# Patient Record
Sex: MALE | Race: ASIAN | NOT HISPANIC OR LATINO | Employment: OTHER | URBAN - METROPOLITAN AREA
[De-identification: names, ages, dates, MRNs, and addresses within clinical notes are randomized per-mention and may not be internally consistent; named-entity substitution may affect disease eponyms.]

---

## 2017-08-09 RX ORDER — ATORVASTATIN CALCIUM 10 MG/1
10 TABLET, FILM COATED ORAL NIGHTLY
COMMUNITY

## 2017-08-09 RX ORDER — TAMSULOSIN HYDROCHLORIDE 0.4 MG/1
0.4 CAPSULE ORAL DAILY
Status: ON HOLD | COMMUNITY
End: 2017-08-14

## 2017-08-09 RX ORDER — GLIMEPIRIDE 1 MG/1
1 TABLET ORAL EVERY MORNING
COMMUNITY

## 2017-08-14 ENCOUNTER — HOSPITAL ENCOUNTER (OUTPATIENT)
Facility: MEDICAL CENTER | Age: 77
DRG: 666 | End: 2017-08-14
Attending: UROLOGY | Admitting: UROLOGY
Payer: MEDICARE

## 2017-08-14 VITALS
HEIGHT: 69 IN | HEART RATE: 65 BPM | SYSTOLIC BLOOD PRESSURE: 134 MMHG | RESPIRATION RATE: 16 BRPM | BODY MASS INDEX: 25.8 KG/M2 | OXYGEN SATURATION: 94 % | WEIGHT: 174.16 LBS | TEMPERATURE: 97.7 F | DIASTOLIC BLOOD PRESSURE: 70 MMHG

## 2017-08-14 PROBLEM — N40.1 BENIGN PROSTATIC HYPERTROPHY WITH LOWER URINARY TRACT SYMPTOMS (LUTS): Status: ACTIVE | Noted: 2017-08-14

## 2017-08-14 LAB
GLUCOSE BLD-MCNC: 141 MG/DL (ref 65–99)
GLUCOSE BLD-MCNC: 148 MG/DL (ref 65–99)

## 2017-08-14 PROCEDURE — 0V508ZZ DESTRUCTION OF PROSTATE, VIA NATURAL OR ARTIFICIAL OPENING ENDOSCOPIC: ICD-10-PCS | Performed by: UROLOGY

## 2017-08-14 RX ORDER — ATROPA BELLADONNA AND OPIUM 16.2; 6 MG/1; MG/1
60 SUPPOSITORY RECTAL
Status: DISCONTINUED | OUTPATIENT
Start: 2017-08-14 | End: 2017-08-14 | Stop reason: HOSPADM

## 2017-08-14 RX ORDER — MAGNESIUM HYDROXIDE 1200 MG/15ML
LIQUID ORAL
Status: DISCONTINUED | OUTPATIENT
Start: 2017-08-14 | End: 2017-08-14 | Stop reason: HOSPADM

## 2017-08-14 RX ORDER — OXYCODONE HCL 5 MG/5 ML
SOLUTION, ORAL ORAL
Status: COMPLETED
Start: 2017-08-14 | End: 2017-08-14

## 2017-08-14 RX ORDER — LIDOCAINE HYDROCHLORIDE 10 MG/ML
0.5 INJECTION, SOLUTION INFILTRATION; PERINEURAL
Status: COMPLETED | OUTPATIENT
Start: 2017-08-14 | End: 2017-08-14

## 2017-08-14 RX ORDER — LIDOCAINE HYDROCHLORIDE 10 MG/ML
INJECTION, SOLUTION INFILTRATION; PERINEURAL
Status: COMPLETED
Start: 2017-08-14 | End: 2017-08-14

## 2017-08-14 RX ORDER — SILODOSIN 8 MG/1
8 CAPSULE ORAL
COMMUNITY

## 2017-08-14 RX ORDER — PHENAZOPYRIDINE HYDROCHLORIDE 200 MG/1
200 TABLET, FILM COATED ORAL 3 TIMES DAILY PRN
Qty: 12 TAB | Refills: 0 | Status: SHIPPED | OUTPATIENT
Start: 2017-08-14

## 2017-08-14 RX ORDER — LIDOCAINE AND PRILOCAINE 25; 25 MG/G; MG/G
1 CREAM TOPICAL
Status: COMPLETED | OUTPATIENT
Start: 2017-08-14 | End: 2017-08-14

## 2017-08-14 RX ORDER — MIDAZOLAM HYDROCHLORIDE 1 MG/ML
INJECTION INTRAMUSCULAR; INTRAVENOUS
Status: DISCONTINUED
Start: 2017-08-14 | End: 2017-08-14 | Stop reason: HOSPADM

## 2017-08-14 RX ORDER — SODIUM CHLORIDE 9 MG/ML
INJECTION, SOLUTION INTRAVENOUS CONTINUOUS
Status: DISCONTINUED | OUTPATIENT
Start: 2017-08-14 | End: 2017-08-14 | Stop reason: HOSPADM

## 2017-08-14 RX ORDER — OXYCODONE HYDROCHLORIDE AND ACETAMINOPHEN 5; 325 MG/1; MG/1
1 TABLET ORAL EVERY 4 HOURS PRN
Status: DISCONTINUED | OUTPATIENT
Start: 2017-08-14 | End: 2017-08-14 | Stop reason: HOSPADM

## 2017-08-14 RX ORDER — OXYCODONE HYDROCHLORIDE AND ACETAMINOPHEN 5; 325 MG/1; MG/1
1 TABLET ORAL EVERY 4 HOURS PRN
Qty: 10 TAB | Refills: 0 | Status: SHIPPED | OUTPATIENT
Start: 2017-08-14

## 2017-08-14 RX ORDER — CIPROFLOXACIN 500 MG/1
500 TABLET, FILM COATED ORAL 2 TIMES DAILY
Qty: 4 TAB | Refills: 0 | Status: ON HOLD | OUTPATIENT
Start: 2017-08-14 | End: 2017-08-17

## 2017-08-14 RX ADMIN — LIDOCAINE HYDROCHLORIDE 0.5 ML: 10 INJECTION, SOLUTION INFILTRATION; PERINEURAL at 13:45

## 2017-08-14 RX ADMIN — SODIUM CHLORIDE: 9 INJECTION, SOLUTION INTRAVENOUS at 13:45

## 2017-08-14 RX ADMIN — OXYCODONE HYDROCHLORIDE 5 MG: 5 SOLUTION ORAL at 17:14

## 2017-08-14 ASSESSMENT — PAIN SCALES - GENERAL
PAINLEVEL_OUTOF10: 3
PAINLEVEL_OUTOF10: 3
PAINLEVEL_OUTOF10: ASSUMED PAIN PRESENT
PAINLEVEL_OUTOF10: 0

## 2017-08-14 NOTE — IP AVS SNAPSHOT
" Home Care Instructions                                                                                                                Name:Miri Joseph  Medical Record Number:[id number]  CSN: [id number]    YOB: 1940   Age: 77 y.o.  Sex: male  HT:1.753 m (5' 9\") WT: 79 kg (174 lb 2.6 oz)          Admit Date: 8/14/2017     Discharge Date:   Today's Date: 8/14/2017  Attending Doctor:  Sagar Boykin M.D.                  Allergies:  Review of patient's allergies indicates no known allergies.                Discharge Instructions         ACTIVITY: Rest and take it easy for the first 24 hours.  A responsible adult is recommended to remain with you during that time.  It is normal to feel sleepy.  We encourage you to not do anything that requires balance, judgment or coordination.    MILD FLU-LIKE SYMPTOMS ARE NORMAL. YOU MAY EXPERIENCE GENERALIZED MUSCLE ACHES, THROAT IRRITATION, HEADACHE AND/OR SOME NAUSEA.    FOR 24 HOURS DO NOT:  Drive, operate machinery or run household appliances.  Drink beer or alcoholic beverages.   Make important decisions or sign legal documents.    SPECIAL INSTRUCTIONS: *Call if fever >101F, pain not controlled with medications, or if catheter not draining  *Mckee Catheter Care, Adult  A Mckee catheter is a soft, flexible tube that is placed into the bladder to drain urine. A Mckee catheter may be inserted if:  · You leak urine or are not able to control when you urinate (urinary incontinence).  · You are not able to urinate when you need to (urinary retention).  · You had prostate surgery or surgery on the genitals.  · You have certain medical conditions, such as multiple sclerosis, dementia, or a spinal cord injury.  If you are going home with a Mckee catheter in place, follow the instructions below.  TAKING CARE OF THE CATHETER  1. Wash your hands with soap and water.  2. Using mild soap and warm water on a clean washcloth:  ¨ Clean the area on your body closest to the catheter " insertion site using a circular motion, moving away from the catheter. Never wipe toward the catheter because this could sweep bacteria up into the urethra and cause infection.  ¨ Remove all traces of soap. Pat the area dry with a clean towel. For males, reposition the foreskin.  3. Attach the catheter to your leg so there is no tension on the catheter. Use adhesive tape or a leg strap. If you are using adhesive tape, remove any sticky residue left behind by the previous tape you used.  4. Keep the drainage bag below the level of the bladder, but keep it off the floor.  5. Check throughout the day to be sure the catheter is working and urine is draining freely. Make sure the tubing does not become kinked.  6. Do not pull on the catheter or try to remove it. Pulling could damage internal tissues.  TAKING CARE OF THE DRAINAGE BAGS  You will be given two drainage bags to take home. One is a large overnight drainage bag, and the other is a smaller leg bag that fits underneath clothing. You may wear the overnight bag at any time, but you should never wear the smaller leg bag at night. Follow the instructions below for how to empty, change, and clean your drainage bags.  Emptying the Drainage Bag  You must empty your drainage bag when it is  -½ full or at least 2-3 times a day.  1. Wash your hands with soap and water.  2. Keep the drainage bag below your hips, below the level of your bladder. This stops urine from going back into the tubing and into your bladder.  3. Hold the dirty bag over the toilet or a clean container.  4. Open the pour spout at the bottom of the bag and empty the urine into the toilet or container. Do not let the pour spout touch the toilet, container, or any other surface. Doing so can place bacteria on the bag, which can cause an infection.  5. Clean the pour spout with a gauze pad or cotton ball that has rubbing alcohol on it.  6. Close the pour spout.  7. Attach the bag to your leg with adhesive  tape or a leg strap.  8. Wash your hands well.  Changing the Drainage Bag  Change your drainage bag once a month or sooner if it starts to smell bad or look dirty. Below are steps to follow when changing the drainage bag.  1. Wash your hands with soap and water.  2. Pinch off the rubber catheter so that urine does not spill out.  3. Disconnect the catheter tube from the drainage tube at the connection valve. Do not let the tubes touch any surface.  4. Clean the end of the catheter tube with an alcohol wipe. Use a different alcohol wipe to clean the end of the drainage tube.  5. Connect the catheter tube to the drainage tube of the clean drainage bag.  6. Attach the new bag to the leg with adhesive tape or a leg strap. Avoid attaching the new bag too tightly.  7. Wash your hands well.  Cleaning the Drainage Bag  1. Wash your hands with soap and water.  2. Wash the bag in warm, soapy water.  3. Rinse the bag thoroughly with warm water.  4. Fill the bag with a solution of white vinegar and water (1 cup vinegar to 1 qt warm water [.2 L vinegar to 1 L warm water]). Close the bag and soak it for 30 minutes in the solution.  5. Rinse the bag with warm water.  6. Hang the bag to dry with the pour spout open and hanging downward.  7. Store the clean bag (once it is dry) in a clean plastic bag.  8. Wash your hands well.  PREVENTING INFECTION  · Wash your hands before and after handling your catheter.  · Take showers daily and wash the area where the catheter enters your body. Do not take baths. Replace wet leg straps with dry ones, if this applies.  · Do not use powders, sprays, or lotions on the genital area. Only use creams, lotions, or ointments as directed by your caregiver.  · For females, wipe from front to back after each bowel movement.  · Drink enough fluids to keep your urine clear or pale yellow unless you have a fluid restriction.  · Do not let the drainage bag or tubing touch or lie on the floor.  · Wear cotton  underwear to absorb moisture and to keep your .  SEEK MEDICAL CARE IF:   · Your urine is cloudy or smells unusually bad.  · Your catheter becomes clogged.  · You are not draining urine into the bag or your bladder feels full.  · Your catheter starts to leak.  SEEK IMMEDIATE MEDICAL CARE IF:   · You have pain, swelling, redness, or pus where the catheter enters the body.  · You have pain in the abdomen, legs, lower back, or bladder.  · You have a fever.  · You see blood fill the catheter, or your urine is pink or red.  · You have nausea, vomiting, or chills.  · Your catheter gets pulled out.  MAKE SURE YOU:   · Understand these instructions.  · Will watch your condition.  · Will get help right away if you are not doing well or get worse.     This information is not intended to replace advice given to you by your health care provider. Make sure you discuss any questions you have with your health care provider.     Document Released: 12/18/2006 Document Revised: 05/04/2015 Document Reviewed: 12/09/2013  [employer] Interactive Patient Education ©2016 [employer]  *    DIET: To avoid nausea, slowly advance diet as tolerated, avoiding spicy or greasy foods for the first day.  Add more substantial food to your diet according to your physician's instructions.  INCREASE FLUIDS AND FIBER TO AVOID CONSTIPATION.    SURGICAL DRESSING/BATHING: *ok to shower**    FOLLOW-UP APPOINTMENT:  A follow-up appointment should be arranged with your doctor in 1-2 weeks; call to schedule.    You should CALL YOUR PHYSICIAN if you develop:  Fever greater than 101 degrees F.  Pain not relieved by medication, or persistent nausea or vomiting.  Excessive bleeding (blood soaking through dressing) or unexpected drainage from the wound.  Extreme redness or swelling around the incision site, drainage of pus or foul smelling drainage.  Inability to urinate or empty your bladder within 8 hours.  Problems with breathing or chest pain.    You  should call 911 if you develop problems with breathing or chest pain.  If you are unable to contact your doctor or surgical center, you should go to the nearest emergency room or urgent care center.    Physician's telephone #: [phone]    If any questions arise, call your doctor.  If your doctor is not available, please feel free to call the Surgical Center at [phone].  The Center is open Monday through Friday from 7AM to 7PM.  You can also call the HEALTH HOTLINE open 24 hours/day, 7 days/week and speak to a nurse at [phone], or toll free at [phone].    A registered nurse may call you a few days after your surgery to see how you are doing after your procedure.    MEDICATIONS: Resume taking daily medication.  Take prescribed pain medication with food.  If no medication is prescribed, you may take non-aspirin pain medication if needed.  PAIN MEDICATION CAN BE VERY CONSTIPATING.  Take a stool softener or laxative such as senokot, pericolace, or milk of magnesia if needed.    Prescription given for **Pyridium, Cipro, Percocet*.  Last pain medication given at **5:15 p.m. - next dose is at 9:15 p.m.*.    If your physician has prescribed pain medication that includes Acetaminophen (Tylenol), do not take additional Acetaminophen (Tylenol) while taking the prescribed medication.    Depression / Suicide Risk    As you are discharged from this [facility] facility, it is important to learn how to keep safe from harming yourself.    Recognize the warning signs:  · Abrupt changes in personality, positive or negative- including increase in energy   · Giving away possessions  · Change in eating patterns- significant weight changes-  positive or negative  · Change in sleeping patterns- unable to sleep or sleeping all the time   · Unwillingness or inability to communicate  · Depression  · Unusual sadness, discouragement and loneliness  · Talk of wanting to die  · Neglect of personal  appearance   · Rebelliousness- reckless behavior  · Withdrawal from people/activities they love  · Confusion- inability to concentrate     If you or a loved one observes any of these behaviors or has concerns about self-harm, here's what you can do:  · Talk about it- your feelings and reasons for harming yourself  · Remove any means that you might use to hurt yourself (examples: pills, rope, extension cords, firearm)  · Get professional help from the community (Mental Health, Substance Abuse, psychological counseling)  · Do not be alone:Call your Safe Contact- someone whom you trust who will be there for you.  · Call your local CRISIS HOTLINE 891-7732 or 655-005-5979  · Call your local Children's Mobile Crisis Response Team [place] (421) 194-4760 or www.[employer]  · Call the toll free National Suicide Prevention Hotlines   · National Suicide Prevention Lifeline 582-483-LSYB (6716)  · [employer] Line Network 800-SUICIDE (789-9061)       Medication List      START taking these medications        Instructions    Morning Afternoon Evening Bedtime    ciprofloxacin 500 MG Tabs   Commonly known as:  CIPRO        Take 1 Tab by mouth 2 times a day.   Dose:  500 mg                        oxycodone-acetaminophen 5-325 MG Tabs   Commonly known as:  PERCOCET        Take 1 Tab by mouth every four hours as needed for Moderate Pain.   Dose:  1 Tab                        phenazopyridine 200 MG Tabs   Commonly known as:  PYRIDIUM        Take 1 Tab by mouth 3 times a day as needed (burning with urination).   Dose:  200 mg                          CONTINUE taking these medications        Instructions    Morning Afternoon Evening Bedtime    atorvastatin 10 MG Tabs   Commonly known as:  LIPITOR        Take 10 mg by mouth every evening.   Dose:  10 mg                        glimepiride 1 MG tablet   Commonly known as:  AMARYL        Take 1 mg by mouth every morning.   Dose:  1 mg                        JANUVIA 100 MG Tabs    Generic drug:  sitagliptin        Take 100 mg by mouth every day.   Dose:  100 mg                        metformin 1000 MG tablet   Commonly known as:  GLUCOPHAGE        Take 1,000 mg by mouth 2 times a day.   Dose:  1000 mg                        Non Formulary Request        Take 1 Tab by mouth every day. Cadifast from [facility] for eyes   Dose:  1 Tab                        RAPAFLO 8 MG Caps capsule   Generic drug:  silodosin        Take 8 mg by mouth ONE-HALF HOUR AFTER BREAKFAST.   Dose:  8 mg                          STOP taking these medications     aspirin 81 MG tablet               tamsulosin 0.4 MG capsule   Commonly known as:  FLOMAX                    Where to Get Your Medications      You can get these medications from any pharmacy     Bring a paper prescription for each of these medications    - ciprofloxacin 500 MG Tabs  - oxycodone-acetaminophen 5-325 MG Tabs  - phenazopyridine 200 MG Tabs            Medication Information     Above and/or attached are the medications your physician expects you to take upon discharge. Review all of your home medications and newly ordered medications with your doctor and/or pharmacist. Follow medication instructions as directed by your doctor and/or pharmacist. Please keep your medication list with you and share with your physician. Update the information when medications are discontinued, doses are changed, or new medications (including over-the-counter products) are added; and carry medication information at all times in the event of emergency situations.        Resources     Quit Smoking / Tobacco Use:    I understand the use of any tobacco products increases my chance of suffering from future heart disease or stroke and could cause other illnesses which may shorten my life. Quitting the use of tobacco products is the single most important thing I can do to improve my health. For further information on smoking / tobacco cessation call a Toll Free Quit Line at  1-969.483.6882 (*[employer] [place]) or 1-279.862.4539 (American Lung Association) or you can access the web based program at www.lungusa.org.    [place] Tobacco Users Help Line:  (604) 345-3624       Toll Free: 2-311-432-6704  Quit Tobacco Program [facility] Management Services (938)421-8302    Crisis Hotline:    [place] Crisis Hotline:  5-073-NBQULKB or 1-632.756.6851    [place] Crisis Hotline:    1-793.312.3151 or 683-085-6111    Discharge Survey:   Thank you for choosing [facility]. We hope we did everything we could to make your hospital stay a pleasant one. You may be receiving a survey and we would appreciate your time and participation in answering the questions. Your input is very valuable to us in our efforts to improve our service to our patients and their families.            Signatures     My signature on this form indicates that:    1. I acknowledge receipt and understanding of these Home Care Instruction.  2. My questions regarding this information have been answered to my satisfaction.  3. I have formulated a plan with my discharge nurse to obtain my prescribed medications for home.    __________________________________      __________________________________                   Patient Signature                                 Guardian/Responsible Adult Signature      __________________________________                 __________       ________                       Nurse Signature                                               Date                 Time

## 2017-08-14 NOTE — IP AVS SNAPSHOT
8/14/2017    Miri Joseph  [address] [place] [address]    Dear Miri:    [facility] wants to ensure your discharge home is safe and you or your loved ones have had all of your questions answered regarding your care after you leave the hospital.    Below is a list of resources and contact information should you have any questions regarding your hospital stay, follow-up instructions, or active medical symptoms.    Questions or Concerns Regarding… Contact   Medical Questions Related to Your Discharge  (7 days a week, 8am-5pm) Contact a Nurse Care Coordinator   [phone]   Medical Questions Not Related to Your Discharge  (24 hours a day / 7 days a week)  Contact the Nurse Health Line   [phone]    Medications or Discharge Instructions Refer to your discharge packet   or contact your [facility] Primary Care Provider   [phone]   Follow-up Appointment(s) Schedule your appointment via utoopia   or contact Scheduling [phone]   Billing Review your statement via utoopia  or contact Billing [phone]   Medical Records Review your records via utoopia   or contact Medical Records [phone]     You may receive a telephone call within two days of discharge. This call is to make certain you understand your discharge instructions and have the opportunity to have any questions answered. You can also easily access your medical information, test results and upcoming appointments via the utoopia free online health management tool. You can learn more and sign up at [email or web address]. For assistance setting up your utoopia account, please call [phone].    Once again, we want to ensure your discharge home is safe and that you have a clear understanding of any next steps in your care. If you have any questions or concerns, please do not hesitate to contact us, we are here for you. Thank you for choosing [facility] for your healthcare needs.    Sincerely,    Your [facility] Healthcare Team

## 2017-08-14 NOTE — OR SURGEON
Operative Report    PreOp Diagnosis: BPH    PostOp Diagnosis: BPH    Procedure(s):  TURP-VAPOR GREEN LIGHT - Wound Class: Clean Contaminated    Surgeon(s):  Sagar Boykin M.D.    Anesthesiologist/Type of Anesthesia:  Anesthesiologist: Pranav Dunbar M.D./General    Surgical Staff:  Circulator: Brittney Machado R.N.  Operating Room Assistant (ORA): Lorenzo Rudolph  Scrub Person: Alyse Heath; Zack Anderson    Specimens:  None    Estimated Blood Loss: 20ml    Findings: Bilobar BPH    Complications: none    Drain:  20Fr bishop    8/14/2017 4:40 PM Sagar Boykin

## 2017-08-15 PROCEDURE — 36415 COLL VENOUS BLD VENIPUNCTURE: CPT

## 2017-08-15 PROCEDURE — 85025 COMPLETE CBC W/AUTO DIFF WBC: CPT

## 2017-08-15 PROCEDURE — 80053 COMPREHEN METABOLIC PANEL: CPT

## 2017-08-15 PROCEDURE — 96365 THER/PROPH/DIAG IV INF INIT: CPT | Mod: XU

## 2017-08-15 PROCEDURE — 51702 INSERT TEMP BLADDER CATH: CPT

## 2017-08-15 PROCEDURE — 99285 EMERGENCY DEPT VISIT HI MDM: CPT

## 2017-08-15 PROCEDURE — 303105 HCHG CATHETER EXTRA

## 2017-08-15 NOTE — OR NURSING
Pt to recovery, sleeping, resps unlabored. Airway dc'd shortly after arrival. Pt medicated per MAR for assumed pain at surgical site. Now states pain is tolerable and declines further analgesics. Denies nausea, tolerating sips of clears. FSBS checked in PACU and = 141. No coverage required per MD. Scant pink oozing observed from penile meatus. Bishop to down drain with pink urine observed in tubing and bag. Per Dr. Boykin, pt to discharge home with bishop and leg bag. VSS. Pt's son updated by telephone to pt condition and POC.

## 2017-08-15 NOTE — OP REPORT
DATE OF SERVICE:  08/14/2017    PREOPERATIVE DIAGNOSIS:  Benign prostatic hypertrophy.    POSTOPERATIVE DIAGNOSIS:  Benign prostatic hypertrophy.    PROCEDURE PERFORMED:  GreenLight laser photovaporization of the prostate.    SURGEON:  Sagar Boykin MD.    ANESTHESIOLOGIST:  Pranav Dunbar MD.    ANESTHESIA:  General.    INDICATIONS FOR PROCEDURE:  The patient is a 77-year-old male with BPH and   bothersome despite maximal medical therapy.  After discussing treatment   options, he elected for a GreenLight laser TURP.    DESCRIPTION OF PROCEDURE:  After obtaining informed consent, the patient was   brought to the operating room.  After the induction of general anesthesia, the   patient was placed in dorsal lithotomy position and his genitalia was prepped   and draped in sterile fashion.  Patient received ciprofloxacin as antibiotic   prophylaxis prior to starting the procedure, after which a laser scope was   passed per urethra into the bladder under direct vision using a visual   obturator after which a GreenLight laser fiber was passed through the working   bridge.  The prostate and bladder were examined.  The bladder was normal in   appearance other than moderate trabeculation.  Ureteral orifices were in   normal location.  The bladder showed a high bladder neck and bilobar BPH.  The   obstructing prostatic tissue was vaporized starting at the bladder neck and   working back to the verumontanum on each lobe separately.  There was a good   open channel at the end of procedure with minimal bleeding.  The patient's   bladder was left full.  Scope was removed and a 20-Beninese Mckee catheter was   placed, 30 mL of sterile water placed in the balloon and hooked up to down   drain after irrigating with a Kena syringe and it was draining light pink   urine.  The patient was then awoken from anesthesia and taken to recovery room   in stable condition.    COMPLICATIONS:  None.    ESTIMATED BLOOD LOSS:  20  mL.    SPECIMENS:  None.    DRAINS:  A 20-Sinhala Mckee catheter.    PLAN:  For the patient to go home with Mckee catheter today and follow up in   the office tomorrow for Mckee catheter removal.       ____________________________________     MD ARSEN Cochran / NTS    DD:  08/14/2017 16:46:27  DT:  08/14/2017 17:42:40    D#:  [id number]  Job#:  [id number]

## 2017-08-15 NOTE — DISCHARGE INSTRUCTIONS
ACTIVITY: Rest and take it easy for the first 24 hours.  A responsible adult is recommended to remain with you during that time.  It is normal to feel sleepy.  We encourage you to not do anything that requires balance, judgment or coordination.    MILD FLU-LIKE SYMPTOMS ARE NORMAL. YOU MAY EXPERIENCE GENERALIZED MUSCLE ACHES, THROAT IRRITATION, HEADACHE AND/OR SOME NAUSEA.    FOR 24 HOURS DO NOT:  Drive, operate machinery or run household appliances.  Drink beer or alcoholic beverages.   Make important decisions or sign legal documents.    SPECIAL INSTRUCTIONS: *Call if fever >101F, pain not controlled with medications, or if catheter not draining  *Mckee Catheter Care, Adult  A Mckee catheter is a soft, flexible tube that is placed into the bladder to drain urine. A Mckee catheter may be inserted if:  · You leak urine or are not able to control when you urinate (urinary incontinence).  · You are not able to urinate when you need to (urinary retention).  · You had prostate surgery or surgery on the genitals.  · You have certain medical conditions, such as multiple sclerosis, dementia, or a spinal cord injury.  If you are going home with a Mckee catheter in place, follow the instructions below.  TAKING CARE OF THE CATHETER  1. Wash your hands with soap and water.  2. Using mild soap and warm water on a clean washcloth:  ¨ Clean the area on your body closest to the catheter insertion site using a circular motion, moving away from the catheter. Never wipe toward the catheter because this could sweep bacteria up into the urethra and cause infection.  ¨ Remove all traces of soap. Pat the area dry with a clean towel. For males, reposition the foreskin.  3. Attach the catheter to your leg so there is no tension on the catheter. Use adhesive tape or a leg strap. If you are using adhesive tape, remove any sticky residue left behind by the previous tape you used.  4. Keep the drainage bag below the level of the bladder, but  keep it off the floor.  5. Check throughout the day to be sure the catheter is working and urine is draining freely. Make sure the tubing does not become kinked.  6. Do not pull on the catheter or try to remove it. Pulling could damage internal tissues.  TAKING CARE OF THE DRAINAGE BAGS  You will be given two drainage bags to take home. One is a large overnight drainage bag, and the other is a smaller leg bag that fits underneath clothing. You may wear the overnight bag at any time, but you should never wear the smaller leg bag at night. Follow the instructions below for how to empty, change, and clean your drainage bags.  Emptying the Drainage Bag  You must empty your drainage bag when it is  -½ full or at least 2-3 times a day.  1. Wash your hands with soap and water.  2. Keep the drainage bag below your hips, below the level of your bladder. This stops urine from going back into the tubing and into your bladder.  3. Hold the dirty bag over the toilet or a clean container.  4. Open the pour spout at the bottom of the bag and empty the urine into the toilet or container. Do not let the pour spout touch the toilet, container, or any other surface. Doing so can place bacteria on the bag, which can cause an infection.  5. Clean the pour spout with a gauze pad or cotton ball that has rubbing alcohol on it.  6. Close the pour spout.  7. Attach the bag to your leg with adhesive tape or a leg strap.  8. Wash your hands well.  Changing the Drainage Bag  Change your drainage bag once a month or sooner if it starts to smell bad or look dirty. Below are steps to follow when changing the drainage bag.  1. Wash your hands with soap and water.  2. Pinch off the rubber catheter so that urine does not spill out.  3. Disconnect the catheter tube from the drainage tube at the connection valve. Do not let the tubes touch any surface.  4. Clean the end of the catheter tube with an alcohol wipe. Use a different alcohol wipe to clean  the end of the drainage tube.  5. Connect the catheter tube to the drainage tube of the clean drainage bag.  6. Attach the new bag to the leg with adhesive tape or a leg strap. Avoid attaching the new bag too tightly.  7. Wash your hands well.  Cleaning the Drainage Bag  1. Wash your hands with soap and water.  2. Wash the bag in warm, soapy water.  3. Rinse the bag thoroughly with warm water.  4. Fill the bag with a solution of white vinegar and water (1 cup vinegar to 1 qt warm water [.2 L vinegar to 1 L warm water]). Close the bag and soak it for 30 minutes in the solution.  5. Rinse the bag with warm water.  6. Hang the bag to dry with the pour spout open and hanging downward.  7. Store the clean bag (once it is dry) in a clean plastic bag.  8. Wash your hands well.  PREVENTING INFECTION  · Wash your hands before and after handling your catheter.  · Take showers daily and wash the area where the catheter enters your body. Do not take baths. Replace wet leg straps with dry ones, if this applies.  · Do not use powders, sprays, or lotions on the genital area. Only use creams, lotions, or ointments as directed by your caregiver.  · For females, wipe from front to back after each bowel movement.  · Drink enough fluids to keep your urine clear or pale yellow unless you have a fluid restriction.  · Do not let the drainage bag or tubing touch or lie on the floor.  · Wear cotton underwear to absorb moisture and to keep your .  SEEK MEDICAL CARE IF:   · Your urine is cloudy or smells unusually bad.  · Your catheter becomes clogged.  · You are not draining urine into the bag or your bladder feels full.  · Your catheter starts to leak.  SEEK IMMEDIATE MEDICAL CARE IF:   · You have pain, swelling, redness, or pus where the catheter enters the body.  · You have pain in the abdomen, legs, lower back, or bladder.  · You have a fever.  · You see blood fill the catheter, or your urine is pink or red.  · You have nausea,  vomiting, or chills.  · Your catheter gets pulled out.  MAKE SURE YOU:   · Understand these instructions.  · Will watch your condition.  · Will get help right away if you are not doing well or get worse.     This information is not intended to replace advice given to you by your health care provider. Make sure you discuss any questions you have with your health care provider.     Document Released: 12/18/2006 Document Revised: 05/04/2015 Document Reviewed: 12/09/2013  [employer] Interactive Patient Education ©2016 [employer]  *    DIET: To avoid nausea, slowly advance diet as tolerated, avoiding spicy or greasy foods for the first day.  Add more substantial food to your diet according to your physician's instructions.  INCREASE FLUIDS AND FIBER TO AVOID CONSTIPATION.    SURGICAL DRESSING/BATHING: *ok to shower**    FOLLOW-UP APPOINTMENT:  A follow-up appointment should be arranged with your doctor in 1-2 weeks; call to schedule.    You should CALL YOUR PHYSICIAN if you develop:  Fever greater than 101 degrees F.  Pain not relieved by medication, or persistent nausea or vomiting.  Excessive bleeding (blood soaking through dressing) or unexpected drainage from the wound.  Extreme redness or swelling around the incision site, drainage of pus or foul smelling drainage.  Inability to urinate or empty your bladder within 8 hours.  Problems with breathing or chest pain.    You should call 911 if you develop problems with breathing or chest pain.  If you are unable to contact your doctor or surgical center, you should go to the nearest emergency room or urgent care center.    Physician's telephone #: [phone]    If any questions arise, call your doctor.  If your doctor is not available, please feel free to call the Surgical Center at [phone].  The Center is open Monday through Friday from 7AM to 7PM.  You can also call the HEALTH HOTLINE open 24 hours/day, 7 days/week and speak to a nurse at [phone], or  toll free at [phone].    A registered nurse may call you a few days after your surgery to see how you are doing after your procedure.    MEDICATIONS: Resume taking daily medication.  Take prescribed pain medication with food.  If no medication is prescribed, you may take non-aspirin pain medication if needed.  PAIN MEDICATION CAN BE VERY CONSTIPATING.  Take a stool softener or laxative such as senokot, pericolace, or milk of magnesia if needed.    Prescription given for **Pyridium, Cipro, Percocet*.  Last pain medication given at **5:15 p.m. - next dose is at 9:15 p.m.*.    If your physician has prescribed pain medication that includes Acetaminophen (Tylenol), do not take additional Acetaminophen (Tylenol) while taking the prescribed medication.    Depression / Suicide Risk    As you are discharged from this [facility] facility, it is important to learn how to keep safe from harming yourself.    Recognize the warning signs:  · Abrupt changes in personality, positive or negative- including increase in energy   · Giving away possessions  · Change in eating patterns- significant weight changes-  positive or negative  · Change in sleeping patterns- unable to sleep or sleeping all the time   · Unwillingness or inability to communicate  · Depression  · Unusual sadness, discouragement and loneliness  · Talk of wanting to die  · Neglect of personal appearance   · Rebelliousness- reckless behavior  · Withdrawal from people/activities they love  · Confusion- inability to concentrate     If you or a loved one observes any of these behaviors or has concerns about self-harm, here's what you can do:  · Talk about it- your feelings and reasons for harming yourself  · Remove any means that you might use to hurt yourself (examples: pills, rope, extension cords, firearm)  · Get professional help from the community (Mental Health, Substance Abuse, psychological counseling)  · Do not be alone:Call your Safe Contact- someone  whom you trust who will be there for you.  · Call your local CRISIS HOTLINE 545-7439 or 516-564-5926  · Call your local Children's Mobile Crisis Response Team [place] (650) 271-2211 or www.HubCast  · Call the toll free National Suicide Prevention Hotlines   · National Suicide Prevention Lifeline 785-283-LDTQ (4525)  · [employer] Network 800-SUICIDE (771-4041)

## 2017-08-16 ENCOUNTER — HOSPITAL ENCOUNTER (INPATIENT)
Facility: MEDICAL CENTER | Age: 77
LOS: 1 days | DRG: 666 | End: 2017-08-17
Attending: EMERGENCY MEDICINE | Admitting: FAMILY MEDICINE
Payer: MEDICARE

## 2017-08-16 DIAGNOSIS — E87.1 HYPONATREMIA: ICD-10-CM

## 2017-08-16 DIAGNOSIS — N30.00 ACUTE CYSTITIS WITHOUT HEMATURIA: ICD-10-CM

## 2017-08-16 DIAGNOSIS — R33.9 URINARY RETENTION: ICD-10-CM

## 2017-08-16 PROBLEM — N39.0 UTI (URINARY TRACT INFECTION): Status: ACTIVE | Noted: 2017-08-16

## 2017-08-16 LAB
ALBUMIN SERPL BCP-MCNC: 4 G/DL (ref 3.2–4.9)
ALBUMIN/GLOB SERPL: 1.3 G/DL
ALP SERPL-CCNC: 54 U/L (ref 30–99)
ALT SERPL-CCNC: 39 U/L (ref 2–50)
ANION GAP SERPL CALC-SCNC: 11 MMOL/L (ref 0–11.9)
ANION GAP SERPL CALC-SCNC: 8 MMOL/L (ref 0–11.9)
ANION GAP SERPL CALC-SCNC: 9 MMOL/L (ref 0–11.9)
APPEARANCE UR: ABNORMAL
AST SERPL-CCNC: 34 U/L (ref 12–45)
BACTERIA #/AREA URNS HPF: ABNORMAL /HPF
BASOPHILS # BLD AUTO: 0.1 % (ref 0–1.8)
BASOPHILS # BLD: 0.02 K/UL (ref 0–0.12)
BILIRUB SERPL-MCNC: 1 MG/DL (ref 0.1–1.5)
BILIRUB UR QL STRIP.AUTO: ABNORMAL
BUN SERPL-MCNC: 10 MG/DL (ref 8–22)
BUN SERPL-MCNC: 10 MG/DL (ref 8–22)
BUN SERPL-MCNC: 9 MG/DL (ref 8–22)
CALCIUM SERPL-MCNC: 8.7 MG/DL (ref 8.5–10.5)
CALCIUM SERPL-MCNC: 8.8 MG/DL (ref 8.5–10.5)
CALCIUM SERPL-MCNC: 9.1 MG/DL (ref 8.5–10.5)
CHLORIDE SERPL-SCNC: 83 MMOL/L (ref 96–112)
CHLORIDE SERPL-SCNC: 83 MMOL/L (ref 96–112)
CHLORIDE SERPL-SCNC: 89 MMOL/L (ref 96–112)
CHLORIDE SERPL-SCNC: 90 MMOL/L (ref 96–112)
CHLORIDE SERPL-SCNC: 93 MMOL/L (ref 96–112)
CO2 SERPL-SCNC: 22 MMOL/L (ref 20–33)
CO2 SERPL-SCNC: 24 MMOL/L (ref 20–33)
CO2 SERPL-SCNC: 24 MMOL/L (ref 20–33)
CO2 SERPL-SCNC: 25 MMOL/L (ref 20–33)
CO2 SERPL-SCNC: 26 MMOL/L (ref 20–33)
COLOR UR: ABNORMAL
CREAT SERPL-MCNC: 0.64 MG/DL (ref 0.5–1.4)
CREAT SERPL-MCNC: 0.65 MG/DL (ref 0.5–1.4)
CREAT SERPL-MCNC: 0.66 MG/DL (ref 0.5–1.4)
CREAT SERPL-MCNC: 0.68 MG/DL (ref 0.5–1.4)
CREAT SERPL-MCNC: 0.73 MG/DL (ref 0.5–1.4)
CULTURE IF INDICATED INDCX: YES UA CULTURE
EOSINOPHIL # BLD AUTO: 0.13 K/UL (ref 0–0.51)
EOSINOPHIL NFR BLD: 0.9 % (ref 0–6.9)
EPI CELLS #/AREA URNS HPF: ABNORMAL /HPF
ERYTHROCYTE [DISTWIDTH] IN BLOOD BY AUTOMATED COUNT: 39 FL (ref 35.9–50)
GFR SERPL CREATININE-BSD FRML MDRD: >60 ML/MIN/1.73 M 2
GLOBULIN SER CALC-MCNC: 3 G/DL (ref 1.9–3.5)
GLUCOSE BLD-MCNC: 182 MG/DL (ref 65–99)
GLUCOSE BLD-MCNC: 202 MG/DL (ref 65–99)
GLUCOSE BLD-MCNC: 247 MG/DL (ref 65–99)
GLUCOSE SERPL-MCNC: 172 MG/DL (ref 65–99)
GLUCOSE SERPL-MCNC: 200 MG/DL (ref 65–99)
GLUCOSE SERPL-MCNC: 201 MG/DL (ref 65–99)
GLUCOSE SERPL-MCNC: 210 MG/DL (ref 65–99)
GLUCOSE SERPL-MCNC: 218 MG/DL (ref 65–99)
GLUCOSE UR STRIP.AUTO-MCNC: >=1000 MG/DL
HCT VFR BLD AUTO: 37.9 % (ref 42–52)
HGB BLD-MCNC: 13.8 G/DL (ref 14–18)
IMM GRANULOCYTES # BLD AUTO: 0.05 K/UL (ref 0–0.11)
IMM GRANULOCYTES NFR BLD AUTO: 0.4 % (ref 0–0.9)
KETONES UR STRIP.AUTO-MCNC: NEGATIVE MG/DL
LEUKOCYTE ESTERASE UR QL STRIP.AUTO: ABNORMAL
LYMPHOCYTES # BLD AUTO: 2.87 K/UL (ref 1–4.8)
LYMPHOCYTES NFR BLD: 20.3 % (ref 22–41)
MCH RBC QN AUTO: 29.9 PG (ref 27–33)
MCHC RBC AUTO-ENTMCNC: 36.4 G/DL (ref 33.7–35.3)
MCV RBC AUTO: 82 FL (ref 81.4–97.8)
MICRO URNS: ABNORMAL
MONOCYTES # BLD AUTO: 1.04 K/UL (ref 0–0.85)
MONOCYTES NFR BLD AUTO: 7.3 % (ref 0–13.4)
MUCOUS THREADS #/AREA URNS HPF: ABNORMAL /HPF
NEUTROPHILS # BLD AUTO: 10.05 K/UL (ref 1.82–7.42)
NEUTROPHILS NFR BLD: 71 % (ref 44–72)
NITRITE UR QL STRIP.AUTO: POSITIVE
NRBC # BLD AUTO: 0 K/UL
NRBC BLD AUTO-RTO: 0 /100 WBC
PH UR STRIP.AUTO: 6.5 [PH]
PLATELET # BLD AUTO: 218 K/UL (ref 164–446)
PMV BLD AUTO: 9.8 FL (ref 9–12.9)
POTASSIUM SERPL-SCNC: 3.5 MMOL/L (ref 3.6–5.5)
POTASSIUM SERPL-SCNC: 3.7 MMOL/L (ref 3.6–5.5)
POTASSIUM SERPL-SCNC: 3.9 MMOL/L (ref 3.6–5.5)
POTASSIUM SERPL-SCNC: 3.9 MMOL/L (ref 3.6–5.5)
POTASSIUM SERPL-SCNC: 4.1 MMOL/L (ref 3.6–5.5)
PROT SERPL-MCNC: 7 G/DL (ref 6–8.2)
PROT UR QL STRIP: 300 MG/DL
RBC # BLD AUTO: 4.62 M/UL (ref 4.7–6.1)
RBC # URNS HPF: >150 /HPF
RBC UR QL AUTO: ABNORMAL
SODIUM SERPL-SCNC: 116 MMOL/L (ref 135–145)
SODIUM SERPL-SCNC: 118 MMOL/L (ref 135–145)
SODIUM SERPL-SCNC: 122 MMOL/L (ref 135–145)
SODIUM SERPL-SCNC: 123 MMOL/L (ref 135–145)
SODIUM SERPL-SCNC: 126 MMOL/L (ref 135–145)
SP GR UR STRIP.AUTO: 1.02
UROBILINOGEN UR STRIP.AUTO-MCNC: 1 MG/DL
WBC # BLD AUTO: 14.2 K/UL (ref 4.8–10.8)
WBC #/AREA URNS HPF: ABNORMAL /HPF

## 2017-08-16 PROCEDURE — 80048 BASIC METABOLIC PNL TOTAL CA: CPT

## 2017-08-16 PROCEDURE — 81001 URINALYSIS AUTO W/SCOPE: CPT

## 2017-08-16 PROCEDURE — 36415 COLL VENOUS BLD VENIPUNCTURE: CPT

## 2017-08-16 PROCEDURE — 700105 HCHG RX REV CODE 258: Performed by: EMERGENCY MEDICINE

## 2017-08-16 PROCEDURE — 82962 GLUCOSE BLOOD TEST: CPT

## 2017-08-16 PROCEDURE — 770020 HCHG ROOM/CARE - TELE (206)

## 2017-08-16 PROCEDURE — 700111 HCHG RX REV CODE 636 W/ 250 OVERRIDE (IP): Performed by: EMERGENCY MEDICINE

## 2017-08-16 PROCEDURE — 700111 HCHG RX REV CODE 636 W/ 250 OVERRIDE (IP): Performed by: FAMILY MEDICINE

## 2017-08-16 PROCEDURE — 700102 HCHG RX REV CODE 250 W/ 637 OVERRIDE(OP): Performed by: FAMILY MEDICINE

## 2017-08-16 PROCEDURE — 87086 URINE CULTURE/COLONY COUNT: CPT

## 2017-08-16 PROCEDURE — A9270 NON-COVERED ITEM OR SERVICE: HCPCS | Performed by: FAMILY MEDICINE

## 2017-08-16 RX ORDER — POLYETHYLENE GLYCOL 3350 17 G/17G
1 POWDER, FOR SOLUTION ORAL
Status: DISCONTINUED | OUTPATIENT
Start: 2017-08-16 | End: 2017-08-17 | Stop reason: HOSPADM

## 2017-08-16 RX ORDER — ACETAMINOPHEN 325 MG/1
650 TABLET ORAL EVERY 6 HOURS PRN
Status: DISCONTINUED | OUTPATIENT
Start: 2017-08-16 | End: 2017-08-17 | Stop reason: HOSPADM

## 2017-08-16 RX ORDER — TAMSULOSIN HYDROCHLORIDE 0.4 MG/1
0.4 CAPSULE ORAL
Status: DISCONTINUED | OUTPATIENT
Start: 2017-08-16 | End: 2017-08-17 | Stop reason: HOSPADM

## 2017-08-16 RX ORDER — PHENAZOPYRIDINE HYDROCHLORIDE 200 MG/1
200 TABLET, FILM COATED ORAL 3 TIMES DAILY PRN
Status: DISCONTINUED | OUTPATIENT
Start: 2017-08-16 | End: 2017-08-17 | Stop reason: HOSPADM

## 2017-08-16 RX ORDER — SODIUM CHLORIDE 9 MG/ML
1000 INJECTION, SOLUTION INTRAVENOUS ONCE
Status: COMPLETED | OUTPATIENT
Start: 2017-08-16 | End: 2017-08-16

## 2017-08-16 RX ORDER — GLIMEPIRIDE 2 MG/1
1 TABLET ORAL EVERY MORNING
Status: DISCONTINUED | OUTPATIENT
Start: 2017-08-16 | End: 2017-08-17 | Stop reason: HOSPADM

## 2017-08-16 RX ORDER — BISACODYL 10 MG
10 SUPPOSITORY, RECTAL RECTAL
Status: DISCONTINUED | OUTPATIENT
Start: 2017-08-16 | End: 2017-08-17 | Stop reason: HOSPADM

## 2017-08-16 RX ORDER — CEFTRIAXONE 2 G/1
2 INJECTION, POWDER, FOR SOLUTION INTRAMUSCULAR; INTRAVENOUS ONCE
Status: COMPLETED | OUTPATIENT
Start: 2017-08-16 | End: 2017-08-16

## 2017-08-16 RX ORDER — ATORVASTATIN CALCIUM 10 MG/1
10 TABLET, FILM COATED ORAL NIGHTLY
Status: DISCONTINUED | OUTPATIENT
Start: 2017-08-16 | End: 2017-08-17 | Stop reason: HOSPADM

## 2017-08-16 RX ORDER — CIPROFLOXACIN 500 MG/1
500 TABLET, FILM COATED ORAL ONCE
Status: ACTIVE | OUTPATIENT
Start: 2017-08-16 | End: 2017-08-17

## 2017-08-16 RX ORDER — SILODOSIN 8 MG/1
8 CAPSULE ORAL
Status: DISCONTINUED | OUTPATIENT
Start: 2017-08-16 | End: 2017-08-16

## 2017-08-16 RX ORDER — ENALAPRILAT 1.25 MG/ML
1.25 INJECTION INTRAVENOUS EVERY 6 HOURS PRN
Status: DISCONTINUED | OUTPATIENT
Start: 2017-08-16 | End: 2017-08-17 | Stop reason: HOSPADM

## 2017-08-16 RX ORDER — SODIUM CHLORIDE 9 MG/ML
INJECTION, SOLUTION INTRAVENOUS
Status: ACTIVE
Start: 2017-08-16 | End: 2017-08-16

## 2017-08-16 RX ORDER — OXYCODONE HYDROCHLORIDE AND ACETAMINOPHEN 5; 325 MG/1; MG/1
1 TABLET ORAL EVERY 4 HOURS PRN
Status: DISCONTINUED | OUTPATIENT
Start: 2017-08-16 | End: 2017-08-17 | Stop reason: HOSPADM

## 2017-08-16 RX ORDER — AMOXICILLIN 250 MG
2 CAPSULE ORAL 2 TIMES DAILY
Status: DISCONTINUED | OUTPATIENT
Start: 2017-08-16 | End: 2017-08-17 | Stop reason: HOSPADM

## 2017-08-16 RX ORDER — DEXTROSE MONOHYDRATE 25 G/50ML
25 INJECTION, SOLUTION INTRAVENOUS
Status: DISCONTINUED | OUTPATIENT
Start: 2017-08-16 | End: 2017-08-17 | Stop reason: HOSPADM

## 2017-08-16 RX ADMIN — ATORVASTATIN CALCIUM 10 MG: 10 TABLET, FILM COATED ORAL at 21:26

## 2017-08-16 RX ADMIN — TAMSULOSIN HYDROCHLORIDE 0.4 MG: 0.4 CAPSULE ORAL at 10:02

## 2017-08-16 RX ADMIN — STANDARDIZED SENNA CONCENTRATE AND DOCUSATE SODIUM 2 TABLET: 8.6; 5 TABLET, FILM COATED ORAL at 21:26

## 2017-08-16 RX ADMIN — METFORMIN HYDROCHLORIDE 1000 MG: 500 TABLET, FILM COATED ORAL at 21:26

## 2017-08-16 RX ADMIN — SODIUM CHLORIDE 1000 ML: 9 INJECTION, SOLUTION INTRAVENOUS at 03:42

## 2017-08-16 RX ADMIN — CEFTRIAXONE SODIUM 2 G: 2 INJECTION, POWDER, FOR SOLUTION INTRAMUSCULAR; INTRAVENOUS at 04:42

## 2017-08-16 RX ADMIN — ENOXAPARIN SODIUM 40 MG: 100 INJECTION SUBCUTANEOUS at 10:02

## 2017-08-16 RX ADMIN — STANDARDIZED SENNA CONCENTRATE AND DOCUSATE SODIUM 2 TABLET: 8.6; 5 TABLET, FILM COATED ORAL at 02:00

## 2017-08-16 RX ADMIN — INSULIN LISPRO 2 UNITS: 100 INJECTION, SOLUTION INTRAVENOUS; SUBCUTANEOUS at 21:30

## 2017-08-16 ASSESSMENT — COPD QUESTIONNAIRES
DURING THE PAST 4 WEEKS HOW MUCH DID YOU FEEL SHORT OF BREATH: NONE/LITTLE OF THE TIME
HAVE YOU SMOKED AT LEAST 100 CIGARETTES IN YOUR ENTIRE LIFE: NO/DON'T KNOW
DO YOU EVER COUGH UP ANY MUCUS OR PHLEGM?: NO/ONLY WITH OCCASIONAL COLDS OR INFECTIONS
COPD SCREENING SCORE: 0

## 2017-08-16 ASSESSMENT — LIFESTYLE VARIABLES
DO YOU DRINK ALCOHOL: NO
DO YOU DRINK ALCOHOL: NO

## 2017-08-16 ASSESSMENT — PAIN SCALES - GENERAL
PAINLEVEL_OUTOF10: 0

## 2017-08-16 NOTE — ED NOTES
Lab called with critical result of sodium of 116 at 0328. Critical lab result read back to .   Dr. Johnson notified of critical lab result at 0328.  Critical lab result read back by Dr. Johnson.

## 2017-08-16 NOTE — ED PROVIDER NOTES
ED Provider Note    Scribed for Connie Johnson M.D. by Christian Jones. 8/16/2017, 2:49 AM.    Primary care provider: Danielle Dawson M.D.  Means of arrival: walk-in  History obtained from: patient, patient's family  History limited by: none    CHIEF COMPLAINT  Chief Complaint   Patient presents with   • N/V     Since aprox 1700. Pt's family reports two episodes of emesis PTA.   • Urinary Retention     since aprox 1700.  Pt had urinary catheter removed at 1100 8/15 w/o complication       HPI  Miri Joseph is a 77 y.o. male who presents to the Emergency Department for evaluation of urinary retention onset tonight. Per patient's family, the patient had a prostate reduction surgery by Dr. Boykin (General Surgery) two days ago. Yesterday, the patient had a urinary catheter removed in the morning without complication. However, last night, patient was unable to urinate. Family also endorses associated nausea and vomiting. The patient's family reports the patient had two episodes of emesis tonight. Patient's family also confirms a history of diabetes. Family denies fevers, shortness of breath.     REVIEW OF SYSTEMS  Pertinent positives include urinary retention, recent prostate reduction surgery, nausea, vomiting. Pertinent negatives include no fevers, shortness of breath.  All other systems reviewed and negative.    C.     PAST MEDICAL HISTORY   has a past medical history of Diabetes (CMS-[facility]).    SURGICAL HISTORY   has past surgical history that includes turp-vapor (8/14/2017).    SOCIAL HISTORY  Social History   Substance Use Topics   • Smoking status: Never Smoker    • Smokeless tobacco: Never Used   • Alcohol Use: No      History   Drug Use No       FAMILY HISTORY  No family history on file.    CURRENT MEDICATIONS  No current facility-administered medications on file prior to encounter.     Current Outpatient Prescriptions on File Prior to Encounter   Medication Sig Dispense Refill   • silodosin (RAPAFLO) 8 MG Cap  capsule Take 8 mg by mouth ONE-HALF HOUR AFTER BREAKFAST.     • Non Formulary Request Take 1 Tab by mouth every day. Cadifast from [facility] for eyes     • oxycodone-acetaminophen (PERCOCET) 5-325 MG Tab Take 1 Tab by mouth every four hours as needed for Moderate Pain. 10 Tab 0   • ciprofloxacin (CIPRO) 500 MG Tab Take 1 Tab by mouth 2 times a day. 4 Tab 0   • phenazopyridine (PYRIDIUM) 200 MG Tab Take 1 Tab by mouth 3 times a day as needed (burning with urination). 12 Tab 0   • atorvastatin (LIPITOR) 10 MG Tab Take 10 mg by mouth every evening.     • glimepiride (AMARYL) 1 MG tablet Take 1 mg by mouth every morning.     • sitagliptin (JANUVIA) 100 MG Tab Take 100 mg by mouth every day.     • metformin (GLUCOPHAGE) 1000 MG tablet Take 1,000 mg by mouth 2 times a day.        ALLERGIES  No Known Allergies    PHYSICAL EXAM  Vital Signs: /62 mmHg  Pulse 62  Temp(Src) 36.7 °C (98.1 °F)  Resp 14  Wt 83.5 kg (184 lb 1.4 oz)  SpO2 95%  Constitutional: Alert, no acute distress  HENT: Normocephalic, atraumatic, moist mucus membranes  Eyes: Pupils equal and reactive, normal conjunctiva, non-icteric  Neck: Supple, normal range of motion, no stridor  Cardiovascular: Regular rhythm, Normal peripheral perfusion, no cyanosis, Normal cardiac auscultation  Pulmonary: No respiratory distress, normal work of breathing, no accessory muscle usage, Clear to auscultation bilaterally  Abdomen: Distended, palpable bladder, no peritoneal signs, no overlying skin changes.   Skin: Warm, dry, no rashes or lesions  Back: No pain with active range of motion  Musculoskeletal: Normal range of motion in all extremities, no swelling or deformity noted  Neurologic: Alert, responsive  Psychiatric: Normal and appropriate mood and affect    DIAGNOSTIC STUDIES/PROCEDURES:    LABS  Labs Reviewed   CBC WITH DIFFERENTIAL - Abnormal; Notable for the following:     WBC 14.2 (*)     RBC 4.62 (*)     Hemoglobin 13.8 (*)     Hematocrit 37.9 (*)     MCHC  36.4 (*)     Lymphocytes 20.30 (*)     Neutrophils (Absolute) 10.05 (*)     Monos (Absolute) 1.04 (*)     All other components within normal limits   COMP METABOLIC PANEL - Abnormal; Notable for the following:     Sodium 116 (*)     Potassium 3.5 (*)     Chloride 83 (*)     Glucose 218 (*)     All other components within normal limits   URINALYSIS,CULTURE IF INDICATED - Abnormal; Notable for the following:     Character Bloody (*)     Glucose >=1000 (*)     Protein 300 (*)     Bilirubin Small (*)     Nitrite Positive (*)     Leukocyte Esterase Moderate (*)     Occult Blood Large (*)     All other components within normal limits   URINE MICROSCOPIC (W/UA) - Abnormal; Notable for the following:     WBC 5-10 (*)     RBC >150 (*)     Bacteria Few (*)     All other components within normal limits   ESTIMATED GFR   URINE CULTURE(NEW)   POCT URINALYSIS     All labs reviewed by me.    Radiology results revealed:   No orders to display        COURSE & MEDICAL DECISION MAKING  Pertinent Labs & Imaging studies reviewed. (See chart for details)    Review of old medical records for continuity of care. Operative report reviewed, patient had laser photo vaporization of the prostate performed on 8/14/17 by Dr. Boykin. Previous laboratory results reviewed, sodium on 8/9/17 is 131.    Differential diagnoses include but are not limited to: Urinary tract infection, urinary retention, electrolyte abnormality, dehydration    11:40 PM - Ordered estimated GFR, CBC with differential, CMP, POCT UA per protocol.     2:49 AM - Patient seen and examined at bedside. Performed transabdominal US at bedside.    Decision Making:  This is a 77 y.o. year old male who presents with urinary retention. He is POD #2 s/p laser TURP, had his Mckee catheter removed yesterday and was urinating normally until this evening when he was no longer able to pass urine. Family also reports that he developed vomiting and has seemed a little more confused than  usual.    On arrival to the emergency department bladder is palpable, it was visualized under ultrasound and noted to be large and distended. Mckee catheter was placed with 1200 mL returned and resolution of vomiting.    On laboratory evaluation sodium was noted to be 116. I discussed this finding with the family, they stated that the patient was told to drink large amounts of fluids after the procedure, they stated he has been drinking very large amounts of water since that time. Additionally, they state that over the last day or so he seemed a little more confused than usual, they have not noticed any other symptoms with exception of urinary retention and vomiting. White blood count is mildly elevated at 14.2, this may be infectious or reactive. Urinalysis is nitrite positive with bacteria, white blood cells and red blood cells. Rocephin given in the emergency department given evidence of urinary tract infection and elevated white blood count. Urine culture sent and pending at this time.    Plan at this time is for admission to [facility] for sodium correction and continued antibiotics. Discussed urology consult with admitting physician, she kindly agrees to contact urology consult during daylight hours to see the patient in the hospital if necessary.    Admit to [facility] in stable condition    FINAL IMPRESSION  1. Hyponatremia    2. Urinary retention    3. Acute cystitis without hematuria          Christian ARCE (Scribe), am scribing for, and in the presence of, Connie Johnson M.D..    Electronically signed by: Christian Jones (Scribjarrod), 8/16/2017    Connie ARCE M.D. personally performed the services described in this documentation, as scribed by Christian Jones in my presence, and it is both accurate and complete.    The note accurately reflects work and decisions made by me.  Connie Johnson  8/16/2017  4:39 AM

## 2017-08-16 NOTE — IP AVS SNAPSHOT
Home Care Instructions                                                                                                                  Name:Miri Joseph  Medical Record Number:[id number]  CSN: [id number]    YOB: 1940   Age: 77 y.o.  Sex: male  HT:  WT: 77.5 kg (170 lb 13.7 oz)          Admit Date: 8/16/2017     Discharge Date:   Today's Date: 8/17/2017  Attending Doctor:  Kisha Donovan M.D.                  Allergies:  Review of patient's allergies indicates no known allergies.            Discharge Instructions       Discharge Instructions    Discharged to home by car with relative. Discharged via wheelchair, hospital escort: Yes.  Special equipment needed: Walker    Be sure to schedule a follow-up appointment with your primary care doctor or any specialists as instructed.     Discharge Plan:   Diet Plan: Discussed  Activity Level: Discussed  Confirmed Follow up Appointment: Patient to Call and Schedule Appointment  Confirmed Symptoms Management: Discussed  Medication Reconciliation Updated: Yes  Influenza Vaccine Indication: Indicated: Not available from distributor/    I understand that a diet low in cholesterol, fat, and sodium is recommended for good health. Unless I have been given specific instructions below for another diet, I accept this instruction as my diet prescription.   Other diet: Diabetic    Special Instructions: None      Depression / Suicide Risk    As you are discharged from this [facility] facility, it is important to learn how to keep safe from harming yourself.    Recognize the warning signs:  · Abrupt changes in personality, positive or negative- including increase in energy   · Giving away possessions  · Change in eating patterns- significant weight changes-  positive or negative  · Change in sleeping patterns- unable to sleep or sleeping all the time   · Unwillingness or inability to communicate  · Depression  · Unusual sadness, discouragement and  loneliness  · Talk of wanting to die  · Neglect of personal appearance   · Rebelliousness- reckless behavior  · Withdrawal from people/activities they love  · Confusion- inability to concentrate     If you or a loved one observes any of these behaviors or has concerns about self-harm, here's what you can do:  · Talk about it- your feelings and reasons for harming yourself  · Remove any means that you might use to hurt yourself (examples: pills, rope, extension cords, firearm)  · Get professional help from the community (Mental Health, Substance Abuse, psychological counseling)  · Do not be alone:Call your Safe Contact- someone whom you trust who will be there for you.  · Call your local CRISIS HOTLINE 730-2550 or 157-082-1199  · Call your local Children's Mobile Crisis Response Team [place] (294) 997-7527 or www.[employer]  · Call the toll free National Suicide Prevention Hotlines   · National Suicide Prevention Lifeline 539-386-FGKY (2789)  · [facility] 800-SUICIDE (644-3713)  Transurethral Resection of the Prostate  Transurethral resection of the prostate (TURP) is the removal of part of your prostate to treat noncancerous (benign) prostatic hyperplasia (BPH). BPH typically occurs in men older than 40 years. It is the abnormal growth of cells in your prostate. Specifically, it is an abnormal increase in the number of cells that make up your prostate tissue. This causes an increase in the size of your prostate. Often, in the case of BPH, the prostate becomes so large that it compresses the tube that drains urine out of your body from your bladder (urethra). Eventually, this compression can obstruct the flow of urine from your bladder. This obstruction can cause recurrent bladder infection and difficulties with bladder control and bladder emptying. The goal of TURP is to remove enough prostate tissue to allow for an unobstructed flow of urine, which often resolves the associated  conditions.  LET YOUR CAREGIVER KNOW ABOUT:  · Any allergies you have.  · Any medicines you are taking, including herbs, eye drops, over-the-counter medicines, and creams.  · Any problems you have had with the use of anesthetics.  · Any blood disorders you have, including bleeding problems and clotting problems.  · Previous surgeries you have had.  · Any prostate infections you have had.  RISKS AND COMPLICATIONS  Generally, TURP is a safe procedure. However, as with any surgical procedure, complications can occur. Possible complications associated with TURP include:  · Difficulty getting an erection.  · Scarring, which may cause problems with the flow of your urine.  · Injury to your urethra.  · Incontinence from injury to the muscle that surrounds your prostate, which controls urine flow.  · Infection.  · Bleeding.  · Injury to your bladder (rare).  BEFORE THE PROCEDURE   Your caregiver will tell you when you need to stop eating and drinking. If you take any medicines, your caregiver will tell you which ones you may keep taking and which ones you will have to stop taking and when.   Just before the procedure you will also receive medicine to make you fall asleep (general anesthetic). This will be given through a tube that is inserted into one of your veins (intravenous [IV] tube).  PROCEDURE  Your surgeon inserts an instrument that is similar to a telescope with an electric cutting edge (resectoscope) through your urethra to the area of the prostate gland. The cutting edge is used to remove enlarged pieces of your prostate, one piece at a time. At the end of your procedure, a flexible tube (catheter) will be inserted into your urethra to drain your bladder. Special plastic bags filled with solution will be connected to the end of the catheter. The solution will be used to irrigate blood from your bladder while you heal.   AFTER THE PROCEDURE  You will be taken to the recovery area. Once you are awake, stable, and  taking fluids well, you will be taken to your hospital room. Typically, you will stay in the hospital 1-2 days after this procedure. The catheter usually is removed before discharge from the hospital.     This information is not intended to replace advice given to you by your health care provider. Make sure you discuss any questions you have with your health care provider.     Document Released: 12/18/2006 Document Revised: 01/08/2016 Document Reviewed: 05/20/2013  [employer] Interactive Patient Education ©2016 [employer]    Mckee Catheter Care, Adult  A Mckee catheter is a soft, flexible tube that is placed into the bladder to drain urine. A Mckee catheter may be inserted if:  · You leak urine or are not able to control when you urinate (urinary incontinence).  · You are not able to urinate when you need to (urinary retention).  · You had prostate surgery or surgery on the genitals.  · You have certain medical conditions, such as multiple sclerosis, dementia, or a spinal cord injury.  If you are going home with a Mckee catheter in place, follow the instructions below.  TAKING CARE OF THE CATHETER  · Wash your hands with soap and water.  · Using mild soap and warm water on a clean washcloth:  ¨ Clean the area on your body closest to the catheter insertion site using a circular motion, moving away from the catheter. Never wipe toward the catheter because this could sweep bacteria up into the urethra and cause infection.  ¨ Remove all traces of soap. Pat the area dry with a clean towel. For males, reposition the foreskin.  · Attach the catheter to your leg so there is no tension on the catheter. Use adhesive tape or a leg strap. If you are using adhesive tape, remove any sticky residue left behind by the previous tape you used.  · Keep the drainage bag below the level of the bladder, but keep it off the floor.  · Check throughout the day to be sure the catheter is working and urine is draining freely. Make sure the  tubing does not become kinked.  · Do not pull on the catheter or try to remove it. Pulling could damage internal tissues.  TAKING CARE OF THE DRAINAGE BAGS  You will be given two drainage bags to take home. One is a large overnight drainage bag, and the other is a smaller leg bag that fits underneath clothing. You may wear the overnight bag at any time, but you should never wear the smaller leg bag at night. Follow the instructions below for how to empty, change, and clean your drainage bags.  Emptying the Drainage Bag  You must empty your drainage bag when it is  -½ full or at least 2-3 times a day.  · Wash your hands with soap and water.  · Keep the drainage bag below your hips, below the level of your bladder. This stops urine from going back into the tubing and into your bladder.  · Hold the dirty bag over the toilet or a clean container.  · Open the pour spout at the bottom of the bag and empty the urine into the toilet or container. Do not let the pour spout touch the toilet, container, or any other surface. Doing so can place bacteria on the bag, which can cause an infection.  · Clean the pour spout with a gauze pad or cotton ball that has rubbing alcohol on it.  · Close the pour spout.  · Attach the bag to your leg with adhesive tape or a leg strap.  · Wash your hands well.  Changing the Drainage Bag  Change your drainage bag once a month or sooner if it starts to smell bad or look dirty. Below are steps to follow when changing the drainage bag.  · Wash your hands with soap and water.  · Pinch off the rubber catheter so that urine does not spill out.  · Disconnect the catheter tube from the drainage tube at the connection valve. Do not let the tubes touch any surface.  · Clean the end of the catheter tube with an alcohol wipe. Use a different alcohol wipe to clean the end of the drainage tube.  · Connect the catheter tube to the drainage tube of the clean drainage bag.  · Attach the new bag to the leg with  adhesive tape or a leg strap. Avoid attaching the new bag too tightly.  · Wash your hands well.  Cleaning the Drainage Bag  · Wash your hands with soap and water.  · Wash the bag in warm, soapy water.  · Rinse the bag thoroughly with warm water.  · Fill the bag with a solution of white vinegar and water (1 cup vinegar to 1 qt warm water [.2 L vinegar to 1 L warm water]). Close the bag and soak it for 30 minutes in the solution.  · Rinse the bag with warm water.  · Hang the bag to dry with the pour spout open and hanging downward.  · Store the clean bag (once it is dry) in a clean plastic bag.  · Wash your hands well.  PREVENTING INFECTION  · Wash your hands before and after handling your catheter.  · Take showers daily and wash the area where the catheter enters your body. Do not take baths. Replace wet leg straps with dry ones, if this applies.  · Do not use powders, sprays, or lotions on the genital area. Only use creams, lotions, or ointments as directed by your caregiver.  · For females, wipe from front to back after each bowel movement.  · Drink enough fluids to keep your urine clear or pale yellow unless you have a fluid restriction.  · Do not let the drainage bag or tubing touch or lie on the floor.  · Wear cotton underwear to absorb moisture and to keep your .  SEEK MEDICAL CARE IF:   · Your urine is cloudy or smells unusually bad.  · Your catheter becomes clogged.  · You are not draining urine into the bag or your bladder feels full.  · Your catheter starts to leak.  SEEK IMMEDIATE MEDICAL CARE IF:   · You have pain, swelling, redness, or pus where the catheter enters the body.  · You have pain in the abdomen, legs, lower back, or bladder.  · You have a fever.  · You see blood fill the catheter, or your urine is pink or red.  · You have nausea, vomiting, or chills.  · Your catheter gets pulled out.  MAKE SURE YOU:   · Understand these instructions.  · Will watch your condition.  · Will get help  right away if you are not doing well or get worse.     This information is not intended to replace advice given to you by your health care provider. Make sure you discuss any questions you have with your health care provider.     Document Released: 12/18/2006 Document Revised: 05/04/2015 Document Reviewed: 12/09/2013  [employer] Interactive Patient Education ©2016 [employer]    Diabetes Mellitus and Food  It is important for you to manage your blood sugar (glucose) level. Your blood glucose level can be greatly affected by what you eat. Eating healthier foods in the appropriate amounts throughout the day at about the same time each day will help you control your blood glucose level. It can also help slow or prevent worsening of your diabetes mellitus. Healthy eating may even help you improve the level of your blood pressure and reach or maintain a healthy weight.   General recommendations for healthful eating and cooking habits include:  · Eating meals and snacks regularly. Avoid going long periods of time without eating to lose weight.  · Eating a diet that consists mainly of plant-based foods, such as fruits, vegetables, nuts, legumes, and whole grains.  · Using low-heat cooking methods, such as baking, instead of high-heat cooking methods, such as deep frying.  Work with your dietitian to make sure you understand how to use the Nutrition Facts information on food labels.  HOW CAN FOOD AFFECT ME?  Carbohydrates  Carbohydrates affect your blood glucose level more than any other type of food. Your dietitian will help you determine how many carbohydrates to eat at each meal and teach you how to count carbohydrates. Counting carbohydrates is important to keep your blood glucose at a healthy level, especially if you are using insulin or taking certain medicines for diabetes mellitus.  Alcohol  Alcohol can cause sudden decreases in blood glucose (hypoglycemia), especially if you use insulin or take certain medicines  for diabetes mellitus. Hypoglycemia can be a life-threatening condition. Symptoms of hypoglycemia (sleepiness, dizziness, and disorientation) are similar to symptoms of having too much alcohol.   If your health care provider has given you approval to drink alcohol, do so in moderation and use the following guidelines:  · Women should not have more than one drink per day, and men should not have more than two drinks per day. One drink is equal to:  ¨ 12 oz of beer.  ¨ 5 oz of wine.  ¨ 1½ oz of hard liquor.  · Do not drink on an empty stomach.  · Keep yourself hydrated. Have water, diet soda, or unsweetened iced tea.  · Regular soda, juice, and other mixers might contain a lot of carbohydrates and should be counted.  WHAT FOODS ARE NOT RECOMMENDED?  As you make food choices, it is important to remember that all foods are not the same. Some foods have fewer nutrients per serving than other foods, even though they might have the same number of calories or carbohydrates. It is difficult to get your body what it needs when you eat foods with fewer nutrients. Examples of foods that you should avoid that are high in calories and carbohydrates but low in nutrients include:  · Trans fats (most processed foods list trans fats on the Nutrition Facts label).  · Regular soda.  · Juice.  · Candy.  · Sweets, such as cake, pie, doughnuts, and cookies.  · Fried foods.  WHAT FOODS CAN I EAT?  Eat nutrient-rich foods, which will nourish your body and keep you healthy. The food you should eat also will depend on several factors, including:  · The calories you need.  · The medicines you take.  · Your weight.  · Your blood glucose level.  · Your blood pressure level.  · Your cholesterol level.  You should eat a variety of foods, including:  · Protein.  ¨ Lean cuts of meat.  ¨ Proteins low in saturated fats, such as fish, egg whites, and beans. Avoid processed meats.  · Fruits and vegetables.  ¨ Fruits and vegetables that may help control  blood glucose levels, such as apples, mangoes, and yams.  · Dairy products.  ¨ Choose fat-free or low-fat dairy products, such as milk, yogurt, and cheese.  · Grains, bread, pasta, and rice.  ¨ Choose whole grain products, such as multigrain bread, whole oats, and brown rice. These foods may help control blood pressure.  · Fats.  ¨ Foods containing healthful fats, such as nuts, avocado, olive oil, canola oil, and fish.  DOES EVERYONE WITH DIABETES MELLITUS HAVE THE SAME MEAL PLAN?  Because every person with diabetes mellitus is different, there is not one meal plan that works for everyone. It is very important that you meet with a dietitian who will help you create a meal plan that is just right for you.     This information is not intended to replace advice given to you by your health care provider. Make sure you discuss any questions you have with your health care provider.     Document Released: 09/14/2006 Document Revised: 01/08/2016 Document Reviewed: 11/14/2014  [employer] Interactive Patient Education ©2016 [employer] Inc.        Follow-up Information     1. Follow up with Sagar Boykin M.D.. Schedule an appointment as soon as possible for a visit in 1 week.    Specialty:  Urology    Contact information    [address] [place] [id number] [phone]           Discharge Medication Instructions:    Below are the medications your physician expects you to take upon discharge:    Review all your home medications and newly ordered medications with your doctor and/or pharmacist. Follow medication instructions as directed by your doctor and/or pharmacist.    Please keep your medication list with you and share with your physician.               Medication List      CONTINUE taking these medications        Instructions    Morning Afternoon Evening Bedtime    atorvastatin 10 MG Tabs   Last time this was given:  10 mg on 8/16/2017  9:26 PM   Commonly known as:  LIPITOR        Take 10 mg by mouth every evening.    Dose:  10 mg                        ciprofloxacin 500 MG Tabs   Commonly known as:  CIPRO        Take 1 Tab by mouth 2 times a day.   Dose:  500 mg                        glimepiride 1 MG tablet   Last time this was given:  1 mg on 8/17/2017  8:58 AM   Commonly known as:  AMARYL        Take 1 mg by mouth every morning.   Dose:  1 mg                        JANUVIA 100 MG Tabs   Last time this was given:  100 mg on 8/17/2017  8:59 AM   Generic drug:  sitagliptin        Take 100 mg by mouth every day.   Dose:  100 mg                        metformin 1000 MG tablet   Last time this was given:  1,000 mg on 8/17/2017  8:54 AM   Commonly known as:  GLUCOPHAGE        Take 1,000 mg by mouth 2 times a day.   Dose:  1000 mg                        Non Formulary Request        Take 1 Tab by mouth every day. Cadifast from [facility] for eyes   Dose:  1 Tab                        oxycodone-acetaminophen 5-325 MG Tabs   Commonly known as:  PERCOCET        Take 1 Tab by mouth every four hours as needed for Moderate Pain.   Dose:  1 Tab                        phenazopyridine 200 MG Tabs   Commonly known as:  PYRIDIUM        Take 1 Tab by mouth 3 times a day as needed (burning with urination).   Dose:  200 mg                        RAPAFLO 8 MG Caps capsule   Generic drug:  silodosin        Take 8 mg by mouth ONE-HALF HOUR AFTER BREAKFAST.   Dose:  8 mg                             Where to Get Your Medications      Information about where to get these medications is not yet available     ! Ask your nurse or doctor about these medications    - ciprofloxacin 500 MG Tabs            Instructions           Diet / Nutrition:    Follow any diet instructions given to you by your doctor or the dietician, including how much salt (sodium) you are allowed each day.    If you are overweight, talk to your doctor about a weight reduction plan.    Activity:    Remain physically active following your doctor's instructions about exercise and  activity.    Rest often.     Any time you become even a little tired or short of breath, SIT DOWN and rest.    Worsening Symptoms:    Report any of the following signs and symptoms to the doctor's office immediately:    *Pain of jaw, arm, or neck  *Chest pain not relieved by medication                               *Dizziness or loss of consciousness  *Difficulty breathing even when at rest   *More tired than usual                                       *Bleeding drainage or swelling of surgical site  *Swelling of feet, ankles, legs or stomach                 *Fever (>100ºF)  *Pink or blood tinged sputum  *Weight gain (3lbs/day or 5lbs /week)           *Shock from internal defibrillator (if applicable)  *Palpitations or irregular heartbeats                *Cool and/or numb extremities    Stroke Awareness    Common Risk Factors for Stroke include:    Age  Atrial Fibrillation  Carotid Artery Stenosis  Diabetes Mellitus  Excessive alcohol consumption  High blood pressure  Overweight   Physical inactivity  Smoking    Warning signs and symptoms of a stroke include:    *Sudden numbness or weakness of the face, arm or leg (especially on one side of the body).  *Sudden confusion, trouble speaking or understanding.  *Sudden trouble seeing in one or both eyes.  *Sudden trouble walking, dizziness, loss of balance or coordination.Sudden severe headache with no known cause.    It is very important to get treatment quickly when a stroke occurs. If you experience any of the above warning signs, call 911 immediately.                   Disclaimer         Quit Smoking / Tobacco Use:    I understand the use of any tobacco products increases my chance of suffering from future heart disease or stroke and could cause other illnesses which may shorten my life. Quitting the use of tobacco products is the single most important thing I can do to improve my health. For further information on smoking / tobacco cessation call a Toll Free Quit  Line at 1-101.642.1843 (*[employer] [place]) or 1-337.237.4029 (American Lung Association) or you can access the web based program at www.lungusa.org.    [place] Tobacco Users Help Line:  (829) 125-8432       Toll Free: 1-273.848.5836  Quit Tobacco Program [facility] (803)397-2357    Crisis Hotline:    [place] Crisis Hotline:  7-734-ZRLBSOA or 1-536.330.7626    [place] Crisis Hotline:    1-424.768.4534 or 755-435-5839    Discharge Survey:   Thank you for choosing [facility]. We hope we did everything we could to make your hospital stay a pleasant one. You may be receiving a phone survey and we would appreciate your time and participation in answering the questions. Your input is very valuable to us in our efforts to improve our service to our patients and their families.        My signature on this form indicates that:    1. I have reviewed and understand the above information.  2. My questions regarding this information have been answered to my satisfaction.  3. I have formulated a plan with my discharge nurse to obtain my prescribed medications for home.                  Disclaimer         __________________________________                     __________       ________                       Patient Signature                                                 Date                    Time

## 2017-08-16 NOTE — ED NOTES
N/V protocol ordered.  Blood drawn and sent to lab.  Urine specimen cup provided.  Pt educated on clean catch technique.  Pt updated on wait time and escorted back to senior russelle.

## 2017-08-16 NOTE — ED NOTES
Miri PERALES Ranu  77 y.o. male  Chief Complaint   Patient presents with   • N/V     Since aprox 1700. Pt's family reports two episodes of emesis PTA.   • Urinary Retention     since aprox 1700.  Pt had urinary catheter removed at 1100 8/15 w/o complication       Pt amb to triage via wheelchair for above complaint. Pt d/c'd from this facility after prostate reduction surgery yesterday (8/14). Pt had urinary catheter in place until aprox 1100 today.   Pt is alert and oriented, speaking in full sentences, follows commands and responds appropriately to questions. NAD. Resp are even and unlabored.  Pt placed in senior lounge. Pt educated on triage process. Pt encouraged to alert staff for any changes.

## 2017-08-16 NOTE — NON-PROVIDER
.  [facility] PHYSICAL     PATIENT ID:  NAME:  Miri Joseph  MRN:               [id number]  YOB: 1940    Date of Admission: 8/16/2017     Attending: Dr. Yarbrough    Resident: Dr. Cordon    Primary Care Physician: Dr. Wolf    CC:  Urinary Retention    HPI: Miri Joseph is a 77 y.o. male who presented with urinary retention for the last 3 days.  He had a Greenlight elected TURP on 8/14 and has had difficulty urinating since that time. Over the past 3 days, he sons reported that he has been drinking lots of water, but has not been able to urinate. Except for one instance where he urinated with blood. Patient's son reports that yesterday he suddenly began to have abdominal pain, mild confusion, and some difficulties walking.  Patient also has had multiple bouts of vomiting.   Currently patient's U/A was positive for leukocyte esterase and nitrite and he is on ciprofloxacin (500mg tid prn).  Waiting for culture results to provide more specific antibiotics.  The son reports that 1.7L of fluid have been taken out of the bladder.    REVIEW OF SYSTEMS:   Eyes: denies no changes in vision  CV: denies heart palpitations  Respiratory: denies cough or difficulty breathing  GI: + emesis  : pertinent to HPI               PAST MEDICAL HISTORY:  Past Medical History   Diagnosis Date   • Diabetes (CMS-[facility])      oral meds       PAST SURGICAL HISTORY:  Past Surgical History   Procedure Laterality Date   • Turp-vapor  8/14/2017     Procedure: Green Light Photorelective Vaporization of the Prostate;  Surgeon: Sagar Boykin M.D.;  Location: SURGERY [facility];  Service:      Vaccination Hx:  Pt reports having his flu shot.  Denies having the pneumococcal vaccine.    FAMILY HISTORY:  Denies family hx DM    SOCIAL HISTORY:   Pt lives in [place] with his son, his son's wife, and their dog  Smoking denies  Etoh use denies  Drug use denies      DIET:   Orders Placed This  Encounter   Procedures   • Diet Order     Standing Status: Standing      Number of Occurrences: 1      Standing Expiration Date:      Order Specific Question:  Diet:     Answer:  Diabetic [3]       ALLERGIES:  No Known Allergies    OUTPATIENT MEDICATIONS:  Current Outpatient Rx   Name  Route  Sig  Dispense  Refill   • silodosin (RAPAFLO) 8 MG Cap capsule    Oral    Take 8 mg by mouth ONE-HALF HOUR AFTER BREAKFAST.             • Non Formulary Request    Oral    Take 1 Tab by mouth every day. Cadifast from [facility] for eyes             • oxycodone-acetaminophen (PERCOCET) 5-325 MG Tab    Oral    Take 1 Tab by mouth every four hours as needed for Moderate Pain.    10 Tab    0     • ciprofloxacin (CIPRO) 500 MG Tab    Oral    Take 1 Tab by mouth 2 times a day.    4 Tab    0     • phenazopyridine (PYRIDIUM) 200 MG Tab    Oral    Take 1 Tab by mouth 3 times a day as needed (burning with urination).    12 Tab    0     • atorvastatin (LIPITOR) 10 MG Tab    Oral    Take 10 mg by mouth every evening.             • glimepiride (AMARYL) 1 MG tablet    Oral    Take 1 mg by mouth every morning.             • sitagliptin (JANUVIA) 100 MG Tab    Oral    Take 100 mg by mouth every day.             • metformin (GLUCOPHAGE) 1000 MG tablet    Oral    Take 1,000 mg by mouth 2 times a day.                 PHYSICAL EXAM:  Filed Vitals:    17 0445 17 0530 17 0600 17 0630   BP:       Pulse: 61 62 58 59   Temp:       Resp:       Weight:       SpO2: 96% 96% 97% 97%   , Temp (24hrs), Av.7 °C (98.1 °F), Min:36.7 °C (98.1 °F), Max:36.7 °C (98.1 °F)  , Pulse Oximetry: 97 %, O2 Delivery: None (Room Air)    General: Pt resting and sleeping in NAD, cooperative   Skin:  Pink, warm and dry.  No rashes  Lungs:  Symmetrical.  CTAB with no W/R/R.  Good air movement   Cardiovascular:  S1/S2 RRR without M/R/G.  Abdomen:  +BS. No masses noted. Mild diffuse tenderness to palpation  CNS:  Muscle tone is normal. Cranial nerves II-XII  grossly intact. 2+ DTRs.     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 1.2L were removed by cath. Low Ap=001 and UTI.     LAB TESTS:   Recent Labs      08/15/17   2358   WBC  14.2*   RBC  4.62*   HEMOGLOBIN  13.8*   HEMATOCRIT  37.9*   MCV  82.0   MCH  29.9   RDW  39.0   PLATELETCT  218   MPV  9.8   NEUTSPOLYS  71.00   LYMPHOCYTES  20.30*   MONOCYTES  7.30   EOSINOPHILS  0.90   BASOPHILS  0.10         Recent Labs      08/15/17   2358  08/16/17   0503   SODIUM  116*  118*   POTASSIUM  3.5*  4.1   CHLORIDE  83*  83*   CO2  22  26   BUN  10  10   CREATININE  0.65  0.68   CALCIUM  8.8  8.8   ALBUMIN  4.0   --        CULTURES:   Results     Procedure Component Value Units Date/Time    URINE CULTURE(NEW) [[id number]] Collected:  08/16/17 0340    Order Status:  Completed Updated:  08/16/17 0700    URINALYSIS,CULTURE IF INDICATED [[id number]]  (Abnormal) Collected:  08/16/17 0340    Order Status:  Completed Specimen Information:  Urine Updated:  08/16/17 0411     Micro Urine Req Microscopic      Color Red      Character Bloody (A)      Specific Gravity 1.021      Ph 6.5      Glucose >=1000 (A) mg/dL      Ketones Negative mg/dL      Protein 300 (A) mg/dL      Bilirubin Small (A)      Urobilinogen, Urine 1.0      Nitrite Positive (A)      Leukocyte Esterase Moderate (A)      Occult Blood Large (A)      Culture Indicated Yes UA Culture           IMAGES:  None    CONSULTS:   None    ASSESSMENT/PLAN: 77 y.o. male admitted for UTI due to urinary retention and hyponatremia following a recent TURP.  1. UTI  -U/A showed nitrites, leukocyte esterase,  blood, and bacteria  -WBC elevated on CBC  -Continue cipro until IV can be placed for ceftriaxone 2g daily   -Wait for Urine culture to use a more specific antibiotic    2. Acute Urinary Retention  - 1.7 L removed in ED with cath bishop  - F/U with Dr. Boykin with [facility]  -Etiology mostly likely due to post surgery but  continue his regiment given by urology    3. Hyponatremia: may be due to overhydration    - Na = 118 (8/16) from 116 when admitted which has decreased from before TURP   -Consider placing him on hypertonic saline IV    Chronic conditions    1) DM: all glucose checks have been elevated   - Increased risk of infection due to DM,    -On a regiment of glimepiride, metformin, januvia   - Bring into question adherence and try to increase adherence of patient   -Continue DM checks up for eyes and PCP   -Consider A1C and regular maintent  2) Dyslipidemia: on atorvastatin    -Consider a lipid panel to check on those

## 2017-08-16 NOTE — IP AVS SNAPSHOT
" <p align=\"LEFT\"><IMG SRC=\"[email or web address]\" alt=\"Image\" WIDTH=\"50%\" HEIGHT=\"200\" BORDER=\"\"></p>                   Name:Miri Joseph  Medical Record Number:[id number]  CSN: [id number]    YOB: 1940   Age: 77 y.o.  Sex: male  HT:  WT: 77.5 kg (170 lb 13.7 oz)          Admit Date: 8/16/2017     Discharge Date:   Today's Date: 8/17/2017  Attending Doctor:  Kisha Donovan M.D.                  Allergies:  Review of patient's allergies indicates no known allergies.          Follow-up Information     1. Follow up with Sagar Boykin M.D.. Schedule an appointment as soon as possible for a visit in 1 week.    Specialty:  Urology    Contact information    [address] [place] [address]  [phone]           Medication List      Take these Medications        Instructions    atorvastatin 10 MG Tabs   Commonly known as:  LIPITOR    Take 10 mg by mouth every evening.   Dose:  10 mg       ciprofloxacin 500 MG Tabs   Commonly known as:  CIPRO    Take 1 Tab by mouth 2 times a day.   Dose:  500 mg       glimepiride 1 MG tablet   Commonly known as:  AMARYL    Take 1 mg by mouth every morning.   Dose:  1 mg       JANUVIA 100 MG Tabs   Generic drug:  sitagliptin    Take 100 mg by mouth every day.   Dose:  100 mg       metformin 1000 MG tablet   Commonly known as:  GLUCOPHAGE    Take 1,000 mg by mouth 2 times a day.   Dose:  1000 mg       Non Formulary Request    Take 1 Tab by mouth every day. Cadifast from [facility] for eyes   Dose:  1 Tab       oxycodone-acetaminophen 5-325 MG Tabs   Commonly known as:  PERCOCET    Take 1 Tab by mouth every four hours as needed for Moderate Pain.   Dose:  1 Tab       phenazopyridine 200 MG Tabs   Commonly known as:  PYRIDIUM    Take 1 Tab by mouth 3 times a day as needed (burning with urination).   Dose:  200 mg       RAPAFLO 8 MG Caps capsule   Generic drug:  silodosin    Take 8 mg by mouth ONE-HALF HOUR AFTER BREAKFAST.   Dose:  8 mg         "

## 2017-08-16 NOTE — IP AVS SNAPSHOT
8/17/2017    Miri Joseph  [address] [place] [address]    Dear Miri:    [facility] wants to ensure your discharge home is safe and you or your loved ones have had all of your questions answered regarding your care after you leave the hospital.    Below is a list of resources and contact information should you have any questions regarding your hospital stay, follow-up instructions, or active medical symptoms.    Questions or Concerns Regarding… Contact   Medical Questions Related to Your Discharge  (7 days a week, 8am-5pm) Contact a Nurse Care Coordinator   [phone]   Medical Questions Not Related to Your Discharge  (24 hours a day / 7 days a week)  Contact the Nurse Health Line   [phone]    Medications or Discharge Instructions Refer to your discharge packet   or contact your [facility] Primary Care Provider   [phone]   Follow-up Appointment(s) Schedule your appointment via [employer]   or contact Scheduling [phone]   Billing Review your statement via [employer]  or contact Billing [phone]   Medical Records Review your records via [employer]   or contact Medical Records [phone]     You may receive a telephone call within two days of discharge. This call is to make certain you understand your discharge instructions and have the opportunity to have any questions answered. You can also easily access your medical information, test results and upcoming appointments via the [employer] free online health management tool. You can learn more and sign up at [employer]. For assistance setting up your [employer] account, please call [phone].    Once again, we want to ensure your discharge home is safe and that you have a clear understanding of any next steps in your care. If you have any questions or concerns, please do not hesitate to contact us, we are here for you. Thank you for choosing [facility] for your healthcare needs.    Sincerely,    Your [facility] Healthcare Team

## 2017-08-16 NOTE — ED NOTES
Med rec complete per family at bedside  Allergies reviewed    Patient took his discharge meds 8-15-17 Cipro, Percocet and Pyridium but most his other medications he took before surgery on 8-12-17, he felt like his blood sugar was off 8-15-17 and took a Metformin

## 2017-08-16 NOTE — IP AVS SNAPSHOT
komoot Access Code: [email or web address]  Expires: 9/16/2017  4:25 PM    komoot  A secure, online tool to manage your health information     [employer]’s komoot® is a secure, online tool that connects you to your personalized health information from the privacy of your home -- day or night - making it very easy for you to manage your healthcare. Once the activation process is completed, you can even access your medical information using the komoot silvio, which is available for free in the Apple Silvio store or Google Play store.     komoot provides the following levels of access (as shown below):   My Chart Features   [facility] Primary Care Doctor [facility]  Specialists [facility]  Urgent  Care Non-[facility]  Primary Care  Doctor   Email your healthcare team securely and privately 24/7 X X X X   Manage appointments: schedule your next appointment; view details of past/upcoming appointments X      Request prescription refills. X      View recent personal medical records, including lab and immunizations X X X X   View health record, including health history, allergies, medications X X X X   Read reports about your outpatient visits, procedures, consult and ER notes X X X X   See your discharge summary, which is a recap of your hospital and/or ER visit that includes your diagnosis, lab results, and care plan. X X       How to register for komoot:  1. Go to  [email or web address].  2. Click on the Sign Up Now box, which takes you to the New Member Sign Up page. You will need to provide the following information:  a. Enter your komoot Access Code exactly as it appears at the top of this page. (You will not need to use this code after you’ve completed the sign-up process. If you do not sign up before the expiration date, you must request a new code.)   b. Enter your date of birth.   c. Enter your home email address.   d. Click Submit, and follow the next screen’s instructions.  3. Create a komoot ID. This will be your komoot  login ID and cannot be changed, so think of one that is secure and easy to remember.  4. Create a [employer] password. You can change your password at any time.  5. Enter your Password Reset Question and Answer. This can be used at a later time if you forget your password.   6. Enter your e-mail address. This allows you to receive e-mail notifications when new information is available in StartX.  7. Click Sign Up. You can now view your health information.    For assistance activating your StartX account, call [phone]

## 2017-08-16 NOTE — H&P
[facility] MEDICINE HISTORY AND PHYSICAL     PATIENT ID:  NAME:  Miri Joseph  MRN:               [id number]  YOB: 1940    Date of Admission: 8/16/2017     Attending: Dr. Shea/Linnea    Resident: Dr. Donovan    Primary Care Physician:  Dr. Wolf    CC:  Urinary retention    HPI: Miri Joseph is a 77 y.o. male who presented with urinary retention. He had prostate surgery on 8/14/17 and was discharged home same-day with bishop. Yesterday the bishop was removed. He was having a hard time peeing. He was drinking a lot of water (maybe 1 gallon yesterday, drinking lots of water the day prior, told by urology to drink lots of water) and urinated once with blood clots. But then difficulty urinating again. They returned to the office yesterday afternoon, at that time bladder scan showed only 50mL. Discharged home again, and around 1700 he threw up. Repeat of emesis at 2230. He had also lost his appetite. Brought in by sons, he was having trouble walking and was a little confused.    His urologist is Dr. Holden with [facility] [place].    ROS:  Denies: headache, chest pain, sob, diarrhea  +sore throat   +visual changes - blurred  + nausea/vomiting    REVIEW OF SYSTEMS:   Ten systems reviewed and were negative except as noted in the HPI.                PAST MEDICAL HISTORY:  Past Medical History   Diagnosis Date   • Diabetes (CMS-[facility])      oral meds       PAST SURGICAL HISTORY:  Past Surgical History   Procedure Laterality Date   • Turp-vapor  8/14/2017     Procedure: Green Light Photorelective Vaporization of the Prostate;  Surgeon: Sagar Boykin M.D.;  Location: SURGERY [facility];  Service:        FAMILY HISTORY:  No family history on file.    SOCIAL HISTORY:   Pt lives in [place], with son  Smoking: never  Etoh use: denies  Drug use: denies    DIET:   No orders of the defined types were placed in this encounter.       ALLERGIES:  No Known Allergies    OUTPATIENT MEDICATIONS:  Current Outpatient Rx    Name  Route  Sig  Dispense  Refill   • silodosin (RAPAFLO) 8 MG Cap capsule    Oral    Take 8 mg by mouth ONE-HALF HOUR AFTER BREAKFAST.             • Non Formulary Request    Oral    Take 1 Tab by mouth every day. Cadifast from [facility] for eyes             • oxycodone-acetaminophen (PERCOCET) 5-325 MG Tab    Oral    Take 1 Tab by mouth every four hours as needed for Moderate Pain.    10 Tab    0     • ciprofloxacin (CIPRO) 500 MG Tab    Oral    Take 1 Tab by mouth 2 times a day.    4 Tab    0     • phenazopyridine (PYRIDIUM) 200 MG Tab    Oral    Take 1 Tab by mouth 3 times a day as needed (burning with urination).    12 Tab    0     • atorvastatin (LIPITOR) 10 MG Tab    Oral    Take 10 mg by mouth every evening.             • glimepiride (AMARYL) 1 MG tablet    Oral    Take 1 mg by mouth every morning.             • sitagliptin (JANUVIA) 100 MG Tab    Oral    Take 100 mg by mouth every day.             • metformin (GLUCOPHAGE) 1000 MG tablet    Oral    Take 1,000 mg by mouth 2 times a day.                 PHYSICAL EXAM:  Filed Vitals:    17 0330 17 0345 17 0400 17 0415   BP:       Pulse: 57 57 57 60   Temp:       Resp:       Weight:       SpO2: 96% 96% 96% 97%   , Temp (24hrs), Av.7 °C (98.1 °F), Min:36.7 °C (98.1 °F), Max:36.7 °C (98.1 °F)  , Pulse Oximetry: 97 %, O2 Delivery: None (Room Air)    General: Pt resting in NAD, cooperative   Skin:  Pink, warm and dry.  No rashes  HEENT: NC/AT. PERRL. EOMI. MMM. No nasal discharge. Oropharynx nonerythematous without exudate/plaques  Neck:  Supple without lymphadenopathy or rigidity.  Lungs:  Symmetrical.  CTAB with no W/R/R.  Good air movement   Cardiovascular:  S1/S2 RRR without M/R/G.  Abdomen:  Abdomen is soft ND, mild ttp diffusely, no rebound/guarding. +BS. No masses noted.    Extremities:  Full range of motion. No gross deformities noted. 2+ pulses in all extremities. No C/C/E   Spine:  Straight without vertebral anomalies.  CNS:   Muscle tone is normal. Cranial nerves II-XII grossly intact. 2+ DTRs.       ERCourse:  Came in for acute urinary retention and 1.2L were removed via cath. Screening labs were performed which showed Na 116 and UTI. He was started on an NS bolus and ceftriaxone.    LAB TESTS:   Recent Labs      08/15/17   2358   WBC  14.2*   RBC  4.62*   HEMOGLOBIN  13.8*   HEMATOCRIT  37.9*   MCV  82.0   MCH  29.9   RDW  39.0   PLATELETCT  218   MPV  9.8   NEUTSPOLYS  71.00   LYMPHOCYTES  20.30*   MONOCYTES  7.30   EOSINOPHILS  0.90   BASOPHILS  0.10         Recent Labs      08/15/17   2358   SODIUM  116*   POTASSIUM  3.5*   CHLORIDE  83*   CO2  22   BUN  10   CREATININE  0.65   CALCIUM  8.8   ALBUMIN  4.0       CULTURES:   Results     Procedure Component Value Units Date/Time    URINALYSIS,CULTURE IF INDICATED [[id number]]  (Abnormal) Collected:  08/16/17 0340    Order Status:  Completed Specimen Information:  Urine Updated:  08/16/17 0411     Micro Urine Req Microscopic      Color Red      Character Bloody (A)      Specific Gravity 1.021      Ph 6.5      Glucose >=1000 (A) mg/dL      Ketones Negative mg/dL      Protein 300 (A) mg/dL      Bilirubin Small (A)      Urobilinogen, Urine 1.0      Nitrite Positive (A)      Leukocyte Esterase Moderate (A)      Occult Blood Large (A)      Culture Indicated Yes UA Culture     URINE CULTURE(NEW) [[id number]]     Order Status:  No result           IMAGES:  None    CONSULTS:   None    ASSESSMENT/PLAN: 77 y.o. male admitted for acute hyponatremia and UTI.    Acute Hyponatremia  - Na 116, down from 131 on 8/9  - s/p TURP on 8/14 and told to drink lots of water  - drinking lots of water in last 2 days   - ordered stat BMP now (116 result from midnight)   - receiving NS bolus currently   - if correcting, could continue NS   - if not correcting, consider a 50mL bolus of hypertonic saline   - check BMP q4h    UTI  - UA shows nitrite, leuk est, blood, few bacteria  - WBC elevated, could be stress or  from urine   - cont ceftriaxone 2g daily   - follow up [facility] results    Acute Urinary Retention  - s/p TURP on 8/14  - bishop removed on 8/15  - 1.2L removed in ED via cath, bishop was replaced  - urologist is Dr. Holden with [facility] [place]   - cont bishop catheter   - contact urology to inform them of his admission in case they want to round on him   - cont home BPH meds: silodosin   - cont post-op meds: ciprofloxacin 500mg x1 to finish course, percocet prn pain, and pyridium     Chronic Conditions  - DM: glucose in urine, 218 on BMP   - cont home glimepiride, metformin, januvia   - check sugars qAC and qhs   - low ISS, hypoglycemia protocol  - Dyslipidemia: cont home atorvastatin    DVT ppx: enoxaparin  GI ppx: not indicated  IVF: currently receiving bolus NS, none ordered until BMP results  Diet: diabetic  PCP: Luciano  Code: Full    Dispo: admit to telemetry for acute hyponatremia and UTI.

## 2017-08-16 NOTE — PROGRESS NOTES
Assumed care of pt, transferred from ER.  Pt denies chest pain or SOB.  VSS.  Pt resting comfortably.  All needs met. Patient and son (at bedside) oriented to room and floor.  Tele box on.   Bed low and locked, call light in reach.  Discussed POC.

## 2017-08-16 NOTE — PROGRESS NOTES
Two RN skin check completed with EMILIO Stover.  Skin is intact with no areas of concern, except tip of penis does have bloody drainage.

## 2017-08-17 VITALS
TEMPERATURE: 97.9 F | OXYGEN SATURATION: 94 % | DIASTOLIC BLOOD PRESSURE: 44 MMHG | SYSTOLIC BLOOD PRESSURE: 96 MMHG | HEART RATE: 81 BPM | WEIGHT: 170.86 LBS | BODY MASS INDEX: 25.22 KG/M2 | RESPIRATION RATE: 20 BRPM

## 2017-08-17 PROBLEM — E87.1 ACUTE HYPONATREMIA: Status: RESOLVED | Noted: 2017-08-16 | Resolved: 2017-08-17

## 2017-08-17 PROBLEM — N40.1 BENIGN PROSTATIC HYPERTROPHY WITH LOWER URINARY TRACT SYMPTOMS (LUTS): Status: RESOLVED | Noted: 2017-08-14 | Resolved: 2017-08-17

## 2017-08-17 LAB
GLUCOSE BLD-MCNC: 166 MG/DL (ref 65–99)
GLUCOSE BLD-MCNC: 175 MG/DL (ref 65–99)

## 2017-08-17 PROCEDURE — A9270 NON-COVERED ITEM OR SERVICE: HCPCS | Performed by: FAMILY MEDICINE

## 2017-08-17 PROCEDURE — 82962 GLUCOSE BLOOD TEST: CPT

## 2017-08-17 PROCEDURE — 700105 HCHG RX REV CODE 258: Performed by: FAMILY MEDICINE

## 2017-08-17 PROCEDURE — 700111 HCHG RX REV CODE 636 W/ 250 OVERRIDE (IP): Performed by: FAMILY MEDICINE

## 2017-08-17 PROCEDURE — 700102 HCHG RX REV CODE 250 W/ 637 OVERRIDE(OP): Performed by: FAMILY MEDICINE

## 2017-08-17 PROCEDURE — 700105 HCHG RX REV CODE 258

## 2017-08-17 RX ORDER — CIPROFLOXACIN 500 MG/1
500 TABLET, FILM COATED ORAL 2 TIMES DAILY
Qty: 10 TAB | Refills: 0 | Status: SHIPPED | OUTPATIENT
Start: 2017-08-17

## 2017-08-17 RX ORDER — SODIUM CHLORIDE 9 MG/ML
INJECTION, SOLUTION INTRAVENOUS
Status: COMPLETED
Start: 2017-08-17 | End: 2017-08-17

## 2017-08-17 RX ADMIN — STANDARDIZED SENNA CONCENTRATE AND DOCUSATE SODIUM 2 TABLET: 8.6; 5 TABLET, FILM COATED ORAL at 08:54

## 2017-08-17 RX ADMIN — CEFTRIAXONE 2 G: 2 INJECTION, POWDER, FOR SOLUTION INTRAMUSCULAR; INTRAVENOUS at 04:43

## 2017-08-17 RX ADMIN — INSULIN LISPRO 1 UNITS: 100 INJECTION, SOLUTION INTRAVENOUS; SUBCUTANEOUS at 14:01

## 2017-08-17 RX ADMIN — ENOXAPARIN SODIUM 40 MG: 100 INJECTION SUBCUTANEOUS at 08:54

## 2017-08-17 RX ADMIN — TAMSULOSIN HYDROCHLORIDE 0.4 MG: 0.4 CAPSULE ORAL at 08:54

## 2017-08-17 RX ADMIN — GLIMEPIRIDE 1 MG: 2 TABLET ORAL at 08:58

## 2017-08-17 RX ADMIN — METFORMIN HYDROCHLORIDE 1000 MG: 500 TABLET, FILM COATED ORAL at 08:54

## 2017-08-17 RX ADMIN — INSULIN LISPRO 1 UNITS: 100 INJECTION, SOLUTION INTRAVENOUS; SUBCUTANEOUS at 06:05

## 2017-08-17 RX ADMIN — SODIUM CHLORIDE 500 ML: 9 INJECTION, SOLUTION INTRAVENOUS at 04:45

## 2017-08-17 RX ADMIN — SITAGLIPTIN 100 MG: 100 TABLET, FILM COATED ORAL at 08:59

## 2017-08-17 ASSESSMENT — PAIN SCALES - GENERAL
PAINLEVEL_OUTOF10: 0

## 2017-08-17 ASSESSMENT — LIFESTYLE VARIABLES
EVER_SMOKED: NEVER
EVER_SMOKED: NEVER

## 2017-08-17 ASSESSMENT — COPD QUESTIONNAIRES
DURING THE PAST 4 WEEKS HOW MUCH DID YOU FEEL SHORT OF BREATH: NONE/LITTLE OF THE TIME
HAVE YOU SMOKED AT LEAST 100 CIGARETTES IN YOUR ENTIRE LIFE: NO/DON'T KNOW
COPD SCREENING SCORE: 0
DO YOU EVER COUGH UP ANY MUCUS OR PHLEGM?: NO/ONLY WITH OCCASIONAL COLDS OR INFECTIONS

## 2017-08-17 ASSESSMENT — PATIENT HEALTH QUESTIONNAIRE - PHQ9
2. FEELING DOWN, DEPRESSED, IRRITABLE, OR HOPELESS: NOT AT ALL
SUM OF ALL RESPONSES TO PHQ QUESTIONS 1-9: 0
1. LITTLE INTEREST OR PLEASURE IN DOING THINGS: NOT AT ALL
SUM OF ALL RESPONSES TO PHQ9 QUESTIONS 1 AND 2: 0

## 2017-08-17 NOTE — PROGRESS NOTES
Patient resting. Family Med and son at bedside. Bed low locked, SRx2, call bell within reach, Non skid socks on, Bed alarm activated.

## 2017-08-17 NOTE — PROGRESS NOTES
Patient resting eyes closed. Bed low locked, SRx2, call bell within reach, Non skid socks on, Bed alarm activated

## 2017-08-17 NOTE — DISCHARGE SUMMARY
CHIEF COMPLAINT ON ADMISSION  Chief Complaint   Patient presents with   • N/V     Since aprox 1700. Pt's family reports two episodes of emesis PTA.   • Urinary Retention     since aprox 1700.  Pt had urinary catheter removed at 1100 8/15 w/o complication       CODE STATUS  Full Code    HPI & HOSPITAL COURSE  This is a 77 y.o. male here with urinary retention and hyponatremia following TURP. Patient had TURP done two days prior to admission and did have one successful void after bishop removal after which the patient was unable to void. He initially thought he was dehydrated and drank several large bottles of water. After which he developed confusion and difficulty ambulating. Upon admission to the ER he was found to have 1200ml in his bladder and a Na of 116. This was likely due to his large free water consumption, he was given 1L of NS and monitored with Q4hr BMP's. He diuresed 4100ml with in the first 6 few hours and his sodium began to increase appropriately. On HD#2 he was back to his baseline health with his sodium returning to normal levels. Dr. Boykin his urologist was notified who recommended leaving the bishop in place and to follow up in his office in one week.    Therefore, he is discharged in good and stable condition with close outpatient follow-up.    SPECIFIC OUTPATIENT FOLLOW-UP  [facility] in one week    DISCHARGE PROBLEM LIST  Active Problems:    UTI (urinary tract infection) POA: Unknown  Resolved Problems:    Acute hyponatremia POA: Unknown      FOLLOW UP  No future appointments.  No follow-up provider specified.    MEDICATIONS ON DISCHARGE   Miri Joseph   Home Medication Instructions EDD:[id number]    Printed on:08/17/17 1511   Medication Information                      atorvastatin (LIPITOR) 10 MG Tab  Take 10 mg by mouth every evening.             ciprofloxacin (CIPRO) 500 MG Tab  Take 1 Tab by mouth 2 times a day.             glimepiride (AMARYL) 1 MG tablet  Take 1 mg by mouth every  morning.             metformin (GLUCOPHAGE) 1000 MG tablet  Take 1,000 mg by mouth 2 times a day.             Non Formulary Request  Take 1 Tab by mouth every day. Cadifast from [facility] for eyes             oxycodone-acetaminophen (PERCOCET) 5-325 MG Tab  Take 1 Tab by mouth every four hours as needed for Moderate Pain.             phenazopyridine (PYRIDIUM) 200 MG Tab  Take 1 Tab by mouth 3 times a day as needed (burning with urination).             silodosin (RAPAFLO) 8 MG Cap capsule  Take 8 mg by mouth ONE-HALF HOUR AFTER BREAKFAST.             sitagliptin (JANUVIA) 100 MG Tab  Take 100 mg by mouth every day.                 DIET  Orders Placed This Encounter   Procedures   • Diet Order     Standing Status: Standing      Number of Occurrences: 1      Standing Expiration Date:      Order Specific Question:  Diet:     Answer:  Diabetic [3]       ACTIVITY  As tolerated.  Weight bearing as tolerated      CONSULTATIONS  None    PROCEDURES  None    LABORATORY  Lab Results   Component Value Date/Time    SODIUM 126* 08/16/2017 05:54 PM    POTASSIUM 3.9 08/16/2017 05:54 PM    CHLORIDE 93* 08/16/2017 05:54 PM    CO2 24 08/16/2017 05:54 PM    GLUCOSE 210* 08/16/2017 05:54 PM    BUN 9 08/16/2017 05:54 PM    CREATININE 0.73 08/16/2017 05:54 PM        Lab Results   Component Value Date/Time    WBC 14.2* 08/15/2017 11:58 PM    HEMOGLOBIN 13.8* 08/15/2017 11:58 PM    HEMATOCRIT 37.9* 08/15/2017 11:58 PM    PLATELET COUNT 218 08/15/2017 11:58 PM        Total time of the discharge process exceeds 31 minutes

## 2017-08-17 NOTE — FACE TO FACE
Face to Face Note  -  Durable Medical Equipment    Bret Cordon M.D. - NPI: [id number]  I certify that this patient is under my care and that they had a durable medical equipment(DME)face to face encounter by myself that meets the physician DME face-to-face encounter requirements with this patient on:    Date of encounter:   Patient:                    MRN:                       YOB: 2015  Miri Joseph  [id number]  1940     The encounter with the patient was in whole, or in part, for the following medical condition, which is the primary reason for durable medical equipment:  Post-Op Surgery    I certify that, based on my findings, the following durable medical equipment is medically necessary:  Walkers.    HOME O2 Saturation Measurements:(Values must be present for Home Oxygen orders)         ,     ,         My Clinical findings support the need for the above equipment due to:  Abnormal Gait    Supporting Symptoms: Patient with recent TURP, needs walker to assist with balance while amublating at home

## 2017-08-17 NOTE — DIETARY
Nutrition Services - Brief Check/No admit screen      Pt with 0% intake documented at breakfast. Per family, Pt is eating about 30% of meals and is a lacto-ovo vegetarian. BMI is 25.22kg/m2 WNL and Pt denies weight loss/poor PO PTA.  Will have nutrition representative provide between meal snacks per family request. RD available prn

## 2017-08-17 NOTE — PROGRESS NOTES
Received bedside report from Julieta RN, Pt assessed, A&Ox4, Vitals stable, pt has 0/10 of pain.  Pt updated on plan of care. Call light within reach, personal belongings within reach.  Bed in lowest position, Bed Strip alarm is on. Tele monitor on.

## 2017-08-17 NOTE — PROGRESS NOTES
[facility] FAMILY MEDICINE PROGRESS NOTE     Attending: Dr. Swartz    Resident: Dr. Cordon    PATIENT: Miri Joseph; [id number]; 1940    ID: 77 y.o. male admitted for urinary retention and hyponatremia     SUBJECTIVE: No acute events overnight, alert and oriented today, no confusion, reports feeling well and ready to go home, denies pain from bishop, tolerating diet, no cp/sob/f/c    OBJECTIVE:     Filed Vitals:    08/16/17 1600 08/16/17 2000 08/17/17 0000 08/17/17 0400   BP: 129/65 119/61 133/70 126/67   Pulse: 71 76 67 69   Temp: 36.9 °C (98.4 °F) 36.3 °C (97.4 °F) 36.7 °C (98 °F) 37.1 °C (98.8 °F)   Resp: 16 16 16 17   Weight:  77.5 kg (170 lb 13.7 oz)     SpO2: 93% 93% 94% 96%       Intake/Output Summary (Last 24 hours) at 08/17/17 0753  Last data filed at 08/16/17 2000   Gross per 24 hour   Intake    260 ml   Output   3950 ml   Net  -3690 ml       PE:  General: No acute distress, resting comfortably in bed.  HEENT: NC/AT. EOMI. MMM  Cardiovascular: RRR with no M/R/G.  Respiratory: Symmetrical chest. CTAB with no W/R/R  Abdomen: soft, NT/ND, no masses, +BS   : Bishop in place, dried blood at meatus of penis but no current bleeding  EXT:  SOL,  No C/C/E 2+ pulses   Neuro: non focal with no numbness, tingling or changes in sensation    LABS:  Recent Labs      08/15/17   2358   WBC  14.2*   RBC  4.62*   HEMOGLOBIN  13.8*   HEMATOCRIT  37.9*   MCV  82.0   MCH  29.9   RDW  39.0   PLATELETCT  218   MPV  9.8   NEUTSPOLYS  71.00   LYMPHOCYTES  20.30*   MONOCYTES  7.30   EOSINOPHILS  0.90   BASOPHILS  0.10     Recent Labs      08/15/17   2358   08/16/17   0948  08/16/17   1340  08/16/17   1754   SODIUM  116*   < >  123*  122*  126*   POTASSIUM  3.5*   < >  3.9  3.7  3.9   CHLORIDE  83*   < >  89*  90*  93*   CO2  22   < >  25  24  24   BUN  10   < >  9  9  9   CREATININE  0.65   < >  0.66  0.64  0.73   CALCIUM  8.8   < >  8.7  8.8  9.1   ALBUMIN  4.0   --    --    --    --     < > = values in this interval not  displayed.     Estimated GFR/CRCL = Estimated Creatinine Clearance: 87.4 mL/min (by C-G formula based on Cr of 0.73).  Recent Labs      08/16/17   0948   08/16/17   1340  08/16/17   1754  08/16/17   1817  08/16/17   2128  08/17/17   0600   GLUCOSE  172*   --   200*  210*   --    --    --    POCGLUCOSE   --    < >   --    --   202*  247*  166*    < > = values in this interval not displayed.     Recent Labs      08/15/17   2358   ASTSGOT  34   ALTSGPT  39   TBILIRUBIN  1.0   ALKPHOSPHAT  54   GLOBULIN  3.0               Invalid input(s): [id number]  No results for input(s): INR, APTT, FIBRINOGEN in the last 72 hours.    Invalid input(s): DIMER    MICROBIOLOGY: UA culture pending    MEDS:  Current Facility-Administered Medications   Medication Last Dose   • atorvastatin (LIPITOR) tablet 10 mg 10 mg at 08/16/17 2126   • glimepiride (AMARYL) tablet 1 mg 1 mg at 08/16/17 0900   • metformin (GLUCOPHAGE) tablet 1,000 mg 1,000 mg at 08/16/17 2126   • sitagliptin (JANUVIA) tablet 100 mg 100 mg at 08/16/17 0900   • phenazopyridine (PYRIDIUM) tablet 200 mg     • senna-docusate (PERICOLACE or SENOKOT S) 8.6-50 MG per tablet 2 Tab 2 Tab at 08/16/17 2126    And   • polyethylene glycol/lytes (MIRALAX) PACKET 1 Packet      And   • magnesium hydroxide (MILK OF MAGNESIA) suspension 30 mL      And   • bisacodyl (DULCOLAX) suppository 10 mg     • enoxaparin (LOVENOX) inj 40 mg 40 mg at 08/16/17 1002   • enalaprilat (VASOTEC) injection 1.25 mg     • acetaminophen (TYLENOL) tablet 650 mg     • insulin lispro (HUMALOG) injection 1-6 Units 1 Units at 08/17/17 0605   • glucose 4 g chewable tablet 16 g      And   • dextrose 50% (D50W) injection 25 mL     • oxycodone-acetaminophen (PERCOCET) 5-325 MG per tablet 1 Tab     • cefTRIAXone (ROCEPHIN) 2 g in  mL IVPB Stopped at 08/17/17 0513   • tamsulosin (FLOMAX) capsule 0.4 mg 0.4 mg at 08/16/17 1002       ASSESSMENT/PLAN: 77 y.o. male admitted for acute hyponatremia, UTI, and urinary  retention    # Acute Hyponatremia (resovling)  - Na 116 on admission, down from 131 on 8/9  - s/p TURP on 8/14 and was drinking plenty of water following procedure  - Likely hypervolemic hyponatremia due to fluid intake  - Na now up to 126 and correcting  - Initially with confusion and now back to baseline              - No further monitoring needed    # UTI  - UA shows nitrite, leuk est, blood, few bacteria  - Recent TURP  - Now with bishop in place and at increase risk for infection              - cont ceftriaxone 2g daily and d/c with PO              - follow up UCx results    # Acute Urinary Retention  - s/p TURP on 8/14  - bishop removed on 8/15  - 1.2L removed in ED via cath, bishop was replaced  - urologist is Dr. Holden with [facility] and was notified of admission              - cont bishop catheter for one week and follow up with Urology next week for removal              - contact urology to inform them of his admission in case they want to round on him              - cont home BPH meds: silodosin    # Chronic Conditions  - DM:              - cont home glimepiride, metformin, januvia              - check sugars qAC and qhs              - low ISS, hypoglycemia protocol  - Dyslipidemia: cont home atorvastatin    DVT ppx: enoxaparin  GI ppx: not indicated  IVF: currently receiving bolus NS, none ordered until BMP results  Diet: diabetic  PCP: Luciano  Code: Full    Dispo: D/C home today

## 2017-08-17 NOTE — DISCHARGE PLANNING
Medical Social Work    Met with pt to obtain choice for [facility]. Choice form completed and faxed to [facility]. Bedside RN will submit traction order.

## 2017-08-17 NOTE — NON-PROVIDER
".  [facility] FAMILY MEDICINE PROGRESS NOTE     Attending: Dr. Swartz    Resident:Dr. Cordon    PATIENT: Miri Joseph; [id number]; 1940    ID: 77 y.o. male admitted for urinary retention and hyponatremia    SUBJECTIVE: No acute events overnight, Hospital Day #2. Patients hyponatremia has improved up to 126 (8/17 17:54) and has steadily increased from 116 on admission.  Patient denies any pain, nausea, or vomiting.   He reports that he is feeling \"well today.\" Patient U/A showed increase leukocyte esterase, nitrites, blood, and bacteria, pt's culture results are not available. He is currently on ceftriaxone 2mg (NS IV q24rhs), and his ciprofloxacin was discontinued yesterday.    Social Hx was updated: patient migrated from [facility] [place] the [place] in [place]. He speaks Ch[place] in his native language as well as some Cecy.      OBJECTIVE:     Filed Vitals:    08/16/17 1600 08/16/17 2000 08/17/17 0000 08/17/17 0400   BP: 129/65 119/61 133/70 126/67   Pulse: 71 76 67 69   Temp: 36.9 °C (98.4 °F) 36.3 °C (97.4 °F) 36.7 °C (98 °F) 37.1 °C (98.8 °F)   Resp: 16 16 16 17   Weight:  77.5 kg (170 lb 13.7 oz)     SpO2: 93% 93% 94% 96%       Intake/Output Summary (Last 24 hours) at 08/17/17 0717  Last data filed at 08/16/17 2000   Gross per 24 hour   Intake    260 ml   Output   3950 ml   Net  -3690 ml       PE:  General: No acute distress, resting comfortably in bed. Pleasant conversation  HEENT: NC/AT. MMM  Cardiovascular: RRR with no M/R/G.  Respiratory: Symmetrical chest. CTAB with no W/R/R  Abdomen: soft, NT/ND, no masses, +BS   EXT:  2+ pedal pulses bilaterally, appropriate capillary refill.   Neuro: non focal with no numbness, tingling or changes in sensation    LABS:  Recent Labs      08/15/17   2358   WBC  14.2*   RBC  4.62*   HEMOGLOBIN  13.8*   HEMATOCRIT  37.9*   MCV  82.0   MCH  29.9   RDW  39.0   PLATELETCT  218   MPV  9.8   NEUTSPOLYS  71.00   LYMPHOCYTES  20.30*   MONOCYTES  7.30   EOSINOPHILS  0.90 "   BASOPHILS  0.10     Recent Labs      08/15/17   2358   08/16/17   0948  08/16/17   1340  08/16/17   1754   SODIUM  116*   < >  123*  122*  126*   POTASSIUM  3.5*   < >  3.9  3.7  3.9   CHLORIDE  83*   < >  89*  90*  93*   CO2  22   < >  25  24  24   BUN  10   < >  9  9  9   CREATININE  0.65   < >  0.66  0.64  0.73   CALCIUM  8.8   < >  8.7  8.8  9.1   ALBUMIN  4.0   --    --    --    --     < > = values in this interval not displayed.     Estimated GFR/CRCL = Estimated Creatinine Clearance: 87.4 mL/min (by C-G formula based on Cr of 0.73).  Recent Labs      08/16/17   0948   08/16/17   1340  08/16/17   1754  08/16/17   1817  08/16/17   2128  08/17/17   0600   GLUCOSE  172*   --   200*  210*   --    --    --    POCGLUCOSE   --    < >   --    --   202*  247*  166*    < > = values in this interval not displayed.     Recent Labs      08/15/17   2358   ASTSGOT  34   ALTSGPT  39   TBILIRUBIN  1.0   ALKPHOSPHAT  54   GLOBULIN  3.0               Invalid input(s): [id number]  No results for input(s): INR, APTT, FIBRINOGEN in the last 72 hours.    Invalid input(s): DIMER    MICROBIOLOGY: Suspected UTI, waiting on culture results    IMAGING: none    MEDS:  Current Facility-Administered Medications   Medication Last Dose   • atorvastatin (LIPITOR) tablet 10 mg 10 mg at 08/16/17 2126   • glimepiride (AMARYL) tablet 1 mg 1 mg at 08/16/17 0900   • metformin (GLUCOPHAGE) tablet 1,000 mg 1,000 mg at 08/16/17 2126   • sitagliptin (JANUVIA) tablet 100 mg 100 mg at 08/16/17 0900   • phenazopyridine (PYRIDIUM) tablet 200 mg     • senna-docusate (PERICOLACE or SENOKOT S) 8.6-50 MG per tablet 2 Tab 2 Tab at 08/16/17 2126    And   • polyethylene glycol/lytes (MIRALAX) PACKET 1 Packet      And   • magnesium hydroxide (MILK OF MAGNESIA) suspension 30 mL      And   • bisacodyl (DULCOLAX) suppository 10 mg     • enoxaparin (LOVENOX) inj 40 mg 40 mg at 08/16/17 1002   • enalaprilat (VASOTEC) injection 1.25 mg     • acetaminophen (TYLENOL)  tablet 650 mg     • insulin lispro (HUMALOG) injection 1-6 Units 1 Units at 08/17/17 0605   • glucose 4 g chewable tablet 16 g      And   • dextrose 50% (D50W) injection 25 mL     • oxycodone-acetaminophen (PERCOCET) 5-325 MG per tablet 1 Tab     • cefTRIAXone (ROCEPHIN) 2 g in  mL IVPB Stopped at 08/17/17 0513   • tamsulosin (FLOMAX) capsule 0.4 mg 0.4 mg at 08/16/17 1002       PROBLEM LIST:  Acute hyponatremia  UTI  Urinary Retention       ASSESSMENT/PLAN: Patient is a 77 year old male that presented to ER on 8/15 with urinary retention following a TURP on 8/14.     1. UTI:  1. U/A came back + for nitrites, leukocyte esterase, blood, and bacteria suspecting UTI  1. + nitrites most likely indicates an E. Coli infection but culture was done at 3:40 am (8/16)  2. Continue Patient on Ceftriaxone 2mg IV till culture report then place on more specific antibiotics  2. Hyponatremia:  1. Resolving, if he begins to deteriorate place on NS bolus  2. Etiology may have been due to overhydration following TURP as he reports drinking lots of water  3. Acute Urinary Retention:  1. After patient stabilize D/C to outpatient urology with Dr. Boykin  2. Keep cath in place and they will remove it in about a week  4. Chronic Problems  1. DMII  1. Patient is currently on metformin, sitagliptin, and glimepiride. As well as insulin lispro  2. Unclear why patient is on 3 medications for DM  1. Consider A1C  3. Continue with outpatient PCP  2. Hyperlipidemia:  1. Continue atorvastatin (10mg)   2. Recommend 1-6 month check-ups with PCP to discuss DM and lipid levels     CODE STATUS: full  PCP:Dr. Wolf

## 2017-08-17 NOTE — CARE PLAN
Problem: Communication  Goal: The ability to communicate needs accurately and effectively will improve  Outcome: PROGRESSING AS EXPECTED  Patient communicates effectively.  All needs met. Will continue to monitor.         Problem: Safety  Goal: Will remain free from injury  Intervention: Provide assistance with mobility  Bed in lowest, locked position.  Treaded slipper socks on, appropriate signs in place, and call light in reach.

## 2017-08-17 NOTE — CARE PLAN
Problem: Safety  Goal: Will remain free from falls  Outcome: PROGRESSING AS EXPECTED  Uses call light effectively, bed alarm in place, does not try to get out of bed without RN or CNA. Non-skid socks on, IV pole on same side as bathroom.

## 2017-08-17 NOTE — PROGRESS NOTES
Son requested walker upon discharge for patient, for when he is unable to assist him at home. [facility] family med paged. Will follow up. Patient tolerated ambulation with walker well and independently.

## 2017-08-17 NOTE — DISCHARGE INSTRUCTIONS
Discharge Instructions    Discharged to home by car with relative. Discharged via wheelchair, hospital escort: Yes.  Special equipment needed: Walker    Be sure to schedule a follow-up appointment with your primary care doctor or any specialists as instructed.     Discharge Plan:   Diet Plan: Discussed  Activity Level: Discussed  Confirmed Follow up Appointment: Patient to Call and Schedule Appointment  Confirmed Symptoms Management: Discussed  Medication Reconciliation Updated: Yes  Influenza Vaccine Indication: Indicated: Not available from distributor/    I understand that a diet low in cholesterol, fat, and sodium is recommended for good health. Unless I have been given specific instructions below for another diet, I accept this instruction as my diet prescription.   Other diet: Diabetic    Special Instructions: None      Depression / Suicide Risk    As you are discharged from this [facility] facility, it is important to learn how to keep safe from harming yourself.    Recognize the warning signs:  · Abrupt changes in personality, positive or negative- including increase in energy   · Giving away possessions  · Change in eating patterns- significant weight changes-  positive or negative  · Change in sleeping patterns- unable to sleep or sleeping all the time   · Unwillingness or inability to communicate  · Depression  · Unusual sadness, discouragement and loneliness  · Talk of wanting to die  · Neglect of personal appearance   · Rebelliousness- reckless behavior  · Withdrawal from people/activities they love  · Confusion- inability to concentrate     If you or a loved one observes any of these behaviors or has concerns about self-harm, here's what you can do:  · Talk about it- your feelings and reasons for harming yourself  · Remove any means that you might use to hurt yourself (examples: pills, rope, extension cords, firearm)  · Get professional help from the community (Mental Health, Substance  Abuse, psychological counseling)  · Do not be alone:Call your Safe Contact- someone whom you trust who will be there for you.  · Call your local CRISIS HOTLINE 060-9828 or 394-217-1878  · Call your local Children's Mobile Crisis Response Team [place] (680) 998-6033 or www.EdgeSpring  · Call the toll free National Suicide Prevention Hotlines   · National Suicide Prevention Lifeline 653-301-JXUX (9231)  · [employer] Line Network 800-SUICIDE (846-1691)  Transurethral Resection of the Prostate  Transurethral resection of the prostate (TURP) is the removal of part of your prostate to treat noncancerous (benign) prostatic hyperplasia (BPH). BPH typically occurs in men older than 40 years. It is the abnormal growth of cells in your prostate. Specifically, it is an abnormal increase in the number of cells that make up your prostate tissue. This causes an increase in the size of your prostate. Often, in the case of BPH, the prostate becomes so large that it compresses the tube that drains urine out of your body from your bladder (urethra). Eventually, this compression can obstruct the flow of urine from your bladder. This obstruction can cause recurrent bladder infection and difficulties with bladder control and bladder emptying. The goal of TURP is to remove enough prostate tissue to allow for an unobstructed flow of urine, which often resolves the associated conditions.  LET YOUR CAREGIVER KNOW ABOUT:  · Any allergies you have.  · Any medicines you are taking, including herbs, eye drops, over-the-counter medicines, and creams.  · Any problems you have had with the use of anesthetics.  · Any blood disorders you have, including bleeding problems and clotting problems.  · Previous surgeries you have had.  · Any prostate infections you have had.  RISKS AND COMPLICATIONS  Generally, TURP is a safe procedure. However, as with any surgical procedure, complications can occur. Possible complications associated with  TURP include:  · Difficulty getting an erection.  · Scarring, which may cause problems with the flow of your urine.  · Injury to your urethra.  · Incontinence from injury to the muscle that surrounds your prostate, which controls urine flow.  · Infection.  · Bleeding.  · Injury to your bladder (rare).  BEFORE THE PROCEDURE   Your caregiver will tell you when you need to stop eating and drinking. If you take any medicines, your caregiver will tell you which ones you may keep taking and which ones you will have to stop taking and when.   Just before the procedure you will also receive medicine to make you fall asleep (general anesthetic). This will be given through a tube that is inserted into one of your veins (intravenous [IV] tube).  PROCEDURE  Your surgeon inserts an instrument that is similar to a telescope with an electric cutting edge (resectoscope) through your urethra to the area of the prostate gland. The cutting edge is used to remove enlarged pieces of your prostate, one piece at a time. At the end of your procedure, a flexible tube (catheter) will be inserted into your urethra to drain your bladder. Special plastic bags filled with solution will be connected to the end of the catheter. The solution will be used to irrigate blood from your bladder while you heal.   AFTER THE PROCEDURE  You will be taken to the recovery area. Once you are awake, stable, and taking fluids well, you will be taken to your hospital room. Typically, you will stay in the hospital 1-2 days after this procedure. The catheter usually is removed before discharge from the hospital.     This information is not intended to replace advice given to you by your health care provider. Make sure you discuss any questions you have with your health care provider.     Document Released: 12/18/2006 Document Revised: 01/08/2016 Document Reviewed: 05/20/2013  [employer] Interactive Patient Education ©2016 [employer]    Mckee Catheter Care,  Adult  A Mckee catheter is a soft, flexible tube that is placed into the bladder to drain urine. A Mckee catheter may be inserted if:  · You leak urine or are not able to control when you urinate (urinary incontinence).  · You are not able to urinate when you need to (urinary retention).  · You had prostate surgery or surgery on the genitals.  · You have certain medical conditions, such as multiple sclerosis, dementia, or a spinal cord injury.  If you are going home with a Mckee catheter in place, follow the instructions below.  TAKING CARE OF THE CATHETER  · Wash your hands with soap and water.  · Using mild soap and warm water on a clean washcloth:  ¨ Clean the area on your body closest to the catheter insertion site using a circular motion, moving away from the catheter. Never wipe toward the catheter because this could sweep bacteria up into the urethra and cause infection.  ¨ Remove all traces of soap. Pat the area dry with a clean towel. For males, reposition the foreskin.  · Attach the catheter to your leg so there is no tension on the catheter. Use adhesive tape or a leg strap. If you are using adhesive tape, remove any sticky residue left behind by the previous tape you used.  · Keep the drainage bag below the level of the bladder, but keep it off the floor.  · Check throughout the day to be sure the catheter is working and urine is draining freely. Make sure the tubing does not become kinked.  · Do not pull on the catheter or try to remove it. Pulling could damage internal tissues.  TAKING CARE OF THE DRAINAGE BAGS  You will be given two drainage bags to take home. One is a large overnight drainage bag, and the other is a smaller leg bag that fits underneath clothing. You may wear the overnight bag at any time, but you should never wear the smaller leg bag at night. Follow the instructions below for how to empty, change, and clean your drainage bags.  Emptying the Drainage Bag  You must empty your drainage  bag when it is  -½ full or at least 2-3 times a day.  · Wash your hands with soap and water.  · Keep the drainage bag below your hips, below the level of your bladder. This stops urine from going back into the tubing and into your bladder.  · Hold the dirty bag over the toilet or a clean container.  · Open the pour spout at the bottom of the bag and empty the urine into the toilet or container. Do not let the pour spout touch the toilet, container, or any other surface. Doing so can place bacteria on the bag, which can cause an infection.  · Clean the pour spout with a gauze pad or cotton ball that has rubbing alcohol on it.  · Close the pour spout.  · Attach the bag to your leg with adhesive tape or a leg strap.  · Wash your hands well.  Changing the Drainage Bag  Change your drainage bag once a month or sooner if it starts to smell bad or look dirty. Below are steps to follow when changing the drainage bag.  · Wash your hands with soap and water.  · Pinch off the rubber catheter so that urine does not spill out.  · Disconnect the catheter tube from the drainage tube at the connection valve. Do not let the tubes touch any surface.  · Clean the end of the catheter tube with an alcohol wipe. Use a different alcohol wipe to clean the end of the drainage tube.  · Connect the catheter tube to the drainage tube of the clean drainage bag.  · Attach the new bag to the leg with adhesive tape or a leg strap. Avoid attaching the new bag too tightly.  · Wash your hands well.  Cleaning the Drainage Bag  · Wash your hands with soap and water.  · Wash the bag in warm, soapy water.  · Rinse the bag thoroughly with warm water.  · Fill the bag with a solution of white vinegar and water (1 cup vinegar to 1 qt warm water [.2 L vinegar to 1 L warm water]). Close the bag and soak it for 30 minutes in the solution.  · Rinse the bag with warm water.  · Hang the bag to dry with the pour spout open and hanging downward.  · Store the clean  bag (once it is dry) in a clean plastic bag.  · Wash your hands well.  PREVENTING INFECTION  · Wash your hands before and after handling your catheter.  · Take showers daily and wash the area where the catheter enters your body. Do not take baths. Replace wet leg straps with dry ones, if this applies.  · Do not use powders, sprays, or lotions on the genital area. Only use creams, lotions, or ointments as directed by your caregiver.  · For females, wipe from front to back after each bowel movement.  · Drink enough fluids to keep your urine clear or pale yellow unless you have a fluid restriction.  · Do not let the drainage bag or tubing touch or lie on the floor.  · Wear cotton underwear to absorb moisture and to keep your .  SEEK MEDICAL CARE IF:   · Your urine is cloudy or smells unusually bad.  · Your catheter becomes clogged.  · You are not draining urine into the bag or your bladder feels full.  · Your catheter starts to leak.  SEEK IMMEDIATE MEDICAL CARE IF:   · You have pain, swelling, redness, or pus where the catheter enters the body.  · You have pain in the abdomen, legs, lower back, or bladder.  · You have a fever.  · You see blood fill the catheter, or your urine is pink or red.  · You have nausea, vomiting, or chills.  · Your catheter gets pulled out.  MAKE SURE YOU:   · Understand these instructions.  · Will watch your condition.  · Will get help right away if you are not doing well or get worse.     This information is not intended to replace advice given to you by your health care provider. Make sure you discuss any questions you have with your health care provider.     Document Released: 12/18/2006 Document Revised: 05/04/2015 Document Reviewed: 12/09/2013  [employer] Interactive Patient Education ©2016 [employer]    Diabetes Mellitus and Food  It is important for you to manage your blood sugar (glucose) level. Your blood glucose level can be greatly affected by what you eat. Eating  healthier foods in the appropriate amounts throughout the day at about the same time each day will help you control your blood glucose level. It can also help slow or prevent worsening of your diabetes mellitus. Healthy eating may even help you improve the level of your blood pressure and reach or maintain a healthy weight.   General recommendations for healthful eating and cooking habits include:  · Eating meals and snacks regularly. Avoid going long periods of time without eating to lose weight.  · Eating a diet that consists mainly of plant-based foods, such as fruits, vegetables, nuts, legumes, and whole grains.  · Using low-heat cooking methods, such as baking, instead of high-heat cooking methods, such as deep frying.  Work with your dietitian to make sure you understand how to use the Nutrition Facts information on food labels.  HOW CAN FOOD AFFECT ME?  Carbohydrates  Carbohydrates affect your blood glucose level more than any other type of food. Your dietitian will help you determine how many carbohydrates to eat at each meal and teach you how to count carbohydrates. Counting carbohydrates is important to keep your blood glucose at a healthy level, especially if you are using insulin or taking certain medicines for diabetes mellitus.  Alcohol  Alcohol can cause sudden decreases in blood glucose (hypoglycemia), especially if you use insulin or take certain medicines for diabetes mellitus. Hypoglycemia can be a life-threatening condition. Symptoms of hypoglycemia (sleepiness, dizziness, and disorientation) are similar to symptoms of having too much alcohol.   If your health care provider has given you approval to drink alcohol, do so in moderation and use the following guidelines:  · Women should not have more than one drink per day, and men should not have more than two drinks per day. One drink is equal to:  ¨ 12 oz of beer.  ¨ 5 oz of wine.  ¨ 1½ oz of hard liquor.  · Do not drink on an empty  stomach.  · Keep yourself hydrated. Have water, diet soda, or unsweetened iced tea.  · Regular soda, juice, and other mixers might contain a lot of carbohydrates and should be counted.  WHAT FOODS ARE NOT RECOMMENDED?  As you make food choices, it is important to remember that all foods are not the same. Some foods have fewer nutrients per serving than other foods, even though they might have the same number of calories or carbohydrates. It is difficult to get your body what it needs when you eat foods with fewer nutrients. Examples of foods that you should avoid that are high in calories and carbohydrates but low in nutrients include:  · Trans fats (most processed foods list trans fats on the Nutrition Facts label).  · Regular soda.  · Juice.  · Candy.  · Sweets, such as cake, pie, doughnuts, and cookies.  · Fried foods.  WHAT FOODS CAN I EAT?  Eat nutrient-rich foods, which will nourish your body and keep you healthy. The food you should eat also will depend on several factors, including:  · The calories you need.  · The medicines you take.  · Your weight.  · Your blood glucose level.  · Your blood pressure level.  · Your cholesterol level.  You should eat a variety of foods, including:  · Protein.  ¨ Lean cuts of meat.  ¨ Proteins low in saturated fats, such as fish, egg whites, and beans. Avoid processed meats.  · Fruits and vegetables.  ¨ Fruits and vegetables that may help control blood glucose levels, such as apples, mangoes, and yams.  · Dairy products.  ¨ Choose fat-free or low-fat dairy products, such as milk, yogurt, and cheese.  · Grains, bread, pasta, and rice.  ¨ Choose whole grain products, such as multigrain bread, whole oats, and brown rice. These foods may help control blood pressure.  · Fats.  ¨ Foods containing healthful fats, such as nuts, avocado, olive oil, canola oil, and fish.  DOES EVERYONE WITH DIABETES MELLITUS HAVE THE SAME MEAL PLAN?  Because every person with diabetes mellitus is  different, there is not one meal plan that works for everyone. It is very important that you meet with a dietitian who will help you create a meal plan that is just right for you.     This information is not intended to replace advice given to you by your health care provider. Make sure you discuss any questions you have with your health care provider.     Document Released: 09/14/2006 Document Revised: 01/08/2016 Document Reviewed: 11/14/2014  [employer] Interactive Patient Education ©2016 [employer].

## 2017-08-18 LAB
BACTERIA UR CULT: NORMAL
SIGNIFICANT IND 70042: NORMAL
SITE SITE: NORMAL
SOURCE SOURCE: NORMAL

## 2017-08-22 NOTE — DOCUMENTATION QUERY
DOCUMENTATION QUERY    PROVIDERS: Please select “Cosign w/ note”to reply to query.    To better represent the severity of illness of your patient, please review the following information and exercise your independent professional judgment in responding to this query.     A UTI is documented in a patient with a recent indwelling bishop after prostate surgery. Based upon the clinical findings, risk factors, and treatment, please specify if there is a relationship between the UTI and the existence of the bishop catheter:      • The UTI is due to the indwelling bishop catheter  • The UTI is not due to the presence of the indwelling bishop catheter  • Other explanation of findings (please document)  • Unable to determine    The medical record reflects the following:   Clinical Findings This is a 77 y.o. male here with urinary retention and hyponatremia following TURP. Patient had TURP done two days prior to admission and did have one successful void after bishop removal after which the patient was unable to void. He initially thought he was dehydrated and drank several large bottles of water. After which he developed confusion and difficulty ambulating. Upon admission to the ER he was found to have 1200ml in his bladder and a Na of 116. This was likely due to his large free water consumption, he was given 1L of NS and monitored with Q4hr BMP's. He diuresed 4100ml with in the first 6 few hours and his sodium began to increase appropriately. On HD#2 he was back to his baseline health with his sodium returning to normal levels. Dr. Boykin his urologist was notified who recommended leaving the bishop in place and to follow up in his office in one week.   Treatment Replacement of bishop   Risk Factors Recent prostate surgery, urinary retention   Location within medical record Discharge Summary     Thank you,   Chani Titus MD, MEd, CPC, CCS, CDIP

## 2017-08-28 NOTE — DOCUMENTATION QUERY
DOCUMENTATION QUERY     PROVIDERS: Please select “Cosign w/ note”to reply to query.     To better represent the severity of illness of your patient, please review the following information and exercise your independent professional judgment in responding to this query.      A UTI is documented in a patient with a recent indwelling bishop after prostate surgery. Based upon the clinical findings, risk factors, and treatment, please specify if there is a relationship between the UTI and the existence of the bishop catheter:     · The UTI is due to the indwelling bishop catheter  · The UTI is not due to the presence of the indwelling bishop catheter  · Other explanation of findings (please document)  · Unable to determine     The medical record reflects the following:   Clinical Findings This is a 77 y.o. male here with urinary retention and hyponatremia following TURP. Patient had TURP done two days prior to admission and did have one successful void after bishop removal after which the patient was unable to void. He initially thought he was dehydrated and drank several large bottles of water. After which he developed confusion and difficulty ambulating. Upon admission to the ER he was found to have 1200ml in his bladder and a Na of 116. This was likely due to his large free water consumption, he was given 1L of NS and monitored with Q4hr BMP's. He diuresed 4100ml with in the first 6 few hours and his sodium began to increase appropriately. On HD#2 he was back to his baseline health with his sodium returning to normal levels. Dr. Boykin his urologist was notified who recommended leaving the bishop in place and to follow up in his office in one week.   Treatment Replacement of bishop   Risk Factors Recent prostate surgery, urinary retention   Location within medical record Discharge Summary      Thank you,   Chani Titus MD, MEd, CPC, CCS, CDIP

## 2017-08-31 NOTE — DOCUMENTATION QUERY
DOCUMENTATION QUERY     PROVIDERS: Please select “Cosign w/ note”to reply to query.     To better represent the severity of illness of your patient, please review the following information and exercise your independent professional judgment in responding to this query.      A UTI is documented in a patient with a recent indwelling bishop after prostate surgery. Based upon the clinical findings, risk factors, and treatment, please specify if there is a relationship between the UTI and the existence of the bishop catheter:     · The UTI is due to the indwelling bishop catheter  · The UTI is not due to the presence of the indwelling bishop catheter  · Other explanation of findings (please document)  · Unable to determine     The medical record reflects the following:   Clinical Findings This is a 77 y.o. male here with urinary retention and hyponatremia following TURP. Patient had TURP done two days prior to admission and did have one successful void after bsihop removal after which the patient was unable to void. He initially thought he was dehydrated and drank several large bottles of water. After which he developed confusion and difficulty ambulating. Upon admission to the ER he was found to have 1200ml in his bladder and a Na of 116. This was likely due to his large free water consumption, he was given 1L of NS and monitored with Q4hr BMP's. He diuresed 4100ml with in the first 6 few hours and his sodium began to increase appropriately. On HD#2 he was back to his baseline health with his sodium returning to normal levels. Dr. Boykin his urologist was notified who recommended leaving the bishop in place and to follow up in his office in one week.   Treatment Replacement of bishop   Risk Factors Recent prostate surgery, urinary retention   Location within medical record Discharge Summary      Thank you,   Chani Titus MD, MEd, CPC, CCS, CDIP

## 2021-01-14 DIAGNOSIS — Z23 NEED FOR VACCINATION: ICD-10-CM

## 2021-02-17 ENCOUNTER — IMMUNIZATION (OUTPATIENT)
Dept: FAMILY PLANNING/WOMEN'S HEALTH CLINIC | Facility: IMMUNIZATION CENTER | Age: 81
End: 2021-02-17
Attending: INTERNAL MEDICINE
Payer: MEDICARE

## 2021-02-17 DIAGNOSIS — Z23 ENCOUNTER FOR VACCINATION: Primary | ICD-10-CM

## 2021-02-17 DIAGNOSIS — Z23 NEED FOR VACCINATION: ICD-10-CM

## 2021-02-17 PROCEDURE — 91300 PFIZER SARS-COV-2 VACCINE: CPT | Performed by: INTERNAL MEDICINE

## 2021-02-17 PROCEDURE — 0001A PFIZER SARS-COV-2 VACCINE: CPT | Performed by: INTERNAL MEDICINE

## 2021-03-13 ENCOUNTER — IMMUNIZATION (OUTPATIENT)
Dept: FAMILY PLANNING/WOMEN'S HEALTH CLINIC | Facility: IMMUNIZATION CENTER | Age: 81
End: 2021-03-13
Attending: INTERNAL MEDICINE
Payer: MEDICARE

## 2021-03-13 DIAGNOSIS — Z23 ENCOUNTER FOR VACCINATION: Primary | ICD-10-CM

## 2021-03-13 PROCEDURE — 0002A PFIZER SARS-COV-2 VACCINE: CPT | Performed by: INTERNAL MEDICINE

## 2021-03-13 PROCEDURE — 91300 PFIZER SARS-COV-2 VACCINE: CPT | Performed by: INTERNAL MEDICINE

## 2024-01-01 ENCOUNTER — APPOINTMENT (OUTPATIENT)
Dept: RADIOLOGY | Facility: MEDICAL CENTER | Age: 84
End: 2024-01-01
Attending: STUDENT IN AN ORGANIZED HEALTH CARE EDUCATION/TRAINING PROGRAM
Payer: MEDICARE

## 2024-01-01 ENCOUNTER — APPOINTMENT (OUTPATIENT)
Dept: RADIOLOGY | Facility: MEDICAL CENTER | Age: 84
End: 2024-01-01
Attending: INTERNAL MEDICINE
Payer: MEDICARE

## 2024-01-01 ENCOUNTER — APPOINTMENT (OUTPATIENT)
Dept: RADIOLOGY | Facility: MEDICAL CENTER | Age: 84
End: 2024-01-01
Payer: MEDICARE

## 2024-01-01 ENCOUNTER — APPOINTMENT (OUTPATIENT)
Dept: RADIOLOGY | Facility: MEDICAL CENTER | Age: 84
End: 2024-01-01
Attending: EMERGENCY MEDICINE
Payer: MEDICARE

## 2024-01-01 ENCOUNTER — HOSPITAL ENCOUNTER (INPATIENT)
Facility: MEDICAL CENTER | Age: 84
LOS: 18 days | End: 2024-11-24
Attending: STUDENT IN AN ORGANIZED HEALTH CARE EDUCATION/TRAINING PROGRAM | Admitting: STUDENT IN AN ORGANIZED HEALTH CARE EDUCATION/TRAINING PROGRAM
Payer: MEDICARE

## 2024-01-01 ENCOUNTER — APPOINTMENT (OUTPATIENT)
Dept: RADIOLOGY | Facility: MEDICAL CENTER | Age: 84
End: 2024-01-01
Attending: NURSE PRACTITIONER
Payer: MEDICARE

## 2024-01-01 ENCOUNTER — HOSPITAL ENCOUNTER (OUTPATIENT)
Dept: RADIOLOGY | Facility: MEDICAL CENTER | Age: 84
End: 2024-11-06
Payer: MEDICARE

## 2024-01-01 DIAGNOSIS — J18.9 MULTIFOCAL PNEUMONIA: ICD-10-CM

## 2024-01-01 DIAGNOSIS — D61.818 PANCYTOPENIA (HCC): ICD-10-CM

## 2024-01-01 LAB
1 3 BETA D GLUCAN INTERP Q4483: NEGATIVE
1 3 BETA D GLUCAN INTERP Q4483: NEGATIVE
1,3 BETA GLUCAN SER-MCNC: 40 PG/ML
1,3 BETA GLUCAN SER-MCNC: <31 PG/ML
ABO GROUP BLD: ABNORMAL
ALBUMIN SERPL BCP-MCNC: 1.7 G/DL (ref 3.2–4.9)
ALBUMIN SERPL BCP-MCNC: 1.8 G/DL (ref 3.2–4.9)
ALBUMIN SERPL BCP-MCNC: 1.8 G/DL (ref 3.2–4.9)
ALBUMIN SERPL BCP-MCNC: 1.9 G/DL (ref 3.2–4.9)
ALBUMIN SERPL BCP-MCNC: 2 G/DL (ref 3.2–4.9)
ALBUMIN SERPL BCP-MCNC: 2.1 G/DL (ref 3.2–4.9)
ALBUMIN SERPL BCP-MCNC: 2.1 G/DL (ref 3.2–4.9)
ALBUMIN SERPL BCP-MCNC: 2.2 G/DL (ref 3.2–4.9)
ALBUMIN SERPL BCP-MCNC: 2.3 G/DL (ref 3.2–4.9)
ALBUMIN SERPL BCP-MCNC: 2.4 G/DL (ref 3.2–4.9)
ALBUMIN/GLOB SERPL: 0.4 G/DL
ALBUMIN/GLOB SERPL: 0.5 G/DL
ALBUMIN/GLOB SERPL: 0.6 G/DL
ALBUMIN/GLOB SERPL: 0.8 G/DL
ALP SERPL-CCNC: 101 U/L (ref 30–99)
ALP SERPL-CCNC: 101 U/L (ref 30–99)
ALP SERPL-CCNC: 106 U/L (ref 30–99)
ALP SERPL-CCNC: 107 U/L (ref 30–99)
ALP SERPL-CCNC: 115 U/L (ref 30–99)
ALP SERPL-CCNC: 119 U/L (ref 30–99)
ALP SERPL-CCNC: 122 U/L (ref 30–99)
ALP SERPL-CCNC: 126 U/L (ref 30–99)
ALP SERPL-CCNC: 67 U/L (ref 30–99)
ALP SERPL-CCNC: 82 U/L (ref 30–99)
ALP SERPL-CCNC: 87 U/L (ref 30–99)
ALP SERPL-CCNC: 95 U/L (ref 30–99)
ALT SERPL-CCNC: 17 U/L (ref 2–50)
ALT SERPL-CCNC: 17 U/L (ref 2–50)
ALT SERPL-CCNC: 20 U/L (ref 2–50)
ALT SERPL-CCNC: 22 U/L (ref 2–50)
ALT SERPL-CCNC: 23 U/L (ref 2–50)
ALT SERPL-CCNC: 23 U/L (ref 2–50)
ALT SERPL-CCNC: 24 U/L (ref 2–50)
ALT SERPL-CCNC: 24 U/L (ref 2–50)
ALT SERPL-CCNC: 31 U/L (ref 2–50)
ALT SERPL-CCNC: 34 U/L (ref 2–50)
ALT SERPL-CCNC: 36 U/L (ref 2–50)
ALT SERPL-CCNC: 60 U/L (ref 2–50)
ANION GAP SERPL CALC-SCNC: 10 MMOL/L (ref 7–16)
ANION GAP SERPL CALC-SCNC: 11 MMOL/L (ref 7–16)
ANION GAP SERPL CALC-SCNC: 12 MMOL/L (ref 7–16)
ANION GAP SERPL CALC-SCNC: 12 MMOL/L (ref 7–16)
ANION GAP SERPL CALC-SCNC: 4 MMOL/L (ref 7–16)
ANION GAP SERPL CALC-SCNC: 5 MMOL/L (ref 7–16)
ANION GAP SERPL CALC-SCNC: 6 MMOL/L (ref 7–16)
ANION GAP SERPL CALC-SCNC: 7 MMOL/L (ref 7–16)
ANION GAP SERPL CALC-SCNC: 8 MMOL/L (ref 7–16)
ANION GAP SERPL CALC-SCNC: 8 MMOL/L (ref 7–16)
ANION GAP SERPL CALC-SCNC: 9 MMOL/L (ref 7–16)
ANISOCYTOSIS BLD QL SMEAR: ABNORMAL
APPEARANCE UR: CLEAR
AST SERPL-CCNC: 15 U/L (ref 12–45)
AST SERPL-CCNC: 20 U/L (ref 12–45)
AST SERPL-CCNC: 22 U/L (ref 12–45)
AST SERPL-CCNC: 26 U/L (ref 12–45)
AST SERPL-CCNC: 28 U/L (ref 12–45)
AST SERPL-CCNC: 29 U/L (ref 12–45)
AST SERPL-CCNC: 31 U/L (ref 12–45)
AST SERPL-CCNC: 32 U/L (ref 12–45)
AST SERPL-CCNC: 56 U/L (ref 12–45)
AST SERPL-CCNC: 74 U/L (ref 12–45)
B-OH-BUTYR SERPL-MCNC: 0.98 MMOL/L (ref 0.02–0.27)
BACTERIA #/AREA URNS HPF: NORMAL /HPF
BACTERIA BLD CULT: NORMAL
BACTERIA SPEC RESP CULT: ABNORMAL
BARCODED ABORH UBTYP: 5100
BARCODED ABORH UBTYP: 6200
BARCODED ABORH UBTYP: 8400
BARCODED PRD CODE UBPRD: ABNORMAL
BARCODED PRD CODE UBPRD: NORMAL
BARCODED UNIT NUM UBUNT: ABNORMAL
BARCODED UNIT NUM UBUNT: NORMAL
BASE EXCESS BLDA CALC-SCNC: -2 MMOL/L (ref -4–3)
BASE EXCESS BLDA CALC-SCNC: 2 MMOL/L (ref -4–3)
BASE EXCESS BLDA CALC-SCNC: 3 MMOL/L (ref -4–3)
BASE EXCESS BLDA CALC-SCNC: 4 MMOL/L (ref -4–3)
BASOPHILS # BLD AUTO: 0 % (ref 0–1.8)
BASOPHILS # BLD: 0 K/UL (ref 0–0.12)
BILIRUB CONJ SERPL-MCNC: 1.4 MG/DL (ref 0.1–0.5)
BILIRUB INDIRECT SERPL-MCNC: 0.7 MG/DL (ref 0–1)
BILIRUB SERPL-MCNC: 1.1 MG/DL (ref 0.1–1.5)
BILIRUB SERPL-MCNC: 1.2 MG/DL (ref 0.1–1.5)
BILIRUB SERPL-MCNC: 1.5 MG/DL (ref 0.1–1.5)
BILIRUB SERPL-MCNC: 1.5 MG/DL (ref 0.1–1.5)
BILIRUB SERPL-MCNC: 1.7 MG/DL (ref 0.1–1.5)
BILIRUB SERPL-MCNC: 2 MG/DL (ref 0.1–1.5)
BILIRUB SERPL-MCNC: 2.1 MG/DL (ref 0.1–1.5)
BILIRUB SERPL-MCNC: 2.2 MG/DL (ref 0.1–1.5)
BILIRUB SERPL-MCNC: 2.5 MG/DL (ref 0.1–1.5)
BILIRUB SERPL-MCNC: 2.8 MG/DL (ref 0.1–1.5)
BILIRUB SERPL-MCNC: 3.3 MG/DL (ref 0.1–1.5)
BILIRUB SERPL-MCNC: 4.2 MG/DL (ref 0.1–1.5)
BILIRUB UR QL STRIP.AUTO: NEGATIVE
BLASTS NFR BLD MANUAL: 0 %
BLD GP AB SCN SERPL QL: ABNORMAL
BODY TEMPERATURE: 36.6 CENTIGRADE
BODY TEMPERATURE: 36.7 CENTIGRADE
BODY TEMPERATURE: 36.8 CENTIGRADE
BODY TEMPERATURE: ABNORMAL DEGREES
BUN SERPL-MCNC: 15 MG/DL (ref 8–22)
BUN SERPL-MCNC: 17 MG/DL (ref 8–22)
BUN SERPL-MCNC: 19 MG/DL (ref 8–22)
BUN SERPL-MCNC: 19 MG/DL (ref 8–22)
BUN SERPL-MCNC: 22 MG/DL (ref 8–22)
BUN SERPL-MCNC: 26 MG/DL (ref 8–22)
BUN SERPL-MCNC: 28 MG/DL (ref 8–22)
BUN SERPL-MCNC: 28 MG/DL (ref 8–22)
BUN SERPL-MCNC: 30 MG/DL (ref 8–22)
BUN SERPL-MCNC: 31 MG/DL (ref 8–22)
BUN SERPL-MCNC: 33 MG/DL (ref 8–22)
BUN SERPL-MCNC: 34 MG/DL (ref 8–22)
BUN SERPL-MCNC: 35 MG/DL (ref 8–22)
BUN SERPL-MCNC: 42 MG/DL (ref 8–22)
BUN SERPL-MCNC: 42 MG/DL (ref 8–22)
BUN SERPL-MCNC: 44 MG/DL (ref 8–22)
BUN SERPL-MCNC: 60 MG/DL (ref 8–22)
BUN SERPL-MCNC: 62 MG/DL (ref 8–22)
BUN SERPL-MCNC: 80 MG/DL (ref 8–22)
BURR CELLS BLD QL SMEAR: NORMAL
C IMMITIS AB SER QL ID: NOT DETECTED
CALCIUM ALBUM COR SERPL-MCNC: 10 MG/DL (ref 8.5–10.5)
CALCIUM ALBUM COR SERPL-MCNC: 9.1 MG/DL (ref 8.5–10.5)
CALCIUM ALBUM COR SERPL-MCNC: 9.2 MG/DL (ref 8.5–10.5)
CALCIUM ALBUM COR SERPL-MCNC: 9.3 MG/DL (ref 8.5–10.5)
CALCIUM ALBUM COR SERPL-MCNC: 9.4 MG/DL (ref 8.5–10.5)
CALCIUM ALBUM COR SERPL-MCNC: 9.6 MG/DL (ref 8.5–10.5)
CALCIUM ALBUM COR SERPL-MCNC: 9.6 MG/DL (ref 8.5–10.5)
CALCIUM ALBUM COR SERPL-MCNC: 9.7 MG/DL (ref 8.5–10.5)
CALCIUM SERPL-MCNC: 6.9 MG/DL (ref 8.5–10.5)
CALCIUM SERPL-MCNC: 7.3 MG/DL (ref 8.5–10.5)
CALCIUM SERPL-MCNC: 7.5 MG/DL (ref 8.5–10.5)
CALCIUM SERPL-MCNC: 7.6 MG/DL (ref 8.5–10.5)
CALCIUM SERPL-MCNC: 7.6 MG/DL (ref 8.5–10.5)
CALCIUM SERPL-MCNC: 7.8 MG/DL (ref 8.5–10.5)
CALCIUM SERPL-MCNC: 7.9 MG/DL (ref 8.5–10.5)
CALCIUM SERPL-MCNC: 8 MG/DL (ref 8.5–10.5)
CALCIUM SERPL-MCNC: 8.1 MG/DL (ref 8.5–10.5)
CALCIUM SERPL-MCNC: 8.2 MG/DL (ref 8.5–10.5)
CALCIUM SERPL-MCNC: 8.2 MG/DL (ref 8.5–10.5)
CALCIUM SERPL-MCNC: 8.5 MG/DL (ref 8.5–10.5)
CASTS URNS QL MICRO: NORMAL /LPF (ref 0–2)
CHLORIDE SERPL-SCNC: 100 MMOL/L (ref 96–112)
CHLORIDE SERPL-SCNC: 100 MMOL/L (ref 96–112)
CHLORIDE SERPL-SCNC: 101 MMOL/L (ref 96–112)
CHLORIDE SERPL-SCNC: 103 MMOL/L (ref 96–112)
CHLORIDE SERPL-SCNC: 103 MMOL/L (ref 96–112)
CHLORIDE SERPL-SCNC: 104 MMOL/L (ref 96–112)
CHLORIDE SERPL-SCNC: 104 MMOL/L (ref 96–112)
CHLORIDE SERPL-SCNC: 105 MMOL/L (ref 96–112)
CHLORIDE SERPL-SCNC: 105 MMOL/L (ref 96–112)
CHLORIDE SERPL-SCNC: 107 MMOL/L (ref 96–112)
CHLORIDE SERPL-SCNC: 108 MMOL/L (ref 96–112)
CHLORIDE SERPL-SCNC: 109 MMOL/L (ref 96–112)
CHLORIDE SERPL-SCNC: 109 MMOL/L (ref 96–112)
CHLORIDE SERPL-SCNC: 111 MMOL/L (ref 96–112)
CHLORIDE SERPL-SCNC: 89 MMOL/L (ref 96–112)
CHLORIDE SERPL-SCNC: 96 MMOL/L (ref 96–112)
CHLORIDE SERPL-SCNC: 97 MMOL/L (ref 96–112)
CHLORIDE SERPL-SCNC: 99 MMOL/L (ref 96–112)
CHLORIDE SERPL-SCNC: 99 MMOL/L (ref 96–112)
CO2 BLDA-SCNC: 28 MMOL/L (ref 20–33)
CO2 SERPL-SCNC: 17 MMOL/L (ref 20–33)
CO2 SERPL-SCNC: 20 MMOL/L (ref 20–33)
CO2 SERPL-SCNC: 20 MMOL/L (ref 20–33)
CO2 SERPL-SCNC: 21 MMOL/L (ref 20–33)
CO2 SERPL-SCNC: 22 MMOL/L (ref 20–33)
CO2 SERPL-SCNC: 23 MMOL/L (ref 20–33)
CO2 SERPL-SCNC: 24 MMOL/L (ref 20–33)
CO2 SERPL-SCNC: 24 MMOL/L (ref 20–33)
CO2 SERPL-SCNC: 26 MMOL/L (ref 20–33)
CO2 SERPL-SCNC: 27 MMOL/L (ref 20–33)
CO2 SERPL-SCNC: 27 MMOL/L (ref 20–33)
CO2 SERPL-SCNC: 29 MMOL/L (ref 20–33)
COCCIDIOIDES AB TITR SER CF: NORMAL {TITER}
COCCIDIOIDES IGG SER-ACNC: 0.7 IV
COCCIDIOIDES IGM SERPL-ACNC: 0.1 IV
COLLECT DURATION TIME SPEC: NORMAL HR
COLOR UR: YELLOW
COMMENT NL1176: NORMAL
COMPONENT P 8504P: NORMAL
COMPONENT R 8504R: ABNORMAL
CORTIS SERPL-MCNC: 21.8 UG/DL (ref 0–23)
CREAT 24H UR-MCNC: 15 MG/DL
CREAT SERPL-MCNC: 0.47 MG/DL (ref 0.5–1.4)
CREAT SERPL-MCNC: 0.53 MG/DL (ref 0.5–1.4)
CREAT SERPL-MCNC: 0.54 MG/DL (ref 0.5–1.4)
CREAT SERPL-MCNC: 0.59 MG/DL (ref 0.5–1.4)
CREAT SERPL-MCNC: 0.6 MG/DL (ref 0.5–1.4)
CREAT SERPL-MCNC: 0.62 MG/DL (ref 0.5–1.4)
CREAT SERPL-MCNC: 0.66 MG/DL (ref 0.5–1.4)
CREAT SERPL-MCNC: 0.68 MG/DL (ref 0.5–1.4)
CREAT SERPL-MCNC: 0.68 MG/DL (ref 0.5–1.4)
CREAT SERPL-MCNC: 0.69 MG/DL (ref 0.5–1.4)
CREAT SERPL-MCNC: 0.72 MG/DL (ref 0.5–1.4)
CREAT SERPL-MCNC: 0.74 MG/DL (ref 0.5–1.4)
CREAT SERPL-MCNC: 0.79 MG/DL (ref 0.5–1.4)
CREAT SERPL-MCNC: 0.92 MG/DL (ref 0.5–1.4)
CREAT SERPL-MCNC: 1.11 MG/DL (ref 0.5–1.4)
CREAT SERPL-MCNC: 1.23 MG/DL (ref 0.5–1.4)
CREAT SERPL-MCNC: 1.42 MG/DL (ref 0.5–1.4)
CREAT SERPL-MCNC: 1.75 MG/DL (ref 0.5–1.4)
CREAT SERPL-MCNC: 1.87 MG/DL (ref 0.5–1.4)
CRP SERPL HS-MCNC: 12.5 MG/DL (ref 0–0.75)
CRP SERPL HS-MCNC: 13.5 MG/DL (ref 0–0.75)
CRYPTOC AG SER QL LA: NEGATIVE
CYCLOSPORINE BLD-MCNC: 53.5 NG/ML
CYCLOSPORINE BLD-MCNC: 75.8 NG/ML
D DIMER PPP IA.FEU-MCNC: 19.18 UG/ML (FEU) (ref 0–0.5)
DELSYS IDSYS: ABNORMAL
EKG IMPRESSION: NORMAL
EOSINOPHIL # BLD AUTO: 0 K/UL (ref 0–0.51)
EOSINOPHIL NFR BLD: 0 % (ref 0–6.9)
EOSINOPHIL NFR BLD: 0.4 % (ref 0–6.9)
EOSINOPHIL NFR BLD: 0.6 % (ref 0–6.9)
EPITHELIAL CELLS 1715: NORMAL /HPF (ref 0–5)
ERYTHROCYTE [DISTWIDTH] IN BLOOD BY AUTOMATED COUNT: 43.8 FL (ref 35.9–50)
ERYTHROCYTE [DISTWIDTH] IN BLOOD BY AUTOMATED COUNT: 44 FL (ref 35.9–50)
ERYTHROCYTE [DISTWIDTH] IN BLOOD BY AUTOMATED COUNT: 44 FL (ref 35.9–50)
ERYTHROCYTE [DISTWIDTH] IN BLOOD BY AUTOMATED COUNT: 44.2 FL (ref 35.9–50)
ERYTHROCYTE [DISTWIDTH] IN BLOOD BY AUTOMATED COUNT: 44.6 FL (ref 35.9–50)
ERYTHROCYTE [DISTWIDTH] IN BLOOD BY AUTOMATED COUNT: 44.7 FL (ref 35.9–50)
ERYTHROCYTE [DISTWIDTH] IN BLOOD BY AUTOMATED COUNT: 44.8 FL (ref 35.9–50)
ERYTHROCYTE [DISTWIDTH] IN BLOOD BY AUTOMATED COUNT: 45.1 FL (ref 35.9–50)
ERYTHROCYTE [DISTWIDTH] IN BLOOD BY AUTOMATED COUNT: 46.5 FL (ref 35.9–50)
ERYTHROCYTE [DISTWIDTH] IN BLOOD BY AUTOMATED COUNT: 46.5 FL (ref 35.9–50)
ERYTHROCYTE [DISTWIDTH] IN BLOOD BY AUTOMATED COUNT: 47.3 FL (ref 35.9–50)
ERYTHROCYTE [DISTWIDTH] IN BLOOD BY AUTOMATED COUNT: 47.5 FL (ref 35.9–50)
ERYTHROCYTE [DISTWIDTH] IN BLOOD BY AUTOMATED COUNT: 47.5 FL (ref 35.9–50)
ERYTHROCYTE [DISTWIDTH] IN BLOOD BY AUTOMATED COUNT: 47.6 FL (ref 35.9–50)
ERYTHROCYTE [DISTWIDTH] IN BLOOD BY AUTOMATED COUNT: 47.8 FL (ref 35.9–50)
ERYTHROCYTE [DISTWIDTH] IN BLOOD BY AUTOMATED COUNT: 48.8 FL (ref 35.9–50)
ERYTHROCYTE [DISTWIDTH] IN BLOOD BY AUTOMATED COUNT: 49.6 FL (ref 35.9–50)
ERYTHROCYTE [DISTWIDTH] IN BLOOD BY AUTOMATED COUNT: 49.7 FL (ref 35.9–50)
ERYTHROCYTE [DISTWIDTH] IN BLOOD BY AUTOMATED COUNT: 50.6 FL (ref 35.9–50)
ERYTHROCYTE [DISTWIDTH] IN BLOOD BY AUTOMATED COUNT: 50.9 FL (ref 35.9–50)
ERYTHROCYTE [DISTWIDTH] IN BLOOD BY AUTOMATED COUNT: 51.5 FL (ref 35.9–50)
FOLATE SERPL-MCNC: 16.8 NG/ML
FUNGUS SPEC CULT: ABNORMAL
FUNGUS SPEC CULT: ABNORMAL
FUNGUS SPEC FUNGUS STN: ABNORMAL
FUNGUS SPEC FUNGUS STN: NORMAL
GALACTOMANNAN AG SERPL QL IA: NEGATIVE
GALACTOMANNAN AG SERPL QL IA: NEGATIVE
GALACTOMANNAN AG SERPL-ACNC: 0.03
GALACTOMANNAN AG SERPL-ACNC: 0.06
GFR SERPLBLD CREATININE-BSD FMLA CKD-EPI: 100 ML/MIN/1.73 M 2
GFR SERPLBLD CREATININE-BSD FMLA CKD-EPI: 103 ML/MIN/1.73 M 2
GFR SERPLBLD CREATININE-BSD FMLA CKD-EPI: 35 ML/MIN/1.73 M 2
GFR SERPLBLD CREATININE-BSD FMLA CKD-EPI: 38 ML/MIN/1.73 M 2
GFR SERPLBLD CREATININE-BSD FMLA CKD-EPI: 49 ML/MIN/1.73 M 2
GFR SERPLBLD CREATININE-BSD FMLA CKD-EPI: 58 ML/MIN/1.73 M 2
GFR SERPLBLD CREATININE-BSD FMLA CKD-EPI: 66 ML/MIN/1.73 M 2
GFR SERPLBLD CREATININE-BSD FMLA CKD-EPI: 83 ML/MIN/1.73 M 2
GFR SERPLBLD CREATININE-BSD FMLA CKD-EPI: 88 ML/MIN/1.73 M 2
GFR SERPLBLD CREATININE-BSD FMLA CKD-EPI: 90 ML/MIN/1.73 M 2
GFR SERPLBLD CREATININE-BSD FMLA CKD-EPI: 91 ML/MIN/1.73 M 2
GFR SERPLBLD CREATININE-BSD FMLA CKD-EPI: 92 ML/MIN/1.73 M 2
GFR SERPLBLD CREATININE-BSD FMLA CKD-EPI: 93 ML/MIN/1.73 M 2
GFR SERPLBLD CREATININE-BSD FMLA CKD-EPI: 95 ML/MIN/1.73 M 2
GFR SERPLBLD CREATININE-BSD FMLA CKD-EPI: 96 ML/MIN/1.73 M 2
GFR SERPLBLD CREATININE-BSD FMLA CKD-EPI: 97 ML/MIN/1.73 M 2
GFR SERPLBLD CREATININE-BSD FMLA CKD-EPI: 99 ML/MIN/1.73 M 2
GLOBULIN SER CALC-MCNC: 2.9 G/DL (ref 1.9–3.5)
GLOBULIN SER CALC-MCNC: 3.6 G/DL (ref 1.9–3.5)
GLOBULIN SER CALC-MCNC: 4.3 G/DL (ref 1.9–3.5)
GLOBULIN SER CALC-MCNC: 4.3 G/DL (ref 1.9–3.5)
GLOBULIN SER CALC-MCNC: 4.4 G/DL (ref 1.9–3.5)
GLOBULIN SER CALC-MCNC: 4.5 G/DL (ref 1.9–3.5)
GLOBULIN SER CALC-MCNC: 4.6 G/DL (ref 1.9–3.5)
GLOBULIN SER CALC-MCNC: 4.7 G/DL (ref 1.9–3.5)
GLUCOSE BLD STRIP.AUTO-MCNC: 101 MG/DL (ref 65–99)
GLUCOSE BLD STRIP.AUTO-MCNC: 104 MG/DL (ref 65–99)
GLUCOSE BLD STRIP.AUTO-MCNC: 104 MG/DL (ref 65–99)
GLUCOSE BLD STRIP.AUTO-MCNC: 108 MG/DL (ref 65–99)
GLUCOSE BLD STRIP.AUTO-MCNC: 108 MG/DL (ref 65–99)
GLUCOSE BLD STRIP.AUTO-MCNC: 110 MG/DL (ref 65–99)
GLUCOSE BLD STRIP.AUTO-MCNC: 113 MG/DL (ref 65–99)
GLUCOSE BLD STRIP.AUTO-MCNC: 114 MG/DL (ref 65–99)
GLUCOSE BLD STRIP.AUTO-MCNC: 117 MG/DL (ref 65–99)
GLUCOSE BLD STRIP.AUTO-MCNC: 119 MG/DL (ref 65–99)
GLUCOSE BLD STRIP.AUTO-MCNC: 121 MG/DL (ref 65–99)
GLUCOSE BLD STRIP.AUTO-MCNC: 121 MG/DL (ref 65–99)
GLUCOSE BLD STRIP.AUTO-MCNC: 123 MG/DL (ref 65–99)
GLUCOSE BLD STRIP.AUTO-MCNC: 124 MG/DL (ref 65–99)
GLUCOSE BLD STRIP.AUTO-MCNC: 124 MG/DL (ref 65–99)
GLUCOSE BLD STRIP.AUTO-MCNC: 126 MG/DL (ref 65–99)
GLUCOSE BLD STRIP.AUTO-MCNC: 134 MG/DL (ref 65–99)
GLUCOSE BLD STRIP.AUTO-MCNC: 138 MG/DL (ref 65–99)
GLUCOSE BLD STRIP.AUTO-MCNC: 141 MG/DL (ref 65–99)
GLUCOSE BLD STRIP.AUTO-MCNC: 142 MG/DL (ref 65–99)
GLUCOSE BLD STRIP.AUTO-MCNC: 142 MG/DL (ref 65–99)
GLUCOSE BLD STRIP.AUTO-MCNC: 146 MG/DL (ref 65–99)
GLUCOSE BLD STRIP.AUTO-MCNC: 149 MG/DL (ref 65–99)
GLUCOSE BLD STRIP.AUTO-MCNC: 150 MG/DL (ref 65–99)
GLUCOSE BLD STRIP.AUTO-MCNC: 150 MG/DL (ref 65–99)
GLUCOSE BLD STRIP.AUTO-MCNC: 152 MG/DL (ref 65–99)
GLUCOSE BLD STRIP.AUTO-MCNC: 152 MG/DL (ref 65–99)
GLUCOSE BLD STRIP.AUTO-MCNC: 155 MG/DL (ref 65–99)
GLUCOSE BLD STRIP.AUTO-MCNC: 159 MG/DL (ref 65–99)
GLUCOSE BLD STRIP.AUTO-MCNC: 161 MG/DL (ref 65–99)
GLUCOSE BLD STRIP.AUTO-MCNC: 163 MG/DL (ref 65–99)
GLUCOSE BLD STRIP.AUTO-MCNC: 164 MG/DL (ref 65–99)
GLUCOSE BLD STRIP.AUTO-MCNC: 169 MG/DL (ref 65–99)
GLUCOSE BLD STRIP.AUTO-MCNC: 173 MG/DL (ref 65–99)
GLUCOSE BLD STRIP.AUTO-MCNC: 195 MG/DL (ref 65–99)
GLUCOSE BLD STRIP.AUTO-MCNC: 196 MG/DL (ref 65–99)
GLUCOSE BLD STRIP.AUTO-MCNC: 199 MG/DL (ref 65–99)
GLUCOSE BLD STRIP.AUTO-MCNC: 199 MG/DL (ref 65–99)
GLUCOSE BLD STRIP.AUTO-MCNC: 208 MG/DL (ref 65–99)
GLUCOSE BLD STRIP.AUTO-MCNC: 210 MG/DL (ref 65–99)
GLUCOSE BLD STRIP.AUTO-MCNC: 211 MG/DL (ref 65–99)
GLUCOSE BLD STRIP.AUTO-MCNC: 212 MG/DL (ref 65–99)
GLUCOSE BLD STRIP.AUTO-MCNC: 213 MG/DL (ref 65–99)
GLUCOSE BLD STRIP.AUTO-MCNC: 222 MG/DL (ref 65–99)
GLUCOSE BLD STRIP.AUTO-MCNC: 223 MG/DL (ref 65–99)
GLUCOSE BLD STRIP.AUTO-MCNC: 224 MG/DL (ref 65–99)
GLUCOSE BLD STRIP.AUTO-MCNC: 229 MG/DL (ref 65–99)
GLUCOSE BLD STRIP.AUTO-MCNC: 232 MG/DL (ref 65–99)
GLUCOSE BLD STRIP.AUTO-MCNC: 237 MG/DL (ref 65–99)
GLUCOSE BLD STRIP.AUTO-MCNC: 237 MG/DL (ref 65–99)
GLUCOSE BLD STRIP.AUTO-MCNC: 244 MG/DL (ref 65–99)
GLUCOSE BLD STRIP.AUTO-MCNC: 244 MG/DL (ref 65–99)
GLUCOSE BLD STRIP.AUTO-MCNC: 248 MG/DL (ref 65–99)
GLUCOSE BLD STRIP.AUTO-MCNC: 248 MG/DL (ref 65–99)
GLUCOSE BLD STRIP.AUTO-MCNC: 249 MG/DL (ref 65–99)
GLUCOSE BLD STRIP.AUTO-MCNC: 252 MG/DL (ref 65–99)
GLUCOSE BLD STRIP.AUTO-MCNC: 271 MG/DL (ref 65–99)
GLUCOSE BLD STRIP.AUTO-MCNC: 274 MG/DL (ref 65–99)
GLUCOSE BLD STRIP.AUTO-MCNC: 287 MG/DL (ref 65–99)
GLUCOSE BLD STRIP.AUTO-MCNC: 290 MG/DL (ref 65–99)
GLUCOSE BLD STRIP.AUTO-MCNC: 309 MG/DL (ref 65–99)
GLUCOSE BLD STRIP.AUTO-MCNC: 310 MG/DL (ref 65–99)
GLUCOSE BLD STRIP.AUTO-MCNC: 321 MG/DL (ref 65–99)
GLUCOSE BLD STRIP.AUTO-MCNC: 322 MG/DL (ref 65–99)
GLUCOSE BLD STRIP.AUTO-MCNC: 326 MG/DL (ref 65–99)
GLUCOSE BLD STRIP.AUTO-MCNC: 330 MG/DL (ref 65–99)
GLUCOSE BLD STRIP.AUTO-MCNC: 340 MG/DL (ref 65–99)
GLUCOSE BLD STRIP.AUTO-MCNC: 341 MG/DL (ref 65–99)
GLUCOSE BLD STRIP.AUTO-MCNC: 365 MG/DL (ref 65–99)
GLUCOSE BLD STRIP.AUTO-MCNC: 380 MG/DL (ref 65–99)
GLUCOSE BLD STRIP.AUTO-MCNC: 384 MG/DL (ref 65–99)
GLUCOSE BLD STRIP.AUTO-MCNC: 389 MG/DL (ref 65–99)
GLUCOSE BLD STRIP.AUTO-MCNC: 63 MG/DL (ref 65–99)
GLUCOSE BLD STRIP.AUTO-MCNC: 68 MG/DL (ref 65–99)
GLUCOSE BLD STRIP.AUTO-MCNC: 76 MG/DL (ref 65–99)
GLUCOSE BLD STRIP.AUTO-MCNC: 77 MG/DL (ref 65–99)
GLUCOSE BLD STRIP.AUTO-MCNC: 77 MG/DL (ref 65–99)
GLUCOSE BLD STRIP.AUTO-MCNC: 80 MG/DL (ref 65–99)
GLUCOSE BLD STRIP.AUTO-MCNC: 91 MG/DL (ref 65–99)
GLUCOSE BLD STRIP.AUTO-MCNC: 95 MG/DL (ref 65–99)
GLUCOSE BLD STRIP.AUTO-MCNC: 99 MG/DL (ref 65–99)
GLUCOSE BLD STRIP.AUTO-MCNC: >600 MG/DL (ref 65–99)
GLUCOSE SERPL-MCNC: 102 MG/DL (ref 65–99)
GLUCOSE SERPL-MCNC: 115 MG/DL (ref 65–99)
GLUCOSE SERPL-MCNC: 120 MG/DL (ref 65–99)
GLUCOSE SERPL-MCNC: 124 MG/DL (ref 65–99)
GLUCOSE SERPL-MCNC: 126 MG/DL (ref 65–99)
GLUCOSE SERPL-MCNC: 128 MG/DL (ref 65–99)
GLUCOSE SERPL-MCNC: 149 MG/DL (ref 65–99)
GLUCOSE SERPL-MCNC: 170 MG/DL (ref 65–99)
GLUCOSE SERPL-MCNC: 195 MG/DL (ref 65–99)
GLUCOSE SERPL-MCNC: 197 MG/DL (ref 65–99)
GLUCOSE SERPL-MCNC: 220 MG/DL (ref 65–99)
GLUCOSE SERPL-MCNC: 223 MG/DL (ref 65–99)
GLUCOSE SERPL-MCNC: 261 MG/DL (ref 65–99)
GLUCOSE SERPL-MCNC: 291 MG/DL (ref 65–99)
GLUCOSE SERPL-MCNC: 342 MG/DL (ref 65–99)
GLUCOSE SERPL-MCNC: 343 MG/DL (ref 65–99)
GLUCOSE SERPL-MCNC: 373 MG/DL (ref 65–99)
GLUCOSE SERPL-MCNC: 629 MG/DL (ref 65–99)
GLUCOSE SERPL-MCNC: 95 MG/DL (ref 65–99)
GLUCOSE UR STRIP.AUTO-MCNC: 500 MG/DL
GRAM STN SPEC: ABNORMAL
GRAM STN SPEC: NORMAL
HCO3 BLDA-SCNC: 22 MMOL/L (ref 17–25)
HCO3 BLDA-SCNC: 26 MMOL/L (ref 17–25)
HCO3 BLDA-SCNC: 26 MMOL/L (ref 21–28)
HCO3 BLDA-SCNC: 26.7 MMOL/L (ref 17–25)
HCT VFR BLD AUTO: 17.5 % (ref 42–52)
HCT VFR BLD AUTO: 17.9 % (ref 42–52)
HCT VFR BLD AUTO: 18.9 % (ref 42–52)
HCT VFR BLD AUTO: 19.1 % (ref 42–52)
HCT VFR BLD AUTO: 19.5 % (ref 42–52)
HCT VFR BLD AUTO: 20.3 % (ref 42–52)
HCT VFR BLD AUTO: 20.9 % (ref 42–52)
HCT VFR BLD AUTO: 21.5 % (ref 42–52)
HCT VFR BLD AUTO: 21.5 % (ref 42–52)
HCT VFR BLD AUTO: 22.3 % (ref 42–52)
HCT VFR BLD AUTO: 22.8 % (ref 42–52)
HCT VFR BLD AUTO: 23.4 % (ref 42–52)
HCT VFR BLD AUTO: 23.6 % (ref 42–52)
HCT VFR BLD AUTO: 23.7 % (ref 42–52)
HCT VFR BLD AUTO: 23.9 % (ref 42–52)
HCT VFR BLD AUTO: 24.2 % (ref 42–52)
HCT VFR BLD AUTO: 25 % (ref 42–52)
HCT VFR BLD AUTO: 25.3 % (ref 42–52)
HCT VFR BLD AUTO: 25.4 % (ref 42–52)
HCT VFR BLD AUTO: 25.7 % (ref 42–52)
HCT VFR BLD AUTO: 29.2 % (ref 42–52)
HGB BLD-MCNC: 10.4 G/DL (ref 14–18)
HGB BLD-MCNC: 6.2 G/DL (ref 14–18)
HGB BLD-MCNC: 6.3 G/DL (ref 14–18)
HGB BLD-MCNC: 6.5 G/DL (ref 14–18)
HGB BLD-MCNC: 6.8 G/DL (ref 14–18)
HGB BLD-MCNC: 7 G/DL (ref 14–18)
HGB BLD-MCNC: 7 G/DL (ref 14–18)
HGB BLD-MCNC: 7.1 G/DL (ref 14–18)
HGB BLD-MCNC: 7.4 G/DL (ref 14–18)
HGB BLD-MCNC: 7.7 G/DL (ref 14–18)
HGB BLD-MCNC: 7.9 G/DL (ref 14–18)
HGB BLD-MCNC: 7.9 G/DL (ref 14–18)
HGB BLD-MCNC: 8.3 G/DL (ref 14–18)
HGB BLD-MCNC: 8.4 G/DL (ref 14–18)
HGB BLD-MCNC: 8.5 G/DL (ref 14–18)
HGB BLD-MCNC: 8.5 G/DL (ref 14–18)
HGB BLD-MCNC: 8.7 G/DL (ref 14–18)
HGB BLD-MCNC: 8.8 G/DL (ref 14–18)
HGB BLD-MCNC: 9.1 G/DL (ref 14–18)
HOLDING TUBE BB 8507: NORMAL
HYPOCHROMIA BLD QL SMEAR: ABNORMAL
INHALED O2 FLOW RATE: 50 L/MIN
INHALED O2 FLOW RATE: ABNORMAL L/MIN
INHALED O2 FLOW RATE: ABNORMAL L/MIN
INR PPP: 1.87 (ref 0.87–1.13)
KETONES UR STRIP.AUTO-MCNC: NEGATIVE MG/DL
LACTATE BLD-SCNC: 1.5 MMOL/L (ref 0.5–2)
LACTATE SERPL-SCNC: 0.9 MMOL/L (ref 0.5–2)
LACTATE SERPL-SCNC: 1.6 MMOL/L (ref 0.5–2)
LACTATE SERPL-SCNC: 1.9 MMOL/L (ref 0.5–2)
LACTATE SERPL-SCNC: 2 MMOL/L (ref 0.5–2)
LACTATE SERPL-SCNC: 2 MMOL/L (ref 0.5–2)
LACTATE SERPL-SCNC: 2.2 MMOL/L (ref 0.5–2)
LEUKOCYTE ESTERASE UR QL STRIP.AUTO: NEGATIVE
LPM ILPM: 8 LPM
LYMPHOCYTES # BLD AUTO: 0.39 K/UL (ref 1–4.8)
LYMPHOCYTES # BLD AUTO: 0.49 K/UL (ref 1–4.8)
LYMPHOCYTES # BLD AUTO: 0.49 K/UL (ref 1–4.8)
LYMPHOCYTES # BLD AUTO: 0.5 K/UL (ref 1–4.8)
LYMPHOCYTES # BLD AUTO: 0.59 K/UL (ref 1–4.8)
LYMPHOCYTES # BLD AUTO: 0.6 K/UL (ref 1–4.8)
LYMPHOCYTES # BLD AUTO: 0.68 K/UL (ref 1–4.8)
LYMPHOCYTES # BLD AUTO: 0.69 K/UL (ref 1–4.8)
LYMPHOCYTES # BLD AUTO: 0.79 K/UL (ref 1–4.8)
LYMPHOCYTES # BLD AUTO: 0.8 K/UL (ref 1–4.8)
LYMPHOCYTES # BLD AUTO: 0.89 K/UL (ref 1–4.8)
LYMPHOCYTES NFR BLD: 100 % (ref 22–41)
LYMPHOCYTES NFR BLD: 97.7 % (ref 22–41)
LYMPHOCYTES NFR BLD: 97.9 % (ref 22–41)
LYMPHOCYTES NFR BLD: 98 % (ref 22–41)
LYMPHOCYTES NFR BLD: 98.2 % (ref 22–41)
LYMPHOCYTES NFR BLD: 98.4 % (ref 22–41)
LYMPHOCYTES NFR BLD: 98.6 % (ref 22–41)
LYMPHOCYTES NFR BLD: 99 % (ref 22–41)
LYMPHOCYTES NFR BLD: 99.1 % (ref 22–41)
LYMPHOCYTES NFR BLD: 99.1 % (ref 22–41)
LYMPHOCYTES NFR BLD: 99.5 % (ref 22–41)
MACROCYTES BLD QL SMEAR: ABNORMAL
MAGNESIUM SERPL-MCNC: 1.3 MG/DL (ref 1.5–2.5)
MAGNESIUM SERPL-MCNC: 1.3 MG/DL (ref 1.5–2.5)
MAGNESIUM SERPL-MCNC: 1.5 MG/DL (ref 1.5–2.5)
MAGNESIUM SERPL-MCNC: 1.5 MG/DL (ref 1.5–2.5)
MAGNESIUM SERPL-MCNC: 1.6 MG/DL (ref 1.5–2.5)
MAGNESIUM SERPL-MCNC: 1.7 MG/DL (ref 1.5–2.5)
MAGNESIUM SERPL-MCNC: 1.8 MG/DL (ref 1.5–2.5)
MAGNESIUM SERPL-MCNC: 1.8 MG/DL (ref 1.5–2.5)
MAGNESIUM SERPL-MCNC: 2 MG/DL (ref 1.5–2.5)
MAGNESIUM SERPL-MCNC: 2.1 MG/DL (ref 1.5–2.5)
MAGNESIUM SERPL-MCNC: 2.6 MG/DL (ref 1.5–2.5)
MAGNESIUM SERPL-MCNC: 2.8 MG/DL (ref 1.5–2.5)
MAGNESIUM SERPL-MCNC: 2.8 MG/DL (ref 1.5–2.5)
MAGNESIUM SERPL-MCNC: 2.9 MG/DL (ref 1.5–2.5)
MANUAL DIFF BLD: NORMAL
MCH RBC QN AUTO: 28.3 PG (ref 27–33)
MCH RBC QN AUTO: 28.8 PG (ref 27–33)
MCH RBC QN AUTO: 28.9 PG (ref 27–33)
MCH RBC QN AUTO: 28.9 PG (ref 27–33)
MCH RBC QN AUTO: 29.1 PG (ref 27–33)
MCH RBC QN AUTO: 29.3 PG (ref 27–33)
MCH RBC QN AUTO: 29.5 PG (ref 27–33)
MCH RBC QN AUTO: 29.7 PG (ref 27–33)
MCH RBC QN AUTO: 29.7 PG (ref 27–33)
MCH RBC QN AUTO: 29.8 PG (ref 27–33)
MCH RBC QN AUTO: 30 PG (ref 27–33)
MCH RBC QN AUTO: 30 PG (ref 27–33)
MCH RBC QN AUTO: 30.1 PG (ref 27–33)
MCH RBC QN AUTO: 30.4 PG (ref 27–33)
MCH RBC QN AUTO: 30.4 PG (ref 27–33)
MCH RBC QN AUTO: 30.5 PG (ref 27–33)
MCH RBC QN AUTO: 30.5 PG (ref 27–33)
MCH RBC QN AUTO: 30.7 PG (ref 27–33)
MCH RBC QN AUTO: 31.1 PG (ref 27–33)
MCHC RBC AUTO-ENTMCNC: 33.5 G/DL (ref 32.3–36.5)
MCHC RBC AUTO-ENTMCNC: 34 G/DL (ref 32.3–36.5)
MCHC RBC AUTO-ENTMCNC: 34.4 G/DL (ref 32.3–36.5)
MCHC RBC AUTO-ENTMCNC: 34.4 G/DL (ref 32.3–36.5)
MCHC RBC AUTO-ENTMCNC: 34.6 G/DL (ref 32.3–36.5)
MCHC RBC AUTO-ENTMCNC: 34.6 G/DL (ref 32.3–36.5)
MCHC RBC AUTO-ENTMCNC: 34.8 G/DL (ref 32.3–36.5)
MCHC RBC AUTO-ENTMCNC: 35.1 G/DL (ref 32.3–36.5)
MCHC RBC AUTO-ENTMCNC: 35.2 G/DL (ref 32.3–36.5)
MCHC RBC AUTO-ENTMCNC: 35.4 G/DL (ref 32.3–36.5)
MCHC RBC AUTO-ENTMCNC: 35.6 G/DL (ref 32.3–36.5)
MCHC RBC AUTO-ENTMCNC: 35.8 G/DL (ref 32.3–36.5)
MCHC RBC AUTO-ENTMCNC: 35.9 G/DL (ref 32.3–36.5)
MCHC RBC AUTO-ENTMCNC: 35.9 G/DL (ref 32.3–36.5)
MCHC RBC AUTO-ENTMCNC: 36 G/DL (ref 32.3–36.5)
MCHC RBC AUTO-ENTMCNC: 36.3 G/DL (ref 32.3–36.5)
MCHC RBC AUTO-ENTMCNC: 36.6 G/DL (ref 32.3–36.5)
MCV RBC AUTO: 80.9 FL (ref 81.4–97.8)
MCV RBC AUTO: 82.1 FL (ref 81.4–97.8)
MCV RBC AUTO: 82.3 FL (ref 81.4–97.8)
MCV RBC AUTO: 82.7 FL (ref 81.4–97.8)
MCV RBC AUTO: 82.9 FL (ref 81.4–97.8)
MCV RBC AUTO: 83 FL (ref 81.4–97.8)
MCV RBC AUTO: 83.3 FL (ref 81.4–97.8)
MCV RBC AUTO: 83.5 FL (ref 81.4–97.8)
MCV RBC AUTO: 84 FL (ref 81.4–97.8)
MCV RBC AUTO: 84.1 FL (ref 81.4–97.8)
MCV RBC AUTO: 84.6 FL (ref 81.4–97.8)
MCV RBC AUTO: 84.9 FL (ref 81.4–97.8)
MCV RBC AUTO: 85.7 FL (ref 81.4–97.8)
MCV RBC AUTO: 85.7 FL (ref 81.4–97.8)
MCV RBC AUTO: 86.1 FL (ref 81.4–97.8)
MCV RBC AUTO: 86.2 FL (ref 81.4–97.8)
MCV RBC AUTO: 86.3 FL (ref 81.4–97.8)
MCV RBC AUTO: 86.4 FL (ref 81.4–97.8)
MCV RBC AUTO: 86.5 FL (ref 81.4–97.8)
MCV RBC AUTO: 86.7 FL (ref 81.4–97.8)
MCV RBC AUTO: 86.8 FL (ref 81.4–97.8)
METHYLMALONATE UR-SCNC: 1.16 UMOL/L
METHYLMALONATE/CREAT UR-SRTO: 0.87 MMOL/MOL CRT (ref 0–3.6)
MICRO URNS: ABNORMAL
MONOCYTES # BLD AUTO: 0 K/UL (ref 0–0.85)
MONOCYTES # BLD AUTO: 0.01 K/UL (ref 0–0.85)
MONOCYTES NFR BLD AUTO: 0 % (ref 0–13.4)
MONOCYTES NFR BLD AUTO: 0.5 % (ref 0–13.4)
MONOCYTES NFR BLD AUTO: 0.6 % (ref 0–13.4)
MONOCYTES NFR BLD AUTO: 0.9 % (ref 0–13.4)
MONOCYTES NFR BLD AUTO: 0.9 % (ref 0–13.4)
MONOCYTES NFR BLD AUTO: 1 % (ref 0–13.4)
MONOCYTES NFR BLD AUTO: 1 % (ref 0–13.4)
MORPHOLOGY BLD-IMP: NORMAL
NEUTROPHILS # BLD AUTO: 0 K/UL (ref 1.82–7.42)
NEUTROPHILS # BLD AUTO: 0.01 K/UL (ref 1.82–7.42)
NEUTROPHILS NFR BLD: 0 % (ref 44–72)
NEUTROPHILS NFR BLD: 0.5 % (ref 44–72)
NEUTROPHILS NFR BLD: 0.8 % (ref 44–72)
NEUTROPHILS NFR BLD: 0.9 % (ref 44–72)
NEUTROPHILS NFR BLD: 0.9 % (ref 44–72)
NEUTROPHILS NFR BLD: 1 % (ref 44–72)
NEUTROPHILS NFR BLD: 1 % (ref 44–72)
NEUTROPHILS NFR BLD: 1.1 % (ref 44–72)
NEUTROPHILS NFR BLD: 1.1 % (ref 44–72)
NEUTROPHILS NFR BLD: 1.2 % (ref 44–72)
NEUTROPHILS NFR BLD: 1.8 % (ref 44–72)
NEUTROPHILS NFR BLD: 1.8 % (ref 44–72)
NEUTS BAND NFR BLD MANUAL: 0.4 % (ref 0–10)
NITRITE UR QL STRIP.AUTO: NEGATIVE
NRBC # BLD AUTO: 0 K/UL
NRBC BLD-RTO: 0 /100 WBC (ref 0–0.2)
NT-PROBNP SERPL IA-MCNC: 1959 PG/ML (ref 0–125)
NT-PROBNP SERPL IA-MCNC: 2899 PG/ML (ref 0–125)
P JIROVECII AG SPEC QL IF: NEGATIVE
P JIROVECII DNA SPEC QL NAA+PROBE: NOT DETECTED
PCO2 BLDA: 27.3 MMHG (ref 32–48)
PCO2 BLDA: 30.8 MMHG (ref 26–37)
PCO2 BLDA: 37 MMHG (ref 26–37)
PCO2 BLDA: 42.1 MMHG (ref 26–37)
PCO2 TEMP ADJ BLDA: 26.9 MMHG (ref 32–48)
PCO2 TEMP ADJ BLDA: 30.3 MMHG (ref 26–37)
PCO2 TEMP ADJ BLDA: 36 MMHG (ref 26–37)
PCO2 TEMP ADJ BLDA: 40.7 MMHG (ref 26–37)
PH BLDA: 7.41 [PH] (ref 7.4–7.5)
PH BLDA: 7.46 [PH] (ref 7.4–7.5)
PH BLDA: 7.47 [PH] (ref 7.4–7.5)
PH BLDA: 7.6 [PH] (ref 7.35–7.45)
PH TEMP ADJ BLDA: 7.43 [PH] (ref 7.4–7.5)
PH TEMP ADJ BLDA: 7.47 [PH] (ref 7.4–7.5)
PH TEMP ADJ BLDA: 7.48 [PH] (ref 7.4–7.5)
PH TEMP ADJ BLDA: 7.6 [PH] (ref 7.35–7.45)
PH UR STRIP.AUTO: 6.5 [PH] (ref 5–8)
PHOSPHATE SERPL-MCNC: 1.9 MG/DL (ref 2.5–4.5)
PHOSPHATE SERPL-MCNC: 2.1 MG/DL (ref 2.5–4.5)
PHOSPHATE SERPL-MCNC: 2.3 MG/DL (ref 2.5–4.5)
PHOSPHATE SERPL-MCNC: 2.3 MG/DL (ref 2.5–4.5)
PHOSPHATE SERPL-MCNC: 2.4 MG/DL (ref 2.5–4.5)
PHOSPHATE SERPL-MCNC: 2.6 MG/DL (ref 2.5–4.5)
PHOSPHATE SERPL-MCNC: 2.8 MG/DL (ref 2.5–4.5)
PHOSPHATE SERPL-MCNC: 3 MG/DL (ref 2.5–4.5)
PHOSPHATE SERPL-MCNC: 3.2 MG/DL (ref 2.5–4.5)
PHOSPHATE SERPL-MCNC: 3.8 MG/DL (ref 2.5–4.5)
PHOSPHATE SERPL-MCNC: 4.1 MG/DL (ref 2.5–4.5)
PHOSPHATE SERPL-MCNC: 4.1 MG/DL (ref 2.5–4.5)
PLATELET # BLD AUTO: 11 K/UL (ref 164–446)
PLATELET # BLD AUTO: 18 K/UL (ref 164–446)
PLATELET # BLD AUTO: 18 K/UL (ref 164–446)
PLATELET # BLD AUTO: 20 K/UL (ref 164–446)
PLATELET # BLD AUTO: 20 K/UL (ref 164–446)
PLATELET # BLD AUTO: 21 K/UL (ref 164–446)
PLATELET # BLD AUTO: 22 K/UL (ref 164–446)
PLATELET # BLD AUTO: 30 K/UL (ref 164–446)
PLATELET # BLD AUTO: 34 K/UL (ref 164–446)
PLATELET # BLD AUTO: 4 K/UL (ref 164–446)
PLATELET # BLD AUTO: 44 K/UL (ref 164–446)
PLATELET # BLD AUTO: 48 K/UL (ref 164–446)
PLATELET # BLD AUTO: 5 K/UL (ref 164–446)
PLATELET # BLD AUTO: 51 K/UL (ref 164–446)
PLATELET # BLD AUTO: 56 K/UL (ref 164–446)
PLATELET # BLD AUTO: 6 K/UL (ref 164–446)
PLATELET # BLD AUTO: 6 K/UL (ref 164–446)
PLATELET # BLD AUTO: 64 K/UL (ref 164–446)
PLATELET # BLD AUTO: 7 K/UL (ref 164–446)
PLATELET # BLD AUTO: 7 K/UL (ref 164–446)
PLATELET # BLD AUTO: 9 K/UL (ref 164–446)
PLATELET BLD QL SMEAR: NORMAL
PLATELETS.RETICULATED NFR BLD AUTO: 0 % (ref 0.6–13.1)
PLATELETS.RETICULATED NFR BLD AUTO: 0.1 % (ref 0.6–13.1)
PLATELETS.RETICULATED NFR BLD AUTO: 0.2 % (ref 0.6–13.1)
PLATELETS.RETICULATED NFR BLD AUTO: 0.3 % (ref 0.6–13.1)
PLATELETS.RETICULATED NFR BLD AUTO: 0.3 % (ref 0.6–13.1)
PLATELETS.RETICULATED NFR BLD AUTO: 0.4 % (ref 0.6–13.1)
PLATELETS.RETICULATED NFR BLD AUTO: 0.4 % (ref 0.6–13.1)
PLATELETS.RETICULATED NFR BLD AUTO: 0.5 % (ref 0.6–13.1)
PLATELETS.RETICULATED NFR BLD AUTO: 0.6 % (ref 0.6–13.1)
PLATELETS.RETICULATED NFR BLD AUTO: 0.6 % (ref 0.6–13.1)
PLATELETS.RETICULATED NFR BLD AUTO: 0.8 % (ref 0.6–13.1)
PLATELETS.RETICULATED NFR BLD AUTO: 0.9 % (ref 0.6–13.1)
PLATELETS.RETICULATED NFR BLD AUTO: 0.9 % (ref 0.6–13.1)
PLATELETS.RETICULATED NFR BLD AUTO: 1.2 % (ref 0.6–13.1)
PLATELETS.RETICULATED NFR BLD AUTO: 1.4 % (ref 0.6–13.1)
PLATELETS.RETICULATED NFR BLD AUTO: 2 % (ref 0.6–13.1)
PLATELETS.RETICULATED NFR BLD AUTO: 2.3 % (ref 0.6–13.1)
PLATELETS.RETICULATED NFR BLD AUTO: 2.4 % (ref 0.6–13.1)
PLATELETS.RETICULATED NFR BLD AUTO: 2.9 % (ref 0.6–13.1)
PMV BLD AUTO: 10.2 FL (ref 9–12.9)
PMV BLD AUTO: 10.2 FL (ref 9–12.9)
PMV BLD AUTO: 10.3 FL (ref 9–12.9)
PMV BLD AUTO: 10.3 FL (ref 9–12.9)
PMV BLD AUTO: 10.4 FL (ref 9–12.9)
PMV BLD AUTO: 10.5 FL (ref 9–12.9)
PMV BLD AUTO: 10.5 FL (ref 9–12.9)
PMV BLD AUTO: 10.6 FL (ref 9–12.9)
PMV BLD AUTO: 10.6 FL (ref 9–12.9)
PMV BLD AUTO: 11.2 FL (ref 9–12.9)
PMV BLD AUTO: 11.4 FL (ref 9–12.9)
PMV BLD AUTO: 11.6 FL (ref 9–12.9)
PMV BLD AUTO: 11.7 FL (ref 9–12.9)
PMV BLD AUTO: 11.9 FL (ref 9–12.9)
PMV BLD AUTO: 12.5 FL (ref 9–12.9)
PMV BLD AUTO: 9 FL (ref 9–12.9)
PMV BLD AUTO: 9.1 FL (ref 9–12.9)
PMV BLD AUTO: 9.6 FL (ref 9–12.9)
PMV BLD AUTO: 9.8 FL (ref 9–12.9)
PMV BLD AUTO: 9.8 FL (ref 9–12.9)
PO2 BLDA: 129.6 MMHG (ref 64–87)
PO2 BLDA: 55 MMHG (ref 64–87)
PO2 BLDA: 56.2 MMHG (ref 83–108)
PO2 BLDA: 66.8 MMHG (ref 64–87)
PO2 TEMP ADJ BLDA: 127.1 MMHG (ref 64–87)
PO2 TEMP ADJ BLDA: 53 MMHG (ref 64–87)
PO2 TEMP ADJ BLDA: 55 MMHG (ref 64–87)
PO2 TEMP ADJ BLDA: 63.2 MMHG (ref 64–87)
POIKILOCYTOSIS BLD QL SMEAR: NORMAL
POTASSIUM SERPL-SCNC: 2.5 MMOL/L (ref 3.6–5.5)
POTASSIUM SERPL-SCNC: 2.8 MMOL/L (ref 3.6–5.5)
POTASSIUM SERPL-SCNC: 2.9 MMOL/L (ref 3.6–5.5)
POTASSIUM SERPL-SCNC: 3.2 MMOL/L (ref 3.6–5.5)
POTASSIUM SERPL-SCNC: 3.3 MMOL/L (ref 3.6–5.5)
POTASSIUM SERPL-SCNC: 3.4 MMOL/L (ref 3.6–5.5)
POTASSIUM SERPL-SCNC: 3.5 MMOL/L (ref 3.6–5.5)
POTASSIUM SERPL-SCNC: 3.6 MMOL/L (ref 3.6–5.5)
POTASSIUM SERPL-SCNC: 3.7 MMOL/L (ref 3.6–5.5)
POTASSIUM SERPL-SCNC: 3.8 MMOL/L (ref 3.6–5.5)
POTASSIUM SERPL-SCNC: 4.3 MMOL/L (ref 3.6–5.5)
POTASSIUM SERPL-SCNC: 4.4 MMOL/L (ref 3.6–5.5)
POTASSIUM SERPL-SCNC: 4.7 MMOL/L (ref 3.6–5.5)
POTASSIUM SERPL-SCNC: 4.7 MMOL/L (ref 3.6–5.5)
POTASSIUM SERPL-SCNC: 4.8 MMOL/L (ref 3.6–5.5)
POTASSIUM SERPL-SCNC: 4.8 MMOL/L (ref 3.6–5.5)
POTASSIUM SERPL-SCNC: 4.9 MMOL/L (ref 3.6–5.5)
POTASSIUM SERPL-SCNC: 5.1 MMOL/L (ref 3.6–5.5)
PREALB SERPL-MCNC: 3.2 MG/DL (ref 18–38)
PREALB SERPL-MCNC: 4.5 MG/DL (ref 18–38)
PROCALCITONIN SERPL-MCNC: 0.36 NG/ML
PRODUCT TYPE UPROD: ABNORMAL
PRODUCT TYPE UPROD: NORMAL
PROT SERPL-MCNC: 5.2 G/DL (ref 6–8.2)
PROT SERPL-MCNC: 5.7 G/DL (ref 6–8.2)
PROT SERPL-MCNC: 6.1 G/DL (ref 6–8.2)
PROT SERPL-MCNC: 6.3 G/DL (ref 6–8.2)
PROT SERPL-MCNC: 6.3 G/DL (ref 6–8.2)
PROT SERPL-MCNC: 6.5 G/DL (ref 6–8.2)
PROT SERPL-MCNC: 6.6 G/DL (ref 6–8.2)
PROT SERPL-MCNC: 6.8 G/DL (ref 6–8.2)
PROT SERPL-MCNC: 6.8 G/DL (ref 6–8.2)
PROT UR QL STRIP: 30 MG/DL
PROTHROMBIN TIME: 21.6 SEC (ref 12–14.6)
RBC # BLD AUTO: 2.03 M/UL (ref 4.7–6.1)
RBC # BLD AUTO: 2.15 M/UL (ref 4.7–6.1)
RBC # BLD AUTO: 2.19 M/UL (ref 4.7–6.1)
RBC # BLD AUTO: 2.36 M/UL (ref 4.7–6.1)
RBC # BLD AUTO: 2.37 M/UL (ref 4.7–6.1)
RBC # BLD AUTO: 2.4 M/UL (ref 4.7–6.1)
RBC # BLD AUTO: 2.41 M/UL (ref 4.7–6.1)
RBC # BLD AUTO: 2.51 M/UL (ref 4.7–6.1)
RBC # BLD AUTO: 2.56 M/UL (ref 4.7–6.1)
RBC # BLD AUTO: 2.59 M/UL (ref 4.7–6.1)
RBC # BLD AUTO: 2.73 M/UL (ref 4.7–6.1)
RBC # BLD AUTO: 2.79 M/UL (ref 4.7–6.1)
RBC # BLD AUTO: 2.83 M/UL (ref 4.7–6.1)
RBC # BLD AUTO: 2.89 M/UL (ref 4.7–6.1)
RBC # BLD AUTO: 2.92 M/UL (ref 4.7–6.1)
RBC # BLD AUTO: 2.98 M/UL (ref 4.7–6.1)
RBC # BLD AUTO: 3.06 M/UL (ref 4.7–6.1)
RBC # BLD AUTO: 3.13 M/UL (ref 4.7–6.1)
RBC # BLD AUTO: 3.47 M/UL (ref 4.7–6.1)
RBC # URNS HPF: NORMAL /HPF (ref 0–2)
RBC BLD AUTO: PRESENT
RBC UR QL AUTO: NEGATIVE
REF LAB TEST RESULTS: NORMAL
RH BLD: ABNORMAL
RHODAMINE-AURAMINE STN SPEC: NORMAL
ROULEAUX BLD QL SMEAR: NORMAL
SAO2 % BLDA: 90 % (ref 93–99)
SAO2 % BLDA: 90.2 % (ref 93–99)
SAO2 % BLDA: 91.5 % (ref 93–99)
SAO2 % BLDA: 98.2 % (ref 93–99)
SCCMEC + MECA PNL NOSE NAA+PROBE: POSITIVE
SIGNIFICANT IND 70042: ABNORMAL
SIGNIFICANT IND 70042: NORMAL
SITE SITE: ABNORMAL
SITE SITE: NORMAL
SMUDGE CELLS BLD QL SMEAR: NORMAL
SODIUM SERPL-SCNC: 118 MMOL/L (ref 135–145)
SODIUM SERPL-SCNC: 128 MMOL/L (ref 135–145)
SODIUM SERPL-SCNC: 129 MMOL/L (ref 135–145)
SODIUM SERPL-SCNC: 131 MMOL/L (ref 135–145)
SODIUM SERPL-SCNC: 132 MMOL/L (ref 135–145)
SODIUM SERPL-SCNC: 133 MMOL/L (ref 135–145)
SODIUM SERPL-SCNC: 134 MMOL/L (ref 135–145)
SODIUM SERPL-SCNC: 135 MMOL/L (ref 135–145)
SODIUM SERPL-SCNC: 137 MMOL/L (ref 135–145)
SODIUM SERPL-SCNC: 138 MMOL/L (ref 135–145)
SODIUM SERPL-SCNC: 139 MMOL/L (ref 135–145)
SODIUM SERPL-SCNC: 140 MMOL/L (ref 135–145)
SODIUM SERPL-SCNC: 143 MMOL/L (ref 135–145)
SOURCE SOURCE: ABNORMAL
SOURCE SOURCE: NORMAL
SP GR UR STRIP.AUTO: 1.02
SPECIMEN DRAWN FROM PATIENT: ABNORMAL
SPECIMEN SOURCE: NORMAL
SPECIMEN SOURCE: NORMAL
TARGETS BLD QL SMEAR: NORMAL
TOTAL VOLUME 1105: NORMAL ML
TROPONIN T SERPL-MCNC: 134 NG/L (ref 6–19)
TROPONIN T SERPL-MCNC: 159 NG/L (ref 6–19)
TROPONIN T SERPL-MCNC: 89 NG/L (ref 6–19)
UNIT STATUS USTAT: ABNORMAL
UNIT STATUS USTAT: NORMAL
UROBILINOGEN UR STRIP.AUTO-MCNC: 4 EU/DL
VARIANT LYMPHS BLD QL SMEAR: NORMAL
VARIANT LYMPHS BLD QL SMEAR: NORMAL
VIT B12 SERPL-MCNC: 1013 PG/ML (ref 211–911)
VIT B12 SERPL-MCNC: 977 PG/ML (ref 211–911)
WBC # BLD AUTO: 0.4 K/UL (ref 4.8–10.8)
WBC # BLD AUTO: 0.5 K/UL (ref 4.8–10.8)
WBC # BLD AUTO: 0.6 K/UL (ref 4.8–10.8)
WBC # BLD AUTO: 0.7 K/UL (ref 4.8–10.8)
WBC # BLD AUTO: 0.8 K/UL (ref 4.8–10.8)
WBC # BLD AUTO: 0.9 K/UL (ref 4.8–10.8)
WBC #/AREA URNS HPF: NORMAL /HPF

## 2024-01-01 PROCEDURE — 700102 HCHG RX REV CODE 250 W/ 637 OVERRIDE(OP): Performed by: STUDENT IN AN ORGANIZED HEALTH CARE EDUCATION/TRAINING PROGRAM

## 2024-01-01 PROCEDURE — 99291 CRITICAL CARE FIRST HOUR: CPT | Performed by: EMERGENCY MEDICINE

## 2024-01-01 PROCEDURE — 770004 HCHG ROOM/CARE - ONCOLOGY PRIVATE *

## 2024-01-01 PROCEDURE — 700101 HCHG RX REV CODE 250: Performed by: INTERNAL MEDICINE

## 2024-01-01 PROCEDURE — 99233 SBSQ HOSP IP/OBS HIGH 50: CPT | Performed by: INTERNAL MEDICINE

## 2024-01-01 PROCEDURE — 83735 ASSAY OF MAGNESIUM: CPT

## 2024-01-01 PROCEDURE — 700102 HCHG RX REV CODE 250 W/ 637 OVERRIDE(OP)

## 2024-01-01 PROCEDURE — 84484 ASSAY OF TROPONIN QUANT: CPT

## 2024-01-01 PROCEDURE — 700105 HCHG RX REV CODE 258: Performed by: EMERGENCY MEDICINE

## 2024-01-01 PROCEDURE — 82962 GLUCOSE BLOOD TEST: CPT

## 2024-01-01 PROCEDURE — 82962 GLUCOSE BLOOD TEST: CPT | Mod: 91

## 2024-01-01 PROCEDURE — 83605 ASSAY OF LACTIC ACID: CPT | Mod: 91

## 2024-01-01 PROCEDURE — 700101 HCHG RX REV CODE 250

## 2024-01-01 PROCEDURE — 92526 ORAL FUNCTION THERAPY: CPT

## 2024-01-01 PROCEDURE — A9270 NON-COVERED ITEM OR SERVICE: HCPCS | Performed by: STUDENT IN AN ORGANIZED HEALTH CARE EDUCATION/TRAINING PROGRAM

## 2024-01-01 PROCEDURE — 86945 BLOOD PRODUCT/IRRADIATION: CPT

## 2024-01-01 PROCEDURE — P9047 ALBUMIN (HUMAN), 25%, 50ML: HCPCS | Mod: JZ,JG | Performed by: INTERNAL MEDICINE

## 2024-01-01 PROCEDURE — 86900 BLOOD TYPING SEROLOGIC ABO: CPT

## 2024-01-01 PROCEDURE — 85007 BL SMEAR W/DIFF WBC COUNT: CPT

## 2024-01-01 PROCEDURE — 87798 DETECT AGENT NOS DNA AMP: CPT | Mod: 91

## 2024-01-01 PROCEDURE — 80048 BASIC METABOLIC PNL TOTAL CA: CPT

## 2024-01-01 PROCEDURE — 700102 HCHG RX REV CODE 250 W/ 637 OVERRIDE(OP): Performed by: INTERNAL MEDICINE

## 2024-01-01 PROCEDURE — 36430 TRANSFUSION BLD/BLD COMPNT: CPT

## 2024-01-01 PROCEDURE — 700111 HCHG RX REV CODE 636 W/ 250 OVERRIDE (IP): Mod: JZ,JG | Performed by: HOSPITALIST

## 2024-01-01 PROCEDURE — 700111 HCHG RX REV CODE 636 W/ 250 OVERRIDE (IP): Mod: JZ,JG | Performed by: INTERNAL MEDICINE

## 2024-01-01 PROCEDURE — 700105 HCHG RX REV CODE 258: Performed by: INTERNAL MEDICINE

## 2024-01-01 PROCEDURE — 84100 ASSAY OF PHOSPHORUS: CPT

## 2024-01-01 PROCEDURE — A9270 NON-COVERED ITEM OR SERVICE: HCPCS | Performed by: INTERNAL MEDICINE

## 2024-01-01 PROCEDURE — 700111 HCHG RX REV CODE 636 W/ 250 OVERRIDE (IP)

## 2024-01-01 PROCEDURE — 700102 HCHG RX REV CODE 250 W/ 637 OVERRIDE(OP): Performed by: HOSPITALIST

## 2024-01-01 PROCEDURE — 700111 HCHG RX REV CODE 636 W/ 250 OVERRIDE (IP): Performed by: INTERNAL MEDICINE

## 2024-01-01 PROCEDURE — 86140 C-REACTIVE PROTEIN: CPT

## 2024-01-01 PROCEDURE — P9034 PLATELETS, PHERESIS: HCPCS

## 2024-01-01 PROCEDURE — 85027 COMPLETE CBC AUTOMATED: CPT | Mod: 91

## 2024-01-01 PROCEDURE — 82803 BLOOD GASES ANY COMBINATION: CPT

## 2024-01-01 PROCEDURE — 700105 HCHG RX REV CODE 258: Performed by: HOSPITALIST

## 2024-01-01 PROCEDURE — 700111 HCHG RX REV CODE 636 W/ 250 OVERRIDE (IP): Mod: JZ,JG | Performed by: NURSE PRACTITIONER

## 2024-01-01 PROCEDURE — 85055 RETICULATED PLATELET ASSAY: CPT

## 2024-01-01 PROCEDURE — 71250 CT THORAX DX C-: CPT

## 2024-01-01 PROCEDURE — 770000 HCHG ROOM/CARE - INTERMEDIATE ICU *

## 2024-01-01 PROCEDURE — 99223 1ST HOSP IP/OBS HIGH 75: CPT | Performed by: INTERNAL MEDICINE

## 2024-01-01 PROCEDURE — 36600 WITHDRAWAL OF ARTERIAL BLOOD: CPT

## 2024-01-01 PROCEDURE — 87281 PNEUMOCYSTIS CARINII AG IF: CPT

## 2024-01-01 PROCEDURE — 74018 RADEX ABDOMEN 1 VIEW: CPT

## 2024-01-01 PROCEDURE — 71045 X-RAY EXAM CHEST 1 VIEW: CPT

## 2024-01-01 PROCEDURE — 85027 COMPLETE CBC AUTOMATED: CPT

## 2024-01-01 PROCEDURE — 700111 HCHG RX REV CODE 636 W/ 250 OVERRIDE (IP): Mod: JZ | Performed by: STUDENT IN AN ORGANIZED HEALTH CARE EDUCATION/TRAINING PROGRAM

## 2024-01-01 PROCEDURE — A9270 NON-COVERED ITEM OR SERVICE: HCPCS

## 2024-01-01 PROCEDURE — 700111 HCHG RX REV CODE 636 W/ 250 OVERRIDE (IP): Performed by: STUDENT IN AN ORGANIZED HEALTH CARE EDUCATION/TRAINING PROGRAM

## 2024-01-01 PROCEDURE — 84134 ASSAY OF PREALBUMIN: CPT

## 2024-01-01 PROCEDURE — A9270 NON-COVERED ITEM OR SERVICE: HCPCS | Performed by: HOSPITALIST

## 2024-01-01 PROCEDURE — 87102 FUNGUS ISOLATION CULTURE: CPT

## 2024-01-01 PROCEDURE — 70491 CT SOFT TISSUE NECK W/DYE: CPT

## 2024-01-01 PROCEDURE — 86850 RBC ANTIBODY SCREEN: CPT

## 2024-01-01 PROCEDURE — 92610 EVALUATE SWALLOWING FUNCTION: CPT | Performed by: SPEECH-LANGUAGE PATHOLOGIST

## 2024-01-01 PROCEDURE — 99232 SBSQ HOSP IP/OBS MODERATE 35: CPT | Performed by: HOSPITALIST

## 2024-01-01 PROCEDURE — 83880 ASSAY OF NATRIURETIC PEPTIDE: CPT

## 2024-01-01 PROCEDURE — 93005 ELECTROCARDIOGRAM TRACING: CPT

## 2024-01-01 PROCEDURE — 80053 COMPREHEN METABOLIC PANEL: CPT

## 2024-01-01 PROCEDURE — P9016 RBC LEUKOCYTES REDUCED: HCPCS

## 2024-01-01 PROCEDURE — 99498 ADVNCD CARE PLAN ADDL 30 MIN: CPT | Performed by: NURSE PRACTITIONER

## 2024-01-01 PROCEDURE — 87040 BLOOD CULTURE FOR BACTERIA: CPT

## 2024-01-01 PROCEDURE — 700111 HCHG RX REV CODE 636 W/ 250 OVERRIDE (IP): Mod: JZ

## 2024-01-01 PROCEDURE — 99233 SBSQ HOSP IP/OBS HIGH 50: CPT | Performed by: STUDENT IN AN ORGANIZED HEALTH CARE EDUCATION/TRAINING PROGRAM

## 2024-01-01 PROCEDURE — 770022 HCHG ROOM/CARE - ICU (200)

## 2024-01-01 PROCEDURE — 82746 ASSAY OF FOLIC ACID SERUM: CPT

## 2024-01-01 PROCEDURE — 99497 ADVNCD CARE PLAN 30 MIN: CPT | Performed by: STUDENT IN AN ORGANIZED HEALTH CARE EDUCATION/TRAINING PROGRAM

## 2024-01-01 PROCEDURE — 97602 WOUND(S) CARE NON-SELECTIVE: CPT

## 2024-01-01 PROCEDURE — 84145 PROCALCITONIN (PCT): CPT

## 2024-01-01 PROCEDURE — 87186 SC STD MICRODIL/AGAR DIL: CPT

## 2024-01-01 PROCEDURE — 700111 HCHG RX REV CODE 636 W/ 250 OVERRIDE (IP): Performed by: EMERGENCY MEDICINE

## 2024-01-01 PROCEDURE — 85055 RETICULATED PLATELET ASSAY: CPT | Mod: 91

## 2024-01-01 PROCEDURE — 81001 URINALYSIS AUTO W/SCOPE: CPT

## 2024-01-01 PROCEDURE — 30233R1 TRANSFUSION OF NONAUTOLOGOUS PLATELETS INTO PERIPHERAL VEIN, PERCUTANEOUS APPROACH: ICD-10-PCS | Performed by: INTERNAL MEDICINE

## 2024-01-01 PROCEDURE — 84132 ASSAY OF SERUM POTASSIUM: CPT

## 2024-01-01 PROCEDURE — 87015 SPECIMEN INFECT AGNT CONCNTJ: CPT

## 2024-01-01 PROCEDURE — 30233N1 TRANSFUSION OF NONAUTOLOGOUS RED BLOOD CELLS INTO PERIPHERAL VEIN, PERCUTANEOUS APPROACH: ICD-10-PCS | Performed by: INTERNAL MEDICINE

## 2024-01-01 PROCEDURE — 85379 FIBRIN DEGRADATION QUANT: CPT

## 2024-01-01 PROCEDURE — 86635 COCCIDIOIDES ANTIBODY: CPT | Mod: 91

## 2024-01-01 PROCEDURE — 86923 COMPATIBILITY TEST ELECTRIC: CPT

## 2024-01-01 PROCEDURE — 700111 HCHG RX REV CODE 636 W/ 250 OVERRIDE (IP): Mod: JZ | Performed by: INTERNAL MEDICINE

## 2024-01-01 PROCEDURE — 36415 COLL VENOUS BLD VENIPUNCTURE: CPT

## 2024-01-01 PROCEDURE — 99233 SBSQ HOSP IP/OBS HIGH 50: CPT | Mod: 25 | Performed by: STUDENT IN AN ORGANIZED HEALTH CARE EDUCATION/TRAINING PROGRAM

## 2024-01-01 PROCEDURE — 99233 SBSQ HOSP IP/OBS HIGH 50: CPT | Performed by: HOSPITALIST

## 2024-01-01 PROCEDURE — 80158 DRUG ASSAY CYCLOSPORINE: CPT

## 2024-01-01 PROCEDURE — 99497 ADVNCD CARE PLAN 30 MIN: CPT | Performed by: NURSE PRACTITIONER

## 2024-01-01 PROCEDURE — 93010 ELECTROCARDIOGRAM REPORT: CPT | Performed by: INTERNAL MEDICINE

## 2024-01-01 PROCEDURE — 99291 CRITICAL CARE FIRST HOUR: CPT | Performed by: STUDENT IN AN ORGANIZED HEALTH CARE EDUCATION/TRAINING PROGRAM

## 2024-01-01 PROCEDURE — 87449 NOS EACH ORGANISM AG IA: CPT | Mod: 91

## 2024-01-01 PROCEDURE — 99291 CRITICAL CARE FIRST HOUR: CPT | Performed by: INTERNAL MEDICINE

## 2024-01-01 PROCEDURE — 86901 BLOOD TYPING SEROLOGIC RH(D): CPT

## 2024-01-01 PROCEDURE — 87147 CULTURE TYPE IMMUNOLOGIC: CPT

## 2024-01-01 PROCEDURE — 94640 AIRWAY INHALATION TREATMENT: CPT

## 2024-01-01 PROCEDURE — 87077 CULTURE AEROBIC IDENTIFY: CPT | Mod: 91

## 2024-01-01 PROCEDURE — 97166 OT EVAL MOD COMPLEX 45 MIN: CPT

## 2024-01-01 PROCEDURE — 97164 PT RE-EVAL EST PLAN CARE: CPT

## 2024-01-01 PROCEDURE — 82248 BILIRUBIN DIRECT: CPT

## 2024-01-01 PROCEDURE — 83605 ASSAY OF LACTIC ACID: CPT

## 2024-01-01 PROCEDURE — 87205 SMEAR GRAM STAIN: CPT

## 2024-01-01 PROCEDURE — 84132 ASSAY OF SERUM POTASSIUM: CPT | Mod: 91

## 2024-01-01 PROCEDURE — 700111 HCHG RX REV CODE 636 W/ 250 OVERRIDE (IP): Mod: JZ | Performed by: EMERGENCY MEDICINE

## 2024-01-01 PROCEDURE — 700105 HCHG RX REV CODE 258

## 2024-01-01 PROCEDURE — 71046 X-RAY EXAM CHEST 2 VIEWS: CPT

## 2024-01-01 PROCEDURE — 97163 PT EVAL HIGH COMPLEX 45 MIN: CPT

## 2024-01-01 PROCEDURE — 51798 US URINE CAPACITY MEASURE: CPT

## 2024-01-01 PROCEDURE — C1751 CATH, INF, PER/CENT/MIDLINE: HCPCS

## 2024-01-01 PROCEDURE — 82607 VITAMIN B-12: CPT | Mod: 91

## 2024-01-01 PROCEDURE — 87116 MYCOBACTERIA CULTURE: CPT

## 2024-01-01 PROCEDURE — 87077 CULTURE AEROBIC IDENTIFY: CPT

## 2024-01-01 PROCEDURE — 99223 1ST HOSP IP/OBS HIGH 75: CPT | Mod: 25 | Performed by: NURSE PRACTITIONER

## 2024-01-01 PROCEDURE — 87040 BLOOD CULTURE FOR BACTERIA: CPT | Mod: 91

## 2024-01-01 PROCEDURE — 97168 OT RE-EVAL EST PLAN CARE: CPT

## 2024-01-01 PROCEDURE — 87070 CULTURE OTHR SPECIMN AEROBIC: CPT

## 2024-01-01 PROCEDURE — 99292 CRITICAL CARE ADDL 30 MIN: CPT | Performed by: EMERGENCY MEDICINE

## 2024-01-01 PROCEDURE — 86403 PARTICLE AGGLUT ANTBDY SCRN: CPT

## 2024-01-01 PROCEDURE — 700105 HCHG RX REV CODE 258: Performed by: NURSE PRACTITIONER

## 2024-01-01 PROCEDURE — 76705 ECHO EXAM OF ABDOMEN: CPT

## 2024-01-01 PROCEDURE — 87641 MR-STAPH DNA AMP PROBE: CPT

## 2024-01-01 PROCEDURE — 02HV33Z INSERTION OF INFUSION DEVICE INTO SUPERIOR VENA CAVA, PERCUTANEOUS APPROACH: ICD-10-PCS | Performed by: INTERNAL MEDICINE

## 2024-01-01 PROCEDURE — 99222 1ST HOSP IP/OBS MODERATE 55: CPT | Mod: AI | Performed by: HOSPITALIST

## 2024-01-01 PROCEDURE — 85610 PROTHROMBIN TIME: CPT

## 2024-01-01 PROCEDURE — 82010 KETONE BODYS QUAN: CPT

## 2024-01-01 PROCEDURE — 87150 DNA/RNA AMPLIFIED PROBE: CPT

## 2024-01-01 PROCEDURE — 700117 HCHG RX CONTRAST REV CODE 255: Performed by: EMERGENCY MEDICINE

## 2024-01-01 PROCEDURE — 8E0ZXY6 ISOLATION: ICD-10-PCS | Performed by: FAMILY MEDICINE

## 2024-01-01 PROCEDURE — 97530 THERAPEUTIC ACTIVITIES: CPT

## 2024-01-01 PROCEDURE — 83921 ORGANIC ACID SINGLE QUANT: CPT

## 2024-01-01 PROCEDURE — 82533 TOTAL CORTISOL: CPT

## 2024-01-01 PROCEDURE — 87206 SMEAR FLUORESCENT/ACID STAI: CPT

## 2024-01-01 PROCEDURE — 700102 HCHG RX REV CODE 250 W/ 637 OVERRIDE(OP): Mod: JZ

## 2024-01-01 PROCEDURE — 87305 ASPERGILLUS AG IA: CPT | Mod: 91

## 2024-01-01 PROCEDURE — 99232 SBSQ HOSP IP/OBS MODERATE 35: CPT | Performed by: FAMILY MEDICINE

## 2024-01-01 PROCEDURE — 87798 DETECT AGENT NOS DNA AMP: CPT

## 2024-01-01 PROCEDURE — A9270 NON-COVERED ITEM OR SERVICE: HCPCS | Mod: JZ

## 2024-01-01 RX ORDER — FAMOTIDINE 20 MG/1
20 TABLET, FILM COATED ORAL 2 TIMES DAILY
COMMUNITY
End: 2024-01-01

## 2024-01-01 RX ORDER — POTASSIUM CHLORIDE 7.45 MG/ML
10 INJECTION INTRAVENOUS
Status: COMPLETED | OUTPATIENT
Start: 2024-01-01 | End: 2024-01-01

## 2024-01-01 RX ORDER — DOCUSATE SODIUM 50 MG/5ML
100 LIQUID ORAL 2 TIMES DAILY
Status: DISCONTINUED | OUTPATIENT
Start: 2024-01-01 | End: 2024-01-01

## 2024-01-01 RX ORDER — CYCLOSPORINE 100 MG/ML
250 SOLUTION ORAL 2 TIMES DAILY
Status: DISCONTINUED | OUTPATIENT
Start: 2024-01-01 | End: 2024-01-01

## 2024-01-01 RX ORDER — CYCLOSPORINE 100 MG/1
100 CAPSULE, GELATIN COATED ORAL 2 TIMES DAILY
Status: DISCONTINUED | OUTPATIENT
Start: 2024-01-01 | End: 2024-01-01

## 2024-01-01 RX ORDER — MAGNESIUM SULFATE HEPTAHYDRATE 40 MG/ML
4 INJECTION, SOLUTION INTRAVENOUS ONCE
Status: COMPLETED | OUTPATIENT
Start: 2024-01-01 | End: 2024-01-01

## 2024-01-01 RX ORDER — MIDODRINE HYDROCHLORIDE 5 MG/1
5 TABLET ORAL EVERY 8 HOURS
Status: DISCONTINUED | OUTPATIENT
Start: 2024-01-01 | End: 2024-01-01

## 2024-01-01 RX ORDER — MIDODRINE HYDROCHLORIDE 5 MG/1
10 TABLET ORAL EVERY 8 HOURS
Status: DISCONTINUED | OUTPATIENT
Start: 2024-01-01 | End: 2024-01-01

## 2024-01-01 RX ORDER — DEXTROSE MONOHYDRATE 25 G/50ML
25 INJECTION, SOLUTION INTRAVENOUS
Status: DISCONTINUED | OUTPATIENT
Start: 2024-01-01 | End: 2024-01-01

## 2024-01-01 RX ORDER — SODIUM CHLORIDE 9 MG/ML
500 INJECTION, SOLUTION INTRAVENOUS
Status: DISCONTINUED | OUTPATIENT
Start: 2024-01-01 | End: 2024-01-01

## 2024-01-01 RX ORDER — POSACONAZOLE 100 MG/1
300 TABLET, DELAYED RELEASE ORAL DAILY
Status: DISCONTINUED | OUTPATIENT
Start: 2024-01-01 | End: 2024-01-01

## 2024-01-01 RX ORDER — ACETAMINOPHEN 650 MG/1
650 SUPPOSITORY RECTAL EVERY 4 HOURS PRN
Status: DISCONTINUED | OUTPATIENT
Start: 2024-01-01 | End: 2024-01-01 | Stop reason: HOSPADM

## 2024-01-01 RX ORDER — ATROPINE SULFATE 10 MG/ML
2 SOLUTION/ DROPS OPHTHALMIC EVERY 4 HOURS PRN
Status: DISCONTINUED | OUTPATIENT
Start: 2024-01-01 | End: 2024-01-01 | Stop reason: HOSPADM

## 2024-01-01 RX ORDER — AMOXICILLIN 250 MG
2 CAPSULE ORAL EVERY EVENING
Status: DISCONTINUED | OUTPATIENT
Start: 2024-01-01 | End: 2024-01-01 | Stop reason: HOSPADM

## 2024-01-01 RX ORDER — HYDROXYZINE HYDROCHLORIDE 25 MG/1
25 TABLET, FILM COATED ORAL 3 TIMES DAILY PRN
Status: DISCONTINUED | OUTPATIENT
Start: 2024-01-01 | End: 2024-01-01

## 2024-01-01 RX ORDER — IPRATROPIUM BROMIDE AND ALBUTEROL SULFATE 2.5; .5 MG/3ML; MG/3ML
3 SOLUTION RESPIRATORY (INHALATION)
Status: DISCONTINUED | OUTPATIENT
Start: 2024-01-01 | End: 2024-01-01

## 2024-01-01 RX ORDER — ACETAMINOPHEN 325 MG/1
650 TABLET ORAL EVERY 4 HOURS PRN
Status: DISCONTINUED | OUTPATIENT
Start: 2024-01-01 | End: 2024-01-01 | Stop reason: HOSPADM

## 2024-01-01 RX ORDER — ACYCLOVIR 400 MG/1
400 TABLET ORAL 2 TIMES DAILY
Status: DISCONTINUED | OUTPATIENT
Start: 2024-01-01 | End: 2024-01-01

## 2024-01-01 RX ORDER — POTASSIUM CHLORIDE 1500 MG/1
40 TABLET, EXTENDED RELEASE ORAL ONCE
Status: DISCONTINUED | OUTPATIENT
Start: 2024-01-01 | End: 2024-01-01

## 2024-01-01 RX ORDER — MAGNESIUM SULFATE HEPTAHYDRATE 40 MG/ML
2 INJECTION, SOLUTION INTRAVENOUS ONCE
Status: COMPLETED | OUTPATIENT
Start: 2024-01-01 | End: 2024-01-01

## 2024-01-01 RX ORDER — DEXTROSE MONOHYDRATE 50 MG/ML
30 INJECTION, SOLUTION INTRAVENOUS EVERY 24 HOURS
Status: DISCONTINUED | OUTPATIENT
Start: 2024-01-01 | End: 2024-01-01

## 2024-01-01 RX ORDER — FAMOTIDINE 20 MG/1
20 TABLET, FILM COATED ORAL 2 TIMES DAILY
Status: DISCONTINUED | OUTPATIENT
Start: 2024-01-01 | End: 2024-01-01

## 2024-01-01 RX ORDER — LEVOFLOXACIN 500 MG/1
500 TABLET, FILM COATED ORAL EVERY 24 HOURS
Status: DISCONTINUED | OUTPATIENT
Start: 2024-01-01 | End: 2024-01-01

## 2024-01-01 RX ORDER — SODIUM CHLORIDE 9 MG/ML
500 INJECTION, SOLUTION INTRAVENOUS EVERY 24 HOURS
Status: DISCONTINUED | OUTPATIENT
Start: 2024-01-01 | End: 2024-01-01

## 2024-01-01 RX ORDER — ACETAMINOPHEN 325 MG/1
650 TABLET ORAL EVERY 4 HOURS PRN
Status: DISCONTINUED | OUTPATIENT
Start: 2024-01-01 | End: 2024-01-01

## 2024-01-01 RX ORDER — SODIUM CHLORIDE 9 MG/ML
INJECTION, SOLUTION INTRAVENOUS CONTINUOUS
Status: DISCONTINUED | OUTPATIENT
Start: 2024-01-01 | End: 2024-01-01

## 2024-01-01 RX ORDER — INSULIN LISPRO 100 [IU]/ML
1-6 INJECTION, SOLUTION INTRAVENOUS; SUBCUTANEOUS EVERY 6 HOURS
Status: DISCONTINUED | OUTPATIENT
Start: 2024-01-01 | End: 2024-01-01

## 2024-01-01 RX ORDER — ACETAMINOPHEN 325 MG/1
650 TABLET ORAL EVERY 6 HOURS PRN
Status: DISCONTINUED | OUTPATIENT
Start: 2024-01-01 | End: 2024-01-01

## 2024-01-01 RX ORDER — HYDROCORTISONE SODIUM SUCCINATE 100 MG/2ML
50 INJECTION INTRAMUSCULAR; INTRAVENOUS EVERY 6 HOURS
Status: DISCONTINUED | OUTPATIENT
Start: 2024-01-01 | End: 2024-01-01

## 2024-01-01 RX ORDER — SODIUM CHLORIDE FOR INHALATION 7 %
4 VIAL, NEBULIZER (ML) INHALATION
Status: DISCONTINUED | OUTPATIENT
Start: 2024-01-01 | End: 2024-01-01

## 2024-01-01 RX ORDER — ATORVASTATIN CALCIUM 10 MG/1
10 TABLET, FILM COATED ORAL EVERY EVENING
Status: DISCONTINUED | OUTPATIENT
Start: 2024-01-01 | End: 2024-01-01

## 2024-01-01 RX ORDER — HYDROCORTISONE SODIUM SUCCINATE 100 MG/2ML
25 INJECTION INTRAMUSCULAR; INTRAVENOUS EVERY 12 HOURS
Status: COMPLETED | OUTPATIENT
Start: 2024-01-01 | End: 2024-01-01

## 2024-01-01 RX ORDER — CARVEDILOL 3.12 MG/1
3.12 TABLET ORAL 2 TIMES DAILY WITH MEALS
Status: DISCONTINUED | OUTPATIENT
Start: 2024-01-01 | End: 2024-01-01

## 2024-01-01 RX ORDER — ACETAMINOPHEN 500 MG
1000 TABLET ORAL ONCE
Status: COMPLETED | OUTPATIENT
Start: 2024-01-01 | End: 2024-01-01

## 2024-01-01 RX ORDER — CYCLOSPORINE 100 MG/ML
100 SOLUTION ORAL 2 TIMES DAILY
Status: DISCONTINUED | OUTPATIENT
Start: 2024-01-01 | End: 2024-01-01

## 2024-01-01 RX ORDER — TAMSULOSIN HYDROCHLORIDE 0.4 MG/1
0.4 CAPSULE ORAL
Status: DISCONTINUED | OUTPATIENT
Start: 2024-01-01 | End: 2024-01-01

## 2024-01-01 RX ORDER — ATOVAQUONE 750 MG/5ML
750 SUSPENSION ORAL 2 TIMES DAILY
Status: DISCONTINUED | OUTPATIENT
Start: 2024-01-01 | End: 2024-01-01

## 2024-01-01 RX ORDER — GAUZE BANDAGE 2" X 2"
100 BANDAGE TOPICAL DAILY
Status: DISCONTINUED | OUTPATIENT
Start: 2024-01-01 | End: 2024-01-01

## 2024-01-01 RX ORDER — POLYETHYLENE GLYCOL 3350 17 G/17G
1 POWDER, FOR SOLUTION ORAL
Status: DISCONTINUED | OUTPATIENT
Start: 2024-01-01 | End: 2024-01-01 | Stop reason: HOSPADM

## 2024-01-01 RX ORDER — SODIUM CHLORIDE 9 MG/ML
500 INJECTION, SOLUTION INTRAVENOUS ONCE
Status: COMPLETED | OUTPATIENT
Start: 2024-01-01 | End: 2024-01-01

## 2024-01-01 RX ORDER — SODIUM CHLORIDE 9 MG/ML
250 INJECTION, SOLUTION INTRAVENOUS EVERY 24 HOURS
Status: DISCONTINUED | OUTPATIENT
Start: 2024-01-01 | End: 2024-01-01

## 2024-01-01 RX ORDER — DEXTROSE MONOHYDRATE 25 G/50ML
12.5-25 INJECTION, SOLUTION INTRAVENOUS PRN
Status: DISCONTINUED | OUTPATIENT
Start: 2024-01-01 | End: 2024-01-01

## 2024-01-01 RX ORDER — INSULIN LISPRO 100 [IU]/ML
3-14 INJECTION, SOLUTION INTRAVENOUS; SUBCUTANEOUS EVERY 6 HOURS
Status: DISCONTINUED | OUTPATIENT
Start: 2024-01-01 | End: 2024-01-01

## 2024-01-01 RX ORDER — LIDOCAINE HYDROCHLORIDE 20 MG/ML
JELLY TOPICAL
Status: COMPLETED | OUTPATIENT
Start: 2024-01-01 | End: 2024-01-01

## 2024-01-01 RX ORDER — AMOXICILLIN 250 MG
1 CAPSULE ORAL 2 TIMES DAILY
COMMUNITY
End: 2024-01-01

## 2024-01-01 RX ORDER — BISACODYL 10 MG
10 SUPPOSITORY, RECTAL RECTAL ONCE
Status: DISCONTINUED | OUTPATIENT
Start: 2024-01-01 | End: 2024-01-01

## 2024-01-01 RX ORDER — ACETAMINOPHEN 500 MG
1000 TABLET ORAL ONCE
Status: DISCONTINUED | OUTPATIENT
Start: 2024-01-01 | End: 2024-01-01

## 2024-01-01 RX ORDER — BISACODYL 10 MG
10 SUPPOSITORY, RECTAL RECTAL
Status: DISCONTINUED | OUTPATIENT
Start: 2024-01-01 | End: 2024-01-01 | Stop reason: HOSPADM

## 2024-01-01 RX ORDER — FUROSEMIDE 10 MG/ML
40 INJECTION INTRAMUSCULAR; INTRAVENOUS ONCE
Status: COMPLETED | OUTPATIENT
Start: 2024-01-01 | End: 2024-01-01

## 2024-01-01 RX ORDER — HYDROCORTISONE SODIUM SUCCINATE 100 MG/2ML
25 INJECTION INTRAMUSCULAR; INTRAVENOUS EVERY 6 HOURS
Status: DISCONTINUED | OUTPATIENT
Start: 2024-01-01 | End: 2024-01-01

## 2024-01-01 RX ORDER — ACETAMINOPHEN 325 MG/1
650 TABLET ORAL EVERY 24 HOURS
Status: DISCONTINUED | OUTPATIENT
Start: 2024-01-01 | End: 2024-01-01

## 2024-01-01 RX ORDER — FUROSEMIDE 10 MG/ML
20 INJECTION INTRAMUSCULAR; INTRAVENOUS ONCE
Status: COMPLETED | OUTPATIENT
Start: 2024-01-01 | End: 2024-01-01

## 2024-01-01 RX ORDER — LEVOFLOXACIN 500 MG/1
500 TABLET, FILM COATED ORAL DAILY
COMMUNITY
End: 2024-01-01

## 2024-01-01 RX ORDER — NOREPINEPHRINE BITARTRATE 0.03 MG/ML
0-1 INJECTION, SOLUTION INTRAVENOUS CONTINUOUS
Status: DISCONTINUED | OUTPATIENT
Start: 2024-01-01 | End: 2024-01-01

## 2024-01-01 RX ORDER — CYCLOSPORINE 100 MG/ML
180 SOLUTION ORAL 2 TIMES DAILY
Status: DISCONTINUED | OUTPATIENT
Start: 2024-01-01 | End: 2024-01-01

## 2024-01-01 RX ORDER — POTASSIUM CHLORIDE 1500 MG/1
40 TABLET, EXTENDED RELEASE ORAL EVERY 6 HOURS
Status: DISCONTINUED | OUTPATIENT
Start: 2024-01-01 | End: 2024-01-01

## 2024-01-01 RX ORDER — POTASSIUM CHLORIDE 7.45 MG/ML
10 INJECTION INTRAVENOUS
Status: DISPENSED | OUTPATIENT
Start: 2024-01-01 | End: 2024-01-01

## 2024-01-01 RX ORDER — LINEZOLID 2 MG/ML
600 INJECTION, SOLUTION INTRAVENOUS EVERY 12 HOURS
Status: DISCONTINUED | OUTPATIENT
Start: 2024-01-01 | End: 2024-01-01

## 2024-01-01 RX ORDER — POTASSIUM CHLORIDE 1500 MG/1
40 TABLET, EXTENDED RELEASE ORAL ONCE
Status: COMPLETED | OUTPATIENT
Start: 2024-01-01 | End: 2024-01-01

## 2024-01-01 RX ORDER — THIAMINE HYDROCHLORIDE 100 MG/ML
200 INJECTION, SOLUTION INTRAMUSCULAR; INTRAVENOUS 2 TIMES DAILY
Status: COMPLETED | OUTPATIENT
Start: 2024-01-01 | End: 2024-01-01

## 2024-01-01 RX ORDER — POSACONAZOLE 40 MG/ML
300 SUSPENSION ORAL DAILY
Status: DISCONTINUED | OUTPATIENT
Start: 2024-01-01 | End: 2024-01-01

## 2024-01-01 RX ORDER — DIPHENHYDRAMINE HCL 25 MG
25 TABLET ORAL EVERY 24 HOURS
Status: DISCONTINUED | OUTPATIENT
Start: 2024-01-01 | End: 2024-01-01

## 2024-01-01 RX ORDER — LORATADINE 10 MG/1
10 TABLET ORAL DAILY
Status: DISCONTINUED | OUTPATIENT
Start: 2024-01-01 | End: 2024-01-01

## 2024-01-01 RX ORDER — INSULIN LISPRO 100 [IU]/ML
2-9 INJECTION, SOLUTION INTRAVENOUS; SUBCUTANEOUS EVERY 6 HOURS
Status: DISCONTINUED | OUTPATIENT
Start: 2024-01-01 | End: 2024-01-01

## 2024-01-01 RX ORDER — POLYETHYLENE GLYCOL 3350 17 G/17G
1 POWDER, FOR SOLUTION ORAL 2 TIMES DAILY
Status: DISCONTINUED | OUTPATIENT
Start: 2024-01-01 | End: 2024-01-01

## 2024-01-01 RX ORDER — POTASSIUM CHLORIDE 7.45 MG/ML
10 INJECTION INTRAVENOUS ONCE
Status: COMPLETED | OUTPATIENT
Start: 2024-01-01 | End: 2024-01-01

## 2024-01-01 RX ORDER — LACTULOSE 10 G/15ML
10 SOLUTION ORAL
Status: DISCONTINUED | OUTPATIENT
Start: 2024-01-01 | End: 2024-01-01 | Stop reason: HOSPADM

## 2024-01-01 RX ORDER — LANOLIN ALCOHOL/MO/W.PET/CERES
400 CREAM (GRAM) TOPICAL ONCE
Status: DISPENSED | OUTPATIENT
Start: 2024-01-01 | End: 2024-01-01

## 2024-01-01 RX ORDER — CYCLOSPORINE 100 MG/ML
100 SOLUTION ORAL ONCE
Status: COMPLETED | OUTPATIENT
Start: 2024-01-01 | End: 2024-01-01

## 2024-01-01 RX ORDER — HYDRALAZINE HYDROCHLORIDE 20 MG/ML
10 INJECTION INTRAMUSCULAR; INTRAVENOUS EVERY 6 HOURS PRN
Status: DISCONTINUED | OUTPATIENT
Start: 2024-01-01 | End: 2024-01-01

## 2024-01-01 RX ORDER — SODIUM CHLORIDE, SODIUM LACTATE, POTASSIUM CHLORIDE, AND CALCIUM CHLORIDE .6; .31; .03; .02 G/100ML; G/100ML; G/100ML; G/100ML
1000 INJECTION, SOLUTION INTRAVENOUS ONCE
Status: COMPLETED | OUTPATIENT
Start: 2024-01-01 | End: 2024-01-01

## 2024-01-01 RX ORDER — ACETAMINOPHEN 325 MG/1
650 TABLET ORAL EVERY 6 HOURS
Status: DISCONTINUED | OUTPATIENT
Start: 2024-01-01 | End: 2024-01-01

## 2024-01-01 RX ORDER — LANOLIN ALCOHOL/MO/W.PET/CERES
400 CREAM (GRAM) TOPICAL DAILY
Status: DISCONTINUED | OUTPATIENT
Start: 2024-01-01 | End: 2024-01-01

## 2024-01-01 RX ORDER — CARBOXYMETHYLCELLULOSE SODIUM 5 MG/ML
1 SOLUTION/ DROPS OPHTHALMIC PRN
Status: DISCONTINUED | OUTPATIENT
Start: 2024-01-01 | End: 2024-01-01 | Stop reason: HOSPADM

## 2024-01-01 RX ORDER — DOCUSATE SODIUM 100 MG/1
100 CAPSULE, LIQUID FILLED ORAL EVERY 12 HOURS
Status: DISCONTINUED | OUTPATIENT
Start: 2024-01-01 | End: 2024-01-01 | Stop reason: HOSPADM

## 2024-01-01 RX ORDER — DOCUSATE SODIUM 100 MG/1
100 CAPSULE, LIQUID FILLED ORAL 2 TIMES DAILY
Status: DISCONTINUED | OUTPATIENT
Start: 2024-01-01 | End: 2024-01-01

## 2024-01-01 RX ORDER — FUROSEMIDE 10 MG/ML
20 INJECTION INTRAMUSCULAR; INTRAVENOUS
Status: DISCONTINUED | OUTPATIENT
Start: 2024-01-01 | End: 2024-01-01

## 2024-01-01 RX ORDER — ALBUMIN (HUMAN) 12.5 G/50ML
25 SOLUTION INTRAVENOUS EVERY 6 HOURS
Status: COMPLETED | OUTPATIENT
Start: 2024-01-01 | End: 2024-01-01

## 2024-01-01 RX ADMIN — LIDOCAINE HYDROCHLORIDE 6 ML: 20 JELLY TOPICAL at 13:17

## 2024-01-01 RX ADMIN — SODIUM CHLORIDE, POTASSIUM CHLORIDE, SODIUM LACTATE AND CALCIUM CHLORIDE 1000 ML: 600; 310; 30; 20 INJECTION, SOLUTION INTRAVENOUS at 15:29

## 2024-01-01 RX ADMIN — HYDROCORTISONE SODIUM SUCCINATE 25 MG: 100 INJECTION, POWDER, FOR SOLUTION INTRAMUSCULAR; INTRAVENOUS at 17:17

## 2024-01-01 RX ADMIN — ACETAMINOPHEN 650 MG: 325 TABLET ORAL at 09:05

## 2024-01-01 RX ADMIN — FAMOTIDINE 20 MG: 10 INJECTION INTRAVENOUS at 06:06

## 2024-01-01 RX ADMIN — HYDROCORTISONE SODIUM SUCCINATE 25 MG: 100 INJECTION, POWDER, FOR SOLUTION INTRAMUSCULAR; INTRAVENOUS at 05:27

## 2024-01-01 RX ADMIN — HYDROCORTISONE SODIUM SUCCINATE 25 MG: 100 INJECTION, POWDER, FOR SOLUTION INTRAMUSCULAR; INTRAVENOUS at 00:21

## 2024-01-01 RX ADMIN — POTASSIUM CHLORIDE 10 MEQ: 7.46 INJECTION, SOLUTION INTRAVENOUS at 00:49

## 2024-01-01 RX ADMIN — ACYCLOVIR 400 MG: 400 TABLET ORAL at 05:39

## 2024-01-01 RX ADMIN — ACETAMINOPHEN 650 MG: 325 TABLET ORAL at 08:01

## 2024-01-01 RX ADMIN — ATORVASTATIN CALCIUM 10 MG: 10 TABLET, FILM COATED ORAL at 16:50

## 2024-01-01 RX ADMIN — ACETAMINOPHEN 650 MG: 325 TABLET ORAL at 20:37

## 2024-01-01 RX ADMIN — DEXTROSE MONOHYDRATE 25 G: 25 INJECTION, SOLUTION INTRAVENOUS at 06:46

## 2024-01-01 RX ADMIN — CYCLOSPORINE 180 MG: 100 SOLUTION ORAL at 17:11

## 2024-01-01 RX ADMIN — ACYCLOVIR 400 MG: 400 TABLET ORAL at 13:00

## 2024-01-01 RX ADMIN — ALBUMIN (HUMAN) 25 G: 0.25 INJECTION, SOLUTION INTRAVENOUS at 23:45

## 2024-01-01 RX ADMIN — POTASSIUM CHLORIDE 10 MEQ: 7.46 INJECTION, SOLUTION INTRAVENOUS at 10:24

## 2024-01-01 RX ADMIN — LINEZOLID 600 MG: 600 INJECTION, SOLUTION INTRAVENOUS at 05:51

## 2024-01-01 RX ADMIN — INSULIN HUMAN 5.5 UNITS/HR: 1 INJECTION, SOLUTION INTRAVENOUS at 02:01

## 2024-01-01 RX ADMIN — ACYCLOVIR 400 MG: 400 TABLET ORAL at 05:34

## 2024-01-01 RX ADMIN — POSACONAZOLE 300 MG: 100 TABLET, DELAYED RELEASE ORAL at 17:20

## 2024-01-01 RX ADMIN — DIBASIC SODIUM PHOSPHATE, MONOBASIC POTASSIUM PHOSPHATE AND MONOBASIC SODIUM PHOSPHATE 500 MG: 852; 155; 130 TABLET ORAL at 19:56

## 2024-01-01 RX ADMIN — ATOVAQUONE 750 MG: 750 SUSPENSION ORAL at 18:18

## 2024-01-01 RX ADMIN — MAGNESIUM SULFATE HEPTAHYDRATE 2 G: 2 INJECTION, SOLUTION INTRAVENOUS at 04:37

## 2024-01-01 RX ADMIN — ATOVAQUONE 750 MG: 750 SUSPENSION ORAL at 21:02

## 2024-01-01 RX ADMIN — CEFTAROLINE FOSAMIL 400 MG: 600 POWDER, FOR SOLUTION INTRAVENOUS at 14:05

## 2024-01-01 RX ADMIN — POTASSIUM BICARBONATE 25 MEQ: 978 TABLET, EFFERVESCENT ORAL at 17:21

## 2024-01-01 RX ADMIN — LORATADINE 10 MG: 10 TABLET ORAL at 13:01

## 2024-01-01 RX ADMIN — ATORVASTATIN CALCIUM 10 MG: 10 TABLET, FILM COATED ORAL at 20:11

## 2024-01-01 RX ADMIN — INSULIN LISPRO 2 UNITS: 100 INJECTION, SOLUTION INTRAVENOUS; SUBCUTANEOUS at 12:11

## 2024-01-01 RX ADMIN — Medication 10 ML: at 00:23

## 2024-01-01 RX ADMIN — INSULIN GLARGINE-YFGN 10 UNITS: 100 INJECTION, SOLUTION SUBCUTANEOUS at 10:36

## 2024-01-01 RX ADMIN — Medication 10 ML: at 18:19

## 2024-01-01 RX ADMIN — DEXTROSE MONOHYDRATE 30 ML: 50 INJECTION, SOLUTION INTRAVENOUS at 12:25

## 2024-01-01 RX ADMIN — ACETAMINOPHEN 650 MG: 325 TABLET ORAL at 02:41

## 2024-01-01 RX ADMIN — LORATADINE 10 MG: 10 TABLET ORAL at 05:35

## 2024-01-01 RX ADMIN — LIDOCAINE HYDROCHLORIDE 5 ML: 20 SOLUTION ORAL; TOPICAL at 15:26

## 2024-01-01 RX ADMIN — POSACONAZOLE 300 MG: 100 TABLET, DELAYED RELEASE ORAL at 18:07

## 2024-01-01 RX ADMIN — Medication 100 MG: at 04:50

## 2024-01-01 RX ADMIN — ACETAMINOPHEN 650 MG: 325 TABLET ORAL at 11:06

## 2024-01-01 RX ADMIN — SODIUM CHLORIDE 500 ML: 9 INJECTION, SOLUTION INTRAVENOUS at 11:11

## 2024-01-01 RX ADMIN — ATOVAQUONE 750 MG: 750 SUSPENSION ORAL at 05:54

## 2024-01-01 RX ADMIN — CEFTAROLINE FOSAMIL 600 MG: 600 POWDER, FOR SOLUTION INTRAVENOUS at 02:47

## 2024-01-01 RX ADMIN — CEFTAROLINE FOSAMIL 600 MG: 600 POWDER, FOR SOLUTION INTRAVENOUS at 02:28

## 2024-01-01 RX ADMIN — INSULIN LISPRO 7 UNITS: 100 INJECTION, SOLUTION INTRAVENOUS; SUBCUTANEOUS at 00:06

## 2024-01-01 RX ADMIN — CYCLOSPORINE 180 MG: 100 SOLUTION ORAL at 11:47

## 2024-01-01 RX ADMIN — DIPHENHYDRAMINE HYDROCHLORIDE 25 MG: 25 TABLET ORAL at 11:06

## 2024-01-01 RX ADMIN — LIDOCAINE HYDROCHLORIDE 5 ML: 20 SOLUTION ORAL; TOPICAL at 02:22

## 2024-01-01 RX ADMIN — HYDROCORTISONE SODIUM SUCCINATE 50 MG: 100 INJECTION, POWDER, FOR SOLUTION INTRAMUSCULAR; INTRAVENOUS at 05:12

## 2024-01-01 RX ADMIN — LEVOFLOXACIN 500 MG: 500 TABLET, FILM COATED ORAL at 04:40

## 2024-01-01 RX ADMIN — CYCLOSPORINE 180 MG: 100 SOLUTION ORAL at 06:24

## 2024-01-01 RX ADMIN — SODIUM CHLORIDE 1000 ML: 9 INJECTION, SOLUTION INTRAVENOUS at 04:04

## 2024-01-01 RX ADMIN — ACETAMINOPHEN 650 MG: 325 TABLET ORAL at 01:34

## 2024-01-01 RX ADMIN — CYCLOSPORINE 100 MG: 100 CAPSULE, GELATIN COATED ORAL at 18:20

## 2024-01-01 RX ADMIN — LIDOCAINE HYDROCHLORIDE 5 ML: 20 SOLUTION ORAL; TOPICAL at 15:57

## 2024-01-01 RX ADMIN — CYCLOSPORINE 100 MG: 100 SOLUTION ORAL at 22:09

## 2024-01-01 RX ADMIN — DEXTROSE MONOHYDRATE 30 ML: 50 INJECTION, SOLUTION INTRAVENOUS at 11:30

## 2024-01-01 RX ADMIN — FAMOTIDINE 20 MG: 10 INJECTION, SOLUTION INTRAVENOUS at 06:09

## 2024-01-01 RX ADMIN — POTASSIUM CHLORIDE 10 MEQ: 7.46 INJECTION, SOLUTION INTRAVENOUS at 15:44

## 2024-01-01 RX ADMIN — FAMOTIDINE 20 MG: 10 INJECTION INTRAVENOUS at 05:13

## 2024-01-01 RX ADMIN — INSULIN LISPRO 3 UNITS: 100 INJECTION, SOLUTION INTRAVENOUS; SUBCUTANEOUS at 18:00

## 2024-01-01 RX ADMIN — ACYCLOVIR 400 MG: 400 TABLET ORAL at 05:12

## 2024-01-01 RX ADMIN — INSULIN LISPRO 7 UNITS: 100 INJECTION, SOLUTION INTRAVENOUS; SUBCUTANEOUS at 18:13

## 2024-01-01 RX ADMIN — POTASSIUM CHLORIDE 10 MEQ: 7.46 INJECTION, SOLUTION INTRAVENOUS at 14:40

## 2024-01-01 RX ADMIN — Medication 400 MG: at 04:50

## 2024-01-01 RX ADMIN — TAMSULOSIN HYDROCHLORIDE 0.4 MG: 0.4 CAPSULE ORAL at 08:23

## 2024-01-01 RX ADMIN — INSULIN GLARGINE-YFGN 8 UNITS: 100 INJECTION, SOLUTION SUBCUTANEOUS at 06:02

## 2024-01-01 RX ADMIN — TAMSULOSIN HYDROCHLORIDE 0.4 MG: 0.4 CAPSULE ORAL at 12:26

## 2024-01-01 RX ADMIN — FAMOTIDINE 20 MG: 10 INJECTION, SOLUTION INTRAVENOUS at 17:13

## 2024-01-01 RX ADMIN — POSACONAZOLE 300 MG: 100 TABLET, DELAYED RELEASE ORAL at 18:29

## 2024-01-01 RX ADMIN — Medication 10 ML: at 18:29

## 2024-01-01 RX ADMIN — ACYCLOVIR 400 MG: 400 TABLET ORAL at 11:46

## 2024-01-01 RX ADMIN — POTASSIUM CHLORIDE 10 MEQ: 7.46 INJECTION, SOLUTION INTRAVENOUS at 19:56

## 2024-01-01 RX ADMIN — ATOVAQUONE 750 MG: 750 SUSPENSION ORAL at 17:31

## 2024-01-01 RX ADMIN — ATORVASTATIN CALCIUM 10 MG: 10 TABLET, FILM COATED ORAL at 17:08

## 2024-01-01 RX ADMIN — FAMOTIDINE 20 MG: 20 TABLET, FILM COATED ORAL at 04:40

## 2024-01-01 RX ADMIN — DIBASIC SODIUM PHOSPHATE, MONOBASIC POTASSIUM PHOSPHATE AND MONOBASIC SODIUM PHOSPHATE 500 MG: 852; 155; 130 TABLET ORAL at 18:07

## 2024-01-01 RX ADMIN — POSACONAZOLE 300 MG: 100 TABLET, DELAYED RELEASE ORAL at 16:53

## 2024-01-01 RX ADMIN — ATOVAQUONE 750 MG: 750 SUSPENSION ORAL at 06:00

## 2024-01-01 RX ADMIN — FAMOTIDINE 20 MG: 10 INJECTION INTRAVENOUS at 05:46

## 2024-01-01 RX ADMIN — CYCLOSPORINE 100 MG: 100 SOLUTION ORAL at 14:39

## 2024-01-01 RX ADMIN — FUROSEMIDE 20 MG: 10 INJECTION, SOLUTION INTRAVENOUS at 16:50

## 2024-01-01 RX ADMIN — ATOVAQUONE 750 MG: 750 SUSPENSION ORAL at 05:27

## 2024-01-01 RX ADMIN — LIDOCAINE HYDROCHLORIDE 5 ML: 20 SOLUTION ORAL; TOPICAL at 21:02

## 2024-01-01 RX ADMIN — SENNOSIDES AND DOCUSATE SODIUM 2 TABLET: 50; 8.6 TABLET ORAL at 18:08

## 2024-01-01 RX ADMIN — POTASSIUM BICARBONATE 25 MEQ: 978 TABLET, EFFERVESCENT ORAL at 11:29

## 2024-01-01 RX ADMIN — ACYCLOVIR 400 MG: 400 TABLET ORAL at 18:07

## 2024-01-01 RX ADMIN — THIAMINE HYDROCHLORIDE 200 MG: 100 INJECTION, SOLUTION INTRAMUSCULAR; INTRAVENOUS at 10:55

## 2024-01-01 RX ADMIN — LIDOCAINE HYDROCHLORIDE 5 ML: 20 SOLUTION ORAL; TOPICAL at 09:25

## 2024-01-01 RX ADMIN — POTASSIUM BICARBONATE 25 MEQ: 978 TABLET, EFFERVESCENT ORAL at 05:13

## 2024-01-01 RX ADMIN — IPRATROPIUM BROMIDE AND ALBUTEROL SULFATE 3 ML: 2.5; .5 SOLUTION RESPIRATORY (INHALATION) at 19:10

## 2024-01-01 RX ADMIN — CARVEDILOL 3.12 MG: 3.12 TABLET, FILM COATED ORAL at 18:18

## 2024-01-01 RX ADMIN — DEXTROSE MONOHYDRATE 25 G: 25 INJECTION, SOLUTION INTRAVENOUS at 12:02

## 2024-01-01 RX ADMIN — POTASSIUM CHLORIDE 10 MEQ: 7.46 INJECTION, SOLUTION INTRAVENOUS at 20:53

## 2024-01-01 RX ADMIN — CYCLOSPORINE 180 MG: 100 SOLUTION ORAL at 17:21

## 2024-01-01 RX ADMIN — FAMOTIDINE 20 MG: 10 INJECTION INTRAVENOUS at 04:45

## 2024-01-01 RX ADMIN — ATOVAQUONE 750 MG: 750 SUSPENSION ORAL at 04:46

## 2024-01-01 RX ADMIN — LIDOCAINE HYDROCHLORIDE 5 ML: 20 SOLUTION ORAL; TOPICAL at 15:27

## 2024-01-01 RX ADMIN — LIDOCAINE HYDROCHLORIDE 5 ML: 20 SOLUTION ORAL; TOPICAL at 04:30

## 2024-01-01 RX ADMIN — DOCUSATE SODIUM 100 MG: 50 LIQUID ORAL at 17:21

## 2024-01-01 RX ADMIN — ACYCLOVIR 400 MG: 400 TABLET ORAL at 05:14

## 2024-01-01 RX ADMIN — FAMOTIDINE 20 MG: 20 TABLET, FILM COATED ORAL at 18:18

## 2024-01-01 RX ADMIN — LORATADINE 10 MG: 10 TABLET ORAL at 05:28

## 2024-01-01 RX ADMIN — ACYCLOVIR 400 MG: 400 TABLET ORAL at 06:06

## 2024-01-01 RX ADMIN — ACYCLOVIR 400 MG: 400 TABLET ORAL at 17:22

## 2024-01-01 RX ADMIN — CARVEDILOL 3.12 MG: 3.12 TABLET, FILM COATED ORAL at 17:51

## 2024-01-01 RX ADMIN — INSULIN LISPRO 1 UNITS: 100 INJECTION, SOLUTION INTRAVENOUS; SUBCUTANEOUS at 06:20

## 2024-01-01 RX ADMIN — INSULIN GLARGINE-YFGN 10 UNITS: 100 INJECTION, SOLUTION SUBCUTANEOUS at 12:05

## 2024-01-01 RX ADMIN — CEFTAROLINE FOSAMIL 300 MG: 600 POWDER, FOR SOLUTION INTRAVENOUS at 02:23

## 2024-01-01 RX ADMIN — CYCLOSPORINE 100 MG: 100 SOLUTION ORAL at 06:06

## 2024-01-01 RX ADMIN — INSULIN LISPRO 4 UNITS: 100 INJECTION, SOLUTION INTRAVENOUS; SUBCUTANEOUS at 06:18

## 2024-01-01 RX ADMIN — ATOVAQUONE 750 MG: 750 SUSPENSION ORAL at 06:06

## 2024-01-01 RX ADMIN — ATOVAQUONE 750 MG: 750 SUSPENSION ORAL at 17:21

## 2024-01-01 RX ADMIN — Medication 100 MG: at 05:12

## 2024-01-01 RX ADMIN — LIDOCAINE HYDROCHLORIDE 5 ML: 20 SOLUTION ORAL; TOPICAL at 13:12

## 2024-01-01 RX ADMIN — CYCLOSPORINE 180 MG: 100 SOLUTION ORAL at 18:08

## 2024-01-01 RX ADMIN — MIDODRINE HYDROCHLORIDE 10 MG: 5 TABLET ORAL at 09:15

## 2024-01-01 RX ADMIN — PIPERACILLIN AND TAZOBACTAM 4.5 G: 4; .5 INJECTION, POWDER, FOR SOLUTION INTRAVENOUS at 20:55

## 2024-01-01 RX ADMIN — ATOVAQUONE 750 MG: 750 SUSPENSION ORAL at 17:10

## 2024-01-01 RX ADMIN — INSULIN LISPRO 10 UNITS: 100 INJECTION, SOLUTION INTRAVENOUS; SUBCUTANEOUS at 12:02

## 2024-01-01 RX ADMIN — MIDODRINE HYDROCHLORIDE 10 MG: 5 TABLET ORAL at 22:12

## 2024-01-01 RX ADMIN — ATOVAQUONE 750 MG: 750 SUSPENSION ORAL at 05:35

## 2024-01-01 RX ADMIN — MAGNESIUM SULFATE HEPTAHYDRATE 2 G: 2 INJECTION, SOLUTION INTRAVENOUS at 11:04

## 2024-01-01 RX ADMIN — CYCLOSPORINE 250 MG: 100 SOLUTION ORAL at 16:52

## 2024-01-01 RX ADMIN — Medication 10 ML: at 12:30

## 2024-01-01 RX ADMIN — DIBASIC SODIUM PHOSPHATE, MONOBASIC POTASSIUM PHOSPHATE AND MONOBASIC SODIUM PHOSPHATE 500 MG: 852; 155; 130 TABLET ORAL at 00:10

## 2024-01-01 RX ADMIN — MIDODRINE HYDROCHLORIDE 10 MG: 5 TABLET ORAL at 05:34

## 2024-01-01 RX ADMIN — CYCLOSPORINE 100 MG: 100 CAPSULE, GELATIN COATED ORAL at 04:41

## 2024-01-01 RX ADMIN — ATOVAQUONE 750 MG: 750 SUSPENSION ORAL at 20:11

## 2024-01-01 RX ADMIN — ACETAMINOPHEN 650 MG: 325 TABLET ORAL at 20:11

## 2024-01-01 RX ADMIN — INSULIN GLARGINE-YFGN 10 UNITS: 100 INJECTION, SOLUTION SUBCUTANEOUS at 05:46

## 2024-01-01 RX ADMIN — VANCOMYCIN HYDROCHLORIDE 750 MG: 5 INJECTION, POWDER, LYOPHILIZED, FOR SOLUTION INTRAVENOUS at 22:16

## 2024-01-01 RX ADMIN — Medication 100 MG: at 21:37

## 2024-01-01 RX ADMIN — POSACONAZOLE 300 MG: 100 TABLET, DELAYED RELEASE ORAL at 17:31

## 2024-01-01 RX ADMIN — ACYCLOVIR 400 MG: 400 TABLET ORAL at 20:11

## 2024-01-01 RX ADMIN — MIDODRINE HYDROCHLORIDE 10 MG: 5 TABLET ORAL at 22:44

## 2024-01-01 RX ADMIN — POTASSIUM BICARBONATE 25 MEQ: 978 TABLET, EFFERVESCENT ORAL at 08:48

## 2024-01-01 RX ADMIN — HYDROCORTISONE SODIUM SUCCINATE 25 MG: 100 INJECTION, POWDER, FOR SOLUTION INTRAMUSCULAR; INTRAVENOUS at 17:39

## 2024-01-01 RX ADMIN — LIDOCAINE HYDROCHLORIDE 5 ML: 20 SOLUTION ORAL; TOPICAL at 03:21

## 2024-01-01 RX ADMIN — ACETAMINOPHEN 1000 MG: 500 TABLET ORAL at 05:47

## 2024-01-01 RX ADMIN — CYCLOSPORINE 100 MG: 100 SOLUTION ORAL at 21:02

## 2024-01-01 RX ADMIN — POTASSIUM BICARBONATE 25 MEQ: 978 TABLET, EFFERVESCENT ORAL at 05:12

## 2024-01-01 RX ADMIN — ALBUMIN (HUMAN) 25 G: 0.25 INJECTION, SOLUTION INTRAVENOUS at 12:01

## 2024-01-01 RX ADMIN — INSULIN LISPRO 3 UNITS: 100 INJECTION, SOLUTION INTRAVENOUS; SUBCUTANEOUS at 05:57

## 2024-01-01 RX ADMIN — ATORVASTATIN CALCIUM 10 MG: 10 TABLET, FILM COATED ORAL at 17:31

## 2024-01-01 RX ADMIN — CYCLOSPORINE 100 MG: 100 SOLUTION ORAL at 05:12

## 2024-01-01 RX ADMIN — CYCLOSPORINE 180 MG: 100 SOLUTION ORAL at 05:13

## 2024-01-01 RX ADMIN — SENNOSIDES AND DOCUSATE SODIUM 2 TABLET: 50; 8.6 TABLET ORAL at 17:15

## 2024-01-01 RX ADMIN — INSULIN LISPRO 10 UNITS: 100 INJECTION, SOLUTION INTRAVENOUS; SUBCUTANEOUS at 06:29

## 2024-01-01 RX ADMIN — CEFTAROLINE FOSAMIL 400 MG: 600 POWDER, FOR SOLUTION INTRAVENOUS at 02:53

## 2024-01-01 RX ADMIN — POTASSIUM BICARBONATE 25 MEQ: 978 TABLET, EFFERVESCENT ORAL at 21:37

## 2024-01-01 RX ADMIN — POTASSIUM BICARBONATE 25 MEQ: 978 TABLET, EFFERVESCENT ORAL at 17:08

## 2024-01-01 RX ADMIN — SODIUM CHLORIDE SOLN NEBU 7% 4 ML: 7 NEBU SOLN at 14:53

## 2024-01-01 RX ADMIN — VANCOMYCIN HYDROCHLORIDE 1750 MG: 5 INJECTION, POWDER, LYOPHILIZED, FOR SOLUTION INTRAVENOUS at 10:02

## 2024-01-01 RX ADMIN — POTASSIUM CHLORIDE 10 MEQ: 7.46 INJECTION, SOLUTION INTRAVENOUS at 15:48

## 2024-01-01 RX ADMIN — LIDOCAINE HYDROCHLORIDE 5 ML: 20 SOLUTION ORAL; TOPICAL at 09:06

## 2024-01-01 RX ADMIN — DIBASIC SODIUM PHOSPHATE, MONOBASIC POTASSIUM PHOSPHATE AND MONOBASIC SODIUM PHOSPHATE 500 MG: 852; 155; 130 TABLET ORAL at 17:20

## 2024-01-01 RX ADMIN — Medication 5 MG: at 21:04

## 2024-01-01 RX ADMIN — Medication 10 ML: at 01:35

## 2024-01-01 RX ADMIN — POTASSIUM CHLORIDE 40 MEQ: 1500 TABLET, EXTENDED RELEASE ORAL at 06:17

## 2024-01-01 RX ADMIN — ATORVASTATIN CALCIUM 10 MG: 10 TABLET, FILM COATED ORAL at 18:19

## 2024-01-01 RX ADMIN — HYDROCORTISONE SODIUM SUCCINATE 25 MG: 100 INJECTION, POWDER, FOR SOLUTION INTRAMUSCULAR; INTRAVENOUS at 05:54

## 2024-01-01 RX ADMIN — DIPHENHYDRAMINE HYDROCHLORIDE 25 MG: 25 TABLET ORAL at 11:00

## 2024-01-01 RX ADMIN — DIBASIC SODIUM PHOSPHATE, MONOBASIC POTASSIUM PHOSPHATE AND MONOBASIC SODIUM PHOSPHATE 500 MG: 852; 155; 130 TABLET ORAL at 00:14

## 2024-01-01 RX ADMIN — INSULIN LISPRO 3 UNITS: 100 INJECTION, SOLUTION INTRAVENOUS; SUBCUTANEOUS at 17:32

## 2024-01-01 RX ADMIN — AMPHOTERICIN B 350 MG: 50 INJECTION, POWDER, LYOPHILIZED, FOR SOLUTION INTRAVENOUS at 14:37

## 2024-01-01 RX ADMIN — POSACONAZOLE 300 MG: 100 TABLET, DELAYED RELEASE ORAL at 05:40

## 2024-01-01 RX ADMIN — ACETAMINOPHEN 650 MG: 325 TABLET ORAL at 13:11

## 2024-01-01 RX ADMIN — ACETAMINOPHEN 650 MG: 325 TABLET ORAL at 12:32

## 2024-01-01 RX ADMIN — POTASSIUM BICARBONATE 25 MEQ: 978 TABLET, EFFERVESCENT ORAL at 05:56

## 2024-01-01 RX ADMIN — CEFTAROLINE FOSAMIL 600 MG: 600 POWDER, FOR SOLUTION INTRAVENOUS at 15:41

## 2024-01-01 RX ADMIN — ACETAMINOPHEN 650 MG: 325 TABLET ORAL at 15:00

## 2024-01-01 RX ADMIN — INSULIN LISPRO 2 UNITS: 100 INJECTION, SOLUTION INTRAVENOUS; SUBCUTANEOUS at 01:05

## 2024-01-01 RX ADMIN — ACYCLOVIR 400 MG: 400 TABLET ORAL at 01:00

## 2024-01-01 RX ADMIN — ACYCLOVIR 400 MG: 400 TABLET ORAL at 05:28

## 2024-01-01 RX ADMIN — CARVEDILOL 3.12 MG: 3.12 TABLET, FILM COATED ORAL at 08:35

## 2024-01-01 RX ADMIN — SODIUM CHLORIDE 250 ML: 9 INJECTION, SOLUTION INTRAVENOUS at 17:10

## 2024-01-01 RX ADMIN — Medication 10 ML: at 06:13

## 2024-01-01 RX ADMIN — HYDROCORTISONE SODIUM SUCCINATE 50 MG: 100 INJECTION, POWDER, FOR SOLUTION INTRAMUSCULAR; INTRAVENOUS at 06:08

## 2024-01-01 RX ADMIN — DOCUSATE SODIUM 100 MG: 100 CAPSULE, LIQUID FILLED ORAL at 17:16

## 2024-01-01 RX ADMIN — ATORVASTATIN CALCIUM 10 MG: 10 TABLET, FILM COATED ORAL at 17:59

## 2024-01-01 RX ADMIN — CYCLOSPORINE 180 MG: 100 SOLUTION ORAL at 16:52

## 2024-01-01 RX ADMIN — SODIUM CHLORIDE: 9 INJECTION, SOLUTION INTRAVENOUS at 16:18

## 2024-01-01 RX ADMIN — FAMOTIDINE 20 MG: 10 INJECTION, SOLUTION INTRAVENOUS at 05:12

## 2024-01-01 RX ADMIN — DIBASIC SODIUM PHOSPHATE, MONOBASIC POTASSIUM PHOSPHATE AND MONOBASIC SODIUM PHOSPHATE 500 MG: 852; 155; 130 TABLET ORAL at 05:13

## 2024-01-01 RX ADMIN — FAMOTIDINE 20 MG: 20 TABLET, FILM COATED ORAL at 05:39

## 2024-01-01 RX ADMIN — Medication 10 ML: at 11:29

## 2024-01-01 RX ADMIN — LIDOCAINE HYDROCHLORIDE 5 ML: 20 SOLUTION ORAL; TOPICAL at 21:04

## 2024-01-01 RX ADMIN — ACETAMINOPHEN 650 MG: 325 TABLET ORAL at 21:04

## 2024-01-01 RX ADMIN — ACETAMINOPHEN 650 MG: 325 TABLET ORAL at 08:28

## 2024-01-01 RX ADMIN — ATOVAQUONE 750 MG: 750 SUSPENSION ORAL at 17:18

## 2024-01-01 RX ADMIN — FAMOTIDINE 20 MG: 10 INJECTION INTRAVENOUS at 05:27

## 2024-01-01 RX ADMIN — POLYETHYLENE GLYCOL 3350 1 PACKET: 17 POWDER, FOR SOLUTION ORAL at 17:10

## 2024-01-01 RX ADMIN — MORPHINE SULFATE 5 MG: 10 INJECTION INTRAVENOUS at 16:49

## 2024-01-01 RX ADMIN — ATORVASTATIN CALCIUM 10 MG: 10 TABLET, FILM COATED ORAL at 17:14

## 2024-01-01 RX ADMIN — ATORVASTATIN CALCIUM 10 MG: 10 TABLET, FILM COATED ORAL at 17:20

## 2024-01-01 RX ADMIN — ATOVAQUONE 750 MG: 750 SUSPENSION ORAL at 16:51

## 2024-01-01 RX ADMIN — MAGNESIUM SULFATE HEPTAHYDRATE 2 G: 2 INJECTION, SOLUTION INTRAVENOUS at 08:57

## 2024-01-01 RX ADMIN — VANCOMYCIN HYDROCHLORIDE 750 MG: 5 INJECTION, POWDER, LYOPHILIZED, FOR SOLUTION INTRAVENOUS at 00:05

## 2024-01-01 RX ADMIN — MAGNESIUM SULFATE IN WATER FOR 4 G: 40 INJECTION INTRAVENOUS at 12:34

## 2024-01-01 RX ADMIN — ACETAMINOPHEN 650 MG: 325 TABLET ORAL at 01:48

## 2024-01-01 RX ADMIN — ATOVAQUONE 750 MG: 750 SUSPENSION ORAL at 11:46

## 2024-01-01 RX ADMIN — LEVOFLOXACIN 500 MG: 500 TABLET, FILM COATED ORAL at 10:05

## 2024-01-01 RX ADMIN — HYDROCORTISONE SODIUM SUCCINATE 50 MG: 100 INJECTION, POWDER, FOR SOLUTION INTRAMUSCULAR; INTRAVENOUS at 05:36

## 2024-01-01 RX ADMIN — SODIUM CHLORIDE 250 ML: 9 INJECTION, SOLUTION INTRAVENOUS at 13:01

## 2024-01-01 RX ADMIN — FUROSEMIDE 20 MG: 10 INJECTION, SOLUTION INTRAVENOUS at 05:46

## 2024-01-01 RX ADMIN — POTASSIUM BICARBONATE 25 MEQ: 978 TABLET, EFFERVESCENT ORAL at 05:14

## 2024-01-01 RX ADMIN — CYCLOSPORINE 100 MG: 100 SOLUTION ORAL at 23:34

## 2024-01-01 RX ADMIN — ACYCLOVIR 400 MG: 400 TABLET ORAL at 17:31

## 2024-01-01 RX ADMIN — SODIUM CHLORIDE SOLN NEBU 7% 4 ML: 7 NEBU SOLN at 06:45

## 2024-01-01 RX ADMIN — POTASSIUM CHLORIDE 10 MEQ: 7.46 INJECTION, SOLUTION INTRAVENOUS at 17:33

## 2024-01-01 RX ADMIN — Medication 400 MG: at 05:12

## 2024-01-01 RX ADMIN — HYDROCORTISONE SODIUM SUCCINATE 50 MG: 100 INJECTION, POWDER, FOR SOLUTION INTRAMUSCULAR; INTRAVENOUS at 12:07

## 2024-01-01 RX ADMIN — POTASSIUM BICARBONATE 50 MEQ: 978 TABLET, EFFERVESCENT ORAL at 08:28

## 2024-01-01 RX ADMIN — DIBASIC SODIUM PHOSPHATE, MONOBASIC POTASSIUM PHOSPHATE AND MONOBASIC SODIUM PHOSPHATE 500 MG: 852; 155; 130 TABLET ORAL at 10:40

## 2024-01-01 RX ADMIN — FAMOTIDINE 20 MG: 10 INJECTION INTRAVENOUS at 05:34

## 2024-01-01 RX ADMIN — CYCLOSPORINE 100 MG: 100 CAPSULE, GELATIN COATED ORAL at 17:51

## 2024-01-01 RX ADMIN — POTASSIUM BICARBONATE 25 MEQ: 978 TABLET, EFFERVESCENT ORAL at 12:32

## 2024-01-01 RX ADMIN — SENNOSIDES AND DOCUSATE SODIUM 2 TABLET: 50; 8.6 TABLET ORAL at 17:09

## 2024-01-01 RX ADMIN — CYCLOSPORINE 100 MG: 100 SOLUTION ORAL at 05:14

## 2024-01-01 RX ADMIN — IPRATROPIUM BROMIDE AND ALBUTEROL SULFATE 3 ML: 2.5; .5 SOLUTION RESPIRATORY (INHALATION) at 14:53

## 2024-01-01 RX ADMIN — FAMOTIDINE 20 MG: 10 INJECTION, SOLUTION INTRAVENOUS at 17:38

## 2024-01-01 RX ADMIN — ACYCLOVIR 400 MG: 400 TABLET ORAL at 18:19

## 2024-01-01 RX ADMIN — FAMOTIDINE 20 MG: 10 INJECTION INTRAVENOUS at 05:54

## 2024-01-01 RX ADMIN — IPRATROPIUM BROMIDE AND ALBUTEROL SULFATE 3 ML: 2.5; .5 SOLUTION RESPIRATORY (INHALATION) at 13:04

## 2024-01-01 RX ADMIN — MIDODRINE HYDROCHLORIDE 10 MG: 5 TABLET ORAL at 05:27

## 2024-01-01 RX ADMIN — ACYCLOVIR 400 MG: 400 TABLET ORAL at 21:01

## 2024-01-01 RX ADMIN — LEVOFLOXACIN 500 MG: 500 TABLET, FILM COATED ORAL at 05:39

## 2024-01-01 RX ADMIN — INSULIN LISPRO 7 UNITS: 100 INJECTION, SOLUTION INTRAVENOUS; SUBCUTANEOUS at 12:09

## 2024-01-01 RX ADMIN — LINEZOLID 600 MG: 600 INJECTION, SOLUTION INTRAVENOUS at 01:03

## 2024-01-01 RX ADMIN — POTASSIUM BICARBONATE 25 MEQ: 978 TABLET, EFFERVESCENT ORAL at 17:14

## 2024-01-01 RX ADMIN — INSULIN LISPRO 7 UNITS: 100 INJECTION, SOLUTION INTRAVENOUS; SUBCUTANEOUS at 18:29

## 2024-01-01 RX ADMIN — INSULIN GLARGINE-YFGN 8 UNITS: 100 INJECTION, SOLUTION SUBCUTANEOUS at 06:23

## 2024-01-01 RX ADMIN — LORATADINE 10 MG: 10 TABLET ORAL at 05:40

## 2024-01-01 RX ADMIN — ACYCLOVIR 400 MG: 400 TABLET ORAL at 17:51

## 2024-01-01 RX ADMIN — POTASSIUM BICARBONATE 25 MEQ: 978 TABLET, EFFERVESCENT ORAL at 12:00

## 2024-01-01 RX ADMIN — CEFTAROLINE FOSAMIL 600 MG: 600 POWDER, FOR SOLUTION INTRAVENOUS at 03:21

## 2024-01-01 RX ADMIN — ACYCLOVIR 400 MG: 400 TABLET ORAL at 16:50

## 2024-01-01 RX ADMIN — POSACONAZOLE 300 MG: 100 TABLET, DELAYED RELEASE ORAL at 18:18

## 2024-01-01 RX ADMIN — ACETAMINOPHEN 650 MG: 325 TABLET ORAL at 14:24

## 2024-01-01 RX ADMIN — CYCLOSPORINE 180 MG: 100 SOLUTION ORAL at 04:52

## 2024-01-01 RX ADMIN — MIDODRINE HYDROCHLORIDE 5 MG: 5 TABLET ORAL at 14:13

## 2024-01-01 RX ADMIN — Medication 10 ML: at 17:39

## 2024-01-01 RX ADMIN — Medication 10 ML: at 12:14

## 2024-01-01 RX ADMIN — POTASSIUM CHLORIDE 10 MEQ: 7.46 INJECTION, SOLUTION INTRAVENOUS at 01:45

## 2024-01-01 RX ADMIN — VORICONAZOLE 440 MG: 10 INJECTION, POWDER, LYOPHILIZED, FOR SOLUTION INTRAVENOUS at 05:55

## 2024-01-01 RX ADMIN — LIDOCAINE HYDROCHLORIDE 5 ML: 20 SOLUTION ORAL; TOPICAL at 21:09

## 2024-01-01 RX ADMIN — ATOVAQUONE 750 MG: 750 SUSPENSION ORAL at 05:47

## 2024-01-01 RX ADMIN — ACETAMINOPHEN 650 MG: 325 TABLET ORAL at 05:53

## 2024-01-01 RX ADMIN — MIDODRINE HYDROCHLORIDE 5 MG: 5 TABLET ORAL at 22:35

## 2024-01-01 RX ADMIN — THIAMINE HYDROCHLORIDE 200 MG: 100 INJECTION, SOLUTION INTRAMUSCULAR; INTRAVENOUS at 05:34

## 2024-01-01 RX ADMIN — MIDODRINE HYDROCHLORIDE 10 MG: 5 TABLET ORAL at 17:58

## 2024-01-01 RX ADMIN — IPRATROPIUM BROMIDE AND ALBUTEROL SULFATE 3 ML: 2.5; .5 SOLUTION RESPIRATORY (INHALATION) at 06:45

## 2024-01-01 RX ADMIN — HYDROXYZINE HYDROCHLORIDE 25 MG: 25 TABLET, FILM COATED ORAL at 03:16

## 2024-01-01 RX ADMIN — CYCLOSPORINE 100 MG: 100 SOLUTION ORAL at 20:56

## 2024-01-01 RX ADMIN — THIAMINE HYDROCHLORIDE 200 MG: 100 INJECTION, SOLUTION INTRAMUSCULAR; INTRAVENOUS at 17:59

## 2024-01-01 RX ADMIN — POTASSIUM BICARBONATE 25 MEQ: 978 TABLET, EFFERVESCENT ORAL at 17:59

## 2024-01-01 RX ADMIN — THIAMINE HYDROCHLORIDE 200 MG: 100 INJECTION, SOLUTION INTRAMUSCULAR; INTRAVENOUS at 05:12

## 2024-01-01 RX ADMIN — CEFTAROLINE FOSAMIL 400 MG: 600 POWDER, FOR SOLUTION INTRAVENOUS at 01:34

## 2024-01-01 RX ADMIN — Medication 10 ML: at 14:12

## 2024-01-01 RX ADMIN — ACETAMINOPHEN 650 MG: 325 TABLET ORAL at 08:57

## 2024-01-01 RX ADMIN — ACETAMINOPHEN 650 MG: 325 TABLET ORAL at 02:26

## 2024-01-01 RX ADMIN — Medication 10 ML: at 17:21

## 2024-01-01 RX ADMIN — ACYCLOVIR 400 MG: 400 TABLET ORAL at 17:14

## 2024-01-01 RX ADMIN — FUROSEMIDE 20 MG: 10 INJECTION, SOLUTION INTRAVENOUS at 08:27

## 2024-01-01 RX ADMIN — POSACONAZOLE 300 MG: 100 TABLET, DELAYED RELEASE ORAL at 20:11

## 2024-01-01 RX ADMIN — HYDROCORTISONE SODIUM SUCCINATE 25 MG: 100 INJECTION, POWDER, FOR SOLUTION INTRAMUSCULAR; INTRAVENOUS at 13:34

## 2024-01-01 RX ADMIN — INSULIN HUMAN 5 UNITS: 1 INJECTION, SOLUTION INTRAVENOUS at 15:00

## 2024-01-01 RX ADMIN — LORATADINE 10 MG: 10 TABLET ORAL at 05:12

## 2024-01-01 RX ADMIN — ATOVAQUONE 750 MG: 750 SUSPENSION ORAL at 17:38

## 2024-01-01 RX ADMIN — ATOVAQUONE 750 MG: 750 SUSPENSION ORAL at 04:50

## 2024-01-01 RX ADMIN — ATORVASTATIN CALCIUM 10 MG: 10 TABLET, FILM COATED ORAL at 18:08

## 2024-01-01 RX ADMIN — FUROSEMIDE 40 MG: 10 INJECTION INTRAMUSCULAR; INTRAVENOUS at 21:50

## 2024-01-01 RX ADMIN — MAGNESIUM SULFATE IN WATER 4 G: 40 INJECTION, SOLUTION INTRAVENOUS at 10:14

## 2024-01-01 RX ADMIN — POTASSIUM BICARBONATE 25 MEQ: 978 TABLET, EFFERVESCENT ORAL at 12:05

## 2024-01-01 RX ADMIN — ACETAMINOPHEN 650 MG: 325 TABLET ORAL at 01:37

## 2024-01-01 RX ADMIN — FAMOTIDINE 20 MG: 10 INJECTION INTRAVENOUS at 05:01

## 2024-01-01 RX ADMIN — ACYCLOVIR 400 MG: 400 TABLET ORAL at 17:20

## 2024-01-01 RX ADMIN — Medication 10 ML: at 13:38

## 2024-01-01 RX ADMIN — INSULIN LISPRO 1 UNITS: 100 INJECTION, SOLUTION INTRAVENOUS; SUBCUTANEOUS at 00:11

## 2024-01-01 RX ADMIN — ATOVAQUONE 750 MG: 750 SUSPENSION ORAL at 17:20

## 2024-01-01 RX ADMIN — POTASSIUM CHLORIDE 10 MEQ: 7.46 INJECTION, SOLUTION INTRAVENOUS at 06:05

## 2024-01-01 RX ADMIN — LORATADINE 10 MG: 10 TABLET ORAL at 04:40

## 2024-01-01 RX ADMIN — LIDOCAINE HYDROCHLORIDE 5 ML: 20 SOLUTION ORAL; TOPICAL at 16:05

## 2024-01-01 RX ADMIN — POTASSIUM BICARBONATE 25 MEQ: 978 TABLET, EFFERVESCENT ORAL at 18:18

## 2024-01-01 RX ADMIN — POSACONAZOLE 300 MG: 100 TABLET, DELAYED RELEASE ORAL at 17:08

## 2024-01-01 RX ADMIN — ACYCLOVIR 400 MG: 400 TABLET ORAL at 05:56

## 2024-01-01 RX ADMIN — ACYCLOVIR 400 MG: 400 TABLET ORAL at 04:45

## 2024-01-01 RX ADMIN — HYDROCORTISONE SODIUM SUCCINATE 25 MG: 100 INJECTION, POWDER, FOR SOLUTION INTRAMUSCULAR; INTRAVENOUS at 18:29

## 2024-01-01 RX ADMIN — Medication 10 ML: at 17:31

## 2024-01-01 RX ADMIN — LIDOCAINE HYDROCHLORIDE 5 ML: 20 SOLUTION ORAL; TOPICAL at 09:15

## 2024-01-01 RX ADMIN — ACETAMINOPHEN 650 MG: 325 TABLET ORAL at 12:12

## 2024-01-01 RX ADMIN — MAGNESIUM SULFATE HEPTAHYDRATE 2 G: 2 INJECTION, SOLUTION INTRAVENOUS at 15:48

## 2024-01-01 RX ADMIN — CEFTAROLINE FOSAMIL 600 MG: 600 POWDER, FOR SOLUTION INTRAVENOUS at 00:11

## 2024-01-01 RX ADMIN — POSACONAZOLE 300 MG: 100 TABLET, DELAYED RELEASE ORAL at 04:40

## 2024-01-01 RX ADMIN — ACETAMINOPHEN 650 MG: 325 TABLET ORAL at 02:29

## 2024-01-01 RX ADMIN — LIDOCAINE HYDROCHLORIDE 5 ML: 20 SOLUTION ORAL; TOPICAL at 08:17

## 2024-01-01 RX ADMIN — CEFTAROLINE FOSAMIL 600 MG: 600 POWDER, FOR SOLUTION INTRAVENOUS at 14:25

## 2024-01-01 RX ADMIN — Medication 5 MG: at 20:11

## 2024-01-01 RX ADMIN — Medication 10 ML: at 23:45

## 2024-01-01 RX ADMIN — NOREPINEPHRINE BITARTRATE 0.06 MCG/KG/MIN: 32 SOLUTION INTRAVENOUS at 13:40

## 2024-01-01 RX ADMIN — POTASSIUM CHLORIDE 10 MEQ: 7.46 INJECTION, SOLUTION INTRAVENOUS at 06:49

## 2024-01-01 RX ADMIN — Medication 5 MG: at 20:26

## 2024-01-01 RX ADMIN — CYCLOSPORINE 250 MG: 100 SOLUTION ORAL at 05:47

## 2024-01-01 RX ADMIN — CEFTAROLINE FOSAMIL 600 MG: 600 POWDER, FOR SOLUTION INTRAVENOUS at 16:07

## 2024-01-01 RX ADMIN — ATORVASTATIN CALCIUM 10 MG: 10 TABLET, FILM COATED ORAL at 18:29

## 2024-01-01 RX ADMIN — ACYCLOVIR 400 MG: 400 TABLET ORAL at 18:42

## 2024-01-01 RX ADMIN — Medication 100 MG: at 05:45

## 2024-01-01 RX ADMIN — POSACONAZOLE 300 MG: 100 TABLET, DELAYED RELEASE ORAL at 16:49

## 2024-01-01 RX ADMIN — HYDROCORTISONE SODIUM SUCCINATE 50 MG: 100 INJECTION, POWDER, FOR SOLUTION INTRAMUSCULAR; INTRAVENOUS at 23:40

## 2024-01-01 RX ADMIN — POTASSIUM CHLORIDE 10 MEQ: 7.46 INJECTION, SOLUTION INTRAVENOUS at 18:35

## 2024-01-01 RX ADMIN — MORPHINE SULFATE 10 MG: 10 INJECTION INTRAVENOUS at 23:01

## 2024-01-01 RX ADMIN — INSULIN LISPRO 3 UNITS: 100 INJECTION, SOLUTION INTRAVENOUS; SUBCUTANEOUS at 00:22

## 2024-01-01 RX ADMIN — POTASSIUM BICARBONATE 25 MEQ: 978 TABLET, EFFERVESCENT ORAL at 18:29

## 2024-01-01 RX ADMIN — ACETAMINOPHEN 650 MG: 325 TABLET ORAL at 21:06

## 2024-01-01 RX ADMIN — DEXTROSE MONOHYDRATE 30 ML: 50 INJECTION, SOLUTION INTRAVENOUS at 12:58

## 2024-01-01 RX ADMIN — INSULIN LISPRO 3 UNITS: 100 INJECTION, SOLUTION INTRAVENOUS; SUBCUTANEOUS at 05:36

## 2024-01-01 RX ADMIN — ATOVAQUONE 750 MG: 750 SUSPENSION ORAL at 13:06

## 2024-01-01 RX ADMIN — ACYCLOVIR 400 MG: 400 TABLET ORAL at 06:13

## 2024-01-01 RX ADMIN — AMPHOTERICIN B 350 MG: 50 INJECTION, POWDER, LYOPHILIZED, FOR SOLUTION INTRAVENOUS at 11:35

## 2024-01-01 RX ADMIN — FUROSEMIDE 40 MG: 10 INJECTION INTRAMUSCULAR; INTRAVENOUS at 05:24

## 2024-01-01 RX ADMIN — FAMOTIDINE 20 MG: 10 INJECTION INTRAVENOUS at 06:13

## 2024-01-01 RX ADMIN — SENNOSIDES AND DOCUSATE SODIUM 2 TABLET: 50; 8.6 TABLET ORAL at 17:19

## 2024-01-01 RX ADMIN — POTASSIUM CHLORIDE 10 MEQ: 7.46 INJECTION, SOLUTION INTRAVENOUS at 07:57

## 2024-01-01 RX ADMIN — POTASSIUM BICARBONATE 25 MEQ: 978 TABLET, EFFERVESCENT ORAL at 20:38

## 2024-01-01 RX ADMIN — POTASSIUM BICARBONATE 25 MEQ: 978 TABLET, EFFERVESCENT ORAL at 05:36

## 2024-01-01 RX ADMIN — ALBUMIN (HUMAN) 25 G: 0.25 INJECTION, SOLUTION INTRAVENOUS at 09:26

## 2024-01-01 RX ADMIN — VANCOMYCIN HYDROCHLORIDE 750 MG: 5 INJECTION, POWDER, LYOPHILIZED, FOR SOLUTION INTRAVENOUS at 13:24

## 2024-01-01 RX ADMIN — DEXTROSE MONOHYDRATE 30 ML: 50 INJECTION, SOLUTION INTRAVENOUS at 17:00

## 2024-01-01 RX ADMIN — INSULIN LISPRO 2 UNITS: 100 INJECTION, SOLUTION INTRAVENOUS; SUBCUTANEOUS at 01:37

## 2024-01-01 RX ADMIN — ATOVAQUONE 750 MG: 750 SUSPENSION ORAL at 06:13

## 2024-01-01 RX ADMIN — POTASSIUM CHLORIDE 10 MEQ: 7.46 INJECTION, SOLUTION INTRAVENOUS at 08:11

## 2024-01-01 RX ADMIN — ACETAMINOPHEN 650 MG: 325 TABLET ORAL at 20:26

## 2024-01-01 RX ADMIN — ACYCLOVIR 400 MG: 400 TABLET ORAL at 16:51

## 2024-01-01 RX ADMIN — Medication 5 MG: at 21:01

## 2024-01-01 RX ADMIN — SODIUM CHLORIDE SOLN NEBU 7% 4 ML: 7 NEBU SOLN at 10:35

## 2024-01-01 RX ADMIN — Medication 10 ML: at 13:18

## 2024-01-01 RX ADMIN — POTASSIUM BICARBONATE 25 MEQ: 978 TABLET, EFFERVESCENT ORAL at 06:13

## 2024-01-01 RX ADMIN — LIDOCAINE HYDROCHLORIDE 5 ML: 20 SOLUTION ORAL; TOPICAL at 20:43

## 2024-01-01 RX ADMIN — Medication 10 ML: at 05:49

## 2024-01-01 RX ADMIN — HYDROCORTISONE SODIUM SUCCINATE 50 MG: 100 INJECTION, POWDER, FOR SOLUTION INTRAMUSCULAR; INTRAVENOUS at 18:00

## 2024-01-01 RX ADMIN — INSULIN LISPRO 3 UNITS: 100 INJECTION, SOLUTION INTRAVENOUS; SUBCUTANEOUS at 13:29

## 2024-01-01 RX ADMIN — POTASSIUM BICARBONATE 25 MEQ: 978 TABLET, EFFERVESCENT ORAL at 18:07

## 2024-01-01 RX ADMIN — SODIUM CHLORIDE 500 ML: 9 INJECTION, SOLUTION INTRAVENOUS at 18:10

## 2024-01-01 RX ADMIN — ACYCLOVIR 400 MG: 400 TABLET ORAL at 04:40

## 2024-01-01 RX ADMIN — MAGNESIUM SULFATE HEPTAHYDRATE 4 G: 4 INJECTION, SOLUTION INTRAVENOUS at 08:14

## 2024-01-01 RX ADMIN — Medication 10 ML: at 00:21

## 2024-01-01 RX ADMIN — INSULIN LISPRO 7 UNITS: 100 INJECTION, SOLUTION INTRAVENOUS; SUBCUTANEOUS at 23:58

## 2024-01-01 RX ADMIN — CYCLOSPORINE 100 MG: 100 SOLUTION ORAL at 06:13

## 2024-01-01 RX ADMIN — SODIUM CHLORIDE 500 ML: 9 INJECTION, SOLUTION INTRAVENOUS at 10:28

## 2024-01-01 RX ADMIN — POTASSIUM BICARBONATE 25 MEQ: 978 TABLET, EFFERVESCENT ORAL at 06:06

## 2024-01-01 RX ADMIN — POTASSIUM BICARBONATE 25 MEQ: 978 TABLET, EFFERVESCENT ORAL at 05:27

## 2024-01-01 RX ADMIN — POTASSIUM BICARBONATE 25 MEQ: 978 TABLET, EFFERVESCENT ORAL at 13:49

## 2024-01-01 RX ADMIN — SODIUM CHLORIDE SOLN NEBU 7% 4 ML: 7 NEBU SOLN at 18:46

## 2024-01-01 RX ADMIN — PIPERACILLIN AND TAZOBACTAM 4.5 G: 4; .5 INJECTION, POWDER, FOR SOLUTION INTRAVENOUS at 03:37

## 2024-01-01 RX ADMIN — ATOVAQUONE 750 MG: 750 SUSPENSION ORAL at 18:08

## 2024-01-01 RX ADMIN — DIBASIC SODIUM PHOSPHATE, MONOBASIC POTASSIUM PHOSPHATE AND MONOBASIC SODIUM PHOSPHATE 500 MG: 852; 155; 130 TABLET ORAL at 04:50

## 2024-01-01 RX ADMIN — ACETAMINOPHEN 650 MG: 325 TABLET ORAL at 08:15

## 2024-01-01 RX ADMIN — IPRATROPIUM BROMIDE AND ALBUTEROL SULFATE 3 ML: 2.5; .5 SOLUTION RESPIRATORY (INHALATION) at 18:46

## 2024-01-01 RX ADMIN — ACETAMINOPHEN 650 MG: 325 TABLET ORAL at 13:49

## 2024-01-01 RX ADMIN — POTASSIUM BICARBONATE 25 MEQ: 978 TABLET, EFFERVESCENT ORAL at 05:54

## 2024-01-01 RX ADMIN — Medication 400 MG: at 12:32

## 2024-01-01 RX ADMIN — LIDOCAINE HYDROCHLORIDE 5 ML: 20 SOLUTION ORAL; TOPICAL at 20:10

## 2024-01-01 RX ADMIN — POTASSIUM BICARBONATE 25 MEQ: 978 TABLET, EFFERVESCENT ORAL at 15:00

## 2024-01-01 RX ADMIN — FUROSEMIDE 20 MG: 10 INJECTION, SOLUTION INTRAVENOUS at 03:33

## 2024-01-01 RX ADMIN — MIDODRINE HYDROCHLORIDE 5 MG: 5 TABLET ORAL at 05:54

## 2024-01-01 RX ADMIN — DIBASIC SODIUM PHOSPHATE, MONOBASIC POTASSIUM PHOSPHATE AND MONOBASIC SODIUM PHOSPHATE 500 MG: 852; 155; 130 TABLET ORAL at 23:29

## 2024-01-01 RX ADMIN — INSULIN HUMAN 6.5 UNITS/HR: 1 INJECTION, SOLUTION INTRAVENOUS at 15:15

## 2024-01-01 RX ADMIN — PIPERACILLIN AND TAZOBACTAM 4.5 G: 4; .5 INJECTION, POWDER, FOR SOLUTION INTRAVENOUS at 12:58

## 2024-01-01 RX ADMIN — ATORVASTATIN CALCIUM 10 MG: 10 TABLET, FILM COATED ORAL at 16:51

## 2024-01-01 RX ADMIN — CEFTAROLINE FOSAMIL 600 MG: 600 POWDER, FOR SOLUTION INTRAVENOUS at 02:25

## 2024-01-01 RX ADMIN — Medication 10 ML: at 00:18

## 2024-01-01 RX ADMIN — CEFTAROLINE FOSAMIL 600 MG: 600 POWDER, FOR SOLUTION INTRAVENOUS at 02:39

## 2024-01-01 RX ADMIN — LIDOCAINE HYDROCHLORIDE 5 ML: 20 SOLUTION ORAL; TOPICAL at 08:01

## 2024-01-01 RX ADMIN — THIAMINE HYDROCHLORIDE 200 MG: 100 INJECTION, SOLUTION INTRAMUSCULAR; INTRAVENOUS at 17:17

## 2024-01-01 RX ADMIN — SODIUM CHLORIDE: 9 INJECTION, SOLUTION INTRAVENOUS at 23:12

## 2024-01-01 RX ADMIN — FAMOTIDINE 20 MG: 10 INJECTION INTRAVENOUS at 05:56

## 2024-01-01 RX ADMIN — POTASSIUM BICARBONATE 25 MEQ: 978 TABLET, EFFERVESCENT ORAL at 20:10

## 2024-01-01 RX ADMIN — POTASSIUM BICARBONATE 25 MEQ: 978 TABLET, EFFERVESCENT ORAL at 13:28

## 2024-01-01 RX ADMIN — POTASSIUM CHLORIDE 10 MEQ: 7.46 INJECTION, SOLUTION INTRAVENOUS at 10:22

## 2024-01-01 RX ADMIN — HYDROCORTISONE SODIUM SUCCINATE 50 MG: 100 INJECTION, POWDER, FOR SOLUTION INTRAMUSCULAR; INTRAVENOUS at 23:55

## 2024-01-01 RX ADMIN — INSULIN LISPRO 6 UNITS: 100 INJECTION, SOLUTION INTRAVENOUS; SUBCUTANEOUS at 00:18

## 2024-01-01 RX ADMIN — CYCLOSPORINE 180 MG: 100 SOLUTION ORAL at 04:46

## 2024-01-01 RX ADMIN — ATOVAQUONE 750 MG: 750 SUSPENSION ORAL at 05:14

## 2024-01-01 RX ADMIN — Medication 5 MG: at 20:37

## 2024-01-01 RX ADMIN — CEFTAROLINE FOSAMIL 600 MG: 600 POWDER, FOR SOLUTION INTRAVENOUS at 13:12

## 2024-01-01 RX ADMIN — IPRATROPIUM BROMIDE AND ALBUTEROL SULFATE 3 ML: 2.5; .5 SOLUTION RESPIRATORY (INHALATION) at 04:35

## 2024-01-01 RX ADMIN — POTASSIUM CHLORIDE 10 MEQ: 7.46 INJECTION, SOLUTION INTRAVENOUS at 16:33

## 2024-01-01 RX ADMIN — INSULIN LISPRO 12 UNITS: 100 INJECTION, SOLUTION INTRAVENOUS; SUBCUTANEOUS at 12:07

## 2024-01-01 RX ADMIN — CARBOXYMETHYLCELLULOSE SODIUM 1 DROP: 5 SOLUTION/ DROPS OPHTHALMIC at 17:20

## 2024-01-01 RX ADMIN — INSULIN LISPRO 2 UNITS: 100 INJECTION, SOLUTION INTRAVENOUS; SUBCUTANEOUS at 17:52

## 2024-01-01 RX ADMIN — CEFTAROLINE FOSAMIL 400 MG: 600 POWDER, FOR SOLUTION INTRAVENOUS at 14:15

## 2024-01-01 RX ADMIN — INSULIN LISPRO 12 UNITS: 100 INJECTION, SOLUTION INTRAVENOUS; SUBCUTANEOUS at 06:19

## 2024-01-01 RX ADMIN — ACYCLOVIR 400 MG: 400 TABLET ORAL at 04:50

## 2024-01-01 RX ADMIN — ATOVAQUONE 750 MG: 750 SUSPENSION ORAL at 05:55

## 2024-01-01 RX ADMIN — INSULIN LISPRO 10 UNITS: 100 INJECTION, SOLUTION INTRAVENOUS; SUBCUTANEOUS at 17:40

## 2024-01-01 RX ADMIN — POTASSIUM BICARBONATE 25 MEQ: 978 TABLET, EFFERVESCENT ORAL at 16:52

## 2024-01-01 RX ADMIN — LIDOCAINE HYDROCHLORIDE 5 ML: 20 SOLUTION ORAL; TOPICAL at 02:26

## 2024-01-01 RX ADMIN — ACETAMINOPHEN 650 MG: 325 TABLET ORAL at 21:01

## 2024-01-01 RX ADMIN — POTASSIUM BICARBONATE 50 MEQ: 978 TABLET, EFFERVESCENT ORAL at 05:45

## 2024-01-01 RX ADMIN — DIBASIC SODIUM PHOSPHATE, MONOBASIC POTASSIUM PHOSPHATE AND MONOBASIC SODIUM PHOSPHATE 250 MG: 852; 155; 130 TABLET ORAL at 05:40

## 2024-01-01 RX ADMIN — IPRATROPIUM BROMIDE AND ALBUTEROL SULFATE 3 ML: 2.5; .5 SOLUTION RESPIRATORY (INHALATION) at 10:35

## 2024-01-01 RX ADMIN — ATORVASTATIN CALCIUM 10 MG: 10 TABLET, FILM COATED ORAL at 17:51

## 2024-01-01 RX ADMIN — INSULIN LISPRO 1 UNITS: 100 INJECTION, SOLUTION INTRAVENOUS; SUBCUTANEOUS at 06:48

## 2024-01-01 RX ADMIN — SODIUM CHLORIDE: 9 INJECTION, SOLUTION INTRAVENOUS at 06:41

## 2024-01-01 RX ADMIN — CYCLOSPORINE 100 MG: 100 CAPSULE, GELATIN COATED ORAL at 11:00

## 2024-01-01 RX ADMIN — ACETAMINOPHEN 650 MG: 325 TABLET ORAL at 02:14

## 2024-01-01 RX ADMIN — POTASSIUM BICARBONATE 50 MEQ: 978 TABLET, EFFERVESCENT ORAL at 16:50

## 2024-01-01 RX ADMIN — CEFTAROLINE FOSAMIL 600 MG: 600 POWDER, FOR SOLUTION INTRAVENOUS at 18:03

## 2024-01-01 RX ADMIN — POTASSIUM CHLORIDE 10 MEQ: 7.46 INJECTION, SOLUTION INTRAVENOUS at 06:26

## 2024-01-01 RX ADMIN — CEFTAROLINE FOSAMIL 600 MG: 600 POWDER, FOR SOLUTION INTRAVENOUS at 15:04

## 2024-01-01 RX ADMIN — FAMOTIDINE 20 MG: 10 INJECTION INTRAVENOUS at 11:46

## 2024-01-01 RX ADMIN — Medication 10 ML: at 05:26

## 2024-01-01 RX ADMIN — INSULIN LISPRO 2 UNITS: 100 INJECTION, SOLUTION INTRAVENOUS; SUBCUTANEOUS at 12:29

## 2024-01-01 RX ADMIN — FAMOTIDINE 20 MG: 10 INJECTION INTRAVENOUS at 05:14

## 2024-01-01 RX ADMIN — CYCLOSPORINE 180 MG: 100 SOLUTION ORAL at 20:12

## 2024-01-01 RX ADMIN — Medication 5 MG: at 21:37

## 2024-01-01 RX ADMIN — POTASSIUM BICARBONATE 25 MEQ: 978 TABLET, EFFERVESCENT ORAL at 17:20

## 2024-01-01 RX ADMIN — INSULIN LISPRO 10 UNITS: 100 INJECTION, SOLUTION INTRAVENOUS; SUBCUTANEOUS at 00:19

## 2024-01-01 RX ADMIN — CARVEDILOL 3.12 MG: 3.12 TABLET, FILM COATED ORAL at 08:23

## 2024-01-01 RX ADMIN — ATOVAQUONE 750 MG: 750 SUSPENSION ORAL at 18:42

## 2024-01-01 RX ADMIN — Medication 10 ML: at 05:54

## 2024-01-01 RX ADMIN — ACYCLOVIR 400 MG: 400 TABLET ORAL at 17:40

## 2024-01-01 RX ADMIN — POTASSIUM BICARBONATE 25 MEQ: 978 TABLET, EFFERVESCENT ORAL at 12:10

## 2024-01-01 RX ADMIN — Medication 10 ML: at 18:21

## 2024-01-01 RX ADMIN — IOHEXOL 80 ML: 350 INJECTION, SOLUTION INTRAVENOUS at 03:00

## 2024-01-01 RX ADMIN — ACETAMINOPHEN 650 MG: 325 TABLET ORAL at 13:00

## 2024-01-01 RX ADMIN — ALBUMIN (HUMAN) 25 G: 0.25 INJECTION, SOLUTION INTRAVENOUS at 18:02

## 2024-01-01 RX ADMIN — INSULIN LISPRO 4 UNITS: 100 INJECTION, SOLUTION INTRAVENOUS; SUBCUTANEOUS at 05:22

## 2024-01-01 RX ADMIN — SODIUM CHLORIDE SOLN NEBU 7% 4 ML: 7 NEBU SOLN at 19:10

## 2024-01-01 RX ADMIN — HYDROCORTISONE SODIUM SUCCINATE 50 MG: 100 INJECTION, POWDER, FOR SOLUTION INTRAMUSCULAR; INTRAVENOUS at 12:02

## 2024-01-01 RX ADMIN — LIDOCAINE HYDROCHLORIDE 5 ML: 20 SOLUTION ORAL; TOPICAL at 02:30

## 2024-01-01 RX ADMIN — Medication 5 MG: at 21:06

## 2024-01-01 RX ADMIN — LIDOCAINE HYDROCHLORIDE 5 ML: 20 SOLUTION ORAL; TOPICAL at 20:37

## 2024-01-01 RX ADMIN — INSULIN GLARGINE-YFGN 8 UNITS: 100 INJECTION, SOLUTION SUBCUTANEOUS at 06:47

## 2024-01-01 RX ADMIN — INSULIN LISPRO 2 UNITS: 100 INJECTION, SOLUTION INTRAVENOUS; SUBCUTANEOUS at 00:10

## 2024-01-01 RX ADMIN — PIPERACILLIN AND TAZOBACTAM 4.5 G: 4; .5 INJECTION, POWDER, FOR SOLUTION INTRAVENOUS at 01:18

## 2024-01-01 RX ADMIN — POTASSIUM BICARBONATE 25 MEQ: 978 TABLET, EFFERVESCENT ORAL at 17:39

## 2024-01-01 RX ADMIN — LIDOCAINE HYDROCHLORIDE 5 ML: 20 SOLUTION ORAL; TOPICAL at 02:34

## 2024-01-01 RX ADMIN — FAMOTIDINE 20 MG: 20 TABLET, FILM COATED ORAL at 17:51

## 2024-01-01 RX ADMIN — INSULIN LISPRO 4 UNITS: 100 INJECTION, SOLUTION INTRAVENOUS; SUBCUTANEOUS at 18:14

## 2024-01-01 RX ADMIN — ACYCLOVIR 400 MG: 400 TABLET ORAL at 17:08

## 2024-01-01 RX ADMIN — CEFTAROLINE FOSAMIL 400 MG: 600 POWDER, FOR SOLUTION INTRAVENOUS at 14:19

## 2024-01-01 RX ADMIN — SODIUM CHLORIDE SOLN NEBU 7% 4 ML: 7 NEBU SOLN at 13:03

## 2024-01-01 RX ADMIN — CEFTAROLINE FOSAMIL 600 MG: 600 POWDER, FOR SOLUTION INTRAVENOUS at 10:27

## 2024-01-01 RX ADMIN — POLYETHYLENE GLYCOL 3350 1 PACKET: 17 POWDER, FOR SOLUTION ORAL at 17:20

## 2024-01-01 RX ADMIN — PIPERACILLIN AND TAZOBACTAM 4.5 G: 4; .5 INJECTION, POWDER, FOR SOLUTION INTRAVENOUS at 05:14

## 2024-01-01 RX ADMIN — ACYCLOVIR 400 MG: 400 TABLET ORAL at 05:47

## 2024-01-01 RX ADMIN — SODIUM CHLORIDE: 9 INJECTION, SOLUTION INTRAVENOUS at 15:32

## 2024-01-01 RX ADMIN — INSULIN LISPRO 12 UNITS: 100 INJECTION, SOLUTION INTRAVENOUS; SUBCUTANEOUS at 17:44

## 2024-01-01 RX ADMIN — POTASSIUM BICARBONATE 25 MEQ: 978 TABLET, EFFERVESCENT ORAL at 11:46

## 2024-01-01 RX ADMIN — POTASSIUM CHLORIDE 10 MEQ: 7.46 INJECTION, SOLUTION INTRAVENOUS at 09:13

## 2024-01-01 RX ADMIN — HYDROCORTISONE SODIUM SUCCINATE 50 MG: 100 INJECTION, POWDER, FOR SOLUTION INTRAMUSCULAR; INTRAVENOUS at 00:58

## 2024-01-01 RX ADMIN — POTASSIUM CHLORIDE 10 MEQ: 7.46 INJECTION, SOLUTION INTRAVENOUS at 11:20

## 2024-01-01 RX ADMIN — HYDROXYZINE HYDROCHLORIDE 25 MG: 25 TABLET, FILM COATED ORAL at 01:49

## 2024-01-01 RX ADMIN — HYDROCORTISONE SODIUM SUCCINATE 50 MG: 100 INJECTION, POWDER, FOR SOLUTION INTRAMUSCULAR; INTRAVENOUS at 17:12

## 2024-01-01 RX ADMIN — LORATADINE 10 MG: 10 TABLET ORAL at 05:54

## 2024-01-01 RX ADMIN — POTASSIUM BICARBONATE 25 MEQ: 978 TABLET, EFFERVESCENT ORAL at 04:46

## 2024-01-01 RX ADMIN — ACETAMINOPHEN 650 MG: 325 TABLET ORAL at 19:56

## 2024-01-01 RX ADMIN — POTASSIUM CHLORIDE 10 MEQ: 7.46 INJECTION, SOLUTION INTRAVENOUS at 05:48

## 2024-01-01 RX ADMIN — POTASSIUM CHLORIDE 10 MEQ: 7.46 INJECTION, SOLUTION INTRAVENOUS at 19:43

## 2024-01-01 RX ADMIN — CEFTAROLINE FOSAMIL 600 MG: 600 POWDER, FOR SOLUTION INTRAVENOUS at 04:24

## 2024-01-01 RX ADMIN — DIBASIC SODIUM PHOSPHATE, MONOBASIC POTASSIUM PHOSPHATE AND MONOBASIC SODIUM PHOSPHATE 500 MG: 852; 155; 130 TABLET ORAL at 11:46

## 2024-01-01 RX ADMIN — SODIUM CHLORIDE: 9 INJECTION, SOLUTION INTRAVENOUS at 18:52

## 2024-01-01 RX ADMIN — THIAMINE HYDROCHLORIDE 200 MG: 100 INJECTION, SOLUTION INTRAMUSCULAR; INTRAVENOUS at 18:21

## 2024-01-01 RX ADMIN — ATORVASTATIN CALCIUM 10 MG: 10 TABLET, FILM COATED ORAL at 17:21

## 2024-01-01 RX ADMIN — INSULIN LISPRO 8 UNITS: 100 INJECTION, SOLUTION INTRAVENOUS; SUBCUTANEOUS at 05:47

## 2024-01-01 RX ADMIN — INSULIN LISPRO 2 UNITS: 100 INJECTION, SOLUTION INTRAVENOUS; SUBCUTANEOUS at 06:03

## 2024-01-01 RX ADMIN — POTASSIUM PHOSPHATE, MONOBASIC AND POTASSIUM PHOSPHATE, DIBASIC 15 MMOL: 224; 236 INJECTION, SOLUTION, CONCENTRATE INTRAVENOUS at 17:41

## 2024-01-01 RX ADMIN — ACYCLOVIR 400 MG: 400 TABLET ORAL at 05:52

## 2024-01-01 RX ADMIN — INSULIN LISPRO 10 UNITS: 100 INJECTION, SOLUTION INTRAVENOUS; SUBCUTANEOUS at 12:24

## 2024-01-01 RX ADMIN — ATORVASTATIN CALCIUM 10 MG: 10 TABLET, FILM COATED ORAL at 17:38

## 2024-01-01 ASSESSMENT — ENCOUNTER SYMPTOMS
EYES NEGATIVE: 1
FEVER: 0
WEAKNESS: 1
SHORTNESS OF BREATH: 1
HEARTBURN: 0
MUSCULOSKELETAL NEGATIVE: 1
SEIZURES: 0
SENSORY CHANGE: 0
PSYCHIATRIC NEGATIVE: 1
FEVER: 0
LOSS OF CONSCIOUSNESS: 0
SPEECH CHANGE: 0
BLOOD IN STOOL: 0
HEADACHES: 0
CARDIOVASCULAR NEGATIVE: 1
CHILLS: 0
FEVER: 0
FEVER: 0
DIZZINESS: 0
BRUISES/BLEEDS EASILY: 1
VOMITING: 0
HEADACHES: 0
BLOOD IN STOOL: 0
BRUISES/BLEEDS EASILY: 1
SHORTNESS OF BREATH: 0
SENSORY CHANGE: 0
FEVER: 0
TINGLING: 0
SPEECH CHANGE: 0
DIARRHEA: 0
SENSORY CHANGE: 0
EYES NEGATIVE: 1
BRUISES/BLEEDS EASILY: 1
FOCAL WEAKNESS: 0
COUGH: 0
SEIZURES: 0
CHILLS: 0
FEVER: 0
SENSORY CHANGE: 0
CHILLS: 0
NAUSEA: 0
PSYCHIATRIC NEGATIVE: 1
HEMOPTYSIS: 0
TINGLING: 0
PSYCHIATRIC NEGATIVE: 1
CARDIOVASCULAR NEGATIVE: 1
SPEECH CHANGE: 0
HEMOPTYSIS: 0
MUSCULOSKELETAL NEGATIVE: 1
DIARRHEA: 0
SHORTNESS OF BREATH: 1
RESPIRATORY NEGATIVE: 1
LOSS OF CONSCIOUSNESS: 0
ABDOMINAL PAIN: 0
SHORTNESS OF BREATH: 0
TINGLING: 0
NAUSEA: 0
CARDIOVASCULAR NEGATIVE: 1
CONSTIPATION: 0
MUSCULOSKELETAL NEGATIVE: 1
SPEECH CHANGE: 0
TINGLING: 0
NAUSEA: 0
DIARRHEA: 0
WEAKNESS: 1
FEVER: 0
VOMITING: 0
FOCAL WEAKNESS: 0
HEARTBURN: 0
RESPIRATORY NEGATIVE: 1
MUSCULOSKELETAL NEGATIVE: 1
HEARTBURN: 0
CONSTIPATION: 0
ABDOMINAL PAIN: 0
HEARTBURN: 0
ABDOMINAL PAIN: 0
CONSTIPATION: 0
FOCAL WEAKNESS: 0
VOMITING: 0
WEAKNESS: 1
HEADACHES: 0
BRUISES/BLEEDS EASILY: 1
VOMITING: 0
SEIZURES: 0
FOCAL WEAKNESS: 0
LOSS OF CONSCIOUSNESS: 0
COUGH: 0
HEADACHES: 0
WEAKNESS: 1
PSYCHIATRIC NEGATIVE: 1
ABDOMINAL PAIN: 0
BLOOD IN STOOL: 0
SEIZURES: 0
CHILLS: 0
DIZZINESS: 0
CARDIOVASCULAR NEGATIVE: 1
EYES NEGATIVE: 1
DIARRHEA: 0
BLOOD IN STOOL: 0
LOSS OF CONSCIOUSNESS: 0
EYES NEGATIVE: 1
DIZZINESS: 0
NAUSEA: 0
CONSTIPATION: 0
DIZZINESS: 0

## 2024-01-01 ASSESSMENT — PAIN DESCRIPTION - PAIN TYPE
TYPE: ACUTE PAIN
TYPE: ACUTE PAIN;SURGICAL PAIN
TYPE: ACUTE PAIN
TYPE: ACUTE PAIN;SURGICAL PAIN
TYPE: ACUTE PAIN
TYPE: ACUTE PAIN;SURGICAL PAIN
TYPE: ACUTE PAIN
TYPE: ACUTE PAIN;SURGICAL PAIN
TYPE: ACUTE PAIN

## 2024-01-01 ASSESSMENT — FIBROSIS 4 INDEX
FIB4 SCORE: 16.74
FIB4 SCORE: 11.24
FIB4 SCORE: 5.39
FIB4 SCORE: 34.79
FIB4 SCORE: 9.17

## 2024-01-01 ASSESSMENT — COGNITIVE AND FUNCTIONAL STATUS - GENERAL
WALKING IN HOSPITAL ROOM: A LITTLE
MOBILITY SCORE: 18
TURNING FROM BACK TO SIDE WHILE IN FLAT BAD: TOTAL
TURNING FROM BACK TO SIDE WHILE IN FLAT BAD: TOTAL
MOVING FROM LYING ON BACK TO SITTING ON SIDE OF FLAT BED: A LITTLE
SUGGESTED CMS G CODE MODIFIER MOBILITY: CN
EATING MEALS: TOTAL
WALKING IN HOSPITAL ROOM: TOTAL
MOVING FROM LYING ON BACK TO SITTING ON SIDE OF FLAT BED: TOTAL
CLIMB 3 TO 5 STEPS WITH RAILING: A LITTLE
MOVING FROM LYING ON BACK TO SITTING ON SIDE OF FLAT BED: TOTAL
DAILY ACTIVITIY SCORE: 6
WALKING IN HOSPITAL ROOM: TOTAL
TOILETING: TOTAL
HELP NEEDED FOR BATHING: A LITTLE
TURNING FROM BACK TO SIDE WHILE IN FLAT BAD: A LITTLE
SUGGESTED CMS G CODE MODIFIER DAILY ACTIVITY: CM
CLIMB 3 TO 5 STEPS WITH RAILING: TOTAL
MOVING TO AND FROM BED TO CHAIR: TOTAL
DRESSING REGULAR LOWER BODY CLOTHING: TOTAL
DRESSING REGULAR UPPER BODY CLOTHING: A LOT
CLIMB 3 TO 5 STEPS WITH RAILING: TOTAL
MOVING TO AND FROM BED TO CHAIR: A LITTLE
MOBILITY SCORE: 6
DRESSING REGULAR LOWER BODY CLOTHING: TOTAL
SUGGESTED CMS G CODE MODIFIER MOBILITY: CN
STANDING UP FROM CHAIR USING ARMS: TOTAL
PERSONAL GROOMING: TOTAL
TOILETING: TOTAL
STANDING UP FROM CHAIR USING ARMS: TOTAL
PERSONAL GROOMING: A LOT
DAILY ACTIVITIY SCORE: 8
DRESSING REGULAR UPPER BODY CLOTHING: TOTAL
DAILY ACTIVITIY SCORE: 22
HELP NEEDED FOR BATHING: TOTAL
STANDING UP FROM CHAIR USING ARMS: A LITTLE
MOVING TO AND FROM BED TO CHAIR: TOTAL
SUGGESTED CMS G CODE MODIFIER MOBILITY: CK
EATING MEALS: TOTAL
DRESSING REGULAR LOWER BODY CLOTHING: A LITTLE
MOBILITY SCORE: 6
SUGGESTED CMS G CODE MODIFIER DAILY ACTIVITY: CN
HELP NEEDED FOR BATHING: TOTAL
SUGGESTED CMS G CODE MODIFIER DAILY ACTIVITY: CJ

## 2024-01-01 ASSESSMENT — GAIT ASSESSMENTS
DEVIATION: DECREASED HEEL STRIKE;DECREASED TOE OFF;SHUFFLED GAIT
DISTANCE (FEET): 300
ASSISTIVE DEVICE: FRONT WHEEL WALKER
GAIT LEVEL OF ASSIST: STANDBY ASSIST
GAIT LEVEL OF ASSIST: UNABLE TO PARTICIPATE

## 2024-01-01 ASSESSMENT — COPD QUESTIONNAIRES
HAVE YOU SMOKED AT LEAST 100 CIGARETTES IN YOUR ENTIRE LIFE: NO/DON'T KNOW
COPD SCREENING SCORE: 0
DO YOU EVER COUGH UP ANY MUCUS OR PHLEGM?: NO/ONLY WITH OCCASIONAL COLDS OR INFECTIONS
DURING THE PAST 4 WEEKS HOW MUCH DID YOU FEEL SHORT OF BREATH: NONE/LITTLE OF THE TIME

## 2024-01-01 ASSESSMENT — ACTIVITIES OF DAILY LIVING (ADL): TOILETING: INDEPENDENT

## 2024-01-01 ASSESSMENT — SOCIAL DETERMINANTS OF HEALTH (SDOH)
WITHIN THE LAST YEAR, HAVE TO BEEN RAPED OR FORCED TO HAVE ANY KIND OF SEXUAL ACTIVITY BY YOUR PARTNER OR EX-PARTNER?: NO
WITHIN THE LAST YEAR, HAVE YOU BEEN HUMILIATED OR EMOTIONALLY ABUSED IN OTHER WAYS BY YOUR PARTNER OR EX-PARTNER?: NO
WITHIN THE LAST YEAR, HAVE YOU BEEN KICKED, HIT, SLAPPED, OR OTHERWISE PHYSICALLY HURT BY YOUR PARTNER OR EX-PARTNER?: NO
WITHIN THE LAST YEAR, HAVE YOU BEEN AFRAID OF YOUR PARTNER OR EX-PARTNER?: NO

## 2024-09-03 ENCOUNTER — HOSPITAL ENCOUNTER (INPATIENT)
Facility: MEDICAL CENTER | Age: 84
LOS: 3 days | DRG: 378 | End: 2024-09-06
Attending: EMERGENCY MEDICINE | Admitting: STUDENT IN AN ORGANIZED HEALTH CARE EDUCATION/TRAINING PROGRAM
Payer: MEDICARE

## 2024-09-03 ENCOUNTER — APPOINTMENT (OUTPATIENT)
Dept: RADIOLOGY | Facility: MEDICAL CENTER | Age: 84
DRG: 378 | End: 2024-09-03
Attending: EMERGENCY MEDICINE
Payer: MEDICARE

## 2024-09-03 DIAGNOSIS — D64.9 SYMPTOMATIC ANEMIA: ICD-10-CM

## 2024-09-03 DIAGNOSIS — D61.818 PANCYTOPENIA (HCC): ICD-10-CM

## 2024-09-03 DIAGNOSIS — K92.2 GASTROINTESTINAL HEMORRHAGE, UNSPECIFIED GASTROINTESTINAL HEMORRHAGE TYPE: ICD-10-CM

## 2024-09-03 DIAGNOSIS — D69.6 THROMBOCYTOPENIA (HCC): ICD-10-CM

## 2024-09-03 PROBLEM — E11.9 TYPE 2 DIABETES MELLITUS, WITHOUT LONG-TERM CURRENT USE OF INSULIN (HCC): Status: ACTIVE | Noted: 2024-09-03

## 2024-09-03 PROBLEM — D70.8 OTHER NEUTROPENIA (HCC): Status: ACTIVE | Noted: 2024-09-03

## 2024-09-03 LAB
ABO + RH BLD: NORMAL
ABO GROUP BLD: NORMAL
ALBUMIN SERPL BCP-MCNC: 3.5 G/DL (ref 3.2–4.9)
ALBUMIN/GLOB SERPL: 1.4 G/DL
ALP SERPL-CCNC: 63 U/L (ref 30–99)
ALT SERPL-CCNC: 27 U/L (ref 2–50)
ANION GAP SERPL CALC-SCNC: 11 MMOL/L (ref 7–16)
APTT PPP: 27 SEC (ref 24.7–36)
AST SERPL-CCNC: 26 U/L (ref 12–45)
BARCODED ABORH UBTYP: 7300
BARCODED ABORH UBTYP: 7300
BARCODED ABORH UBTYP: 8400
BARCODED ABORH UBTYP: 8400
BARCODED PRD CODE UBPRD: NORMAL
BARCODED UNIT NUM UBUNT: NORMAL
BASOPHILS # BLD AUTO: 0 % (ref 0–1.8)
BASOPHILS # BLD: 0 K/UL (ref 0–0.12)
BILIRUB SERPL-MCNC: 0.6 MG/DL (ref 0.1–1.5)
BLD GP AB SCN SERPL QL: NORMAL
BUN SERPL-MCNC: 25 MG/DL (ref 8–22)
CALCIUM ALBUM COR SERPL-MCNC: 9 MG/DL (ref 8.5–10.5)
CALCIUM SERPL-MCNC: 8.6 MG/DL (ref 8.4–10.2)
CHLORIDE SERPL-SCNC: 99 MMOL/L (ref 96–112)
CO2 SERPL-SCNC: 21 MMOL/L (ref 20–33)
COMPONENT P 8504P: NORMAL
COMPONENT P 8504P: NORMAL
COMPONENT R 8504R: NORMAL
COMPONENT R 8504R: NORMAL
CREAT SERPL-MCNC: 0.84 MG/DL (ref 0.5–1.4)
DAT C3D-SP REAG RBC QL: NORMAL
DAT IGG-SP REAG RBC QL: NORMAL
EKG IMPRESSION: NORMAL
EOSINOPHIL # BLD AUTO: 0.07 K/UL (ref 0–0.51)
EOSINOPHIL NFR BLD: 2.2 % (ref 0–6.9)
ERYTHROCYTE [DISTWIDTH] IN BLOOD BY AUTOMATED COUNT: 50.8 FL (ref 35.9–50)
ERYTHROCYTE [DISTWIDTH] IN BLOOD BY AUTOMATED COUNT: 57.4 FL (ref 35.9–50)
FERRITIN SERPL-MCNC: 320 NG/ML (ref 22–322)
FIBRINOGEN PPP-MCNC: 295 MG/DL (ref 215–460)
GFR SERPLBLD CREATININE-BSD FMLA CKD-EPI: 87 ML/MIN/1.73 M 2
GLOBULIN SER CALC-MCNC: 2.5 G/DL (ref 1.9–3.5)
GLUCOSE BLD STRIP.AUTO-MCNC: 94 MG/DL (ref 65–99)
GLUCOSE SERPL-MCNC: 206 MG/DL (ref 65–99)
HCT VFR BLD AUTO: 15.6 % (ref 42–52)
HCT VFR BLD AUTO: 21.1 % (ref 42–52)
HCV AB SER QL: NORMAL
HGB BLD-MCNC: 5.5 G/DL (ref 14–18)
HGB BLD-MCNC: 7.4 G/DL (ref 14–18)
HGB RETIC QN AUTO: 45.4 PG/CELL (ref 29–35)
HIV 1+2 AB+HIV1 P24 AG SERPL QL IA: NORMAL
IMM GRANULOCYTES # BLD AUTO: 0.01 K/UL (ref 0–0.11)
IMM GRANULOCYTES NFR BLD AUTO: 0.3 % (ref 0–0.9)
IMM RETICS NFR: 16.9 % (ref 2.6–16.1)
INR PPP: 1.05 (ref 0.87–1.13)
LDH SERPL L TO P-CCNC: 150 U/L (ref 107–266)
LYMPHOCYTES # BLD AUTO: 2.37 K/UL (ref 1–4.8)
LYMPHOCYTES NFR BLD: 73.6 % (ref 22–41)
MCH RBC QN AUTO: 33.6 PG (ref 27–33)
MCH RBC QN AUTO: 34.6 PG (ref 27–33)
MCHC RBC AUTO-ENTMCNC: 35.1 G/DL (ref 32.3–36.5)
MCHC RBC AUTO-ENTMCNC: 35.3 G/DL (ref 32.3–36.5)
MCV RBC AUTO: 95.9 FL (ref 81.4–97.8)
MCV RBC AUTO: 98.1 FL (ref 81.4–97.8)
MONOCYTES # BLD AUTO: 0.15 K/UL (ref 0–0.85)
MONOCYTES NFR BLD AUTO: 4.7 % (ref 0–13.4)
NEUTROPHILS # BLD AUTO: 0.62 K/UL (ref 1.82–7.42)
NEUTROPHILS NFR BLD: 19.2 % (ref 44–72)
NRBC # BLD AUTO: 0 K/UL
NRBC BLD-RTO: 0 /100 WBC (ref 0–0.2)
PLATELET # BLD AUTO: 1 K/UL (ref 164–446)
PLATELET # BLD AUTO: 88 K/UL (ref 164–446)
PLATELET BLD QL SMEAR: NORMAL
PLATELETS.RETICULATED NFR BLD AUTO: 0.8 % (ref 0.6–13.1)
PLATELETS.RETICULATED NFR BLD AUTO: 6.8 % (ref 0.6–13.1)
PMV BLD AUTO: 8.2 FL (ref 9–12.9)
POTASSIUM SERPL-SCNC: 4.6 MMOL/L (ref 3.6–5.5)
PRODUCT TYPE UPROD: NORMAL
PROT SERPL-MCNC: 6 G/DL (ref 6–8.2)
PROTHROMBIN TIME: 14.2 SEC (ref 12–14.6)
RBC # BLD AUTO: 1.59 M/UL (ref 4.7–6.1)
RBC # BLD AUTO: 2.2 M/UL (ref 4.7–6.1)
RBC BLD AUTO: NORMAL
RETICS # AUTO: 0.02 M/UL (ref 0.04–0.12)
RETICS/RBC NFR: 1.2 % (ref 0.8–2.6)
RH BLD: NORMAL
SODIUM SERPL-SCNC: 131 MMOL/L (ref 135–145)
UNIT STATUS USTAT: NORMAL
VIT B12 SERPL-MCNC: <150 PG/ML (ref 211–911)
WBC # BLD AUTO: 2.5 K/UL (ref 4.8–10.8)
WBC # BLD AUTO: 3.2 K/UL (ref 4.8–10.8)

## 2024-09-03 PROCEDURE — 86880 COOMBS TEST DIRECT: CPT

## 2024-09-03 PROCEDURE — 87389 HIV-1 AG W/HIV-1&-2 AB AG IA: CPT

## 2024-09-03 PROCEDURE — 85397 CLOTTING FUNCT ACTIVITY: CPT

## 2024-09-03 PROCEDURE — 83615 LACTATE (LD) (LDH) ENZYME: CPT

## 2024-09-03 PROCEDURE — 85025 COMPLETE CBC W/AUTO DIFF WBC: CPT

## 2024-09-03 PROCEDURE — 86901 BLOOD TYPING SEROLOGIC RH(D): CPT

## 2024-09-03 PROCEDURE — 36415 COLL VENOUS BLD VENIPUNCTURE: CPT

## 2024-09-03 PROCEDURE — 85730 THROMBOPLASTIN TIME PARTIAL: CPT

## 2024-09-03 PROCEDURE — 82607 VITAMIN B-12: CPT

## 2024-09-03 PROCEDURE — 85610 PROTHROMBIN TIME: CPT

## 2024-09-03 PROCEDURE — 99291 CRITICAL CARE FIRST HOUR: CPT | Performed by: STUDENT IN AN ORGANIZED HEALTH CARE EDUCATION/TRAINING PROGRAM

## 2024-09-03 PROCEDURE — 83010 ASSAY OF HAPTOGLOBIN QUANT: CPT

## 2024-09-03 PROCEDURE — 83520 IMMUNOASSAY QUANT NOS NONAB: CPT

## 2024-09-03 PROCEDURE — P9016 RBC LEUKOCYTES REDUCED: HCPCS

## 2024-09-03 PROCEDURE — 86803 HEPATITIS C AB TEST: CPT

## 2024-09-03 PROCEDURE — 700105 HCHG RX REV CODE 258: Performed by: EMERGENCY MEDICINE

## 2024-09-03 PROCEDURE — 82962 GLUCOSE BLOOD TEST: CPT

## 2024-09-03 PROCEDURE — 82728 ASSAY OF FERRITIN: CPT

## 2024-09-03 PROCEDURE — 85046 RETICYTE/HGB CONCENTRATE: CPT

## 2024-09-03 PROCEDURE — 86900 BLOOD TYPING SEROLOGIC ABO: CPT

## 2024-09-03 PROCEDURE — 85055 RETICULATED PLATELET ASSAY: CPT

## 2024-09-03 PROCEDURE — 99291 CRITICAL CARE FIRST HOUR: CPT

## 2024-09-03 PROCEDURE — 86850 RBC ANTIBODY SCREEN: CPT

## 2024-09-03 PROCEDURE — P9034 PLATELETS, PHERESIS: HCPCS | Mod: 91

## 2024-09-03 PROCEDURE — 74175 CTA ABDOMEN W/CONTRAST: CPT

## 2024-09-03 PROCEDURE — 700117 HCHG RX CONTRAST REV CODE 255: Performed by: EMERGENCY MEDICINE

## 2024-09-03 PROCEDURE — 85027 COMPLETE CBC AUTOMATED: CPT

## 2024-09-03 PROCEDURE — 700111 HCHG RX REV CODE 636 W/ 250 OVERRIDE (IP): Performed by: STUDENT IN AN ORGANIZED HEALTH CARE EDUCATION/TRAINING PROGRAM

## 2024-09-03 PROCEDURE — 86923 COMPATIBILITY TEST ELECTRIC: CPT

## 2024-09-03 PROCEDURE — 36430 TRANSFUSION BLD/BLD COMPNT: CPT

## 2024-09-03 PROCEDURE — 93005 ELECTROCARDIOGRAM TRACING: CPT | Performed by: EMERGENCY MEDICINE

## 2024-09-03 PROCEDURE — 700105 HCHG RX REV CODE 258: Performed by: STUDENT IN AN ORGANIZED HEALTH CARE EDUCATION/TRAINING PROGRAM

## 2024-09-03 PROCEDURE — 85335 FACTOR INHIBITOR TEST: CPT

## 2024-09-03 PROCEDURE — 80053 COMPREHEN METABOLIC PANEL: CPT

## 2024-09-03 PROCEDURE — 71260 CT THORAX DX C+: CPT

## 2024-09-03 PROCEDURE — 770022 HCHG ROOM/CARE - ICU (200)

## 2024-09-03 PROCEDURE — 85384 FIBRINOGEN ACTIVITY: CPT

## 2024-09-03 PROCEDURE — 30233N1 TRANSFUSION OF NONAUTOLOGOUS RED BLOOD CELLS INTO PERIPHERAL VEIN, PERCUTANEOUS APPROACH: ICD-10-PCS | Performed by: EMERGENCY MEDICINE

## 2024-09-03 RX ORDER — DEXTROSE MONOHYDRATE 25 G/50ML
25 INJECTION, SOLUTION INTRAVENOUS
Status: DISCONTINUED | OUTPATIENT
Start: 2024-09-03 | End: 2024-09-06 | Stop reason: HOSPADM

## 2024-09-03 RX ORDER — ONDANSETRON 4 MG/1
4 TABLET, ORALLY DISINTEGRATING ORAL EVERY 4 HOURS PRN
Status: DISCONTINUED | OUTPATIENT
Start: 2024-09-03 | End: 2024-09-06 | Stop reason: HOSPADM

## 2024-09-03 RX ORDER — SODIUM CHLORIDE 9 MG/ML
1000 INJECTION, SOLUTION INTRAVENOUS ONCE
Status: COMPLETED | OUTPATIENT
Start: 2024-09-03 | End: 2024-09-03

## 2024-09-03 RX ORDER — ONDANSETRON 2 MG/ML
4 INJECTION INTRAMUSCULAR; INTRAVENOUS EVERY 4 HOURS PRN
Status: DISCONTINUED | OUTPATIENT
Start: 2024-09-03 | End: 2024-09-06 | Stop reason: HOSPADM

## 2024-09-03 RX ORDER — PANTOPRAZOLE SODIUM 40 MG/10ML
40 INJECTION, POWDER, LYOPHILIZED, FOR SOLUTION INTRAVENOUS 2 TIMES DAILY
Status: DISCONTINUED | OUTPATIENT
Start: 2024-09-03 | End: 2024-09-06 | Stop reason: HOSPADM

## 2024-09-03 RX ORDER — ACETAMINOPHEN 325 MG/1
650 TABLET ORAL EVERY 6 HOURS PRN
Status: DISCONTINUED | OUTPATIENT
Start: 2024-09-03 | End: 2024-09-06 | Stop reason: HOSPADM

## 2024-09-03 RX ORDER — CYANOCOBALAMIN 1000 UG/ML
1000 INJECTION, SOLUTION INTRAMUSCULAR; SUBCUTANEOUS
Status: DISCONTINUED | OUTPATIENT
Start: 2024-09-03 | End: 2024-09-06 | Stop reason: HOSPADM

## 2024-09-03 RX ORDER — SODIUM CHLORIDE 9 MG/ML
INJECTION, SOLUTION INTRAVENOUS CONTINUOUS
Status: ACTIVE | OUTPATIENT
Start: 2024-09-03 | End: 2024-09-04

## 2024-09-03 RX ORDER — GLIPIZIDE 5 MG/1
5 TABLET, FILM COATED, EXTENDED RELEASE ORAL DAILY
COMMUNITY

## 2024-09-03 RX ORDER — CYANOCOBALAMIN 1000 UG/ML
1000 INJECTION, SOLUTION INTRAMUSCULAR; SUBCUTANEOUS DAILY
Status: DISCONTINUED | OUTPATIENT
Start: 2024-09-04 | End: 2024-09-06 | Stop reason: HOSPADM

## 2024-09-03 RX ADMIN — CYANOCOBALAMIN 1000 MCG: 1000 INJECTION, SOLUTION INTRAMUSCULAR at 18:01

## 2024-09-03 RX ADMIN — PANTOPRAZOLE SODIUM 40 MG: 40 INJECTION, POWDER, FOR SOLUTION INTRAVENOUS at 18:01

## 2024-09-03 RX ADMIN — IOHEXOL 100 ML: 350 INJECTION, SOLUTION INTRAVENOUS at 14:11

## 2024-09-03 RX ADMIN — SODIUM CHLORIDE: 9 INJECTION, SOLUTION INTRAVENOUS at 14:46

## 2024-09-03 RX ADMIN — SODIUM CHLORIDE 1000 ML: 9 INJECTION, SOLUTION INTRAVENOUS at 11:26

## 2024-09-03 RX ADMIN — IOHEXOL 56 ML: 350 INJECTION, SOLUTION INTRAVENOUS at 14:20

## 2024-09-03 ASSESSMENT — LIFESTYLE VARIABLES
TOTAL SCORE: 0
HAVE YOU EVER FELT YOU SHOULD CUT DOWN ON YOUR DRINKING: NO
HOW MANY TIMES IN THE PAST YEAR HAVE YOU HAD 5 OR MORE DRINKS IN A DAY: 0
CONSUMPTION TOTAL: NEGATIVE
TOTAL SCORE: 0
HAVE PEOPLE ANNOYED YOU BY CRITICIZING YOUR DRINKING: NO
TOTAL SCORE: 0
EVER HAD A DRINK FIRST THING IN THE MORNING TO STEADY YOUR NERVES TO GET RID OF A HANGOVER: NO
ON A TYPICAL DAY WHEN YOU DRINK ALCOHOL HOW MANY DRINKS DO YOU HAVE: 0
EVER FELT BAD OR GUILTY ABOUT YOUR DRINKING: NO
AVERAGE NUMBER OF DAYS PER WEEK YOU HAVE A DRINK CONTAINING ALCOHOL: 0
DOES PATIENT WANT TO STOP DRINKING: NO
ALCOHOL_USE: NO

## 2024-09-03 ASSESSMENT — ENCOUNTER SYMPTOMS
DOUBLE VISION: 0
WHEEZING: 0
BLURRED VISION: 0
NERVOUS/ANXIOUS: 0
COUGH: 0
DEPRESSION: 0
SORE THROAT: 0
HEMOPTYSIS: 0
SHORTNESS OF BREATH: 1
HEARTBURN: 0
FEVER: 0
CHILLS: 0
NAUSEA: 0
PALPITATIONS: 0
DIZZINESS: 1

## 2024-09-03 ASSESSMENT — COGNITIVE AND FUNCTIONAL STATUS - GENERAL
MOBILITY SCORE: 24
SUGGESTED CMS G CODE MODIFIER MOBILITY: CH
SUGGESTED CMS G CODE MODIFIER DAILY ACTIVITY: CH
DAILY ACTIVITIY SCORE: 24

## 2024-09-03 ASSESSMENT — FIBROSIS 4 INDEX: FIB4 SCORE: 410.3

## 2024-09-03 ASSESSMENT — PAIN DESCRIPTION - PAIN TYPE
TYPE: ACUTE PAIN

## 2024-09-03 NOTE — PROGRESS NOTES
4 Eyes Skin Assessment Completed by Gaurav YANG, RN and Yasemin MAYER RN.    Head WDL  Ears WDL  Nose WDL  Mouth WDL  Neck WDL  Breast/Chest WDL  Shoulder Blades WDL  Spine WDL  (R) Arm/Elbow/Hand WDL  (L) Arm/Elbow/Hand WDL  Abdomen WDL  Groin WDL  Scrotum/Coccyx/Buttocks WDL  (R) Leg WDL  (L) Leg WDL  (R) Heel/Foot/Toe WDL  (L) Heel/Foot/Toe WDL          Devices In Places Tele Box and Blood Pressure Cuff      Interventions In Place Pillows, Q2 Turns, and Low Air Loss Mattress    Possible Skin Injury No    Pictures Uploaded Into Epic N/A  Wound Consult Placed N/A  RN Wound Prevention Protocol Ordered No

## 2024-09-03 NOTE — ED TRIAGE NOTES
"Patient presents to the ER with the following complaints:    Chief Complaint   Patient presents with    Bloody Stools     X4 the past two days.     Dizziness     Upon ambulation.        /64   Pulse 79   Temp 36 °C (96.8 °F) (Temporal)   Resp 14   Ht 1.753 m (5' 9\")   Wt 74.6 kg (164 lb 8 oz)   SpO2 96%   BMI 24.29 kg/m²     "

## 2024-09-03 NOTE — ED NOTES
Patient's son states that his father has had black stool. Pt has been experiencing weakness. Pt's son states that the his father has had dark stool since yesterday.

## 2024-09-03 NOTE — ED PROVIDER NOTES
ER Provider Note    Scribed for Michael Menendez M.D. by Mirian Carty. 9/3/2024   11:02 AM    Primary Care Provider: Danielle Dawson M.D.    CHIEF COMPLAINT  Chief Complaint   Patient presents with    Bloody Stools     X4 the past two days.     Dizziness     Upon ambulation.      EXTERNAL RECORDS REVIEWED      HPI/ROS  LIMITATION TO HISTORY   Select: : None  OUTSIDE HISTORIAN(S):  Family at bedside assisting history.     Miri Joseph is a 84 y.o. male who presents to the ED for evaluation of bloody stool onset two days ago. He describes the stool as bright red. The patient states he also has lightheadedness and leg pain, but denies any chest pain or abdominal pain. No alleviating factors were noted. He states that he walks around 4 miles per day. He states that the past two days he has been unable to walk due to the leg pain. The patient has a history of diabetes.  He has a history of hemorrhoids several years ago. The patient does not take aspirin or NSAIDs.    PAST MEDICAL HISTORY  Past Medical History:   Diagnosis Date    Diabetes (HCC)     oral meds       SURGICAL HISTORY  Past Surgical History:   Procedure Laterality Date    TURP-VAPOR  8/14/2017    Procedure: Green Light Photorelective Vaporization of the Prostate;  Surgeon: Sagar Boykin M.D.;  Location: SURGERY [facility];  Service:        FAMILY HISTORY  History reviewed. No pertinent family history.    SOCIAL HISTORY   reports that he has never smoked. He has never used smokeless tobacco. He reports that he does not drink alcohol and does not use drugs.    CURRENT MEDICATIONS  Previous Medications    ATORVASTATIN (LIPITOR) 10 MG TAB    Take 10 mg by mouth every evening.    CIPROFLOXACIN (CIPRO) 500 MG TAB    Take 1 Tab by mouth 2 times a day.    GLIMEPIRIDE (AMARYL) 1 MG TABLET    Take 1 mg by mouth every morning.    METFORMIN (GLUCOPHAGE) 1000 MG TABLET    Take 1,000 mg by mouth 2 times a day.    NON FORMULARY REQUEST    Take 1 Tab by  "mouth every day. Cadifast from [facility] for eyes    OXYCODONE-ACETAMINOPHEN (PERCOCET) 5-325 MG TAB    Take 1 Tab by mouth every four hours as needed for Moderate Pain.    PHENAZOPYRIDINE (PYRIDIUM) 200 MG TAB    Take 1 Tab by mouth 3 times a day as needed (burning with urination).    SILODOSIN (RAPAFLO) 8 MG CAP CAPSULE    Take 8 mg by mouth ONE-HALF HOUR AFTER BREAKFAST.    SITAGLIPTIN (JANUVIA) 100 MG TAB    Take 100 mg by mouth every day.       ALLERGIES  No Known Allergies     PHYSICAL EXAM  /64   Pulse 79   Temp 36 °C (96.8 °F) (Temporal)   Resp 14   Ht 1.753 m (5' 9\")   Wt 74.6 kg (164 lb 8 oz)   SpO2 96%   BMI 24.29 kg/m²      Nursing note and vitals reviewed.  Constitutional: Pale appearing.  Elderly.  HENT: Head is normocephalic and atraumatic. Oropharynx is clear and moist without exudate or erythema.   Eyes: Pupils are equal, round, and reactive to light. Conjunctiva are pale.   Cardiovascular: Normal rate and regular rhythm. No murmur heard. Normal radial pulses.  Pulmonary/Chest: Breath sounds normal. No wheezes or rales.   Abdominal: Soft. No distention  Unable to elicit any abdominal tenderness.   Musculoskeletal: Extremities exhibit normal range of motion without edema or tenderness.   Neurological: Awake, alert and oriented to person, place, and time. No focal deficits noted.  Skin: Skin is warm and dry. No rash. Pale nailbeds.  Pale palmar creases.  Pale conjunctiva.  Psychiatric: Normal mood and affect. Appropriate for clinical situation    DIAGNOSTIC STUDIES    Labs:   Results for orders placed or performed during the hospital encounter of 09/03/24   CBC WITH DIFFERENTIAL   Result Value Ref Range    WBC 3.2 (L) 4.8 - 10.8 K/uL    RBC 1.59 (L) 4.70 - 6.10 M/uL    Hemoglobin 5.5 (LL) 14.0 - 18.0 g/dL    Hematocrit 15.6 (LL) 42.0 - 52.0 %    MCV 98.1 (H) 81.4 - 97.8 fL    MCH 34.6 (H) 27.0 - 33.0 pg    MCHC 35.3 32.3 - 36.5 g/dL    RDW 57.4 (H) 35.9 - 50.0 fL    Platelet Count 1 (LL) 164 " - 446 K/uL    Neutrophils-Polys 19.20 (L) 44.00 - 72.00 %    Lymphocytes 73.60 (H) 22.00 - 41.00 %    Monocytes 4.70 0.00 - 13.40 %    Eosinophils 2.20 0.00 - 6.90 %    Basophils 0.00 0.00 - 1.80 %    Immature Granulocytes 0.30 0.00 - 0.90 %    Nucleated RBC 0.00 0.00 - 0.20 /100 WBC    Neutrophils (Absolute) 0.62 (L) 1.82 - 7.42 K/uL    Lymphs (Absolute) 2.37 1.00 - 4.80 K/uL    Monos (Absolute) 0.15 0.00 - 0.85 K/uL    Eos (Absolute) 0.07 0.00 - 0.51 K/uL    Baso (Absolute) 0.00 0.00 - 0.12 K/uL    Immature Granulocytes (abs) 0.01 0.00 - 0.11 K/uL    NRBC (Absolute) 0.00 K/uL   COMP METABOLIC PANEL   Result Value Ref Range    Sodium 131 (L) 135 - 145 mmol/L    Potassium 4.6 3.6 - 5.5 mmol/L    Chloride 99 96 - 112 mmol/L    Co2 21 20 - 33 mmol/L    Anion Gap 11.0 7.0 - 16.0    Glucose 206 (H) 65 - 99 mg/dL    Bun 25 (H) 8 - 22 mg/dL    Creatinine 0.84 0.50 - 1.40 mg/dL    Calcium 8.6 8.4 - 10.2 mg/dL    Correct Calcium 9.0 8.5 - 10.5 mg/dL    AST(SGOT) 26 12 - 45 U/L    ALT(SGPT) 27 2 - 50 U/L    Alkaline Phosphatase 63 30 - 99 U/L    Total Bilirubin 0.6 0.1 - 1.5 mg/dL    Albumin 3.5 3.2 - 4.9 g/dL    Total Protein 6.0 6.0 - 8.2 g/dL    Globulin 2.5 1.9 - 3.5 g/dL    A-G Ratio 1.4 g/dL   APTT   Result Value Ref Range    APTT 27.0 24.7 - 36.0 sec   PROTHROMBIN TIME (INR)   Result Value Ref Range    PT 14.2 12.0 - 14.6 sec    INR 1.05 0.87 - 1.13   COD (ADULT)   Result Value Ref Range    ABO Grouping Only AB     Rh Grouping Only POS     Antibody Screen-Cod NEG    ABO Rh Confirm   Result Value Ref Range    ABO Rh Confirm AB POS    ESTIMATED GFR   Result Value Ref Range    GFR (CKD-EPI) 87 >60 mL/min/1.73 m 2   PLATELET ESTIMATE   Result Value Ref Range    Plt Estimation SEE BELOW    MORPHOLOGY   Result Value Ref Range    RBC Morphology Normal    IMMATURE PLT FRACTION   Result Value Ref Range    Imm. Plt Fraction 6.8 0.6 - 13.1 %   EKG (NOW)   Result Value Ref Range    Report       [facility]  Emergency Dept.    Test Date:  2024  Pt Name:    STEVEN BECKER               Department: [facility]  MRN:        [id number]                      Room:       -ROOM 8  Gender:     Male                         Technician: EM  :        [id number]                   Requested By:MARGO MCMANUS  Order #:    [id number]                    Reading MD: MARGO MCMANUS MD    Measurements  Intervals                                Axis  Rate:       72                           P:          35  PA:         194                          QRS:        33  QRSD:       76                           T:          37  QT:         371  QTc:        406    Interpretive Statements  Sinus rhythm  Compared to ECG 2017 14:03:10  No significant changes  Electronically Signed On 2024 12:07:10 PDT by MARGO MCMANUS MD         EKG:   I have independently interpreted this EKG as detailed above.       Radiology:   This attending emergency physician has independently interpreted the diagnostic imaging associated with this visit and is awaiting the final reading from the radiologist.   Preliminary interpretation is a follows: Pending    Radiologist interpretation:   CT-CTA ABDOMEN WITH & W/O-POST PROCESS    (Results Pending)   CT-CHEST (THORAX) WITH    (Results Pending)        INITIAL ASSESSMENT AND PLAN    11:02 AM - Patient was evaluated at bedside. Ordered for EKG, APTT, CMP, CBC w/ diff, COD, and ABO Rh Confirm to evaluate. The patient will be medicated with NS 1,000 mL for his symptoms. Patient verbalizes understanding and support with my plan of care.  Differential diagnoses include but not limited to: GI bleed vs anemia.      12:25 PM - I reevaluated the patient at bedside. I discussed the patient's diagnostic study results which show anemia. I informed the patient of my plan to admit to the ICU today given the patient's current presentation and diagnostic study results. The patient was given an opportunity to ask questions. The  patient verbalizes understanding and support with my plan for admission.     ED Observation Status? No; Patient does not meet criteria for ED Observation.     CRITICAL CARE  The very real possibilty of a deterioration of this patient's condition required the highest level of my preparedness for sudden, emergent intervention.  I provided critical care services, which included medication orders, frequent reevaluations of the patient's condition and response to treatment, ordering and reviewing test results, and discussing the case with various consultants.  The critical care time associated with the care of the patient was 35 minutes. Review chart for interventions. This time is exclusive of any other billable procedures.      COURSE AND MEDICAL DECISION MAKING    Patient presents today with a history of a GI bleed.  Laboratory studies remarkable for pancytopenia with a white blood cell count of 3.2.  Hemoglobin very low at 5.5.  Platelet count very low at 1.  I have ordered a platelet pheresis as well as blood transfusion.  Patient will be admitted to the intensive care unit.      DISPOSITION AND DISCUSSIONS    I have discussed management of the patient with the following physicians and GREGORY's:  Dr. Samaniego, ICU, who will admit    Discussion of management with other [facility] or appropriate source(s): None     DISPOSITION:  Patient will be hospitalized by Dr. Samaniego in critical condition.    FINAL DIAGNOSIS  1. Pancytopenia (HCC)    2. Thrombocytopenia (HCC)    3. Symptomatic anemia    4. Gastrointestinal hemorrhage, unspecified gastrointestinal hemorrhage type    The critical care time associated with the care of the patient was 35 minutes.     Mirian ARCE (Ashley), am scribing for, and in the presence of, Michael Menendez M.D..    Electronically signed by: Mirian Carty (Ashley), 9/3/2024    Michael ARCE M.D. personally performed the services described in this documentation, as scribed by Mirian Carty  in my presence, and it is both accurate and complete.      The note accurately reflects work and decisions made by me.  Michael Menendez M.D.  9/3/2024  12:31 PM

## 2024-09-03 NOTE — ED NOTES
Medication history reviewed with pt and pts son. Med rec is complete.   Allergies reviewed, per pt  Interviewed pt with son at bedside with permission from pt.    Pts son translated for pt, pts son had a list of medications that he read to this writer.    Patient has not had any outpatient antibiotics in the last 30 days.    Pt is not on any anticoagulants

## 2024-09-03 NOTE — PLAN OF CARE (IOPOC)
ICU Update Note    Spoke with Dr. Chandler Neri of [place]c [place] [facility]. He requests Mr. Joseph be transferred [place] [facility] [place]ology consultation for new pancytopenia or unknown origin. I have contacted the transfer center.    Bret Samaniego MD  Pulmonary and Critical Care Medicine  [facility]

## 2024-09-03 NOTE — ASSESSMENT & PLAN NOTE
- transfuse pRBCs for Hb goal 7  - transfuse platelets for PLT goal 50  - 2x large bore PIV  - consult GI for upper and lower endoscopy  - IV PPI BID

## 2024-09-03 NOTE — CONSULTS
Critical Care/Pulmonary Consultation    Date of Service: 9/3/2024    Date of Admission:  9/3/2024 10:41 AM    Consulting Physician: Michael Menendez M.D.    Chief Complaint:  Bloody Stools (X4 the past two days. ) and Dizziness (Upon ambulation. )    History of Present Illness: Miri Joseph is a 84 y.o. male with a past medical history of never smoker, hyperlipidemia, diabetes, BPH presents for 1 day of bloody stools. Found to be pancytopenic.    Patient describes that for the past few months he has noticed little bit of blood in his stool.  He was not too worried about this.  However over the last 24 hours he has had 4-5 bloody bowel movements.  He describes the blood as a red rather than black.  He does not know if he has ever had a diagnosis of hemorrhoids.  Today he endorses mild shortness of breath and mild dizziness but says he is comfortable in his hospital bed.  His son is a physician in [place].    ED Course: Received CT chest abdomen pelvis which does not reveal any metastatic malignancy.  He was ordered for 1 unit of platelets and 1 unit of packed red blood cells.  He received 1 L of normal saline.        Review of Systems   Constitutional:  Negative for chills and fever.   HENT:  Negative for congestion and sore throat.    Eyes:  Negative for blurred vision and double vision.   Respiratory:  Positive for shortness of breath. Negative for cough, hemoptysis and wheezing.    Cardiovascular:  Negative for chest pain and palpitations.   Gastrointestinal:  Negative for heartburn and nausea.   Skin:  Negative for itching and rash.   Neurological:  Positive for dizziness.   Psychiatric/Behavioral:  Negative for depression. The patient is not nervous/anxious.        Home Medications       Reviewed by Elena Zuñiga (Pharmacy Tech) on 09/03/24 at 1231  Med List Status: Complete     Medication Last Dose Status   Acetaminophen (TYLENOL PO) 8/29/2024 Active   atorvastatin (LIPITOR) 10 MG Tab 9/2/2024  "Active   glipiZIDE ER (GLUCOTROL XL) 5 MG TABLET SR 24 HR 9/3/2024 Active   metformin (GLUCOPHAGE) 1000 MG tablet 9/3/2024 Active   Non Formulary Request 9/2/2024 Active   sitagliptin (JANUVIA) 100 MG Tab 9/3/2024 Active                  Audit from Redirected Encounters    **Home medications have not yet been reviewed for this encounter**         Social History     Tobacco Use    Smoking status: Never    Smokeless tobacco: Never   Substance Use Topics    Alcohol use: No    Drug use: No        Past Medical History:   Diagnosis Date    Diabetes (HCC)     oral meds       Past Surgical History:   Procedure Laterality Date    TURP-VAPOR  8/14/2017    Procedure: Green Light Photorelective Vaporization of the Prostate;  Surgeon: Sagar Boykin M.D.;  Location: SURGERY [facility];  Service:        Allergies: Patient has no known allergies.    History reviewed. No pertinent family history.    Vitals:    09/03/24 1021 09/03/24 1059 09/03/24 1129 09/03/24 1214   Height: 1.753 m (5' 9\")      Weight: 74.6 kg (164 lb 8 oz)      Weight % change since last entry.: 0 %      BP: 121/64 113/56 110/54 119/60   Pulse: 79 74 73 72   BMI (Calculated): 24.29      Resp: 14 19 (!) 22    Temp: 36 °C (96.8 °F)      TempSrc: Temporal       09/03/24 1229 09/03/24 1305 09/03/24 1310 09/03/24 1314   Height:       Weight:       Weight % change since last entry.:       BP: 130/60 124/84 124/84 119/58   Pulse: 70 73 78 70   BMI (Calculated):       Resp: (!) 21 18  20   Temp:  36 °C (96.8 °F)     TempSrc:        09/03/24 1329 09/03/24 1344 09/03/24 1440 09/03/24 1446   Height:   1.753 m (5' 9\")    Weight:   74.3 kg (163 lb 12.8 oz)    Weight % change since last entry.:   -0.42 %    BP: 121/55  (!) 148/67 130/60   Pulse: 71 70 79 74   BMI (Calculated):   24.19    Resp: (!) 23 19 (!) 26 20   Temp:   36.4 °C (97.5 °F)    TempSrc:   Temporal     09/03/24 1500   Height:    Weight:    Weight % change since last entry.:    BP: 129/61   Pulse:    BMI " (Calculated):    Resp:    Temp: 36.4 °C (97.5 °F)   TempSrc: Temporal       Physical Examination  General: alert, oriented, dry mucous membranes  Neuro/Psych: mood and affect appropriate  HEENT: trachea midline, no stridor  CVS: RRR, s1, s2  Respiratory: CTAB  Abdomen/: soft, NT, ND  Extremities: WWP, no edema  Skin: no rashes or bruising    No intake or output data in the 24 hours ending 09/03/24 1213    Recent Labs     09/03/24  1109   WBC 3.2*   NEUTSPOLYS 19.20*   LYMPHOCYTES 73.60*   MONOCYTES 4.70   EOSINOPHILS 2.20   BASOPHILS 0.00   ASTSGOT 26   ALTSGPT 27   ALKPHOSPHAT 63   TBILIRUBIN 0.6         Recent Labs     09/03/24  1109   SODIUM 131*   POTASSIUM 4.6   CHLORIDE 99   CO2 21   BUN 25*   CREATININE 0.84   CALCIUM 8.6     Recent Labs     09/03/24  1109   ALTSGPT 27   ASTSGOT 26   ALKPHOSPHAT 63   TBILIRUBIN 0.6   GLUCOSE 206*         CT-CHEST (THORAX) WITH   Final Result      1.  Respiratory motion degraded study.      2.  No evidence of focal consolidation or pleural effusion.      3.  Atherosclerotic change of the aorta and coronary arteries.      4.   Fatty change of the liver.      Fleischner Society pulmonary nodule recommendations:   Not Applicable         CT-CTA ABDOMEN WITH & W/O-POST PROCESS   Final Result      1.  No evidence of active gastrointestinal hemorrhage.      2.  Atherosclerotic change of aorta. No evidence of aneurysm or dissection.      3.  Widely patent celiac, superior mesenteric and inferior mesenteric arteries.      4.  Bibasilar atelectasis.      5.  Fatty liver.      6.  Enlarged prostate gland.          Patient Active Problem List   Diagnosis    UTI (urinary tract infection)    Pancytopenia (HCC)    Thrombocytopenia (HCC)    Other neutropenia (HCC)    GI bleed    Type 2 diabetes mellitus, without long-term current use of insulin (HCC)       Assessment and Plan:    Miri Joseph is a 84 y.o. male with a past medical history of never smoker, hyperlipidemia, diabetes,  BPH presents for 1 day of bloody stools. Found to be pancytopenic.    Thrombocytopenia (HCC)  Differential includes bone marrow dyscrasia (MDS), microangiopathic hemolytic anemia (MAHA), DIC, malignancy. No schistocytes on smear and normal bilirubin point away from MAHA, normal PT/PTT points away from DIC.  - workup as per panctopenia  - send [id number], given lack of schistocytes/bilirubin, do not feel empiric plasmapheresis for TTP is indicated  - transfuse for PLT goal 50 in actively bleeding patient  - q6h CBC    Other neutropenia (HCC)  Workup as per pancytopenia  - neutropenic precautions    Pancytopenia (HCC)  - send HIV, HCV, vitamin b12, folate, LDH, ferritin  - B12 is low --> Vit B12 1,000mcg IM STAT and then 1,000mcg IM daily for 5 days  - CT CHEST/Ab/Pel w/ no evidence of solid organ maligancy  - oncology consult    GI bleed  - transfuse pRBCs for Hb goal 7  - transfuse 2 units platelets for PLT goal 50  - 2x large bore PIV  - consult GI for upper and lower endoscopy  - IV PPI BID    Type 2 diabetes mellitus, without long-term current use of insulin (HCC)  Hold home oral meds  ACHS Accu-Cheks and sliding scale insulin     DVT ppx - SCDs  GI ppx - PPI BID  Bowel reg - none  Nutrition - NPO  Glucose -  ACHS accuchecks, SSI    Bret Samaniego M.D., MD  [facility]    Patient is critically ill.   The patient continues to have: GI bleed  The vital organ system that is affected is the: GI tract  If untreated there is a high chance of deterioration into: hemorrhagic shock  And eventually death.   The critical care that I am providing today is: resuscitation with blood product, diagnosis of pancytopenia, coordination with GI  The critical that has been undertaken is medically complex.   There has been no overlap in critical care time.   Critical Care Time not including procedures: 70 minutes

## 2024-09-03 NOTE — ASSESSMENT & PLAN NOTE
Differential includes bone marrow dyscrasia (MDS), microangiopathic hemolytic anemia (MAHA), DIC, malignancy. No schistocytes on smear and normal bilirubin point away from MAHA, normal PT/PTT points away from DIC.  - workup as per panctopenia  - follow up CT CHEST/Ab/Pel  - transfuse for PLT goal 50 in actively bleeding patient  - q6h CBC

## 2024-09-04 ENCOUNTER — ANESTHESIA EVENT (OUTPATIENT)
Dept: SURGERY | Facility: MEDICAL CENTER | Age: 84
DRG: 378 | End: 2024-09-04
Payer: MEDICARE

## 2024-09-04 DIAGNOSIS — D61.818 PANCYTOPENIA (HCC): ICD-10-CM

## 2024-09-04 LAB
ANION GAP SERPL CALC-SCNC: 9 MMOL/L (ref 7–16)
BASOPHILS # BLD AUTO: 0 % (ref 0–1.8)
BASOPHILS # BLD AUTO: 0 % (ref 0–1.8)
BASOPHILS # BLD: 0 K/UL (ref 0–0.12)
BASOPHILS # BLD: 0 K/UL (ref 0–0.12)
BUN SERPL-MCNC: 20 MG/DL (ref 8–22)
CALCIUM SERPL-MCNC: 8.6 MG/DL (ref 8.4–10.2)
CHLORIDE SERPL-SCNC: 104 MMOL/L (ref 96–112)
CO2 SERPL-SCNC: 21 MMOL/L (ref 20–33)
CREAT SERPL-MCNC: 0.78 MG/DL (ref 0.5–1.4)
EOSINOPHIL # BLD AUTO: 0.02 K/UL (ref 0–0.51)
EOSINOPHIL # BLD AUTO: 0.04 K/UL (ref 0–0.51)
EOSINOPHIL NFR BLD: 0.8 % (ref 0–6.9)
EOSINOPHIL NFR BLD: 1.7 % (ref 0–6.9)
ERYTHROCYTE [DISTWIDTH] IN BLOOD BY AUTOMATED COUNT: 48.9 FL (ref 35.9–50)
ERYTHROCYTE [DISTWIDTH] IN BLOOD BY AUTOMATED COUNT: 53.9 FL (ref 35.9–50)
GFR SERPLBLD CREATININE-BSD FMLA CKD-EPI: 89 ML/MIN/1.73 M 2
GLUCOSE BLD STRIP.AUTO-MCNC: 118 MG/DL (ref 65–99)
GLUCOSE BLD STRIP.AUTO-MCNC: 168 MG/DL (ref 65–99)
GLUCOSE BLD STRIP.AUTO-MCNC: 230 MG/DL (ref 65–99)
GLUCOSE BLD STRIP.AUTO-MCNC: 90 MG/DL (ref 65–99)
GLUCOSE SERPL-MCNC: 118 MG/DL (ref 65–99)
HAPTOGLOB SERPL-MCNC: 101 MG/DL (ref 30–200)
HCT VFR BLD AUTO: 21 % (ref 42–52)
HCT VFR BLD AUTO: 29.1 % (ref 42–52)
HGB BLD-MCNC: 10.2 G/DL (ref 14–18)
HGB BLD-MCNC: 7.4 G/DL (ref 14–18)
IMM GRANULOCYTES # BLD AUTO: 0 K/UL (ref 0–0.11)
IMM GRANULOCYTES # BLD AUTO: 0.01 K/UL (ref 0–0.11)
IMM GRANULOCYTES NFR BLD AUTO: 0 % (ref 0–0.9)
IMM GRANULOCYTES NFR BLD AUTO: 0.4 % (ref 0–0.9)
LYMPHOCYTES # BLD AUTO: 1.51 K/UL (ref 1–4.8)
LYMPHOCYTES # BLD AUTO: 1.62 K/UL (ref 1–4.8)
LYMPHOCYTES NFR BLD: 63.7 % (ref 22–41)
LYMPHOCYTES NFR BLD: 69.5 % (ref 22–41)
MAGNESIUM SERPL-MCNC: 1.9 MG/DL (ref 1.5–2.5)
MCH RBC QN AUTO: 31.7 PG (ref 27–33)
MCH RBC QN AUTO: 33.5 PG (ref 27–33)
MCHC RBC AUTO-ENTMCNC: 35.1 G/DL (ref 32.3–36.5)
MCHC RBC AUTO-ENTMCNC: 35.2 G/DL (ref 32.3–36.5)
MCV RBC AUTO: 90.4 FL (ref 81.4–97.8)
MCV RBC AUTO: 95 FL (ref 81.4–97.8)
MONOCYTES # BLD AUTO: 0.12 K/UL (ref 0–0.85)
MONOCYTES # BLD AUTO: 0.14 K/UL (ref 0–0.85)
MONOCYTES NFR BLD AUTO: 5.2 % (ref 0–13.4)
MONOCYTES NFR BLD AUTO: 5.9 % (ref 0–13.4)
NEUTROPHILS # BLD AUTO: 0.54 K/UL (ref 1.82–7.42)
NEUTROPHILS # BLD AUTO: 0.7 K/UL (ref 1.82–7.42)
NEUTROPHILS NFR BLD: 23.2 % (ref 44–72)
NEUTROPHILS NFR BLD: 29.6 % (ref 44–72)
NRBC # BLD AUTO: 0 K/UL
NRBC # BLD AUTO: 0.02 K/UL
NRBC BLD-RTO: 0 /100 WBC (ref 0–0.2)
NRBC BLD-RTO: 0.9 /100 WBC (ref 0–0.2)
PHOSPHATE SERPL-MCNC: 2.9 MG/DL (ref 2.5–4.5)
PLATELET # BLD AUTO: 66 K/UL (ref 164–446)
PLATELET # BLD AUTO: 86 K/UL (ref 164–446)
PLATELETS.RETICULATED NFR BLD AUTO: 0.3 % (ref 0.6–13.1)
PLATELETS.RETICULATED NFR BLD AUTO: 0.4 % (ref 0.6–13.1)
PMV BLD AUTO: 8.1 FL (ref 9–12.9)
PMV BLD AUTO: 8.4 FL (ref 9–12.9)
POTASSIUM SERPL-SCNC: 4.4 MMOL/L (ref 3.6–5.5)
RBC # BLD AUTO: 2.21 M/UL (ref 4.7–6.1)
RBC # BLD AUTO: 3.22 M/UL (ref 4.7–6.1)
SODIUM SERPL-SCNC: 134 MMOL/L (ref 135–145)
WBC # BLD AUTO: 2.3 K/UL (ref 4.8–10.8)
WBC # BLD AUTO: 2.4 K/UL (ref 4.8–10.8)

## 2024-09-04 PROCEDURE — 94760 N-INVAS EAR/PLS OXIMETRY 1: CPT

## 2024-09-04 PROCEDURE — 700102 HCHG RX REV CODE 250 W/ 637 OVERRIDE(OP): Performed by: STUDENT IN AN ORGANIZED HEALTH CARE EDUCATION/TRAINING PROGRAM

## 2024-09-04 PROCEDURE — 84100 ASSAY OF PHOSPHORUS: CPT

## 2024-09-04 PROCEDURE — 85025 COMPLETE CBC W/AUTO DIFF WBC: CPT

## 2024-09-04 PROCEDURE — 82962 GLUCOSE BLOOD TEST: CPT | Mod: 91

## 2024-09-04 PROCEDURE — 99291 CRITICAL CARE FIRST HOUR: CPT | Performed by: STUDENT IN AN ORGANIZED HEALTH CARE EDUCATION/TRAINING PROGRAM

## 2024-09-04 PROCEDURE — 85055 RETICULATED PLATELET ASSAY: CPT

## 2024-09-04 PROCEDURE — 36430 TRANSFUSION BLD/BLD COMPNT: CPT

## 2024-09-04 PROCEDURE — 86923 COMPATIBILITY TEST ELECTRIC: CPT

## 2024-09-04 PROCEDURE — 80048 BASIC METABOLIC PNL TOTAL CA: CPT

## 2024-09-04 PROCEDURE — 83735 ASSAY OF MAGNESIUM: CPT

## 2024-09-04 PROCEDURE — 770020 HCHG ROOM/CARE - TELE (206)

## 2024-09-04 PROCEDURE — 700101 HCHG RX REV CODE 250: Performed by: NURSE PRACTITIONER

## 2024-09-04 PROCEDURE — 700111 HCHG RX REV CODE 636 W/ 250 OVERRIDE (IP): Performed by: STUDENT IN AN ORGANIZED HEALTH CARE EDUCATION/TRAINING PROGRAM

## 2024-09-04 PROCEDURE — P9016 RBC LEUKOCYTES REDUCED: HCPCS

## 2024-09-04 RX ADMIN — PANTOPRAZOLE SODIUM 40 MG: 40 INJECTION, POWDER, FOR SOLUTION INTRAVENOUS at 17:08

## 2024-09-04 RX ADMIN — CYANOCOBALAMIN 1000 MCG: 1000 INJECTION, SOLUTION INTRAMUSCULAR at 05:20

## 2024-09-04 RX ADMIN — POLYETHYLENE GLYCOL-3350 AND ELECTROLYTES 4 L: 236; 6.74; 5.86; 2.97; 22.74 POWDER, FOR SOLUTION ORAL at 17:08

## 2024-09-04 RX ADMIN — INSULIN HUMAN 2 UNITS: 100 INJECTION, SOLUTION PARENTERAL at 12:07

## 2024-09-04 RX ADMIN — PANTOPRAZOLE SODIUM 40 MG: 40 INJECTION, POWDER, FOR SOLUTION INTRAVENOUS at 05:21

## 2024-09-04 ASSESSMENT — ENCOUNTER SYMPTOMS
BLOOD IN STOOL: 1
HEMOPTYSIS: 0
COUGH: 0
SORE THROAT: 0
MUSCULOSKELETAL NEGATIVE: 1
DOUBLE VISION: 0
HEARTBURN: 0
WEAKNESS: 1
PALPITATIONS: 0
CARDIOVASCULAR NEGATIVE: 1
DIZZINESS: 0
RESPIRATORY NEGATIVE: 1
BLURRED VISION: 0
DEPRESSION: 0
FEVER: 0
EYES NEGATIVE: 1
NAUSEA: 0
SHORTNESS OF BREATH: 0
WHEEZING: 0
NERVOUS/ANXIOUS: 0
PSYCHIATRIC NEGATIVE: 1
CHILLS: 0

## 2024-09-04 ASSESSMENT — PAIN DESCRIPTION - PAIN TYPE
TYPE: ACUTE PAIN

## 2024-09-04 NOTE — PROGRESS NOTES
"Midline order placed for reliable access for fixed rate dobutamine gtt. Gtt currently infusing via wide bore PIV per protocol. Pt historically a \"hard stick\" requiring US IV placement. Discussed advanced IV access at length with MD and pharmacist. Per VAT RN, a midline is not appropriate at this time d/t extravasation risk and difficulty of detecting extravasation. Pt not amenable to CVC placement at this time. Will continue close monitoring of PIVs.   Cardiology bhakti, MD unsure if he will be adjusting dobutamine dose at this time.  "

## 2024-09-04 NOTE — CARE PLAN
The patient is Watcher - Medium risk of patient condition declining or worsening    Shift Goals  Clinical Goals: Replace blood  Patient Goals: comfort  Family Goals: VALERIO    Progress made toward(s) clinical / shift goals:      Patient is not progressing towards the following goals:      Problem: Urinary Elimination:  Goal: Ability to reestablish a normal urinary elimination pattern will improve  Outcome: Not Progressing     Problem: Skin Integrity  Goal: Risk for impaired skin integrity will decrease  Outcome: Not Progressing     Problem: Mobility  Goal: Risk for activity intolerance will decrease  Outcome: Not Progressing

## 2024-09-04 NOTE — PROGRESS NOTES
Critical Care/Pulmonary Consultation    Date of Service: 9/3/2024    Date of Admission:  9/3/2024 10:41 AM    Consulting Physician: Michael Menendez M.D.    Chief Complaint:  Bloody Stools (X4 the past two days. ) and Dizziness (Upon ambulation. )    History of Present Illness: Miri Joseph is a 84 y.o. male with a past medical history of never smoker, hyperlipidemia, diabetes, BPH presents for 1 day of bloody stools. Found to be newly pancytopenic with a platelet count of 1.    Patient describes that for the past few months he has noticed little bit of blood in his stool.  He was not too worried about this.  However over the last 24 hours he has had 4-5 bloody bowel movements.  He describes the blood as a red rather than black.  He does not know if he has ever had a diagnosis of hemorrhoids.  Today he endorses mild shortness of breath and mild dizziness but says he is comfortable in his hospital bed.  His son is a physician in [place].    ED Course: Received CT chest abdomen pelvis which does not reveal any metastatic malignancy.  He was ordered for 1 unit of platelets and 1 unit of packed red blood cells.  He received 1 L of normal saline.    9/3  Received 2 units PLT with rise in PLT from 1 -->80s  Received 1 unit pRBC with appropriate rise in Hb  Discussed with oncology possibility of MDS vs. Megaloblastic anemia given very low b12 level --> b12 supplementation started  No more bloody bowel movements      Review of Systems   Constitutional:  Negative for chills and fever.   HENT:  Negative for congestion and sore throat.    Eyes:  Negative for blurred vision and double vision.   Respiratory:  Negative for cough, hemoptysis, shortness of breath and wheezing.    Cardiovascular:  Negative for chest pain and palpitations.   Gastrointestinal:  Negative for heartburn and nausea.   Skin:  Negative for itching and rash.   Neurological:  Negative for dizziness.   Psychiatric/Behavioral:  Negative for depression. The  "patient is not nervous/anxious.        Home Medications       Reviewed by Elena Zuñiga (Pharmacy Tech) on 09/03/24 at 1231  Med List Status: Complete     Medication Last Dose Status   Acetaminophen (TYLENOL PO) 8/29/2024 Active   atorvastatin (LIPITOR) 10 MG Tab 9/2/2024 Active   glipiZIDE ER (GLUCOTROL XL) 5 MG TABLET SR 24 HR 9/3/2024 Active   metformin (GLUCOPHAGE) 1000 MG tablet 9/3/2024 Active   Non Formulary Request 9/2/2024 Active   sitagliptin (JANUVIA) 100 MG Tab 9/3/2024 Active                  Audit from Redirected Encounters    **Home medications have not yet been reviewed for this encounter**         Social History     Tobacco Use    Smoking status: Never    Smokeless tobacco: Never   Substance Use Topics    Alcohol use: No    Drug use: No        Past Medical History:   Diagnosis Date    Diabetes (HCC)     oral meds       Past Surgical History:   Procedure Laterality Date    TURP-VAPOR  8/14/2017    Procedure: Green Light Photorelective Vaporization of the Prostate;  Surgeon: Sagar Boykin M.D.;  Location: SURGERY [facility];  Service:        Allergies: Patient has no known allergies.    History reviewed. No pertinent family history.    Vitals:    09/03/24 1021 09/03/24 1059 09/03/24 1129 09/03/24 1214   Height: 1.753 m (5' 9\")      Weight: 74.6 kg (164 lb 8 oz)      Weight % change since last entry.: 0 %      BP: 121/64 113/56 110/54 119/60   Pulse: 79 74 73 72   BMI (Calculated): 24.29      Resp: 14 19 (!) 22    Temp: 36 °C (96.8 °F)      TempSrc: Temporal       09/03/24 1229 09/03/24 1300 09/03/24 1305 09/03/24 1310   Height:       Weight:       Weight % change since last entry.:       BP: 130/60  124/84 124/84   Pulse: 70 73 73 78   BMI (Calculated):       Resp: (!) 21 (!) 21 18    Temp:   36 °C (96.8 °F)    TempSrc:        09/03/24 1314 09/03/24 1329 09/03/24 1344 09/03/24 1400   Height:       Weight:       Weight % change since last entry.:       BP: 119/58 121/55     Pulse: 70 71 " "70 70   BMI (Calculated):       Resp: 20 (!) 23 19    Temp:       TempSrc:        09/03/24 1440 09/03/24 1446 09/03/24 1500 09/03/24 1600   Height: 1.753 m (5' 9\")      Weight: 74.3 kg (163 lb 12.8 oz)      Weight % change since last entry.: -0.42 %      BP: (!) 148/67 130/60 129/61 (!) 189/81   Pulse: 79 74 77 72   BMI (Calculated): 24.19      Resp: (!) 26 20 (!) 22 (!) 27   Temp: 36.4 °C (97.5 °F)  36.4 °C (97.5 °F) 36.2 °C (97.2 °F)   TempSrc: Temporal  Temporal Temporal    09/03/24 1700 09/03/24 1707 09/03/24 1725 09/03/24 1800   Height:       Weight:       Weight % change since last entry.:       BP: (!) 157/67 (!) 153/70 (!) 156/72 (!) 156/72   Pulse: 72 67  68   BMI (Calculated):       Resp: (!) 27 20  20   Temp:  36.9 °C (98.4 °F) 36.8 °C (98.3 °F) 36.8 °C (98.3 °F)   TempSrc:   Temporal Temporal    09/03/24 2000 09/03/24 2015 09/03/24 2100 09/03/24 2200   Height:       Weight:       Weight % change since last entry.:       BP:  (!) 160/68 139/67 120/58   Pulse: 69 70 66 61   BMI (Calculated):       Resp: (!) 23 20 20 14   Temp:  36.2 °C (97.2 °F)  36.3 °C (97.4 °F)   TempSrc:  Temporal  Temporal    09/03/24 2300 09/04/24 0000 09/04/24 0100 09/04/24 0200   Height:       Weight:       Weight % change since last entry.:       BP: 133/63 123/69 120/60 130/60   Pulse: 61 63 66 (!) 58   BMI (Calculated):       Resp: 20 19 20 14   Temp:  36.6 °C (97.8 °F)  36.3 °C (97.3 °F)   TempSrc:  Temporal  Temporal    09/04/24 0300 09/04/24 0400 09/04/24 0500 09/04/24 0600   Height:       Weight:       Weight % change since last entry.:       BP: 130/59 (!) 148/70 110/59 118/57   Pulse: 61 73 62 62   BMI (Calculated):       Resp: 16 14 17 18   Temp:  36.6 °C (97.8 °F)  36.6 °C (97.9 °F)   TempSrc:  Temporal  Temporal       Physical Examination  Exam unchanged from 9/3  General: alert, oriented, dry mucous membranes  Neuro/Psych: mood and affect appropriate  HEENT: trachea midline, no stridor  CVS: RRR, s1, s2  Respiratory: " CTAB  Abdomen/: soft, NT, ND  Extremities: WWP, no edema  Skin: no rashes or bruising    No intake or output data in the 24 hours ending 09/03/24 1213    Recent Labs     09/03/24  1109 09/03/24  1902 09/04/24  0305   WBC 3.2* 2.5* 2.3*   NEUTSPOLYS 19.20*  --  23.20*   LYMPHOCYTES 73.60*  --  69.50*   MONOCYTES 4.70  --  5.20   EOSINOPHILS 2.20  --  1.70   BASOPHILS 0.00  --  0.00   ASTSGOT 26  --   --    ALTSGPT 27  --   --    ALKPHOSPHAT 63  --   --    TBILIRUBIN 0.6  --   --          Recent Labs     09/03/24  1109 09/04/24  0305   SODIUM 131* 134*   POTASSIUM 4.6 4.4   CHLORIDE 99 104   CO2 21 21   BUN 25* 20   CREATININE 0.84 0.78   MAGNESIUM  --  1.9   PHOSPHORUS  --  2.9   CALCIUM 8.6 8.6     Recent Labs     09/03/24  1109 09/04/24  0305   ALTSGPT 27  --    ASTSGOT 26  --    ALKPHOSPHAT 63  --    TBILIRUBIN 0.6  --    GLUCOSE 206* 118*         CT-CHEST (THORAX) WITH   Final Result      1.  Respiratory motion degraded study.      2.  No evidence of focal consolidation or pleural effusion.      3.  Atherosclerotic change of the aorta and coronary arteries.      4.   Fatty change of the liver.      Fleischner Society pulmonary nodule recommendations:   Not Applicable         CT-CTA ABDOMEN WITH & W/O-POST PROCESS   Final Result      1.  No evidence of active gastrointestinal hemorrhage.      2.  Atherosclerotic change of aorta. No evidence of aneurysm or dissection.      3.  Widely patent celiac, superior mesenteric and inferior mesenteric arteries.      4.  Bibasilar atelectasis.      5.  Fatty liver.      6.  Enlarged prostate gland.          Patient Active Problem List   Diagnosis    UTI (urinary tract infection)    Pancytopenia (HCC)    Thrombocytopenia (HCC)    Other neutropenia (HCC)    GI bleed    Type 2 diabetes mellitus, without long-term current use of insulin (HCC)       Assessment and Plan:    Miri Joseph is a 84 y.o. male with a past medical history of never smoker, hyperlipidemia,  diabetes, BPH presents for 1 day of bloody stools. Found to be newly pancytopenic with platelet count of 1.    Pancytopenia (HCC)  Patient had normal labs with PCP 6/2024: WBC 7, Hb 13.8, Plt 194.  What ever process is at play started between June and now, and patient was [place] time [place] [facility] during that interval.  Differential diagnosis of pancytopenia includes megaloblastic anemia from severe b12 deficiency (B12 level undetectable on arrival) as well as myelodysplastic syndrome.  B12 deficiency can be autoimmune, absorption but patient denies history of chronic diarrhea while in [place].  Will send intrinsic factor antibody. Ferritin wnl, Coomb's negative, biliriubin/LDH wnl, no schistocytes, HIV/HCV negative.  - B12 is low --> Vit B12 1,000mcg IM STAT and then 1,000mcg IM daily for 5 days and then d/c  - send intrinsic factor antibody  - CT CHEST/Ab/Pel w/ no evidence of solid organ maligancy  - hematology followup as outpatient 9/6 (Dr. Chandler Neri; referral placed and Dr. Neri aware)    Thrombocytopenia (HCC)  Differential includes bone marrow dyscrasia (MDS), microangiopathic hemolytic anemia (MAHA), DIC, malignancy. No schistocytes on smear and normal bilirubin point away from MAHA, normal PT/PTT points away from DIC. ITP is possible although diagnosis of exclusion.  - workup as per panctopenia  - [id number] is pending, given lack of schistocytes/bilirubin, do not feel empiric plasmapheresis for TTP is indicated  - transfuse for PLT goal 50   - q12h CBC    Other neutropenia (HCC)  Workup as per pancytopenia  - neutropenic precautions if ANC falls < 500    GI bleed  BRBPR concern for LGIB source.  - transfuse pRBCs for Hb goal 7  - transfuse platelets for PLT goal 50  - 2x large bore PIV  - consult GI for upper and lower endoscopy  - IV PPI BID    Type 2 diabetes mellitus, without long-term current use of insulin (HCC)  Hold home oral meds  ACHS Accu-Cheks and sliding scale insulin     DVT  ppx - SCDs  GI ppx - PPI BID  Bowel reg - none  Nutrition - NPO  Glucose -  ACHS accuchecks, SSI    Bret Samaniego M.D., MD  [facility]    Patient is critically ill.   The patient continues to have: GI bleed  The vital organ system that is affected is the: GI tract  If untreated there is a high chance of deterioration into: hemorrhagic shock  And eventually death.   The critical care that I am providing today is: resuscitation with blood product, diagnosis of pancytopenia, coordination with GI  The critical that has been undertaken is medically complex.   There has been no overlap in critical care time.   Critical Care Time not including procedures: 50

## 2024-09-04 NOTE — PROGRESS NOTES
12-hour chart check complete.    Monitor Summary  Rhythm: SR  Rate: 60-68  Ectopy: -  Measurements: 0.18/0.08/0.34

## 2024-09-04 NOTE — PROGRESS NOTES
Called pt PCP office per MD request to get most recent blood work results.    6/5/24  WBC: 7  RBC: 4.40  Hgb: 13.8  Hct: 42.3  Plt: 194.  Notified Dr. Samaniego of results.

## 2024-09-04 NOTE — CARE PLAN
The patient is Watcher - Medium risk of patient condition declining or worsening    Shift Goals  Clinical Goals: monitor labs  Patient Goals: comfort, rest  Family Goals: updates    Progress made toward(s) clinical / shift goals:      Problem: Knowledge Deficit - Standard  Goal: Patient and family/care givers will demonstrate understanding of plan of care, disease process/condition, diagnostic tests and medications  Outcome: Progressing     Problem: Communication  Goal: The ability to communicate needs accurately and effectively will improve  Outcome: Progressing     Problem: Safety  Goal: Will remain free from injury  Outcome: Progressing     Problem: Safety  Goal: Will remain free from falls  Outcome: Progressing     Problem: Urinary Elimination:  Goal: Ability to reestablish a normal urinary elimination pattern will improve  Outcome: Progressing     Problem: Skin Integrity  Goal: Risk for impaired skin integrity will decrease  Outcome: Progressing     Problem: Knowledge Deficit  Goal: Knowledge of disease process/condition, treatment plan, diagnostic tests, and medications will improve  Outcome: Progressing     Problem: Risk for Fluid Volume Deficit Related to Bleeding  Goal: Fluid volume balance will be maintained  Outcome: Progressing     Problem: Risk for Fluid Volume Deficit Related to Bleeding  Goal: Patient will show no signs and symptoms of excessive bleeding  Outcome: Progressing     Problem: Risk for Bleeding  Goal: Patient will not experience bleeding as evidenced by normal blood pressure, stable hematocrit and hemoglobin levels and desired ranges for coagulation profiles  Outcome: Progressing       Patient is not progressing towards the following goals:

## 2024-09-04 NOTE — CONSULTS
Gastroenterology Consult Note:    CHELA Aleman  Date & Time note created:    9/4/2024   1:40 PM     Referring MD:  Dr. Bret Samaniego    Patient ID:  Name:             Miri Joseph   YOB: 1940  Age:                 84 y.o.  male   MRN:               [id number]                                                             Reason for Consult:      Rectal bleeding  Pancytopenia    History of Present Illness:    This is a 83 yo  male, who speaks [place], requiring , [facility] Type 2 DM, HLD who was seen in consultation for a few days of bloody stools-mixture of black and bright red. At first, he was having a couple episodes of rectal bleeding but over the past few days, rectal bleeding was more frequent. Has noticed profound fatigue and weakness with dizziness upon standing. Denies chest pain, abdominal pain, N/V, changes in bowel habits. ER VSS. Notable labs: WBC: 3.2. Hgb: 5.5. Hct: 15.6. MCV: 98.1. Platelet count: 1. Na: 131. Glucose: 206. BuN: 25. INR: 1.05. Ferritin: 320. Vitamin B12: <150. Received blood transfusions and platelets. Current Hgb: 7.4. Hct: 21. Plt: 86.     CTA: No evidence of active gastrointestinal hemorrhage. Widely patent celiac, superior mesenteric and inferior mesenteric arteries.     On occasion, he would have rectal bleeding when he was constipated.   His son, who is a MD in [place], told him it was due to hemorrhoids. Never had a colonoscopy. No history of upper GI bleed.     Patient is a vegetarian.     Review of Systems:      Review of Systems   Constitutional:  Positive for malaise/fatigue.   HENT: Negative.     Eyes: Negative.    Respiratory: Negative.     Cardiovascular: Negative.    Gastrointestinal:  Positive for blood in stool and melena.   Genitourinary: Negative.    Musculoskeletal: Negative.    Skin: Negative.    Neurological:  Positive for weakness.   Endo/Heme/Allergies: Negative.    Psychiatric/Behavioral: Negative.                Physical Exam:  Vitals/ General Appearance:   Weight/BMI: Body mass index is 24.19 kg/m².    Vitals:    09/04/24 0938 09/04/24 0958 09/04/24 1010 09/04/24 1200   BP: 111/56  98/55 129/66   Pulse:  60 (!) 58 71   Resp:  17 16 18   Temp: 36.4 °C (97.6 °F)  36.4 °C (97.5 °F) 36.2 °C (97.2 °F)   TempSrc: Temporal   Temporal   SpO2:  99% 99% 95%   Weight:       Height:         Oxygen Therapy:  Pulse Oximetry: 95 %, O2 (LPM): 0, O2 Delivery Device: None - Room Air    Physical Exam    Past Medical History:   Past Medical History:   Diagnosis Date    Diabetes (HCC)     oral meds       Past Surgical History:  Past Surgical History:   Procedure Laterality Date    TURP-VAPOR  8/14/2017    Procedure: Green Light Photorelective Vaporization of the Prostate;  Surgeon: Sagar Boykin M.D.;  Location: SURGERY [facility];  Service:        Hospital Medications:    Current Facility-Administered Medications:     polyethylene glycol-electrolytes (Golytely) solution 4 L, 4 L, Oral, Once, Jenn Christie, A.P.R.NLefty    acetaminophen (Tylenol) tablet 650 mg, 650 mg, Oral, Q6HRS PRN, Bret Samaniego M.D.    ondansetron (Zofran) syringe/vial injection 4 mg, 4 mg, Intravenous, Q4HRS PRN, Bret Samaniego M.D.    ondansetron (Zofran ODT) dispertab 4 mg, 4 mg, Oral, Q4HRS PRN, Bret Samaniego M.D.    pantoprazole (Protonix) injection 40 mg, 40 mg, Intravenous, BID, Bret Samaniego M.D., 40 mg at 09/04/24 0521    insulin regular (HumuLIN R,NovoLIN R) injection, 1-6 Units, Subcutaneous, Q6HRS, 2 Units at 09/04/24 1207 **AND** POC blood glucose manual result, , , Q6H **AND** NOTIFY MD and PharmD, , , Once **AND** Administer 20 grams of glucose (approximately 8 ounces of fruit juice) every 15 minutes PRN FSBG less than 70 mg/dL, , , PRN **AND** dextrose 50% (D50W) injection 25 g, 25 g, Intravenous, Q15 MIN PRN, Bret Samaniego M.D.    cyanocobalamin (Vitamin B-12) injection 1,000 mcg, 1,000 mcg, Intramuscular, Q30 DAYS, Bret Samaniego M.D.,  1,000 mcg at 09/03/24 1801    cyanocobalamin (Vitamin B-12) injection 1,000 mcg, 1,000 mcg, Intramuscular, DAILY, Bret Samaniego M.D., 1,000 mcg at 09/04/24 0520    Current Outpatient Medications:  Current Facility-Administered Medications   Medication Dose Route Frequency Provider Last Rate Last Admin    polyethylene glycol-electrolytes (Golytely) solution 4 L  4 L Oral Once DRE AlemanPLeftyRALESSANDRA        acetaminophen (Tylenol) tablet 650 mg  650 mg Oral Q6HRS PRN Bret Samaniego M.D.        ondansetron (Zofran) syringe/vial injection 4 mg  4 mg Intravenous Q4HRS PRN Bret Samaniego M.D.        ondansetron (Zofran ODT) dispertab 4 mg  4 mg Oral Q4HRS PRN Bret Samaniego M.D.        pantoprazole (Protonix) injection 40 mg  40 mg Intravenous BID Bret Samaniego M.D.   40 mg at 09/04/24 0521    insulin regular (HumuLIN R,NovoLIN R) injection  1-6 Units Subcutaneous Q6HRS Bret Samaniego M.D.   2 Units at 09/04/24 1207    And    dextrose 50% (D50W) injection 25 g  25 g Intravenous Q15 MIN PRN Bret Samaniego M.D.        cyanocobalamin (Vitamin B-12) injection 1,000 mcg  1,000 mcg Intramuscular Q30 DAYS Bret Samaniego M.D.   1,000 mcg at 09/03/24 1801    cyanocobalamin (Vitamin B-12) injection 1,000 mcg  1,000 mcg Intramuscular DAILY Bret Samaniego M.D.   1,000 mcg at 09/04/24 0520       Medication Allergy:  No Known Allergies    Family History:  History reviewed. No pertinent family history.    Social History:  Social History     Socioeconomic History    Marital status:      Spouse name: Not on file    Number of children: Not on file    Years of education: Not on file    Highest education level: Not on file   Occupational History    Not on file   Tobacco Use    Smoking status: Never    Smokeless tobacco: Never   Substance and Sexual Activity    Alcohol use: No    Drug use: No    Sexual activity: Not on file   Other Topics Concern    Not on file   Social History Narrative    Not on file     Social Determinants  [facility]     Financial Resource Strain: Not on file   Food Insecurity: Not on file   Transportation Needs: Not on file   Physical Activity: Not on file   Stress: Not on file   Social Connections: Not on file   Intimate Partner Violence: Not At Risk (9/3/2024)    Humiliation, Afraid, Rape, and Kick questionnaire     Fear of Current or Ex-Partner: No     Emotionally Abused: No     Physically Abused: No     Sexually Abused: No   Housing Stability: Not on file         MDM (Data Review):     Records reviewed and summarized in current documentation    Lab Data Review:  Recent Results (from the past 24 hour(s))   POCT glucose device results    Collection Time: 09/03/24  5:59 PM   Result Value Ref Range    POC Glucose, Blood 94 65 - 99 mg/dL   CBC Without Differential    Collection Time: 09/03/24  7:02 PM   Result Value Ref Range    WBC 2.5 (L) 4.8 - 10.8 K/uL    RBC 2.20 (L) 4.70 - 6.10 M/uL    Hemoglobin 7.4 (L) 14.0 - 18.0 g/dL    Hematocrit 21.1 (L) 42.0 - 52.0 %    MCV 95.9 81.4 - 97.8 fL    MCH 33.6 (H) 27.0 - 33.0 pg    MCHC 35.1 32.3 - 36.5 g/dL    RDW 50.8 (H) 35.9 - 50.0 fL    Platelet Count 88 (L) 164 - 446 K/uL    MPV 8.2 (L) 9.0 - 12.9 fL   IMMATURE PLT FRACTION    Collection Time: 09/03/24  7:02 PM   Result Value Ref Range    Imm. Plt Fraction 0.8 0.6 - 13.1 %   POCT glucose device results    Collection Time: 09/03/24 11:49 PM   Result Value Ref Range    POC Glucose, Blood 90 65 - 99 mg/dL   CBC WITH DIFFERENTIAL    Collection Time: 09/04/24  3:05 AM   Result Value Ref Range    WBC 2.3 (L) 4.8 - 10.8 K/uL    RBC 2.21 (L) 4.70 - 6.10 M/uL    Hemoglobin 7.4 (L) 14.0 - 18.0 g/dL    Hematocrit 21.0 (L) 42.0 - 52.0 %    MCV 95.0 81.4 - 97.8 fL    MCH 33.5 (H) 27.0 - 33.0 pg    MCHC 35.2 32.3 - 36.5 g/dL    RDW 53.9 (H) 35.9 - 50.0 fL    Platelet Count 86 (L) 164 - 446 K/uL    MPV 8.4 (L) 9.0 - 12.9 fL    Neutrophils-Polys 23.20 (L) 44.00 - 72.00 %    Lymphocytes 69.50 (H) 22.00 - 41.00 %    Monocytes 5.20 0.00 -  13.40 %    Eosinophils 1.70 0.00 - 6.90 %    Basophils 0.00 0.00 - 1.80 %    Immature Granulocytes 0.40 0.00 - 0.90 %    Nucleated RBC 0.90 (H) 0.00 - 0.20 /100 WBC    Neutrophils (Absolute) 0.54 (L) 1.82 - 7.42 K/uL    Lymphs (Absolute) 1.62 1.00 - 4.80 K/uL    Monos (Absolute) 0.12 0.00 - 0.85 K/uL    Eos (Absolute) 0.04 0.00 - 0.51 K/uL    Baso (Absolute) 0.00 0.00 - 0.12 K/uL    Immature Granulocytes (abs) 0.01 0.00 - 0.11 K/uL    NRBC (Absolute) 0.02 K/uL   Basic Metabolic Panel    Collection Time: 09/04/24  3:05 AM   Result Value Ref Range    Sodium 134 (L) 135 - 145 mmol/L    Potassium 4.4 3.6 - 5.5 mmol/L    Chloride 104 96 - 112 mmol/L    Co2 21 20 - 33 mmol/L    Glucose 118 (H) 65 - 99 mg/dL    Bun 20 8 - 22 mg/dL    Creatinine 0.78 0.50 - 1.40 mg/dL    Calcium 8.6 8.4 - 10.2 mg/dL    Anion Gap 9.0 7.0 - 16.0   MAGNESIUM    Collection Time: 09/04/24  3:05 AM   Result Value Ref Range    Magnesium 1.9 1.5 - 2.5 mg/dL   PHOSPHORUS    Collection Time: 09/04/24  3:05 AM   Result Value Ref Range    Phosphorus 2.9 2.5 - 4.5 mg/dL   IMMATURE PLT FRACTION    Collection Time: 09/04/24  3:05 AM   Result Value Ref Range    Imm. Plt Fraction 0.3 (L) 0.6 - 13.1 %   ESTIMATED GFR    Collection Time: 09/04/24  3:05 AM   Result Value Ref Range    GFR (CKD-EPI) 89 >60 mL/min/1.73 m 2   POCT glucose device results    Collection Time: 09/04/24  5:25 AM   Result Value Ref Range    POC Glucose, Blood 118 (H) 65 - 99 mg/dL   POCT glucose device results    Collection Time: 09/04/24 12:03 PM   Result Value Ref Range    POC Glucose, Blood 230 (H) 65 - 99 mg/dL       Imaging/Procedures Review:      CT-CHEST (THORAX) WITH   Final Result      1.  Respiratory motion degraded study.      2.  No evidence of focal consolidation or pleural effusion.      3.  Atherosclerotic change of the aorta and coronary arteries.      4.   Fatty change of the liver.      Fleischner Society pulmonary nodule recommendations:   Not Applicable          CT-CTA ABDOMEN WITH & W/O-POST PROCESS   Final Result      1.  No evidence of active gastrointestinal hemorrhage.      2.  Atherosclerotic change of aorta. No evidence of aneurysm or dissection.      3.  Widely patent celiac, superior mesenteric and inferior mesenteric arteries.      4.  Bibasilar atelectasis.      5.  Fatty liver.      6.  Enlarged prostate gland.           MDM (Assessment and Plan):     Patient Active Problem List    Diagnosis Date Noted    Pancytopenia (HCC) 09/03/2024    Thrombocytopenia (HCC) 09/03/2024    Other neutropenia (HCC) 09/03/2024    GI bleed 09/03/2024    Type 2 diabetes mellitus, without long-term current use of insulin (HCC) 09/03/2024    UTI (urinary tract infection) 08/16/2017     This is a 83 yo male who was admitted to the hospital with rectal bleeding (black and bright red), profound fatigue and weakness. Labs showed severe pancytopenia with Hgb; 5.5 Platelet count: 1. CTA negative for active GI hemorrhage. Received blood transfusions and platelets. Current Hgb: 7.4. Platelet count: 86. Vitamin B12: <150. He is a vegetarian.     ASSESSMENT:  Pancytopenia  Rectal bleeding  Thrombocytopenia  Vitamin B12 deficiency  Type 2 DM    PLAN:  EGD and colonoscopy tomorrow  Trend Hgb and transfuse >7  Keep platelets >50  Golytely prep today  Clear liquids, no reds  nPO after midnight  PPI IV BID   I will defer to the hospitalist for management of the patient's other multiple comorbid conditions.    Thank your for the opportunity to assist in the care of your patient.  Please call for any questions or concerns.    KENJI Aleman.

## 2024-09-04 NOTE — PLAN OF CARE (IOPOC)
Hematologist Chandler Neri has notified us that his outpatient clinic does not accept patient's insurance.  We will urgently refer him to cancer care specialists, phone [phone].    Bret Samaniego MD  Pulmonary and Critical Care Medicine  [facility]

## 2024-09-05 ENCOUNTER — ANESTHESIA (OUTPATIENT)
Dept: SURGERY | Facility: MEDICAL CENTER | Age: 84
DRG: 378 | End: 2024-09-05
Payer: MEDICARE

## 2024-09-05 PROBLEM — E53.8 B12 DEFICIENCY: Status: ACTIVE | Noted: 2024-09-05

## 2024-09-05 LAB
ANION GAP SERPL CALC-SCNC: 13 MMOL/L (ref 7–16)
BASOPHILS # BLD AUTO: 0 % (ref 0–1.8)
BASOPHILS # BLD AUTO: 0 % (ref 0–1.8)
BASOPHILS # BLD: 0 K/UL (ref 0–0.12)
BASOPHILS # BLD: 0 K/UL (ref 0–0.12)
BUN SERPL-MCNC: 19 MG/DL (ref 8–22)
CALCIUM SERPL-MCNC: 8.5 MG/DL (ref 8.4–10.2)
CHLORIDE SERPL-SCNC: 101 MMOL/L (ref 96–112)
CO2 SERPL-SCNC: 24 MMOL/L (ref 20–33)
CREAT SERPL-MCNC: 0.78 MG/DL (ref 0.5–1.4)
EOSINOPHIL # BLD AUTO: 0.03 K/UL (ref 0–0.51)
EOSINOPHIL # BLD AUTO: 0.03 K/UL (ref 0–0.51)
EOSINOPHIL NFR BLD: 1 % (ref 0–6.9)
EOSINOPHIL NFR BLD: 1 % (ref 0–6.9)
ERYTHROCYTE [DISTWIDTH] IN BLOOD BY AUTOMATED COUNT: 51.6 FL (ref 35.9–50)
ERYTHROCYTE [DISTWIDTH] IN BLOOD BY AUTOMATED COUNT: 55.6 FL (ref 35.9–50)
FERRITIN SERPL-MCNC: 643 NG/ML (ref 22–322)
GFR SERPLBLD CREATININE-BSD FMLA CKD-EPI: 89 ML/MIN/1.73 M 2
GLUCOSE BLD STRIP.AUTO-MCNC: 123 MG/DL (ref 65–99)
GLUCOSE BLD STRIP.AUTO-MCNC: 128 MG/DL (ref 65–99)
GLUCOSE BLD STRIP.AUTO-MCNC: 131 MG/DL (ref 65–99)
GLUCOSE BLD STRIP.AUTO-MCNC: 135 MG/DL (ref 65–99)
GLUCOSE SERPL-MCNC: 131 MG/DL (ref 65–99)
HCT VFR BLD AUTO: 27.2 % (ref 42–52)
HCT VFR BLD AUTO: 31.4 % (ref 42–52)
HGB BLD-MCNC: 10.8 G/DL (ref 14–18)
HGB BLD-MCNC: 9.7 G/DL (ref 14–18)
IMM GRANULOCYTES # BLD AUTO: 0 K/UL (ref 0–0.11)
IMM GRANULOCYTES NFR BLD AUTO: 0 % (ref 0–0.9)
IRON SATN MFR SERPL: ABNORMAL % (ref 15–55)
IRON SERPL-MCNC: 285 UG/DL (ref 50–180)
LYMPHOCYTES # BLD AUTO: 2.05 K/UL (ref 1–4.8)
LYMPHOCYTES # BLD AUTO: 2.25 K/UL (ref 1–4.8)
LYMPHOCYTES NFR BLD: 76 % (ref 22–41)
LYMPHOCYTES NFR BLD: 76 % (ref 22–41)
MANUAL DIFF BLD: NORMAL
MCH RBC QN AUTO: 32 PG (ref 27–33)
MCH RBC QN AUTO: 32.3 PG (ref 27–33)
MCHC RBC AUTO-ENTMCNC: 34.4 G/DL (ref 32.3–36.5)
MCHC RBC AUTO-ENTMCNC: 35.7 G/DL (ref 32.3–36.5)
MCV RBC AUTO: 90.7 FL (ref 81.4–97.8)
MCV RBC AUTO: 93.2 FL (ref 81.4–97.8)
MONOCYTES # BLD AUTO: 0.08 K/UL (ref 0–0.85)
MONOCYTES # BLD AUTO: 0.11 K/UL (ref 0–0.85)
MONOCYTES NFR BLD AUTO: 3 % (ref 0–13.4)
MONOCYTES NFR BLD AUTO: 3.7 % (ref 0–13.4)
NEUTROPHILS # BLD AUTO: 0.54 K/UL (ref 1.82–7.42)
NEUTROPHILS # BLD AUTO: 0.57 K/UL (ref 1.82–7.42)
NEUTROPHILS NFR BLD: 18 % (ref 44–72)
NEUTROPHILS NFR BLD: 19.3 % (ref 44–72)
NEUTS BAND NFR BLD MANUAL: 2 % (ref 0–10)
NRBC # BLD AUTO: 0 K/UL
NRBC # BLD AUTO: 0.02 K/UL
NRBC BLD-RTO: 0 /100 WBC (ref 0–0.2)
NRBC BLD-RTO: 0.7 /100 WBC (ref 0–0.2)
PLATELET # BLD AUTO: 41 K/UL (ref 164–446)
PLATELET # BLD AUTO: 62 K/UL (ref 164–446)
PLATELET BLD QL SMEAR: NORMAL
PLATELET BLD QL SMEAR: NORMAL
PLATELETS.RETICULATED NFR BLD AUTO: 0.3 % (ref 0.6–13.1)
PLATELETS.RETICULATED NFR BLD AUTO: 0.5 % (ref 0.6–13.1)
PMV BLD AUTO: 8.9 FL (ref 9–12.9)
PMV BLD AUTO: 9.4 FL (ref 9–12.9)
POTASSIUM SERPL-SCNC: 3.6 MMOL/L (ref 3.6–5.5)
RBC # BLD AUTO: 3 M/UL (ref 4.7–6.1)
RBC # BLD AUTO: 3.37 M/UL (ref 4.7–6.1)
RBC BLD AUTO: NORMAL
RBC BLD AUTO: NORMAL
SMUDGE CELLS BLD QL SMEAR: NORMAL
SODIUM SERPL-SCNC: 138 MMOL/L (ref 135–145)
TIBC SERPL-MCNC: ABNORMAL UG/DL (ref 250–450)
UIBC SERPL-MCNC: <17 UG/DL (ref 110–370)
VARIANT LYMPHS BLD QL SMEAR: NORMAL
WBC # BLD AUTO: 2.7 K/UL (ref 4.8–10.8)
WBC # BLD AUTO: 3 K/UL (ref 4.8–10.8)

## 2024-09-05 PROCEDURE — 700101 HCHG RX REV CODE 250: Performed by: STUDENT IN AN ORGANIZED HEALTH CARE EDUCATION/TRAINING PROGRAM

## 2024-09-05 PROCEDURE — 85025 COMPLETE CBC W/AUTO DIFF WBC: CPT

## 2024-09-05 PROCEDURE — 160048 HCHG OR STATISTICAL LEVEL 1-5: Performed by: INTERNAL MEDICINE

## 2024-09-05 PROCEDURE — 82728 ASSAY OF FERRITIN: CPT

## 2024-09-05 PROCEDURE — 99233 SBSQ HOSP IP/OBS HIGH 50: CPT | Performed by: INTERNAL MEDICINE

## 2024-09-05 PROCEDURE — 160002 HCHG RECOVERY MINUTES (STAT): Performed by: INTERNAL MEDICINE

## 2024-09-05 PROCEDURE — 0DB98ZX EXCISION OF DUODENUM, VIA NATURAL OR ARTIFICIAL OPENING ENDOSCOPIC, DIAGNOSTIC: ICD-10-PCS | Performed by: INTERNAL MEDICINE

## 2024-09-05 PROCEDURE — 700111 HCHG RX REV CODE 636 W/ 250 OVERRIDE (IP): Performed by: STUDENT IN AN ORGANIZED HEALTH CARE EDUCATION/TRAINING PROGRAM

## 2024-09-05 PROCEDURE — 160203 HCHG ENDO MINUTES - 1ST 30 MINS LEVEL 4: Performed by: INTERNAL MEDICINE

## 2024-09-05 PROCEDURE — 88305 TISSUE EXAM BY PATHOLOGIST: CPT | Mod: 59

## 2024-09-05 PROCEDURE — 700105 HCHG RX REV CODE 258: Performed by: INTERNAL MEDICINE

## 2024-09-05 PROCEDURE — 0DB68ZX EXCISION OF STOMACH, VIA NATURAL OR ARTIFICIAL OPENING ENDOSCOPIC, DIAGNOSTIC: ICD-10-PCS | Performed by: INTERNAL MEDICINE

## 2024-09-05 PROCEDURE — 83550 IRON BINDING TEST: CPT

## 2024-09-05 PROCEDURE — 85055 RETICULATED PLATELET ASSAY: CPT

## 2024-09-05 PROCEDURE — 160208 HCHG ENDO MINUTES - EA ADDL 1 MIN LEVEL 4: Performed by: INTERNAL MEDICINE

## 2024-09-05 PROCEDURE — 94760 N-INVAS EAR/PLS OXIMETRY 1: CPT

## 2024-09-05 PROCEDURE — 36415 COLL VENOUS BLD VENIPUNCTURE: CPT

## 2024-09-05 PROCEDURE — 85007 BL SMEAR W/DIFF WBC COUNT: CPT

## 2024-09-05 PROCEDURE — 82962 GLUCOSE BLOOD TEST: CPT | Mod: 91

## 2024-09-05 PROCEDURE — 86340 INTRINSIC FACTOR ANTIBODY: CPT

## 2024-09-05 PROCEDURE — 0DB78ZX EXCISION OF STOMACH, PYLORUS, VIA NATURAL OR ARTIFICIAL OPENING ENDOSCOPIC, DIAGNOSTIC: ICD-10-PCS | Performed by: INTERNAL MEDICINE

## 2024-09-05 PROCEDURE — 85027 COMPLETE CBC AUTOMATED: CPT

## 2024-09-05 PROCEDURE — 160009 HCHG ANES TIME/MIN: Performed by: INTERNAL MEDICINE

## 2024-09-05 PROCEDURE — 160035 HCHG PACU - 1ST 60 MINS PHASE I: Performed by: INTERNAL MEDICINE

## 2024-09-05 PROCEDURE — 80048 BASIC METABOLIC PNL TOTAL CA: CPT

## 2024-09-05 PROCEDURE — 83540 ASSAY OF IRON: CPT

## 2024-09-05 PROCEDURE — 88312 SPECIAL STAINS GROUP 1: CPT

## 2024-09-05 PROCEDURE — 0DJD8ZZ INSPECTION OF LOWER INTESTINAL TRACT, VIA NATURAL OR ARTIFICIAL OPENING ENDOSCOPIC: ICD-10-PCS | Performed by: INTERNAL MEDICINE

## 2024-09-05 PROCEDURE — 770020 HCHG ROOM/CARE - TELE (206)

## 2024-09-05 RX ORDER — OXYCODONE HCL 5 MG/5 ML
10 SOLUTION, ORAL ORAL
Status: DISCONTINUED | OUTPATIENT
Start: 2024-09-05 | End: 2024-09-05 | Stop reason: HOSPADM

## 2024-09-05 RX ORDER — ONDANSETRON 2 MG/ML
4 INJECTION INTRAMUSCULAR; INTRAVENOUS
Status: DISCONTINUED | OUTPATIENT
Start: 2024-09-05 | End: 2024-09-05 | Stop reason: HOSPADM

## 2024-09-05 RX ORDER — METOPROLOL TARTRATE 1 MG/ML
1 INJECTION, SOLUTION INTRAVENOUS
Status: DISCONTINUED | OUTPATIENT
Start: 2024-09-05 | End: 2024-09-05 | Stop reason: HOSPADM

## 2024-09-05 RX ORDER — SODIUM CHLORIDE, SODIUM LACTATE, POTASSIUM CHLORIDE, CALCIUM CHLORIDE 600; 310; 30; 20 MG/100ML; MG/100ML; MG/100ML; MG/100ML
INJECTION, SOLUTION INTRAVENOUS CONTINUOUS
Status: DISCONTINUED | OUTPATIENT
Start: 2024-09-05 | End: 2024-09-05 | Stop reason: HOSPADM

## 2024-09-05 RX ORDER — OXYCODONE HCL 5 MG/5 ML
5 SOLUTION, ORAL ORAL
Status: DISCONTINUED | OUTPATIENT
Start: 2024-09-05 | End: 2024-09-05 | Stop reason: HOSPADM

## 2024-09-05 RX ORDER — LIDOCAINE HYDROCHLORIDE 20 MG/ML
INJECTION, SOLUTION EPIDURAL; INFILTRATION; INTRACAUDAL; PERINEURAL PRN
Status: DISCONTINUED | OUTPATIENT
Start: 2024-09-05 | End: 2024-09-05 | Stop reason: SURG

## 2024-09-05 RX ORDER — HYDRALAZINE HYDROCHLORIDE 20 MG/ML
5 INJECTION INTRAMUSCULAR; INTRAVENOUS
Status: DISCONTINUED | OUTPATIENT
Start: 2024-09-05 | End: 2024-09-05 | Stop reason: HOSPADM

## 2024-09-05 RX ORDER — EPHEDRINE SULFATE 50 MG/ML
5 INJECTION, SOLUTION INTRAVENOUS
Status: DISCONTINUED | OUTPATIENT
Start: 2024-09-05 | End: 2024-09-05 | Stop reason: HOSPADM

## 2024-09-05 RX ORDER — LABETALOL HYDROCHLORIDE 5 MG/ML
5 INJECTION, SOLUTION INTRAVENOUS
Status: DISCONTINUED | OUTPATIENT
Start: 2024-09-05 | End: 2024-09-05 | Stop reason: HOSPADM

## 2024-09-05 RX ORDER — SODIUM CHLORIDE, SODIUM LACTATE, POTASSIUM CHLORIDE, CALCIUM CHLORIDE 600; 310; 30; 20 MG/100ML; MG/100ML; MG/100ML; MG/100ML
INJECTION, SOLUTION INTRAVENOUS CONTINUOUS
Status: ACTIVE | OUTPATIENT
Start: 2024-09-05 | End: 2024-09-05

## 2024-09-05 RX ORDER — HYDROMORPHONE HYDROCHLORIDE 1 MG/ML
0.2 INJECTION, SOLUTION INTRAMUSCULAR; INTRAVENOUS; SUBCUTANEOUS
Status: DISCONTINUED | OUTPATIENT
Start: 2024-09-05 | End: 2024-09-05 | Stop reason: HOSPADM

## 2024-09-05 RX ORDER — HYDROMORPHONE HYDROCHLORIDE 1 MG/ML
0.1 INJECTION, SOLUTION INTRAMUSCULAR; INTRAVENOUS; SUBCUTANEOUS
Status: DISCONTINUED | OUTPATIENT
Start: 2024-09-05 | End: 2024-09-05 | Stop reason: HOSPADM

## 2024-09-05 RX ORDER — HYDROMORPHONE HYDROCHLORIDE 1 MG/ML
0.4 INJECTION, SOLUTION INTRAMUSCULAR; INTRAVENOUS; SUBCUTANEOUS
Status: DISCONTINUED | OUTPATIENT
Start: 2024-09-05 | End: 2024-09-05 | Stop reason: HOSPADM

## 2024-09-05 RX ORDER — ALBUTEROL SULFATE 5 MG/ML
2.5 SOLUTION RESPIRATORY (INHALATION)
Status: DISCONTINUED | OUTPATIENT
Start: 2024-09-05 | End: 2024-09-05 | Stop reason: HOSPADM

## 2024-09-05 RX ORDER — HALOPERIDOL 5 MG/ML
1 INJECTION INTRAMUSCULAR
Status: DISCONTINUED | OUTPATIENT
Start: 2024-09-05 | End: 2024-09-05 | Stop reason: HOSPADM

## 2024-09-05 RX ORDER — EPHEDRINE SULFATE 50 MG/ML
INJECTION, SOLUTION INTRAVENOUS PRN
Status: DISCONTINUED | OUTPATIENT
Start: 2024-09-05 | End: 2024-09-05 | Stop reason: SURG

## 2024-09-05 RX ORDER — DIPHENHYDRAMINE HYDROCHLORIDE 50 MG/ML
12.5 INJECTION INTRAMUSCULAR; INTRAVENOUS
Status: DISCONTINUED | OUTPATIENT
Start: 2024-09-05 | End: 2024-09-05 | Stop reason: HOSPADM

## 2024-09-05 RX ADMIN — PANTOPRAZOLE SODIUM 40 MG: 40 INJECTION, POWDER, FOR SOLUTION INTRAVENOUS at 17:10

## 2024-09-05 RX ADMIN — PROPOFOL 50 MG: 10 INJECTION, EMULSION INTRAVENOUS at 15:13

## 2024-09-05 RX ADMIN — SODIUM CHLORIDE, POTASSIUM CHLORIDE, SODIUM LACTATE AND CALCIUM CHLORIDE: 600; 310; 30; 20 INJECTION, SOLUTION INTRAVENOUS at 15:08

## 2024-09-05 RX ADMIN — PROPOFOL 20 MG: 10 INJECTION, EMULSION INTRAVENOUS at 15:20

## 2024-09-05 RX ADMIN — LIDOCAINE HYDROCHLORIDE 80 MG: 20 INJECTION, SOLUTION EPIDURAL; INFILTRATION; INTRACAUDAL at 15:13

## 2024-09-05 RX ADMIN — EPHEDRINE SULFATE 5 MG: 50 INJECTION, SOLUTION INTRAVENOUS at 15:39

## 2024-09-05 RX ADMIN — PANTOPRAZOLE SODIUM 40 MG: 40 INJECTION, POWDER, FOR SOLUTION INTRAVENOUS at 06:01

## 2024-09-05 RX ADMIN — EPHEDRINE SULFATE 5 MG: 50 INJECTION, SOLUTION INTRAVENOUS at 15:33

## 2024-09-05 RX ADMIN — PROPOFOL 20 MG: 10 INJECTION, EMULSION INTRAVENOUS at 15:16

## 2024-09-05 RX ADMIN — PROPOFOL 100 MCG/KG/MIN: 10 INJECTION, EMULSION INTRAVENOUS at 15:19

## 2024-09-05 RX ADMIN — CYANOCOBALAMIN 1000 MCG: 1000 INJECTION, SOLUTION INTRAMUSCULAR at 06:08

## 2024-09-05 RX ADMIN — FENTANYL CITRATE 50 MCG: 50 INJECTION, SOLUTION INTRAMUSCULAR; INTRAVENOUS at 15:11

## 2024-09-05 ASSESSMENT — COGNITIVE AND FUNCTIONAL STATUS - GENERAL
CLIMB 3 TO 5 STEPS WITH RAILING: A LITTLE
DAILY ACTIVITIY SCORE: 24
MOBILITY SCORE: 23
SUGGESTED CMS G CODE MODIFIER DAILY ACTIVITY: CH
SUGGESTED CMS G CODE MODIFIER MOBILITY: CI
SUGGESTED CMS G CODE MODIFIER DAILY ACTIVITY: CH
WALKING IN HOSPITAL ROOM: A LITTLE
CLIMB 3 TO 5 STEPS WITH RAILING: A LITTLE
SUGGESTED CMS G CODE MODIFIER MOBILITY: CJ
DAILY ACTIVITIY SCORE: 24
MOBILITY SCORE: 22

## 2024-09-05 ASSESSMENT — PAIN DESCRIPTION - PAIN TYPE
TYPE: SURGICAL PAIN
TYPE: ACUTE PAIN
TYPE: SURGICAL PAIN
TYPE: SURGICAL PAIN
TYPE: ACUTE PAIN
TYPE: SURGICAL PAIN

## 2024-09-05 ASSESSMENT — ENCOUNTER SYMPTOMS
NAUSEA: 0
SHORTNESS OF BREATH: 0
NERVOUS/ANXIOUS: 1
ABDOMINAL PAIN: 0
CHILLS: 0
VOMITING: 0
FEVER: 0

## 2024-09-05 NOTE — OR NURSING
1543- Patient arrived from [facility] via gurney. I wore a surgical mask and pulled the curtains closed for reverse isolation precautions.    Sedation/Resp Status: Non responsive to verbal with no OPA in place. Respirations spontaneous and non-labored.    HR 81SR; VSS on 10L 02 via simple mask.  No pain or nausea reported.     1555- The patient is resting comfortably on the gurney. No pain or nausea reported.    1557-  at bedside checking on the patient.    1600- Called the patients family and left a voicemail with updates.    1604- Called the monitor tech and verified that the patients tele box could be seen by the monitor tech.    1610- No pain or nausea reported.     1415- Gave report to Yasemin JHAVERI    1625- No pain or nausea reported. Awaiting transport.    1640- The patient was transported to room 314 via [facility] by transport. VSS on RA. Dentures on the bed in a specimen bag. Surgical mask on the patient and transporter.

## 2024-09-05 NOTE — PROCEDURES
Patient Name: Miri Joseph    Esophagogastroduodenoscopy/Colonoscopy Procedure Note  Date of Procedure: 9/5/2024  Attending Physician: Alexis Anglin M.D.        Indications: Hematochezia, anemia  Instrument: Olympus Flexible Variable Stiffness Endoscope and Colonoscope  Sedation:     Anesthesiologist/Type of Anesthesia:  Anesthesiologist: Dinh Ashford D.O./General    Surgical Staff:  Circulator: Ernesto Mckee R.N.  Endoscopy Technician: Chris Camejo; Juli Holland; Paloma Norwood    Specimens removed if any:  ID Type Source Tests Collected by Time Destination   A : Biopsy, duodenal Other Other PATHOLOGY SPECIMEN Alexis Anglin M.D. 9/5/2024  3:17 PM    B : Biopsy, Gastric antrum Other Other PATHOLOGY SPECIMEN Alexis Anglin M.D. 9/5/2024  3:18 PM    C : Biopsy, Gastric body and fundus Other Other PATHOLOGY SPECIMEN Alexis Anglin M.D. 9/5/2024  3:19 PM         Procedure Details:      Pre-Anesthesia Assessment:  Prior to the procedure, a History and Physical was performed, and patient medications and allergies were reviewed. The patient’s tolerance of previous anesthesia was also reviewed. The risks and benefits of the procedure and the sedation options and risks were discussed with the patient including but not limited to infection, bleeding, aspiration, perforation, adverse medication reaction, missed diagnosis, and missed lesions. The patient verbalized understanding. All questions were answered, and informed consent was obtained.     After I obtained informed consent from the patient, the patient was placed in the left lateral position. Appropriate time-out protocol was followed: the correct patient, the correct procedure, and the correct equipment in the room were confirmed. Throughout the procedure, the patient’s blood pressure, pulse, and oxygen saturations were monitored continuously    The Olympus flexible gastroscope was gently passed through the incisoral orifice into the oral cavity and under  direct visualization the esophagus was intubated. The endoscope was passed down the esophagus, through the stomach and into the 2nd portion of the duodenum. Retroflexion was performed at the gastroesophageal junction. Color, texture, mucosa and anatomy of esophagus, stomach, and duodenum were carefully examined with the scope.  After completion of the examination, the endoscope as removed. The patient tolerated the procedure well. There were no immediate postoperative complications.     After completion of EGD, a digital rectal exam was performed. The Olympus variable stiffness colonoscope was introduced through the anus and passed under direct vision. The scope was advanced to the cecum, identified by the appendiceal orifice and ileocecal valve. The colonscopy was performed without difficulty. The patient tolerated the procedure well. The quality of the bowel preparation was good.  Findings and interventions were performed and documented below. The endoscope was then removed and the procedure was terminated. There were no immediate postoperative complications.        Findings:    EGD  Esophagus: normal-appearing.  GE junction at 38 cm from incisor    Stomach: Diffuse erythema almost with appearance of watermelon stomach in the stomach body to the fundus with complete sparing of the antrum.  Etiology unclear.  Biopsies taken of the stomach and fundus for comparison against biopsy of the antrum.  No active bleeding noted    Duodenum: first and second portion duodenum appeared normal.  Biopsies taken no active bleeding seen    Colonoscopy:   Cecum: Visualization hindered secondary to significant amount of vegetable matter.  Lesions less than 1 cm could be missed.  Extensive lavage suction and brushing of the material was performed without ability to clear. Scope was clogged and require clearing.  Ascending colon: proximal ascending colon with similar issues with prep adequacy.  Lesions less than 1 cm could be missed  no large lesion tissue noted  Transverse colon: normal  Descending colon scattered suboptimal prep due to vegetable matter that clog the scope. 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  Biopsies taken  Biopsy of duodenum taken to rule out celiac  Suboptimal prep and a colonoscopy due to vegetable matters.  Lesion less than 1 cm could be missed in the right side. No active bleeding      Recommendations:   Await pathology  Correct coagulopathy and thrombocytopenia  Outpatient hematology consultation would be strongly recommended  Serial H&H monitoring  Discussed with patient's family about prep quality consideration for repeat colonoscopy as outpatient if persistent anemia and or recurrence of bleeding once pancytopenia workup has been completed.   Resume diet      This note was generated using voice recognition software which has a small chance of producing errors of grammar and possibly content. I have made every reasonable attempt to find and correct any obvious errors, but expect that some may not be found prior to finalization of this note

## 2024-09-05 NOTE — CARE PLAN
The patient is Stable - Low risk of patient condition declining or worsening    Shift Goals  Clinical Goals: Monitor labs, mobilize, egd/colo  Patient Goals: Get better  Family Goals: have procedure    Progress made toward(s) clinical / shift goals:  Scopes complete. VSS. Diet resumed. No complaints of pain. Educated to call for assistance. Post op vitals in place.   Problem: Knowledge Deficit - Standard  Goal: Patient and family/care givers will demonstrate understanding of plan of care, disease process/condition, diagnostic tests and medications  Outcome: Progressing     Problem: Communication  Goal: The ability to communicate needs accurately and effectively will improve  Outcome: Progressing     Problem: Safety  Goal: Will remain free from injury  Outcome: Progressing  Goal: Will remain free from falls  Outcome: Progressing     Problem: Pain Management  Goal: Pain level will decrease to patient's comfort goal  Outcome: Progressing     Problem: Infection  Goal: Will remain free from infection  Outcome: Progressing     Problem: Venous Thromboembolism (VTW)/Deep Vein Thrombosis (DVT) Prevention:  Goal: Patient will participate in Venous Thrombosis (VTE)/Deep Vein Thrombosis (DVT)Prevention Measures  Outcome: Progressing     Problem: Psychosocial Needs:  Goal: Level of anxiety will decrease  Outcome: Progressing     Problem: Fluid Volume:  Goal: Will maintain balanced intake and output  Outcome: Progressing     Problem: Respiratory:  Goal: Respiratory status will improve  Outcome: Progressing     Problem: Bowel/Gastric:  Goal: Normal bowel function is maintained or improved  Outcome: Progressing  Goal: Will not experience complications related to bowel motility  Outcome: Progressing     Problem: Urinary Elimination:  Goal: Ability to reestablish a normal urinary elimination pattern will improve  Outcome: Progressing     Problem: Skin Integrity  Goal: Risk for impaired skin integrity will decrease  Outcome:  Progressing     Problem: Mobility  Goal: Risk for activity intolerance will decrease  Outcome: Progressing     Problem: Medication  Goal: Compliance with prescribed medication will improve  Outcome: Progressing     Problem: Knowledge Deficit  Goal: Knowledge of disease process/condition, treatment plan, diagnostic tests, and medications will improve  Outcome: Progressing  Goal: Knowledge of the prescribed therapeutic regimen will improve  Outcome: Progressing     Problem: Discharge Barriers/Planning  Goal: Patient's continuum of care needs will be met  Outcome: Progressing     Problem: Risk for Fluid Volume Deficit Related to Bleeding  Goal: Fluid volume balance will be maintained  Outcome: Progressing  Goal: Patient will show no signs and symptoms of excessive bleeding  Outcome: Progressing     Problem: Risk for Bleeding  Goal: Patient will not experience bleeding as evidenced by normal blood pressure, stable hematocrit and hemoglobin levels and desired ranges for coagulation profiles  Outcome: Progressing     Problem: Fall Risk  Goal: Patient will remain free from falls  Outcome: Progressing       Patient is not progressing towards the following goals:

## 2024-09-05 NOTE — ANESTHESIA TIME REPORT
Anesthesia Start and Stop Event Times       Date Time Event    9/5/2024 1448 Ready for Procedure     1508 Anesthesia Start     1546 Anesthesia Stop          Responsible Staff  09/05/24      Name Role Begin End    Dinh Ashford D.O. Anesth 1508 1540          Overtime Reason:  no overtime (within assigned shift)    Comments:

## 2024-09-05 NOTE — ANESTHESIA PREPROCEDURE EVALUATION
Case: [id number] Date/Time: 09/05/24 1439    Procedure: EGD, WITH COLONOSCOPY    Location:  ENDOSCOPIC ULTRASOUND ROOM / SURGERY [facility]    Surgeons: Alexis Anglin M.D.          Anes H&P:  PAST MEDICAL HISTORY:   84 y.o. male who presents for Procedure(s):  EGD, WITH COLONOSCOPY.  He has current and past medical problems significant for:    Past Medical History:   Diagnosis Date    Diabetes (HCC)     oral meds       SMOKING/ALCOHOL/RECREATIONAL DRUG USE:  Social History     Tobacco Use    Smoking status: Never    Smokeless tobacco: Never   Substance Use Topics    Alcohol use: No    Drug use: No     Social History     Substance and Sexual Activity   Drug Use No       PAST SURGICAL HISTORY:  Past Surgical History:   Procedure Laterality Date    TURP-VAPOR  8/14/2017    Procedure: Green Light Photorelective Vaporization of the Prostate;  Surgeon: Sagar Boykin M.D.;  Location: SURGERY [facility];  Service:        ALLERGIES:   No Known Allergies    MEDICATIONS:  No current facility-administered medications on file prior to encounter.     Current Outpatient Medications on File Prior to Encounter   Medication Sig Dispense Refill    glipiZIDE ER (GLUCOTROL XL) 5 MG TABLET SR 24 HR Take 5 mg by mouth every day.      Acetaminophen (TYLENOL PO) Take 1 Tablet by mouth 2 times a day as needed (For pain). Pt and pts son are not sure the strength (OTC)      Non Formulary Request Take 1 Tab by mouth every day. Cadifast from [facility] for eyes      atorvastatin (LIPITOR) 10 MG Tab Take 10 mg by mouth every evening.      sitagliptin (JANUVIA) 100 MG Tab Take 100 mg by mouth every day.      metformin (GLUCOPHAGE) 1000 MG tablet Take 1,000 mg by mouth 2 times a day.         LABS:  Lab Results   Component Value Date/Time    HEMOGLOBIN 10.2 (L) 09/04/2024 1517    HEMATOCRIT 29.1 (L) 09/04/2024 1517    WBC 2.4 (L) 09/04/2024 1517     Lab Results   Component Value Date/Time    SODIUM 134 (L) 09/04/2024 0305    POTASSIUM 4.4  09/04/2024 0305    CHLORIDE 104 09/04/2024 0305    CO2 21 09/04/2024 0305    GLUCOSE 118 (H) 09/04/2024 0305    BUN 20 09/04/2024 0305    CALCIUM 8.6 09/04/2024 0305         PREVIOUS ANESTHETICS: See EMR  __________________________________________    Relevant Problems   ENDO   (positive) Type 2 diabetes mellitus, without long-term current use of insulin (HCC)      Other   (positive) GI bleed   (positive) Pancytopenia (HCC)       Physical Exam    Airway   Mallampati: II  TM distance: >3 FB  Neck ROM: full       Cardiovascular - normal exam  Rhythm: regular  Rate: normal  (-) murmur     Dental   (+) upper dentures, lower dentures           Pulmonary - normal exam  Breath sounds clear to auscultation     Abdominal    Neurological - normal exam                   Anesthesia Plan    ASA 2       Plan - MAC               Induction: intravenous    Postoperative Plan: Postoperative administration of opioids is intended.    Pertinent diagnostic labs and testing reviewed    Informed Consent:    Anesthetic plan and risks discussed with patient.    Use of blood products discussed with: patient whom consented to blood products.

## 2024-09-05 NOTE — CARE PLAN
The patient is Watcher - Medium risk of patient condition declining or worsening    Shift Goals  Clinical Goals: monitor labs, NPO 0000, bowel prep  Patient Goals: get better  Family Goals: have procedure    Progress made toward(s) clinical / shift goals:    Problem: Knowledge Deficit - Standard  Goal: Patient and family/care givers will demonstrate understanding of plan of care, disease process/condition, diagnostic tests and medications  Outcome: Progressing  Note: Patient and family verbalizes understanding of POC including bowel prep for procedure, labs, and NPO status.     Problem: Fall Risk  Goal: Patient will remain free from falls  Outcome: Progressing  Note: Strip and bed alarm in place.        Patient is not progressing towards the following goals:

## 2024-09-05 NOTE — PROGRESS NOTES
Telemetry Shift Summary    Rhythm SR  HR Range 70-92  Ectopy R PVC  Measurements  0.18/0.08/0.38    Per monitor Sami perez    Normal Values  Rhythm SR  HR Range   Measurements 0.12-0.20 / 0.06-0.10 / 0.30-0.52

## 2024-09-05 NOTE — PROGRESS NOTES
Educated patient on high fall risk and implementing appropriate interventions. Patient declining bed & strip alarm. Discussed with patient about using the call light before standing. Call light within reach.

## 2024-09-05 NOTE — PROGRESS NOTES
12-hour chart check complete.     Monitor Summary  Rhythm: SR  Rate: 62-77  Ectopy: none  Measurements: 16/08/38

## 2024-09-06 ENCOUNTER — HOSPITAL ENCOUNTER (INPATIENT)
Facility: MEDICAL CENTER | Age: 84
LOS: 18 days | DRG: 809 | End: 2024-09-24
Attending: INTERNAL MEDICINE | Admitting: INTERNAL MEDICINE
Payer: MEDICARE

## 2024-09-06 VITALS
TEMPERATURE: 97.6 F | WEIGHT: 163.8 LBS | DIASTOLIC BLOOD PRESSURE: 50 MMHG | HEIGHT: 69 IN | HEART RATE: 73 BPM | SYSTOLIC BLOOD PRESSURE: 116 MMHG | OXYGEN SATURATION: 100 % | BODY MASS INDEX: 24.26 KG/M2 | RESPIRATION RATE: 18 BRPM

## 2024-09-06 DIAGNOSIS — Z87.19 HISTORY OF GI BLEED: ICD-10-CM

## 2024-09-06 DIAGNOSIS — D69.6 THROMBOCYTOPENIA (HCC): ICD-10-CM

## 2024-09-06 DIAGNOSIS — D61.818 PANCYTOPENIA (HCC): ICD-10-CM

## 2024-09-06 DIAGNOSIS — E53.8 B12 DEFICIENCY: ICD-10-CM

## 2024-09-06 PROBLEM — E78.5 DYSLIPIDEMIA: Status: ACTIVE | Noted: 2024-09-06

## 2024-09-06 LAB
ABO GROUP BLD: NORMAL
ANION GAP SERPL CALC-SCNC: 9 MMOL/L (ref 7–16)
BASOPHILS # BLD AUTO: 0 % (ref 0–1.8)
BASOPHILS # BLD AUTO: 0 % (ref 0–1.8)
BASOPHILS # BLD: 0 K/UL (ref 0–0.12)
BASOPHILS # BLD: 0 K/UL (ref 0–0.12)
BLD GP AB SCN SERPL QL: NORMAL
BUN SERPL-MCNC: 16 MG/DL (ref 8–22)
CALCIUM SERPL-MCNC: 8.3 MG/DL (ref 8.4–10.2)
CHLORIDE SERPL-SCNC: 102 MMOL/L (ref 96–112)
CO2 SERPL-SCNC: 22 MMOL/L (ref 20–33)
CREAT SERPL-MCNC: 0.82 MG/DL (ref 0.5–1.4)
EOSINOPHIL # BLD AUTO: 0 K/UL (ref 0–0.51)
EOSINOPHIL # BLD AUTO: 0.04 K/UL (ref 0–0.51)
EOSINOPHIL NFR BLD: 0 % (ref 0–6.9)
EOSINOPHIL NFR BLD: 1.8 % (ref 0–6.9)
ERYTHROCYTE [DISTWIDTH] IN BLOOD BY AUTOMATED COUNT: 49.9 FL (ref 35.9–50)
ERYTHROCYTE [DISTWIDTH] IN BLOOD BY AUTOMATED COUNT: 50 FL (ref 35.9–50)
GFR SERPLBLD CREATININE-BSD FMLA CKD-EPI: 88 ML/MIN/1.73 M 2
GLUCOSE BLD STRIP.AUTO-MCNC: 156 MG/DL (ref 65–99)
GLUCOSE BLD STRIP.AUTO-MCNC: 165 MG/DL (ref 65–99)
GLUCOSE BLD STRIP.AUTO-MCNC: 185 MG/DL (ref 65–99)
GLUCOSE BLD STRIP.AUTO-MCNC: 198 MG/DL (ref 65–99)
GLUCOSE BLD STRIP.AUTO-MCNC: 204 MG/DL (ref 65–99)
GLUCOSE SERPL-MCNC: 184 MG/DL (ref 65–99)
HCT VFR BLD AUTO: 25.7 % (ref 42–52)
HCT VFR BLD AUTO: 26.8 % (ref 42–52)
HGB BLD-MCNC: 9.1 G/DL (ref 14–18)
HGB BLD-MCNC: 9.5 G/DL (ref 14–18)
IMM GRANULOCYTES # BLD AUTO: 0.01 K/UL (ref 0–0.11)
IMM GRANULOCYTES NFR BLD AUTO: 0.5 % (ref 0–0.9)
LYMPHOCYTES # BLD AUTO: 1.36 K/UL (ref 1–4.8)
LYMPHOCYTES # BLD AUTO: 1.65 K/UL (ref 1–4.8)
LYMPHOCYTES NFR BLD: 61.8 % (ref 22–41)
LYMPHOCYTES NFR BLD: 66 % (ref 22–41)
MANUAL DIFF BLD: NORMAL
MCH RBC QN AUTO: 32 PG (ref 27–33)
MCH RBC QN AUTO: 32 PG (ref 27–33)
MCHC RBC AUTO-ENTMCNC: 35.4 G/DL (ref 32.3–36.5)
MCHC RBC AUTO-ENTMCNC: 35.4 G/DL (ref 32.3–36.5)
MCV RBC AUTO: 90.2 FL (ref 81.4–97.8)
MCV RBC AUTO: 90.5 FL (ref 81.4–97.8)
MONOCYTES # BLD AUTO: 0.12 K/UL (ref 0–0.85)
MONOCYTES # BLD AUTO: 0.13 K/UL (ref 0–0.85)
MONOCYTES NFR BLD AUTO: 5 % (ref 0–13.4)
MONOCYTES NFR BLD AUTO: 5.5 % (ref 0–13.4)
NEUTROPHILS # BLD AUTO: 0.67 K/UL (ref 1.82–7.42)
NEUTROPHILS # BLD AUTO: 0.73 K/UL (ref 1.82–7.42)
NEUTROPHILS NFR BLD: 28 % (ref 44–72)
NEUTROPHILS NFR BLD: 30.4 % (ref 44–72)
NEUTS BAND NFR BLD MANUAL: 1 % (ref 0–10)
NRBC # BLD AUTO: 0 K/UL
NRBC # BLD AUTO: 0 K/UL
NRBC BLD-RTO: 0 /100 WBC (ref 0–0.2)
NRBC BLD-RTO: 0 /100 WBC (ref 0–0.2)
PATHOLOGY CONSULT NOTE: NORMAL
PLATELET # BLD AUTO: 30 K/UL (ref 164–446)
PLATELET # BLD AUTO: 33 K/UL (ref 164–446)
PLATELET BLD QL SMEAR: NORMAL
PLATELETS.RETICULATED NFR BLD AUTO: 0.3 % (ref 0.6–13.1)
PLATELETS.RETICULATED NFR BLD AUTO: 0.8 % (ref 0.6–13.1)
PMV BLD AUTO: 8.5 FL (ref 9–12.9)
PMV BLD AUTO: 9.1 FL (ref 9–12.9)
POTASSIUM SERPL-SCNC: 3.8 MMOL/L (ref 3.6–5.5)
RBC # BLD AUTO: 2.84 M/UL (ref 4.7–6.1)
RBC # BLD AUTO: 2.97 M/UL (ref 4.7–6.1)
RBC BLD AUTO: NORMAL
RH BLD: NORMAL
SODIUM SERPL-SCNC: 133 MMOL/L (ref 135–145)
VARIANT LYMPHS BLD QL SMEAR: NORMAL
VWF CP INHIB PPP QL: NEGATIVE
WBC # BLD AUTO: 2.2 K/UL (ref 4.8–10.8)
WBC # BLD AUTO: 2.5 K/UL (ref 4.8–10.8)

## 2024-09-06 PROCEDURE — 80048 BASIC METABOLIC PNL TOTAL CA: CPT

## 2024-09-06 PROCEDURE — 700111 HCHG RX REV CODE 636 W/ 250 OVERRIDE (IP): Performed by: INTERNAL MEDICINE

## 2024-09-06 PROCEDURE — 700105 HCHG RX REV CODE 258

## 2024-09-06 PROCEDURE — 86900 BLOOD TYPING SEROLOGIC ABO: CPT

## 2024-09-06 PROCEDURE — 85055 RETICULATED PLATELET ASSAY: CPT | Mod: 91

## 2024-09-06 PROCEDURE — 700102 HCHG RX REV CODE 250 W/ 637 OVERRIDE(OP): Performed by: INTERNAL MEDICINE

## 2024-09-06 PROCEDURE — 85007 BL SMEAR W/DIFF WBC COUNT: CPT

## 2024-09-06 PROCEDURE — 770004 HCHG ROOM/CARE - ONCOLOGY PRIVATE *

## 2024-09-06 PROCEDURE — 700111 HCHG RX REV CODE 636 W/ 250 OVERRIDE (IP): Performed by: STUDENT IN AN ORGANIZED HEALTH CARE EDUCATION/TRAINING PROGRAM

## 2024-09-06 PROCEDURE — 86901 BLOOD TYPING SEROLOGIC RH(D): CPT

## 2024-09-06 PROCEDURE — 36415 COLL VENOUS BLD VENIPUNCTURE: CPT

## 2024-09-06 PROCEDURE — 700102 HCHG RX REV CODE 250 W/ 637 OVERRIDE(OP)

## 2024-09-06 PROCEDURE — 86850 RBC ANTIBODY SCREEN: CPT

## 2024-09-06 PROCEDURE — 85025 COMPLETE CBC W/AUTO DIFF WBC: CPT

## 2024-09-06 PROCEDURE — A9270 NON-COVERED ITEM OR SERVICE: HCPCS

## 2024-09-06 PROCEDURE — 82962 GLUCOSE BLOOD TEST: CPT | Mod: 91

## 2024-09-06 PROCEDURE — 82962 GLUCOSE BLOOD TEST: CPT

## 2024-09-06 PROCEDURE — 85027 COMPLETE CBC AUTOMATED: CPT

## 2024-09-06 PROCEDURE — 99223 1ST HOSP IP/OBS HIGH 75: CPT | Mod: AI | Performed by: INTERNAL MEDICINE

## 2024-09-06 RX ORDER — ONDANSETRON 2 MG/ML
4 INJECTION INTRAMUSCULAR; INTRAVENOUS EVERY 4 HOURS PRN
Status: DISCONTINUED | OUTPATIENT
Start: 2024-09-06 | End: 2024-09-24 | Stop reason: HOSPADM

## 2024-09-06 RX ORDER — ACETAMINOPHEN 325 MG/1
650 TABLET ORAL EVERY 6 HOURS PRN
Status: CANCELLED | OUTPATIENT
Start: 2024-09-06

## 2024-09-06 RX ORDER — CYANOCOBALAMIN 1000 UG/ML
1000 INJECTION, SOLUTION INTRAMUSCULAR; SUBCUTANEOUS DAILY
Status: COMPLETED | OUTPATIENT
Start: 2024-09-07 | End: 2024-09-07

## 2024-09-06 RX ORDER — ONDANSETRON 4 MG/1
4 TABLET, ORALLY DISINTEGRATING ORAL EVERY 4 HOURS PRN
Status: CANCELLED | OUTPATIENT
Start: 2024-09-06

## 2024-09-06 RX ORDER — CYANOCOBALAMIN 1000 UG/ML
1000 INJECTION, SOLUTION INTRAMUSCULAR; SUBCUTANEOUS DAILY
Status: CANCELLED | OUTPATIENT
Start: 2024-09-07 | End: 2024-09-08

## 2024-09-06 RX ORDER — PANTOPRAZOLE SODIUM 40 MG/10ML
40 INJECTION, POWDER, LYOPHILIZED, FOR SOLUTION INTRAVENOUS 2 TIMES DAILY
Status: DISCONTINUED | OUTPATIENT
Start: 2024-09-06 | End: 2024-09-21

## 2024-09-06 RX ORDER — ACETAMINOPHEN 325 MG/1
650 TABLET ORAL EVERY 6 HOURS PRN
Status: DISCONTINUED | OUTPATIENT
Start: 2024-09-06 | End: 2024-09-24 | Stop reason: HOSPADM

## 2024-09-06 RX ORDER — ONDANSETRON 2 MG/ML
4 INJECTION INTRAMUSCULAR; INTRAVENOUS EVERY 4 HOURS PRN
Status: CANCELLED | OUTPATIENT
Start: 2024-09-06

## 2024-09-06 RX ORDER — DEXTROSE MONOHYDRATE 25 G/50ML
25 INJECTION, SOLUTION INTRAVENOUS
Status: DISCONTINUED | OUTPATIENT
Start: 2024-09-06 | End: 2024-09-19

## 2024-09-06 RX ORDER — CYANOCOBALAMIN 1000 UG/ML
1000 INJECTION, SOLUTION INTRAMUSCULAR; SUBCUTANEOUS
Status: DISCONTINUED | OUTPATIENT
Start: 2024-10-03 | End: 2024-09-09

## 2024-09-06 RX ORDER — DEXTROSE MONOHYDRATE 25 G/50ML
25 INJECTION, SOLUTION INTRAVENOUS
Status: CANCELLED | OUTPATIENT
Start: 2024-09-06

## 2024-09-06 RX ORDER — CYANOCOBALAMIN 1000 UG/ML
1000 INJECTION, SOLUTION INTRAMUSCULAR; SUBCUTANEOUS
Status: CANCELLED | OUTPATIENT
Start: 2024-10-03

## 2024-09-06 RX ORDER — ATORVASTATIN CALCIUM 10 MG/1
10 TABLET, FILM COATED ORAL NIGHTLY
Status: DISCONTINUED | OUTPATIENT
Start: 2024-09-06 | End: 2024-09-24 | Stop reason: HOSPADM

## 2024-09-06 RX ORDER — PANTOPRAZOLE SODIUM 40 MG/10ML
40 INJECTION, POWDER, LYOPHILIZED, FOR SOLUTION INTRAVENOUS 2 TIMES DAILY
Status: CANCELLED | OUTPATIENT
Start: 2024-09-06

## 2024-09-06 RX ORDER — ONDANSETRON 4 MG/1
4 TABLET, ORALLY DISINTEGRATING ORAL EVERY 4 HOURS PRN
Status: DISCONTINUED | OUTPATIENT
Start: 2024-09-06 | End: 2024-09-24 | Stop reason: HOSPADM

## 2024-09-06 RX ORDER — SODIUM CHLORIDE, SODIUM LACTATE, POTASSIUM CHLORIDE, CALCIUM CHLORIDE 600; 310; 30; 20 MG/100ML; MG/100ML; MG/100ML; MG/100ML
INJECTION, SOLUTION INTRAVENOUS CONTINUOUS
Status: DISCONTINUED | OUTPATIENT
Start: 2024-09-07 | End: 2024-09-07

## 2024-09-06 RX ADMIN — PANTOPRAZOLE SODIUM 40 MG: 40 INJECTION, POWDER, FOR SOLUTION INTRAVENOUS at 05:54

## 2024-09-06 RX ADMIN — INSULIN HUMAN 1 UNITS: 100 INJECTION, SOLUTION PARENTERAL at 20:24

## 2024-09-06 RX ADMIN — CYANOCOBALAMIN 1000 MCG: 1000 INJECTION, SOLUTION INTRAMUSCULAR at 06:00

## 2024-09-06 RX ADMIN — INSULIN HUMAN 1 UNITS: 100 INJECTION, SOLUTION PARENTERAL at 00:02

## 2024-09-06 RX ADMIN — PANTOPRAZOLE SODIUM 40 MG: 40 INJECTION, POWDER, FOR SOLUTION INTRAVENOUS at 17:21

## 2024-09-06 RX ADMIN — SODIUM CHLORIDE, POTASSIUM CHLORIDE, SODIUM LACTATE AND CALCIUM CHLORIDE: 600; 310; 30; 20 INJECTION, SOLUTION INTRAVENOUS at 23:22

## 2024-09-06 RX ADMIN — ATORVASTATIN CALCIUM 10 MG: 10 TABLET, FILM COATED ORAL at 20:22

## 2024-09-06 RX ADMIN — INSULIN HUMAN 1 UNITS: 100 INJECTION, SOLUTION PARENTERAL at 06:06

## 2024-09-06 RX ADMIN — INSULIN HUMAN 2 UNITS: 100 INJECTION, SOLUTION PARENTERAL at 12:00

## 2024-09-06 SDOH — ECONOMIC STABILITY: TRANSPORTATION INSECURITY
IN THE PAST 12 MONTHS, HAS THE LACK OF TRANSPORTATION KEPT YOU FROM MEDICAL APPOINTMENTS OR FROM GETTING MEDICATIONS?: NO

## 2024-09-06 SDOH — ECONOMIC STABILITY: TRANSPORTATION INSECURITY
IN THE PAST 12 MONTHS, HAS LACK OF RELIABLE TRANSPORTATION KEPT YOU FROM MEDICAL APPOINTMENTS, MEETINGS, WORK OR FROM GETTING THINGS NEEDED FOR DAILY LIVING?: NO

## 2024-09-06 ASSESSMENT — ENCOUNTER SYMPTOMS
CHILLS: 0
BLOOD IN STOOL: 1
BLOOD IN STOOL: 0
SHORTNESS OF BREATH: 0
MUSCULOSKELETAL NEGATIVE: 1
PSYCHIATRIC NEGATIVE: 1
CARDIOVASCULAR NEGATIVE: 1
WEAKNESS: 1
DIZZINESS: 0
NERVOUS/ANXIOUS: 1
RESPIRATORY NEGATIVE: 1
EYES NEGATIVE: 1
HEADACHES: 0
FEVER: 0

## 2024-09-06 ASSESSMENT — FIBROSIS 4 INDEX: FIB4 SCORE: 13.68

## 2024-09-06 ASSESSMENT — LIFESTYLE VARIABLES
ALCOHOL_USE: NO
EVER HAD A DRINK FIRST THING IN THE MORNING TO STEADY YOUR NERVES TO GET RID OF A HANGOVER: NO
DOES PATIENT WANT TO STOP DRINKING: NO
HOW MANY TIMES IN THE PAST YEAR HAVE YOU HAD 5 OR MORE DRINKS IN A DAY: 0
TOTAL SCORE: 0
AVERAGE NUMBER OF DAYS PER WEEK YOU HAVE A DRINK CONTAINING ALCOHOL: 0
ON A TYPICAL DAY WHEN YOU DRINK ALCOHOL HOW MANY DRINKS DO YOU HAVE: 0
EVER FELT BAD OR GUILTY ABOUT YOUR DRINKING: NO
HAVE YOU EVER FELT YOU SHOULD CUT DOWN ON YOUR DRINKING: NO
TOTAL SCORE: 0
TOTAL SCORE: 0
HAVE PEOPLE ANNOYED YOU BY CRITICIZING YOUR DRINKING: NO
CONSUMPTION TOTAL: NEGATIVE

## 2024-09-06 ASSESSMENT — COGNITIVE AND FUNCTIONAL STATUS - GENERAL
MOBILITY SCORE: 24
DAILY ACTIVITIY SCORE: 24
SUGGESTED CMS G CODE MODIFIER MOBILITY: CH
SUGGESTED CMS G CODE MODIFIER DAILY ACTIVITY: CH

## 2024-09-06 ASSESSMENT — SOCIAL DETERMINANTS OF HEALTH (SDOH)

## 2024-09-06 ASSESSMENT — PATIENT HEALTH QUESTIONNAIRE - PHQ9
2. FEELING DOWN, DEPRESSED, IRRITABLE, OR HOPELESS: NOT AT ALL
SUM OF ALL RESPONSES TO PHQ9 QUESTIONS 1 AND 2: 0
1. LITTLE INTEREST OR PLEASURE IN DOING THINGS: NOT AT ALL

## 2024-09-06 ASSESSMENT — PAIN DESCRIPTION - PAIN TYPE: TYPE: ACUTE PAIN

## 2024-09-06 NOTE — ASSESSMENT & PLAN NOTE
Stopped, no active bleeding, poor colon prep, EGD with ? Watermelon stomach (path pending)  CTM  Will need repeat colonoscopy later  On PPI

## 2024-09-06 NOTE — ASSESSMENT & PLAN NOTE
Profound, likely due to vegetarian diet, discussed supplementation, he has already received a total of 2000 mcg IM this hospitalization

## 2024-09-06 NOTE — CARE PLAN
The patient is Watcher - Medium risk of patient condition declining or worsening    Shift Goals  Clinical Goals: post op vitals, monitor BM, labs  Patient Goals: rest  Family Goals: have procedure    Progress made toward(s) clinical / shift goals:    Problem: Knowledge Deficit - Standard  Goal: Patient and family/care givers will demonstrate understanding of plan of care, disease process/condition, diagnostic tests and medications  Outcome: Progressing  Note: Pt verbalizes understanding of care including labs, vitals, and diet.      Problem: Communication  Goal: The ability to communicate needs accurately and effectively will improve  Outcome: Progressing  Note:  used.     Problem: Safety  Goal: Will remain free from injury  Outcome: Progressing  Note: Patient educated on importance to use call light before getting out of bed.        Patient is not progressing towards the following goals:

## 2024-09-06 NOTE — CARE PLAN
The patient is Stable - Low risk of patient condition declining or worsening    Shift Goals  Clinical Goals: shower, monitor labs  Patient Goals: shower  Family Goals: none present    Progress made toward(s) clinical / shift goals:      Patient is not progressing towards the following goals:

## 2024-09-06 NOTE — PROGRESS NOTES
Gastroenterology Consult Note:    CHELA Aleman  Date & Time note created:    9/6/2024   11:06 AM     Referring MD:  Dr. Bret Samaniego    Patient ID:  Name:             Miri Joseph   YOB: 1940  Age:                 84 y.o.  male   MRN:               [id number]                                                             Reason for Consult:      Rectal bleeding  Pancytopenia    History of Present Illness:    This is a 85 yo  male, who speaks [place], requiring , PMH Type 2 DM, HLD who was seen in consultation for a few days of bloody stools-mixture of black and bright red. At first, he was having a couple episodes of rectal bleeding but over the past few days, rectal bleeding was more frequent. Has noticed profound fatigue and weakness with dizziness upon standing. Denies chest pain, abdominal pain, N/V, changes in bowel habits. ER VSS. Notable labs: WBC: 3.2. Hgb: 5.5. Hct: 15.6. MCV: 98.1. Platelet count: 1. Na: 131. Glucose: 206. BuN: 25. INR: 1.05. Ferritin: 320. Vitamin B12: <150. Received blood transfusions and platelets. Current Hgb: 7.4. Hct: 21. Plt: 86.     CTA: No evidence of active gastrointestinal hemorrhage. Widely patent celiac, superior mesenteric and inferior mesenteric arteries.     On occasion, he would have rectal bleeding when he was constipated.   His son, who is a MD in [place], told him it was due to hemorrhoids. Never had a colonoscopy. No history of upper GI bleed.     Patient is a vegetarian.     INTERVAL HISTORY:   9/6/24: Stable. No acute events overnight. Denies abdominal pain, rectal bleeding. WBC: 2.2 Hgb; 9.5. Platelet count: 33. Iron: 285. Ferritin: 643.    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  Biopsies taken  Biopsy of duodenum taken to rule out celiac  Suboptimal prep and a colonoscopy  due to vegetable matters.  Lesion less than 1 cm could be missed in the right side. No active bleeding    Review of Systems:      Review of Systems   Constitutional:  Positive for malaise/fatigue.   HENT: Negative.     Eyes: Negative.    Respiratory: Negative.     Cardiovascular: Negative.    Gastrointestinal:  Positive for blood in stool and melena.   Genitourinary: Negative.    Musculoskeletal: Negative.    Skin: Negative.    Neurological:  Positive for weakness.   Endo/Heme/Allergies: Negative.    Psychiatric/Behavioral: Negative.               Physical Exam:  Vitals/ General Appearance:   Weight/BMI: Body mass index is 24.19 kg/m².    Vitals:    09/05/24 2200 09/05/24 2335 09/06/24 0410 09/06/24 0800   BP: 106/52 108/52 117/55 113/46   Pulse: 72  68 96   Resp: 16 18 16 18   Temp: 36.4 °C (97.6 °F) 36.9 °C (98.5 °F) 36.9 °C (98.4 °F) 36.4 °C (97.6 °F)   TempSrc: Temporal Temporal Temporal Temporal   SpO2: 99% 99% 97% 99%   Weight:       Height:         Oxygen Therapy:  Pulse Oximetry: 99 %, O2 (LPM): 0, O2 Delivery Device: None - Room Air    Physical Exam  Vitals reviewed.   Constitutional:       Appearance: Normal appearance.   HENT:      Head: Normocephalic and atraumatic.      Mouth/Throat:      Mouth: Mucous membranes are moist.   Eyes:      Extraocular Movements: Extraocular movements intact.      Pupils: Pupils are equal, round, and reactive to light.   Cardiovascular:      Rate and Rhythm: Normal rate and regular rhythm.      Pulses: Normal pulses.      Heart sounds: Normal heart sounds.   Pulmonary:      Effort: Pulmonary effort is normal.      Breath sounds: Normal breath sounds.   Abdominal:      General: Bowel sounds are normal.      Palpations: Abdomen is soft.   Musculoskeletal:      Cervical back: Normal range of motion and neck supple.   Skin:     General: Skin is warm and dry.      Capillary Refill: Capillary refill takes 2 to 3 seconds.   Neurological:      General: No focal deficit present.       Mental Status: He is alert and oriented to person, place, and time.         Past Medical History:   Past Medical History:   Diagnosis Date    Diabetes (HCC)     oral meds       Past Surgical History:  Past Surgical History:   Procedure Laterality Date    PANENDOSCOPY N/A 9/5/2024    Procedure: EGD, WITH COLONOSCOPY;  Surgeon: Alexis Anglin M.D.;  Location: SURGERY [facility];  Service: Gastroenterology    TURP-VAPOR  8/14/2017    Procedure: Green Light Photorelective Vaporization of the Prostate;  Surgeon: Sagar Boykin M.D.;  Location: SURGERY [facility];  Service:        Hospital Medications:    Current Facility-Administered Medications:     acetaminophen (Tylenol) tablet 650 mg, 650 mg, Oral, Q6HRS PRN, Bret Samaniego M.D.    ondansetron (Zofran) syringe/vial injection 4 mg, 4 mg, Intravenous, Q4HRS PRN, Bret Samaniego M.D.    ondansetron (Zofran ODT) dispertab 4 mg, 4 mg, Oral, Q4HRS PRN, Bret Samaniego M.D.    pantoprazole (Protonix) injection 40 mg, 40 mg, Intravenous, BID, Bret Samaniego M.D., 40 mg at 09/06/24 0554    insulin regular (HumuLIN R,NovoLIN R) injection, 1-6 Units, Subcutaneous, Q6HRS, 1 Units at 09/06/24 0606 **AND** POC blood glucose manual result, , , Q6H **AND** NOTIFY MD and PharmD, , , Once **AND** Administer 20 grams of glucose (approximately 8 ounces of fruit juice) every 15 minutes PRN FSBG less than 70 mg/dL, , , PRN **AND** dextrose 50% (D50W) injection 25 g, 25 g, Intravenous, Q15 MIN PRN, Bret Samaniego M.D.    cyanocobalamin (Vitamin B-12) injection 1,000 mcg, 1,000 mcg, Intramuscular, Q30 DAYS, Bret Samaniego M.D., 1,000 mcg at 09/03/24 1801    cyanocobalamin (Vitamin B-12) injection 1,000 mcg, 1,000 mcg, Intramuscular, DAILY, Bret Samaniego M.D., 1,000 mcg at 09/06/24 0600    Current Outpatient Medications:  Current Facility-Administered Medications   Medication Dose Route Frequency Provider Last Rate Last Admin    acetaminophen (Tylenol) tablet 650 mg  650 mg Oral Q6HRS  PRN Bret Samaniego M.D.        ondansetron (Zofran) syringe/vial injection 4 mg  4 mg Intravenous Q4HRS PRN Bret Samaniego M.D.        ondansetron (Zofran ODT) dispertab 4 mg  4 mg Oral Q4HRS PRN Bret Samaniego M.D.        pantoprazole (Protonix) injection 40 mg  40 mg Intravenous BID Bret Samaniego M.D.   40 mg at 09/06/24 0554    insulin regular (HumuLIN R,NovoLIN R) injection  1-6 Units Subcutaneous Q6HRS Bret Samaniego M.D.   1 Units at 09/06/24 0606    And    dextrose 50% (D50W) injection 25 g  25 g Intravenous Q15 MIN PRN Bret Samaniego M.D.        cyanocobalamin (Vitamin B-12) injection 1,000 mcg  1,000 mcg Intramuscular Q30 DAYS Bret Samaniego M.D.   1,000 mcg at 09/03/24 1801    cyanocobalamin (Vitamin B-12) injection 1,000 mcg  1,000 mcg Intramuscular DAILY Bret Samaniego M.D.   1,000 mcg at 09/06/24 0600       Medication Allergy:  No Known Allergies    Family History:  History reviewed. No pertinent family history.    Social History:  Social History     Socioeconomic History    Marital status:      Spouse name: Not on file    Number of children: Not on file    Years of education: Not on file    Highest education level: Not on file   Occupational History    Not on file   Tobacco Use    Smoking status: Never    Smokeless tobacco: Never   Substance and Sexual Activity    Alcohol use: No    Drug use: No    Sexual activity: Not on file   Other Topics Concern    Not on file   Social History Narrative    Not on file     Social Determinants of Health     Financial Resource Strain: Not on file   Food Insecurity: Not on file   Transportation Needs: Not on file   Physical Activity: Not on file   Stress: Not on file   Social Connections: Not on file   Intimate Partner Violence: Not At Risk (9/3/2024)    Humiliation, Afraid, Rape, and Kick questionnaire     Fear of Current or Ex-Partner: No     Emotionally Abused: No     Physically Abused: No     Sexually Abused: No   Housing Stability: Not on file         MDM  (Data Review):     Records reviewed and summarized in current documentation    Lab Data Review:  Recent Results (from the past 24 hour(s))   POCT glucose device results    Collection Time: 09/05/24 12:25 PM   Result Value Ref Range    POC Glucose, Blood 135 (H) 65 - 99 mg/dL   Histology Request    Collection Time: 09/05/24  3:17 PM   Result Value Ref Range    Pathology Request Sent to Histo    POCT glucose device results    Collection Time: 09/05/24  5:09 PM   Result Value Ref Range    POC Glucose, Blood 123 (H) 65 - 99 mg/dL   CBC WITH DIFFERENTIAL    Collection Time: 09/05/24  5:16 PM   Result Value Ref Range    WBC 2.7 (L) 4.8 - 10.8 K/uL    RBC 3.00 (L) 4.70 - 6.10 M/uL    Hemoglobin 9.7 (L) 14.0 - 18.0 g/dL    Hematocrit 27.2 (L) 42.0 - 52.0 %    MCV 90.7 81.4 - 97.8 fL    MCH 32.3 27.0 - 33.0 pg    MCHC 35.7 32.3 - 36.5 g/dL    RDW 51.6 (H) 35.9 - 50.0 fL    Platelet Count 41 (L) 164 - 446 K/uL    MPV 9.4 9.0 - 12.9 fL    Neutrophils-Polys 18.00 (L) 44.00 - 72.00 %    Lymphocytes 76.00 (H) 22.00 - 41.00 %    Monocytes 3.00 0.00 - 13.40 %    Eosinophils 1.00 0.00 - 6.90 %    Basophils 0.00 0.00 - 1.80 %    Nucleated RBC 0.00 0.00 - 0.20 /100 WBC    Neutrophils (Absolute) 0.54 (L) 1.82 - 7.42 K/uL    Lymphs (Absolute) 2.05 1.00 - 4.80 K/uL    Monos (Absolute) 0.08 0.00 - 0.85 K/uL    Eos (Absolute) 0.03 0.00 - 0.51 K/uL    Baso (Absolute) 0.00 0.00 - 0.12 K/uL    NRBC (Absolute) 0.00 K/uL   IRON/TOTAL IRON BIND    Collection Time: 09/05/24  5:16 PM   Result Value Ref Range    Iron 285 (H) 50 - 180 ug/dL    Total Iron Binding see below 250 - 450 ug/dL    Unsat Iron Binding <17 (L) 110 - 370 ug/dL    % Saturation see below 15 - 55 %   FERRITIN    Collection Time: 09/05/24  5:16 PM   Result Value Ref Range    Ferritin 643.0 (H) 22.0 - 322.0 ng/mL   DIFFERENTIAL MANUAL    Collection Time: 09/05/24  5:16 PM   Result Value Ref Range    Bands-Stabs 2.00 0.00 - 10.00 %    Manual Diff Status PERFORMED    PLATELET  ESTIMATE    Collection Time: 09/05/24  5:16 PM   Result Value Ref Range    Plt Estimation Decreased    MORPHOLOGY    Collection Time: 09/05/24  5:16 PM   Result Value Ref Range    RBC Morphology Normal     Smudge Cells Few     Reactive Lymphocytes Few    IMMATURE PLT FRACTION    Collection Time: 09/05/24  5:16 PM   Result Value Ref Range    Imm. Plt Fraction 0.5 (L) 0.6 - 13.1 %   POCT glucose device results    Collection Time: 09/05/24 11:51 PM   Result Value Ref Range    POC Glucose, Blood 198 (H) 65 - 99 mg/dL   CBC WITH DIFFERENTIAL    Collection Time: 09/06/24  5:15 AM   Result Value Ref Range    WBC 2.2 (L) 4.8 - 10.8 K/uL    RBC 2.97 (L) 4.70 - 6.10 M/uL    Hemoglobin 9.5 (L) 14.0 - 18.0 g/dL    Hematocrit 26.8 (L) 42.0 - 52.0 %    MCV 90.2 81.4 - 97.8 fL    MCH 32.0 27.0 - 33.0 pg    MCHC 35.4 32.3 - 36.5 g/dL    RDW 50.0 35.9 - 50.0 fL    Platelet Count 33 (L) 164 - 446 K/uL    MPV 8.5 (L) 9.0 - 12.9 fL    Neutrophils-Polys 30.40 (L) 44.00 - 72.00 %    Lymphocytes 61.80 (H) 22.00 - 41.00 %    Monocytes 5.50 0.00 - 13.40 %    Eosinophils 1.80 0.00 - 6.90 %    Basophils 0.00 0.00 - 1.80 %    Immature Granulocytes 0.50 0.00 - 0.90 %    Nucleated RBC 0.00 0.00 - 0.20 /100 WBC    Neutrophils (Absolute) 0.67 (L) 1.82 - 7.42 K/uL    Lymphs (Absolute) 1.36 1.00 - 4.80 K/uL    Monos (Absolute) 0.12 0.00 - 0.85 K/uL    Eos (Absolute) 0.04 0.00 - 0.51 K/uL    Baso (Absolute) 0.00 0.00 - 0.12 K/uL    Immature Granulocytes (abs) 0.01 0.00 - 0.11 K/uL    NRBC (Absolute) 0.00 K/uL   Basic Metabolic Panel    Collection Time: 09/06/24  5:15 AM   Result Value Ref Range    Sodium 133 (L) 135 - 145 mmol/L    Potassium 3.8 3.6 - 5.5 mmol/L    Chloride 102 96 - 112 mmol/L    Co2 22 20 - 33 mmol/L    Glucose 184 (H) 65 - 99 mg/dL    Bun 16 8 - 22 mg/dL    Creatinine 0.82 0.50 - 1.40 mg/dL    Calcium 8.3 (L) 8.4 - 10.2 mg/dL    Anion Gap 9.0 7.0 - 16.0   IMMATURE PLT FRACTION    Collection Time: 09/06/24  5:15 AM   Result Value  Ref Range    Imm. Plt Fraction 0.8 0.6 - 13.1 %   ESTIMATED GFR    Collection Time: 09/06/24  5:15 AM   Result Value Ref Range    GFR (CKD-EPI) 88 >60 mL/min/1.73 m 2   POCT glucose device results    Collection Time: 09/06/24  6:05 AM   Result Value Ref Range    POC Glucose, Blood 165 (H) 65 - 99 mg/dL       Imaging/Procedures Review:      CT-CHEST (THORAX) WITH   Final Result      1.  Respiratory motion degraded study.      2.  No evidence of focal consolidation or pleural effusion.      3.  Atherosclerotic change of the aorta and coronary arteries.      4.   Fatty change of the liver.      Fleischner Society pulmonary nodule recommendations:   Not Applicable         CT-CTA ABDOMEN WITH & W/O-POST PROCESS   Final Result      1.  No evidence of active gastrointestinal hemorrhage.      2.  Atherosclerotic change of aorta. No evidence of aneurysm or dissection.      3.  Widely patent celiac, superior mesenteric and inferior mesenteric arteries.      4.  Bibasilar atelectasis.      5.  Fatty liver.      6.  Enlarged prostate gland.           MDM (Assessment and Plan):     Patient Active Problem List    Diagnosis Date Noted    B12 deficiency 09/05/2024    Pancytopenia (HCC) 09/03/2024    Thrombocytopenia (HCC) 09/03/2024    Other neutropenia (HCC) 09/03/2024    GI bleed 09/03/2024    Type 2 diabetes mellitus, without long-term current use of insulin (HCC) 09/03/2024    UTI (urinary tract infection) 08/16/2017     This is a 85 yo male who was admitted to the hospital with rectal bleeding (black and bright red), profound fatigue and weakness. Labs showed severe pancytopenia with Hgb; 5.5 Platelet count: 1. CTA negative for active GI hemorrhage. Received blood transfusions and platelets. Current Hgb: 7.4. Platelet count: 86. Vitamin B12: <150. He is a vegetarian.     ASSESSMENT:  Pancytopenia  Rectal bleeding  Thrombocytopenia  Vitamin B12 deficiency  Type 2 DM      Recommendations:   Await pathology  Correct coagulopathy  and thrombocytopenia  Outpatient hematology consultation would be strongly recommended  Serial H&H monitoring  Discussed with patient's family about prep quality consideration for repeat colonoscopy as outpatient if persistent anemia and or recurrence of bleeding once pancytopenia workup has been completed.   Resume diet.    Thank your for the opportunity to assist in the care of your patient.  Please call for any questions or concerns.    CHELA Aleman    GI WILL SIGN OFF.PLEASE CALL IF ANY QUESTIONS OR CONCERNS.

## 2024-09-06 NOTE — ASSESSMENT & PLAN NOTE
Suspect initial platelet count artifactual there may have been clumping but there is no differential noted  Stable in the 80s since that time

## 2024-09-06 NOTE — DISCHARGE SUMMARY
Discharge Summary    CHIEF COMPLAINT ON ADMISSION  Chief Complaint   Patient presents with    Bloody Stools     X4 the past two days.     Dizziness     Upon ambulation.        Reason for Admission  Bloody Stools, Dizzy, Foot Pains     Admission Date  9/3/2024    CODE STATUS  Full Code    HPI & HOSPITAL COURSE  This is a 84 y.o. male here with pancytopenia and GI bleeding, he originally presented to [facility] with 4 episodes of bloody stools over 2 days and dizziness.  He had apparently been noticing smaller amounts of blood in his stool for a few months but was not too worried about it he describes the blood as red and was feeling short of breath as well as dizzy at presentation.  Initial CBC showed a WBC of 3.2 with 620 ANC, 19% polys.  His platelet count was 1K and there was no mention of platelet clumping, and initial hemoglobin was 5.5.  He received 2 units of packed red cells as well as 2 units of platelets -with improvement of hemoglobin to 7.4 and platelets to 88K.  Prior to this patient denied any other episodes of abnormal bleeding including bleeding gums, hematuria or other abnormal findings.  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  By yesterday afternoon platelet count was down to 66,000 and today it is 33,000.  His WBCs are also lower this morning at 2.2 hemoglobin stable at 9.5.  B12 was found to be low at less than 150 and patient is a vegetarian he was given IM B12 which is ordered daily for 4 doses and then every 30 days.  I have told him he will need to continue to monitor his B12 levels going forward.  GI bleed is likely secondary to underlying hematologic process he may  require repeat colonoscopy in the future but is not urgent at this time.  Patient was discussed with oncologist/hematologist [facility] who agrees that patient needs a bone marrow biopsy during this hospitalization episode and recommends that patient be transferred up to [facility] for further evaluation and to have the procedure.  Given the trajectory of his platelets he will likely also need further platelet transfusions to keep him stabilized.    Patient's son is a family physician in [facility], he will be arriving in town on Saturday 9/7.  I attempted to leave a message for the patient's other son regarding patient's transfer but had to leave a voicemail.    Patient himself is agreeable with transfer and this is being arranged for him to go to the cancer nursing unit at [facility].  No notes on file    Therefore, he is discharged in guarded and stable condition to [facility] for higher level of care    The patient met 2-midnight criteria for an inpatient stay at the time of discharge.    Discharge Date  9/6/2024      DISCHARGE DIAGNOSES  Principal Problem:    GI bleed (POA: Unknown)  Active Problems:    Pancytopenia (HCC) (POA: Yes)    Thrombocytopenia (HCC) (POA: Yes)    Other neutropenia (HCC) (POA: Yes)    Type 2 diabetes mellitus, without long-term current use of insulin (HCC) (POA: Yes)    B12 deficiency (POA: Yes)  Resolved Problems:    * No resolved hospital problems. *      FOLLOW UP  No future appointments.  No follow-up provider specified.    MEDICATIONS ON DISCHARGE    Current Facility-Administered Medications:     acetaminophen    ondansetron    ondansetron    pantoprazole    insulin regular **AND** POC blood glucose manual result **AND** NOTIFY MD and PharmD **AND** Administer 20 grams of glucose (approximately 8 ounces of fruit juice) every 15 minutes PRN FSBG less than 70 mg/dL **AND** dextrose bolus    cyanocobalamin    cyanocobalamin        Allergies  No Known Allergies    DIET  Orders Placed This Encounter   Procedures    Diet Order Diet: Consistent CHO (Diabetic); Miscellaneous modifications: (optional): Vegetarian     Standing Status:   Standing     Number of Occurrences:   1     Order Specific Question:   Diet:     Answer:   Consistent CHO (Diabetic) [4]     Order Specific Question:   Miscellaneous modifications: (optional)     Answer:   Vegetarian [13]       ACTIVITY  As tolerated.  Weight bearing as tolerated    CONSULTATIONS  GI (GIC)    PROCEDURES    [address] [place] [address]       Miri Joseph   MRN: [id number], : 1940 , Sex: M   Admit: 9/3/2024, D/C:           Alexis Anglin M.D.  Physician  [facility] Medicine     Procedures     Signed     Date of Service: 2024  3:43 PM                 Patient Name: Miri Joseph     Esophagogastroduodenoscopy/Colonoscopy Procedure Note  Date of Procedure: 2024  Attending Physician: Alexis Anglin M.D.           Indications: Hematochezia, anemia  Instrument: Olympus Flexible Variable Stiffness Endoscope and Colonoscope  Sedation:      Anesthesiologist/Type of Anesthesia:  Anesthesiologist: Dinh Ashford D.O./General     Surgical Staff:  Circulator: Ernesto Mckee R.N.  Endoscopy Technician: Chris Camejo; Juli Holland; Paloma Norwood     Specimens removed if any:  ID Type Source Tests Collected by Time Destination   A : Biopsy, duodenal Other Other PATHOLOGY SPECIMEN Alexis Anglin M.D. 2024  3:17 PM     B : Biopsy, Gastric antrum Other Other PATHOLOGY SPECIMEN Alexis Anglin M.D. 2024  3:18 PM     C : Biopsy, Gastric body and fundus Other Other PATHOLOGY SPECIMEN Alexis Anglin M.D. 2024  3:19 PM           Procedure Details:       Pre-Anesthesia Assessment:  Prior to the procedure, a History and Physical was performed, and patient medications and allergies were reviewed. The patient’s tolerance of previous anesthesia was also reviewed. The risks and  benefits of the procedure and the sedation options and risks were discussed with the patient including but not limited to infection, bleeding, aspiration, perforation, adverse medication reaction, missed diagnosis, and missed lesions. The patient verbalized understanding. All questions were answered, and informed consent was obtained.      After I obtained informed consent from the patient, the patient was placed in the left lateral position. Appropriate time-out protocol was followed: the correct patient, the correct procedure, and the correct equipment in the room were confirmed. Throughout the procedure, the patient’s blood pressure, pulse, and oxygen saturations were monitored continuously     The Olympus flexible gastroscope was gently passed through the incisoral orifice into the oral cavity and under direct visualization the esophagus was intubated. The endoscope was passed down the esophagus, through the stomach and into the 2nd portion of the duodenum. Retroflexion was performed at the gastroesophageal junction. Color, texture, mucosa and anatomy of esophagus, stomach, and duodenum were carefully examined with the scope.  After completion of the examination, the endoscope as removed. The patient tolerated the procedure well. There were no immediate postoperative complications.      After completion of EGD, a digital rectal exam was performed. The Olympus variable stiffness colonoscope was introduced through the anus and passed under direct vision. The scope was advanced to the cecum, identified by the appendiceal orifice and ileocecal valve. The colonscopy was performed without difficulty. The patient tolerated the procedure well. The quality of the bowel preparation was good.  Findings and interventions were performed and documented below. The endoscope was then removed and the procedure was terminated. There were no immediate postoperative complications.         Findings:    EGD  Esophagus:  normal-appearing.  GE junction at 38 cm from incisor     Stomach: Diffuse erythema almost with appearance of watermelon stomach in the stomach body to the fundus with complete sparing of the antrum.  Etiology unclear.  Biopsies taken of the stomach and fundus for comparison against biopsy of the antrum.  No active bleeding noted     Duodenum: first and second portion duodenum appeared normal.  Biopsies taken no active bleeding seen     Colonoscopy:   Cecum: Visualization hindered secondary to significant amount of vegetable matter.  Lesions less than 1 cm could be missed.  Extensive lavage suction and brushing of the material was performed without ability to clear. Scope was clogged and require clearing.  Ascending colon: proximal ascending colon with similar issues with prep adequacy.  Lesions less than 1 cm could be missed no large lesion tissue noted  Transverse colon: normal  Descending colon scattered suboptimal prep due to vegetable matter that clog the scope. 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  Biopsies taken  Biopsy of duodenum taken to rule out celiac  Suboptimal prep and a colonoscopy due to vegetable matters.  Lesion less than 1 cm could be missed in the right side. No active bleeding       Recommendations:   Await pathology  Correct coagulopathy and thrombocytopenia  Outpatient hematology consultation would be strongly recommended  Serial H&H monitoring  Discussed with patient's family about prep quality consideration for repeat colonoscopy as outpatient if persistent anemia and or recurrence of bleeding once pancytopenia workup has been completed.   Resume diet                 LABORATORY  Lab Results   Component Value Date    SODIUM 133 (L) 09/06/2024    POTASSIUM 3.8 09/06/2024     CHLORIDE 102 09/06/2024    CO2 22 09/06/2024    GLUCOSE 184 (H) 09/06/2024    BUN 16 09/06/2024    CREATININE 0.82 09/06/2024        Lab Results   Component Value Date    WBC 2.2 (L) 09/06/2024    HEMOGLOBIN 9.5 (L) 09/06/2024    HEMATOCRIT 26.8 (L) 09/06/2024    PLATELETCT 33 (L) 09/06/2024        Total time of the discharge process exceeds   minutes.

## 2024-09-06 NOTE — DISCHARGE PLANNING
Patient transferring to [facility]. PCS form faxed to Transfer Center.

## 2024-09-06 NOTE — ASSESSMENT & PLAN NOTE
----- Message from Cassandra Rojo sent at 3/10/2017  9:51 AM CST -----  Contact: [phone]-please call above patient mother has questions about medications before surgery waiting on your call thanks   ? MDS  Needs bone marrow Bx  ANC ~ 600s

## 2024-09-06 NOTE — PROGRESS NOTES
Dr. Yates notified of Platelets 33. Patient showing no sign of bleeding. Continuing with plan of care.

## 2024-09-06 NOTE — PROGRESS NOTES
12 hour chart check complete.    Monitor summary:    Rhythm: SR    Heart Rate: 67-99    Ectopy: N/A

## 2024-09-06 NOTE — PROGRESS NOTES
Hospital Medicine Daily Progress Note    Date of Service  9/5/2024    Chief Complaint  Miri Joseph is a 84 y.o. male admitted 9/3/2024 with rectal bleeding and found to be significantly anemic and pancytopenic    Hospital Course  Patient initially monitored and managed in the intensive care unit, once stabilized he has been transferred to telemetry he underwent bidirectional endoscopy with suboptimal bowel prep and findings of watermelon stomach biopsies were taken    Interval Problem Update  Patient seen in the morning prior to endoscopy, discussed plan of care including that he will need to have an outpatient bone marrow biopsy and follow-up with hematology, the patient indicates that his son who lives in [place] is a family physician-his son will be here on Saturday.    Patient has significant B12 deficiency and is a vegetarian, I discussed that we are giving him IM supplementation now but he will need to take oral supplementation indefinitely after his B12 levels have been restored.  Iron studies have been ordered as well.    I have discussed this patient's plan of care and discharge plan at IDT rounds today with Case Management, Nursing, Nursing leadership, and other members of the IDT team.    Consultants/Specialty  critical care and GI    Code Status  Full Code    Disposition  The patient is not medically cleared for discharge to home or a post-acute facility.  Anticipate discharge to: home with close outpatient follow-up    I have placed the appropriate orders for post-discharge needs.    Review of Systems  Review of Systems   Constitutional:  Negative for chills and fever.   Respiratory:  Negative for shortness of breath.    Cardiovascular:  Negative for chest pain.   Gastrointestinal:  Negative for abdominal pain, nausea and vomiting.   Psychiatric/Behavioral:  The patient is nervous/anxious.         Physical Exam  Temp:  [36.1 °C (97 °F)-37.1 °C (98.8 °F)] 36.4 °C (97.5 °F)  Pulse:  [63-84] 63  Resp:   [14-20] 16  BP: (102-141)/(47-68) 130/53  SpO2:  [96 %-100 %] 100 %    Physical Exam  Vitals and nursing note reviewed.   Constitutional:       General: He is not in acute distress.     Appearance: Normal appearance. He is not ill-appearing.   HENT:      Head: Normocephalic and atraumatic.      Nose: Nose normal.      Mouth/Throat:      Mouth: Mucous membranes are moist.   Eyes:      Extraocular Movements: Extraocular movements intact.      Pupils: Pupils are equal, round, and reactive to light.   Cardiovascular:      Rate and Rhythm: Normal rate and regular rhythm.   Pulmonary:      Effort: Pulmonary effort is normal.      Breath sounds: Normal breath sounds.   Abdominal:      General: Bowel sounds are normal. There is no distension.      Palpations: Abdomen is soft.      Tenderness: There is no abdominal tenderness.   Musculoskeletal:      Right lower leg: No edema.      Left lower leg: No edema.   Skin:     General: Skin is warm and dry.   Neurological:      General: No focal deficit present.      Mental Status: He is alert and oriented to person, place, and time.      Cranial Nerves: No cranial nerve deficit.      Motor: Weakness present.   Psychiatric:         Mood and Affect: Mood normal.         Behavior: Behavior normal.         Fluids    Intake/Output Summary (Last 24 hours) at 9/5/2024 1706  Last data filed at 9/5/2024 1640  Gross per 24 hour   Intake 400 ml   Output --   Net 400 ml        Laboratory  Recent Labs     09/04/24  0305 09/04/24  1517 09/05/24  0239   WBC 2.3* 2.4* 3.0*   RBC 2.21* 3.22* 3.37*   HEMOGLOBIN 7.4* 10.2* 10.8*   HEMATOCRIT 21.0* 29.1* 31.4*   MCV 95.0 90.4 93.2   MCH 33.5* 31.7 32.0   MCHC 35.2 35.1 34.4   RDW 53.9* 48.9 55.6*   PLATELETCT 86* 66* 62*   MPV 8.4* 8.1* 8.9*     Recent Labs     09/03/24  1109 09/04/24  0305 09/05/24  0910   SODIUM 131* 134* 138   POTASSIUM 4.6 4.4 3.6   CHLORIDE 99 104 101   CO2 21 21 24   GLUCOSE 206* 118* 131*   BUN 25* 20 19   CREATININE 0.84 0.78  0.78   CALCIUM 8.6 8.6 8.5     Recent Labs     09/03/24  1109   APTT 27.0   INR 1.05               Imaging  CT-CHEST (THORAX) WITH   Final Result      1.  Respiratory motion degraded study.      2.  No evidence of focal consolidation or pleural effusion.      3.  Atherosclerotic change of the aorta and coronary arteries.      4.   Fatty change of the liver.      Fleischner Society pulmonary nodule recommendations:   Not Applicable         CT-CTA ABDOMEN WITH & W/O-POST PROCESS   Final Result      1.  No evidence of active gastrointestinal hemorrhage.      2.  Atherosclerotic change of aorta. No evidence of aneurysm or dissection.      3.  Widely patent celiac, superior mesenteric and inferior mesenteric arteries.      4.  Bibasilar atelectasis.      5.  Fatty liver.      6.  Enlarged prostate gland.           Assessment/Plan  * GI bleed  Assessment & Plan  Watermelon stomach noted, poor bowel prep  Continue to monitor hemoglobin    B12 deficiency- (present on admission)  Assessment & Plan  Profound, likely due to vegetarian diet, discussed supplementation, he has already received a total of 2000 mcg IM this hospitalization    Type 2 diabetes mellitus, without long-term current use of insulin (HCC)- (present on admission)  Assessment & Plan  On SSI    Other neutropenia (HCC)- (present on admission)  Assessment & Plan  Associated with pancytopenia    Thrombocytopenia (HCC)- (present on admission)  Assessment & Plan  Suspect initial platelet count artifactual there may have been clumping but there is no differential noted  Stable in the 80s since that time    Pancytopenia (HCC)- (present on admission)  Assessment & Plan  Given patient's age, 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 In review of yesterday's note (9/4/2024), there are no changes except as documented above.

## 2024-09-06 NOTE — CARE PLAN
Problem: Knowledge Deficit - Standard  Goal: Patient and family/care givers will demonstrate understanding of plan of care, disease process/condition, diagnostic tests and medications  Outcome: Progressing   The patient is Stable - Low risk of patient condition declining or worsening    Shift Goals  Clinical Goals: pt safety  Patient Goals: rest    Progress made toward(s) clinical / shift goals:  patient remained safe from injury    Patient is not progressing towards the following goals:

## 2024-09-06 NOTE — PROGRESS NOTES
Telemetry Shift Summary    Rhythm SR, ST  HR Range   Ectopy R PVC  Measurements  0.20/0.08/0.38    Per monitor tech, Uzair     Normal Values  Rhythm SR  HR Range   Measurements 0.12-0.20 / 0.06-0.10 / 0.30-0.52

## 2024-09-06 NOTE — ASSESSMENT & PLAN NOTE
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

## 2024-09-06 NOTE — PROGRESS NOTES
Patient transferred to [facility] at 1540. Per Dr. Rey ok to be off tele for ambulance ride. Patient was Aox4, walking independently, all belongings in place, AVS signed. All needs met at transfer.

## 2024-09-07 LAB
ALBUMIN SERPL BCP-MCNC: 3.7 G/DL (ref 3.2–4.9)
ALBUMIN/GLOB SERPL: 1.2 G/DL
ALP SERPL-CCNC: 78 U/L (ref 30–99)
ALT SERPL-CCNC: 27 U/L (ref 2–50)
ANION GAP SERPL CALC-SCNC: 14 MMOL/L (ref 7–16)
ANISOCYTOSIS BLD QL SMEAR: ABNORMAL
AST SERPL-CCNC: 36 U/L (ref 12–45)
BARCODED ABORH UBTYP: 7300
BARCODED PRD CODE UBPRD: NORMAL
BARCODED UNIT NUM UBUNT: NORMAL
BASOPHILS # BLD AUTO: 0 % (ref 0–1.8)
BASOPHILS # BLD AUTO: 0 % (ref 0–1.8)
BASOPHILS # BLD: 0 K/UL (ref 0–0.12)
BASOPHILS # BLD: 0 K/UL (ref 0–0.12)
BILIRUB SERPL-MCNC: 1 MG/DL (ref 0.1–1.5)
BUN SERPL-MCNC: 9 MG/DL (ref 8–22)
CALCIUM ALBUM COR SERPL-MCNC: 9.4 MG/DL (ref 8.5–10.5)
CALCIUM SERPL-MCNC: 9.2 MG/DL (ref 8.5–10.5)
CHLORIDE SERPL-SCNC: 104 MMOL/L (ref 96–112)
CO2 SERPL-SCNC: 20 MMOL/L (ref 20–33)
COMMENT NL1176: NORMAL
COMPONENT P 8504P: NORMAL
CREAT SERPL-MCNC: 0.67 MG/DL (ref 0.5–1.4)
EOSINOPHIL # BLD AUTO: 0.05 K/UL (ref 0–0.51)
EOSINOPHIL # BLD AUTO: 0.09 K/UL (ref 0–0.51)
EOSINOPHIL NFR BLD: 1.8 % (ref 0–6.9)
EOSINOPHIL NFR BLD: 3.5 % (ref 0–6.9)
ERYTHROCYTE [DISTWIDTH] IN BLOOD BY AUTOMATED COUNT: 49.6 FL (ref 35.9–50)
ERYTHROCYTE [DISTWIDTH] IN BLOOD BY AUTOMATED COUNT: 49.8 FL (ref 35.9–50)
GFR SERPLBLD CREATININE-BSD FMLA CKD-EPI: 93 ML/MIN/1.73 M 2
GLOBULIN SER CALC-MCNC: 3.2 G/DL (ref 1.9–3.5)
GLUCOSE BLD STRIP.AUTO-MCNC: 102 MG/DL (ref 65–99)
GLUCOSE BLD STRIP.AUTO-MCNC: 133 MG/DL (ref 65–99)
GLUCOSE BLD STRIP.AUTO-MCNC: 209 MG/DL (ref 65–99)
GLUCOSE SERPL-MCNC: 154 MG/DL (ref 65–99)
HCT VFR BLD AUTO: 24.6 % (ref 42–52)
HCT VFR BLD AUTO: 27.4 % (ref 42–52)
HGB BLD-MCNC: 8.8 G/DL (ref 14–18)
HGB BLD-MCNC: 9.9 G/DL (ref 14–18)
IF BLOCK AB SER QL RIA: NEGATIVE
LYMPHOCYTES # BLD AUTO: 1.89 K/UL (ref 1–4.8)
LYMPHOCYTES # BLD AUTO: 1.89 K/UL (ref 1–4.8)
LYMPHOCYTES NFR BLD: 72.8 % (ref 22–41)
LYMPHOCYTES NFR BLD: 75.7 % (ref 22–41)
MACROCYTES BLD QL SMEAR: ABNORMAL
MANUAL DIFF BLD: NORMAL
MANUAL DIFF BLD: NORMAL
MCH RBC QN AUTO: 32.2 PG (ref 27–33)
MCH RBC QN AUTO: 32.4 PG (ref 27–33)
MCHC RBC AUTO-ENTMCNC: 35.8 G/DL (ref 32.3–36.5)
MCHC RBC AUTO-ENTMCNC: 36.1 G/DL (ref 32.3–36.5)
MCV RBC AUTO: 89.5 FL (ref 81.4–97.8)
MCV RBC AUTO: 90.1 FL (ref 81.4–97.8)
MONOCYTES # BLD AUTO: 0.02 K/UL (ref 0–0.85)
MONOCYTES # BLD AUTO: 0.09 K/UL (ref 0–0.85)
MONOCYTES NFR BLD AUTO: 0.9 % (ref 0–13.4)
MONOCYTES NFR BLD AUTO: 3.5 % (ref 0–13.4)
MORPHOLOGY BLD-IMP: NORMAL
MORPHOLOGY BLD-IMP: NORMAL
NEUTROPHILS # BLD AUTO: 0.53 K/UL (ref 1.82–7.42)
NEUTROPHILS # BLD AUTO: 0.54 K/UL (ref 1.82–7.42)
NEUTROPHILS NFR BLD: 20.2 % (ref 44–72)
NEUTROPHILS NFR BLD: 21.6 % (ref 44–72)
NRBC # BLD AUTO: 0 K/UL
NRBC # BLD AUTO: 0 K/UL
NRBC BLD-RTO: 0 /100 WBC (ref 0–0.2)
NRBC BLD-RTO: 0 /100 WBC (ref 0–0.2)
PLATELET # BLD AUTO: 18 K/UL (ref 164–446)
PLATELET # BLD AUTO: 24 K/UL (ref 164–446)
PLATELET BLD QL SMEAR: NORMAL
PLATELET BLD QL SMEAR: NORMAL
PLATELETS.RETICULATED NFR BLD AUTO: 0.3 % (ref 0.6–13.1)
PLATELETS.RETICULATED NFR BLD AUTO: 0.3 % (ref 0.6–13.1)
PMV BLD AUTO: 8.4 FL (ref 9–12.9)
PMV BLD AUTO: 9 FL (ref 9–12.9)
POTASSIUM SERPL-SCNC: 3.7 MMOL/L (ref 3.6–5.5)
PRODUCT TYPE UPROD: NORMAL
PROT SERPL-MCNC: 6.9 G/DL (ref 6–8.2)
RBC # BLD AUTO: 2.73 M/UL (ref 4.7–6.1)
RBC # BLD AUTO: 3.06 M/UL (ref 4.7–6.1)
RBC BLD AUTO: NORMAL
RBC BLD AUTO: PRESENT
SODIUM SERPL-SCNC: 138 MMOL/L (ref 135–145)
UNIT STATUS USTAT: NORMAL
WBC # BLD AUTO: 2.5 K/UL (ref 4.8–10.8)
WBC # BLD AUTO: 2.6 K/UL (ref 4.8–10.8)

## 2024-09-07 PROCEDURE — A9270 NON-COVERED ITEM OR SERVICE: HCPCS

## 2024-09-07 PROCEDURE — 82962 GLUCOSE BLOOD TEST: CPT | Mod: 91

## 2024-09-07 PROCEDURE — P9034 PLATELETS, PHERESIS: HCPCS

## 2024-09-07 PROCEDURE — 36430 TRANSFUSION BLD/BLD COMPNT: CPT

## 2024-09-07 PROCEDURE — 770004 HCHG ROOM/CARE - ONCOLOGY PRIVATE *

## 2024-09-07 PROCEDURE — 700105 HCHG RX REV CODE 258

## 2024-09-07 PROCEDURE — 85007 BL SMEAR W/DIFF WBC COUNT: CPT | Mod: 91

## 2024-09-07 PROCEDURE — 700102 HCHG RX REV CODE 250 W/ 637 OVERRIDE(OP)

## 2024-09-07 PROCEDURE — 85027 COMPLETE CBC AUTOMATED: CPT

## 2024-09-07 PROCEDURE — 80053 COMPREHEN METABOLIC PANEL: CPT

## 2024-09-07 PROCEDURE — 99232 SBSQ HOSP IP/OBS MODERATE 35: CPT | Mod: GC | Performed by: STUDENT IN AN ORGANIZED HEALTH CARE EDUCATION/TRAINING PROGRAM

## 2024-09-07 PROCEDURE — 700111 HCHG RX REV CODE 636 W/ 250 OVERRIDE (IP): Performed by: INTERNAL MEDICINE

## 2024-09-07 PROCEDURE — 36415 COLL VENOUS BLD VENIPUNCTURE: CPT

## 2024-09-07 PROCEDURE — 85055 RETICULATED PLATELET ASSAY: CPT | Mod: 91

## 2024-09-07 RX ORDER — DIPHENHYDRAMINE HYDROCHLORIDE 50 MG/ML
25 INJECTION INTRAMUSCULAR; INTRAVENOUS
Status: ACTIVE | OUTPATIENT
Start: 2024-09-07 | End: 2024-09-14

## 2024-09-07 RX ORDER — ACETAMINOPHEN 325 MG/1
650 TABLET ORAL
Status: ACTIVE | OUTPATIENT
Start: 2024-09-07 | End: 2024-09-14

## 2024-09-07 RX ORDER — DIPHENHYDRAMINE HCL 25 MG
25 TABLET ORAL
Status: ACTIVE | OUTPATIENT
Start: 2024-09-07 | End: 2024-09-14

## 2024-09-07 RX ADMIN — CYANOCOBALAMIN 1000 MCG: 1000 INJECTION, SOLUTION INTRAMUSCULAR; SUBCUTANEOUS at 05:21

## 2024-09-07 RX ADMIN — INSULIN HUMAN 2 UNITS: 100 INJECTION, SOLUTION PARENTERAL at 13:00

## 2024-09-07 RX ADMIN — PANTOPRAZOLE SODIUM 40 MG: 40 INJECTION, POWDER, FOR SOLUTION INTRAVENOUS at 05:21

## 2024-09-07 RX ADMIN — SODIUM CHLORIDE, POTASSIUM CHLORIDE, SODIUM LACTATE AND CALCIUM CHLORIDE: 600; 310; 30; 20 INJECTION, SOLUTION INTRAVENOUS at 08:59

## 2024-09-07 RX ADMIN — PANTOPRAZOLE SODIUM 40 MG: 40 INJECTION, POWDER, FOR SOLUTION INTRAVENOUS at 18:17

## 2024-09-07 RX ADMIN — ATORVASTATIN CALCIUM 10 MG: 10 TABLET, FILM COATED ORAL at 21:25

## 2024-09-07 RX ADMIN — INSULIN HUMAN 3 UNITS: 100 INJECTION, SOLUTION PARENTERAL at 21:24

## 2024-09-07 ASSESSMENT — PAIN DESCRIPTION - PAIN TYPE
TYPE: ACUTE PAIN
TYPE: ACUTE PAIN

## 2024-09-07 NOTE — ASSESSMENT & PLAN NOTE
Per Oncology Dr. Long, essentially undetectable B12 so recommends daily injections of B12 x 2 weeks and then can switch to weekly x 1 month, and then monthly from thereon.  We will follow these recommendations.  Additionally, concerned that long-term metformin use has contributed to profound vitamin B12 deficiency.    Plan:  -Vit B12 1,000mcg x 2 weeks (completed 9/17)   -Then vitamin B12 1000 mcg weekly x 1 month (Last dose 10/15)   -Then vitamin B12 1000 mcg monthly  -Recommend oral supplementation at discharge for vegetarian diet, for now the plan will be IM; patient should follow-up on this outpatient  -Will exclude metformin from diabetes regimen at discharge

## 2024-09-07 NOTE — ASSESSMENT & PLAN NOTE
Continues to have severe neutropenia with critically low absolute neutrophils, today at 0.90 which is up from 0.00 yesterday.  Hx of T2DM with long term use of metformin and Vegetarian diet. Vital signs stable, afebrile. Neutropenic precautions in place. Hematology/oncology consulted and closely following. 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   Workup to date significant for undetectable B12 and low zinc levels.  No evidence of GI or hematuria with negative FOB and UA 9/21.    Plan:  -CBC daily to monitor  -Transfusions of PRBC and Plt as indicated per oncology recommendations  -Neutropenic precautions  -Will receive 1 unit of blood per oncology today  -Oncology consulted, appreciate recommendations:   - On weekly B12 shots, received 9/24  - Continue shots weekly for 4 doses, and then switch to monthly injections  - With B12 deficiency, may take up to 4 weeks on average to see improvement in blood counts.  - He may have delayed response  - If he does not improve, he may need a repeat bone marrow biopsy as an outpatient to consider repeat NGS   testing versus peripheral blood NGS testing   - Follow-up with Dr. Molina as an outpatient  - Continue PPX: Levaquin, Acyclovir and voriconazole  - Will need to be set up with [facility] 2 days/week, for blood counts and potential transfusion  -Recommend transfusing if hemoglobin <8 as an outpatient  -Recommend transfusing platelets if <20K  -No improvement since starting Granix.  This likely rules out an autoimmune neutropenia.  Likely will not improve with Granix, unless B12 is adequately replaced.  -Once discharged, would discontinue Granix  -Continue prophylactic antibiotics  with acyclovir, voriconazole and Levaquin  -From hematology standpoint, he would be okay for discharge, as long as he had a standing appointment at [facility] for CBC and possible transfusion every Tuesday and Friday (2 days/week).

## 2024-09-07 NOTE — ASSESSMENT & PLAN NOTE
On metformin, glipizide, and Januvia at home.  Per oncology, there is concern that long-term use of metformin may be contributing to profound vitamin B12 deficiency.  Patient will need a different home regimen that does not include metformin at discharge. POC  this AM     Plan:  -Continue to hold metformin and glipizide   -Continue Januvia  -Continue insulin glargine 10 units nightly  -Continue CHO diet and moderate SSI + hypoglycemia protocol

## 2024-09-07 NOTE — PROGRESS NOTES
This provider was notified by Dr. Carrasco the patient's platelets were 18,000; Dr. Molina of hematology oncology saw the patient today and recommends keeping platelet count above 20,000; order for platelet transfusion has been placed.

## 2024-09-07 NOTE — PROGRESS NOTES
4 Eyes Skin Assessment Completed by EMILIO Rios and EMILIO Macario.    Head WDL  Ears WDL  Nose WDL  Mouth WDL  Neck WDL  Breast/Chest WDL  Shoulder Blades WDL  Spine WDL  (R) Arm/Elbow/Hand WDL  (L) Arm/Elbow/Hand Bruising left shoulder  Abdomen WDL  Groin WDL  Scrotum/Coccyx/Buttocks WDL  (R) Leg Bruising  (L) Leg WDL  (R) Heel/Foot/Toe Blanching  (L) Heel/Foot/Toe Blanching          Devices In Places Pulse Ox      Interventions In Place Pillows    Possible Skin Injury No    Pictures Uploaded Into Epic N/A  Wound Consult Placed N/A  RN Wound Prevention Protocol Ordered No

## 2024-09-07 NOTE — PROGRESS NOTES
"Bedside report received.  Assessment complete.  A&O x 4. Patient calls appropriately.  Patient ambulates with no assist. Bed alarm off.   Patient has 0/10 pain. Patient medicated per MAR.  Denies N&V. Tolerating CHO diet.  + void, + flatus, + BM.  Patient denies SOB.  SCD's off.  Patient is pleasant and cooperative with the care plan.  Review plan with of care with patient. Call light and personal belongings within reach. Hourly rounding in place. All needs met at this time.  Ht 1.702 m (5' 7\")   Wt 72.1 kg (158 lb 15.2 oz)   BMI 24.90 kg/m²     "

## 2024-09-07 NOTE — CONSULTS
DATE OF SERVICE:  2024     HEMATOLOGY-ONCOLOGY CONSULTATION     REASON FOR CONSULTATION:  Profound pancytopenia, concerning for primary bone   marrow condition.     HISTORY OF PRESENT ILLNESS:  The patient is an 84-year-old male who presented   to [facility] on 2024 with complaints of bloody stools and dizziness.    On presentation to the ER, he was found to have a WBC count of 3.2,   hemoglobin of 5.5 and a platelet count of 1000.  The patient did receive 2   units of platelets and his platelet count did improve to 88,000.  During the   hospitalization, he did have a GI workup with colonoscopy and endoscopy.  Due   to poor prep, could not complete colonoscopy and endoscopy, showed some   watermelon stomach and the biopsies were obtained, which are still pending.    The patient also during the workup was found to have a B12 deficiency less   than 150 and started on B12 injections.  He had an extensive workup with   coags, which are normal, fibrinogen level was normal.  He also did have   [id number] activity that was negative.  Hepatitis C was negative.  HIV was   negative.  His platelet count again continues to drop and was 30,000 yesterday   and he is transferred to [facility] for further evaluation to rule out   primary bone marrow condition.  He denies of any fevers or chills.  His GI   bleeding has stopped.     PAST MEDICAL HISTORY:  Diabetes.     PAST SURGICAL HISTORY:  TURP and he had a panendoscopy on 2024     ALLERGIES:  No known drug allergies.     MEDICATIONS:  Currently, he is on B12, atorvastatin, pantoprazole, and   acetaminophen.     SOCIAL HISTORY:  Denies history of smoking, no heavy alcohol intake, no drugs.    He is , lives with his wife, has 3 children.  He is retired.     FAMILY HISTORY:  Mother  at 84 due to breast cancer.  Father    at 93 due to GI complications.  He has 2 brothers and 3 sisters.  One sister    at 86, unknown medical  problems.     REVIEW OF SYSTEMS:  I did do a 10-point system review, which was negative   except as mentioned above.  CONSTITUTIONAL:  Positive fatigue.  No fevers, chills or night sweats.  RESPIRATORY:  No cough or shortness of breath.  CARDIOVASCULAR:  No chest pain or palpitations.  GASTROINTESTINAL:  No abdominal pain, nausea, vomiting, or diarrhea.  PSYCHIATRIC:  No anxiety or depression.  GASTROINTESTINAL:  No abdominal pain, nausea or vomiting.  He did have bloody   stools on admission, which have completely resolved.  NEUROLOGY:  No headaches or vision changes or focal weakness.     PHYSICAL EXAMINATION:    GENERAL:  He is alert, oriented x3.  VITAL SIGNS:  Temperature was 36.6; pulse was 67; respiratory rate 15; blood   pressure 125/67; on room air, saturating about 99%.  HEENT:  Pupils are equal, reactive.  Extraocular muscles are intact.    Anicteric.  CHEST:  Clear to auscultation, bilaterally symmetrical.  CARDIOVASCULAR:  S1, S2 heard.  Regular rate and rhythm.  ABDOMEN:  Soft, nontender, nondistended, positive bowel sounds.  EXTREMITIES:  No edema, cyanosis, or clubbing.     LABORATORY DATA:  WBC count is 2.4, hemoglobin 9.9, platelet count is 24.  ANC   is 0.5.  Sodium 138, potassium 3.7, BUN is 9, creatinine 0.6, calcium 9.2,   AST 36, ALT 27, alkaline phosphatase 78, total bilirubin was 1.0.  Serum iron   285.  Coags normal including fibrinogen level.  [id number] inhibitor on   09/03/2024 negative.  Ferritin on admission was 320.  B12 was less than 150.    Hepatitis C antibody nonreactive.  HIV was nonreactive.  Haptoglobin was 101.    CARY was negative.     IMAGING STUDIES:  CT of the abdomen and pelvis on 09/03/2024 showed no   evidence of acute gastrointestinal hemorrhage, atherosclerotic changes of the   aorta, widely patent celiac, superior mesenteric, and inferior mesenteric   arteries.  Fatty liver.  Enlarged prostate gland.     On 09/03/2024, CT of the chest showed respiratory motion degraded  study.  No   evidence of focal consolidation or pleural effusion, fatty changes of the   liver.     ASSESSMENT AND PLAN:  The patient is an 84-year-old male who presented with   bright red blood per rectum on 09/03/2024 to [facility] and had an   extensive inpatient workup and also received 2 units of platelet transfusion   on admission.  His platelet count improved to 88,000.  He did have an   inpatient workup to rule out consumptive coagulopathy.  All was negative.    Main concern in this situation is primary bone marrow condition.  I have   recommended a bone marrow biopsy.     Continue B12 supplements and supportive measures.  Keep his platelet count   above 20,000 and hemoglobin above 7.0.     Thank you for allowing me to participate in his care.  Please call if any   questions arise.        ______________________________  Veronika Molina MD SR/JERROD/JAMES    DD:  09/07/2024 08:18  DT:  09/07/2024 09:34    Job#:  [id number]

## 2024-09-07 NOTE — PROGRESS NOTES
"    [facility]        Attending:   Sami Weller MD    Resident:   Mima Carrasco DO    PATIENT:   Miri Joseph; [id number]; 1940    ID:   84 y.o. male with history of dyslipidemia, BPH, type 2 DM, and recent GI bleed transferred from [facility] admitted for symptomatic pancytopenia and bone marrow biopsy. He was seen by GI at [facility] and had bidirectional endoscopy on 9/5 with biopsies taken from endoscopy but colonoscopy could not be completed due to inadequate bowel prep. Hgb stable but WBC and platelets continued to drop, prompting consult to oncology/hematology by hospitalist at [facility]. Per hospitalist note, transfer to [facility] was recommended for bone marrow biopsy.    SUBJECTIVE:   This morning, patient reports he is feeling happy and has no complaints.  He denies headache, dizziness, lightheadedness, chest pain, palpitations, dyspnea, abdominal pain, dysuria, hematuria, blood in stool.  Denies any bleeding or bruising.     OBJECTIVE:  Vitals:    09/06/24 1600 09/06/24 2010 09/06/24 2327 09/07/24 0442   BP: 136/65 (!) 148/72 127/65 137/66   Pulse: 68 79 68 77   Resp: 18 18 18 18   Temp: 36.9 °C (98.4 °F) 36.6 °C (97.8 °F) 36.6 °C (97.8 °F) 36.3 °C (97.4 °F)   TempSrc: Oral Oral Oral Oral   SpO2: 97% 98% 98% 98%   Weight: 72.1 kg (158 lb 15.2 oz)      Height: 1.702 m (5' 7\")          Intake/Output Summary (Last 24 hours) at 9/7/2024 0648  Last data filed at 9/7/2024 0430  Gross per 24 hour   Intake 240 ml   Output 1 ml   Net 239 ml       PHYSICAL EXAM:   General: No acute distress, afebrile, resting comfortably  HEENT: NC/AT. EOMI. no pallor  Cardiovascular: RRR without murmurs. Normal capillary refill   Respiratory: CTAB  Abdomen: soft, nontender, nondistended, no masses  EXT:  SOL, no edema  Skin: No erythema/lesions, no bruises, no petechia  Neuro: Non-focal    LABS:  Recent Labs     09/05/24  1716 09/06/24  0515 09/06/24  1456 " "09/07/24  0333   WBC 2.7* 2.2* 2.5* 2.5*   RBC 3.00* 2.97* 2.84* 3.06*   HEMOGLOBIN 9.7* 9.5* 9.1* 9.9*   HEMATOCRIT 27.2* 26.8* 25.7* 27.4*   MCV 90.7 90.2 90.5 89.5   MCH 32.3 32.0 32.0 32.4   RDW 51.6* 50.0 49.9 49.8   PLATELETCT 41* 33* 30* 24*   MPV 9.4 8.5* 9.1 8.4*   NEUTSPOLYS 18.00* 30.40* 28.00* 21.60*   LYMPHOCYTES 76.00* 61.80* 66.00* 75.70*   MONOCYTES 3.00 5.50 5.00 0.90   EOSINOPHILS 1.00 1.80 0.00 1.80   BASOPHILS 0.00 0.00 0.00 0.00   RBCMORPHOLO Normal  --  Normal Present     Recent Labs     09/05/24  0910 09/06/24  0515 09/07/24  0333   SODIUM 138 133* 138   POTASSIUM 3.6 3.8 3.7   CHLORIDE 101 102 104   CO2 24 22 20   BUN 19 16 9   CREATININE 0.78 0.82 0.67   CALCIUM 8.5 8.3* 9.2   ALBUMIN  --   --  3.7     Estimated GFR/CRCL = Estimated Creatinine Clearance: 79.5 mL/min (by C-G formula based on SCr of 0.67 mg/dL).  Recent Labs     09/05/24 0910 09/06/24 0515 09/07/24  0333   GLUCOSE 131* 184* 154*     Recent Labs     09/07/24  0333   ASTSGOT 36   ALTSGPT 27   TBILIRUBIN 1.0   ALKPHOSPHAT 78   GLOBULIN 3.2             No results for input(s): \"INR\", \"APTT\", \"FIBRINOGEN\" in the last 72 hours.    Invalid input(s): \"DIMER\"      IMAGING:  No orders to display       MEDS:  Current Facility-Administered Medications   Medication Last Admin    [START ON 10/3/2024] cyanocobalamin (Vitamin B-12) injection 1,000 mcg      insulin regular (HumuLIN R,NovoLIN R) injection 1 Units at 09/06/24 2024    And    dextrose 50% (D50W) injection 25 g      pantoprazole (Protonix) injection 40 mg 40 mg at 09/07/24 0521    acetaminophen (Tylenol) tablet 650 mg      ondansetron (Zofran ODT) dispertab 4 mg      ondansetron (Zofran) syringe/vial injection 4 mg      atorvastatin (Lipitor) tablet 10 mg 10 mg at 09/06/24 2022    lactated ringers infusion New Bag at 09/06/24 2322       ASSESSMENT/PLAN:    * Pancytopenia (HCC)- (present on admission)  Assessment & Plan  New pancytopenia; in reviewing records, patient had normal " labs ordered by his PCP 6/2024: WBC 7, Hb 13.8, Plt 194. Suspect current process developed between that time until leading up to current admission. Patient does report that he was [place] [facility] during that interval.  Differential diagnosis of pancytopenia includes megaloblastic anemia from severe b12 deficiency (B12 level undetectable on arrival) as well as myelodysplastic syndrome, myelofibrosis, aplastic anemia, or other infiltrative diseases.  With recent travel jose eduardo Kassandra also considered infectious causes such as tuberculosis, leishmaniasis, brucellosis, histoplasmosis. Viruses would include hepatitis, parvovirus B19, EBV, HIV.     Per workup so far:   -CT chest/abd/pelvis w/ no evidence of solid organ maligancy  -B12 is low --> Vit B12 1,000mcg IM started 9/3 with 1,000mcg IM daily for 5 days (to end 9/7) and then monthly; consider starting oral supplementation at discharge. Patient is a vegetarian.    -Ferritin elevated at 643.  Iron 285,  unsaturated binding <17, TIBC too low to measure   -Coomb's negative  -Biliriubin/LDH wnl, no schistocytes  -HIV/HCV negative.  -Intrinsic factor: pending  -CBC 9/7 AM: WBC 2.5, hemoglobin 9.9, platelets 24,000, absolute neutrophils 0.54, lymphocytes 75.70, smudge cells present    Plan:  -Follow results of workup  -CBC q12h  -Transfuse platelets goal >10,000  -Transfuse pRBCs for Hb goal 7  -Neutropenic precautions if ANC falls < 0.50  -Oncology consulted, patient evaluated by Dr. Beltrán.   -Continue supportive measures and B12  -Transfuse for platelets < 20,000, hemoglobin < 7  -Bone marrow biopsy  -Plan for bone marrow biopsy on Monday    Dyslipidemia  Assessment & Plan  Continue home atorvastatin.     B12 deficiency- (present on admission)  Assessment & Plan  -Vit B12 1,000mcg IM started 9/3 with 1,000mcg IM daily for 5 days (to end 9/7) and then monthly  -Consider starting oral supplementation at discharge. Patient is a vegetarian.      Type 2  diabetes mellitus, without long-term current use of insulin (HCC)- (present on admission)  Assessment & Plan  On metformin, glipizide, and Januvia at home.   -Hold home medications  -Continue CHO diet and SSI + hypoglycemia protocol     GI bleed- (present on admission)  Assessment & Plan  Resolved. No active bleeding at this time. Underwent EGD and colonoscopy (poor prep during procedure) at [facility] on 9/5 with impression and plan as below per GI (Dr. 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  Biopsies taken  Biopsy of duodenum taken to rule out celiac  Suboptimal prep and a colonoscopy due to vegetable matters.  Lesion less than 1 cm could be missed in the right side. No active bleeding    Plan:  -CTM.  Patient currently denying any pain, no melena or bloody stool  -Await pathology results  -Will need repeat colonoscopy outpatient per GI   -Continue daily PPI    Thrombocytopenia (HCC)- (present on admission)  Assessment & Plan  Trending down. Received 2 units of platelets at [facility].   -CBC q12h  -Transfuse w/ platelets <10,000 or per hematology/oncology          Core Measures  IV Fluids: none  Diet: vegetarian with carb count  Antbx: none  DVT ppx: SCDs, chemical prophylaxis held in setting of thrombocytopenia  Code status: Full Code  Dispo: inpatient for supportive care, monitoring of severe thrombocytopenia, bone marrow biopsy    Mima Carrasco D.O.  PGY-1  [facility] Resident

## 2024-09-07 NOTE — DISCHARGE PLANNING
Care Transition Team Assessment    Patient is a 84 year old male admitted for pancytopenia. Please see patient's H&P for prior medical history. RNCM met with patient at bedside to complete assessment. Patient's son helped translate. Patient is A/Ox4 and able to verify information on the face sheet. Patient lives with his spouse, son, daughter in law and their 3 kids in a single story house with 1 step to enter, Geeta Shirley (p)[phone]; emergency contact. No Advanced directive on file. Patient's NOK is his wife, however she does not speak English and has the help of patient's son Johan Shirley make medical decisions if needed. Patient reports, prior to admission patient is independent with ADL's and IADL's. Patient does not use any DME at baseline. Patient reports his family is good support for him. Patient currently has no income but family is going to apply for SSI as patient recently stopped working. The patient's PCP is Dr. Danielle Dawson. Patient's preferred pharmacy is [facility] on [address]. Patient denies a history of SNF/IPR nor HHC use in the past. Patient denies any SA or MH concerns. Patient confirmed medical coverage via [facility] Medicare and Medicaid FFS. Patient has means to transportation and son will provide transport once medically stable for discharge.     Per Oncology note, a BMB was recommended.    Information Source  Orientation Level: Oriented X4  Information Given By: Relative (son)  Informant's Name: Johan Shirley [phone]  Who is responsible for making decisions for patient? : Patient    Readmission Evaluation  Is this a readmission?: No    Elopement Risk  Legal Hold: No  Ambulatory or Self Mobile in Wheelchair: Yes  Disoriented: No  Psychiatric Symptoms: None  History of Wandering: No  Elopement this Admit: No  Vocalizing Wanting to Leave: No  Displays Behaviors, Body Language Wanting to Leave: No-Not at Risk for Elopement  Elopement Risk: Not at Risk for  Elopement    Interdisciplinary Discharge Planning  Lives with - Patient's Self Care Capacity: Adult Children  Patient or legal guardian wants to designate a caregiver: No  Support Systems: Children  Housing / Facility: [address]    Discharge Preparedness  What is your plan after discharge?: Home with help  What are your discharge supports?: Child, Spouse  Prior Functional Level: Ambulatory, Independent with Activities of Daily Living, Independent with Medication Management  Difficulity with ADLs: None  Difficulity with IADLs: None    Functional Assesment  Prior Functional Level: Ambulatory, Independent with Activities of Daily Living, Independent with Medication Management    Finances  Financial Barriers to Discharge: No  Prescription Coverage: Yes    Vision / Hearing Impairment  Vision Impairment : Yes  Right Eye Vision: Impaired, Wears Glasses  Left Eye Vision: Impaired, Wears Glasses  Hearing Impairment : Yes  Hearing Impairment: Both Ears  Does Pt Need Special Equipment for the Hearing Impaired?: No         Advance Directive  Advance Directive?: None  Advance Directive offered?: AD Booklet refused    Domestic Abuse  Possible Abuse/Neglect Reported to:: Not Applicable    Psychological Assessment  History of Substance Abuse: None  History of Psychiatric Problems: No  Non-compliant with Treatment: No  Newly Diagnosed Illness: No    Discharge Risks or Barriers  Discharge risks or barriers?: No  Patient risk factors: Complex medical needs    Anticipated Discharge Information  Discharge Disposition: Discharged to home/self care (01)

## 2024-09-07 NOTE — ASSESSMENT & PLAN NOTE
Appears stable, no bloody stool, no melena.  Vital signs stable.  Patient continues to require periodic transfusions of PRBC this does appear to be secondary to severe pancytopenia rather than blood loss. FOB and UA negative for occult blood 9/21.    Plan:  -Continue daily PPI, transitioned to PO 9/21  -Bowel regimen with senna and MiraLAX  -Continue to monitor for signs of GI bleed: bloody stool, melena  -Outpatient follow-up with GI for repeat colonoscopy as initial did not have complete prep

## 2024-09-07 NOTE — CARE PLAN
The patient is Stable - Low risk of patient condition declining or worsening    Shift Goals  Clinical Goals: NPO, patient safety, pain control, monitor BM's  Patient Goals: sleep, drink water    Progress made toward(s) clinical / shift goals:      Problem: Knowledge Deficit - Standard  Goal: Patient and family/care givers will demonstrate understanding of plan of care, disease process/condition, diagnostic tests and medications  Outcome: Progressing

## 2024-09-07 NOTE — H&P
FAMILY MEDICINE HISTORY AND PHYSICAL       PATIENT ID:  NAME:  Miri Joseph  MRN:               [id number]  YOB: 1940    Date of Admission: 9/6/2024     Attending: Ciara Lowe M.D.     Resident: Houston Jha D.O. (PGY-2)    Primary Care Physician:  Danielle Dawson M.D.    CC:  Pancytopenia     HPI: Miri Joseph is a 84 y.o. male with a history of dyslipidemia, BPH, and T2DM who presented to [facility] on 9/3 with 4 episodes of red bloody stools for 2-3 days, shortness of breath and lightheadedness.  He had apparently been noticing smaller amounts of blood in his stool for a few months but was not too worried about them. Prior to this patient denied any other episodes of abnormal bleeding including bleeding gums, hematuria or other abnormal findings.  Denies chest pain, abdominal pain, N/V, changes in bowel habits otherwise. On occasion reports he would have rectal bleeding when he was constipated. Never had a colonoscopy. No history of upper GI bleed in the past. Patient is a vegetarian. Was visiting his family in [facility] 2 months ago.    Patient's son is a family physician in [facility], he will be arriving in [facility] on Saturday 9/7.     At [facility] on 9/3, patient was vitally stable. His labs revealed pancytopenia with WBC 3.2, , 19% polys. Hgb 5.5 and thrombocytopenia to 1,000. He was hyponatremic corrected to 133 for hyperglycemia to 206. BuN: 25. INR: 1.05. Ferritin: 320. Noted to have Vitamin B12 deficiency with level <150. He was admitted to the ICU and received 2 units of packed red cells as well as 2 units of platelets with improvement of hemoglobin to 7.4 and platelets to 88,000. He was started on IM B12 injections. CTA: No evidence of active gastrointestinal hemorrhage. Widely patent celiac, superior mesenteric and inferior mesenteric arteries.    By 9/4, platelet count was down to 86,000 and he was no longer having bleeding. He was seen by GI and bidirectional  endoscopy was attempted on 9/5 however he had very poor prep of his colon; no large lesions were noted however there was questionable watermelon stomach and biopsies were obtained but there was no active bleeding.  By yesterday afternoon (9/5) platelet count was down to 66,000 and today it is 33,000.  His WBCs are also lower this morning at 2.2 with hemoglobin stable at 9.5. GI recommended outpatient repeat colonoscopy in the future due to poor prep but is not urgent at this time.  Patient was discussed with oncologist/hematologist on-call for [facility] by Dr. Rey at [facility] who agreed that patient needs a bone marrow biopsy during this hospitalization and recommended that patient be transferred to [facility] for further evaluation and to have the procedure.  Given the trajectory of his platelets he will likely also need further platelet transfusions to keep him stabilized.      REVIEW OF SYSTEMS:   Ten systems reviewed and were negative except as noted in the HPI.                PAST MEDICAL HISTORY:  Diabetes  BPH  Dyslipidemia    PAST SURGICAL HISTORY:   has a past surgical history that includes turp-vapor (8/14/2017) and panendoscopy (N/A, 9/5/2024).     FAMILY HISTORY:  family history is not on file.     SOCIAL HISTORY:   Employment: N/a   Living Situation: Lives at home independently. Son is a  physician in [facility].   Smoking: Never smoker  Etoh: Denies  Recreational Drug: Denies     DIET:   Orders Placed This Encounter   Procedures    Diet Order Diet: Consistent CHO (Diabetic); Miscellaneous modifications: (optional): Vegetarian     Standing Status:   Standing     Number of Occurrences:   1     Order Specific Question:   Diet:     Answer:   Consistent CHO (Diabetic) [4]     Order Specific Question:   Miscellaneous modifications: (optional)     Answer:   Vegetarian [13]    Diet NPO Restrict to: Sips with Medications     Standing Status:   Standing     Number of Occurrences:   1     Order  "Specific Question:   Diet NPO Restrict to:     Answer:   Sips with Medications [3]       ALLERGIES:  No Known Allergies    OUTPATIENT MEDICATIONS:    Current Facility-Administered Medications:     [START ON 2024] cyanocobalamin (Vitamin B-12) injection 1,000 mcg, 1,000 mcg, Intramuscular, DAILY, Oliva Rey M.D.    [START ON 10/3/2024] cyanocobalamin (Vitamin B-12) injection 1,000 mcg, 1,000 mcg, Intramuscular, Q30 DAYS, Oliva Rey M.D.    insulin regular (HumuLIN R,NovoLIN R) injection, 1-6 Units, Subcutaneous, 4X/DAY ACHS **AND** POC blood glucose manual result, , , Q6H **AND** NOTIFY MD and PharmD, , , Once **AND** Administer 20 grams of glucose (approximately 8 ounces of fruit juice) every 15 minutes PRN FSBG less than 70 mg/dL, , , PRN **AND** dextrose 50% (D50W) injection 25 g, 25 g, Intravenous, Q15 MIN PRN, Oliva Rey M.D.    pantoprazole (Protonix) injection 40 mg, 40 mg, Intravenous, BID, Oliva Rey M.D., 40 mg at 24 1721    acetaminophen (Tylenol) tablet 650 mg, 650 mg, Oral, Q6HRS PRN, Oliva Rey M.D.    ondansetron (Zofran ODT) dispertab 4 mg, 4 mg, Oral, Q4HRS PRN, Oliva Rey M.D.    ondansetron (Zofran) syringe/vial injection 4 mg, 4 mg, Intravenous, Q4HRS PRN, Oliva Rey M.D.    atorvastatin (Lipitor) tablet 10 mg, 10 mg, Oral, Nightly, Houston Jha D.O.    PHYSICAL EXAM:  Vitals:    24 1600   BP: 136/65   Pulse: 68   Resp: 18   Temp: 36.9 °C (98.4 °F)   TempSrc: Oral   SpO2: 97%   Weight: 72.1 kg (158 lb 15.2 oz)   Height: 1.702 m (5' 7\")   , Temp (24hrs), Av.6 °C (97.8 °F), Min:36.3 °C (97.4 °F), Max:36.9 °C (98.5 °F)  , Pulse Oximetry: 97 %, O2 Delivery Device: None - Room Air    Physical Exam   Gen:  NAD  HEENT: MMM, EOMI  Cardio: RRR, clear s1/s2, no murmur  Resp:  Equal bilat, clear to auscultation  GI/: Soft, non-distended, no TTP, normal bowel sounds, no guarding/rebound  Neuro: Non-focal, " Gross intact, no deficits  Skin/Extremities: Cap refill <3sec, warm/well perfused, no rash, normal extremities, no purpura or petechiae    LAB TESTS:   Results for orders placed or performed during the hospital encounter of 09/06/24   POCT glucose device results   Result Value Ref Range    POC Glucose, Blood 156 (H) 65 - 99 mg/dL        CULTURES:   Results       ** No results found for the last 168 hours. **            IMAGES:  No orders to display          ASSESSMENT/PLAN:   84 y.o. male admitted from [facility] for:       * Pancytopenia (HCC)- (present on admission)  Assessment & Plan  New pancytopenia; looking at prior records, patient had normal labs ordered by his PCP 6/2024: WBC 7, Hb 13.8, Plt 194. Suspect current process developed between that time until leading up to current admission. Patient does report that he was [place] [facility] during that interval.  Differential diagnosis of pancytopenia includes megaloblastic anemia from severe b12 deficiency (B12 level undetectable on arrival) as well as myelodysplastic syndrome, myelofibrosis, aplastic anemia, or other infiltrative diseases.  With recent travel [place] [facility] also considered infectious causes such as tuberculosis, leishmaniasis, brucellosis, histoplasmosis. Viruses would include hepatitis, parvovirus B19, EBV, HIV.     Per workup so far:   -CT chest/abd/pelvis w/ no evidence of solid organ maligancy  -B12 is low --> Vit B12 1,000mcg IM started 9/3 with 1,000mcg IM daily for 5 days (to end 9/7) and then monthly; consider starting oral supplementation at discharge. Patient is a vegetarian.    -Ferritin wnl  -Coomb's negative  -Biliriubin/LDH wnl, no schistocytes  -HIV/HCV negative.  -Intrinsic factor: pending    Plan:  -Follow results of workup  -Dr. Chandler Neri aware of patient, recommended bone marrow biopsy during current hospitalization. Recommend day team to reach out in AM regarding plan.   -CBC q12h  -Transfuse platelets goal  >10,000  -Transfuse pRBCs for Hb goal 7  -Neutropenic precautions if ANC falls < 500      Thrombocytopenia (HCC)- (present on admission)  Assessment & Plan  Trending down. Received 2 units of platelets at [facility].   -CBC q12h  -Transfuse w/ platelets <10,000 or per hematology/oncology     B12 deficiency- (present on admission)  Assessment & Plan  -Vit B12 1,000mcg IM started 9/3 with 1,000mcg IM daily for 5 days (to end 9/7) and then monthly  -Consider starting oral supplementation at discharge. Patient is a vegetarian.      GI bleed- (present on admission)  Assessment & Plan  Resolved. No active bleeding at this time. Underwent EGD and colonoscopy (poor prep during procedure) at [facility] on 9/5 with impression and plan as below per GI (Dr. 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  Biopsies taken  Biopsy of duodenum taken to rule out celiac  Suboptimal prep and a colonoscopy due to vegetable matters.  Lesion less than 1 cm could be missed in the right side. No active bleeding    Plan:  -CTM  -Await pathology   -Will need repeat colonoscopy outpatient per GI   -Continue daily PPI    Dyslipidemia  Assessment & Plan  Continue home atorvastatin.     Type 2 diabetes mellitus, without long-term current use of insulin (HCC)- (present on admission)  Assessment & Plan  On metformin, glipizide, and Januvia at home.   -Hold home medications  -Continue CHO diet and SSI + hypoglycemia protocol         Core Measures:  Fluids: PO   Lines: PIV   Abx: None   Diet: CHO, NPO ad midnight   PPX: SCDs      CODE Status: Full Code      Houston Jha D.O.   PGY-2  [facility]

## 2024-09-07 NOTE — ASSESSMENT & PLAN NOTE
Continues to downtrend. Platelets transfused the evening of 9/7, 9/12, 9/16, 9/21.  Today platelets 58 down from 87 yesterday. Vital signs stable.  No obvious bleeding or unprovoked bruising. Anticipate need for additional platelet transfusions during admission.    Plan:  - CBC daily  -Transfuse platelets per hematology/oncology recs (see A/P for pancytopenia)  - Oncology following

## 2024-09-08 LAB
ANISOCYTOSIS BLD QL SMEAR: ABNORMAL
ANISOCYTOSIS BLD QL SMEAR: ABNORMAL
BASOPHILS # BLD AUTO: 0 % (ref 0–1.8)
BASOPHILS # BLD AUTO: 1.8 % (ref 0–1.8)
BASOPHILS # BLD: 0 K/UL (ref 0–0.12)
BASOPHILS # BLD: 0.05 K/UL (ref 0–0.12)
COMMENT NL1176: NORMAL
COMMENT NL1176: NORMAL
EOSINOPHIL # BLD AUTO: 0.02 K/UL (ref 0–0.51)
EOSINOPHIL # BLD AUTO: 0.06 K/UL (ref 0–0.51)
EOSINOPHIL NFR BLD: 0.9 % (ref 0–6.9)
EOSINOPHIL NFR BLD: 2.9 % (ref 0–6.9)
ERYTHROCYTE [DISTWIDTH] IN BLOOD BY AUTOMATED COUNT: 49.1 FL (ref 35.9–50)
ERYTHROCYTE [DISTWIDTH] IN BLOOD BY AUTOMATED COUNT: 50.4 FL (ref 35.9–50)
GLUCOSE BLD STRIP.AUTO-MCNC: 210 MG/DL (ref 65–99)
GLUCOSE BLD STRIP.AUTO-MCNC: 215 MG/DL (ref 65–99)
GLUCOSE BLD STRIP.AUTO-MCNC: 236 MG/DL (ref 65–99)
GLUCOSE BLD STRIP.AUTO-MCNC: 237 MG/DL (ref 65–99)
GLUCOSE BLD STRIP.AUTO-MCNC: 255 MG/DL (ref 65–99)
HCT VFR BLD AUTO: 24.4 % (ref 42–52)
HCT VFR BLD AUTO: 25 % (ref 42–52)
HGB BLD-MCNC: 8.6 G/DL (ref 14–18)
HGB BLD-MCNC: 9 G/DL (ref 14–18)
LYMPHOCYTES # BLD AUTO: 1.73 K/UL (ref 1–4.8)
LYMPHOCYTES # BLD AUTO: 2.2 K/UL (ref 1–4.8)
LYMPHOCYTES NFR BLD: 82.5 % (ref 22–41)
LYMPHOCYTES NFR BLD: 84.7 % (ref 22–41)
MANUAL DIFF BLD: NORMAL
MANUAL DIFF BLD: NORMAL
MCH RBC QN AUTO: 31.6 PG (ref 27–33)
MCH RBC QN AUTO: 32.8 PG (ref 27–33)
MCHC RBC AUTO-ENTMCNC: 35.2 G/DL (ref 32.3–36.5)
MCHC RBC AUTO-ENTMCNC: 36 G/DL (ref 32.3–36.5)
MCV RBC AUTO: 89.7 FL (ref 81.4–97.8)
MCV RBC AUTO: 91.2 FL (ref 81.4–97.8)
MICROCYTES BLD QL SMEAR: ABNORMAL
MICROCYTES BLD QL SMEAR: ABNORMAL
MONOCYTES # BLD AUTO: 0.05 K/UL (ref 0–0.85)
MONOCYTES # BLD AUTO: 0.06 K/UL (ref 0–0.85)
MONOCYTES NFR BLD AUTO: 1.8 % (ref 0–13.4)
MONOCYTES NFR BLD AUTO: 2.9 % (ref 0–13.4)
MORPHOLOGY BLD-IMP: NORMAL
MORPHOLOGY BLD-IMP: NORMAL
NEUTROPHILS # BLD AUTO: 0.25 K/UL (ref 1.82–7.42)
NEUTROPHILS # BLD AUTO: 0.28 K/UL (ref 1.82–7.42)
NEUTROPHILS NFR BLD: 10.8 % (ref 44–72)
NEUTROPHILS NFR BLD: 11.7 % (ref 44–72)
NRBC # BLD AUTO: 0 K/UL
NRBC # BLD AUTO: 0 K/UL
NRBC BLD-RTO: 0 /100 WBC (ref 0–0.2)
NRBC BLD-RTO: 0 /100 WBC (ref 0–0.2)
PLATELET # BLD AUTO: 55 K/UL (ref 164–446)
PLATELET # BLD AUTO: 65 K/UL (ref 164–446)
PLATELET BLD QL SMEAR: NORMAL
PLATELET BLD QL SMEAR: NORMAL
PLATELETS.RETICULATED NFR BLD AUTO: 1.8 % (ref 0.6–13.1)
PLATELETS.RETICULATED NFR BLD AUTO: 2.2 % (ref 0.6–13.1)
PMV BLD AUTO: 10.1 FL (ref 9–12.9)
PMV BLD AUTO: 9.7 FL (ref 9–12.9)
POIKILOCYTOSIS BLD QL SMEAR: NORMAL
RBC # BLD AUTO: 2.72 M/UL (ref 4.7–6.1)
RBC # BLD AUTO: 2.74 M/UL (ref 4.7–6.1)
RBC BLD AUTO: PRESENT
RBC BLD AUTO: PRESENT
SMUDGE CELLS BLD QL SMEAR: NORMAL
SMUDGE CELLS BLD QL SMEAR: NORMAL
TARGETS BLD QL SMEAR: NORMAL
VARIANT LYMPHS BLD QL SMEAR: NORMAL
WBC # BLD AUTO: 2.1 K/UL (ref 4.8–10.8)
WBC # BLD AUTO: 2.6 K/UL (ref 4.8–10.8)

## 2024-09-08 PROCEDURE — 36415 COLL VENOUS BLD VENIPUNCTURE: CPT

## 2024-09-08 PROCEDURE — 82962 GLUCOSE BLOOD TEST: CPT

## 2024-09-08 PROCEDURE — 700111 HCHG RX REV CODE 636 W/ 250 OVERRIDE (IP): Performed by: INTERNAL MEDICINE

## 2024-09-08 PROCEDURE — 85007 BL SMEAR W/DIFF WBC COUNT: CPT | Mod: 91

## 2024-09-08 PROCEDURE — 770004 HCHG ROOM/CARE - ONCOLOGY PRIVATE *

## 2024-09-08 PROCEDURE — 700102 HCHG RX REV CODE 250 W/ 637 OVERRIDE(OP)

## 2024-09-08 PROCEDURE — A9270 NON-COVERED ITEM OR SERVICE: HCPCS

## 2024-09-08 PROCEDURE — 99232 SBSQ HOSP IP/OBS MODERATE 35: CPT | Mod: GC | Performed by: STUDENT IN AN ORGANIZED HEALTH CARE EDUCATION/TRAINING PROGRAM

## 2024-09-08 PROCEDURE — 85055 RETICULATED PLATELET ASSAY: CPT | Mod: 91

## 2024-09-08 PROCEDURE — 85027 COMPLETE CBC AUTOMATED: CPT

## 2024-09-08 RX ADMIN — PANTOPRAZOLE SODIUM 40 MG: 40 INJECTION, POWDER, FOR SOLUTION INTRAVENOUS at 18:09

## 2024-09-08 RX ADMIN — PANTOPRAZOLE SODIUM 40 MG: 40 INJECTION, POWDER, FOR SOLUTION INTRAVENOUS at 05:45

## 2024-09-08 RX ADMIN — INSULIN HUMAN 2 UNITS: 100 INJECTION, SOLUTION PARENTERAL at 20:54

## 2024-09-08 RX ADMIN — ATORVASTATIN CALCIUM 10 MG: 10 TABLET, FILM COATED ORAL at 20:49

## 2024-09-08 RX ADMIN — INSULIN HUMAN 2 UNITS: 100 INJECTION, SOLUTION PARENTERAL at 18:13

## 2024-09-08 RX ADMIN — INSULIN HUMAN 2 UNITS: 100 INJECTION, SOLUTION PARENTERAL at 13:13

## 2024-09-08 RX ADMIN — INSULIN HUMAN 2 UNITS: 100 INJECTION, SOLUTION PARENTERAL at 08:27

## 2024-09-08 ASSESSMENT — PAIN DESCRIPTION - PAIN TYPE
TYPE: ACUTE PAIN

## 2024-09-08 NOTE — PROGRESS NOTES
".HEMATOLOGY-ONCOLOGY PROGRESS NOTE    Consulted on 9/7/24 profound cytopenias     Subjective:  No overnight events, No fevers or chills . No black stools since hospitalization.   He is lying in bed comfortably.       Objective:  Medications reviewed and notable for:  Current Facility-Administered Medications   Medication Dose    acetaminophen (Tylenol) tablet 650 mg  650 mg    diphenhydrAMINE (Benadryl) tablet/capsule 25 mg  25 mg    Or    diphenhydrAMINE (Benadryl) injection 25 mg  25 mg    [START ON 10/3/2024] cyanocobalamin (Vitamin B-12) injection 1,000 mcg  1,000 mcg    insulin regular (HumuLIN R,NovoLIN R) injection  1-6 Units    And    dextrose 50% (D50W) injection 25 g  25 g    pantoprazole (Protonix) injection 40 mg  40 mg    acetaminophen (Tylenol) tablet 650 mg  650 mg    ondansetron (Zofran ODT) dispertab 4 mg  4 mg    ondansetron (Zofran) syringe/vial injection 4 mg  4 mg    atorvastatin (Lipitor) tablet 10 mg  10 mg       ROS:   I did do 10 point system review which is neg except as mentioned above     /65   Pulse 75   Temp 36.2 °C (97.2 °F) (Oral)   Resp 16   Ht 1.702 m (5' 7\")   Wt 72.1 kg (158 lb 15.2 oz)   SpO2 99%       General:  comfortable, NAD  HEENT:  PERRLA, EOMI< anicteric  Neck:   supple, no lymphadenopathy  Cor:   regular rate and rhythm, no murmurs, rubs, or gallops  Pulm:   clear to auscultation bilaterally  Abd:   bowel sounds present, soft, nontender, nondistended  Extremities:  warm, no lower extremity edema  Neurologic:  A&O x 3  Pyschiatric:  Appropriate mood and affect    Labs reviewed and notable for:  Recent Labs     09/07/24  0333 09/07/24  1510 09/08/24  0326   WBC 2.5* 2.6* 2.6*   RBC 3.06* 2.73* 2.72*   HEMOGLOBIN 9.9* 8.8* 8.6*   HEMATOCRIT 27.4* 24.6* 24.4*   MCV 89.5 90.1 89.7   MCH 32.4 32.2 31.6   MCHC 36.1 35.8 35.2   RDW 49.8 49.6 49.1   PLATELETCT 24* 18* 65*   MPV 8.4* 9.0 9.7         .@[facility]  Recent Results (from the past 24 hour(s))   POCT glucose device " results    Collection Time: 09/07/24  8:54 AM   Result Value Ref Range    POC Glucose, Blood 133 (H) 65 - 99 mg/dL   POCT glucose device results    Collection Time: 09/07/24 12:59 PM   Result Value Ref Range    POC Glucose, Blood 209 (H) 65 - 99 mg/dL   CBC WITH DIFFERENTIAL    Collection Time: 09/07/24  3:10 PM   Result Value Ref Range    WBC 2.6 (L) 4.8 - 10.8 K/uL    RBC 2.73 (L) 4.70 - 6.10 M/uL    Hemoglobin 8.8 (L) 14.0 - 18.0 g/dL    Hematocrit 24.6 (L) 42.0 - 52.0 %    MCV 90.1 81.4 - 97.8 fL    MCH 32.2 27.0 - 33.0 pg    MCHC 35.8 32.3 - 36.5 g/dL    RDW 49.6 35.9 - 50.0 fL    Platelet Count 18 (LL) 164 - 446 K/uL    MPV 9.0 9.0 - 12.9 fL    Neutrophils-Polys 20.20 (L) 44.00 - 72.00 %    Lymphocytes 72.80 (H) 22.00 - 41.00 %    Monocytes 3.50 0.00 - 13.40 %    Eosinophils 3.50 0.00 - 6.90 %    Basophils 0.00 0.00 - 1.80 %    Nucleated RBC 0.00 0.00 - 0.20 /100 WBC    Neutrophils (Absolute) 0.53 (L) 1.82 - 7.42 K/uL    Lymphs (Absolute) 1.89 1.00 - 4.80 K/uL    Monos (Absolute) 0.09 0.00 - 0.85 K/uL    Eos (Absolute) 0.09 0.00 - 0.51 K/uL    Baso (Absolute) 0.00 0.00 - 0.12 K/uL    NRBC (Absolute) 0.00 K/uL   DIFFERENTIAL MANUAL    Collection Time: 09/07/24  3:10 PM   Result Value Ref Range    Manual Diff Status PERFORMED    PERIPHERAL SMEAR REVIEW    Collection Time: 09/07/24  3:10 PM   Result Value Ref Range    Peripheral Smear Review see below    PLATELET ESTIMATE    Collection Time: 09/07/24  3:10 PM   Result Value Ref Range    Plt Estimation SEE BELOW    MORPHOLOGY    Collection Time: 09/07/24  3:10 PM   Result Value Ref Range    RBC Morphology Normal    IMMATURE PLT FRACTION    Collection Time: 09/07/24  3:10 PM   Result Value Ref Range    Imm. Plt Fraction 0.3 (L) 0.6 - 13.1 %   PLATELETS REQUEST    Collection Time: 09/07/24  4:45 PM   Result Value Ref Range    Component P       Plts,Pheresis       [id number]   transfused   09/07/24 20:36    Product Type Platelets  Pheresis LR     Dispense Status  transfused     Unit Number (Barcoded) [id number]     Product Code (Barcoded) T6781R75     Blood Type (Barcoded) 7300    POCT glucose device results    Collection Time: 09/07/24  6:16 PM   Result Value Ref Range    POC Glucose, Blood 102 (H) 65 - 99 mg/dL   POCT glucose device results    Collection Time: 09/07/24  9:20 PM   Result Value Ref Range    POC Glucose, Blood 255 (H) 65 - 99 mg/dL   CBC WITH DIFFERENTIAL    Collection Time: 09/08/24  3:26 AM   Result Value Ref Range    WBC 2.6 (L) 4.8 - 10.8 K/uL    RBC 2.72 (L) 4.70 - 6.10 M/uL    Hemoglobin 8.6 (L) 14.0 - 18.0 g/dL    Hematocrit 24.4 (L) 42.0 - 52.0 %    MCV 89.7 81.4 - 97.8 fL    MCH 31.6 27.0 - 33.0 pg    MCHC 35.2 32.3 - 36.5 g/dL    RDW 49.1 35.9 - 50.0 fL    Platelet Count 65 (L) 164 - 446 K/uL    MPV 9.7 9.0 - 12.9 fL    Neutrophils-Polys 10.80 (L) 44.00 - 72.00 %    Lymphocytes 84.70 (H) 22.00 - 41.00 %    Monocytes 1.80 0.00 - 13.40 %    Eosinophils 0.90 0.00 - 6.90 %    Basophils 1.80 0.00 - 1.80 %    Nucleated RBC 0.00 0.00 - 0.20 /100 WBC    Neutrophils (Absolute) 0.28 (LL) 1.82 - 7.42 K/uL    Lymphs (Absolute) 2.20 1.00 - 4.80 K/uL    Monos (Absolute) 0.05 0.00 - 0.85 K/uL    Eos (Absolute) 0.02 0.00 - 0.51 K/uL    Baso (Absolute) 0.05 0.00 - 0.12 K/uL    NRBC (Absolute) 0.00 K/uL    Anisocytosis 1+     Microcytosis 1+    DIFFERENTIAL MANUAL    Collection Time: 09/08/24  3:26 AM   Result Value Ref Range    Manual Diff Status PERFORMED     Comment See Comment    PERIPHERAL SMEAR REVIEW    Collection Time: 09/08/24  3:26 AM   Result Value Ref Range    Peripheral Smear Review see below    PLATELET ESTIMATE    Collection Time: 09/08/24  3:26 AM   Result Value Ref Range    Plt Estimation Decreased    MORPHOLOGY    Collection Time: 09/08/24  3:26 AM   Result Value Ref Range    RBC Morphology Present     Poikilocytosis 1+     Target Cells 1+     Smudge Cells Moderate    IMMATURE PLT FRACTION    Collection Time: 09/08/24  3:26 AM   Result Value Ref  Range    Imm. Plt Fraction 2.2 0.6 - 13.1 %       Diagnostic imaging:      Assessment and Recommendations:  - Pancytopenia - Concerning primary BM process - Planned BMBX  and likely will be done tomorrow     - GI bleed stopped with PLT transfusions - GI wor up EGD on 9/5/24 biopsies pending, Colonoscopy due to poor prep - did not completed . CT scan C/A/P WNL on 9/3/24     - Severe B12 deficiency on supplements     - Cytopenias - transfuse keep HGB > 7.0, PLT > 20,000 due to recent GI bleed.     Plan:   - Clinically stable. Labs reviewed   - BMBX tomorrow   - Supportive measures     Dr. Long will start seeing patient starting tomorrow , depending on BMBX results we will discuss further plan        please call with any questions, [phone].      Please note that this dictation was created using voice recognition software.  I have made every reasonable attempt to correct obvious error, but I expected that there are errors of grammar and possibly context  that I did not discover before finalizing the note     Quality-Core Measures        Veronika Molina MD  [facility]   [phone]

## 2024-09-08 NOTE — PROGRESS NOTES
[facility] FAMILY MEDICINE PROGRESS NOTE        Attending:   Sami Weller MD    Resident:   Mima Carrasco DO    PATIENT:   Miri Joseph; [id number]; 1940    ID:   84 y.o. male with history of dyslipidemia, BPH, type 2 DM, and recent GI bleed transferred from [facility] admitted for symptomatic pancytopenia and bone marrow biopsy. He was seen by GI at [facility] and had bidirectional endoscopy on 9/5 with biopsies taken from endoscopy but colonoscopy could not be completed due to inadequate bowel prep. Hgb stable but WBC and platelets continued to drop.  Transferred here for bone marrow biopsy.  Now status post platelet transfusion on 9/7.    SUBJECTIVE:   This morning, patient reports he is feeling happy and has no complaints.  He is accompanied by his wife, 2 sons, and daughter.  He denies headache, dizziness, lightheadedness, chest pain, palpitations, dyspnea, abdominal pain. Denies any bleeding or bruising.     OBJECTIVE:  Vitals:    09/07/24 2034 09/07/24 2049 09/07/24 2328 09/08/24 0339   BP: (!) 149/73 138/64 130/67 121/65   Pulse: 78 78 69 75   Resp: 17 17 16 16   Temp: 36.4 °C (97.6 °F) 36.5 °C (97.7 °F) 36.6 °C (97.8 °F) 36.2 °C (97.2 °F)   TempSrc: Oral Oral Oral Oral   SpO2: 99% 100% 98% 99%   Weight:       Height:             Intake/Output Summary (Last 24 hours) at 9/8/2024 1547  Last data filed at 9/7/2024 2328  Gross per 24 hour   Intake 348 ml   Output --   Net 348 ml     PHYSICAL EXAM:   General: No acute distress, afebrile, sitting up on the edge of the bed eating breakfast, smiling and conversational  HEENT: NC/AT. EOMI, no pallor  Cardiovascular: RRR without murmurs. Normal capillary refill   Respiratory: CTAB  Abdomen: soft, nontender, nondistended, no masses  EXT:  SOL, no edema  Skin: No erythema/lesions, no bruises, no petechia  Neuro: Non-focal    LABS:  Recent Labs     09/07/24  0333 09/07/24  1510 09/08/24  0326   WBC 2.5* 2.6* 2.6*   RBC 3.06* 2.73* 2.72*  "  HEMOGLOBIN 9.9* 8.8* 8.6*   HEMATOCRIT 27.4* 24.6* 24.4*   MCV 89.5 90.1 89.7   MCH 32.4 32.2 31.6   RDW 49.8 49.6 49.1   PLATELETCT 24* 18* 65*   MPV 8.4* 9.0 9.7   NEUTSPOLYS 21.60* 20.20* 10.80*   LYMPHOCYTES 75.70* 72.80* 84.70*   MONOCYTES 0.90 3.50 1.80   EOSINOPHILS 1.80 3.50 0.90   BASOPHILS 0.00 0.00 1.80   RBCMORPHOLO Present Normal Present     Recent Labs     09/05/24  0910 09/06/24  0515 09/07/24  0333   SODIUM 138 133* 138   POTASSIUM 3.6 3.8 3.7   CHLORIDE 101 102 104   CO2 24 22 20   BUN 19 16 9   CREATININE 0.78 0.82 0.67   CALCIUM 8.5 8.3* 9.2   ALBUMIN  --   --  3.7     Estimated GFR/CRCL = Estimated Creatinine Clearance: 79.5 mL/min (by C-G formula based on SCr of 0.67 mg/dL).  Recent Labs     09/05/24  0910 09/06/24  0515 09/07/24  0333   GLUCOSE 131* 184* 154*     Recent Labs     09/07/24  0333   ASTSGOT 36   ALTSGPT 27   TBILIRUBIN 1.0   ALKPHOSPHAT 78   GLOBULIN 3.2             No results for input(s): \"INR\", \"APTT\", \"FIBRINOGEN\" in the last 72 hours.    Invalid input(s): \"DIMER\"      IMAGING:  No orders to display       MEDS:  Current Facility-Administered Medications   Medication Last Admin    acetaminophen (Tylenol) tablet 650 mg      diphenhydrAMINE (Benadryl) tablet/capsule 25 mg      Or    diphenhydrAMINE (Benadryl) injection 25 mg      [START ON 10/3/2024] cyanocobalamin (Vitamin B-12) injection 1,000 mcg      insulin regular (HumuLIN R,NovoLIN R) injection 3 Units at 09/07/24 2124    And    dextrose 50% (D50W) injection 25 g      pantoprazole (Protonix) injection 40 mg 40 mg at 09/08/24 0545    acetaminophen (Tylenol) tablet 650 mg      ondansetron (Zofran ODT) dispertab 4 mg      ondansetron (Zofran) syringe/vial injection 4 mg      atorvastatin (Lipitor) tablet 10 mg 10 mg at 09/07/24 2125       ASSESSMENT/PLAN:  84 y.o. male with history of dyslipidemia, BPH, type 2 DM, and recent GI bleed transferred from [facility] admitted for symptomatic pancytopenia " and bone marrow biopsy. He was seen by GI at [facility] and had bidirectional endoscopy on 9/5 with biopsies taken from endoscopy but colonoscopy could not be completed due to inadequate bowel prep. Hgb stable but WBC and platelets continue to downtrend, now with critically low absolute neutrophils. Status post platelet transfusion on 9/7.  Bone marrow biopsy planned for 9/9.    * Pancytopenia (HCC)- (present on admission)  Assessment & Plan  Continues to have downtrending white blood cells, absolute neutrophils now critical at 0.24.  Protective precautions in place.  Vital signs stable, afebrile.    Plan:  -CBC q12h  -Transfuse platelets goal >10,000  -Transfuse pRBCs for Hb goal 7  -Neutropenic precautions  -Oncology consulted, patient evaluated by Dr. Beltrán.   -Continue supportive measures and B12  -Transfuse for platelets < 20,000, hemoglobin < 7  -Bone marrow biopsy planned for Monday  -N.p.o. at midnight    Dyslipidemia  Assessment & Plan  Continue home atorvastatin.     B12 deficiency- (present on admission)  Assessment & Plan  Completed 5-day course of daily IM vitamin B12.   -Vit B12 1,000mcg IM monthly  -Recommend starting oral supplementation at discharge for vegetarian diet    Type 2 diabetes mellitus, without long-term current use of insulin (HCC)- (present on admission)  Assessment & Plan  On metformin, glipizide, and Januvia at home.   -Hold home medications  -Continue CHO diet and SSI + hypoglycemia protocol     GI bleed- (present on admission)  Assessment & Plan  Stable. No active bleeding at this time.  Vital signs stable, no signs of bleeding or bruising on exam.    Plan:  -Follow-up pathology G results from endoscopy at [facility]  -Continue daily PPI  -Outpatient follow-up with GI for repeat colonoscopy    Thrombocytopenia (HCC)- (present on admission)  Assessment & Plan  Continues to downtrend.  Platelets transfused the evening of 9/7.  Vital signs stable, no signs of bleeding or  bruising.    Plan:  -CBC q12h  -Transfuse w/ platelets <20,000 per hematology/oncology   -Bone marrow biopsy planned for Monday       Core Measures  IV Fluids: none  Diet: vegetarian with carb count, n.p.o. at midnight  Antbx: none  DVT ppx: SCDs, chemical prophylaxis held in setting of thrombocytopenia  Code status: Full Code  Dispo: inpatient for supportive care, monitoring of severe thrombocytopenia, bone marrow biopsy    Mima Carrasco D.O.  PGY-1  [facility] Resident
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
## 2024-09-08 NOTE — CARE PLAN
The patient is Watcher - Medium risk of patient condition declining or worsening    Shift Goals  Clinical Goals: Monitor abnormal labs, transfuse PRN  Patient Goals: Rest  Family Goals: Updates, rest    Progress made toward(s) clinical / shift goals:       Problem: Knowledge Deficit - Standard  Goal: Patient and family/care givers will demonstrate understanding of plan of care, disease process/condition, diagnostic tests and medications  Outcome: Progressing  Note: A/Ox4, patient able to understand plan of care, all questions answered at this time.      Problem: Knowledge Deficit - Diabetes  Goal: Patient will demonstrate knowledge of insulin injection, symptoms, and treatment of hypoglycemia and diet prior to discharge  Outcome: Progressing  Note: Patient verbalizes understanding of ACHS fingersticks and insulin administration.        Patient is not progressing towards the following goals:

## 2024-09-08 NOTE — CARE PLAN
Problem: Knowledge Deficit - Standard  Goal: Patient and family/care givers will demonstrate understanding of plan of care, disease process/condition, diagnostic tests and medications  Outcome: Progressing     Problem: Nutrition  Goal: Patient's nutritional and fluid intake will be adequate or improve  Outcome: Progressing     Problem: Knowledge Deficit - Diabetes  Goal: Patient will demonstrate knowledge of insulin injection, symptoms, and treatment of hypoglycemia and diet prior to discharge  Outcome: Progressing  Pt has had a good appetite during this RN's shift.    The patient is Stable - Low risk of patient condition declining or worsening    Shift Goals  Clinical Goals: Monitor abnormal labs, transfuse PRN  Patient Goals: Rest  Family Goals: Updates, rest    Progress made toward(s) clinical / shift goals:    Pt has no reported any pain during this RN's shift. Pt has been getting up. Pt has had good support at bedside    Patient is not progressing towards the following goals:

## 2024-09-09 LAB
ANION GAP SERPL CALC-SCNC: 10 MMOL/L (ref 7–16)
ANISOCYTOSIS BLD QL SMEAR: ABNORMAL
BASOPHILS # BLD AUTO: 0 % (ref 0–1.8)
BASOPHILS # BLD AUTO: 0 % (ref 0–1.8)
BASOPHILS # BLD: 0 K/UL (ref 0–0.12)
BASOPHILS # BLD: 0 K/UL (ref 0–0.12)
BUN SERPL-MCNC: 13 MG/DL (ref 8–22)
CALCIUM SERPL-MCNC: 9 MG/DL (ref 8.5–10.5)
CHLORIDE SERPL-SCNC: 102 MMOL/L (ref 96–112)
CO2 SERPL-SCNC: 24 MMOL/L (ref 20–33)
CREAT SERPL-MCNC: 0.82 MG/DL (ref 0.5–1.4)
EOSINOPHIL # BLD AUTO: 0.03 K/UL (ref 0–0.51)
EOSINOPHIL # BLD AUTO: 0.06 K/UL (ref 0–0.51)
EOSINOPHIL NFR BLD: 0.9 % (ref 0–6.9)
EOSINOPHIL NFR BLD: 2.4 % (ref 0–6.9)
ERYTHROCYTE [DISTWIDTH] IN BLOOD BY AUTOMATED COUNT: 48 FL (ref 35.9–50)
ERYTHROCYTE [DISTWIDTH] IN BLOOD BY AUTOMATED COUNT: 49.9 FL (ref 35.9–50)
FOLATE SERPL-MCNC: 26 NG/ML
GFR SERPLBLD CREATININE-BSD FMLA CKD-EPI: 88 ML/MIN/1.73 M 2
GLUCOSE BLD STRIP.AUTO-MCNC: 136 MG/DL (ref 65–99)
GLUCOSE BLD STRIP.AUTO-MCNC: 222 MG/DL (ref 65–99)
GLUCOSE BLD STRIP.AUTO-MCNC: 228 MG/DL (ref 65–99)
GLUCOSE BLD STRIP.AUTO-MCNC: 229 MG/DL (ref 65–99)
GLUCOSE SERPL-MCNC: 163 MG/DL (ref 65–99)
HAV IGM SERPL QL IA: NORMAL
HBV CORE IGM SER QL: NORMAL
HBV SURFACE AG SER QL: NORMAL
HCT VFR BLD AUTO: 25.2 % (ref 42–52)
HCT VFR BLD AUTO: 27 % (ref 42–52)
HCV AB SER QL: NORMAL
HGB BLD-MCNC: 9 G/DL (ref 14–18)
HGB BLD-MCNC: 9.8 G/DL (ref 14–18)
IMM GRANULOCYTES # BLD AUTO: 0.01 K/UL (ref 0–0.11)
IMM GRANULOCYTES NFR BLD AUTO: 0.4 % (ref 0–0.9)
LYMPHOCYTES # BLD AUTO: 2.08 K/UL (ref 1–4.8)
LYMPHOCYTES # BLD AUTO: 3.05 K/UL (ref 1–4.8)
LYMPHOCYTES NFR BLD: 82.2 % (ref 22–41)
LYMPHOCYTES NFR BLD: 89.6 % (ref 22–41)
MACROCYTES BLD QL SMEAR: ABNORMAL
MANUAL DIFF BLD: NORMAL
MCH RBC QN AUTO: 32.6 PG (ref 27–33)
MCH RBC QN AUTO: 32.7 PG (ref 27–33)
MCHC RBC AUTO-ENTMCNC: 35.7 G/DL (ref 32.3–36.5)
MCHC RBC AUTO-ENTMCNC: 36.3 G/DL (ref 32.3–36.5)
MCV RBC AUTO: 89.7 FL (ref 81.4–97.8)
MCV RBC AUTO: 91.6 FL (ref 81.4–97.8)
MONOCYTES # BLD AUTO: 0.06 K/UL (ref 0–0.85)
MONOCYTES # BLD AUTO: 0.1 K/UL (ref 0–0.85)
MONOCYTES NFR BLD AUTO: 1.7 % (ref 0–13.4)
MONOCYTES NFR BLD AUTO: 4 % (ref 0–13.4)
MORPHOLOGY BLD-IMP: NORMAL
NEUTROPHILS # BLD AUTO: 0.27 K/UL (ref 1.82–7.42)
NEUTROPHILS # BLD AUTO: 0.28 K/UL (ref 1.82–7.42)
NEUTROPHILS NFR BLD: 11 % (ref 44–72)
NEUTROPHILS NFR BLD: 7.8 % (ref 44–72)
NRBC # BLD AUTO: 0 K/UL
NRBC # BLD AUTO: 0 K/UL
NRBC BLD-RTO: 0 /100 WBC (ref 0–0.2)
NRBC BLD-RTO: 0 /100 WBC (ref 0–0.2)
PLATELET # BLD AUTO: 42 K/UL (ref 164–446)
PLATELET # BLD AUTO: 48 K/UL (ref 164–446)
PLATELET BLD QL SMEAR: NORMAL
PLATELET BLD QL SMEAR: NORMAL
PLATELETS.RETICULATED NFR BLD AUTO: 1.3 % (ref 0.6–13.1)
PLATELETS.RETICULATED NFR BLD AUTO: 1.3 % (ref 0.6–13.1)
PMV BLD AUTO: 10.3 FL (ref 9–12.9)
PMV BLD AUTO: 9.7 FL (ref 9–12.9)
POTASSIUM SERPL-SCNC: 4 MMOL/L (ref 3.6–5.5)
RBC # BLD AUTO: 2.75 M/UL (ref 4.7–6.1)
RBC # BLD AUTO: 3.01 M/UL (ref 4.7–6.1)
RBC BLD AUTO: PRESENT
SODIUM SERPL-SCNC: 136 MMOL/L (ref 135–145)
TSH SERPL DL<=0.005 MIU/L-ACNC: 1.49 UIU/ML (ref 0.38–5.33)
VWF CP ACT/NOR PPP CHRO: 85 % (ref 40–130)
VWF CP INHIB PPP-ACNC: 2 U/ML
WBC # BLD AUTO: 2.5 K/UL (ref 4.8–10.8)
WBC # BLD AUTO: 3.4 K/UL (ref 4.8–10.8)

## 2024-09-09 PROCEDURE — 86645 CMV ANTIBODY IGM: CPT

## 2024-09-09 PROCEDURE — 99232 SBSQ HOSP IP/OBS MODERATE 35: CPT | Mod: GC | Performed by: FAMILY MEDICINE

## 2024-09-09 PROCEDURE — A9270 NON-COVERED ITEM OR SERVICE: HCPCS

## 2024-09-09 PROCEDURE — 80074 ACUTE HEPATITIS PANEL: CPT

## 2024-09-09 PROCEDURE — 36415 COLL VENOUS BLD VENIPUNCTURE: CPT

## 2024-09-09 PROCEDURE — 85007 BL SMEAR W/DIFF WBC COUNT: CPT

## 2024-09-09 PROCEDURE — 86663 EPSTEIN-BARR ANTIBODY: CPT

## 2024-09-09 PROCEDURE — 700102 HCHG RX REV CODE 250 W/ 637 OVERRIDE(OP): Performed by: STUDENT IN AN ORGANIZED HEALTH CARE EDUCATION/TRAINING PROGRAM

## 2024-09-09 PROCEDURE — 770004 HCHG ROOM/CARE - ONCOLOGY PRIVATE *

## 2024-09-09 PROCEDURE — 86665 EPSTEIN-BARR CAPSID VCA: CPT | Mod: 91

## 2024-09-09 PROCEDURE — A9270 NON-COVERED ITEM OR SERVICE: HCPCS | Performed by: STUDENT IN AN ORGANIZED HEALTH CARE EDUCATION/TRAINING PROGRAM

## 2024-09-09 PROCEDURE — 700111 HCHG RX REV CODE 636 W/ 250 OVERRIDE (IP): Performed by: STUDENT IN AN ORGANIZED HEALTH CARE EDUCATION/TRAINING PROGRAM

## 2024-09-09 PROCEDURE — 86664 EPSTEIN-BARR NUCLEAR ANTIGEN: CPT

## 2024-09-09 PROCEDURE — 85055 RETICULATED PLATELET ASSAY: CPT

## 2024-09-09 PROCEDURE — 82962 GLUCOSE BLOOD TEST: CPT

## 2024-09-09 PROCEDURE — 86644 CMV ANTIBODY: CPT

## 2024-09-09 PROCEDURE — 83516 IMMUNOASSAY NONANTIBODY: CPT

## 2024-09-09 PROCEDURE — 85027 COMPLETE CBC AUTOMATED: CPT

## 2024-09-09 PROCEDURE — 700102 HCHG RX REV CODE 250 W/ 637 OVERRIDE(OP)

## 2024-09-09 PROCEDURE — 82746 ASSAY OF FOLIC ACID SERUM: CPT

## 2024-09-09 PROCEDURE — 80048 BASIC METABOLIC PNL TOTAL CA: CPT

## 2024-09-09 PROCEDURE — 84443 ASSAY THYROID STIM HORMONE: CPT

## 2024-09-09 PROCEDURE — 85025 COMPLETE CBC W/AUTO DIFF WBC: CPT

## 2024-09-09 PROCEDURE — 700111 HCHG RX REV CODE 636 W/ 250 OVERRIDE (IP): Performed by: INTERNAL MEDICINE

## 2024-09-09 RX ORDER — CYANOCOBALAMIN 1000 UG/ML
1000 INJECTION, SOLUTION INTRAMUSCULAR; SUBCUTANEOUS DAILY
Status: DISCONTINUED | OUTPATIENT
Start: 2024-09-09 | End: 2024-09-09

## 2024-09-09 RX ORDER — CYANOCOBALAMIN 1000 UG/ML
1000 INJECTION, SOLUTION INTRAMUSCULAR; SUBCUTANEOUS DAILY
Status: DISCONTINUED | OUTPATIENT
Start: 2024-09-09 | End: 2024-09-14

## 2024-09-09 RX ORDER — FOLIC ACID 1 MG/1
1 TABLET ORAL DAILY
Status: DISCONTINUED | OUTPATIENT
Start: 2024-09-09 | End: 2024-09-24 | Stop reason: HOSPADM

## 2024-09-09 RX ORDER — AMOXICILLIN 250 MG
2 CAPSULE ORAL EVERY EVENING
Status: DISCONTINUED | OUTPATIENT
Start: 2024-09-09 | End: 2024-09-16

## 2024-09-09 RX ORDER — POLYETHYLENE GLYCOL 3350 17 G/17G
1 POWDER, FOR SOLUTION ORAL
Status: DISCONTINUED | OUTPATIENT
Start: 2024-09-09 | End: 2024-09-16

## 2024-09-09 RX ADMIN — PANTOPRAZOLE SODIUM 40 MG: 40 INJECTION, POWDER, FOR SOLUTION INTRAVENOUS at 16:58

## 2024-09-09 RX ADMIN — CYANOCOBALAMIN 1000 MCG: 1000 INJECTION, SOLUTION INTRAMUSCULAR; SUBCUTANEOUS at 11:51

## 2024-09-09 RX ADMIN — ATORVASTATIN CALCIUM 10 MG: 10 TABLET, FILM COATED ORAL at 20:25

## 2024-09-09 RX ADMIN — FOLIC ACID 1 MG: 1 TABLET ORAL at 11:51

## 2024-09-09 RX ADMIN — PANTOPRAZOLE SODIUM 40 MG: 40 INJECTION, POWDER, FOR SOLUTION INTRAVENOUS at 05:35

## 2024-09-09 RX ADMIN — INSULIN HUMAN 2 UNITS: 100 INJECTION, SOLUTION PARENTERAL at 12:06

## 2024-09-09 RX ADMIN — INSULIN HUMAN 2 UNITS: 100 INJECTION, SOLUTION PARENTERAL at 20:29

## 2024-09-09 RX ADMIN — INSULIN HUMAN 2 UNITS: 100 INJECTION, SOLUTION PARENTERAL at 17:22

## 2024-09-09 RX ADMIN — SENNOSIDES AND DOCUSATE SODIUM 2 TABLET: 50; 8.6 TABLET ORAL at 18:40

## 2024-09-09 ASSESSMENT — ENCOUNTER SYMPTOMS
NEUROLOGICAL NEGATIVE: 1
FEVER: 0
FOCAL WEAKNESS: 0
ORTHOPNEA: 0
BLOOD IN STOOL: 0
CONSTIPATION: 0
RESPIRATORY NEGATIVE: 1
MYALGIAS: 0
PSYCHIATRIC NEGATIVE: 1
COUGH: 0
ABDOMINAL PAIN: 0
EYES NEGATIVE: 1
BRUISES/BLEEDS EASILY: 0
PALPITATIONS: 0
CARDIOVASCULAR NEGATIVE: 1
WEIGHT LOSS: 0
CHILLS: 0
INSOMNIA: 0
MEMORY LOSS: 0
DIZZINESS: 0
VOMITING: 0
DIAPHORESIS: 0
MUSCULOSKELETAL NEGATIVE: 1
PND: 0
CONSTITUTIONAL NEGATIVE: 1
DIARRHEA: 0
NAUSEA: 0
GASTROINTESTINAL NEGATIVE: 1
HEADACHES: 0
DEPRESSION: 0

## 2024-09-09 ASSESSMENT — PAIN DESCRIPTION - PAIN TYPE
TYPE: ACUTE PAIN
TYPE: ACUTE PAIN

## 2024-09-09 NOTE — CARE PLAN
The patient is Stable - Low risk of patient condition declining or worsening    Shift Goals  Clinical Goals: Schedule bone marrow biopsy, monitor blood sugars, comfort  Patient Goals: Find out when bone marrow biopsy will be, comfort, speak with provider  Family Goals: not present    Progress made toward(s) clinical / shift goals:    Pt is A/Ox4. Pt is up self. Pt uses call light appropriately. Pt did not report any pain throughout today's shift. Pt has been educated on bone marrow biopsy that is scheduled for tomorrow. Pt was seen at bedside by [facility] family medicine.     Problem: Nutrition  Goal: Patient's nutritional and fluid intake will be adequate or improve  Outcome: Progressing  Note: Pt is getting vitamin B12 injections due to vitamin B12 deficiency. Pt has been eating throughout the day. Pt will have a bone marrow biopsy tomorrow 9/10. Pt will be NPO at midnight due to bone marrow biopsy tomorrow. Pt will still need blood glucose checks even through he is NPO. Pt was educated on being NPO at midnight and understands.      Problem: Knowledge Deficit - Diabetes  Goal: Patient will demonstrate knowledge of insulin injection, symptoms, and treatment of hypoglycemia and diet prior to discharge  Outcome: Progressing  Note: Patients blood glucose levels were monitored throughout the day. Patients blood glucose did not go below 70. Pt was medicated per MAR with insulin for his increased blood glucose levels. Pt understands the need for blood glucose checks. Pt educated on importance of monitoring blood glucose levels. Pt cooperates with receiving insulin.        Patient is not progressing towards the following goals:

## 2024-09-09 NOTE — DISCHARGE PLANNING
Case Management Discharge Planning    Admission Date: 9/6/2024  GMLOS: 3.4  ALOS: 3    6-Clicks ADL Score: 24  6-Clicks Mobility Score: 24      Anticipated Discharge Dispo: Discharge Disposition: Discharged to home/self care (01)    DME Needed: No    Action(s) Taken: Discussed in IDT rounds, plan for pt to have BMB today, will wait for results to determine plan of care.    Escalations Completed: None    Medically Clear: No    Next Steps: Pending BMB    Barriers to Discharge: Medical clearance    Is the patient up for discharge tomorrow: No

## 2024-09-09 NOTE — PROGRESS NOTES
Hematology/Oncology Progress Note    Primary Hematologist/Oncologist:     Oncology History:  The patient is an 84-year-old male who presented   to [facility] on 09/03/2024 with complaints of bloody stools and dizziness.  On presentation to the ER, he was found to have a WBC count of 3.2,   hemoglobin of 5.5 and a platelet count of 1000.  The patient did receive 2   units of platelets and his platelet count did improve to 88,000.  During the   hospitalization, he did have a GI workup with colonoscopy and endoscopy.  Due   to poor prep, could not complete colonoscopy and endoscopy, showed some   watermelon stomach and the biopsies were obtained, which are still pending.    The patient also during the workup was found to have a B12 deficiency less   than 150 and started on B12 injections.  He had an extensive workup with   coags, which are normal, fibrinogen level was normal.  He also did have   [id number] activity that was negative.  Hepatitis C was negative.  HIV was   negative.  His platelet count again continues to drop and was 30,000 yesterday   and he is transferred to [facility] for further evaluation to rule out   primary bone marrow condition.  He denies of any fevers or chills.  His GI   bleeding has stopped.    Interval History:  No events overnight.     Review of Systems:  Review of Systems   Constitutional: Negative.  Negative for chills, diaphoresis, fever, malaise/fatigue and weight loss.   HENT: Negative.     Eyes: Negative.    Respiratory: Negative.  Negative for cough.    Cardiovascular: Negative.  Negative for chest pain, palpitations, orthopnea, leg swelling and PND.   Gastrointestinal: Negative.  Negative for abdominal pain, blood in stool, constipation, diarrhea, melena, nausea and vomiting.   Genitourinary: Negative.  Negative for dysuria, frequency, hematuria and urgency.   Musculoskeletal: Negative.  Negative for myalgias.   Skin: Negative.    Neurological: Negative.  Negative for dizziness,  "focal weakness and headaches.   Endo/Heme/Allergies: Negative.  Does not bruise/bleed easily.   Psychiatric/Behavioral: Negative.  Negative for depression and memory loss. The patient does not have insomnia.         Vitals:     BP (!) 143/70   Pulse 65   Temp 36.2 °C (97.1 °F) (Oral)   Resp 16   Ht 1.702 m (5' 7\")   Wt 72.1 kg (158 lb 15.2 oz)   SpO2 92%   BMI 24.90 kg/m²     Physical Exam:  Physical Exam  Vitals and nursing note reviewed.   Constitutional:       General: He is not in acute distress.     Appearance: He is not toxic-appearing.   HENT:      Head: Normocephalic and atraumatic.   Eyes:      General: No scleral icterus.     Pupils: Pupils are equal, round, and reactive to light.   Cardiovascular:      Rate and Rhythm: Normal rate and regular rhythm.      Pulses: Normal pulses.      Heart sounds: No murmur heard.     No friction rub. No gallop.   Pulmonary:      Effort: Pulmonary effort is normal.      Breath sounds: No stridor. No wheezing, rhonchi or rales.   Abdominal:      General: Abdomen is flat. Bowel sounds are normal.      Palpations: Abdomen is soft.   Musculoskeletal:         General: No swelling.      Cervical back: No rigidity.   Skin:     General: Skin is warm and dry.      Capillary Refill: Capillary refill takes 2 to 3 seconds.      Coloration: Skin is not jaundiced.   Neurological:      General: No focal deficit present.      Mental Status: He is alert and oriented to person, place, and time. Mental status is at baseline.   Psychiatric:         Mood and Affect: Mood normal.          Assessment and Plan:    # Pancytopenia  Unclear etiology. BMBx arranged for 9/10/24. Differential with  with anisocytosis, macrocytosis, target cells, and some reactive lymphocytes. His absolute reticulocyte count of 2,000 indicates severe hypoproliferation for his diegree of anemia. Will eval for benign or infectious causes in addition to bone marrow biopsy.    TRANSFUSION THRESHOLDS:  RBCs and " PLTs must be leukoreduced, CMV negative/safe. Irradiation NOT required (yet) for this patient.     Hgb <7 if no symptoms or <8 with symptoms or bleedinu pRBC  PLTs <10 and no symptoms or <20 if febrile, septic, or bleeidnu apheresis PLTs     # B12 Deficiency  Undetectable B12. Intrinsic factor Abs negative. Will check parietal cell Abs, gastrin level, and folate levels. Continue daily B12 1000mcg injections. Can transition to weekly after 2 weeks. Start folic acid.     # GI Bleed  # Chronic Gastritis  Biopsies from EGD demonstrated chronic gastritis which could be worsening his B12 deficiency beyond just his vegetarian diet.    PLAN:  Hepatitis panel  EBV DNA PCR and Ab profile  CMV IgM and IgG and CMV DNA PCR  Parvovirus DNA PCR  Zinc level  Copper level  Folate level  Flow cytometry on peripheral blood  Parietal cell antibodies  Gastrin level  Increased B12 injections to 100mcg daily for two additional weeks; will then need B12 weekly x1 month then monthly indefinitely      Thank you for allowing me to participate in his care. Please do not hesitate to reach out with any questions.    Please note that this dictation was created using voice recognition software. I have made every reasonable attempt to correct obvious errors, but I expect that there are errors of grammar and possibly content that I did not discover before finalizing the note.      Howie Long MD  Hematologist/Oncologist  [facility]

## 2024-09-09 NOTE — CARE PLAN
The patient is Watcher - Medium risk of patient condition declining or worsening    Shift Goals  Clinical Goals: Monitor abnormal labs, transfuse PRN  Patient Goals: Rest  Family Goals: not present    Progress made toward(s) clinical / shift goals:       Problem: Knowledge Deficit - Standard  Goal: Patient and family/care givers will demonstrate understanding of plan of care, disease process/condition, diagnostic tests and medications  Outcome: Progressing  Note: A/Ox4, patient able to understand plan of care, all questions answered at this time.      Problem: Knowledge Deficit - Diabetes  Goal: Patient will demonstrate knowledge of insulin injection, symptoms, and treatment of hypoglycemia and diet prior to discharge  Outcome: Progressing  Note: Pt understands importance of fingerstick checks and insulin administration.        Patient is not progressing towards the following goals:

## 2024-09-10 LAB
ANISOCYTOSIS BLD QL SMEAR: ABNORMAL
BASOPHILS # BLD AUTO: 0 % (ref 0–1.8)
BASOPHILS # BLD AUTO: 0 % (ref 0–1.8)
BASOPHILS # BLD: 0 K/UL (ref 0–0.12)
BASOPHILS # BLD: 0 K/UL (ref 0–0.12)
EOSINOPHIL # BLD AUTO: 0.05 K/UL (ref 0–0.51)
EOSINOPHIL # BLD AUTO: 0.05 K/UL (ref 0–0.51)
EOSINOPHIL NFR BLD: 1.8 % (ref 0–6.9)
EOSINOPHIL NFR BLD: 2 % (ref 0–6.9)
ERYTHROCYTE [DISTWIDTH] IN BLOOD BY AUTOMATED COUNT: 48.1 FL (ref 35.9–50)
ERYTHROCYTE [DISTWIDTH] IN BLOOD BY AUTOMATED COUNT: 48.4 FL (ref 35.9–50)
GLUCOSE BLD STRIP.AUTO-MCNC: 156 MG/DL (ref 65–99)
GLUCOSE BLD STRIP.AUTO-MCNC: 162 MG/DL (ref 65–99)
GLUCOSE BLD STRIP.AUTO-MCNC: 173 MG/DL (ref 65–99)
GLUCOSE BLD STRIP.AUTO-MCNC: 275 MG/DL (ref 65–99)
HCT VFR BLD AUTO: 24.7 % (ref 42–52)
HCT VFR BLD AUTO: 26.6 % (ref 42–52)
HGB BLD-MCNC: 8.8 G/DL (ref 14–18)
HGB BLD-MCNC: 9.3 G/DL (ref 14–18)
HGB RETIC QN AUTO: 42.1 PG/CELL (ref 29–35)
IMM GRANULOCYTES # BLD AUTO: 0 K/UL (ref 0–0.11)
IMM GRANULOCYTES NFR BLD AUTO: 0 % (ref 0–0.9)
IMM RETICS NFR: 8.4 % (ref 2.6–16.1)
LYMPHOCYTES # BLD AUTO: 1.98 K/UL (ref 1–4.8)
LYMPHOCYTES # BLD AUTO: 2.22 K/UL (ref 1–4.8)
LYMPHOCYTES NFR BLD: 81.1 % (ref 22–41)
LYMPHOCYTES NFR BLD: 85.5 % (ref 22–41)
MACROCYTES BLD QL SMEAR: ABNORMAL
MANUAL DIFF BLD: NORMAL
MCH RBC QN AUTO: 31.3 PG (ref 27–33)
MCH RBC QN AUTO: 32.1 PG (ref 27–33)
MCHC RBC AUTO-ENTMCNC: 35 G/DL (ref 32.3–36.5)
MCHC RBC AUTO-ENTMCNC: 35.6 G/DL (ref 32.3–36.5)
MCV RBC AUTO: 89.6 FL (ref 81.4–97.8)
MCV RBC AUTO: 90.1 FL (ref 81.4–97.8)
MONOCYTES # BLD AUTO: 0.02 K/UL (ref 0–0.85)
MONOCYTES # BLD AUTO: 0.07 K/UL (ref 0–0.85)
MONOCYTES NFR BLD AUTO: 0.9 % (ref 0–13.4)
MONOCYTES NFR BLD AUTO: 2.9 % (ref 0–13.4)
MORPHOLOGY BLD-IMP: NORMAL
NEUTROPHILS # BLD AUTO: 0.31 K/UL (ref 1.82–7.42)
NEUTROPHILS # BLD AUTO: 0.34 K/UL (ref 1.82–7.42)
NEUTROPHILS NFR BLD: 11.8 % (ref 44–72)
NEUTROPHILS NFR BLD: 14 % (ref 44–72)
NRBC # BLD AUTO: 0 K/UL
NRBC # BLD AUTO: 0 K/UL
NRBC BLD-RTO: 0 /100 WBC (ref 0–0.2)
NRBC BLD-RTO: 0 /100 WBC (ref 0–0.2)
PATHOLOGY CONSULT NOTE: NORMAL
PLATELET # BLD AUTO: 29 K/UL (ref 164–446)
PLATELET # BLD AUTO: 35 K/UL (ref 164–446)
PLATELET BLD QL SMEAR: NORMAL
PLATELETS.RETICULATED NFR BLD AUTO: 1 % (ref 0.6–13.1)
PLATELETS.RETICULATED NFR BLD AUTO: 1.5 % (ref 0.6–13.1)
PMV BLD AUTO: 10.1 FL (ref 9–12.9)
PMV BLD AUTO: 9.7 FL (ref 9–12.9)
RBC # BLD AUTO: 2.74 M/UL (ref 4.7–6.1)
RBC # BLD AUTO: 2.97 M/UL (ref 4.7–6.1)
RBC BLD AUTO: PRESENT
RETICS # AUTO: 0.02 M/UL (ref 0.04–0.12)
RETICS/RBC NFR: 0.6 % (ref 0.8–2.6)
SMUDGE CELLS BLD QL SMEAR: NORMAL
WBC # BLD AUTO: 2.4 K/UL (ref 4.8–10.8)
WBC # BLD AUTO: 2.6 K/UL (ref 4.8–10.8)

## 2024-09-10 PROCEDURE — 700102 HCHG RX REV CODE 250 W/ 637 OVERRIDE(OP)

## 2024-09-10 PROCEDURE — 85027 COMPLETE CBC AUTOMATED: CPT

## 2024-09-10 PROCEDURE — 700111 HCHG RX REV CODE 636 W/ 250 OVERRIDE (IP): Performed by: HOSPITALIST

## 2024-09-10 PROCEDURE — 99232 SBSQ HOSP IP/OBS MODERATE 35: CPT | Mod: GC | Performed by: FAMILY MEDICINE

## 2024-09-10 PROCEDURE — 85055 RETICULATED PLATELET ASSAY: CPT | Mod: 91

## 2024-09-10 PROCEDURE — 88313 SPECIAL STAINS GROUP 2: CPT

## 2024-09-10 PROCEDURE — 700111 HCHG RX REV CODE 636 W/ 250 OVERRIDE (IP): Performed by: STUDENT IN AN ORGANIZED HEALTH CARE EDUCATION/TRAINING PROGRAM

## 2024-09-10 PROCEDURE — 770004 HCHG ROOM/CARE - ONCOLOGY PRIVATE *

## 2024-09-10 PROCEDURE — 85007 BL SMEAR W/DIFF WBC COUNT: CPT

## 2024-09-10 PROCEDURE — 700111 HCHG RX REV CODE 636 W/ 250 OVERRIDE (IP): Performed by: INTERNAL MEDICINE

## 2024-09-10 PROCEDURE — 82525 ASSAY OF COPPER: CPT

## 2024-09-10 PROCEDURE — 82962 GLUCOSE BLOOD TEST: CPT

## 2024-09-10 PROCEDURE — A9270 NON-COVERED ITEM OR SERVICE: HCPCS

## 2024-09-10 PROCEDURE — 160027 HCHG SURGERY MINUTES - 1ST 30 MINS LEVEL 2: Performed by: HOSPITALIST

## 2024-09-10 PROCEDURE — 88185 FLOWCYTOMETRY/TC ADD-ON: CPT | Mod: 91

## 2024-09-10 PROCEDURE — 700102 HCHG RX REV CODE 250 W/ 637 OVERRIDE(OP): Performed by: STUDENT IN AN ORGANIZED HEALTH CARE EDUCATION/TRAINING PROGRAM

## 2024-09-10 PROCEDURE — 07DR3ZX EXTRACTION OF ILIAC BONE MARROW, PERCUTANEOUS APPROACH, DIAGNOSTIC: ICD-10-PCS | Performed by: HOSPITALIST

## 2024-09-10 PROCEDURE — 88184 FLOWCYTOMETRY/ TC 1 MARKER: CPT

## 2024-09-10 PROCEDURE — 88305 TISSUE EXAM BY PATHOLOGIST: CPT

## 2024-09-10 PROCEDURE — 88311 DECALCIFY TISSUE: CPT

## 2024-09-10 PROCEDURE — 87799 DETECT AGENT NOS DNA QUANT: CPT

## 2024-09-10 PROCEDURE — 36415 COLL VENOUS BLD VENIPUNCTURE: CPT

## 2024-09-10 PROCEDURE — 85046 RETICYTE/HGB CONCENTRATE: CPT

## 2024-09-10 PROCEDURE — 84630 ASSAY OF ZINC: CPT

## 2024-09-10 PROCEDURE — 160048 HCHG OR STATISTICAL LEVEL 1-5: Performed by: HOSPITALIST

## 2024-09-10 PROCEDURE — 38222 DX BONE MARROW BX & ASPIR: CPT | Performed by: HOSPITALIST

## 2024-09-10 PROCEDURE — 82941 ASSAY OF GASTRIN: CPT

## 2024-09-10 PROCEDURE — 99152 MOD SED SAME PHYS/QHP 5/>YRS: CPT | Performed by: HOSPITALIST

## 2024-09-10 PROCEDURE — 85025 COMPLETE CBC W/AUTO DIFF WBC: CPT

## 2024-09-10 PROCEDURE — A9270 NON-COVERED ITEM OR SERVICE: HCPCS | Performed by: STUDENT IN AN ORGANIZED HEALTH CARE EDUCATION/TRAINING PROGRAM

## 2024-09-10 RX ORDER — MIDAZOLAM HYDROCHLORIDE 1 MG/ML
INJECTION INTRAMUSCULAR; INTRAVENOUS
Status: DISPENSED
Start: 2024-09-10 | End: 2024-09-10

## 2024-09-10 RX ORDER — MIDAZOLAM HYDROCHLORIDE 1 MG/ML
.5-2 INJECTION INTRAMUSCULAR; INTRAVENOUS PRN
Status: DISCONTINUED | OUTPATIENT
Start: 2024-09-10 | End: 2024-09-10 | Stop reason: HOSPADM

## 2024-09-10 RX ORDER — SODIUM CHLORIDE 9 MG/ML
500 INJECTION, SOLUTION INTRAVENOUS
Status: DISCONTINUED | OUTPATIENT
Start: 2024-09-10 | End: 2024-09-10 | Stop reason: HOSPADM

## 2024-09-10 RX ADMIN — ATORVASTATIN CALCIUM 10 MG: 10 TABLET, FILM COATED ORAL at 21:21

## 2024-09-10 RX ADMIN — CYANOCOBALAMIN 1000 MCG: 1000 INJECTION, SOLUTION INTRAMUSCULAR; SUBCUTANEOUS at 05:21

## 2024-09-10 RX ADMIN — FOLIC ACID 1 MG: 1 TABLET ORAL at 05:22

## 2024-09-10 RX ADMIN — PANTOPRAZOLE SODIUM 40 MG: 40 INJECTION, POWDER, FOR SOLUTION INTRAVENOUS at 05:22

## 2024-09-10 RX ADMIN — PANTOPRAZOLE SODIUM 40 MG: 40 INJECTION, POWDER, FOR SOLUTION INTRAVENOUS at 17:10

## 2024-09-10 RX ADMIN — INSULIN HUMAN 2 UNITS: 100 INJECTION, SOLUTION PARENTERAL at 17:42

## 2024-09-10 ASSESSMENT — ENCOUNTER SYMPTOMS
CONSTIPATION: 0
GASTROINTESTINAL NEGATIVE: 1
ABDOMINAL PAIN: 0
PSYCHIATRIC NEGATIVE: 1
WEIGHT LOSS: 0
PND: 0
NAUSEA: 0
CONSTITUTIONAL NEGATIVE: 1
CHILLS: 0
MYALGIAS: 0
PALPITATIONS: 0
MUSCULOSKELETAL NEGATIVE: 1
FOCAL WEAKNESS: 0
DIZZINESS: 0
DIARRHEA: 0
EYES NEGATIVE: 1
BRUISES/BLEEDS EASILY: 0
BLOOD IN STOOL: 0
RESPIRATORY NEGATIVE: 1
DIAPHORESIS: 0
DEPRESSION: 0
HEADACHES: 0
MEMORY LOSS: 0
COUGH: 0
ORTHOPNEA: 0
VOMITING: 0
NEUROLOGICAL NEGATIVE: 1
INSOMNIA: 0
CARDIOVASCULAR NEGATIVE: 1
FEVER: 0

## 2024-09-10 ASSESSMENT — PAIN DESCRIPTION - PAIN TYPE
TYPE: SURGICAL PAIN

## 2024-09-10 NOTE — CARE PLAN
The patient is Watcher - Medium risk of patient condition declining or worsening    Shift Goals  Clinical Goals: NPO at midnight, BMB tomorrow  Patient Goals: Rest  Family Goals: not present    Progress made toward(s) clinical / shift goals:       Problem: Knowledge Deficit - Standard  Goal: Patient and family/care givers will demonstrate understanding of plan of care, disease process/condition, diagnostic tests and medications  Outcome: Progressing  Note: A/Ox4, patient able to understand plan of care, all questions answered at this time. Plan for bone marrow biopsy today.      Problem: Nutrition  Goal: Patient's nutritional and fluid intake will be adequate or improve  Outcome: Progressing  Note: Pt verbalizes understanding of fingerstick glucose checks and insulin administration. Pt has been NPO since midnight for a bone marrow biopsy today.        Patient is not progressing towards the following goals:

## 2024-09-10 NOTE — PROGRESS NOTES
Miguelito from Lab called with critical result of ANC at 0.34 at 15:55. Critical lab result read back to Miguelito.   This critical lab result is within parameters established by  for this patient

## 2024-09-10 NOTE — PROGRESS NOTES
[facility] [place] FAMILY MEDICINE PROGRESS NOTE        Attending:   Sandy Trujillo MD    Resident:   DO Michel Rodriguez M.D.    PATIENT:   Miri Joseph; [id number]; 1940    ID:   84 y.o. male with history of dyslipidemia, BPH, type 2 DM, and recent GI bleed transferred from [facility] admitted for symptomatic pancytopenia and bone marrow biopsy. He was seen by GI at [facility] and had bidirectional endoscopy on 9/5 with biopsies taken from endoscopy but colonoscopy could not be completed due to inadequate bowel prep. Hgb stable but WBC and platelets continued to drop.  Transferred here for bone marrow biopsy.  Now status post platelet transfusion on 9/7.    SUBJECTIVE:   This morning the patient reports that he continues to feel well and he is encountered reading in his bed.  No complaints.  Son accompanies him this morning on team rounds and updated on plan of care and biopsy which is scheduled for today.  He denies headache, dizziness, lightheadedness, chest pain, palpitation, dyspnea, abdominal pain, new bruising/bleeding.     OBJECTIVE:  Vitals:    09/10/24 1249 09/10/24 1304 09/10/24 1319 09/10/24 1334   BP: 113/60 118/63 108/59 119/64   Pulse: 66 62  66   Resp: 19 19 18 18   Temp: 36.7 °C (98 °F) 36.4 °C (97.5 °F) 36.4 °C (97.6 °F) 36.6 °C (97.8 °F)   TempSrc: Temporal Temporal Oral Temporal   SpO2: 99% 99% 99% 99%   Weight:       Height:           No intake or output data in the 24 hours ending 09/10/24 1403    PHYSICAL EXAM:  General: No acute distress, afebrile, reading in bed, smiling and conversational  HEENT: NC/AT. EOMI, no pallor  Cardiovascular: RRR without murmurs. Normal capillary refill   Respiratory: CTAB  Abdomen: soft, nontender, nondistended, no masses  EXT:  SOL, no edema  Skin: No erythema/lesions, no bruises, no petechia  Neuro: Non-focal    LABS:  Recent Labs     09/08/24  1501 09/09/24  0527 09/09/24  1512 09/10/24  0331   WBC 2.1* 3.4* 2.5* 2.6*   RBC 2.74*  "3.01* 2.75* 2.74*   HEMOGLOBIN 9.0* 9.8* 9.0* 8.8*   HEMATOCRIT 25.0* 27.0* 25.2* 24.7*   MCV 91.2 89.7 91.6 90.1   MCH 32.8 32.6 32.7 32.1   RDW 50.4* 48.0 49.9 48.4   PLATELETCT 55* 48* 42* 35*   MPV 10.1 9.7 10.3 9.7   NEUTSPOLYS 11.70* 7.80* 11.00* 11.80*   LYMPHOCYTES 82.50* 89.60* 82.20* 85.50*   MONOCYTES 2.90 1.70 4.00 0.90   EOSINOPHILS 2.90 0.90 2.40 1.80   BASOPHILS 0.00 0.00 0.00 0.00   RBCMORPHOLO Present Present  --  Present     Recent Labs     09/09/24  0527   SODIUM 136   POTASSIUM 4.0   CHLORIDE 102   CO2 24   BUN 13   CREATININE 0.82   CALCIUM 9.0     Estimated GFR/CRCL = Estimated Creatinine Clearance: 64.9 mL/min (by C-G formula based on SCr of 0.82 mg/dL).  Recent Labs     09/09/24  0527   GLUCOSE 163*                   No results for input(s): \"INR\", \"APTT\", \"FIBRINOGEN\" in the last 72 hours.    Invalid input(s): \"DIMER\"      IMAGING:  No orders to display       MEDS:  Current Facility-Administered Medications   Medication Last Admin    FENTANYL CITRATE (PF) 0.05 MG/ML INJ SOLN (WRAPPED)      MIDAZOLAM HCL 1 MG/ML INJ SOLN (WRAPPER)      cyanocobalamin (Vitamin B-12) injection 1,000 mcg 1,000 mcg at 09/10/24 0521    folic acid (Folvite) tablet 1 mg 1 mg at 09/10/24 0522    senna-docusate (Pericolace Or Senokot S) 8.6-50 MG per tablet 2 Tablet 2 Tablet at 09/09/24 1840    And    polyethylene glycol/lytes (Miralax) Packet 1 Packet      acetaminophen (Tylenol) tablet 650 mg      diphenhydrAMINE (Benadryl) tablet/capsule 25 mg      Or    diphenhydrAMINE (Benadryl) injection 25 mg      insulin regular (HumuLIN R,NovoLIN R) injection 2 Units at 09/09/24 2029    And    dextrose 50% (D50W) injection 25 g      pantoprazole (Protonix) injection 40 mg 40 mg at 09/10/24 0522    acetaminophen (Tylenol) tablet 650 mg      ondansetron (Zofran ODT) dispertab 4 mg      ondansetron (Zofran) syringe/vial injection 4 mg      atorvastatin (Lipitor) tablet 10 mg 10 mg at 09/09/24 2025       ASSESSMENT/PLAN:  84 y.o. " male with history of dyslipidemia, BPH, type 2 DM, and recent GI bleed transferred from [facility] admitted for symptomatic pancytopenia and bone marrow biopsy. He was seen by GI at [facility] and had bidirectional endoscopy on 9/5 with biopsies taken from endoscopy but colonoscopy could not be completed due to inadequate bowel prep. Hgb stable but WBC and platelets continue to downtrend, now with critically low absolute neutrophils. Status post platelet transfusion on 9/7.  Bone marrow biopsy planned for 9/9.    * Pancytopenia (HCC)- (present on admission)  Assessment & Plan  Continues to have downtrending white blood cells, absolute neutrophils critical at 0.27.  Protective precautions in place.  Vital signs stable, afebrile, ROS negative.    Plan:  -CBC q12h  -Transfuse platelets goal >10,000  -Transfuse pRBCs for Hb goal 7  -Neutropenic precautions  -Parvo, EBV and CMV antibodies ordered  -Oncology consulted, patient evaluated by Dr. Beltrán.   -Continue supportive measures and B12  -Transfuse for platelets < 20,000, hemoglobin < 7  -Bone marrow biopsy planned for 9/9 at 11 AM  -Copper level  -Folate level (wnl)  -Gastrin level  -Parietal cell antibodies  -Zinc level  -Hepatitis panel (negative)  -N.p.o. at midnight    Dyslipidemia  Assessment & Plan  Continue home atorvastatin.     B12 deficiency- (present on admission)  Assessment & Plan  Completed 5-day course of daily IM vitamin B12.  Oncology Dr. Long notes undetectable B12; he recommends daily injections of B12 x 2 weeks and then can switch to weekly x 1 month, and then monthly from thereon.  We will follow these recommendations.  -Vit B12 1,000mcg x 2 weeks   -Then vitamin B12 1000 mcg weekly x 1 month   -Then vitamin B12 1000 mcg monthly  -Recommend considering oral supplementation at discharge for vegetarian diet, for now the plan will be IM; patient should follow-up on this outpatient    Type 2 diabetes mellitus, without  long-term current use of insulin (HCC)- (present on admission)  Assessment & Plan  On metformin, glipizide, and Januvia at home.   -Hold home medications  -Continue CHO diet and SSI + hypoglycemia protocol     GI bleed- (present on admission)  Assessment & Plan  Stable. No active bleeding at this time.  Vital signs stable, no signs of bleeding or bruising on exam.  Pathology from duodenal biopsy shows no significant abnormalities.  Gastric antrum biopsy shows mild chronic active gastritis negative for H. pylor.  Gastric body and fundus biopsy shows minimal chronic inactive gastritis and again notes negative for H. pylori and no evidence of autoimmune gastritis.    Plan:  -Continue daily PPI  -Outpatient follow-up with GI for repeat colonoscopy as initial did not have complete prep    Thrombocytopenia (HCC)- (present on admission)  Assessment & Plan  Continues to downtrend.  Platelets transfused the evening of 9/7.  Vital signs stable, no signs of bleeding or bruising.    Plan:  -CBC q12h  -Transfuse w/ platelets <20,000 per hematology/oncology   -Bone marrow biopsy planned for Monday       Core Measures  IV Fluids: none  Diet: vegetarian with carb count  Antbx: none  DVT ppx: SCDs, chemical prophylaxis held in setting of thrombocytopenia, patient encouraged to ambulate  Code status: Full Code  Dispo: inpatient for supportive care, monitoring of severe thrombocytopenia, bone marrow biopsy performed 9/10 and pending    Michel Bean M.D.  PGY-2  [facility] Resident

## 2024-09-10 NOTE — CARE PLAN
Problem: Knowledge Deficit - Standard  Goal: Patient and family/care givers will demonstrate understanding of plan of care, disease process/condition, diagnostic tests and medications  Outcome: Progressing   Pt updated about PO-BM biopsy today around noon. Pt NPO.     Problem: Nutrition  Goal: Patient's nutritional and fluid intake will be adequate or improve  Outcome: Progressing     Problem: Knowledge Deficit - Diabetes  Goal: Patient will demonstrate knowledge of insulin injection, symptoms, and treatment of hypoglycemia and diet prior to discharge    Outcome: Progressing   The patient is Watcher - Medium risk of patient condition declining or worsening    Shift Goals  Clinical Goals: pt will tolerate BM biopsy  Patient Goals: Rest  Family Goals: not present    Progress made toward(s) clinical / shift goals:      Patient is not progressing towards the following goals:    Pt AOx4. Pt denies pain,nausea, and SOB. Bone marrow biopsy today. PIV's patent SL. Pt up self. Last BM 09/06. Pt refuses tool softeners at this time. Prune juice given.

## 2024-09-10 NOTE — PROCEDURES
Bone Marrow Biopsy/Aspiration    Date/Time: 9/10/2024 12:15 PM    Performed by: Alexis Rosas M.D.  Authorized by: Alexis Rosas M.D.    Consent:     Consent obtained:  Written    Consent given by:  Patient    Risks discussed:  Bleeding, infection, headache, pain, repeat procedure and nerve damage    Alternatives discussed:  No treatment  Universal protocol:     Procedure explained and questions answered to patient or proxy's satisfaction: yes      Relevant documents present and verified: yes      Test results available and properly labeled: yes      Imaging studies available: yes      Required blood products, implants, devices, and special equipment available: yes      Immediately prior to procedure a time out was called: yes      Site/side marked: yes      Patient identity confirmed:  Hospital-assigned identification number  Pre-procedure details:     Procedure type:  Aspiration and biopsy    Requesting physician:  Dr. Long    Indications:  Pancytopenia    Position:  Prone    Buttock laterality:  Left    Local anesthetic:  1% Lidocaine    Subcutaneous volume:     Periosteum anesthetic volume:  4 mL    Preparation: Patient was prepped and draped in usual sterile fashion    Sedation:     Patient Sedated: Yes      Sedation type: moderate (conscious) sedation      Sedation:  Midazolam    Sedation Dosage:  2 mg    Analgesia:  Fentanyl    Analgesia Dosage:  50 mcg    : 12:22.    : 12:27.    Moderate sedation charge selection based on time above is:  15 minutes      I was personally present and supervised the patient throughout the entirety of the moderate sedation time mentioned above.  Procedure details:     Aspirate obtained:  5 mL followed by 5 mL    Biopsy performed:  2 cores    Number of attempts:  3    Estimated blood loss (mL): 1.  Post-procedure:     Puncture site:  Adhesive bandage applied and direct pressure applied    Patient tolerance of procedure:  Tolerated well, no immediate complications  Comments:       I personally monitored him until 12:37 due to conscious sedation.

## 2024-09-11 PROBLEM — R30.0 DYSURIA: Status: ACTIVE | Noted: 2024-09-11

## 2024-09-11 LAB
ACUTE LEUKEMIA MARKERS SPEC-IMP: NORMAL
ANION GAP SERPL CALC-SCNC: 11 MMOL/L (ref 7–16)
ANISOCYTOSIS BLD QL SMEAR: ABNORMAL
ANISOCYTOSIS BLD QL SMEAR: ABNORMAL
APPEARANCE UR: CLEAR
BACTERIA #/AREA URNS HPF: ABNORMAL /HPF
BASOPHILS # BLD AUTO: 0 % (ref 0–1.8)
BASOPHILS # BLD AUTO: 0 % (ref 0–1.8)
BASOPHILS # BLD: 0 K/UL (ref 0–0.12)
BASOPHILS # BLD: 0 K/UL (ref 0–0.12)
BILIRUB UR QL STRIP.AUTO: NEGATIVE
BUN SERPL-MCNC: 15 MG/DL (ref 8–22)
CALCIUM SERPL-MCNC: 8.8 MG/DL (ref 8.5–10.5)
CHLORIDE SERPL-SCNC: 102 MMOL/L (ref 96–112)
CMV IGG SERPL IA-ACNC: >10 U/ML
CMV IGM SERPL IA-ACNC: <8 AU/ML
CO2 SERPL-SCNC: 23 MMOL/L (ref 20–33)
COLOR UR: YELLOW
COMMENT NL1176: NORMAL
CREAT SERPL-MCNC: 0.85 MG/DL (ref 0.5–1.4)
EBV EA-D IGG SER-ACNC: <5 U/ML (ref 0–10.9)
EBV NA IGG SER IA-ACNC: >600 U/ML (ref 0–21.9)
EBV VCA IGG SER IA-ACNC: 192 U/ML (ref 0–21.9)
EBV VCA IGM SER IA-ACNC: <10 U/ML (ref 0–43.9)
EOSINOPHIL # BLD AUTO: 0.01 K/UL (ref 0–0.51)
EOSINOPHIL # BLD AUTO: 0.08 K/UL (ref 0–0.51)
EOSINOPHIL NFR BLD: 0.9 % (ref 0–6.9)
EOSINOPHIL NFR BLD: 3.5 % (ref 0–6.9)
EPI CELLS #/AREA URNS HPF: ABNORMAL /HPF
ERYTHROCYTE [DISTWIDTH] IN BLOOD BY AUTOMATED COUNT: 47.4 FL (ref 35.9–50)
ERYTHROCYTE [DISTWIDTH] IN BLOOD BY AUTOMATED COUNT: 48.7 FL (ref 35.9–50)
EVENTS COUNTED SPEC: 26 MARKERS
GASTRIN SERPL-MCNC: 67 PG/ML (ref 0–100)
GFR SERPLBLD CREATININE-BSD FMLA CKD-EPI: 87 ML/MIN/1.73 M 2
GLUCOSE BLD STRIP.AUTO-MCNC: 148 MG/DL (ref 65–99)
GLUCOSE BLD STRIP.AUTO-MCNC: 229 MG/DL (ref 65–99)
GLUCOSE BLD STRIP.AUTO-MCNC: 286 MG/DL (ref 65–99)
GLUCOSE SERPL-MCNC: 164 MG/DL (ref 65–99)
GLUCOSE UR STRIP.AUTO-MCNC: >=1000 MG/DL
HCT VFR BLD AUTO: 24.8 % (ref 42–52)
HCT VFR BLD AUTO: 25.6 % (ref 42–52)
HGB BLD-MCNC: 8.6 G/DL (ref 14–18)
HGB BLD-MCNC: 9.2 G/DL (ref 14–18)
HYALINE CASTS #/AREA URNS LPF: ABNORMAL /LPF
KETONES UR STRIP.AUTO-MCNC: NEGATIVE MG/DL
LEUKOCYTE ESTERASE UR QL STRIP.AUTO: NEGATIVE
LYMPHOCYTES # BLD AUTO: 1.02 K/UL (ref 1–4.8)
LYMPHOCYTES # BLD AUTO: 1.8 K/UL (ref 1–4.8)
LYMPHOCYTES NFR BLD: 73 % (ref 22–41)
LYMPHOCYTES NFR BLD: 81.7 % (ref 22–41)
MANUAL DIFF BLD: NORMAL
MANUAL DIFF BLD: NORMAL
MCH RBC QN AUTO: 31 PG (ref 27–33)
MCH RBC QN AUTO: 32.1 PG (ref 27–33)
MCHC RBC AUTO-ENTMCNC: 34.7 G/DL (ref 32.3–36.5)
MCHC RBC AUTO-ENTMCNC: 35.9 G/DL (ref 32.3–36.5)
MCV RBC AUTO: 89.2 FL (ref 81.4–97.8)
MCV RBC AUTO: 89.5 FL (ref 81.4–97.8)
MICRO URNS: ABNORMAL
MICROCYTES BLD QL SMEAR: ABNORMAL
MICROCYTES BLD QL SMEAR: ABNORMAL
MONOCYTES # BLD AUTO: 0.05 K/UL (ref 0–0.85)
MONOCYTES # BLD AUTO: 0.08 K/UL (ref 0–0.85)
MONOCYTES NFR BLD AUTO: 3.5 % (ref 0–13.4)
MONOCYTES NFR BLD AUTO: 3.5 % (ref 0–13.4)
MORPHOLOGY BLD-IMP: NORMAL
MORPHOLOGY BLD-IMP: NORMAL
NEUTROPHILS # BLD AUTO: 0.25 K/UL (ref 1.82–7.42)
NEUTROPHILS # BLD AUTO: 0.32 K/UL (ref 1.82–7.42)
NEUTROPHILS NFR BLD: 11.3 % (ref 44–72)
NEUTROPHILS NFR BLD: 21.7 % (ref 44–72)
NEUTS BAND NFR BLD MANUAL: 0.9 % (ref 0–10)
NITRITE UR QL STRIP.AUTO: NEGATIVE
NRBC # BLD AUTO: 0 K/UL
NRBC # BLD AUTO: 0 K/UL
NRBC BLD-RTO: 0 /100 WBC (ref 0–0.2)
NRBC BLD-RTO: 0 /100 WBC (ref 0–0.2)
PCA IGG SER-ACNC: 1.9 UNITS (ref 0–24.9)
PH UR STRIP.AUTO: 7 [PH] (ref 5–8)
PLATELET # BLD AUTO: 16 K/UL (ref 164–446)
PLATELET # BLD AUTO: 24 K/UL (ref 164–446)
PLATELET BLD QL SMEAR: NORMAL
PLATELET BLD QL SMEAR: NORMAL
PLATELETS.RETICULATED NFR BLD AUTO: 0.9 % (ref 0.6–13.1)
PLATELETS.RETICULATED NFR BLD AUTO: 1.5 % (ref 0.6–13.1)
PMV BLD AUTO: 10.5 FL (ref 9–12.9)
PMV BLD AUTO: 11.4 FL (ref 9–12.9)
POTASSIUM SERPL-SCNC: 4.3 MMOL/L (ref 3.6–5.5)
PROT UR QL STRIP: 100 MG/DL
RBC # BLD AUTO: 2.77 M/UL (ref 4.7–6.1)
RBC # BLD AUTO: 2.87 M/UL (ref 4.7–6.1)
RBC # URNS HPF: ABNORMAL /HPF
RBC BLD AUTO: PRESENT
RBC BLD AUTO: PRESENT
RBC UR QL AUTO: ABNORMAL
SMUDGE CELLS BLD QL SMEAR: NORMAL
SMUDGE CELLS BLD QL SMEAR: NORMAL
SODIUM SERPL-SCNC: 136 MMOL/L (ref 135–145)
SOURCE 9121: NORMAL
SP GR UR STRIP.AUTO: 1.02
UROBILINOGEN UR STRIP.AUTO-MCNC: 2 MG/DL
WBC # BLD AUTO: 1.4 K/UL (ref 4.8–10.8)
WBC # BLD AUTO: 2.2 K/UL (ref 4.8–10.8)
WBC #/AREA URNS HPF: ABNORMAL /HPF
ZINC SERPL-MCNC: 56.9 UG/DL (ref 60–120)

## 2024-09-11 PROCEDURE — 87186 SC STD MICRODIL/AGAR DIL: CPT | Mod: 91

## 2024-09-11 PROCEDURE — 700111 HCHG RX REV CODE 636 W/ 250 OVERRIDE (IP): Performed by: STUDENT IN AN ORGANIZED HEALTH CARE EDUCATION/TRAINING PROGRAM

## 2024-09-11 PROCEDURE — 99232 SBSQ HOSP IP/OBS MODERATE 35: CPT | Mod: GC | Performed by: FAMILY MEDICINE

## 2024-09-11 PROCEDURE — 700111 HCHG RX REV CODE 636 W/ 250 OVERRIDE (IP)

## 2024-09-11 PROCEDURE — 87086 URINE CULTURE/COLONY COUNT: CPT

## 2024-09-11 PROCEDURE — A9270 NON-COVERED ITEM OR SERVICE: HCPCS

## 2024-09-11 PROCEDURE — 770004 HCHG ROOM/CARE - ONCOLOGY PRIVATE *

## 2024-09-11 PROCEDURE — 87015 SPECIMEN INFECT AGNT CONCNTJ: CPT

## 2024-09-11 PROCEDURE — 700102 HCHG RX REV CODE 250 W/ 637 OVERRIDE(OP)

## 2024-09-11 PROCEDURE — 85007 BL SMEAR W/DIFF WBC COUNT: CPT

## 2024-09-11 PROCEDURE — 87077 CULTURE AEROBIC IDENTIFY: CPT | Mod: 91

## 2024-09-11 PROCEDURE — 700102 HCHG RX REV CODE 250 W/ 637 OVERRIDE(OP): Performed by: STUDENT IN AN ORGANIZED HEALTH CARE EDUCATION/TRAINING PROGRAM

## 2024-09-11 PROCEDURE — A9270 NON-COVERED ITEM OR SERVICE: HCPCS | Performed by: STUDENT IN AN ORGANIZED HEALTH CARE EDUCATION/TRAINING PROGRAM

## 2024-09-11 PROCEDURE — 700105 HCHG RX REV CODE 258

## 2024-09-11 PROCEDURE — A9270 NON-COVERED ITEM OR SERVICE: HCPCS | Performed by: INTERNAL MEDICINE

## 2024-09-11 PROCEDURE — 36415 COLL VENOUS BLD VENIPUNCTURE: CPT

## 2024-09-11 PROCEDURE — 82962 GLUCOSE BLOOD TEST: CPT | Mod: 91

## 2024-09-11 PROCEDURE — 87040 BLOOD CULTURE FOR BACTERIA: CPT

## 2024-09-11 PROCEDURE — 85027 COMPLETE CBC AUTOMATED: CPT

## 2024-09-11 PROCEDURE — 80048 BASIC METABOLIC PNL TOTAL CA: CPT

## 2024-09-11 PROCEDURE — 81001 URINALYSIS AUTO W/SCOPE: CPT

## 2024-09-11 PROCEDURE — 85055 RETICULATED PLATELET ASSAY: CPT

## 2024-09-11 PROCEDURE — 700102 HCHG RX REV CODE 250 W/ 637 OVERRIDE(OP): Performed by: INTERNAL MEDICINE

## 2024-09-11 PROCEDURE — 700111 HCHG RX REV CODE 636 W/ 250 OVERRIDE (IP): Performed by: INTERNAL MEDICINE

## 2024-09-11 RX ORDER — OXYCODONE HYDROCHLORIDE 5 MG/1
2.5 TABLET ORAL
Status: COMPLETED | OUTPATIENT
Start: 2024-09-11 | End: 2024-09-11

## 2024-09-11 RX ORDER — PHENAZOPYRIDINE HYDROCHLORIDE 100 MG/1
100 TABLET, FILM COATED ORAL
Status: COMPLETED | OUTPATIENT
Start: 2024-09-11 | End: 2024-09-13

## 2024-09-11 RX ADMIN — INSULIN HUMAN 3 UNITS: 100 INJECTION, SOLUTION PARENTERAL at 22:03

## 2024-09-11 RX ADMIN — PANTOPRAZOLE SODIUM 40 MG: 40 INJECTION, POWDER, FOR SOLUTION INTRAVENOUS at 06:39

## 2024-09-11 RX ADMIN — PHENAZOPYRIDINE 100 MG: 100 TABLET ORAL at 12:12

## 2024-09-11 RX ADMIN — PANTOPRAZOLE SODIUM 40 MG: 40 INJECTION, POWDER, FOR SOLUTION INTRAVENOUS at 18:27

## 2024-09-11 RX ADMIN — ACETAMINOPHEN 650 MG: 325 TABLET ORAL at 12:05

## 2024-09-11 RX ADMIN — INSULIN HUMAN 2 UNITS: 100 INJECTION, SOLUTION PARENTERAL at 18:28

## 2024-09-11 RX ADMIN — CEFAZOLIN 2 G: 2 INJECTION, POWDER, FOR SOLUTION INTRAMUSCULAR; INTRAVENOUS at 12:20

## 2024-09-11 RX ADMIN — FOLIC ACID 1 MG: 1 TABLET ORAL at 06:39

## 2024-09-11 RX ADMIN — OXYCODONE HYDROCHLORIDE 2.5 MG: 5 TABLET ORAL at 15:07

## 2024-09-11 RX ADMIN — PHENAZOPYRIDINE 100 MG: 100 TABLET ORAL at 22:08

## 2024-09-11 RX ADMIN — ATORVASTATIN CALCIUM 10 MG: 10 TABLET, FILM COATED ORAL at 22:07

## 2024-09-11 RX ADMIN — CYANOCOBALAMIN 1000 MCG: 1000 INJECTION, SOLUTION INTRAMUSCULAR; SUBCUTANEOUS at 06:38

## 2024-09-11 RX ADMIN — INSULIN HUMAN 3 UNITS: 100 INJECTION, SOLUTION PARENTERAL at 12:02

## 2024-09-11 ASSESSMENT — PAIN DESCRIPTION - PAIN TYPE
TYPE: ACUTE PAIN

## 2024-09-11 NOTE — ASSESSMENT & PLAN NOTE
Resolved.  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  S/p Ancef and vancomycin. Now on prophylactic Levaquin.

## 2024-09-11 NOTE — PROGRESS NOTES
[facility] [place] [facility] MEDICINE PROGRESS NOTE        Attending:   Sandy Trujillo MD    Resident:   DO Michel Rodriguez M.D.    PATIENT:   Miri Joseph; [id number]; 1940    ID:   84 y.o. male with history of dyslipidemia, BPH, type 2 DM, and recent GI bleed transferred from [facility] admitted for symptomatic pancytopenia and bone marrow biopsy. He was seen by GI at [facility] and had bidirectional endoscopy on 9/5 with biopsies taken from endoscopy but colonoscopy could not be completed due to inadequate bowel prep. Hgb stable but WBC and platelets continued to drop.  Transferred here for bone marrow biopsy.  Now status post platelet transfusion on 9/7.    SUBJECTIVE:   Patient reports subjective chills and shivering in his room.  He has also had 2 instances of dysuria.  He otherwise has a negative review of systems for fever, chest pain, shortness of breath, abdominal pain, N/V/D, hematuria, bloody BM, myalgias or other pain.    OBJECTIVE:  Vitals:    09/11/24 0431 09/11/24 0746 09/11/24 1246 09/11/24 1628   BP: 101/51 139/70 127/68 122/62   Pulse: 73 83 86 99   Resp: 16 16 16 16   Temp: 36.7 °C (98 °F) 36.3 °C (97.4 °F) 36.6 °C (97.9 °F) 37.2 °C (98.9 °F)   TempSrc: Oral Oral Tympanic Oral   SpO2: 97% 100% 99% 98%   Weight:       Height:             Intake/Output Summary (Last 24 hours) at 9/11/2024 1643  Last data filed at 9/10/2024 1825  Gross per 24 hour   Intake 240 ml   Output --   Net 240 ml       PHYSICAL EXAM:  General: No acute distress, reading in bed, conversational  HEENT: NC/AT. EOMI, no pallor  Cardiovascular: RRR without murmurs. Normal capillary refill   Respiratory: CTAB  Abdomen: soft, nontender, nondistended, no masses, no suprapubic tenderness  EXT:  SOL, no edema  Skin: No erythema/lesions, no bruises, no petechia  Neuro: Non-focal    LABS:  Recent Labs     09/09/24  0527 09/09/24  1512 09/10/24  0331 09/10/24  1510 09/11/24  0214 09/11/24  1457   WBC 3.4*   <  "> 2.6* 2.4* 2.2* 1.4*   RBC 3.01*   < > 2.74* 2.97* 2.77* 2.87*   HEMOGLOBIN 9.8*   < > 8.8* 9.3* 8.6* 9.2*   HEMATOCRIT 27.0*   < > 24.7* 26.6* 24.8* 25.6*   MCV 89.7   < > 90.1 89.6 89.5 89.2   MCH 32.6   < > 32.1 31.3 31.0 32.1   RDW 48.0   < > 48.4 48.1 48.7 47.4   PLATELETCT 48*   < > 35* 29* 24* 16*   MPV 9.7   < > 9.7 10.1 11.4 10.5   NEUTSPOLYS 7.80*   < > 11.80* 14.00* 11.30*  --    LYMPHOCYTES 89.60*   < > 85.50* 81.10* 81.70*  --    MONOCYTES 1.70   < > 0.90 2.90 3.50  --    EOSINOPHILS 0.90   < > 1.80 2.00 3.50  --    BASOPHILS 0.00   < > 0.00 0.00 0.00  --    RBCMORPHOLO Present  --  Present  --  Present  --     < > = values in this interval not displayed.     Recent Labs     09/09/24 0527 09/11/24  0214   SODIUM 136 136   POTASSIUM 4.0 4.3   CHLORIDE 102 102   CO2 24 23   BUN 13 15   CREATININE 0.82 0.85   CALCIUM 9.0 8.8     Estimated GFR/CRCL = Estimated Creatinine Clearance: 62.6 mL/min (by C-G formula based on SCr of 0.85 mg/dL).  Recent Labs     09/09/24  0527 09/11/24  0214   GLUCOSE 163* 164*                   No results for input(s): \"INR\", \"APTT\", \"FIBRINOGEN\" in the last 72 hours.    Invalid input(s): \"DIMER\"      IMAGING:  No orders to display       MEDS:  Current Facility-Administered Medications   Medication Last Admin    ceFAZolin (Ancef) 2 g in  mL IVPB Stopped at 09/11/24 1250    phenazopyridine (Pyridium) tablet 100 mg 100 mg at 09/11/24 1212    cyanocobalamin (Vitamin B-12) injection 1,000 mcg 1,000 mcg at 09/11/24 0638    folic acid (Folvite) tablet 1 mg 1 mg at 09/11/24 0639    senna-docusate (Pericolace Or Senokot S) 8.6-50 MG per tablet 2 Tablet 2 Tablet at 09/09/24 1840    And    polyethylene glycol/lytes (Miralax) Packet 1 Packet      acetaminophen (Tylenol) tablet 650 mg      diphenhydrAMINE (Benadryl) tablet/capsule 25 mg      Or    diphenhydrAMINE (Benadryl) injection 25 mg      insulin regular (HumuLIN R,NovoLIN R) injection 3 Units at 09/11/24 1202    And    dextrose " 50% (D50W) injection 25 g      pantoprazole (Protonix) injection 40 mg 40 mg at 09/11/24 0639    acetaminophen (Tylenol) tablet 650 mg 650 mg at 09/11/24 1205    ondansetron (Zofran ODT) dispertab 4 mg      ondansetron (Zofran) syringe/vial injection 4 mg      atorvastatin (Lipitor) tablet 10 mg 10 mg at 09/10/24 8051       ASSESSMENT/PLAN:  84 y.o. male with history of dyslipidemia, BPH, type 2 DM, and recent GI bleed transferred from [facility] admitted for symptomatic pancytopenia and bone marrow biopsy. He was seen by GI at [facility] and had bidirectional endoscopy on 9/5 with biopsies taken from endoscopy but colonoscopy could not be completed due to inadequate bowel prep. Hgb stable but WBC and platelets continue to downtrend, now with critically low absolute neutrophils. Status post platelet transfusion on 9/7.  Bone marrow biopsy performed for 9/10.    On the morning of 9/11 the patient had dysuria; please see plan below    * Pancytopenia (HCC)- (present on admission)  Assessment & Plan  Continues to have downtrending white blood cells, absolute neutrophils critical at 0.27.  Protective precautions in place.  Vital signs stable, afebrile, ROS negative.    Plan:  -CBC q12h  -Transfuse platelets goal >10,000  -Transfuse pRBCs for Hb goal 7  -Neutropenic precautions  -Parvo, EBV and CMV antibodies ordered  -Oncology consulted, patient evaluated by Dr. Beltrán.   -Continue supportive measures and B12  -Transfuse for platelets < 20,000, hemoglobin < 7  -Bone marrow biopsy planned for 9/9 at 11 AM  -Copper level  -Folate level (wnl)  -Gastrin level  -Parietal cell antibodies  -Zinc level  -Hepatitis panel (negative)  -N.p.o. at midnight    Dysuria  Assessment & Plan  Morning of 9/11 patient had 2 instances of dysuria was also noted to be shivering with subjective chills.  Review of systems negative for abdominal pain, nausea/vomiting/diarrhea, fevers, other pain, myalgias.  Patient afebrile  but dealing with pancytopenia with an ANC of 0.25.  Exam without suprapubic tenderness.  UA demonstrates LE, rare bacteria,  RBC, and 0-2 white blood cells.  Interestingly, there was greater than 1000 glucose in the urine.  Given the patient's immunodeficiency infectious disease pharmacy was consulted and recommends Ancef.  UA and cultures of blood and urine were taken before Ancef was started.  We will continue to monitor  -Phenazopyridine  -Ancef  -Follow blood cultures and urine cultures  -Discussed with heme-onc Dr. Long who agrees    Dyslipidemia  Assessment & Plan  Continue home atorvastatin.     B12 deficiency- (present on admission)  Assessment & Plan  Completed 5-day course of daily IM vitamin B12.  Oncology Dr. Long notes undetectable B12; he recommends daily injections of B12 x 2 weeks and then can switch to weekly x 1 month, and then monthly from thereon.  We will follow these recommendations.  -Vit B12 1,000mcg x 2 weeks   -Then vitamin B12 1000 mcg weekly x 1 month   -Then vitamin B12 1000 mcg monthly  -Recommend considering oral supplementation at discharge for vegetarian diet, for now the plan will be IM; patient should follow-up on this outpatient    Type 2 diabetes mellitus, without long-term current use of insulin (HCC)- (present on admission)  Assessment & Plan  On metformin, glipizide, and Januvia at home.   -Hold home medications  -Continue CHO diet and SSI + hypoglycemia protocol     GI bleed- (present on admission)  Assessment & Plan  Stable. No active bleeding at this time.  Vital signs stable, no signs of bleeding or bruising on exam.  Pathology from duodenal biopsy shows no significant abnormalities.  Gastric antrum biopsy shows mild chronic active gastritis negative for H. pylor.  Gastric body and fundus biopsy shows minimal chronic inactive gastritis and again notes negative for H. pylori and no evidence of autoimmune gastritis.    Plan:  -Continue daily PPI  -Outpatient  follow-up with GI for repeat colonoscopy as initial did not have complete prep    Thrombocytopenia (HCC)- (present on admission)  Assessment & Plan  Continues to downtrend.  Platelets transfused the evening of 9/7.  Vital signs stable, no signs of bleeding or bruising.    Plan:  -CBC q12h  -Transfuse w/ platelets <20,000 per hematology/oncology   -Bone marrow biopsy planned for Monday       Core Measures  IV Fluids: none  Diet: vegetarian with carb count  Antbx: none  DVT ppx: SCDs, chemical prophylaxis held in setting of thrombocytopenia, patient encouraged to ambulate  Code status: Full Code  Dispo: inpatient for supportive care, monitoring of severe thrombocytopenia, bone marrow biopsy performed 9/10 and pending    Michel Bean M.D.  PGY-2  [facility] Resident

## 2024-09-11 NOTE — DISCHARGE PLANNING
Case Management Discharge Planning    Admission Date: 9/6/2024  GMLOS: 3.4  ALOS: 5    6-Clicks ADL Score: 24  6-Clicks Mobility Score: 24      Anticipated Discharge Dispo: Discharge Disposition: Discharged to home/self care (01)    DME Needed: No    Action(s) Taken: Discussed in IDT rounds, pt is pending BMB results at this time, oncology following. Pt to continue to receive B12 due to B12 deficiency. Will await for plan from oncology once BMB has resulted.     Escalations Completed: None    Medically Clear: No    Next Steps: Pending BMB results    Barriers to Discharge: Medical clearance    Is the patient up for discharge tomorrow: No

## 2024-09-11 NOTE — PROGRESS NOTES
Hematology/Oncology Progress Note    Primary Hematologist/Oncologist:     Oncology History:  The patient is an 84-year-old male who presented   to [facility] on 09/03/2024 with complaints of bloody stools and dizziness.  On presentation to the ER, he was found to have a WBC count of 3.2,   hemoglobin of 5.5 and a platelet count of 1000.  The patient did receive 2   units of platelets and his platelet count did improve to 88,000.  During the   hospitalization, he did have a GI workup with colonoscopy and endoscopy.  Due   to poor prep, could not complete colonoscopy and endoscopy, showed some   watermelon stomach and the biopsies were obtained, which are still pending.    The patient also during the workup was found to have a B12 deficiency less   than 150 and started on B12 injections.  He had an extensive workup with   coags, which are normal, fibrinogen level was normal.  He also did have   [id number] activity that was negative.  Hepatitis C was negative.  HIV was   negative.  His platelet count again continues to drop and was 30,000 yesterday   and he is transferred to [facility] for further evaluation to rule out   primary bone marrow condition.  He denies of any fevers or chills.  His GI   bleeding has stopped.    Interval History:  No events overnight.     Review of Systems:  Review of Systems   Constitutional: Negative.  Negative for chills, diaphoresis, fever, malaise/fatigue and weight loss.   HENT: Negative.     Eyes: Negative.    Respiratory: Negative.  Negative for cough.    Cardiovascular: Negative.  Negative for chest pain, palpitations, orthopnea, leg swelling and PND.   Gastrointestinal: Negative.  Negative for abdominal pain, blood in stool, constipation, diarrhea, melena, nausea and vomiting.   Genitourinary: Negative.  Negative for dysuria, frequency, hematuria and urgency.   Musculoskeletal: Negative.  Negative for myalgias.   Skin: Negative.    Neurological: Negative.  Negative for dizziness,  "focal weakness and headaches.   Endo/Heme/Allergies: Negative.  Does not bruise/bleed easily.   Psychiatric/Behavioral: Negative.  Negative for depression and memory loss. The patient does not have insomnia.         Vitals:     /67   Pulse 72   Temp 36.7 °C (98.1 °F) (Oral)   Resp 18   Ht 1.702 m (5' 7\")   Wt 72.1 kg (158 lb 15.2 oz)   SpO2 98%   BMI 24.90 kg/m²     Physical Exam:  Physical Exam  Vitals and nursing note reviewed.   Constitutional:       General: He is not in acute distress.     Appearance: He is not toxic-appearing.   HENT:      Head: Normocephalic and atraumatic.   Eyes:      General: No scleral icterus.     Pupils: Pupils are equal, round, and reactive to light.   Cardiovascular:      Rate and Rhythm: Normal rate and regular rhythm.      Pulses: Normal pulses.      Heart sounds: No murmur heard.     No friction rub. No gallop.   Pulmonary:      Effort: Pulmonary effort is normal.      Breath sounds: No stridor. No wheezing, rhonchi or rales.   Abdominal:      General: Abdomen is flat. Bowel sounds are normal.      Palpations: Abdomen is soft.   Musculoskeletal:         General: No swelling.      Cervical back: No rigidity.   Skin:     General: Skin is warm and dry.      Capillary Refill: Capillary refill takes 2 to 3 seconds.      Coloration: Skin is not jaundiced.   Neurological:      General: No focal deficit present.      Mental Status: He is alert and oriented to person, place, and time. Mental status is at baseline.   Psychiatric:         Mood and Affect: Mood normal.          Assessment and Plan:    # Pancytopenia  Unclear etiology. BMBx arranged for 9/10/24. Differential with  with anisocytosis, macrocytosis, target cells, and some reactive lymphocytes. His absolute reticulocyte count of 2,000 indicates severe hypoproliferation for his diegree of anemia. Will eval for benign or infectious causes in addition to bone marrow biopsy.    TRANSFUSION THRESHOLDS:  RBCs and PLTs " must be leukoreduced, CMV negative/safe. Irradiation NOT required (yet) for this patient.     Hgb <7 if no symptoms or <8 with symptoms or bleedinu pRBC  PLTs <10 and no symptoms or <20 if febrile, septic, or bleeidnu apheresis PLTs     # B12 Deficiency  Undetectable B12. Intrinsic factor Abs negative. Will check parietal cell Abs, gastrin level, and folate levels. Continue daily B12 1000mcg injections. Can transition to weekly after 2 weeks. Continue folic acid.     # GI Bleed  # Chronic Gastritis  Biopsies from EGD demonstrated chronic gastritis which could be worsening his B12 deficiency beyond just his vegetarian diet.    PLAN:  Will await results of BMBx  Extensive serologic eval still pending  Continue B12 and folic acid supplementation        Thank you for allowing me to participate in his care. Please do not hesitate to reach out with any questions.    Please note that this dictation was created using voice recognition software. I have made every reasonable attempt to correct obvious errors, but I expect that there are errors of grammar and possibly content that I did not discover before finalizing the note.      Howie Long MD  Hematologist/Oncologist  [facility] [employer] [facility] [employer]

## 2024-09-11 NOTE — CARE PLAN
The patient is Watcher - Medium risk of patient condition declining or worsening    Shift Goals  Clinical Goals: Pt will show no S/S of infection  Patient Goals: rest  Family Goals: N/A    Progress made toward(s) clinical / shift goals:    Problem: Knowledge Deficit - Standard  Goal: Patient and family/care givers will demonstrate understanding of plan of care, disease process/condition, diagnostic tests and medications  Outcome: Progressing  Note: Pt is AO4. Call light within reach. Educated and understand POC. All questions answered at this time.     Problem: Neutropenia Precautions  Goal: Neutropenic precautions will be implemented and maintained for patient protection  Outcome: Progressing  Note: Pt has not s/s of infection. Education provided on the importance of hand hygiene. Oral care encouraged.        Patient is not progressing towards the following goals:

## 2024-09-12 LAB
25(OH)D3 SERPL-MCNC: 16 NG/ML (ref 30–100)
ANISOCYTOSIS BLD QL SMEAR: ABNORMAL
ANISOCYTOSIS BLD QL SMEAR: ABNORMAL
ANNOTATION COMMENT IMP: NOT DETECTED
B19V DNA SERPL NAA+PROBE-ACNC: <100 IU/ML
B19V DNA SERPL NAA+PROBE-LOG IU: <2 LOG IU/ML
BARCODED ABORH UBTYP: 5100
BARCODED ABORH UBTYP: 7300
BARCODED PRD CODE UBPRD: NORMAL
BARCODED PRD CODE UBPRD: NORMAL
BARCODED UNIT NUM UBUNT: NORMAL
BARCODED UNIT NUM UBUNT: NORMAL
BASOPHILS # BLD AUTO: 0 % (ref 0–1.8)
BASOPHILS # BLD AUTO: 0 % (ref 0–1.8)
BASOPHILS # BLD: 0 K/UL (ref 0–0.12)
BASOPHILS # BLD: 0 K/UL (ref 0–0.12)
CMV DNA SERPL NAA+PROBE-ACNC: NOT DETECTED IU/ML
CMV DNA SERPL NAA+PROBE-LOG IU: NOT DETECTED LOG IU/ML
CMV DNA SERPL QL NAA+PROBE: NOT DETECTED
COMPONENT P 8504P: NORMAL
COMPONENT P 8504P: NORMAL
COPPER SERPL-MCNC: 99.5 UG/DL (ref 70–140)
EBV DNA SERPL NAA+PROBE-ACNC: NOT DETECTED IU/ML
EBV DNA SERPL NAA+PROBE-LOG#: NOT DETECTED LOG IU/ML
EBV DNA SPEC QL NAA+PROBE: NOT DETECTED
EOSINOPHIL # BLD AUTO: 0 K/UL (ref 0–0.51)
EOSINOPHIL # BLD AUTO: 0 K/UL (ref 0–0.51)
EOSINOPHIL NFR BLD: 0 % (ref 0–6.9)
EOSINOPHIL NFR BLD: 0 % (ref 0–6.9)
ERYTHROCYTE [DISTWIDTH] IN BLOOD BY AUTOMATED COUNT: 47.3 FL (ref 35.9–50)
ERYTHROCYTE [DISTWIDTH] IN BLOOD BY AUTOMATED COUNT: 49.5 FL (ref 35.9–50)
EST. AVERAGE GLUCOSE BLD GHB EST-MCNC: 163 MG/DL
GLUCOSE BLD STRIP.AUTO-MCNC: 181 MG/DL (ref 65–99)
GLUCOSE BLD STRIP.AUTO-MCNC: 243 MG/DL (ref 65–99)
GLUCOSE BLD STRIP.AUTO-MCNC: 251 MG/DL (ref 65–99)
GLUCOSE BLD STRIP.AUTO-MCNC: 264 MG/DL (ref 65–99)
GLUCOSE BLD STRIP.AUTO-MCNC: 273 MG/DL (ref 65–99)
HBA1C MFR BLD: 7.3 % (ref 4–5.6)
HCT VFR BLD AUTO: 21.4 % (ref 42–52)
HCT VFR BLD AUTO: 23.5 % (ref 42–52)
HGB BLD-MCNC: 7.5 G/DL (ref 14–18)
HGB BLD-MCNC: 8.5 G/DL (ref 14–18)
LYMPHOCYTES # BLD AUTO: 0.84 K/UL (ref 1–4.8)
LYMPHOCYTES # BLD AUTO: 1.24 K/UL (ref 1–4.8)
LYMPHOCYTES NFR BLD: 77.2 % (ref 22–41)
LYMPHOCYTES NFR BLD: 84.3 % (ref 22–41)
MACROCYTES BLD QL SMEAR: ABNORMAL
MANUAL DIFF BLD: NORMAL
MANUAL DIFF BLD: NORMAL
MCH RBC QN AUTO: 31.7 PG (ref 27–33)
MCH RBC QN AUTO: 32.1 PG (ref 27–33)
MCHC RBC AUTO-ENTMCNC: 35 G/DL (ref 32.3–36.5)
MCHC RBC AUTO-ENTMCNC: 36.2 G/DL (ref 32.3–36.5)
MCV RBC AUTO: 87.7 FL (ref 81.4–97.8)
MCV RBC AUTO: 91.5 FL (ref 81.4–97.8)
MICROCYTES BLD QL SMEAR: ABNORMAL
MONOCYTES # BLD AUTO: 0.03 K/UL (ref 0–0.85)
MONOCYTES # BLD AUTO: 0.06 K/UL (ref 0–0.85)
MONOCYTES NFR BLD AUTO: 2.6 % (ref 0–13.4)
MONOCYTES NFR BLD AUTO: 3.5 % (ref 0–13.4)
MORPHOLOGY BLD-IMP: NORMAL
MORPHOLOGY BLD-IMP: NORMAL
NEUTROPHILS # BLD AUTO: 0.13 K/UL (ref 1.82–7.42)
NEUTROPHILS # BLD AUTO: 0.31 K/UL (ref 1.82–7.42)
NEUTROPHILS NFR BLD: 12.2 % (ref 44–72)
NEUTROPHILS NFR BLD: 17.5 % (ref 44–72)
NEUTS BAND NFR BLD MANUAL: 0.9 % (ref 0–10)
NEUTS BAND NFR BLD MANUAL: 1.8 % (ref 0–10)
NRBC # BLD AUTO: 0 K/UL
NRBC # BLD AUTO: 0 K/UL
NRBC BLD-RTO: 0 /100 WBC (ref 0–0.2)
NRBC BLD-RTO: 0 /100 WBC (ref 0–0.2)
PLATELET # BLD AUTO: 11 K/UL (ref 164–446)
PLATELET # BLD AUTO: 8 K/UL (ref 164–446)
PLATELET BLD QL SMEAR: NORMAL
PLATELET BLD QL SMEAR: NORMAL
PLATELETS.RETICULATED NFR BLD AUTO: 1.2 % (ref 0.6–13.1)
PLATELETS.RETICULATED NFR BLD AUTO: 1.6 % (ref 0.6–13.1)
PMV BLD AUTO: 10 FL (ref 9–12.9)
PMV BLD AUTO: 9.6 FL (ref 9–12.9)
PRODUCT TYPE UPROD: NORMAL
PRODUCT TYPE UPROD: NORMAL
RBC # BLD AUTO: 2.34 M/UL (ref 4.7–6.1)
RBC # BLD AUTO: 2.68 M/UL (ref 4.7–6.1)
RBC BLD AUTO: PRESENT
RBC BLD AUTO: PRESENT
SCCMEC + MECA PNL NOSE NAA+PROBE: NEGATIVE
SMUDGE CELLS BLD QL SMEAR: NORMAL
SPECIMEN SOURCE: NORMAL
UNIT STATUS USTAT: NORMAL
UNIT STATUS USTAT: NORMAL
WBC # BLD AUTO: 1 K/UL (ref 4.8–10.8)
WBC # BLD AUTO: 1.6 K/UL (ref 4.8–10.8)

## 2024-09-12 PROCEDURE — A9270 NON-COVERED ITEM OR SERVICE: HCPCS

## 2024-09-12 PROCEDURE — 99232 SBSQ HOSP IP/OBS MODERATE 35: CPT | Mod: GC | Performed by: FAMILY MEDICINE

## 2024-09-12 PROCEDURE — 770004 HCHG ROOM/CARE - ONCOLOGY PRIVATE *

## 2024-09-12 PROCEDURE — 700111 HCHG RX REV CODE 636 W/ 250 OVERRIDE (IP)

## 2024-09-12 PROCEDURE — 87040 BLOOD CULTURE FOR BACTERIA: CPT

## 2024-09-12 PROCEDURE — 85007 BL SMEAR W/DIFF WBC COUNT: CPT | Mod: 91

## 2024-09-12 PROCEDURE — 700111 HCHG RX REV CODE 636 W/ 250 OVERRIDE (IP): Performed by: INTERNAL MEDICINE

## 2024-09-12 PROCEDURE — 83036 HEMOGLOBIN GLYCOSYLATED A1C: CPT

## 2024-09-12 PROCEDURE — 85055 RETICULATED PLATELET ASSAY: CPT

## 2024-09-12 PROCEDURE — 82306 VITAMIN D 25 HYDROXY: CPT

## 2024-09-12 PROCEDURE — 700105 HCHG RX REV CODE 258

## 2024-09-12 PROCEDURE — 700111 HCHG RX REV CODE 636 W/ 250 OVERRIDE (IP): Performed by: STUDENT IN AN ORGANIZED HEALTH CARE EDUCATION/TRAINING PROGRAM

## 2024-09-12 PROCEDURE — 700102 HCHG RX REV CODE 250 W/ 637 OVERRIDE(OP)

## 2024-09-12 PROCEDURE — 700102 HCHG RX REV CODE 250 W/ 637 OVERRIDE(OP): Performed by: STUDENT IN AN ORGANIZED HEALTH CARE EDUCATION/TRAINING PROGRAM

## 2024-09-12 PROCEDURE — A9270 NON-COVERED ITEM OR SERVICE: HCPCS | Performed by: STUDENT IN AN ORGANIZED HEALTH CARE EDUCATION/TRAINING PROGRAM

## 2024-09-12 PROCEDURE — 36415 COLL VENOUS BLD VENIPUNCTURE: CPT

## 2024-09-12 PROCEDURE — 82962 GLUCOSE BLOOD TEST: CPT | Mod: 91

## 2024-09-12 PROCEDURE — 87641 MR-STAPH DNA AMP PROBE: CPT

## 2024-09-12 PROCEDURE — 36430 TRANSFUSION BLD/BLD COMPNT: CPT

## 2024-09-12 PROCEDURE — P9034 PLATELETS, PHERESIS: HCPCS | Mod: 91

## 2024-09-12 PROCEDURE — 85027 COMPLETE CBC AUTOMATED: CPT

## 2024-09-12 RX ADMIN — VANCOMYCIN HYDROCHLORIDE 1750 MG: 5 INJECTION, POWDER, LYOPHILIZED, FOR SOLUTION INTRAVENOUS at 01:40

## 2024-09-12 RX ADMIN — PANTOPRAZOLE SODIUM 40 MG: 40 INJECTION, POWDER, FOR SOLUTION INTRAVENOUS at 17:53

## 2024-09-12 RX ADMIN — INSULIN HUMAN 3 UNITS: 100 INJECTION, SOLUTION PARENTERAL at 12:16

## 2024-09-12 RX ADMIN — CEFAZOLIN 2 G: 2 INJECTION, POWDER, FOR SOLUTION INTRAMUSCULAR; INTRAVENOUS at 05:05

## 2024-09-12 RX ADMIN — INSULIN HUMAN 3 UNITS: 100 INJECTION, SOLUTION PARENTERAL at 17:56

## 2024-09-12 RX ADMIN — CYANOCOBALAMIN 1000 MCG: 1000 INJECTION, SOLUTION INTRAMUSCULAR; SUBCUTANEOUS at 05:34

## 2024-09-12 RX ADMIN — FOLIC ACID 1 MG: 1 TABLET ORAL at 05:34

## 2024-09-12 RX ADMIN — CEFAZOLIN 2 G: 2 INJECTION, POWDER, FOR SOLUTION INTRAMUSCULAR; INTRAVENOUS at 12:12

## 2024-09-12 RX ADMIN — PHENAZOPYRIDINE 100 MG: 100 TABLET ORAL at 17:55

## 2024-09-12 RX ADMIN — ATORVASTATIN CALCIUM 10 MG: 10 TABLET, FILM COATED ORAL at 21:02

## 2024-09-12 RX ADMIN — CEFAZOLIN 2 G: 2 INJECTION, POWDER, FOR SOLUTION INTRAMUSCULAR; INTRAVENOUS at 20:09

## 2024-09-12 RX ADMIN — PHENAZOPYRIDINE 100 MG: 100 TABLET ORAL at 12:11

## 2024-09-12 RX ADMIN — INSULIN HUMAN 1 UNITS: 100 INJECTION, SOLUTION PARENTERAL at 07:59

## 2024-09-12 RX ADMIN — PANTOPRAZOLE SODIUM 40 MG: 40 INJECTION, POWDER, FOR SOLUTION INTRAVENOUS at 05:33

## 2024-09-12 RX ADMIN — PHENAZOPYRIDINE 100 MG: 100 TABLET ORAL at 08:00

## 2024-09-12 RX ADMIN — INSULIN HUMAN 2 UNITS: 100 INJECTION, SOLUTION PARENTERAL at 21:09

## 2024-09-12 ASSESSMENT — PAIN DESCRIPTION - PAIN TYPE: TYPE: ACUTE PAIN

## 2024-09-12 NOTE — PROGRESS NOTES
[facility] PROGRESS NOTE        Attending:   Kristie Swartz MD    Resident:   DO Michel Rodriguez M.D.    PATIENT:   Miri Joseph; [id number]; 1940    ID:   84 y.o. male with history of dyslipidemia, BPH, type 2 DM, and recent GI bleed transferred from [facility] admitted for symptomatic pancytopenia and bone marrow biopsy. He was seen by GI at [facility] and had bidirectional endoscopy on 9/5 with biopsies taken from endoscopy but colonoscopy could not be completed due to inadequate bowel prep. Hgb stable but WBC and platelets continued to drop.  Transferred here for bone marrow biopsy.  Now status post platelet transfusion on 9/7 and 9/12.    SUBJECTIVE:   Patient reports subjective chills have resolved and he is no longer shivering.  He also reports his dysuria has improved and is only mild.  He otherwise has a negative review of systems for fever, chest pain, shortness of breath, abdominal pain, N/V/D, hematuria, bloody BM, myalgias or other pain.    OBJECTIVE:  Vitals:    09/12/24 0550 09/12/24 0605 09/12/24 0734 09/12/24 1232   BP: 106/47 119/51 98/41 96/44   Pulse: 79 80 78 84   Resp: 18 18 16 16   Temp: 36.8 °C (98.2 °F) 37.1 °C (98.8 °F) 37.2 °C (99 °F) 36.8 °C (98.3 °F)   TempSrc: Oral Oral Oral Oral   SpO2: 99% 99% 97% 98%   Weight:       Height:             Intake/Output Summary (Last 24 hours) at 9/12/2024 1416  Last data filed at 9/11/2024 2040  Gross per 24 hour   Intake 240 ml   Output --   Net 240 ml       PHYSICAL EXAM:  General: No acute distress, reading in bed, conversational  HEENT: NC/AT. EOMI, no pallor  Cardiovascular: RRR without murmurs. Normal capillary refill   Respiratory: CTAB  Abdomen: soft, nontender, nondistended, no masses, no suprapubic tenderness  EXT:  SOL, no edema  Skin: No erythema/lesions, no bruises, no petechia  Neuro: Non-focal    LABS:  Recent Labs     09/11/24  0214 09/11/24  1457 09/12/24  0413   WBC 2.2* 1.4*  "1.6*   RBC 2.77* 2.87* 2.68*   HEMOGLOBIN 8.6* 9.2* 8.5*   HEMATOCRIT 24.8* 25.6* 23.5*   MCV 89.5 89.2 87.7   MCH 31.0 32.1 31.7   RDW 48.7 47.4 47.3   PLATELETCT 24* 16* 11*   MPV 11.4 10.5 10.0   NEUTSPOLYS 11.30* 21.70* 17.50*   LYMPHOCYTES 81.70* 73.00* 77.20*   MONOCYTES 3.50 3.50 3.50   EOSINOPHILS 3.50 0.90 0.00   BASOPHILS 0.00 0.00 0.00   RBCMORPHOLO Present Present Present     Recent Labs     09/11/24  0214   SODIUM 136   POTASSIUM 4.3   CHLORIDE 102   CO2 23   BUN 15   CREATININE 0.85   CALCIUM 8.8     Estimated GFR/CRCL = Estimated Creatinine Clearance: 62.6 mL/min (by C-G formula based on SCr of 0.85 mg/dL).  Recent Labs     09/11/24  0214   GLUCOSE 164*                   No results for input(s): \"INR\", \"APTT\", \"FIBRINOGEN\" in the last 72 hours.    Invalid input(s): \"DIMER\"      IMAGING:  No orders to display       MEDS:  Current Facility-Administered Medications   Medication Last Admin    ceFAZolin (Ancef) 2 g in  mL IVPB Stopped at 09/12/24 1242    phenazopyridine (Pyridium) tablet 100 mg 100 mg at 09/12/24 1211    cyanocobalamin (Vitamin B-12) injection 1,000 mcg 1,000 mcg at 09/12/24 0534    folic acid (Folvite) tablet 1 mg 1 mg at 09/12/24 0534    senna-docusate (Pericolace Or Senokot S) 8.6-50 MG per tablet 2 Tablet 2 Tablet at 09/09/24 1840    And    polyethylene glycol/lytes (Miralax) Packet 1 Packet      acetaminophen (Tylenol) tablet 650 mg      diphenhydrAMINE (Benadryl) tablet/capsule 25 mg      Or    diphenhydrAMINE (Benadryl) injection 25 mg      insulin regular (HumuLIN R,NovoLIN R) injection 3 Units at 09/12/24 1216    And    dextrose 50% (D50W) injection 25 g      pantoprazole (Protonix) injection 40 mg 40 mg at 09/12/24 0533    acetaminophen (Tylenol) tablet 650 mg 650 mg at 09/11/24 1205    ondansetron (Zofran ODT) dispertab 4 mg      ondansetron (Zofran) syringe/vial injection 4 mg      atorvastatin (Lipitor) tablet 10 mg 10 mg at 09/11/24 0947       ASSESSMENT/PLAN:  84 y.o. " male with history of dyslipidemia, BPH, type 2 DM, and recent GI bleed transferred from [facility] admitted for symptomatic pancytopenia and bone marrow biopsy. He was seen by GI at [facility] and had bidirectional endoscopy on 9/5 with biopsies taken from endoscopy but colonoscopy could not be completed due to inadequate bowel prep. Hgb stable but WBC and platelets continue to downtrend, now with critically low absolute neutrophils. Status post platelet transfusion on 9/7.  Bone marrow biopsy performed for 9/10.    On the morning of 9/11 the patient had dysuria; please see plan below    * Pancytopenia (HCC)- (present on admission)  Assessment & Plan  Continues to have downtrending white blood cells, absolute neutrophils critical at 0.27.  Protective precautions in place.  Vital signs stable, afebrile, ROS negative.    Plan:  -CBC q12h  -Transfuse platelets goal >10,000  -Transfuse pRBCs for Hb goal 7  -Neutropenic precautions  -Parvo, EBV and CMV antibodies ordered  -Oncology consulted, patient evaluated by Dr. Beltrán.   -Continue supportive measures and B12  -Transfuse for platelets < 20,000, hemoglobin < 7  -s/p Bone marrow biopsy, awaiting path results  -Copper level (wnl)  -Folate level (wnl)  -Gastrin level  -Parietal cell antibodies  -Zinc level (mildly low)  -Hepatitis panel (negative)    Dysuria  Assessment & Plan  Morning of 9/11 patient had 2 instances of dysuria was also noted to be shivering with subjective chills.  Review of systems negative for abdominal pain, nausea/vomiting/diarrhea, fevers, other pain, myalgias.  Patient afebrile but dealing with pancytopenia with an ANC of 0.25.  Exam without suprapubic tenderness.  UA demonstrates LE, rare bacteria,  RBC, and 0-2 white blood cells.  Interestingly, there was greater than 1000 glucose in the urine.  Given the patient's immunodeficiency infectious disease pharmacy was consulted and recommends Ancef.  UA and cultures of  blood and urine were taken before Ancef was started; 2/2 blood cultures positive less than 24 hours after drawing for Bacillus cereus, thuringiensis, & mycoides... Ucx without growth to date.  Discussed with ID Pharm who with Dr. Levine of infectious disease visit the patient agrees he looks well; we will plan to draw new blood cultures x 2 and treat with vancomycin given pancytopenia.  We will continue to monitor  -Phenazopyridine  -Ancef, planning to add vancomycin following blood cultures  -Follow 9/12 blood cultures and 9/11 urine cultures   -9/11 Bcx positive x2  -Discussed with heme-onc Dr. Long 9/11 who agrees    Dyslipidemia  Assessment & Plan  Continue home atorvastatin.     B12 deficiency- (present on admission)  Assessment & Plan  Completed 5-day course of daily IM vitamin B12.  Oncology Dr. Long notes undetectable B12; he recommends daily injections of B12 x 2 weeks and then can switch to weekly x 1 month, and then monthly from thereon.  We will follow these recommendations.  -Vit B12 1,000mcg x 2 weeks   -Then vitamin B12 1000 mcg weekly x 1 month   -Then vitamin B12 1000 mcg monthly  -Recommend considering oral supplementation at discharge for vegetarian diet, for now the plan will be IM; patient should follow-up on this outpatient    Type 2 diabetes mellitus, without long-term current use of insulin (HCC)- (present on admission)  Assessment & Plan  On metformin, glipizide, and Januvia at home.   -Hold home medications  -Continue CHO diet and SSI + hypoglycemia protocol     GI bleed- (present on admission)  Assessment & Plan  Stable. No active bleeding at this time.  Vital signs stable, no signs of bleeding or bruising on exam.  Pathology from duodenal biopsy shows no significant abnormalities.  Gastric antrum biopsy shows mild chronic active gastritis negative for H. pylor.  Gastric body and fundus biopsy shows minimal chronic inactive gastritis and again notes negative for H. pylori and no  evidence of autoimmune gastritis.    Plan:  -Continue daily PPI  -Outpatient follow-up with GI for repeat colonoscopy as initial did not have complete prep    Thrombocytopenia (HCC)- (present on admission)  Assessment & Plan  Continues to downtrend.  Platelets transfused the evening of 9/7.  Vital signs stable, no signs of bleeding or bruising.    Plan:  -CBC q12h  -Transfuse w/ platelets <20,000 per hematology/oncology   -Bone marrow biopsy planned for Monday       Core Measures  IV Fluids: none  Diet: vegetarian with carb count  Antbx: Ancef & vancomycin yeah  DVT ppx: SCDs, chemical prophylaxis held in setting of thrombocytopenia, patient encouraged to ambulate  Code status: Full Code  Dispo: inpatient for supportive care, monitoring of severe thrombocytopenia, bone marrow biopsy performed 9/10 and pending    Michel Bean M.D.  PGY-2  [facility] Resident

## 2024-09-12 NOTE — CARE PLAN
The patient is Watcher - Medium risk of patient condition declining or worsening    Shift Goals  Clinical Goals: Pt will continue to tolerate antibiotics and transusions as needed  Patient Goals: rest  Family Goals: Updates on POC    Progress made toward(s) clinical / shift goals:    Problem: Knowledge Deficit - Standard  Goal: Patient and family/care givers will demonstrate understanding of plan of care, disease process/condition, diagnostic tests and medications  Outcome: Progressing     Problem: Neutropenia Precautions  Goal: Neutropenic precautions will be implemented and maintained for patient protection  Outcome: Progressing     Problem: Pain - Standard  Goal: Alleviation of pain or a reduction in pain to the patient’s comfort goal  Outcome: Progressing     Lost IV access. Rapid nurse finally accessed US guided.  Dr. Aundrea Can was notified of gram positive rods on 2 of the blood cultures collected. Dr. Can communicated to prioritize vancomycin, the ancef and then platelets last.       Patient is not progressing towards the following goals:

## 2024-09-13 PROBLEM — N39.0 UTI (URINARY TRACT INFECTION): Status: RESOLVED | Noted: 2017-08-16 | Resolved: 2024-09-13

## 2024-09-13 PROBLEM — R78.81 BACTEREMIA: Status: ACTIVE | Noted: 2024-09-13

## 2024-09-13 LAB
ABO GROUP BLD: NORMAL
ANISOCYTOSIS BLD QL SMEAR: ABNORMAL
ANISOCYTOSIS BLD QL SMEAR: ABNORMAL
BARCODED ABORH UBTYP: 8400
BARCODED PRD CODE UBPRD: NORMAL
BARCODED UNIT NUM UBUNT: NORMAL
BASOPHILS # BLD AUTO: 0 % (ref 0–1.8)
BASOPHILS # BLD AUTO: 0 % (ref 0–1.8)
BASOPHILS # BLD: 0 K/UL (ref 0–0.12)
BASOPHILS # BLD: 0 K/UL (ref 0–0.12)
BLD GP AB SCN SERPL QL: NORMAL
COMPONENT R 8504R: NORMAL
EOSINOPHIL # BLD AUTO: 0.02 K/UL (ref 0–0.51)
EOSINOPHIL # BLD AUTO: 0.06 K/UL (ref 0–0.51)
EOSINOPHIL NFR BLD: 1.7 % (ref 0–6.9)
EOSINOPHIL NFR BLD: 3.5 % (ref 0–6.9)
ERYTHROCYTE [DISTWIDTH] IN BLOOD BY AUTOMATED COUNT: 48.3 FL (ref 35.9–50)
ERYTHROCYTE [DISTWIDTH] IN BLOOD BY AUTOMATED COUNT: 48.4 FL (ref 35.9–50)
GLUCOSE BLD STRIP.AUTO-MCNC: 177 MG/DL (ref 65–99)
GLUCOSE BLD STRIP.AUTO-MCNC: 238 MG/DL (ref 65–99)
GLUCOSE BLD STRIP.AUTO-MCNC: 256 MG/DL (ref 65–99)
HCT VFR BLD AUTO: 20.9 % (ref 42–52)
HCT VFR BLD AUTO: 22.7 % (ref 42–52)
HGB BLD-MCNC: 7.4 G/DL (ref 14–18)
HGB BLD-MCNC: 8.1 G/DL (ref 14–18)
LYMPHOCYTES # BLD AUTO: 1.05 K/UL (ref 1–4.8)
LYMPHOCYTES # BLD AUTO: 1.41 K/UL (ref 1–4.8)
LYMPHOCYTES NFR BLD: 83.2 % (ref 22–41)
LYMPHOCYTES NFR BLD: 87.8 % (ref 22–41)
MACROCYTES BLD QL SMEAR: ABNORMAL
MANUAL DIFF BLD: NORMAL
MANUAL DIFF BLD: NORMAL
MCH RBC QN AUTO: 31.8 PG (ref 27–33)
MCH RBC QN AUTO: 31.9 PG (ref 27–33)
MCHC RBC AUTO-ENTMCNC: 35.4 G/DL (ref 32.3–36.5)
MCHC RBC AUTO-ENTMCNC: 35.7 G/DL (ref 32.3–36.5)
MCV RBC AUTO: 89 FL (ref 81.4–97.8)
MCV RBC AUTO: 90.1 FL (ref 81.4–97.8)
METAMYELOCYTES NFR BLD MANUAL: 0.9 %
MICROCYTES BLD QL SMEAR: ABNORMAL
MONOCYTES # BLD AUTO: 0 K/UL (ref 0–0.85)
MONOCYTES # BLD AUTO: 0.03 K/UL (ref 0–0.85)
MONOCYTES NFR BLD AUTO: 0 % (ref 0–13.4)
MONOCYTES NFR BLD AUTO: 1.8 % (ref 0–13.4)
MORPHOLOGY BLD-IMP: NORMAL
MORPHOLOGY BLD-IMP: NORMAL
NEUTROPHILS # BLD AUTO: 0.12 K/UL (ref 1.82–7.42)
NEUTROPHILS # BLD AUTO: 0.2 K/UL (ref 1.82–7.42)
NEUTROPHILS NFR BLD: 10.6 % (ref 44–72)
NEUTROPHILS NFR BLD: 9.6 % (ref 44–72)
NEUTS BAND NFR BLD MANUAL: 0.9 % (ref 0–10)
NRBC # BLD AUTO: 0 K/UL
NRBC # BLD AUTO: 0 K/UL
NRBC BLD-RTO: 0 /100 WBC (ref 0–0.2)
NRBC BLD-RTO: 0 /100 WBC (ref 0–0.2)
PLATELET # BLD AUTO: 49 K/UL (ref 164–446)
PLATELET # BLD AUTO: 55 K/UL (ref 164–446)
PLATELET BLD QL SMEAR: NORMAL
PLATELET BLD QL SMEAR: NORMAL
PLATELETS.RETICULATED NFR BLD AUTO: 0.3 % (ref 0.6–13.1)
PLATELETS.RETICULATED NFR BLD AUTO: 0.5 % (ref 0.6–13.1)
PMV BLD AUTO: 8.7 FL (ref 9–12.9)
PMV BLD AUTO: 9.6 FL (ref 9–12.9)
PRODUCT TYPE UPROD: NORMAL
RBC # BLD AUTO: 2.32 M/UL (ref 4.7–6.1)
RBC # BLD AUTO: 2.55 M/UL (ref 4.7–6.1)
RBC BLD AUTO: PRESENT
RBC BLD AUTO: PRESENT
RH BLD: NORMAL
UNIT STATUS USTAT: NORMAL
WBC # BLD AUTO: 1.2 K/UL (ref 4.8–10.8)
WBC # BLD AUTO: 1.7 K/UL (ref 4.8–10.8)

## 2024-09-13 PROCEDURE — 700111 HCHG RX REV CODE 636 W/ 250 OVERRIDE (IP): Performed by: INTERNAL MEDICINE

## 2024-09-13 PROCEDURE — 770004 HCHG ROOM/CARE - ONCOLOGY PRIVATE *

## 2024-09-13 PROCEDURE — 700105 HCHG RX REV CODE 258: Performed by: FAMILY MEDICINE

## 2024-09-13 PROCEDURE — 51798 US URINE CAPACITY MEASURE: CPT

## 2024-09-13 PROCEDURE — 700111 HCHG RX REV CODE 636 W/ 250 OVERRIDE (IP)

## 2024-09-13 PROCEDURE — A9270 NON-COVERED ITEM OR SERVICE: HCPCS | Performed by: STUDENT IN AN ORGANIZED HEALTH CARE EDUCATION/TRAINING PROGRAM

## 2024-09-13 PROCEDURE — 86900 BLOOD TYPING SEROLOGIC ABO: CPT

## 2024-09-13 PROCEDURE — 36415 COLL VENOUS BLD VENIPUNCTURE: CPT

## 2024-09-13 PROCEDURE — 700102 HCHG RX REV CODE 250 W/ 637 OVERRIDE(OP)

## 2024-09-13 PROCEDURE — 700102 HCHG RX REV CODE 250 W/ 637 OVERRIDE(OP): Performed by: STUDENT IN AN ORGANIZED HEALTH CARE EDUCATION/TRAINING PROGRAM

## 2024-09-13 PROCEDURE — 82962 GLUCOSE BLOOD TEST: CPT

## 2024-09-13 PROCEDURE — 99232 SBSQ HOSP IP/OBS MODERATE 35: CPT | Mod: GC | Performed by: FAMILY MEDICINE

## 2024-09-13 PROCEDURE — 85027 COMPLETE CBC AUTOMATED: CPT | Mod: 91

## 2024-09-13 PROCEDURE — 86901 BLOOD TYPING SEROLOGIC RH(D): CPT

## 2024-09-13 PROCEDURE — A9270 NON-COVERED ITEM OR SERVICE: HCPCS

## 2024-09-13 PROCEDURE — 700102 HCHG RX REV CODE 250 W/ 637 OVERRIDE(OP): Performed by: INTERNAL MEDICINE

## 2024-09-13 PROCEDURE — A9270 NON-COVERED ITEM OR SERVICE: HCPCS | Performed by: INTERNAL MEDICINE

## 2024-09-13 PROCEDURE — 700105 HCHG RX REV CODE 258

## 2024-09-13 PROCEDURE — 700111 HCHG RX REV CODE 636 W/ 250 OVERRIDE (IP): Performed by: FAMILY MEDICINE

## 2024-09-13 PROCEDURE — 700111 HCHG RX REV CODE 636 W/ 250 OVERRIDE (IP): Performed by: STUDENT IN AN ORGANIZED HEALTH CARE EDUCATION/TRAINING PROGRAM

## 2024-09-13 PROCEDURE — 85007 BL SMEAR W/DIFF WBC COUNT: CPT

## 2024-09-13 PROCEDURE — 86850 RBC ANTIBODY SCREEN: CPT

## 2024-09-13 PROCEDURE — 85055 RETICULATED PLATELET ASSAY: CPT | Mod: 91

## 2024-09-13 RX ORDER — VORICONAZOLE 200 MG/1
200 TABLET, FILM COATED ORAL EVERY 12 HOURS
Status: DISCONTINUED | OUTPATIENT
Start: 2024-09-13 | End: 2024-09-24 | Stop reason: HOSPADM

## 2024-09-13 RX ORDER — ACYCLOVIR 400 MG/1
400 TABLET ORAL 2 TIMES DAILY
Status: DISCONTINUED | OUTPATIENT
Start: 2024-09-13 | End: 2024-09-24 | Stop reason: HOSPADM

## 2024-09-13 RX ADMIN — VORICONAZOLE 200 MG: 200 TABLET ORAL at 12:49

## 2024-09-13 RX ADMIN — PANTOPRAZOLE SODIUM 40 MG: 40 INJECTION, POWDER, FOR SOLUTION INTRAVENOUS at 17:06

## 2024-09-13 RX ADMIN — INSULIN HUMAN 2 UNITS: 100 INJECTION, SOLUTION PARENTERAL at 20:41

## 2024-09-13 RX ADMIN — VANCOMYCIN HYDROCHLORIDE 1250 MG: 5 INJECTION, POWDER, LYOPHILIZED, FOR SOLUTION INTRAVENOUS at 01:07

## 2024-09-13 RX ADMIN — PANTOPRAZOLE SODIUM 40 MG: 40 INJECTION, POWDER, FOR SOLUTION INTRAVENOUS at 05:45

## 2024-09-13 RX ADMIN — CEFAZOLIN 2 G: 2 INJECTION, POWDER, FOR SOLUTION INTRAMUSCULAR; INTRAVENOUS at 12:50

## 2024-09-13 RX ADMIN — CEFAZOLIN 2 G: 2 INJECTION, POWDER, FOR SOLUTION INTRAMUSCULAR; INTRAVENOUS at 04:09

## 2024-09-13 RX ADMIN — ATORVASTATIN CALCIUM 10 MG: 10 TABLET, FILM COATED ORAL at 20:40

## 2024-09-13 RX ADMIN — FOLIC ACID 1 MG: 1 TABLET ORAL at 05:44

## 2024-09-13 RX ADMIN — ACYCLOVIR 400 MG: 400 TABLET ORAL at 12:49

## 2024-09-13 RX ADMIN — CYANOCOBALAMIN 1000 MCG: 1000 INJECTION, SOLUTION INTRAMUSCULAR; SUBCUTANEOUS at 05:45

## 2024-09-13 RX ADMIN — ACETAMINOPHEN 650 MG: 325 TABLET ORAL at 06:10

## 2024-09-13 RX ADMIN — INSULIN HUMAN 3 UNITS: 100 INJECTION, SOLUTION PARENTERAL at 17:03

## 2024-09-13 RX ADMIN — INSULIN HUMAN 1 UNITS: 100 INJECTION, SOLUTION PARENTERAL at 08:20

## 2024-09-13 RX ADMIN — PHENAZOPYRIDINE 100 MG: 100 TABLET ORAL at 09:37

## 2024-09-13 RX ADMIN — CEFAZOLIN 2 G: 2 INJECTION, POWDER, FOR SOLUTION INTRAMUSCULAR; INTRAVENOUS at 20:46

## 2024-09-13 ASSESSMENT — COGNITIVE AND FUNCTIONAL STATUS - GENERAL
MOVING FROM LYING ON BACK TO SITTING ON SIDE OF FLAT BED: A LITTLE
WALKING IN HOSPITAL ROOM: A LITTLE
SUGGESTED CMS G CODE MODIFIER MOBILITY: CK
HELP NEEDED FOR BATHING: A LITTLE
DAILY ACTIVITIY SCORE: 22
MOBILITY SCORE: 19
TOILETING: A LITTLE
STANDING UP FROM CHAIR USING ARMS: A LITTLE
SUGGESTED CMS G CODE MODIFIER DAILY ACTIVITY: CJ
MOVING TO AND FROM BED TO CHAIR: A LITTLE
CLIMB 3 TO 5 STEPS WITH RAILING: A LITTLE

## 2024-09-13 ASSESSMENT — PAIN DESCRIPTION - PAIN TYPE
TYPE: ACUTE PAIN
TYPE: ACUTE PAIN

## 2024-09-13 NOTE — ASSESSMENT & PLAN NOTE
Resolved. Blood cultures 9/11 positive for  Bacillus cereus, thuringiensis, & mycoides. Ucx positive for same. Repeat blood cultures from 9/12 are negative final. S/p Ancef and vancomycin.    Plan:  -Prophylactic Levaquin started 9/19  -Monitor VS and symptoms  -Also receiving acyclovir and voriconazole for prophylaxis

## 2024-09-13 NOTE — PROGRESS NOTES
Pharmacy Vancomycin Kinetics Note for 9/12/2024     84 y.o. male on Vancomycin day # 1     Vancomycin Indication (AUC Dosing): Non S. aureus bacteremia    Provider specified end date: 09/15/24    Active Antibiotics (From admission, onward)      Ordered     Ordering Provider       Thu Sep 12, 2024  4:29 PM    09/12/24 1629  MD Alert...Vancomycin per Pharmacy  (MD Alert...Vancomycin per Pharmacy)  PHARMACY TO DOSE        Question:  Indication(s) for vancomycin?  Answer:  Other (comments)  Comment:  pancytopenia, positive cultures (likely contaminant). Dr. Levine and ID pharm saw with plan for vanc after new cultures drawn    Michel Bean M.D.       Wed Sep 11, 2024 11:34 AM    09/11/24 1134  ceFAZolin (Ancef) 2 g in  mL IVPB  EVERY 8 HOURS         Michel Bean M.D.            Dosing Weight: 72.1 kg (158 lb 15.2 oz)      Admission History: Admitted on 9/6/2024 for Pancytopenia (HCC) [D61.818]  Pertinent history: Pancytopenic patient with B. cereus x2 in blood cultures. Getting repeat blood cultures with the plan to discontinue antibiotics if they return negative given that B cereus is frequently a contaminant. still remains on Ancef as well due to concern for UTI.    Allergies:     Patient has no known allergies.     Pertinent cultures to date:     Results       Procedure Component Value Units Date/Time    BLOOD CULTURE [[id number]] Collected: 09/12/24 1539    Order Status: Sent Specimen: Blood from Peripheral Updated: 09/12/24 1636    BLOOD CULTURE [[id number]] Collected: 09/12/24 1539    Order Status: Sent Specimen: Blood from Peripheral Updated: 09/12/24 1636    MRSA By PCR (Amp) [[id number]]     Order Status: No result Specimen: Respirate from Nares     BLOOD CULTURE [[id number]]     Order Status: Canceled Specimen: Blood from Peripheral     BLOOD CULTURE [[id number]]     Order Status: Canceled Specimen: Blood from Peripheral     BLOOD CULTURE [[id number]]  (Abnormal) Collected: 09/11/24 1043    Order  Status: Completed Specimen: Blood from Peripheral Updated: 09/12/24 1349     Significant Indicator POS     Source BLD     Site PERIPHERAL     Culture Result Growth detected by Bactec instrument. 09/11/2024  21:26  Gram Stain: Gram positive rods.        Bacillus cereus/thuringiensis/mycoides    MRSA By PCR (Amp) [[id number]] Collected: 09/12/24 0144    Order Status: Completed Specimen: Respirate from Nares Updated: 09/12/24 1143     MRSA by PCR Negative    BLOOD CULTURE [[id number]]  (Abnormal) Collected: 09/11/24 1043    Order Status: Completed Specimen: Blood from Peripheral Updated: 09/12/24 0212     Significant Indicator POS     Source BLD     Site PERIPHERAL     Culture Result Growth detected by Bactec instrument. 09/11/2024  21:27  Gram Stain: Gram positive rods.        Bacillus cereus/thuringiensis/mycoides  POSSIBLE CONTAMINANT: Isolated from one set only, please  correlate with clinical condition. Contact the Microbiology  department within 48 hr if susceptibility testing is needed.      URINE CULTURE(NEW) [[id number]] Collected: 09/11/24 1113    Order Status: Sent Specimen: Urine, Ureter Updated: 09/11/24 1346    URINALYSIS [[id number]]  (Abnormal) Collected: 09/11/24 1115    Order Status: Completed Specimen: Urine, Clean Catch Updated: 09/11/24 1256     Color Yellow     Character Clear     Specific Gravity 1.021     Ph 7.0     Glucose >=1000 mg/dL      Ketones Negative mg/dL      Protein 100 mg/dL      Bilirubin Negative     Urobilinogen, Urine 2.0     Nitrite Negative     Leukocyte Esterase Negative     Occult Blood Large     Micro Urine Req Microscopic            Labs:     Estimated Creatinine Clearance: 62.6 mL/min (by C-G formula based on SCr of 0.85 mg/dL).  Recent Labs     09/10/24  0331 09/10/24  1510 09/11/24  0214 09/11/24  1457 09/12/24  0413   WBC 2.6* 2.4* 2.2* 1.4* 1.6*   NEUTSPOLYS 11.80* 14.00* 11.30* 21.70* 17.50*   BANDSSTABS  --   --   --  0.90 1.80     Recent Labs     09/11/24  0214   BUN 15  "  CREATININE 0.85       Intake/Output Summary (Last 24 hours) at 2024 1705  Last data filed at 2024 0734  Gross per 24 hour   Intake 566.67 ml   Output --   Net 566.67 ml      BP 96/44   Pulse 84   Temp 36.8 °C (98.3 °F) (Oral)   Resp 16   Ht 1.702 m (5' 7\")   Wt 72.1 kg (158 lb 15.2 oz)   SpO2 98%  Temp (24hrs), Av °C (98.6 °F), Min:36.7 °C (98.1 °F), Max:37.6 °C (99.7 °F)      List concerns for Vancomycin clearance:     Age    Pharmacokinetics:     AUC kinetics:   Ke (hr ^-1): 0.0567 hr^-1  Half life: 12.22 hr  Clearance: 2.657  Estimated TDD: 1328.5  Estimated Dose: 786  Estimated interval: 14.2    A/P:     -  Vancomycin dose: 1250mg IV every 24 hours      -  Next vancomycin level(s):    -not ordering any levels at this time pending duration of vancomycin therapy     -  Predicted vancomycin AUC from initial AUC test calculator: 470 mg·hr/L    -  Comments: Renal function appears stable. Remains on cefazolin for concern for UTI (enteric coverage).  Received single loading dose  @ 0140, therefore not loading.     Kiki Connelly, PharmD  Brit Leon, PharmD, BCOP  "

## 2024-09-13 NOTE — CARE PLAN
Problem: Knowledge Deficit - Standard  Goal: Patient and family/care givers will demonstrate understanding of plan of care, disease process/condition, diagnostic tests and medications  Outcome: Progressing     Problem: Neutropenia Precautions  Goal: Neutropenic precautions will be implemented and maintained for patient protection  Outcome: Progressing     The patient is Watcher - Medium risk of patient condition declining or worsening    Shift Goals  Clinical Goals: Pt will continue to tolerate antibiotics and transusions as needed  Patient Goals: rest  Family Goals: Updates on POC    Progress made toward(s) clinical / shift goals:  vital signs q 4 hours. Pt is afebrile    Patient is not progressing towards the following goals:

## 2024-09-13 NOTE — DISCHARGE PLANNING
Case Management Discharge Planning    Admission Date: 9/6/2024  GMLOS: 3.4  ALOS: 7    6-Clicks ADL Score: 22  6-Clicks Mobility Score: 19      Anticipated Discharge Dispo: Discharge Disposition: Discharged to home/self care (01)    DME Needed: No    Action(s) Taken: Discussed in IDT rounds, pt started on Vancomycin yesterday. Pending BMB results, once resulted plan to be determined.     Escalations Completed: None    Medically Clear: No    Next Steps: Pending BMB results.     Barriers to Discharge: Medical clearance    Is the patient up for discharge tomorrow: No

## 2024-09-13 NOTE — PROGRESS NOTES
Carlos from Lab called with critical result of ANC 0.13 at 1840. Critical lab result read back to Carlos.   Dr. Hernandez notified of critical lab result at 1842.  Critical lab result read back by Dr. Hernandez.

## 2024-09-13 NOTE — PROGRESS NOTES
Cecilia from Lab called with critical result of WBC 1.0 and platelets of 8 at 1815. Critical lab result read back to Cecilia.   Dr. Hernandez notified of critical lab result at 1843.  Critical lab result read back by Dr. Hernandez.

## 2024-09-13 NOTE — PROGRESS NOTES
[facility] [place] [facility] [place]        Attending:   Kristie Swartz MD    Resident:   ELVIRA Soler D.O.    PATIENT:   Miri Joseph; [id number]; 1940    ID:   84 y.o. male with history of dyslipidemia, BPH, type 2 DM, and recent GI bleed transferred from [facility] admitted for symptomatic pancytopenia and bone marrow biopsy. He was seen by GI at [facility] and had bidirectional endoscopy on 9/5 with biopsies taken from endoscopy but colonoscopy could not be completed due to inadequate bowel prep. Hgb stable but WBC and platelets continued to drop.  Transferred here for bone marrow biopsy.  Now status post platelet transfusion on 9/7 and 9/12.    SUBJECTIVE:   Patient is visited with his daughter bedside.  He reports he continues to feel well.  He still has some discomfort with the Mckee catheter present. He otherwise has a negative review of systems for fever, chest pain, shortness of breath, abdominal pain, N/V/D, hematuria, bloody BM, myalgias or other pain.  His last bowel movement was yesterday.    OBJECTIVE:  Vitals:    09/12/24 2140 09/12/24 2308 09/13/24 0000 09/13/24 0400   BP: 101/55 111/56 106/58 119/60   Pulse: 83 76 78 74   Resp: 16 16 17 16   Temp: 36.9 °C (98.5 °F) 37 °C (98.6 °F) 36.9 °C (98.4 °F) 36.7 °C (98.1 °F)   TempSrc: Oral Oral Oral Oral   SpO2: 99% 97% 97% 97%   Weight:       Height:             Intake/Output Summary (Last 24 hours) at 9/13/2024 0614  Last data filed at 9/12/2024 2308  Gross per 24 hour   Intake 650 ml   Output --   Net 650 ml       PHYSICAL EXAM:   General: No acute distress, resting in bed, conversational  HEENT: NC/AT. EOMI, no pallor  Cardiovascular: RRR without murmurs. Normal capillary refill   Respiratory: CTAB  Abdomen: soft, nontender, nondistended, no masses, no suprapubic tenderness  : Mckee catheter draining clear orange-colored urine  EXT:  SOL, no edema  Skin: No erythema/lesions, bruises on  "bilateral hands, no petechia  Neuro: Non-focal    LABS:  Recent Labs     09/12/24  0413 09/12/24  1539 09/13/24  0323   WBC 1.6* 1.0* 1.2*   RBC 2.68* 2.34* 2.32*   HEMOGLOBIN 8.5* 7.5* 7.4*   HEMATOCRIT 23.5* 21.4* 20.9*   MCV 87.7 91.5 90.1   MCH 31.7 32.1 31.9   RDW 47.3 49.5 48.3   PLATELETCT 11* 8* 55*   MPV 10.0 9.6 8.7*   NEUTSPOLYS 17.50* 12.20* 9.60*   LYMPHOCYTES 77.20* 84.30* 87.80*   MONOCYTES 3.50 2.60 0.00   EOSINOPHILS 0.00 0.00 1.70   BASOPHILS 0.00 0.00 0.00   RBCMORPHOLO Present Present Present     Recent Labs     09/11/24  0214   SODIUM 136   POTASSIUM 4.3   CHLORIDE 102   CO2 23   BUN 15   CREATININE 0.85   CALCIUM 8.8     Estimated GFR/CRCL = Estimated Creatinine Clearance: 62.6 mL/min (by C-G formula based on SCr of 0.85 mg/dL).  Recent Labs     09/11/24  0214   GLUCOSE 164*                   No results for input(s): \"INR\", \"APTT\", \"FIBRINOGEN\" in the last 72 hours.    Invalid input(s): \"DIMER\"      IMAGING:  No orders to display       MEDS:  Current Facility-Administered Medications   Medication Last Admin    acyclovir (Zovirax) tablet 400 mg 400 mg at 09/13/24 1249    voriconazole (Vfend) tablet 200 mg 200 mg at 09/13/24 1249    MD Alert...Vancomycin per Pharmacy      vancomycin (Vancocin) 1,250 mg in  mL IVPB Stopped at 09/13/24 0307    ceFAZolin (Ancef) 2 g in  mL IVPB 2 g at 09/13/24 1250    cyanocobalamin (Vitamin B-12) injection 1,000 mcg 1,000 mcg at 09/13/24 0545    folic acid (Folvite) tablet 1 mg 1 mg at 09/13/24 0544    senna-docusate (Pericolace Or Senokot S) 8.6-50 MG per tablet 2 Tablet 2 Tablet at 09/09/24 1840    And    polyethylene glycol/lytes (Miralax) Packet 1 Packet      acetaminophen (Tylenol) tablet 650 mg      diphenhydrAMINE (Benadryl) tablet/capsule 25 mg      Or    diphenhydrAMINE (Benadryl) injection 25 mg      insulin regular (HumuLIN R,NovoLIN R) injection 1 Units at 09/13/24 0820    And    dextrose 50% (D50W) injection 25 g      pantoprazole " (Protonix) injection 40 mg 40 mg at 09/13/24 0545    acetaminophen (Tylenol) tablet 650 mg 650 mg at 09/13/24 0610    ondansetron (Zofran ODT) dispertab 4 mg      ondansetron (Zofran) syringe/vial injection 4 mg      atorvastatin (Lipitor) tablet 10 mg 10 mg at 09/12/24 2102       ASSESSMENT/PLAN:  84 y.o. male with history of dyslipidemia, BPH, type 2 DM, and recent GI bleed transferred from [facility] admitted for symptomatic pancytopenia and bone marrow biopsy. He was seen by GI at [facility] and had bidirectional endoscopy on 9/5 with biopsies taken from endoscopy but colonoscopy could not be completed due to inadequate bowel prep. Hgb stable but WBC and platelets continue to downtrend, now with critically low absolute neutrophils. Status post platelet transfusion on 9/7 and 9/12.  Bone marrow biopsy performed  9/10. On the morning of 9/11 the patient had dysuria; please see plan below.    * Pancytopenia (HCC)- (present on admission)  Assessment & Plan  Continues to have downtrending white blood cells, absolute neutrophils critical at 0.12.  Neutropenic precautions in place.  Vital signs stable, afebrile, ROS negative.  Bone marrow biopsy performed 9/10, pending results.    Plan:  -CBC q12h  -Transfuse platelets per oncology recs below  -Transfuse pRBCs for Hb goal 7  -Neutropenic precautions  -Parvo, EBV and CMV antibodies ordered  -Oncology consulted, patient evaluated by Dr. Beltrán and Dr. Long  -Continue supportive measures and B12  -Transfuse for platelets < 20,000 unless febrile, bleeding, sepsis then <10,000, hemoglobin < 7  -s/p Bone marrow biopsy, awaiting path results  -Copper level (wnl)  -Folate level (wnl)  -Gastrin level (wnl)  -Parietal cell antibodies (neg)  -Zinc level (mildly low)  -Hepatitis panel (negative)    Bacteremia  Assessment & Plan  2/2 blood cultures positive less than 24 hours after drawing 9/11 for Bacillus cereus, thuringiensis, & mycoides. Ucx ngtd.   Discussed with ID Pharm who with Dr. Levine of infectious disease visit the patient agrees he looks well; repeat bcx x 2 and treat with vancomycin given pancytopenia.  -Phenazopyridine  -Ancef, vancomycin  -Follow 9/12 blood cultures (ngtd) and 9/11 urine culture (ngtd)              -9/11 Bcx positive x2 Bacillus cereus, thuringiensis, & mycoides  -Discussed with heme-onc Dr. Long 9/11 who agrees with plan    Dysuria  Assessment & Plan  Morning of 9/11 patient had 2 instances of dysuria was also noted to be shivering with subjective chills.  Patient afebrile but dealing with pancytopenia with an ANC of 0.25.  Exam without suprapubic tenderness.  UA with LE, rare bacteria,  RBC, and 0-2 white blood cells.  Interestingly, there was greater than 1000 glucose in the urine.  Given the patient's immunodeficiency, ID pharmacy was consulted and recommended Ancef.  UA and cultures of blood and urine were taken before Ancef was started.   2/2 blood cultures positive less than 24 hours after drawing for Bacillus cereus, thuringiensis, & mycoides. Ucx ngtd.  Discussed with ID Pharm who with Dr. Levine of infectious disease visit the patient agrees he looks well; we will plan to draw new blood cultures x 2 and treat with vancomycin given pancytopenia.  -Phenazopyridine  -Ancef, vancomycin  -Follow 9/12 blood cultures (ngtd) and 9/11 urine culture (ngtd)   -9/11 Bcx positive x2 Bacillus cereus, thuringiensis, & mycoides  -Discussed with heme-onc Dr. Long 9/11 who agrees with plan    Dyslipidemia  Assessment & Plan  Continue home atorvastatin.     B12 deficiency- (present on admission)  Assessment & Plan  Per Oncology Dr. Long, essentially undetectable B12. Recommends daily injections of B12 x 2 weeks and then can switch to weekly x 1 month, and then monthly from thereon.  We will follow these recommendations.  -Vit B12 1,000mcg x 2 weeks   -Then vitamin B12 1000 mcg weekly x 1 month   -Then vitamin B12 1000  mcg monthly  -Recommend oral supplementation at discharge for vegetarian diet, for now the plan will be IM; patient should follow-up on this outpatient    Type 2 diabetes mellitus, without long-term current use of insulin (HCC)- (present on admission)  Assessment & Plan  On metformin, glipizide, and Januvia at home.   -Hold home medications  -Continue CHO diet and SSI + hypoglycemia protocol   -Discussed with patient's daughter that she may bring him his preferred foods    GI bleed- (present on admission)  Assessment & Plan  Stable. No active bleeding at this time.  Vital signs stable, no signs of bleeding or bruising on exam.     Plan:  -Continue daily PPI  -Outpatient follow-up with GI for repeat colonoscopy as initial did not have complete prep    Thrombocytopenia (HCC)- (present on admission)  Assessment & Plan  Continues to downtrend.  Platelets transfused the evening of 9/7 and 9/12.  Vital signs stable, no signs of bleeding or unprovoked bruising.    Plan:  -CBC q12h  -Transfuse w/ platelets per hematology/oncology recs (see A/P for pancytopenia)  -S/p Bone marrow biopsy, awaiting path results       Core Measures  IV Fluids: none  Diet: vegetarian with carb count  Antbx: Ancef & vancomycin, acyclovir, voriconazole   DVT ppx: SCDs, chemical prophylaxis held in setting of thrombocytopenia, patient encouraged to ambulate  Code status: Full Code  Dispo: inpatient for supportive care, monitoring of severe thrombocytopenia pending results of bone marrow biopsy.     Mima Carrasco D.O.  PGY-1  [facility] Resident

## 2024-09-13 NOTE — CARE PLAN
The patient is Watcher - Medium risk of patient condition declining or worsening    Shift Goals  Clinical Goals: Pt will continue to tolerate antibiotics and transusions as needed  Patient Goals: rest  Family Goals: Updates on POC    Progress made toward(s) clinical / shift goals:    Problem: Knowledge Deficit - Standard  Goal: Patient and family/care givers will demonstrate understanding of plan of care, disease process/condition, diagnostic tests and medications  Outcome: Progressing  Note: Pt is AO4. Call light within reach. Educated and understand POC. All questions answered at this time.     Problem: Neutropenia Precautions  Goal: Neutropenic precautions will be implemented and maintained for patient protection  Outcome: Progressing     Problem: Pain - Standard  Goal: Alleviation of pain or a reduction in pain to the patient’s comfort goal  Outcome: Progressing       Patient is not progressing towards the following goals:

## 2024-09-14 LAB
ANION GAP SERPL CALC-SCNC: 12 MMOL/L (ref 7–16)
ANISOCYTOSIS BLD QL SMEAR: ABNORMAL
ANISOCYTOSIS BLD QL SMEAR: ABNORMAL
BASOPHILS # BLD AUTO: 0 % (ref 0–1.8)
BASOPHILS # BLD AUTO: 0 % (ref 0–1.8)
BASOPHILS # BLD: 0 K/UL (ref 0–0.12)
BASOPHILS # BLD: 0 K/UL (ref 0–0.12)
BUN SERPL-MCNC: 13 MG/DL (ref 8–22)
CALCIUM SERPL-MCNC: 8.3 MG/DL (ref 8.5–10.5)
CHLORIDE SERPL-SCNC: 97 MMOL/L (ref 96–112)
CO2 SERPL-SCNC: 19 MMOL/L (ref 20–33)
COMMENT NL1176: NORMAL
CREAT SERPL-MCNC: 0.57 MG/DL (ref 0.5–1.4)
DOHLE BOD BLD QL SMEAR: NORMAL
EOSINOPHIL # BLD AUTO: 0 K/UL (ref 0–0.51)
EOSINOPHIL # BLD AUTO: 0 K/UL (ref 0–0.51)
EOSINOPHIL NFR BLD: 0 % (ref 0–6.9)
EOSINOPHIL NFR BLD: 0 % (ref 0–6.9)
ERYTHROCYTE [DISTWIDTH] IN BLOOD BY AUTOMATED COUNT: 45.3 FL (ref 35.9–50)
ERYTHROCYTE [DISTWIDTH] IN BLOOD BY AUTOMATED COUNT: 48.3 FL (ref 35.9–50)
GFR SERPLBLD CREATININE-BSD FMLA CKD-EPI: 98 ML/MIN/1.73 M 2
GLUCOSE BLD STRIP.AUTO-MCNC: 188 MG/DL (ref 65–99)
GLUCOSE BLD STRIP.AUTO-MCNC: 285 MG/DL (ref 65–99)
GLUCOSE BLD STRIP.AUTO-MCNC: 312 MG/DL (ref 65–99)
GLUCOSE BLD STRIP.AUTO-MCNC: 335 MG/DL (ref 65–99)
GLUCOSE SERPL-MCNC: 219 MG/DL (ref 65–99)
HCT VFR BLD AUTO: 18.8 % (ref 42–52)
HCT VFR BLD AUTO: 23.6 % (ref 42–52)
HGB BLD-MCNC: 6.9 G/DL (ref 14–18)
HGB BLD-MCNC: 8.8 G/DL (ref 14–18)
LYMPHOCYTES # BLD AUTO: 0.77 K/UL (ref 1–4.8)
LYMPHOCYTES # BLD AUTO: 1.12 K/UL (ref 1–4.8)
LYMPHOCYTES NFR BLD: 77.4 % (ref 22–41)
LYMPHOCYTES NFR BLD: 86 % (ref 22–41)
MACROCYTES BLD QL SMEAR: ABNORMAL
MANUAL DIFF BLD: NORMAL
MANUAL DIFF BLD: NORMAL
MCH RBC QN AUTO: 32.2 PG (ref 27–33)
MCH RBC QN AUTO: 32.2 PG (ref 27–33)
MCHC RBC AUTO-ENTMCNC: 36.7 G/DL (ref 32.3–36.5)
MCHC RBC AUTO-ENTMCNC: 37.3 G/DL (ref 32.3–36.5)
MCV RBC AUTO: 86.4 FL (ref 81.4–97.8)
MCV RBC AUTO: 87.9 FL (ref 81.4–97.8)
MICROCYTES BLD QL SMEAR: ABNORMAL
MONOCYTES # BLD AUTO: 0.03 K/UL (ref 0–0.85)
MONOCYTES # BLD AUTO: 0.03 K/UL (ref 0–0.85)
MONOCYTES NFR BLD AUTO: 2 % (ref 0–13.4)
MONOCYTES NFR BLD AUTO: 2.6 % (ref 0–13.4)
MORPHOLOGY BLD-IMP: NORMAL
MORPHOLOGY BLD-IMP: NORMAL
NEUTROPHILS # BLD AUTO: 0.16 K/UL (ref 1.82–7.42)
NEUTROPHILS # BLD AUTO: 0.2 K/UL (ref 1.82–7.42)
NEUTROPHILS NFR BLD: 12 % (ref 44–72)
NEUTROPHILS NFR BLD: 20 % (ref 44–72)
NRBC # BLD AUTO: 0 K/UL
NRBC # BLD AUTO: 0 K/UL
NRBC BLD-RTO: 0 /100 WBC (ref 0–0.2)
NRBC BLD-RTO: 0 /100 WBC (ref 0–0.2)
PLATELET # BLD AUTO: 24 K/UL (ref 164–446)
PLATELET # BLD AUTO: 31 K/UL (ref 164–446)
PLATELET BLD QL SMEAR: NORMAL
PLATELET BLD QL SMEAR: NORMAL
PLATELETS.RETICULATED NFR BLD AUTO: 0.4 % (ref 0.6–13.1)
PLATELETS.RETICULATED NFR BLD AUTO: 0.5 % (ref 0.6–13.1)
PMV BLD AUTO: 9.3 FL (ref 9–12.9)
PMV BLD AUTO: 9.3 FL (ref 9–12.9)
POTASSIUM SERPL-SCNC: 3.7 MMOL/L (ref 3.6–5.5)
RBC # BLD AUTO: 2.14 M/UL (ref 4.7–6.1)
RBC # BLD AUTO: 2.73 M/UL (ref 4.7–6.1)
RBC BLD AUTO: PRESENT
RBC BLD AUTO: PRESENT
SMUDGE CELLS BLD QL SMEAR: NORMAL
SMUDGE CELLS BLD QL SMEAR: NORMAL
SODIUM SERPL-SCNC: 128 MMOL/L (ref 135–145)
TOXIC GRANULES BLD QL SMEAR: NORMAL
WBC # BLD AUTO: 1 K/UL (ref 4.8–10.8)
WBC # BLD AUTO: 1.3 K/UL (ref 4.8–10.8)

## 2024-09-14 PROCEDURE — 700111 HCHG RX REV CODE 636 W/ 250 OVERRIDE (IP)

## 2024-09-14 PROCEDURE — 82962 GLUCOSE BLOOD TEST: CPT | Mod: 91

## 2024-09-14 PROCEDURE — 80048 BASIC METABOLIC PNL TOTAL CA: CPT

## 2024-09-14 PROCEDURE — 85027 COMPLETE CBC AUTOMATED: CPT

## 2024-09-14 PROCEDURE — 700111 HCHG RX REV CODE 636 W/ 250 OVERRIDE (IP): Performed by: INTERNAL MEDICINE

## 2024-09-14 PROCEDURE — 85055 RETICULATED PLATELET ASSAY: CPT | Mod: 91

## 2024-09-14 PROCEDURE — 82570 ASSAY OF URINE CREATININE: CPT

## 2024-09-14 PROCEDURE — 85007 BL SMEAR W/DIFF WBC COUNT: CPT

## 2024-09-14 PROCEDURE — 82525 ASSAY OF COPPER: CPT

## 2024-09-14 PROCEDURE — 36430 TRANSFUSION BLD/BLD COMPNT: CPT

## 2024-09-14 PROCEDURE — 700102 HCHG RX REV CODE 250 W/ 637 OVERRIDE(OP)

## 2024-09-14 PROCEDURE — 82175 ASSAY OF ARSENIC: CPT

## 2024-09-14 PROCEDURE — 700102 HCHG RX REV CODE 250 W/ 637 OVERRIDE(OP): Performed by: STUDENT IN AN ORGANIZED HEALTH CARE EDUCATION/TRAINING PROGRAM

## 2024-09-14 PROCEDURE — 83655 ASSAY OF LEAD: CPT

## 2024-09-14 PROCEDURE — 83516 IMMUNOASSAY NONANTIBODY: CPT

## 2024-09-14 PROCEDURE — 36415 COLL VENOUS BLD VENIPUNCTURE: CPT

## 2024-09-14 PROCEDURE — 86923 COMPATIBILITY TEST ELECTRIC: CPT

## 2024-09-14 PROCEDURE — 86038 ANTINUCLEAR ANTIBODIES: CPT

## 2024-09-14 PROCEDURE — 700111 HCHG RX REV CODE 636 W/ 250 OVERRIDE (IP): Performed by: STUDENT IN AN ORGANIZED HEALTH CARE EDUCATION/TRAINING PROGRAM

## 2024-09-14 PROCEDURE — 99232 SBSQ HOSP IP/OBS MODERATE 35: CPT | Mod: GC | Performed by: HOSPITALIST

## 2024-09-14 PROCEDURE — 700105 HCHG RX REV CODE 258: Performed by: FAMILY MEDICINE

## 2024-09-14 PROCEDURE — A9270 NON-COVERED ITEM OR SERVICE: HCPCS

## 2024-09-14 PROCEDURE — 84630 ASSAY OF ZINC: CPT

## 2024-09-14 PROCEDURE — 770004 HCHG ROOM/CARE - ONCOLOGY PRIVATE *

## 2024-09-14 PROCEDURE — 700111 HCHG RX REV CODE 636 W/ 250 OVERRIDE (IP): Performed by: FAMILY MEDICINE

## 2024-09-14 PROCEDURE — A9270 NON-COVERED ITEM OR SERVICE: HCPCS | Performed by: STUDENT IN AN ORGANIZED HEALTH CARE EDUCATION/TRAINING PROGRAM

## 2024-09-14 PROCEDURE — P9016 RBC LEUKOCYTES REDUCED: HCPCS

## 2024-09-14 PROCEDURE — 82300 ASSAY OF CADMIUM: CPT

## 2024-09-14 PROCEDURE — 30233N1 TRANSFUSION OF NONAUTOLOGOUS RED BLOOD CELLS INTO PERIPHERAL VEIN, PERCUTANEOUS APPROACH: ICD-10-PCS | Performed by: STUDENT IN AN ORGANIZED HEALTH CARE EDUCATION/TRAINING PROGRAM

## 2024-09-14 PROCEDURE — 83825 ASSAY OF MERCURY: CPT

## 2024-09-14 PROCEDURE — 700105 HCHG RX REV CODE 258

## 2024-09-14 RX ORDER — CYANOCOBALAMIN 1000 UG/ML
1000 INJECTION, SOLUTION INTRAMUSCULAR; SUBCUTANEOUS DAILY
Status: DISCONTINUED | OUTPATIENT
Start: 2024-09-15 | End: 2024-09-17

## 2024-09-14 RX ORDER — CYANOCOBALAMIN 1000 UG/ML
1000 INJECTION, SOLUTION INTRAMUSCULAR; SUBCUTANEOUS
Status: DISCONTINUED | OUTPATIENT
Start: 2024-09-26 | End: 2024-09-17

## 2024-09-14 RX ORDER — UREA 10 %
1000 LOTION (ML) TOPICAL DAILY
Status: DISCONTINUED | OUTPATIENT
Start: 2024-09-20 | End: 2024-09-17

## 2024-09-14 RX ADMIN — VANCOMYCIN HYDROCHLORIDE 1250 MG: 5 INJECTION, POWDER, LYOPHILIZED, FOR SOLUTION INTRAVENOUS at 00:46

## 2024-09-14 RX ADMIN — CEFAZOLIN 2 G: 2 INJECTION, POWDER, FOR SOLUTION INTRAMUSCULAR; INTRAVENOUS at 04:08

## 2024-09-14 RX ADMIN — INSULIN HUMAN 4 UNITS: 100 INJECTION, SOLUTION PARENTERAL at 13:08

## 2024-09-14 RX ADMIN — FOLIC ACID 1 MG: 1 TABLET ORAL at 06:22

## 2024-09-14 RX ADMIN — PANTOPRAZOLE SODIUM 40 MG: 40 INJECTION, POWDER, FOR SOLUTION INTRAVENOUS at 06:22

## 2024-09-14 RX ADMIN — ATORVASTATIN CALCIUM 10 MG: 10 TABLET, FILM COATED ORAL at 21:56

## 2024-09-14 RX ADMIN — ACYCLOVIR 400 MG: 400 TABLET ORAL at 06:22

## 2024-09-14 RX ADMIN — PANTOPRAZOLE SODIUM 40 MG: 40 INJECTION, POWDER, FOR SOLUTION INTRAVENOUS at 17:17

## 2024-09-14 RX ADMIN — INSULIN HUMAN 4 UNITS: 100 INJECTION, SOLUTION PARENTERAL at 17:23

## 2024-09-14 RX ADMIN — ACYCLOVIR 400 MG: 400 TABLET ORAL at 17:17

## 2024-09-14 RX ADMIN — VORICONAZOLE 200 MG: 200 TABLET ORAL at 06:22

## 2024-09-14 RX ADMIN — INSULIN HUMAN 1 UNITS: 100 INJECTION, SOLUTION PARENTERAL at 08:15

## 2024-09-14 RX ADMIN — CYANOCOBALAMIN 1000 MCG: 1000 INJECTION, SOLUTION INTRAMUSCULAR; SUBCUTANEOUS at 06:22

## 2024-09-14 RX ADMIN — CEFAZOLIN 2 G: 2 INJECTION, POWDER, FOR SOLUTION INTRAMUSCULAR; INTRAVENOUS at 13:18

## 2024-09-14 RX ADMIN — INSULIN HUMAN 3 UNITS: 100 INJECTION, SOLUTION PARENTERAL at 21:56

## 2024-09-14 RX ADMIN — VORICONAZOLE 200 MG: 200 TABLET ORAL at 17:17

## 2024-09-14 ASSESSMENT — ENCOUNTER SYMPTOMS
PND: 0
COUGH: 0
CONSTITUTIONAL NEGATIVE: 1
FOCAL WEAKNESS: 0
GASTROINTESTINAL NEGATIVE: 1
VOMITING: 0
DEPRESSION: 0
ORTHOPNEA: 0
INSOMNIA: 0
ABDOMINAL PAIN: 0
DIARRHEA: 0
CHILLS: 0
BRUISES/BLEEDS EASILY: 0
CARDIOVASCULAR NEGATIVE: 1
EYES NEGATIVE: 1
FEVER: 0
PSYCHIATRIC NEGATIVE: 1
HEADACHES: 0
WEIGHT LOSS: 0
NEUROLOGICAL NEGATIVE: 1
BLOOD IN STOOL: 0
MYALGIAS: 0
CONSTIPATION: 0
DIAPHORESIS: 0
NAUSEA: 0
MEMORY LOSS: 0
RESPIRATORY NEGATIVE: 1
PALPITATIONS: 0
DIZZINESS: 0
MUSCULOSKELETAL NEGATIVE: 1

## 2024-09-14 ASSESSMENT — PAIN DESCRIPTION - PAIN TYPE
TYPE: ACUTE PAIN
TYPE: ACUTE PAIN

## 2024-09-14 NOTE — CARE PLAN
The patient is Watcher - Medium risk of patient condition declining or worsening    Shift Goals  Clinical Goals: Pt will continue to tolerate atibiotics  Patient Goals: rest  Family Goals: Updates on POC    Progress made toward(s) clinical / shift goals:    Problem: Infection  Goal: Will remain free from infection  Outcome: Progressing  Note: Pt continues to tolerate IV antibiotics. Continues to be afebrile and showing no other s/s of infection. Most recent blood cultures are negative.      Problem: Knowledge Deficit - Standard      Goal: Patient and family/care givers will demonstrate understanding of plan of care, disease process/condition, diagnostic tests and medications  Outcome: Progressing  Note: Pt is AO4. Call light within reach. Educated and understand POC. All questions answered at this time.    Patient is not progressing towards the following goals:

## 2024-09-14 NOTE — PROGRESS NOTES
Hematology/Oncology Progress Note    Primary Hematologist/Oncologist:     Oncology History:  The patient is an 84-year-old male who presented   to [facility] ER on 09/03/2024 with complaints of bloody stools and dizziness.  On presentation to the ER, he was found to have a WBC count of 3.2,   hemoglobin of 5.5 and a platelet count of 1000.  The patient did receive 2   units of platelets and his platelet count did improve to 88,000.  During the   hospitalization, he did have a GI workup with colonoscopy and endoscopy.  Due   to poor prep, could not complete colonoscopy and endoscopy, showed some   watermelon stomach and the biopsies were obtained, which are still pending.    The patient also during the workup was found to have a B12 deficiency less   than 150 and started on B12 injections.  He had an extensive workup with   coags, which are normal, fibrinogen level was normal.  He also did have   [id number] activity that was negative.  Hepatitis C was negative.  HIV was   negative.  His platelet count again continues to drop and was 30,000 yesterday   and he is transferred to [facility] for further evaluation to rule out   primary bone marrow condition.  He denies of any fevers or chills.  His GI   bleeding has stopped.    9/10/24: Bone marrow biopsy demonstrating hypocellular bone marrow at 15% cellular with prominent stromal elements, markedly decreased maturing granulocytic precursors, decreased erythroid precursors with slight dyserythropoiesis, and markedly decreased megakaryocytes.  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     Review of Systems:  Review of Systems   Constitutional: Negative.  Negative for chills, diaphoresis, fever, malaise/fatigue and weight loss.   HENT: Negative.     Eyes: Negative.    Respiratory: Negative.  Negative for cough.   "  Cardiovascular: Negative.  Negative for chest pain, palpitations, orthopnea, leg swelling and PND.   Gastrointestinal: Negative.  Negative for abdominal pain, blood in stool, constipation, diarrhea, melena, nausea and vomiting.   Genitourinary: Negative.  Negative for dysuria, frequency, hematuria and urgency.   Musculoskeletal: Negative.  Negative for myalgias.   Skin: Negative.    Neurological: Negative.  Negative for dizziness, focal weakness and headaches.   Endo/Heme/Allergies: Negative.  Does not bruise/bleed easily.   Psychiatric/Behavioral: Negative.  Negative for depression and memory loss. The patient does not have insomnia.         Vitals:     /65   Pulse 80   Temp 36.4 °C (97.5 °F) (Oral)   Resp 16   Ht 1.702 m (5' 7\")   Wt 72.1 kg (158 lb 15.2 oz)   SpO2 98%   BMI 24.90 kg/m²     Physical Exam:  Physical Exam  Vitals and nursing note reviewed.   Constitutional:       General: He is not in acute distress.     Appearance: He is not toxic-appearing.   HENT:      Head: Normocephalic and atraumatic.   Eyes:      General: No scleral icterus.     Pupils: Pupils are equal, round, and reactive to light.   Cardiovascular:      Rate and Rhythm: Normal rate and regular rhythm.      Pulses: Normal pulses.      Heart sounds: No murmur heard.     No friction rub. No gallop.   Pulmonary:      Effort: Pulmonary effort is normal.      Breath sounds: No stridor. No wheezing, rhonchi or rales.   Abdominal:      General: Abdomen is flat. Bowel sounds are normal.      Palpations: Abdomen is soft.   Musculoskeletal:         General: No swelling.      Cervical back: No rigidity.   Skin:     General: Skin is warm and dry.      Capillary Refill: Capillary refill takes 2 to 3 seconds.      Coloration: Skin is not jaundiced.   Neurological:      General: No focal deficit present.      Mental Status: He is alert and oriented to person, place, and time. Mental status is at baseline.   Psychiatric:         Mood and " Affect: Mood normal.          Assessment and Plan:    # Pancytopenia  Bone marrow biopsy demonstrating hypocellular bone marrow at 15% cellular with prominent stromal elements, markedly decreased maturing granulocytic precursors, decreased erythroid precursors with slight dyserythropoiesis, and markedly decreased megakaryocytes.  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    His B12 was undetectable but his folic acid was normal.  He has been on IM B12 replacement.  I would expect some recovery in his pancytopenia by this point it was simply B12 deficiency.  Zinc and copper levels are normal.  EBV, CMV, parvovirus, HIV, and hepatitis PCR are all negative.  TSH is within normal limits.    Today I will send for heavy metal testing in the blood and urine, PNH FLAER, SHANELL with reflex, and ANCA antibodies to evaluate for other benign causes of pancytopenia.  However, given the morphologic findings of the bone marrow biopsy I remain concerned for hypoplastic MDS.  We will need to await for FISH results before being able to decide on a treatment plan.    TRANSFUSION THRESHOLDS:  RBCs and PLTs must be leukoreduced, CMV negative/safe. Irradiation NOT required (yet) for this patient.     Hgb <7 if no symptoms or <8 with symptoms or bleedinu pRBC  PLTs <10 and no symptoms or <20 if febrile, septic, or bleeidnu apheresis PLTs     # B12 Deficiency  Undetectable B12. Intrinsic factor Abs negative. Will check parietal cell Abs, gastrin level, and folate levels. Continue daily B12 1000mcg injections. Can transition to weekly after 2 weeks. Continue folic acid.     # GI Bleed  # Chronic Gastritis  Biopsies from EGD demonstrated chronic gastritis which could be worsening his B12 deficiency beyond just his vegetarian diet.    PLAN:  Will await results of BMBx  Extensive serologic eval still pending  Continue B12 and  folic acid supplementation        Thank you for allowing me to participate in his care. Please do not hesitate to reach out with any questions.    Please note that this dictation was created using voice recognition software. I have made every reasonable attempt to correct obvious errors, but I expect that there are errors of grammar and possibly content that I did not discover before finalizing the note.      Howie Long MD  Hematologist/Oncologist  [facility]   [facility] - [facility] [place] [facility]

## 2024-09-14 NOTE — PROGRESS NOTES
[facility] [place] [facility] [place]        Attending:   Maida Gonzales M.d.    Resident:   ELVIRA Soler D.O.    PATIENT:   Miri Joseph; [id number]; 1940    ID:   84 y.o. male with history of dyslipidemia, BPH, type 2 DM, and recent GI bleed transferred from [facility] admitted for symptomatic pancytopenia and bone marrow biopsy. He was seen by GI at [facility] and had bidirectional endoscopy on 9/5 with biopsies taken from endoscopy but colonoscopy could not be completed due to inadequate bowel prep. Hgb stable but WBC and platelets continued to drop.  Transferred here for bone marrow biopsy.  Now status post platelet transfusion on 9/7 and 9/12, PRBC transfusion 9/15.    SUBJECTIVE:   Miri is visited this morning and states he continues to feel well. He had a bowel movement yesterday that was non-bloody, no melena. He continues to have some mild pain associated with his condom catheter. He otherwise has a negative review of systems for fever, chest pain, shortness of breath, abdominal pain, N/V/D, hematuria, bloody BM, myalgias or other pain.     OBJECTIVE:  Vitals:    09/13/24 1553 09/13/24 2001 09/14/24 0031 09/14/24 0405   BP: 109/56 127/59 121/63 114/61   Pulse: 78 95 76 79   Resp: 16 16 16 16   Temp: 36.7 °C (98 °F) 37 °C (98.6 °F) 37.1 °C (98.7 °F) 36.8 °C (98.3 °F)   TempSrc: Temporal Oral Oral Oral   SpO2: 98% 96% 97% 98%   Weight:       Height:           Intake/Output Summary (Last 24 hours) at 9/14/2024 0518  Last data filed at 9/14/2024 0405  Gross per 24 hour   Intake 300 ml   Output 1750 ml   Net -1450 ml       PHYSICAL EXAM:   General: No acute distress, sitting up on edge of bed, conversational  HEENT: NC/AT. EOMI, no pallor  Cardiovascular: RRR without murmurs. Normal capillary refill   Respiratory: CTAB  Abdomen: soft, nontender, nondistended, no masses,   : Condom catheter draining clear yellow-orange urine  EXT:  SOL, no edema  Skin:  "No erythema/lesions, bruises on bilateral hands, no petechia  Neuro: Non-focal    LABS:  Recent Labs     09/13/24  0323 09/13/24  1457 09/14/24  0317   WBC 1.2* 1.7* 1.3*   RBC 2.32* 2.55* 2.14*   HEMOGLOBIN 7.4* 8.1* 6.9*   HEMATOCRIT 20.9* 22.7* 18.8*   MCV 90.1 89.0 87.9   MCH 31.9 31.8 32.2   RDW 48.3 48.4 48.3   PLATELETCT 55* 49* 31*   MPV 8.7* 9.6 9.3   NEUTSPOLYS 9.60* 10.60* 12.00*   LYMPHOCYTES 87.80* 83.20* 86.00*   MONOCYTES 0.00 1.80 2.00   EOSINOPHILS 1.70 3.50 0.00   BASOPHILS 0.00 0.00 0.00   RBCMORPHOLO Present Present Present     Recent Labs     09/14/24  0317   SODIUM 128*   POTASSIUM 3.7   CHLORIDE 97   CO2 19*   BUN 13   CREATININE 0.57   CALCIUM 8.3*     Estimated GFR/CRCL = Estimated Creatinine Clearance: 93.4 mL/min (by C-G formula based on SCr of 0.57 mg/dL).  Recent Labs     09/14/24  0317   GLUCOSE 219*                   No results for input(s): \"INR\", \"APTT\", \"FIBRINOGEN\" in the last 72 hours.    Invalid input(s): \"DIMER\"      IMAGING:  No orders to display       MEDS:  Current Facility-Administered Medications   Medication Last Admin    acyclovir (Zovirax) tablet 400 mg 400 mg at 09/13/24 1249    voriconazole (Vfend) tablet 200 mg 200 mg at 09/13/24 1249    MD Alert...Vancomycin per Pharmacy      vancomycin (Vancocin) 1,250 mg in  mL IVPB Stopped at 09/14/24 0246    ceFAZolin (Ancef) 2 g in  mL IVPB Stopped at 09/14/24 0438    cyanocobalamin (Vitamin B-12) injection 1,000 mcg 1,000 mcg at 09/13/24 0545    folic acid (Folvite) tablet 1 mg 1 mg at 09/13/24 0544    senna-docusate (Pericolace Or Senokot S) 8.6-50 MG per tablet 2 Tablet 2 Tablet at 09/09/24 1840    And    polyethylene glycol/lytes (Miralax) Packet 1 Packet      acetaminophen (Tylenol) tablet 650 mg      diphenhydrAMINE (Benadryl) tablet/capsule 25 mg      Or    diphenhydrAMINE (Benadryl) injection 25 mg      insulin regular (HumuLIN R,NovoLIN R) injection 2 Units at 09/13/24 2041    And    dextrose 50% (D50W) " injection 25 g      pantoprazole (Protonix) injection 40 mg 40 mg at 09/13/24 1706    acetaminophen (Tylenol) tablet 650 mg 650 mg at 09/13/24 0610    ondansetron (Zofran ODT) dispertab 4 mg      ondansetron (Zofran) syringe/vial injection 4 mg      atorvastatin (Lipitor) tablet 10 mg 10 mg at 09/13/24 2040       ASSESSMENT/PLAN:  84 y.o. male with history of dyslipidemia, BPH, type 2 DM, and recent GI bleed transferred from [facility] admitted for symptomatic pancytopenia and bone marrow biopsy. He was seen by GI at [facility] and had bidirectional endoscopy on 9/5 with biopsies taken from endoscopy but colonoscopy could not be completed due to inadequate bowel prep. Hgb stable but WBC and platelets continue to downtrend, now with critically low absolute neutrophils. Status post platelet transfusion on 9/7 and 9/12.  Bone marrow biopsy performed  9/10. On the morning of 9/11 the patient had dysuria; please see plan below.    * Pancytopenia (HCC)- (present on admission)  Assessment & Plan  Continues to have downtrending white blood cells, absolute neutrophils critical. Neutropenic precautions in place.  Vital signs stable, afebrile, ROS negative.  Bone marrow biopsy performed 9/10, preliminary results 9/14.  1u PRBC transfused 9/14 am for Hgb 6.89.    Plan:  -CBC q12h  -Transfuse platelets per oncology recs below  -Transfuse pRBCs for Hb goal 7  -Neutropenic precautions  -Oncology consulted, following:  -Continue supportive measures and B12  -Transfuse for platelets < 20,000 unless febrile, bleeding, sepsis then <10,000, hemoglobin < 7  -s/p Bone marrow biopsy, awaiting path final results  -Acyclovir and voriconazole added 9/13  -Additional testing per oncology    Bacteremia  Assessment & Plan  2/2 blood cultures positive less than 24 hours after drawing 9/11 for Bacillus cereus, thuringiensis, & mycoides. Ucx positive for same. Discussed with ID Pharm who with Dr. Levine of infectious disease  visit the patient agrees he looks well; repeat bcx x 2 and treat with vancomycin given pancytopenia.  -Phenazopyridine  -Ancef, vancomycin  -Follow 9/12 blood cultures (ngtd)   -9/11 Bcx positive x2 Bacillus cereus, thuringiensis, & mycoides   -9/11 Ucx positive for Bacillus cereus, thuringiensis, & mycoides    Dysuria  Assessment & Plan  Blood cultures and Urine cultures 9/11 positive for Bacillus cereus, thuringiensis, & mycoides.  Discussed with ID Pharm who with Dr. Levine of infectious disease visit the patient agrees he looks well; we will plan to draw new blood cultures x 2 and treat with vancomycin given pancytopenia.  -Phenazopyridine   -Ancef, vancomycin  -Follow 9/12 blood cultures (ngtd)   -9/11 Bcx positive x2 Bacillus cereus, thuringiensis, & mycoides   -9/11 Ucx positive for Bacillus cereus, thuringiensis, & mycoides    Dyslipidemia- (present on admission)  Assessment & Plan  Continue home atorvastatin.     B12 deficiency- (present on admission)  Assessment & Plan  Per Oncology Dr. Long, essentially undetectable B12. Recommends daily injections of B12 x 2 weeks and then can switch to weekly x 1 month, and then monthly from thereon.  We will follow these recommendations.  -Vit B12 1,000mcg x 2 weeks   -Then vitamin B12 1000 mcg weekly x 1 month   -Then vitamin B12 1000 mcg monthly  -Recommend oral supplementation at discharge for vegetarian diet, for now the plan will be IM; patient should follow-up on this outpatient    Type 2 diabetes mellitus, without long-term current use of insulin (HCC)- (present on admission)  Assessment & Plan  On metformin, glipizide, and Januvia at home.   -Hold home medications  -Continue CHO diet and SSI + hypoglycemia protocol   -Discussed with patient's daughter that she may bring him his preferred foods    GI bleed- (present on admission)  Assessment & Plan  Appears stable, no bloody stool, no melena.  Vital signs stable; however, hemoglobin dropped to 6.9 on 9/14  and 1 unit of PRBC was transfused.  Question whether there may still be some amount of GI bleeding as patient had a 1.2 point drop in hemoglobin overnight without any obvious signs of bleeding.    Plan:  -Continue daily PPI  -Continue to monitor for signs of GI bleed: bloody stool, melena  -Outpatient follow-up with GI for repeat colonoscopy as initial did not have complete prep    Thrombocytopenia (HCC)- (present on admission)  Assessment & Plan  Continues to downtrend.  Platelets transfused the evening of 9/7 and 9/12.  Vital signs stable, no obvious bleeding or unprovoked bruising.    Plan:  -CBC q12h  -Transfuse w/ platelets per hematology/oncology recs (see A/P for pancytopenia)  -S/p Bone marrow biopsy, oncology consulted and following       Core Measures  IV Fluids: none  Diet: vegetarian with carb count  Antbx: Ancef, vancomycin, voriconazole, acyclovir  DVT ppx: SCDs, chemical prophylaxis held in setting of thrombocytopenia, patient encouraged to ambulate  Code status: Full Code  Dispo: inpatient for supportive care, monitoring of and transfusions for severe pancytopenia and thrombocytopenia pending final results of bone marrow biopsy, oncology recommendations/plan.     Mima Carrasco D.O.  PGY-1  [facility] Resident

## 2024-09-15 LAB
ANION GAP SERPL CALC-SCNC: 16 MMOL/L (ref 7–16)
ANISOCYTOSIS BLD QL SMEAR: ABNORMAL
BASOPHILS # BLD AUTO: 0 % (ref 0–1.8)
BASOPHILS # BLD AUTO: 0 % (ref 0–1.8)
BASOPHILS # BLD: 0 K/UL (ref 0–0.12)
BASOPHILS # BLD: 0 K/UL (ref 0–0.12)
BUN SERPL-MCNC: 13 MG/DL (ref 8–22)
CALCIUM SERPL-MCNC: 8 MG/DL (ref 8.5–10.5)
CHLORIDE SERPL-SCNC: 100 MMOL/L (ref 96–112)
CO2 SERPL-SCNC: 17 MMOL/L (ref 20–33)
CREAT SERPL-MCNC: 0.7 MG/DL (ref 0.5–1.4)
EOSINOPHIL # BLD AUTO: 0 K/UL (ref 0–0.51)
EOSINOPHIL # BLD AUTO: 0.01 K/UL (ref 0–0.51)
EOSINOPHIL NFR BLD: 0 % (ref 0–6.9)
EOSINOPHIL NFR BLD: 0.9 % (ref 0–6.9)
ERYTHROCYTE [DISTWIDTH] IN BLOOD BY AUTOMATED COUNT: 46 FL (ref 35.9–50)
ERYTHROCYTE [DISTWIDTH] IN BLOOD BY AUTOMATED COUNT: 47.5 FL (ref 35.9–50)
GFR SERPLBLD CREATININE-BSD FMLA CKD-EPI: 92 ML/MIN/1.73 M 2
GLUCOSE BLD STRIP.AUTO-MCNC: 198 MG/DL (ref 65–99)
GLUCOSE BLD STRIP.AUTO-MCNC: 294 MG/DL (ref 65–99)
GLUCOSE BLD STRIP.AUTO-MCNC: 344 MG/DL (ref 65–99)
GLUCOSE BLD STRIP.AUTO-MCNC: 391 MG/DL (ref 65–99)
GLUCOSE SERPL-MCNC: 219 MG/DL (ref 65–99)
HCT VFR BLD AUTO: 21.6 % (ref 42–52)
HCT VFR BLD AUTO: 21.9 % (ref 42–52)
HGB BLD-MCNC: 7.8 G/DL (ref 14–18)
HGB BLD-MCNC: 7.9 G/DL (ref 14–18)
LYMPHOCYTES # BLD AUTO: 0.85 K/UL (ref 1–4.8)
LYMPHOCYTES # BLD AUTO: 0.92 K/UL (ref 1–4.8)
LYMPHOCYTES NFR BLD: 91.9 % (ref 22–41)
LYMPHOCYTES NFR BLD: 94.8 % (ref 22–41)
MACROCYTES BLD QL SMEAR: ABNORMAL
MACROCYTES BLD QL SMEAR: ABNORMAL
MANUAL DIFF BLD: NORMAL
MANUAL DIFF BLD: NORMAL
MCH RBC QN AUTO: 31.6 PG (ref 27–33)
MCH RBC QN AUTO: 31.7 PG (ref 27–33)
MCHC RBC AUTO-ENTMCNC: 36.1 G/DL (ref 32.3–36.5)
MCHC RBC AUTO-ENTMCNC: 36.1 G/DL (ref 32.3–36.5)
MCV RBC AUTO: 87.6 FL (ref 81.4–97.8)
MCV RBC AUTO: 87.8 FL (ref 81.4–97.8)
MONOCYTES # BLD AUTO: 0.01 K/UL (ref 0–0.85)
MONOCYTES # BLD AUTO: 0.01 K/UL (ref 0–0.85)
MONOCYTES NFR BLD AUTO: 0.9 % (ref 0–13.4)
MONOCYTES NFR BLD AUTO: 0.9 % (ref 0–13.4)
MORPHOLOGY BLD-IMP: NORMAL
MORPHOLOGY BLD-IMP: NORMAL
NEUTROPHILS # BLD AUTO: 0.04 K/UL (ref 1.82–7.42)
NEUTROPHILS # BLD AUTO: 0.06 K/UL (ref 1.82–7.42)
NEUTROPHILS NFR BLD: 4.3 % (ref 44–72)
NEUTROPHILS NFR BLD: 5.4 % (ref 44–72)
NEUTS BAND NFR BLD MANUAL: 0.9 % (ref 0–10)
NRBC # BLD AUTO: 0 K/UL
NRBC # BLD AUTO: 0 K/UL
NRBC BLD-RTO: 0 /100 WBC (ref 0–0.2)
NRBC BLD-RTO: 0 /100 WBC (ref 0–0.2)
PLATELET # BLD AUTO: 15 K/UL (ref 164–446)
PLATELET # BLD AUTO: 18 K/UL (ref 164–446)
PLATELET BLD QL SMEAR: NORMAL
PLATELET BLD QL SMEAR: NORMAL
PLATELETS.RETICULATED NFR BLD AUTO: 0.2 % (ref 0.6–13.1)
PLATELETS.RETICULATED NFR BLD AUTO: 0.4 % (ref 0.6–13.1)
PMV BLD AUTO: 9 FL (ref 9–12.9)
PMV BLD AUTO: 9.4 FL (ref 9–12.9)
POTASSIUM SERPL-SCNC: 3.6 MMOL/L (ref 3.6–5.5)
RBC # BLD AUTO: 2.46 M/UL (ref 4.7–6.1)
RBC # BLD AUTO: 2.5 M/UL (ref 4.7–6.1)
RBC BLD AUTO: PRESENT
RBC BLD AUTO: PRESENT
SODIUM SERPL-SCNC: 133 MMOL/L (ref 135–145)
VANCOMYCIN PEAK SERPL-MCNC: 17.1 UG/ML (ref 20–40)
WBC # BLD AUTO: 0.9 K/UL (ref 4.8–10.8)
WBC # BLD AUTO: 1 K/UL (ref 4.8–10.8)

## 2024-09-15 PROCEDURE — 700102 HCHG RX REV CODE 250 W/ 637 OVERRIDE(OP): Performed by: STUDENT IN AN ORGANIZED HEALTH CARE EDUCATION/TRAINING PROGRAM

## 2024-09-15 PROCEDURE — 80048 BASIC METABOLIC PNL TOTAL CA: CPT

## 2024-09-15 PROCEDURE — 80202 ASSAY OF VANCOMYCIN: CPT

## 2024-09-15 PROCEDURE — 700105 HCHG RX REV CODE 258

## 2024-09-15 PROCEDURE — 85055 RETICULATED PLATELET ASSAY: CPT

## 2024-09-15 PROCEDURE — 700111 HCHG RX REV CODE 636 W/ 250 OVERRIDE (IP): Performed by: INTERNAL MEDICINE

## 2024-09-15 PROCEDURE — 700111 HCHG RX REV CODE 636 W/ 250 OVERRIDE (IP)

## 2024-09-15 PROCEDURE — 99232 SBSQ HOSP IP/OBS MODERATE 35: CPT | Mod: GC | Performed by: HOSPITALIST

## 2024-09-15 PROCEDURE — 82962 GLUCOSE BLOOD TEST: CPT

## 2024-09-15 PROCEDURE — 36415 COLL VENOUS BLD VENIPUNCTURE: CPT

## 2024-09-15 PROCEDURE — 85007 BL SMEAR W/DIFF WBC COUNT: CPT

## 2024-09-15 PROCEDURE — A9270 NON-COVERED ITEM OR SERVICE: HCPCS | Performed by: STUDENT IN AN ORGANIZED HEALTH CARE EDUCATION/TRAINING PROGRAM

## 2024-09-15 PROCEDURE — 770004 HCHG ROOM/CARE - ONCOLOGY PRIVATE *

## 2024-09-15 PROCEDURE — 700102 HCHG RX REV CODE 250 W/ 637 OVERRIDE(OP)

## 2024-09-15 PROCEDURE — A9270 NON-COVERED ITEM OR SERVICE: HCPCS

## 2024-09-15 PROCEDURE — 85027 COMPLETE CBC AUTOMATED: CPT | Mod: 91

## 2024-09-15 RX ADMIN — INSULIN HUMAN 3 UNITS: 100 INJECTION, SOLUTION PARENTERAL at 12:22

## 2024-09-15 RX ADMIN — PANTOPRAZOLE SODIUM 40 MG: 40 INJECTION, POWDER, FOR SOLUTION INTRAVENOUS at 06:02

## 2024-09-15 RX ADMIN — VORICONAZOLE 200 MG: 200 TABLET ORAL at 17:12

## 2024-09-15 RX ADMIN — PANTOPRAZOLE SODIUM 40 MG: 40 INJECTION, POWDER, FOR SOLUTION INTRAVENOUS at 17:12

## 2024-09-15 RX ADMIN — FOLIC ACID 1 MG: 1 TABLET ORAL at 06:02

## 2024-09-15 RX ADMIN — VANCOMYCIN HYDROCHLORIDE 1250 MG: 5 INJECTION, POWDER, LYOPHILIZED, FOR SOLUTION INTRAVENOUS at 01:26

## 2024-09-15 RX ADMIN — INSULIN HUMAN 1 UNITS: 100 INJECTION, SOLUTION PARENTERAL at 07:52

## 2024-09-15 RX ADMIN — VORICONAZOLE 200 MG: 200 TABLET ORAL at 06:02

## 2024-09-15 RX ADMIN — CYANOCOBALAMIN 1000 MCG: 1000 INJECTION, SOLUTION INTRAMUSCULAR; SUBCUTANEOUS at 06:06

## 2024-09-15 RX ADMIN — INSULIN HUMAN 4 UNITS: 100 INJECTION, SOLUTION PARENTERAL at 17:21

## 2024-09-15 RX ADMIN — ACYCLOVIR 400 MG: 400 TABLET ORAL at 17:12

## 2024-09-15 RX ADMIN — INSULIN HUMAN 5 UNITS: 100 INJECTION, SOLUTION PARENTERAL at 20:38

## 2024-09-15 RX ADMIN — ACYCLOVIR 400 MG: 400 TABLET ORAL at 06:02

## 2024-09-15 RX ADMIN — ATORVASTATIN CALCIUM 10 MG: 10 TABLET, FILM COATED ORAL at 20:38

## 2024-09-15 ASSESSMENT — ENCOUNTER SYMPTOMS
PALPITATIONS: 0
BLOOD IN STOOL: 0
BRUISES/BLEEDS EASILY: 0
ABDOMINAL PAIN: 0
MYALGIAS: 0
DIAPHORESIS: 0
INSOMNIA: 0
MUSCULOSKELETAL NEGATIVE: 1
WEIGHT LOSS: 0
CHILLS: 0
CONSTIPATION: 0
NEUROLOGICAL NEGATIVE: 1
RESPIRATORY NEGATIVE: 1
NAUSEA: 0
CARDIOVASCULAR NEGATIVE: 1
DIZZINESS: 0
PND: 0
PSYCHIATRIC NEGATIVE: 1
CONSTITUTIONAL NEGATIVE: 1
DIARRHEA: 0
HEADACHES: 0
VOMITING: 0
FOCAL WEAKNESS: 0
ORTHOPNEA: 0
DEPRESSION: 0
EYES NEGATIVE: 1
COUGH: 0
MEMORY LOSS: 0
GASTROINTESTINAL NEGATIVE: 1
FEVER: 0

## 2024-09-15 ASSESSMENT — PATIENT HEALTH QUESTIONNAIRE - PHQ9
1. LITTLE INTEREST OR PLEASURE IN DOING THINGS: NOT AT ALL
SUM OF ALL RESPONSES TO PHQ9 QUESTIONS 1 AND 2: 0

## 2024-09-15 ASSESSMENT — FIBROSIS 4 INDEX: FIB4 SCORE: 31.56

## 2024-09-15 NOTE — CARE PLAN
The patient is Watcher - Medium risk of patient condition declining or worsening    Shift Goals  Clinical Goals: Monitor labs/vs; rest  Patient Goals: Rest  Family Goals: Not present    Progress made toward(s) clinical / shift goals:    Problem: Knowledge Deficit - Standard  Goal: Patient and family/care givers will demonstrate understanding of plan of care, disease process/condition, diagnostic tests and medications  Outcome: Progressing     Problem: Neutropenia Precautions  Goal: Neutropenic precautions will be implemented and maintained for patient protection  Outcome: Progressing     Problem: Pain - Standard  Goal: Alleviation of pain or a reduction in pain to the patient’s comfort goal  Outcome: Progressing     Problem: Urinary Elimination  Goal: Establish and maintain regular urinary output  Outcome: Progressing       Patient is not progressing towards the following goals:

## 2024-09-15 NOTE — CARE PLAN
"The patient is Watcher - Medium risk of patient condition declining or worsening    Shift Goals  Clinical Goals: Pt will remain free from injury.  Patient Goals: Pt will be able to catch up on sleep  Family Goals: Not present    Progress made toward(s) clinical / shift goals:  Pt resting comfortably. Denies pain or nausea. Denies penile pain or discomfort with urination. Patient is not progressing towards the following goals:      Problem: Knowledge Deficit - Diabetes  Goal: Patient will demonstrate knowledge of insulin injection, symptoms, and treatment of hypoglycemia and diet prior to discharge  Outcome: Not Progressing     Problem: Urinary Elimination  Goal: Establish and maintain regular urinary output  Outcome: Not Progressing     Condom cath in use due to urinary incontinence and frequency.     Patient not calling for assistance with mobilization. Educated regarding fall precautions and importance of medical staff presence for mobilization. Patient declined stating such measures to be \"unnecessary\". Charge RN notified.     "

## 2024-09-15 NOTE — PROGRESS NOTES
Hematology/Oncology Progress Note    Primary Hematologist/Oncologist:     Oncology History:  The patient is an 84-year-old male who presented   to [facility] on 09/03/2024 with complaints of bloody stools and dizziness.  On presentation to the ER, he was found to have a WBC count of 3.2,   hemoglobin of 5.5 and a platelet count of 1000.  The patient did receive 2   units of platelets and his platelet count did improve to 88,000.  During the   hospitalization, he did have a GI workup with colonoscopy and endoscopy.  Due   to poor prep, could not complete colonoscopy and endoscopy, showed some   watermelon stomach and the biopsies were obtained, which are still pending.    The patient also during the workup was found to have a B12 deficiency less   than 150 and started on B12 injections.  He had an extensive workup with   coags, which are normal, fibrinogen level was normal.  He also did have   [id number] activity that was negative.  Hepatitis C was negative.  HIV was   negative.  His platelet count again continues to drop and was 30,000 yesterday   and he is transferred to [facility] for further evaluation to rule out   primary bone marrow condition.  He denies of any fevers or chills.  His GI   bleeding has stopped.    9/10/24: Bone marrow biopsy demonstrating hypocellular bone marrow at 15% cellular with prominent stromal elements, markedly decreased maturing granulocytic precursors, decreased erythroid precursors with slight dyserythropoiesis, and markedly decreased megakaryocytes.  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     Review of Systems:  Review of Systems   Constitutional: Negative.  Negative for chills, diaphoresis, fever, malaise/fatigue and weight loss.   HENT: Negative.     Eyes: Negative.    Respiratory: Negative.  Negative for cough.   "  Cardiovascular: Negative.  Negative for chest pain, palpitations, orthopnea, leg swelling and PND.   Gastrointestinal: Negative.  Negative for abdominal pain, blood in stool, constipation, diarrhea, melena, nausea and vomiting.   Genitourinary: Negative.  Negative for dysuria, frequency, hematuria and urgency.   Musculoskeletal: Negative.  Negative for myalgias.   Skin: Negative.    Neurological: Negative.  Negative for dizziness, focal weakness and headaches.   Endo/Heme/Allergies: Negative.  Does not bruise/bleed easily.   Psychiatric/Behavioral: Negative.  Negative for depression and memory loss. The patient does not have insomnia.         Vitals:     /49   Pulse 82   Temp 36.4 °C (97.5 °F) (Oral)   Resp 16   Ht 1.702 m (5' 7\")   Wt 72.1 kg (158 lb 15.2 oz)   SpO2 98%   BMI 24.90 kg/m²     Physical Exam:  Physical Exam  Vitals and nursing note reviewed.   Constitutional:       General: He is not in acute distress.     Appearance: He is not toxic-appearing.   HENT:      Head: Normocephalic and atraumatic.   Eyes:      General: No scleral icterus.     Pupils: Pupils are equal, round, and reactive to light.   Cardiovascular:      Rate and Rhythm: Normal rate and regular rhythm.      Pulses: Normal pulses.      Heart sounds: No murmur heard.     No friction rub. No gallop.   Pulmonary:      Effort: Pulmonary effort is normal.      Breath sounds: No stridor. No wheezing, rhonchi or rales.   Abdominal:      General: Abdomen is flat. Bowel sounds are normal.      Palpations: Abdomen is soft.   Musculoskeletal:         General: No swelling.      Cervical back: No rigidity.   Skin:     General: Skin is warm and dry.      Capillary Refill: Capillary refill takes 2 to 3 seconds.      Coloration: Skin is not jaundiced.   Neurological:      General: No focal deficit present.      Mental Status: He is alert and oriented to person, place, and time. Mental status is at baseline.   Psychiatric:         Mood and " Affect: Mood normal.          Assessment and Plan:    # Pancytopenia  Bone marrow biopsy demonstrating hypocellular bone marrow at 15% cellular with prominent stromal elements, markedly decreased maturing granulocytic precursors, decreased erythroid precursors with slight dyserythropoiesis, and markedly decreased megakaryocytes.  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    His B12 was undetectable but his folic acid was normal.  He has been on IM B12 replacement.  I would expect some recovery in his pancytopenia by this point it was simply B12 deficiency.  Zinc and copper levels are normal.  EBV, CMV, parvovirus, HIV, and hepatitis PCR are all negative.  TSH is within normal limits.    PNH FLAER, heavy metals, SHANELL, ANCA, and BMBx cytogenetics are all still pending. I recommend he remain inpatient as he will require treatment with decitabine if this is hypoplastic MDS.    TRANSFUSION THRESHOLDS:  RBCs and PLTs must be leukoreduced, CMV negative/safe. Irradiation NOT required (yet) for this patient.     Hgb <7 if no symptoms or <8 with symptoms or bleedinu pRBC  PLTs <10 and no symptoms or <20 if febrile, septic, or bleeidnu apheresis PLTs     # B12 Deficiency  Undetectable B12. Intrinsic factor Abs, Parietal cell Abs, and gastrin negative.Secondary to impaired absorption from combination of vegetarian diet and years of metformin use. Continue B12 1000mcg and folic acid 1mg.     # GI Bleed  # Chronic Gastritis  Biopsies from EGD demonstrated chronic gastritis which could be worsening his B12 deficiency beyond just his vegetarian diet.    PLAN:  Will await results of BMBx  Extensive serologic eval still pending  Continue B12 and folic acid supplementation        Thank you for allowing me to participate in his care. Please do not hesitate to reach out with any questions.    Please note that this  dictation was created using voice recognition software. I have made every reasonable attempt to correct obvious errors, but I expect that there are errors of grammar and possibly content that I did not discover before finalizing the note.      Howie Long MD  Hematologist/Oncologist  [facility]   [facility] - [facility]

## 2024-09-15 NOTE — PROGRESS NOTES
[facility] [place] FAM[facility] MEDICINE PROGRESS NOTE        Attending:   Maida Gonzales M.d.    Resident:   Mima Carrasco D.O.    PATIENT:   Miri Joseph; [id number]; 1940    ID:   84 y.o. male with history of dyslipidemia, BPH, type 2 DM, and recent GI bleed transferred from [facility] admitted for symptomatic pancytopenia and bone marrow biopsy. He was seen by GI at [facility] and had bidirectional endoscopy on 9/5 with biopsies taken from endoscopy but colonoscopy could not be completed due to inadequate bowel prep. Hgb stable but WBC and platelets continued to drop.  Transferred here for bone marrow biopsy.  Now status post platelet transfusion on 9/7 and 9/12, PRBC transfusion 9/15.    SUBJECTIVE:   Miri is visited this morning and states he is no longer having dysuria.  He did have some issues with his catheter leaking overnight. His review of systems is negative for fever, chest pain, shortness of breath, abdominal pain, N/V/D, hematuria, bloody BM, melena, myalgias or other pain.     OBJECTIVE:  Vitals:    09/14/24 1624 09/14/24 2014 09/14/24 2346 09/15/24 0502   BP: 109/51 112/56 116/67 128/60   Pulse: 83 84 61 78   Resp: 16 16 16 16   Temp: 36.7 °C (98.1 °F) 36.8 °C (98.2 °F) 36.8 °C (98.3 °F) 36.4 °C (97.5 °F)   TempSrc: Oral Oral Oral Oral   SpO2: 99% 99% 96% 96%   Weight:       Height:           Intake/Output Summary (Last 24 hours) at 9/15/2024 0509  Last data filed at 9/15/2024 0500  Gross per 24 hour   Intake 742 ml   Output 2125 ml   Net -1383 ml       PHYSICAL EXAM:   General: No acute distress, sitting up on edge of bed eating breakfast, conversational  HEENT: NC/AT. EOMI, no pallor  Cardiovascular: RRR without murmurs. Normal capillary refill   Respiratory: CTAB  Abdomen: soft, nontender, nondistended, no masses, no CVA tenderness  : Condom catheter draining clear yellow.  EXT:  SOL, no edema  Skin: No erythema/lesions, no petechia  Neuro: Non-focal    LABS:  Recent Labs      "09/14/24  0317 09/14/24  1513 09/15/24  0542   WBC 1.3* 1.0* 1.0*   RBC 2.14* 2.73* 2.50*   HEMOGLOBIN 6.9* 8.8* 7.9*   HEMATOCRIT 18.8* 23.6* 21.9*   MCV 87.9 86.4 87.6   MCH 32.2 32.2 31.6   RDW 48.3 45.3 46.0   PLATELETCT 31* 24* 18*   MPV 9.3 9.3 9.4   NEUTSPOLYS 12.00* 20.00* 5.40*   LYMPHOCYTES 86.00* 77.40* 91.90*   MONOCYTES 2.00 2.60 0.90   EOSINOPHILS 0.00 0.00 0.90   BASOPHILS 0.00 0.00 0.00   RBCMORPHOLO Present Present Present     Recent Labs     09/14/24  0317 09/15/24  0542   SODIUM 128* 133*   POTASSIUM 3.7 3.6   CHLORIDE 97 100   CO2 19* 17*   BUN 13 13   CREATININE 0.57 0.70   CALCIUM 8.3* 8.0*     Estimated GFR/CRCL = Estimated Creatinine Clearance: 93.4 mL/min (by C-G formula based on SCr of 0.57 mg/dL).  Recent Labs     09/14/24  0317 09/15/24  0542   GLUCOSE 219* 219*                   No results for input(s): \"INR\", \"APTT\", \"FIBRINOGEN\" in the last 72 hours.    Invalid input(s): \"DIMER\"      IMAGING:  No orders to display       MEDS:  Current Facility-Administered Medications   Medication Last Admin    cyanocobalamin (Vitamin B-12) injection 1,000 mcg 1,000 mcg at 09/15/24 0606    Followed by    [START ON 9/26/2024] cyanocobalamin (Vitamin B-12) injection 1,000 mcg      [START ON 9/20/2024] cyanocobalamin (Vitamin B-12) tablet 1,000 mcg      acyclovir (Zovirax) tablet 400 mg 400 mg at 09/15/24 0602    voriconazole (Vfend) tablet 200 mg 200 mg at 09/15/24 0602    MD Alert...Vancomycin per Pharmacy      vancomycin (Vancocin) 1,250 mg in  mL IVPB Stopped at 09/15/24 0326    folic acid (Folvite) tablet 1 mg 1 mg at 09/15/24 0602    senna-docusate (Pericolace Or Senokot S) 8.6-50 MG per tablet 2 Tablet 2 Tablet at 09/09/24 1840    And    polyethylene glycol/lytes (Miralax) Packet 1 Packet      insulin regular (HumuLIN R,NovoLIN R) injection 3 Units at 09/15/24 1222    And    dextrose 50% (D50W) injection 25 g      pantoprazole (Protonix) injection 40 mg 40 mg at 09/15/24 0602    acetaminophen " (Tylenol) tablet 650 mg 650 mg at 09/13/24 0610    ondansetron (Zofran ODT) dispertab 4 mg      ondansetron (Zofran) syringe/vial injection 4 mg      atorvastatin (Lipitor) tablet 10 mg 10 mg at 09/14/24 6605       ASSESSMENT/PLAN:  84 y.o. male with history of dyslipidemia, BPH, type 2 DM, and recent GI bleed transferred from [facility] admitted for symptomatic pancytopenia and bone marrow biopsy. He was seen by GI at [facility] and had bidirectional endoscopy on 9/5 with biopsies taken from endoscopy but colonoscopy could not be completed due to inadequate bowel prep. Hgb stable but WBC and platelets continue to downtrend, now with critically low absolute neutrophils. Status post platelet transfusion on 9/7 and 9/12.  Bone marrow biopsy performed  9/10. On the morning of 9/11 the patient had dysuria; please see plan below.    * Pancytopenia (HCC)- (present on admission)  Assessment & Plan  Continues to have downtrending white blood cells, absolute neutrophils critical. Neutropenic precautions in place.  Vital signs stable, afebrile, ROS negative.  Bone marrow biopsy performed 9/10, preliminary results 9/14, additional results including FISH pending.  1u PRBC transfused 9/14 am for Hgb 6.89.    Plan:  -CBC q12h  -Transfuse platelets per oncology recs below  -Transfuse pRBCs for Hb goal 7  -Neutropenic precautions  -Oncology consulted, following:  -Continue supportive measures and B12  -Transfuse for platelets < 20,000 unless febrile, bleeding, sepsis then <10,000, hemoglobin < 7  -Bone marrow biopsy with nonspecific findings, pending FISH results for further plan  -Acyclovir and voriconazole added 9/13  -Additional testing per oncology    Bacteremia  Assessment & Plan  2/2 blood cultures positive less than 24 hours after drawing 9/11 for Bacillus cereus, thuringiensis, & mycoides. Ucx positive for same. Discussed with ID Pharm who with Dr. Levine of infectious disease visit the patient agrees  he looks well; repeat bcx x 2 and treat with vancomycin given pancytopenia.  -Phenazopyridine  -Ancef, vancomycin  -Follow 9/12 blood cultures (ngtd)   -9/11 Bcx positive x2 Bacillus cereus, thuringiensis, & mycoides   -9/11 Ucx positive for Bacillus cereus, thuringiensis, & mycoides    Dysuria  Assessment & Plan  Resolved.  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  Discussed with ID Pharm who with Dr. Levine of infectious disease visit the patient agrees he looks well; we will plan to draw new blood cultures x 2 and treat with vancomycin given pancytopenia.  Per ID pharmacy on 9/14, no need to continue Ancef.  -Phenazopyridine   -Ancef discontinued  -Continue vancomycin  -Follow 9/12 blood cultures (ngtd)   -9/11 Bcx positive x2 Bacillus cereus, thuringiensis, & mycoides   -9/11 Ucx positive for Bacillus cereus, thuringiensis, & mycoides    Dyslipidemia- (present on admission)  Assessment & Plan  Continue home atorvastatin.     B12 deficiency- (present on admission)  Assessment & Plan  Per Oncology Dr. Long, essentially undetectable B12. Recommends daily injections of B12 x 2 weeks and then can switch to weekly x 1 month, and then monthly from thereon.  We will follow these recommendations.  -Vit B12 1,000mcg x 2 weeks   -Then vitamin B12 1000 mcg weekly x 1 month   -Then vitamin B12 1000 mcg monthly  -Recommend oral supplementation at discharge for vegetarian diet, for now the plan will be IM; patient should follow-up on this outpatient    Type 2 diabetes mellitus, without long-term current use of insulin (HCC)- (present on admission)  Assessment & Plan  On metformin, glipizide, and Januvia at home.   -Hold home medications  -Continue CHO diet and SSI + hypoglycemia protocol   -Discussed with patient's daughter that she may bring him his preferred foods    GI bleed- (present on admission)  Assessment & Plan  Appears stable, no bloody stool, no melena.   Vital signs stable. S/p 1u PRBC on 9/14, though patient does have significant pancytopenia and is most likely the reason for this need rather than ongoing bleeding.    Plan:  -Continue daily PPI  -Continue to monitor for signs of GI bleed: bloody stool, melena  -Outpatient follow-up with GI for repeat colonoscopy as initial did not have complete prep    Thrombocytopenia (HCC)- (present on admission)  Assessment & Plan  Continues to downtrend. Platelets transfused the evening of 9/7 and 9/12.  Vital signs stable, well-appearing.  No obvious bleeding or unprovoked bruising.  Anticipate need for additional platelet transfusions during admission.    Plan:  -CBC q12h  -Transfuse w/ platelets per hematology/oncology recs (see A/P for pancytopenia)  -Oncology following     Core Measures  IV Fluids: none  Diet: vegetarian with carb count  Antbx: Vancomycin, voriconazole, acyclovir  DVT ppx: SCDs, chemical prophylaxis held in setting of thrombocytopenia, patient encouraged to ambulate  Code status: Full Code  Dispo: inpatient for supportive care, monitoring of and transfusions for severe pancytopenia and thrombocytopenia pending final results of bone marrow biopsy, oncology recommendations/plan.     Mima Carrasco D.O.  PGY-1  [facility] Resident

## 2024-09-16 LAB
ANION GAP SERPL CALC-SCNC: 14 MMOL/L (ref 7–16)
ANISOCYTOSIS BLD QL SMEAR: ABNORMAL
ANISOCYTOSIS BLD QL SMEAR: ABNORMAL
ARSENIC BLD-MCNC: <10 UG/L
BARCODED ABORH UBTYP: 5100
BARCODED PRD CODE UBPRD: NORMAL
BARCODED UNIT NUM UBUNT: NORMAL
BASOPHILS # BLD AUTO: 0 % (ref 0–1.8)
BASOPHILS # BLD AUTO: 0 % (ref 0–1.8)
BASOPHILS # BLD: 0 K/UL (ref 0–0.12)
BASOPHILS # BLD: 0 K/UL (ref 0–0.12)
BUN SERPL-MCNC: 15 MG/DL (ref 8–22)
CADMIUM BLD-MCNC: <1 UG/L
CALCIUM SERPL-MCNC: 8.8 MG/DL (ref 8.5–10.5)
CHLORIDE SERPL-SCNC: 98 MMOL/L (ref 96–112)
CO2 SERPL-SCNC: 22 MMOL/L (ref 20–33)
COMPONENT P 8504P: NORMAL
CREAT SERPL-MCNC: 0.81 MG/DL (ref 0.5–1.4)
EOSINOPHIL # BLD AUTO: 0.01 K/UL (ref 0–0.51)
EOSINOPHIL # BLD AUTO: 0.02 K/UL (ref 0–0.51)
EOSINOPHIL NFR BLD: 0.8 % (ref 0–6.9)
EOSINOPHIL NFR BLD: 1.8 % (ref 0–6.9)
ERYTHROCYTE [DISTWIDTH] IN BLOOD BY AUTOMATED COUNT: 47.4 FL (ref 35.9–50)
ERYTHROCYTE [DISTWIDTH] IN BLOOD BY AUTOMATED COUNT: 47.9 FL (ref 35.9–50)
GFR SERPLBLD CREATININE-BSD FMLA CKD-EPI: 88 ML/MIN/1.73 M 2
GLUCOSE BLD STRIP.AUTO-MCNC: 216 MG/DL (ref 65–99)
GLUCOSE BLD STRIP.AUTO-MCNC: 230 MG/DL (ref 65–99)
GLUCOSE BLD STRIP.AUTO-MCNC: 331 MG/DL (ref 65–99)
GLUCOSE SERPL-MCNC: 206 MG/DL (ref 65–99)
HCT VFR BLD AUTO: 21.2 % (ref 42–52)
HCT VFR BLD AUTO: 22.2 % (ref 42–52)
HGB BLD-MCNC: 7.7 G/DL (ref 14–18)
HGB BLD-MCNC: 8.1 G/DL (ref 14–18)
LEAD BLDV-MCNC: <2 UG/DL
LYMPHOCYTES # BLD AUTO: 0.86 K/UL (ref 1–4.8)
LYMPHOCYTES # BLD AUTO: 1.54 K/UL (ref 1–4.8)
LYMPHOCYTES NFR BLD: 86.1 % (ref 22–41)
LYMPHOCYTES NFR BLD: 90.6 % (ref 22–41)
MACROCYTES BLD QL SMEAR: ABNORMAL
MACROCYTES BLD QL SMEAR: ABNORMAL
MANUAL DIFF BLD: NORMAL
MANUAL DIFF BLD: NORMAL
MCH RBC QN AUTO: 32.2 PG (ref 27–33)
MCH RBC QN AUTO: 32.5 PG (ref 27–33)
MCHC RBC AUTO-ENTMCNC: 36.3 G/DL (ref 32.3–36.5)
MCHC RBC AUTO-ENTMCNC: 36.5 G/DL (ref 32.3–36.5)
MCV RBC AUTO: 88.7 FL (ref 81.4–97.8)
MCV RBC AUTO: 89.2 FL (ref 81.4–97.8)
MERCURY BLD-MCNC: <2.5 UG/L
MICROCYTES BLD QL SMEAR: ABNORMAL
MONOCYTES # BLD AUTO: 0.02 K/UL (ref 0–0.85)
MONOCYTES # BLD AUTO: 0.04 K/UL (ref 0–0.85)
MONOCYTES NFR BLD AUTO: 1.7 % (ref 0–13.4)
MONOCYTES NFR BLD AUTO: 2.6 % (ref 0–13.4)
MORPHOLOGY BLD-IMP: NORMAL
MORPHOLOGY BLD-IMP: NORMAL
NEUTROPHILS # BLD AUTO: 0.1 K/UL (ref 1.82–7.42)
NEUTROPHILS # BLD AUTO: 0.1 K/UL (ref 1.82–7.42)
NEUTROPHILS NFR BLD: 10.4 % (ref 44–72)
NEUTROPHILS NFR BLD: 6 % (ref 44–72)
NRBC # BLD AUTO: 0 K/UL
NRBC # BLD AUTO: 0 K/UL
NRBC BLD-RTO: 0 /100 WBC (ref 0–0.2)
NRBC BLD-RTO: 0 /100 WBC (ref 0–0.2)
NUCLEAR IGG SER QL IA: NORMAL
PLATELET # BLD AUTO: 15 K/UL (ref 164–446)
PLATELET # BLD AUTO: 9 K/UL (ref 164–446)
PLATELET BLD QL SMEAR: NORMAL
PLATELET BLD QL SMEAR: NORMAL
PLATELETS.RETICULATED NFR BLD AUTO: 0.2 % (ref 0.6–13.1)
PLATELETS.RETICULATED NFR BLD AUTO: 0.4 % (ref 0.6–13.1)
PMV BLD AUTO: 11 FL (ref 9–12.9)
PMV BLD AUTO: 8.9 FL (ref 9–12.9)
POTASSIUM SERPL-SCNC: 3.5 MMOL/L (ref 3.6–5.5)
PRODUCT TYPE UPROD: NORMAL
RBC # BLD AUTO: 2.39 M/UL (ref 4.7–6.1)
RBC # BLD AUTO: 2.49 M/UL (ref 4.7–6.1)
RBC BLD AUTO: PRESENT
RBC BLD AUTO: PRESENT
SODIUM SERPL-SCNC: 134 MMOL/L (ref 135–145)
TOXIC GRANULES BLD QL SMEAR: NORMAL
UNIT STATUS USTAT: NORMAL
VANCOMYCIN TROUGH SERPL-MCNC: 4.4 UG/ML (ref 10–20)
WBC # BLD AUTO: 1 K/UL (ref 4.8–10.8)
WBC # BLD AUTO: 1.7 K/UL (ref 4.8–10.8)

## 2024-09-16 PROCEDURE — 700102 HCHG RX REV CODE 250 W/ 637 OVERRIDE(OP)

## 2024-09-16 PROCEDURE — 36415 COLL VENOUS BLD VENIPUNCTURE: CPT

## 2024-09-16 PROCEDURE — 770004 HCHG ROOM/CARE - ONCOLOGY PRIVATE *

## 2024-09-16 PROCEDURE — 700111 HCHG RX REV CODE 636 W/ 250 OVERRIDE (IP)

## 2024-09-16 PROCEDURE — 700105 HCHG RX REV CODE 258: Performed by: FAMILY MEDICINE

## 2024-09-16 PROCEDURE — A9270 NON-COVERED ITEM OR SERVICE: HCPCS

## 2024-09-16 PROCEDURE — 85027 COMPLETE CBC AUTOMATED: CPT

## 2024-09-16 PROCEDURE — 700102 HCHG RX REV CODE 250 W/ 637 OVERRIDE(OP): Performed by: STUDENT IN AN ORGANIZED HEALTH CARE EDUCATION/TRAINING PROGRAM

## 2024-09-16 PROCEDURE — 36430 TRANSFUSION BLD/BLD COMPNT: CPT

## 2024-09-16 PROCEDURE — 700111 HCHG RX REV CODE 636 W/ 250 OVERRIDE (IP): Performed by: INTERNAL MEDICINE

## 2024-09-16 PROCEDURE — 85007 BL SMEAR W/DIFF WBC COUNT: CPT | Mod: 91

## 2024-09-16 PROCEDURE — 80048 BASIC METABOLIC PNL TOTAL CA: CPT

## 2024-09-16 PROCEDURE — 82962 GLUCOSE BLOOD TEST: CPT | Mod: 91

## 2024-09-16 PROCEDURE — 80202 ASSAY OF VANCOMYCIN: CPT

## 2024-09-16 PROCEDURE — 700111 HCHG RX REV CODE 636 W/ 250 OVERRIDE (IP): Performed by: FAMILY MEDICINE

## 2024-09-16 PROCEDURE — A9270 NON-COVERED ITEM OR SERVICE: HCPCS | Performed by: STUDENT IN AN ORGANIZED HEALTH CARE EDUCATION/TRAINING PROGRAM

## 2024-09-16 PROCEDURE — 700102 HCHG RX REV CODE 250 W/ 637 OVERRIDE(OP): Mod: JZ

## 2024-09-16 PROCEDURE — 99232 SBSQ HOSP IP/OBS MODERATE 35: CPT | Mod: GC | Performed by: FAMILY MEDICINE

## 2024-09-16 PROCEDURE — P9034 PLATELETS, PHERESIS: HCPCS

## 2024-09-16 PROCEDURE — A9270 NON-COVERED ITEM OR SERVICE: HCPCS | Mod: JZ

## 2024-09-16 PROCEDURE — 85055 RETICULATED PLATELET ASSAY: CPT | Mod: 91

## 2024-09-16 PROCEDURE — 700105 HCHG RX REV CODE 258

## 2024-09-16 RX ORDER — POTASSIUM CHLORIDE 1500 MG/1
40 TABLET, EXTENDED RELEASE ORAL ONCE
Status: COMPLETED | OUTPATIENT
Start: 2024-09-16 | End: 2024-09-16

## 2024-09-16 RX ORDER — POLYETHYLENE GLYCOL 3350 17 G/17G
1 POWDER, FOR SOLUTION ORAL DAILY
Status: DISCONTINUED | OUTPATIENT
Start: 2024-09-16 | End: 2024-09-24 | Stop reason: HOSPADM

## 2024-09-16 RX ORDER — AMOXICILLIN 250 MG
2 CAPSULE ORAL EVERY EVENING
Status: DISCONTINUED | OUTPATIENT
Start: 2024-09-16 | End: 2024-09-24 | Stop reason: HOSPADM

## 2024-09-16 RX ADMIN — INSULIN GLARGINE-YFGN 5 UNITS: 100 INJECTION, SOLUTION SUBCUTANEOUS at 17:52

## 2024-09-16 RX ADMIN — VORICONAZOLE 200 MG: 200 TABLET ORAL at 05:11

## 2024-09-16 RX ADMIN — CYANOCOBALAMIN 1000 MCG: 1000 INJECTION, SOLUTION INTRAMUSCULAR; SUBCUTANEOUS at 05:11

## 2024-09-16 RX ADMIN — VANCOMYCIN HYDROCHLORIDE 1250 MG: 5 INJECTION, POWDER, LYOPHILIZED, FOR SOLUTION INTRAVENOUS at 12:54

## 2024-09-16 RX ADMIN — ACYCLOVIR 400 MG: 400 TABLET ORAL at 05:11

## 2024-09-16 RX ADMIN — POTASSIUM CHLORIDE 40 MEQ: 1500 TABLET, EXTENDED RELEASE ORAL at 07:59

## 2024-09-16 RX ADMIN — PANTOPRAZOLE SODIUM 40 MG: 40 INJECTION, POWDER, FOR SOLUTION INTRAVENOUS at 17:59

## 2024-09-16 RX ADMIN — SITAGLIPTIN 100 MG: 100 TABLET, FILM COATED ORAL at 12:48

## 2024-09-16 RX ADMIN — INSULIN HUMAN 2 UNITS: 100 INJECTION, SOLUTION PARENTERAL at 12:49

## 2024-09-16 RX ADMIN — ACYCLOVIR 400 MG: 400 TABLET ORAL at 17:58

## 2024-09-16 RX ADMIN — POLYETHYLENE GLYCOL 3350 1 PACKET: 17 POWDER, FOR SOLUTION ORAL at 12:53

## 2024-09-16 RX ADMIN — FOLIC ACID 1 MG: 1 TABLET ORAL at 05:11

## 2024-09-16 RX ADMIN — PANTOPRAZOLE SODIUM 40 MG: 40 INJECTION, POWDER, FOR SOLUTION INTRAVENOUS at 05:11

## 2024-09-16 RX ADMIN — INSULIN HUMAN 2 UNITS: 100 INJECTION, SOLUTION PARENTERAL at 08:00

## 2024-09-16 RX ADMIN — VANCOMYCIN HYDROCHLORIDE 1250 MG: 5 INJECTION, POWDER, LYOPHILIZED, FOR SOLUTION INTRAVENOUS at 01:09

## 2024-09-16 RX ADMIN — SENNOSIDES AND DOCUSATE SODIUM 2 TABLET: 50; 8.6 TABLET ORAL at 17:55

## 2024-09-16 RX ADMIN — VORICONAZOLE 200 MG: 200 TABLET ORAL at 17:55

## 2024-09-16 RX ADMIN — INSULIN HUMAN 2 UNITS: 100 INJECTION, SOLUTION PARENTERAL at 21:51

## 2024-09-16 RX ADMIN — ATORVASTATIN CALCIUM 10 MG: 10 TABLET, FILM COATED ORAL at 21:32

## 2024-09-16 RX ADMIN — INSULIN HUMAN 4 UNITS: 100 INJECTION, SOLUTION PARENTERAL at 17:49

## 2024-09-16 ASSESSMENT — ENCOUNTER SYMPTOMS
FEVER: 0
EYES NEGATIVE: 1
CHILLS: 0
MUSCULOSKELETAL NEGATIVE: 1
CARDIOVASCULAR NEGATIVE: 1
RESPIRATORY NEGATIVE: 1
WEAKNESS: 1
GASTROINTESTINAL NEGATIVE: 1
WEIGHT LOSS: 0
PSYCHIATRIC NEGATIVE: 1

## 2024-09-16 ASSESSMENT — PAIN DESCRIPTION - PAIN TYPE
TYPE: ACUTE PAIN
TYPE: ACUTE PAIN

## 2024-09-16 NOTE — CARE PLAN
The patient is Watcher - Medium risk of patient condition declining or worsening    Shift Goals  Clinical Goals: safety, vss, labs stable  Patient Goals: bowel movement, sleep  Family Goals: Not present    Progress made toward(s) clinical / shift goals:    Labs stable for tonight. No need for transfusion. No signs or symptoms of bleeding. Pt did complain of numbness of leg. This RN assessed pt legs. Pt asked py closed his eyes while this RN assessed his sensory. Pt said he was able to feel sensation on his leg but he wanted to walk.   RN assessed the pt to walk around the room for 10 minutes.     Problem: Knowledge Deficit - Standard  Goal: Patient and family/care givers will demonstrate understanding of plan of care, disease process/condition, diagnostic tests and medications  Outcome: Progressing     Problem: Neutropenia Precautions  Goal: Neutropenic precautions will be implemented and maintained for patient protection  Outcome: Progressing       Patient is not progressing towards the following goals:

## 2024-09-16 NOTE — PROGRESS NOTES
Cristela from Lab called with critical result of WBC 0.9, ANC 0.04, platelets 15 at 1643. Critical lab result read back to Cristela.   This critical lab result is within parameters established by  for this patient

## 2024-09-16 NOTE — PROGRESS NOTES
Pharmacy Vancomycin Kinetics Note for 9/16/2024     84 y.o. male on Vancomycin day # 5     Vancomycin Indication (Two level): Non S. aureus bacteremia (goal trough 10-15)      Provider specified end date: 09/19/24    Active Antibiotics (From admission, onward)      Ordered     Ordering Provider       Mon Sep 16, 2024 10:04 AM    09/16/24 1004  vancomycin (Vancocin) 1,250 mg in  mL IVPB  EVERY 12 HOURS        Note to Pharmacy: Per P&T Kinetics Protocol    Manny Shea M.D.       Fri Sep 13, 2024 10:47 AM    09/13/24 1047  acyclovir (Zovirax) tablet 400 mg  2 TIMES DAILY         Howie Long M.D.    09/13/24 1047  voriconazole (Vfend) tablet 200 mg  EVERY 12 HOURS        Note to Pharmacy: ([facility])   Question:  Indication  Answer:  Prophylaxis    Howie Long M.D.       Thu Sep 12, 2024  4:29 PM    09/12/24 1629  MD Alert...Vancomycin per Pharmacy  (MD Alert...Vancomycin per Pharmacy)  PHARMACY TO DOSE        Question:  Indication(s) for vancomycin?  Answer:  Other (comments)  Comment:  pancytopenia, positive cultures (likely contaminant). Dr. Levine and ID pharm saw with plan for vanc after new cultures drawn    Jeremi BowerD            Dosing Weight: 72.1 kg (158 lb 15.2 oz)      Admission History: Admitted on 9/6/2024 for Pancytopenia (HCC) [D61.818]  Pertinent history: 85 yo male transferred from [facility] with symptomatic pancytopenia in need of BMBx and med/onc consult.  Pt found to have B. cereus x2 in blood cultures and in urine culture which is indicative of true infection in setting of prolonged neutropenia. B. cereus is sensitive to vancomycin. Given hypocellular marrow on BMBx and expectation of prolonged neutropenia for at least another 30 days, discussed plan with Dr. Carrasco who agreed to stop cefazolin, continue vancomycin for 7 days and consult ID for definitive duration of therapy. Pt will need to start levoflaxacin with completion of vancomycin  therapy.    Allergies:     Patient has no known allergies.     Pertinent cultures to date:     Results       Procedure Component Value Units Date/Time    BLOOD CULTURE [[id number]]  (Abnormal) Collected: 09/11/24 1043    Order Status: Completed Specimen: Blood from Peripheral Updated: 09/14/24 1604     Significant Indicator POS     Source BLD     Site PERIPHERAL     Culture Result Growth detected by Bactec instrument. 09/11/2024  21:26  Gram Stain: Gram positive rods.        Bacillus cereus/thuringiensis/mycoides  Susceptibilities in progress      URINE CULTURE(NEW) [[id number]]  (Abnormal) Collected: 09/11/24 1113    Order Status: Completed Specimen: Urine, Ureter Updated: 09/13/24 1226     Significant Indicator POS     Source UR     Site URINE, URETER     Culture Result Usual skin rosalino 10-50,000 cfu/mL      Bacillus cereus/thuringiensis/mycoides  10-50,000 cfu/mL      BLOOD CULTURE [[id number]]  (Abnormal) Collected: 09/11/24 1043    Order Status: Completed Specimen: Blood from Peripheral Updated: 09/13/24 0824     Significant Indicator POS     Source BLD     Site PERIPHERAL     Culture Result Growth detected by Bactec instrument. 09/11/2024  21:27      Bacillus cereus/thuringiensis/mycoides    BLOOD CULTURE [[id number]] Collected: 09/12/24 1539    Order Status: Completed Specimen: Blood from Peripheral Updated: 09/12/24 1808     Significant Indicator NEG     Source BLD     Site PERIPHERAL     Culture Result No Growth  Note: Blood cultures are incubated for 5 days and  are monitored continuously.Positive blood cultures  are called to the RN and reported as soon as  they are identified.      BLOOD CULTURE [[id number]] Collected: 09/12/24 1539    Order Status: Completed Specimen: Blood from Peripheral Updated: 09/12/24 1808     Significant Indicator NEG     Source BLD     Site PERIPHERAL     Culture Result No Growth  Note: Blood cultures are incubated for 5 days and  are monitored continuously.Positive blood  "cultures  are called to the RN and reported as soon as  they are identified.      BLOOD CULTURE [[id number]]     Order Status: Canceled Specimen: Blood from Peripheral     BLOOD CULTURE [[id number]]     Order Status: Canceled Specimen: Blood from Peripheral     MRSA By PCR (Amp) [[id number]] Collected: 24    Order Status: Completed Specimen: Respirate from Nares Updated: 24 1143     MRSA by PCR Negative    MRSA By PCR (Amp) [[id number]] Collected: 24    Order Status: Sent Specimen: Respirate from Nares     URINALYSIS [[id number]]  (Abnormal) Collected: 24 1115    Order Status: Completed Specimen: Urine, Clean Catch Updated: 24 1256     Color Yellow     Character Clear     Specific Gravity 1.021     Ph 7.0     Glucose >=1000 mg/dL      Ketones Negative mg/dL      Protein 100 mg/dL      Bilirubin Negative     Urobilinogen, Urine 2.0     Nitrite Negative     Leukocyte Esterase Negative     Occult Blood Large     Micro Urine Req Microscopic            Labs:     Estimated Creatinine Clearance: 65.7 mL/min (by C-G formula based on SCr of 0.81 mg/dL).  Recent Labs     24  1457 247 24  1513 09/15/24  0542 09/15/24  1516 24  0050   WBC 1.7* 1.3* 1.0* 1.0* 0.9* 1.7*   NEUTSPOLYS 10.60* 12.00* 20.00* 5.40* 4.30* 6.00*   BANDSSTABS 0.90  --   --  0.90  --   --      Recent Labs     24  0317 09/15/24  0542 24  0050   BUN 13 13 15   CREATININE 0.57 0.70 0.81       Intake/Output Summary (Last 24 hours) at 2024 1006  Last data filed at 2024 0439  Gross per 24 hour   Intake --   Output 1625 ml   Net -1625 ml      /60   Pulse 78   Temp 36.5 °C (97.7 °F) (Oral)   Resp 18   Ht 1.702 m (5' 7\")   Wt 74.7 kg (164 lb 10.9 oz)   SpO2 98%  Temp (24hrs), Av.6 °C (97.9 °F), Min:36.4 °C (97.5 °F), Max:36.8 °C (98.2 °F)      List concerns for Vancomycin clearance:     Age    Pharmacokinetics:     Two level kinetics:   Ke (hr ^-1): 0.0709  Half " life: 9.8  Vd: Steady state Vd : 70.986  Calculated AUC: 248 mg·hr/L    Trough kinetics:   Recent Labs     09/15/24  0542 09/16/24  0050   VANCOTROUGH  --  4.4*   VANCOPEAK 17.1*  --        A/P:     -  Vancomycin dose: increase to 1250mg IV q12h(1300/0100)    -  Next vancomycin level(s):    ~ 7 days or sooner if indicated    -  Predicted vancomycin AUC from two level test calculator: 496 mg·hr/L    -  Comments: Appears to be tolerating vancomycin. Renal function appears stable. Awaiting final sens to guide therapy. Vancomycin AUC subtherapeutic. Increase to q12h dosing as noted above. Pharmacy will monitor/adjust as needed per protocol.     Cynthia Soto, JeremiD

## 2024-09-16 NOTE — PROGRESS NOTES
[facility] PROGRESS NOTE        Attending:   Kristie Swartz M.d.    Resident:   Mima Carrasco D.O.    PATIENT:   Miri Joseph; [id number]; 1940    ID:   84 y.o. male with history of dyslipidemia, BPH, type 2 DM, and recent GI bleed transferred from [facility] admitted for symptomatic pancytopenia and bone marrow biopsy. He was seen by GI at [facility] and had bidirectional endoscopy on 9/5 with biopsies taken from endoscopy but colonoscopy could not be completed due to inadequate bowel prep. Hgb stable but WBC and platelets continued to drop.  Transferred here for bone marrow biopsy.  Now status post platelet transfusion on 9/7 and 9/12, PRBC transfusion 9/15.    SUBJECTIVE:   Miri is visited this morning and states he is feeling a bit bloated as he has not had a bowel movement in 2 days.  He has also noticed some swelling to his lower extremities which she thinks is because he typically walks several miles a day and has not been able to walk around outside of his room since admission. His review of systems is negative for fever, chest pain, shortness of breath, abdominal pain, N/V/D, hematuria, myalgias.      OBJECTIVE:  Vitals:    09/15/24 1902 09/15/24 2038 09/15/24 2334 09/16/24 0439   BP: 133/66  105/57 102/57   Pulse: 93  80 73   Resp: 18 18 16 18   Temp: 36.8 °C (98.2 °F)  36.6 °C (97.9 °F) 36.6 °C (97.9 °F)   TempSrc: Oral  Oral Oral   SpO2: 99%  99% 95%   Weight:       Height:           Intake/Output Summary (Last 24 hours) at 9/16/2024 0617  Last data filed at 9/16/2024 0439  Gross per 24 hour   Intake 0 ml   Output 1625 ml   Net -1625 ml       PHYSICAL EXAM:   General: No acute distress, sitting up on edge of bed eating breakfast, conversational  HEENT: NC/AT. EOMI, no pallor  Cardiovascular: RRR without murmurs. Normal capillary refill   Respiratory: CTAB  Abdomen: soft, nontender, mildly distended, no masses, bowel sounds present  : Condom catheter  "draining clear yellow-orange urine.  EXT:  SOL, trace pitting edema bilateral lower extremities  Skin: No erythema/lesions, no petechia, ecchymosis on upper extremities at sites of lab draws  Neuro: Non-focal    LABS:  Recent Labs     09/15/24  0542 09/15/24  1516 09/16/24  0050   WBC 1.0* 0.9* 1.7*   RBC 2.50* 2.46* 2.39*   HEMOGLOBIN 7.9* 7.8* 7.7*   HEMATOCRIT 21.9* 21.6* 21.2*   MCV 87.6 87.8 88.7   MCH 31.6 31.7 32.2   RDW 46.0 47.5 47.4   PLATELETCT 18* 15* 15*   MPV 9.4 9.0 8.9*   NEUTSPOLYS 5.40* 4.30* 6.00*   LYMPHOCYTES 91.90* 94.80* 90.60*   MONOCYTES 0.90 0.90 2.60   EOSINOPHILS 0.90 0.00 0.80   BASOPHILS 0.00 0.00 0.00   RBCMORPHOLO Present Present Present     Recent Labs     09/14/24  0317 09/15/24  0542 09/16/24  0050   SODIUM 128* 133* 134*   POTASSIUM 3.7 3.6 3.5*   CHLORIDE 97 100 98   CO2 19* 17* 22   BUN 13 13 15   CREATININE 0.57 0.70 0.81   CALCIUM 8.3* 8.0* 8.8     Estimated GFR/CRCL = Estimated Creatinine Clearance: 65.7 mL/min (by C-G formula based on SCr of 0.81 mg/dL).  Recent Labs     09/14/24  0317 09/15/24  0542 09/16/24  0050   GLUCOSE 219* 219* 206*                   No results for input(s): \"INR\", \"APTT\", \"FIBRINOGEN\" in the last 72 hours.    Invalid input(s): \"DIMER\"      IMAGING:  No orders to display       MEDS:  Current Facility-Administered Medications   Medication Last Admin    cyanocobalamin (Vitamin B-12) injection 1,000 mcg 1,000 mcg at 09/16/24 0511    Followed by    [START ON 9/26/2024] cyanocobalamin (Vitamin B-12) injection 1,000 mcg      [START ON 9/20/2024] cyanocobalamin (Vitamin B-12) tablet 1,000 mcg      acyclovir (Zovirax) tablet 400 mg 400 mg at 09/16/24 0511    voriconazole (Vfend) tablet 200 mg 200 mg at 09/16/24 0511    MD Alert...Vancomycin per Pharmacy      vancomycin (Vancocin) 1,250 mg in  mL IVPB Stopped at 09/16/24 0309    folic acid (Folvite) tablet 1 mg 1 mg at 09/16/24 0511    senna-docusate (Pericolace Or Senokot S) 8.6-50 MG per tablet 2 " Tablet 2 Tablet at 09/09/24 1840    And    polyethylene glycol/lytes (Miralax) Packet 1 Packet      insulin regular (HumuLIN R,NovoLIN R) injection 5 Units at 09/15/24 2038    And    dextrose 50% (D50W) injection 25 g      pantoprazole (Protonix) injection 40 mg 40 mg at 09/16/24 0511    acetaminophen (Tylenol) tablet 650 mg 650 mg at 09/13/24 0610    ondansetron (Zofran ODT) dispertab 4 mg      ondansetron (Zofran) syringe/vial injection 4 mg      atorvastatin (Lipitor) tablet 10 mg 10 mg at 09/15/24 2038       ASSESSMENT/PLAN:  84 y.o. male with history of dyslipidemia, BPH, type 2 DM, and recent GI bleed transferred from [facility] admitted for symptomatic pancytopenia and bone marrow biopsy. He was seen by GI at [facility] and had bidirectional endoscopy on 9/5 with biopsies taken from endoscopy but colonoscopy could not be completed due to inadequate bowel prep. Hgb stable but WBC and platelets continue to downtrend, now with critically low absolute neutrophils. Status post platelet transfusion on 9/7 and 9/12.  Bone marrow biopsy performed  9/10. On the morning of 9/11 the patient had dysuria; please see plan below.    * Pancytopenia (HCC)- (present on admission)  Assessment & Plan  Continues to have severe neutropenia with critically low absolute neutrophils. Neutropenic precautions in place.  Vital signs stable, afebrile. Bone marrow biopsy performed 9/10, preliminary results 9/14, additional results including FISH pending.  1u PRBC transfused 9/14 am for Hgb 6.89.    Plan:  -CBC q12h  -Transfuse platelets per oncology recs below  -Transfuse pRBCs for Hb goal 7  -Neutropenic precautions  -Oncology consulted, following:  -Continue supportive measures and B12  -Transfuse for platelets < 20,000 unless febrile, bleeding, sepsis then <10,000, hemoglobin < 7  -Bone marrow biopsy with nonspecific findings, pending FISH results for further plan  -Acyclovir and voriconazole  -Additional testing  per oncology    Bacteremia  Assessment & Plan  2/2 blood cultures positive less than 24 hours after drawing 9/11 for Bacillus cereus, thuringiensis, & mycoides. Ucx positive for same. Discussed with ID Pharm who with Dr. Levine of infectious disease visit the patient agrees he looks well; repeat bcx x 2 and treat with vancomycin given pancytopenia.  -Phenazopyridine  -Ancef, vancomycin  -Follow 9/12 blood cultures (ngtd)   -9/11 Bcx positive x2 Bacillus cereus, thuringiensis, & mycoides   -9/11 Ucx positive for Bacillus cereus, thuringiensis, & mycoides    Dysuria  Assessment & Plan  Resolved.  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  Discussed with ID Pharm who with Dr. Levine of infectious disease visit the patient agrees he looks well; we will plan to draw new blood cultures x 2 and treat with vancomycin given pancytopenia.  Per ID pharmacy on 9/14, no need to continue Ancef.  -Phenazopyridine   -Ancef discontinued  -Continue vancomycin  -Follow 9/12 blood cultures (ngtd)   -9/11 Bcx positive x2 Bacillus cereus, thuringiensis, & mycoides   -9/11 Ucx positive for Bacillus cereus, thuringiensis, & mycoides    Dyslipidemia- (present on admission)  Assessment & Plan  Continue home atorvastatin.     B12 deficiency- (present on admission)  Assessment & Plan  Per Oncology Dr. Long, essentially undetectable B12. Recommends daily injections of B12 x 2 weeks and then can switch to weekly x 1 month, and then monthly from thereon.  We will follow these recommendations.  -Vit B12 1,000mcg x 2 weeks   -Then vitamin B12 1000 mcg weekly x 1 month   -Then vitamin B12 1000 mcg monthly  -Recommend oral supplementation at discharge for vegetarian diet, for now the plan will be IM; patient should follow-up on this outpatient    Type 2 diabetes mellitus, without long-term current use of insulin (HCC)- (present on admission)  Assessment & Plan  On metformin, glipizide, and  Januvia at home.  Fasting BGL >180 for 2 previous days.  -Continue to hold home metformin and glipizide   -Resume Januvia  -Begin insulin glargine 5 units nightly  -Continue CHO diet and SSI + hypoglycemia protocol   -Discussed with patient's daughter that she may bring him his preferred foods    GI bleed- (present on admission)  Assessment & Plan  Appears stable, no bloody stool, no melena.  Vital signs stable.     Plan:  -Continue daily PPI  -Continue to monitor for signs of GI bleed: bloody stool, melena  -Outpatient follow-up with GI for repeat colonoscopy as initial did not have complete prep    Thrombocytopenia (HCC)- (present on admission)  Assessment & Plan  Continues to downtrend. Platelets transfused the evening of 9/7 and 9/12.  Vital signs stable, well-appearing.  No obvious bleeding or unprovoked bruising.  Anticipate need for additional platelet transfusions during admission.    Plan:  -CBC q12h  -Transfuse w/ platelets per hematology/oncology recs (see A/P for pancytopenia)  -Oncology following     Core Measures  IV Fluids: none  Diet: vegetarian with carb count  Antbx: Vancomycin, voriconazole, acyclovir  DVT ppx: SCDs, chemical prophylaxis held in setting of thrombocytopenia, patient encouraged to ambulate  Code status: Full Code  Dispo: inpatient for supportive care, monitoring of and transfusions for severe pancytopenia and thrombocytopenia pending final results of bone marrow biopsy, oncology recommendations/plan.     Mima Carrasco D.O.  PGY-1  [facility] Resident

## 2024-09-16 NOTE — DIETARY
Nutrition Note:  Consult received for patient with special meal requests and needs.  Nutrition Rep has seen patient and family for preferences and meal ordering.    RD available PRN or per dept policy.

## 2024-09-16 NOTE — PROGRESS NOTES
Oncology/Hematology Progress Note               Author: Donaldo Seals III, M.D. Date & Time created: 9/16/2024  10:41 AM   Pancytopenia likely due to severe B12 deficiency  Interval History:   in stable clinical condition there is no fever, chills.  Blood count remains relatively stable, n cyanocobalamin injections..  He has been ambulating in hallway, he is eating well, he denies any major complaints    PMHx, PSurgHx, SocHx, FAMHx: Reviewed and unchanged    Review of Systems:  Review of Systems   Constitutional:  Positive for malaise/fatigue. Negative for chills, fever and weight loss.   HENT: Negative.     Eyes: Negative.    Respiratory: Negative.     Cardiovascular: Negative.    Gastrointestinal: Negative.    Genitourinary: Negative.    Musculoskeletal: Negative.    Skin: Negative.    Neurological:  Positive for weakness.   Psychiatric/Behavioral: Negative.         Physical Exam:  Physical Exam  Constitutional:       Appearance: Normal appearance.   HENT:      Head: Normocephalic and atraumatic.      Nose: Nose normal.      Mouth/Throat:      Mouth: Mucous membranes are moist.   Eyes:      Extraocular Movements: Extraocular movements intact.      Pupils: Pupils are equal, round, and reactive to light.   Cardiovascular:      Rate and Rhythm: Normal rate and regular rhythm.   Pulmonary:      Effort: Pulmonary effort is normal.      Breath sounds: Normal breath sounds.   Abdominal:      General: Abdomen is flat.      Palpations: Abdomen is soft.   Neurological:      General: No focal deficit present.      Mental Status: He is alert and oriented to person, place, and time.   Psychiatric:         Mood and Affect: Mood normal.         Labs:          Recent Labs     09/14/24  0317 09/15/24  0542 09/16/24  0050   SODIUM 128* 133* 134*   POTASSIUM 3.7 3.6 3.5*   CHLORIDE 97 100 98   CO2 19* 17* 22   BUN 13 13 15   CREATININE 0.57 0.70 0.81   CALCIUM 8.3* 8.0* 8.8     Recent Labs     09/14/24  0317 09/15/24  0542  24  0050   GLUCOSE 219* 219* 206*     Recent Labs     09/15/24  0542 09/15/24  1516 24  0050   RBC 2.50* 2.46* 2.39*   HEMOGLOBIN 7.9* 7.8* 7.7*   HEMATOCRIT 21.9* 21.6* 21.2*   PLATELETCT 18* 15* 15*     Recent Labs     09/15/24  0542 09/15/24  1516 24  0050   WBC 1.0* 0.9* 1.7*   NEUTSPOLYS 5.40* 4.30* 6.00*   LYMPHOCYTES 91.90* 94.80* 90.60*   MONOCYTES 0.90 0.90 2.60   EOSINOPHILS 0.90 0.00 0.80   BASOPHILS 0.00 0.00 0.00     Recent Labs     24  0317 09/15/24  0542 24  0050   SODIUM 128* 133* 134*   POTASSIUM 3.7 3.6 3.5*   CHLORIDE 97 100 98   CO2 19* 17* 22   GLUCOSE 219* 219* 206*   BUN 13 13 15   CREATININE 0.57 0.70 0.81   CALCIUM 8.3* 8.0* 8.8     Hemodynamics:  Temp (24hrs), Av.6 °C (97.9 °F), Min:36.4 °C (97.5 °F), Max:36.8 °C (98.2 °F)  Temperature: 36.5 °C (97.7 °F)  Pulse  Av.2  Min: 61  Max: 99   Blood Pressure : 134/60     Respiratory:    Respiration: 18, Pulse Oximetry: 98 %           Fluids:    Intake/Output Summary (Last 24 hours) at 2024 1041  Last data filed at 2024 0439  Gross per 24 hour   Intake --   Output 1625 ml   Net -1625 ml     Weight: 74.7 kg (164 lb 10.9 oz)  GI/Nutrition:  Orders Placed This Encounter   Procedures    Diet Order Diet: Consistent CHO (Diabetic); Miscellaneous modifications: (optional): Vegetarian, Lactose Free     Standing Status:   Standing     Number of Occurrences:   1     Order Specific Question:   Diet:     Answer:   Consistent CHO (Diabetic) [4]     Order Specific Question:   Miscellaneous modifications: (optional)     Answer:   Vegetarian [13]     Order Specific Question:   Miscellaneous modifications: (optional)     Answer:   Lactose Free [5]     Medical Decision Making, by Problem:  Active Hospital Problems    Diagnosis     *Pancytopenia (HCC) [D61.818]     Bacteremia [R78.81]     Dysuria [R30.0]     Dyslipidemia [E78.5]     B12 deficiency [E53.8]     Type 2 diabetes mellitus, without long-term current use of  insulin (HCC) [E11.9]     Thrombocytopenia (HCC) [D69.6]     GI bleed [K92.2]        Plan:  Pancytopenia  related to severe B12 deficiency  -Bone marrow biopsy demonstrating hypocellular bone marrow at 15% cellular with prominent stromal elements, markedly decreased maturing granulocytic precursors, decreased erythroid precursors with slight dyserythropoiesis, and markedly decreased megakaryocytes.  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  -B12 was undetectable but his folic acid was normal.  He has been on IM B12 replacement.   -  Zinc and copper levels are normal.  EBV, CMV, parvovirus, HIV, and hepatitis PCR are all negative.  TSH is within normal limits.     PNH FLAER, heavy metals, SHANELL, ANCA, and BMBx cytogenetics are all still pending. TRANSFUSION THRESHOLDS:  RBCs and PLTs must be leukoreduced, CMV negative/safe. Irradiation NOT required (yet) for this patient.      2.  Anemia, transfuse if Hgb <7 if no symptoms or <8 with symptoms or bleeding    3.  Thrombocytopenia, transfuse if  PLTs <10 and no symptoms or <20 if febrile, septic, or bleeidnu apheresis PLTs      4.  B12 Deficiency  Undetectable B12. Intrinsic factor Abs, Parietal cell Abs, and gastrin negative.Secondary to impaired absorption from combination of vegetarian diet and years of metformin use. Continue B12 1000mcg and folic acid 1mg.      5.  GI Bleed    6.  Chronic Gastritis  Biopsies from EGD demonstrated chronic gastritis which could be worsening his B12 deficiency beyond just his vegetarian diet.     PLAN:  -Continue with cyanocobalamin injections, expecting a blood recovery soon.  Other studies pending as stated above  In the setting of severe B12 deficiency dysplastic changes can be seen in the bone marrow until, I will recommend to repeat the bone marrow biopsy once B12 deficiency resolved if blood counts are not  recovering by them  -Other medical management per primary team.     High complexity, complex decision making       Quality-Core Measures

## 2024-09-16 NOTE — CARE PLAN
Problem: Knowledge Deficit - Standard  Goal: Patient and family/care givers will demonstrate understanding of plan of care, disease process/condition, diagnostic tests and medications  Outcome: Progressing     Problem: Neutropenia Precautions  Goal: Neutropenic precautions will be implemented and maintained for patient protection  Outcome: Progressing     The patient is Watcher - Medium risk of patient condition declining or worsening    Shift Goals  Clinical Goals: safety, vss, labs stable  Patient Goals: bowel movement, sleep  Family Goals: Not present    Progress made toward(s) clinical / shift goals:  Pt educated on POC    Patient is not progressing towards the following goals:

## 2024-09-17 LAB
ANION GAP SERPL CALC-SCNC: 13 MMOL/L (ref 7–16)
ANISOCYTOSIS BLD QL SMEAR: ABNORMAL
ANISOCYTOSIS BLD QL SMEAR: ABNORMAL
BACTERIA BLD CULT: NORMAL
BACTERIA BLD CULT: NORMAL
BASOPHILS # BLD AUTO: 0 % (ref 0–1.8)
BASOPHILS # BLD AUTO: 0 % (ref 0–1.8)
BASOPHILS # BLD: 0 K/UL (ref 0–0.12)
BASOPHILS # BLD: 0 K/UL (ref 0–0.12)
BUN SERPL-MCNC: 13 MG/DL (ref 8–22)
CALCIUM SERPL-MCNC: 8.7 MG/DL (ref 8.5–10.5)
CHLORIDE SERPL-SCNC: 99 MMOL/L (ref 96–112)
CO2 SERPL-SCNC: 21 MMOL/L (ref 20–33)
COMMENT NL1176: NORMAL
COMMENT NL1176: NORMAL
CREAT SERPL-MCNC: 0.65 MG/DL (ref 0.5–1.4)
EOSINOPHIL # BLD AUTO: 0.01 K/UL (ref 0–0.51)
EOSINOPHIL # BLD AUTO: 0.02 K/UL (ref 0–0.51)
EOSINOPHIL NFR BLD: 0.9 % (ref 0–6.9)
EOSINOPHIL NFR BLD: 1.8 % (ref 0–6.9)
ERYTHROCYTE [DISTWIDTH] IN BLOOD BY AUTOMATED COUNT: 48.9 FL (ref 35.9–50)
ERYTHROCYTE [DISTWIDTH] IN BLOOD BY AUTOMATED COUNT: 49.3 FL (ref 35.9–50)
GFR SERPLBLD CREATININE-BSD FMLA CKD-EPI: 94 ML/MIN/1.73 M 2
GLUCOSE BLD STRIP.AUTO-MCNC: 167 MG/DL (ref 65–99)
GLUCOSE BLD STRIP.AUTO-MCNC: 216 MG/DL (ref 65–99)
GLUCOSE BLD STRIP.AUTO-MCNC: 232 MG/DL (ref 65–99)
GLUCOSE BLD STRIP.AUTO-MCNC: 264 MG/DL (ref 65–99)
GLUCOSE BLD STRIP.AUTO-MCNC: 299 MG/DL (ref 65–99)
GLUCOSE SERPL-MCNC: 157 MG/DL (ref 65–99)
HCT VFR BLD AUTO: 21.2 % (ref 42–52)
HCT VFR BLD AUTO: 23.4 % (ref 42–52)
HGB BLD-MCNC: 7.5 G/DL (ref 14–18)
HGB BLD-MCNC: 8.3 G/DL (ref 14–18)
LYMPHOCYTES # BLD AUTO: 1.03 K/UL (ref 1–4.8)
LYMPHOCYTES # BLD AUTO: 1.14 K/UL (ref 1–4.8)
LYMPHOCYTES NFR BLD: 93.7 % (ref 22–41)
LYMPHOCYTES NFR BLD: 94.8 % (ref 22–41)
MACROCYTES BLD QL SMEAR: ABNORMAL
MACROCYTES BLD QL SMEAR: ABNORMAL
MANUAL DIFF BLD: NORMAL
MANUAL DIFF BLD: NORMAL
MCH RBC QN AUTO: 31.8 PG (ref 27–33)
MCH RBC QN AUTO: 32.1 PG (ref 27–33)
MCHC RBC AUTO-ENTMCNC: 35.4 G/DL (ref 32.3–36.5)
MCHC RBC AUTO-ENTMCNC: 35.5 G/DL (ref 32.3–36.5)
MCV RBC AUTO: 89.7 FL (ref 81.4–97.8)
MCV RBC AUTO: 90.6 FL (ref 81.4–97.8)
MICROCYTES BLD QL SMEAR: ABNORMAL
MONOCYTES # BLD AUTO: 0 K/UL (ref 0–0.85)
MONOCYTES # BLD AUTO: 0.02 K/UL (ref 0–0.85)
MONOCYTES NFR BLD AUTO: 0 % (ref 0–13.4)
MONOCYTES NFR BLD AUTO: 1.8 % (ref 0–13.4)
MORPHOLOGY BLD-IMP: NORMAL
MORPHOLOGY BLD-IMP: NORMAL
MYELOPEROXIDASE AB SER-ACNC: 0 AU/ML (ref 0–19)
NEUTROPHILS # BLD AUTO: 0.03 K/UL (ref 1.82–7.42)
NEUTROPHILS # BLD AUTO: 0.05 K/UL (ref 1.82–7.42)
NEUTROPHILS NFR BLD: 2.7 % (ref 44–72)
NEUTROPHILS NFR BLD: 4.3 % (ref 44–72)
NRBC # BLD AUTO: 0 K/UL
NRBC # BLD AUTO: 0 K/UL
NRBC BLD-RTO: 0 /100 WBC (ref 0–0.2)
NRBC BLD-RTO: 0 /100 WBC (ref 0–0.2)
PLATELET # BLD AUTO: 57 K/UL (ref 164–446)
PLATELET # BLD AUTO: 61 K/UL (ref 164–446)
PLATELET BLD QL SMEAR: NORMAL
PLATELET BLD QL SMEAR: NORMAL
PLATELETS.RETICULATED NFR BLD AUTO: 0.6 % (ref 0.6–13.1)
PLATELETS.RETICULATED NFR BLD AUTO: 0.8 % (ref 0.6–13.1)
PMV BLD AUTO: 10.1 FL (ref 9–12.9)
PMV BLD AUTO: 9.8 FL (ref 9–12.9)
POTASSIUM SERPL-SCNC: 3.7 MMOL/L (ref 3.6–5.5)
PROTEINASE3 AB SER-ACNC: 1 AU/ML (ref 0–19)
RBC # BLD AUTO: 2.34 M/UL (ref 4.7–6.1)
RBC # BLD AUTO: 2.61 M/UL (ref 4.7–6.1)
RBC BLD AUTO: PRESENT
RBC BLD AUTO: PRESENT
SIGNIFICANT IND 70042: NORMAL
SIGNIFICANT IND 70042: NORMAL
SITE SITE: NORMAL
SITE SITE: NORMAL
SMUDGE CELLS BLD QL SMEAR: NORMAL
SMUDGE CELLS BLD QL SMEAR: NORMAL
SODIUM SERPL-SCNC: 133 MMOL/L (ref 135–145)
SOURCE SOURCE: NORMAL
SOURCE SOURCE: NORMAL
WBC # BLD AUTO: 1.1 K/UL (ref 4.8–10.8)
WBC # BLD AUTO: 1.2 K/UL (ref 4.8–10.8)

## 2024-09-17 PROCEDURE — 85027 COMPLETE CBC AUTOMATED: CPT

## 2024-09-17 PROCEDURE — 82962 GLUCOSE BLOOD TEST: CPT | Mod: 91

## 2024-09-17 PROCEDURE — 700102 HCHG RX REV CODE 250 W/ 637 OVERRIDE(OP)

## 2024-09-17 PROCEDURE — A9270 NON-COVERED ITEM OR SERVICE: HCPCS | Performed by: STUDENT IN AN ORGANIZED HEALTH CARE EDUCATION/TRAINING PROGRAM

## 2024-09-17 PROCEDURE — A9270 NON-COVERED ITEM OR SERVICE: HCPCS

## 2024-09-17 PROCEDURE — 700102 HCHG RX REV CODE 250 W/ 637 OVERRIDE(OP): Performed by: STUDENT IN AN ORGANIZED HEALTH CARE EDUCATION/TRAINING PROGRAM

## 2024-09-17 PROCEDURE — 99232 SBSQ HOSP IP/OBS MODERATE 35: CPT | Mod: GC | Performed by: FAMILY MEDICINE

## 2024-09-17 PROCEDURE — 36415 COLL VENOUS BLD VENIPUNCTURE: CPT

## 2024-09-17 PROCEDURE — 80048 BASIC METABOLIC PNL TOTAL CA: CPT

## 2024-09-17 PROCEDURE — 700111 HCHG RX REV CODE 636 W/ 250 OVERRIDE (IP)

## 2024-09-17 PROCEDURE — 700105 HCHG RX REV CODE 258: Performed by: FAMILY MEDICINE

## 2024-09-17 PROCEDURE — 700111 HCHG RX REV CODE 636 W/ 250 OVERRIDE (IP): Performed by: FAMILY MEDICINE

## 2024-09-17 PROCEDURE — 700111 HCHG RX REV CODE 636 W/ 250 OVERRIDE (IP): Mod: JZ,JG | Performed by: INTERNAL MEDICINE

## 2024-09-17 PROCEDURE — 85007 BL SMEAR W/DIFF WBC COUNT: CPT

## 2024-09-17 PROCEDURE — 86356 MONONUCLEAR CELL ANTIGEN: CPT | Mod: 91

## 2024-09-17 PROCEDURE — 700111 HCHG RX REV CODE 636 W/ 250 OVERRIDE (IP): Performed by: INTERNAL MEDICINE

## 2024-09-17 PROCEDURE — 770004 HCHG ROOM/CARE - ONCOLOGY PRIVATE *

## 2024-09-17 PROCEDURE — 85055 RETICULATED PLATELET ASSAY: CPT | Mod: 91

## 2024-09-17 RX ORDER — CYANOCOBALAMIN 1000 UG/ML
1000 INJECTION, SOLUTION INTRAMUSCULAR; SUBCUTANEOUS
Status: DISCONTINUED | OUTPATIENT
Start: 2024-09-24 | End: 2024-09-24 | Stop reason: HOSPADM

## 2024-09-17 RX ADMIN — PANTOPRAZOLE SODIUM 40 MG: 40 INJECTION, POWDER, FOR SOLUTION INTRAVENOUS at 18:01

## 2024-09-17 RX ADMIN — CYANOCOBALAMIN 1000 MCG: 1000 INJECTION, SOLUTION INTRAMUSCULAR; SUBCUTANEOUS at 06:06

## 2024-09-17 RX ADMIN — INSULIN HUMAN 1 UNITS: 100 INJECTION, SOLUTION PARENTERAL at 08:06

## 2024-09-17 RX ADMIN — INSULIN GLARGINE-YFGN 5 UNITS: 100 INJECTION, SOLUTION SUBCUTANEOUS at 18:00

## 2024-09-17 RX ADMIN — SITAGLIPTIN 100 MG: 100 TABLET, FILM COATED ORAL at 06:12

## 2024-09-17 RX ADMIN — ACYCLOVIR 400 MG: 400 TABLET ORAL at 18:01

## 2024-09-17 RX ADMIN — VORICONAZOLE 200 MG: 200 TABLET ORAL at 18:01

## 2024-09-17 RX ADMIN — INSULIN HUMAN 3 UNITS: 100 INJECTION, SOLUTION PARENTERAL at 11:34

## 2024-09-17 RX ADMIN — TBO-FILGRASTIM 300 MCG: 300 INJECTION, SOLUTION SUBCUTANEOUS at 14:00

## 2024-09-17 RX ADMIN — VANCOMYCIN HYDROCHLORIDE 1250 MG: 5 INJECTION, POWDER, LYOPHILIZED, FOR SOLUTION INTRAVENOUS at 01:00

## 2024-09-17 RX ADMIN — FOLIC ACID 1 MG: 1 TABLET ORAL at 06:12

## 2024-09-17 RX ADMIN — PANTOPRAZOLE SODIUM 40 MG: 40 INJECTION, POWDER, FOR SOLUTION INTRAVENOUS at 06:11

## 2024-09-17 RX ADMIN — ATORVASTATIN CALCIUM 10 MG: 10 TABLET, FILM COATED ORAL at 20:55

## 2024-09-17 RX ADMIN — ACYCLOVIR 400 MG: 400 TABLET ORAL at 06:11

## 2024-09-17 RX ADMIN — INSULIN HUMAN 3 UNITS: 100 INJECTION, SOLUTION PARENTERAL at 18:02

## 2024-09-17 RX ADMIN — INSULIN HUMAN 2 UNITS: 100 INJECTION, SOLUTION PARENTERAL at 21:01

## 2024-09-17 RX ADMIN — VORICONAZOLE 200 MG: 200 TABLET ORAL at 06:11

## 2024-09-17 RX ADMIN — VANCOMYCIN HYDROCHLORIDE 1250 MG: 5 INJECTION, POWDER, LYOPHILIZED, FOR SOLUTION INTRAVENOUS at 13:53

## 2024-09-17 ASSESSMENT — ENCOUNTER SYMPTOMS
CARDIOVASCULAR NEGATIVE: 1
FEVER: 0
GASTROINTESTINAL NEGATIVE: 1
EYES NEGATIVE: 1
MUSCULOSKELETAL NEGATIVE: 1
CHILLS: 0
RESPIRATORY NEGATIVE: 1
WEAKNESS: 1
PSYCHIATRIC NEGATIVE: 1
WEIGHT LOSS: 0

## 2024-09-17 ASSESSMENT — PAIN DESCRIPTION - PAIN TYPE
TYPE: ACUTE PAIN
TYPE: ACUTE PAIN

## 2024-09-17 NOTE — CARE PLAN
The patient is Watcher - Medium risk of patient condition declining or worsening      Problem: Knowledge Deficit - Standard  Goal: Patient and family/care givers will demonstrate understanding of plan of care, disease process/condition, diagnostic tests and medications  Outcome: Progressing     Problem: Knowledge Deficit - Diabetes  Goal: Patient will demonstrate knowledge of insulin injection, symptoms, and treatment of hypoglycemia and diet prior to discharge  Outcome: Progressing     Problem: Neutropenia Precautions  Goal: Neutropenic precautions will be implemented and maintained for patient protection  Outcome: Progressing     Problem: Urinary Elimination  Goal: Establish and maintain regular urinary output  Outcome: Progressing     Problem: Fall Risk  Goal: Patient will remain free from falls  Outcome: Progressing       Shift Goals  Clinical Goals: monitor labs, rest, vitals  Patient Goals: rest, comfort, timely response  Family Goals: not present    Progress made toward(s) clinical / shift goals:     Monitor Labs: Critical platelets of 9.0 on 09/16 were treated; platelet transfusion was administered.    Rest: Patient's care was clustered so as to minimize interruptions.    Vitals: Patient has finger sticks for insulin related to diabetes mellitus type 2.    Timely Response: Patient uses call light appropriately. Staff tries to respond to call light in timely manner.    Patient is not progressing towards the following goals:

## 2024-09-17 NOTE — PROGRESS NOTES
[facility] [place] [facility] [place]GR[place]        Attending:   Manny Shea M.d.    Resident:   Mima Carrasco D.O.    PATIENT:   Miri Joseph; [id number]; 1940    ID:   84 y.o. male with history of dyslipidemia, BPH, type 2 DM, and recent GI bleed transferred from [facility] admitted for symptomatic pancytopenia and bone marrow biopsy. He was seen by GI at [facility] and had bidirectional endoscopy on 9/5 with biopsies taken from endoscopy but colonoscopy could not be completed due to inadequate bowel prep. Hgb stable but WBC and platelets continued to drop.  Transferred here for bone marrow biopsy.  Now status post platelet transfusion on 9/7 and 9/12, PRBC transfusion 9/15.    SUBJECTIVE:   Miri is visited this morning and states he had a bowel movement overnight and has a lot of relief from that.  It was nonbloody, no melena. His review of systems is negative for fever, chest pain, shortness of breath, abdominal pain, N/V/D, hematuria, myalgias.      He had 1 unit of platelets transfused overnight without complication.    OBJECTIVE:  Vitals:    09/16/24 2234 09/16/24 2350 09/17/24 0048 09/17/24 0352   BP: 121/71 (!) 160/79 133/69 (!) 155/73   Pulse: 79 78 75 80   Resp: 16 18 16 18   Temp: 36.6 °C (97.9 °F) 36.3 °C (97.4 °F) 36.8 °C (98.2 °F) 36.5 °C (97.7 °F)   TempSrc: Oral Oral Oral Oral   SpO2: 98% 95% 97% 97%   Weight:       Height:           Intake/Output Summary (Last 24 hours) at 9/17/2024 0609  Last data filed at 9/17/2024 0352  Gross per 24 hour   Intake 548 ml   Output 1450 ml   Net -902 ml       PHYSICAL EXAM:   General: No acute distress, resting comfortably in bed   HEENT: NC/AT. EOMI, no pallor  Cardiovascular: RRR without murmurs. Normal capillary refill   Respiratory: CTAB  Abdomen: soft, nontender, nondistended, no masses, bowel sounds present  : Condom catheter draining clear yellow urine  EXT:  SOL, no lower extremity edema  Skin: No erythema/lesions, no  "petechia  Neuro: Non-focal    LABS:  Recent Labs     09/15/24  1516 09/16/24  0050 09/16/24  1459   WBC 0.9* 1.7* 1.0*   RBC 2.46* 2.39* 2.49*   HEMOGLOBIN 7.8* 7.7* 8.1*   HEMATOCRIT 21.6* 21.2* 22.2*   MCV 87.8 88.7 89.2   MCH 31.7 32.2 32.5   RDW 47.5 47.4 47.9   PLATELETCT 15* 15* 9*   MPV 9.0 8.9* 11.0   NEUTSPOLYS 4.30* 6.00* 10.40*   LYMPHOCYTES 94.80* 90.60* 86.10*   MONOCYTES 0.90 2.60 1.70   EOSINOPHILS 0.00 0.80 1.80   BASOPHILS 0.00 0.00 0.00   RBCMORPHOLO Present Present Present     Recent Labs     09/15/24  0542 09/16/24  0050 09/17/24  0246   SODIUM 133* 134* 133*   POTASSIUM 3.6 3.5* 3.7   CHLORIDE 100 98 99   CO2 17* 22 21   BUN 13 15 13   CREATININE 0.70 0.81 0.65   CALCIUM 8.0* 8.8 8.7     Estimated GFR/CRCL = Estimated Creatinine Clearance: 81.9 mL/min (by C-G formula based on SCr of 0.65 mg/dL).  Recent Labs     09/15/24  0542 09/16/24  0050 09/17/24  0246   GLUCOSE 219* 206* 157*                   No results for input(s): \"INR\", \"APTT\", \"FIBRINOGEN\" in the last 72 hours.    Invalid input(s): \"DIMER\"      IMAGING:  No orders to display       MEDS:  Current Facility-Administered Medications   Medication Last Admin    senna-docusate (Pericolace Or Senokot S) 8.6-50 MG per tablet 2 Tablet 2 Tablet at 09/16/24 1755    And    polyethylene glycol/lytes (Miralax) Packet 1 Packet 1 Packet at 09/16/24 1253    SITagliptin (Januvia) tablet 100 mg 100 mg at 09/16/24 1248    insulin GLARGINE (Lantus,Semglee) injection 5 Units at 09/16/24 1752    vancomycin (Vancocin) 1,250 mg in  mL IVPB Stopped at 09/17/24 0300    cyanocobalamin (Vitamin B-12) injection 1,000 mcg 1,000 mcg at 09/17/24 0606    Followed by    [START ON 9/26/2024] cyanocobalamin (Vitamin B-12) injection 1,000 mcg      [START ON 9/20/2024] cyanocobalamin (Vitamin B-12) tablet 1,000 mcg      acyclovir (Zovirax) tablet 400 mg 400 mg at 09/16/24 1758    voriconazole (Vfend) tablet 200 mg 200 mg at 09/16/24 1755    MD Alert...Vancomycin per " Pharmacy      folic acid (Folvite) tablet 1 mg 1 mg at 09/16/24 0511    insulin regular (HumuLIN R,NovoLIN R) injection 2 Units at 09/16/24 2151    And    dextrose 50% (D50W) injection 25 g      pantoprazole (Protonix) injection 40 mg 40 mg at 09/16/24 1759    acetaminophen (Tylenol) tablet 650 mg 650 mg at 09/13/24 0610    ondansetron (Zofran ODT) dispertab 4 mg      ondansetron (Zofran) syringe/vial injection 4 mg      atorvastatin (Lipitor) tablet 10 mg 10 mg at 09/16/24 2132       ASSESSMENT/PLAN:  84 y.o. male with history of dyslipidemia, BPH, type 2 DM, and recent GI bleed transferred from [facility] admitted for symptomatic pancytopenia and bone marrow biopsy. He was seen by GI at [facility] and had bidirectional endoscopy on 9/5 with biopsies taken from endoscopy but colonoscopy could not be completed due to inadequate bowel prep. Hgb stable but WBC and platelets continue to downtrend, now with critically low absolute neutrophils. Status post platelet transfusion on 9/7 and 9/12.  Bone marrow biopsy performed  9/10. On the morning of 9/11 the patient had dysuria; please see plan below.    * Pancytopenia (HCC)- (present on admission)  Assessment & Plan  Continues to have severe neutropenia with critically low absolute neutrophils. Neutropenic precautions in place.  Vital signs stable, afebrile. Bone marrow biopsy performed 9/10, preliminary results 9/14, additional results including FISH pending.  1u PRBC transfused 9/14 am for Hgb 6.89.  9/17 severe neutropenia with ANC 0.03. Heme/onc ordered daily Granix.    Plan:  -CBC q12h  -Transfuse platelets per oncology recs below  -Transfuse pRBCs for Hb goal 7  -Neutropenic precautions  -Oncology consulted, following:  -Continue supportive measures and B12  -Transfuse for platelets < 20,000 unless febrile, bleeding, sepsis then <10,000, hemoglobin < 7  -Bone marrow biopsy with nonspecific findings, pending FISH results for further  plan  -Acyclovir and voriconazole  -Additional testing per oncology  -Daily Granix per heme/onc    Bacteremia  Assessment & Plan  Blood cultures 9/11 positive for  Bacillus cereus, thuringiensis, & mycoides. Ucx positive for same. Per ID pharm and Dr. Levine of ID, repeat bcx x 2 and treat with vancomycin given pancytopenia.    Plan:  -Vancomycin  -Follow 9/12 blood cultures (ngtd)  -Monitor VS and symptoms    Dysuria  Assessment & Plan  Resolved.  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      Plan:  -Phenazopyridine   -Continue vancomycin  -Monitor for recurrence of symptoms    Dyslipidemia- (present on admission)  Assessment & Plan  Continue home atorvastatin.     B12 deficiency- (present on admission)  Assessment & Plan  Per Oncology Dr. Long, essentially undetectable B12 so recommends daily injections of B12 x 2 weeks and then can switch to weekly x 1 month, and then monthly from thereon.  We will follow these recommendations.  Additionally, concerned that long-term metformin use has contributed to profound vitamin B12 deficiency.    Plan:  -Vit B12 1,000mcg x 2 weeks (completed 9/17)   -Then vitamin B12 1000 mcg weekly x 1 month (Last dose 10/15)   -Then vitamin B12 1000 mcg monthly  -Recommend oral supplementation at discharge for vegetarian diet, for now the plan will be IM; patient should follow-up on this outpatient  -Will likely exclude metformin from diabetes regimen at discharge    Type 2 diabetes mellitus, without long-term current use of insulin (HCC)- (present on admission)  Assessment & Plan  On metformin, glipizide, and Januvia at home.  5 units insulin glargine started 9/16 PM, Januvia resumed the same day with better glycemic control, fasting blood sugar morning of 9/17 was 167.    Per oncology note, there is concern that long-term use of metformin may be contributing to profound vitamin B12 deficiency.  Patient will likely need a  different home regimen that does not include metformin at discharge.    Plan:  -Continue to hold metformin and glipizide   -Insulin glargine 5 units nightly  -Continue CHO diet and SSI + hypoglycemia protocol     GI bleed- (present on admission)  Assessment & Plan  Appears stable, no bloody stool, no melena.  Vital signs stable.     Plan:  -Continue daily PPI  -Bowel regimen with senna and MiraLAX  -Continue to monitor for signs of GI bleed: bloody stool, melena  -Outpatient follow-up with GI for repeat colonoscopy as initial did not have complete prep    Thrombocytopenia (HCC)- (present on admission)  Assessment & Plan  Continues to downtrend. Platelets transfused the evening of 9/7, 9/12, and 9/16.  Vital signs stable, well-appearing.  No obvious bleeding or unprovoked bruising.  Anticipate need for additional platelet transfusions during admission.    Plan:  -CBC q12h  -Transfuse w/ platelets per hematology/oncology recs (see A/P for pancytopenia)  -Oncology following     Core Measures  IV Fluids: none  Diet: vegetarian with carb count  Antbx: Vancomycin, voriconazole, acyclovir  DVT ppx: SCDs, chemical prophylaxis held in setting of thrombocytopenia, patient encouraged to ambulate  Code status: Full Code  Dispo: inpatient for supportive care, monitoring of and transfusions for severe pancytopenia and thrombocytopenia pending final results of bone marrow biopsy, oncology recommendations/plan.     Mima Carrasco D.O.  PGY-1  [facility] Resident

## 2024-09-17 NOTE — PROGRESS NOTES
Oncology/Hematology Progress Note               Author: Donaldo Seals III, M.D. Date & Time created: 9/17/2024  10:49 AM   Pancytopenia likely due to severe B12 deficiency  Interval History:  Remains in very stable clinical condition, daughter at bedside, there is no fever, chills.  Got 1 unit of platelet yesterday, now at 61.  Remains severely neutropenic.    PMHx, PSurgHx, SocHx, FAMHx: Reviewed and unchanged    Review of Systems:  Review of Systems   Constitutional:  Positive for malaise/fatigue. Negative for chills, fever and weight loss.   HENT: Negative.     Eyes: Negative.    Respiratory: Negative.     Cardiovascular: Negative.    Gastrointestinal: Negative.    Genitourinary: Negative.    Musculoskeletal: Negative.    Skin: Negative.    Neurological:  Positive for weakness.   Psychiatric/Behavioral: Negative.         Physical Exam:  Physical Exam  Constitutional:       Appearance: Normal appearance.   HENT:      Head: Normocephalic and atraumatic.      Nose: Nose normal.      Mouth/Throat:      Mouth: Mucous membranes are moist.   Eyes:      Extraocular Movements: Extraocular movements intact.      Pupils: Pupils are equal, round, and reactive to light.   Cardiovascular:      Rate and Rhythm: Normal rate and regular rhythm.   Pulmonary:      Effort: Pulmonary effort is normal.      Breath sounds: Normal breath sounds.   Abdominal:      General: Abdomen is flat.      Palpations: Abdomen is soft.   Neurological:      General: No focal deficit present.      Mental Status: He is alert and oriented to person, place, and time.   Psychiatric:         Mood and Affect: Mood normal.         Labs:          Recent Labs     09/15/24  0542 09/16/24  0050 09/17/24  0246   SODIUM 133* 134* 133*   POTASSIUM 3.6 3.5* 3.7   CHLORIDE 100 98 99   CO2 17* 22 21   BUN 13 15 13   CREATININE 0.70 0.81 0.65   CALCIUM 8.0* 8.8 8.7     Recent Labs     09/15/24  0542 09/16/24  0050 09/17/24  0246   GLUCOSE 219* 206* 157*     Recent Labs      24  0050 24  1459 24  0246   RBC 2.39* 2.49* 2.34*   HEMOGLOBIN 7.7* 8.1* 7.5*   HEMATOCRIT 21.2* 22.2* 21.2*   PLATELETCT 15* 9* 61*     Recent Labs     24  0050 24  1459 24  0246   WBC 1.7* 1.0* 1.1*   NEUTSPOLYS 6.00* 10.40* 2.70*   LYMPHOCYTES 90.60* 86.10* 93.70*   MONOCYTES 2.60 1.70 1.80   EOSINOPHILS 0.80 1.80 1.80   BASOPHILS 0.00 0.00 0.00     Recent Labs     09/15/24  0542 24  0050 24  0246   SODIUM 133* 134* 133*   POTASSIUM 3.6 3.5* 3.7   CHLORIDE 100 98 99   CO2 17* 22 21   GLUCOSE 219* 206* 157*   BUN 13 15 13   CREATININE 0.70 0.81 0.65   CALCIUM 8.0* 8.8 8.7     Hemodynamics:  Temp (24hrs), Av.6 °C (97.8 °F), Min:36.3 °C (97.4 °F), Max:36.8 °C (98.2 °F)  Temperature: 36.7 °C (98 °F)  Pulse  Av.6  Min: 61  Max: 99   Blood Pressure : 135/64     Respiratory:    Respiration: 18, Pulse Oximetry: 98 %           Fluids:    Intake/Output Summary (Last 24 hours) at 2024 1041  Last data filed at 2024 0439  Gross per 24 hour   Intake --   Output 1625 ml   Net -1625 ml        GI/Nutrition:  Orders Placed This Encounter   Procedures    Diet Order Diet: Consistent CHO (Diabetic); Miscellaneous modifications: (optional): Vegetarian, Lactose Free     Standing Status:   Standing     Number of Occurrences:   1     Order Specific Question:   Diet:     Answer:   Consistent CHO (Diabetic) [4]     Order Specific Question:   Miscellaneous modifications: (optional)     Answer:   Vegetarian [13]     Order Specific Question:   Miscellaneous modifications: (optional)     Answer:   Lactose Free [5]     Medical Decision Making, by Problem:  Active Hospital Problems    Diagnosis     *Pancytopenia (HCC) [D61.818]     Bacteremia [R78.81]     Dysuria [R30.0]     Dyslipidemia [E78.5]     B12 deficiency [E53.8]     Type 2 diabetes mellitus, without long-term current use of insulin (HCC) [E11.9]     Thrombocytopenia (HCC) [D69.6]     GI bleed [K92.2]         Plan:  Pancytopenia  related to severe B12 deficiency  -Bone marrow biopsy demonstrating hypocellular bone marrow at 15% cellular with prominent stromal elements, markedly decreased maturing granulocytic precursors, decreased erythroid precursors with slight dyserythropoiesis, and markedly decreased megakaryocytes.  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  Normal male karyotype.  Flow no increase in blast  -9/3/2024 started on intramuscular cyanocobalamin 1000 mcg  -B12 was undetectable but his folic acid was normal.   -  Zinc and copper levels are normal.  EBV, CMV, parvovirus, HIV, and hepatitis PCR are all negative.  TSH is within normal limits.  Heavy metals none detected.  SHANELL negative  -2024 I did review with pharmacy, since 9/3/2024 he did receive 12 intramuscular shots of vitamin B-12.        2.  Anemia, transfuse if Hgb <7 if no symptoms or <8 with symptoms or bleeding    3.  Thrombocytopenia, transfuse if  PLTs <10 and no symptoms or <20 if febrile, septic, or bleeidnu apheresis PLTs      4.  B12 Deficiency  Undetectable B12. Intrinsic factor Abs, Parietal cell Abs, and gastrin negative.Secondary to impaired absorption from combination of vegetarian diet and years of metformin use. Continue B12 1000mcg and folic acid 1mg.   -2024 since 9/3/2024 got 12 shots of B12.      5.  GI Bleed    6.  Chronic Gastritis  Biopsies from EGD demonstrated chronic gastritis which could be worsening his B12 deficiency beyond just his vegetarian diet.     PLAN:  I have a good discussion with patient and family members-including his son who is a physician, so far he did receive 12 shots of cyanocobalamin injections since 9/3, we expect a bone marrow recovery a few weeks after first dose sometimes takes 3-4 weeks.  We will continue with  B12 shots now every week x 4 then every month.  If the  bone marrow is not recovering by week 4 we will plan to repeat bone marrow biopsy. otherwise a bone marrow biopsy while severe B12 deficiency can cause dysplastic changes and have confounding results  -He has been severely neutropenic as well, will start daily Granix until ANC more than 1.5  -Will transfuse if platelets less than-10,000.  Or hemoglobin less than 7 g/dL.   -Daily labs    High complexity, complex decision making       Quality-Core Measures

## 2024-09-18 ENCOUNTER — APPOINTMENT (OUTPATIENT)
Dept: RADIOLOGY | Facility: MEDICAL CENTER | Age: 84
DRG: 809 | End: 2024-09-18
Payer: MEDICARE

## 2024-09-18 LAB
ANION GAP SERPL CALC-SCNC: 13 MMOL/L (ref 7–16)
ANISOCYTOSIS BLD QL SMEAR: ABNORMAL
ANISOCYTOSIS BLD QL SMEAR: ABNORMAL
ARSENIC 24H UR-MCNC: <10 UG/L (ref 0–34.9)
ARSENIC 24H UR-MRATE: ABNORMAL UG/D (ref 0–49.9)
ARSENIC/CREAT 24H UR: ABNORMAL UG/G CRT (ref 0–29.9)
BACTERIA BLD CULT: ABNORMAL
BACTERIA UR CULT: ABNORMAL
BACTERIA UR CULT: ABNORMAL
BASOPHILS # BLD AUTO: 0 % (ref 0–1.8)
BASOPHILS # BLD AUTO: 0 % (ref 0–1.8)
BASOPHILS # BLD: 0 K/UL (ref 0–0.12)
BASOPHILS # BLD: 0 K/UL (ref 0–0.12)
BUN SERPL-MCNC: 13 MG/DL (ref 8–22)
CALCIUM SERPL-MCNC: 8.8 MG/DL (ref 8.5–10.5)
CHLORIDE SERPL-SCNC: 101 MMOL/L (ref 96–112)
CO2 SERPL-SCNC: 21 MMOL/L (ref 20–33)
COLLECT DURATION TIME SPEC: ABNORMAL HR
COPPER 24H UR-MRATE: ABNORMAL UG/D (ref 3–45)
COPPER ?TM UR-MCNC: 2.9 UG/DL
COPPER/CREAT 24H UR: 65.9 UG/G CRT (ref 10–45)
CREAT 24H UR-MCNC: 44 MG/DL
CREAT SERPL-MCNC: 0.76 MG/DL (ref 0.5–1.4)
DOHLE BOD BLD QL SMEAR: NORMAL
EOSINOPHIL # BLD AUTO: 0 K/UL (ref 0–0.51)
EOSINOPHIL # BLD AUTO: 0 K/UL (ref 0–0.51)
EOSINOPHIL NFR BLD: 0 % (ref 0–6.9)
EOSINOPHIL NFR BLD: 0 % (ref 0–6.9)
ERYTHROCYTE [DISTWIDTH] IN BLOOD BY AUTOMATED COUNT: 48.9 FL (ref 35.9–50)
ERYTHROCYTE [DISTWIDTH] IN BLOOD BY AUTOMATED COUNT: 51.3 FL (ref 35.9–50)
GFR SERPLBLD CREATININE-BSD FMLA CKD-EPI: 90 ML/MIN/1.73 M 2
GLUCOSE BLD STRIP.AUTO-MCNC: 172 MG/DL (ref 65–99)
GLUCOSE BLD STRIP.AUTO-MCNC: 228 MG/DL (ref 65–99)
GLUCOSE BLD STRIP.AUTO-MCNC: 235 MG/DL (ref 65–99)
GLUCOSE BLD STRIP.AUTO-MCNC: 287 MG/DL (ref 65–99)
GLUCOSE SERPL-MCNC: 143 MG/DL (ref 65–99)
HCT VFR BLD AUTO: 20.6 % (ref 42–52)
HCT VFR BLD AUTO: 21.1 % (ref 42–52)
HGB BLD-MCNC: 7.3 G/DL (ref 14–18)
HGB BLD-MCNC: 7.4 G/DL (ref 14–18)
LEAD 24H UR-MCNC: <5 UG/L (ref 0–5)
LEAD 24H UR-MRATE: ABNORMAL UG/D (ref 0–8.1)
LEAD/CREAT 24H UR: ABNORMAL UG/G CRT (ref 0–5)
LYMPHOCYTES # BLD AUTO: 1.28 K/UL (ref 1–4.8)
LYMPHOCYTES # BLD AUTO: 1.37 K/UL (ref 1–4.8)
LYMPHOCYTES NFR BLD: 85.5 % (ref 22–41)
LYMPHOCYTES NFR BLD: 91.4 % (ref 22–41)
MACROCYTES BLD QL SMEAR: ABNORMAL
MACROCYTES BLD QL SMEAR: ABNORMAL
MANUAL DIFF BLD: NORMAL
MANUAL DIFF BLD: NORMAL
MCH RBC QN AUTO: 32.2 PG (ref 27–33)
MCH RBC QN AUTO: 32.3 PG (ref 27–33)
MCHC RBC AUTO-ENTMCNC: 34.6 G/DL (ref 32.3–36.5)
MCHC RBC AUTO-ENTMCNC: 35.9 G/DL (ref 32.3–36.5)
MCV RBC AUTO: 90 FL (ref 81.4–97.8)
MCV RBC AUTO: 93 FL (ref 81.4–97.8)
MERCURY 24H UR-MCNC: <2.5 UG/L (ref 0–5)
MERCURY 24H UR-MRATE: ABNORMAL UG/D (ref 0–20)
MERCURY/CREAT 24H UR: ABNORMAL UG/G CRT (ref 0–20)
MONOCYTES # BLD AUTO: 0 K/UL (ref 0–0.85)
MONOCYTES # BLD AUTO: 0.01 K/UL (ref 0–0.85)
MONOCYTES NFR BLD AUTO: 0 % (ref 0–13.4)
MONOCYTES NFR BLD AUTO: 0.8 % (ref 0–13.4)
MORPHOLOGY BLD-IMP: NORMAL
MORPHOLOGY BLD-IMP: NORMAL
NEUTROPHILS # BLD AUTO: 0.12 K/UL (ref 1.82–7.42)
NEUTROPHILS # BLD AUTO: 0.22 K/UL (ref 1.82–7.42)
NEUTROPHILS NFR BLD: 13.7 % (ref 44–72)
NEUTROPHILS NFR BLD: 8.6 % (ref 44–72)
NRBC # BLD AUTO: 0 K/UL
NRBC # BLD AUTO: 0 K/UL
NRBC BLD-RTO: 0 /100 WBC (ref 0–0.2)
NRBC BLD-RTO: 0 /100 WBC (ref 0–0.2)
PLATELET # BLD AUTO: 32 K/UL (ref 164–446)
PLATELET # BLD AUTO: 45 K/UL (ref 164–446)
PLATELET BLD QL SMEAR: NORMAL
PLATELET BLD QL SMEAR: NORMAL
PLATELETS.RETICULATED NFR BLD AUTO: 0.6 % (ref 0.6–13.1)
PLATELETS.RETICULATED NFR BLD AUTO: 1.1 % (ref 0.6–13.1)
PMV BLD AUTO: 10.6 FL (ref 9–12.9)
PMV BLD AUTO: 9.8 FL (ref 9–12.9)
POIKILOCYTOSIS BLD QL SMEAR: NORMAL
POTASSIUM SERPL-SCNC: 4 MMOL/L (ref 3.6–5.5)
RBC # BLD AUTO: 2.27 M/UL (ref 4.7–6.1)
RBC # BLD AUTO: 2.29 M/UL (ref 4.7–6.1)
RBC BLD AUTO: PRESENT
RBC BLD AUTO: PRESENT
SIGNIFICANT IND 70042: ABNORMAL
SITE SITE: ABNORMAL
SODIUM SERPL-SCNC: 135 MMOL/L (ref 135–145)
SOURCE SOURCE: ABNORMAL
TARGETS BLD QL SMEAR: NORMAL
TEST NAME 95000: NORMAL
TOTAL VOLUME 1105: ABNORMAL ML
TOXIC GRANULES BLD QL SMEAR: NORMAL
WBC # BLD AUTO: 1.4 K/UL (ref 4.8–10.8)
WBC # BLD AUTO: 1.6 K/UL (ref 4.8–10.8)
ZINC 24H UR-MCNC: 26 UG/DL (ref 15–120)
ZINC 24H UR-MRATE: ABNORMAL UG/D (ref 150–1200)
ZINC/CREAT 24H UR: 590.9 UG/G CRT (ref 110–750)

## 2024-09-18 PROCEDURE — 85055 RETICULATED PLATELET ASSAY: CPT

## 2024-09-18 PROCEDURE — A9270 NON-COVERED ITEM OR SERVICE: HCPCS

## 2024-09-18 PROCEDURE — 700111 HCHG RX REV CODE 636 W/ 250 OVERRIDE (IP): Performed by: INTERNAL MEDICINE

## 2024-09-18 PROCEDURE — 700111 HCHG RX REV CODE 636 W/ 250 OVERRIDE (IP): Performed by: FAMILY MEDICINE

## 2024-09-18 PROCEDURE — 700102 HCHG RX REV CODE 250 W/ 637 OVERRIDE(OP): Performed by: STUDENT IN AN ORGANIZED HEALTH CARE EDUCATION/TRAINING PROGRAM

## 2024-09-18 PROCEDURE — 85027 COMPLETE CBC AUTOMATED: CPT

## 2024-09-18 PROCEDURE — A9270 NON-COVERED ITEM OR SERVICE: HCPCS | Performed by: STUDENT IN AN ORGANIZED HEALTH CARE EDUCATION/TRAINING PROGRAM

## 2024-09-18 PROCEDURE — 82962 GLUCOSE BLOOD TEST: CPT

## 2024-09-18 PROCEDURE — 700102 HCHG RX REV CODE 250 W/ 637 OVERRIDE(OP)

## 2024-09-18 PROCEDURE — 36415 COLL VENOUS BLD VENIPUNCTURE: CPT

## 2024-09-18 PROCEDURE — 80048 BASIC METABOLIC PNL TOTAL CA: CPT

## 2024-09-18 PROCEDURE — 85007 BL SMEAR W/DIFF WBC COUNT: CPT

## 2024-09-18 PROCEDURE — 700105 HCHG RX REV CODE 258: Performed by: FAMILY MEDICINE

## 2024-09-18 PROCEDURE — 700111 HCHG RX REV CODE 636 W/ 250 OVERRIDE (IP): Mod: JZ,JG | Performed by: INTERNAL MEDICINE

## 2024-09-18 PROCEDURE — 770004 HCHG ROOM/CARE - ONCOLOGY PRIVATE *

## 2024-09-18 PROCEDURE — 99232 SBSQ HOSP IP/OBS MODERATE 35: CPT | Mod: GC | Performed by: FAMILY MEDICINE

## 2024-09-18 RX ADMIN — ACYCLOVIR 400 MG: 400 TABLET ORAL at 17:38

## 2024-09-18 RX ADMIN — ACYCLOVIR 400 MG: 400 TABLET ORAL at 05:39

## 2024-09-18 RX ADMIN — VANCOMYCIN HYDROCHLORIDE 1250 MG: 5 INJECTION, POWDER, LYOPHILIZED, FOR SOLUTION INTRAVENOUS at 13:42

## 2024-09-18 RX ADMIN — INSULIN HUMAN 1 UNITS: 100 INJECTION, SOLUTION PARENTERAL at 08:45

## 2024-09-18 RX ADMIN — FOLIC ACID 1 MG: 1 TABLET ORAL at 05:40

## 2024-09-18 RX ADMIN — SITAGLIPTIN 100 MG: 100 TABLET, FILM COATED ORAL at 05:39

## 2024-09-18 RX ADMIN — PANTOPRAZOLE SODIUM 40 MG: 40 INJECTION, POWDER, FOR SOLUTION INTRAVENOUS at 17:38

## 2024-09-18 RX ADMIN — INSULIN HUMAN 2 UNITS: 100 INJECTION, SOLUTION PARENTERAL at 11:47

## 2024-09-18 RX ADMIN — INSULIN HUMAN 2 UNITS: 100 INJECTION, SOLUTION PARENTERAL at 21:04

## 2024-09-18 RX ADMIN — VANCOMYCIN HYDROCHLORIDE 1250 MG: 5 INJECTION, POWDER, LYOPHILIZED, FOR SOLUTION INTRAVENOUS at 01:02

## 2024-09-18 RX ADMIN — VORICONAZOLE 200 MG: 200 TABLET ORAL at 06:00

## 2024-09-18 RX ADMIN — ATORVASTATIN CALCIUM 10 MG: 10 TABLET, FILM COATED ORAL at 21:06

## 2024-09-18 RX ADMIN — INSULIN HUMAN 3 UNITS: 100 INJECTION, SOLUTION PARENTERAL at 17:41

## 2024-09-18 RX ADMIN — TBO-FILGRASTIM 300 MCG: 300 INJECTION, SOLUTION SUBCUTANEOUS at 13:42

## 2024-09-18 RX ADMIN — VORICONAZOLE 200 MG: 200 TABLET ORAL at 17:38

## 2024-09-18 ASSESSMENT — ENCOUNTER SYMPTOMS
RESPIRATORY NEGATIVE: 1
WEAKNESS: 1
CHILLS: 0
CARDIOVASCULAR NEGATIVE: 1
GASTROINTESTINAL NEGATIVE: 1
MUSCULOSKELETAL NEGATIVE: 1
FEVER: 0
PSYCHIATRIC NEGATIVE: 1
EYES NEGATIVE: 1
WEIGHT LOSS: 0

## 2024-09-18 ASSESSMENT — PAIN DESCRIPTION - PAIN TYPE
TYPE: ACUTE PAIN
TYPE: ACUTE PAIN

## 2024-09-18 NOTE — DISCHARGE PLANNING
Case Management Discharge Planning    Admission Date: 9/6/2024  GMLOS: 3.4  ALOS: 12    6-Clicks ADL Score: 22  6-Clicks Mobility Score: 19      Anticipated Discharge Dispo: Discharge Disposition: Discharged to home/self care (01)    DME Needed: No    Action(s) Taken: Discussed in IDT rounds, pt is pending BMB at this time, once resulted plan of care to be determined.     Escalations Completed: None    Medically Clear: No    Next Steps: Pending BMB results     Barriers to Discharge: Medical clearance    Is the patient up for discharge tomorrow: No

## 2024-09-18 NOTE — PROGRESS NOTES
[facility] FAMILY MEDICINE PROGRESS NOTE        Attending:   Manny Shea M.d.    Resident:   Michel Bean M.D.    PATIENT:   Miri Joseph; [id number]; 1940    ID:   84 y.o. male with history of dyslipidemia, BPH, type 2 DM, and recent GI bleed transferred from [facility] admitted for symptomatic pancytopenia and bone marrow biopsy. He was seen by GI at [facility] and had bidirectional endoscopy on 9/5 with biopsies taken from endoscopy but colonoscopy could not be completed due to inadequate bowel prep. Hgb stable but WBC and platelets continued to drop.  Transferred here for bone marrow biopsy.  Now status post platelet transfusion on 9/7 and 9/12, PRBC transfusion 9/15.    SUBJECTIVE:   Miri is visited this morning and reports another bowel movement overnight without blood/melanotic features described. His review of systems is negative for fever, chest pain, shortness of breath, abdominal pain, N/V/D, hematuria, myalgias.      OBJECTIVE:  Vitals:    09/17/24 2013 09/17/24 2324 09/18/24 0511 09/18/24 0800   BP: (!) 152/64 133/72 134/74 134/69   Pulse: 93 81 76 78   Resp: 18 18 16 20   Temp: 36.2 °C (97.2 °F) 37.1 °C (98.7 °F) 36.6 °C (97.8 °F) 36.6 °C (97.9 °F)   TempSrc: Oral Oral Oral Oral   SpO2: 98% 98% 97% 97%   Weight:       Height:           Intake/Output Summary (Last 24 hours) at 9/18/2024 1236  Last data filed at 9/18/2024 1000  Gross per 24 hour   Intake 240 ml   Output 2100 ml   Net -1860 ml       PHYSICAL EXAM:   General: No acute distress, resting comfortably in bed   HEENT: NC/AT. EOMI, no pallor  Cardiovascular: RRR without murmurs.  Respiratory: CTAB, no adventitious breath sounds, no wheezing  Abdomen: soft, nontender, nondistended, no masses, bowel sounds present  : Condom catheter draining clear yellow urine  EXT:  SOL, no lower extremity edema  Skin: No erythema/lesions, no petechia  Neuro: Non-focal    LABS:  Recent Labs     09/17/24  0246 09/17/24  1458  "09/18/24  0050   WBC 1.1* 1.2* 1.4*   RBC 2.34* 2.61* 2.29*   HEMOGLOBIN 7.5* 8.3* 7.4*   HEMATOCRIT 21.2* 23.4* 20.6*   MCV 90.6 89.7 90.0   MCH 32.1 31.8 32.3   RDW 49.3 48.9 48.9   PLATELETCT 61* 57* 45*   MPV 9.8 10.1 9.8   NEUTSPOLYS 2.70* 4.30* 8.60*   LYMPHOCYTES 93.70* 94.80* 91.40*   MONOCYTES 1.80 0.00 0.00   EOSINOPHILS 1.80 0.90 0.00   BASOPHILS 0.00 0.00 0.00   RBCMORPHOLO Present Present Present     Recent Labs     09/16/24  0050 09/17/24  0246 09/18/24  0050   SODIUM 134* 133* 135   POTASSIUM 3.5* 3.7 4.0   CHLORIDE 98 99 101   CO2 22 21 21   BUN 15 13 13   CREATININE 0.81 0.65 0.76   CALCIUM 8.8 8.7 8.8     Estimated GFR/CRCL = Estimated Creatinine Clearance: 70.1 mL/min (by C-G formula based on SCr of 0.76 mg/dL).  Recent Labs     09/16/24  0050 09/17/24  0246 09/18/24  0050   GLUCOSE 206* 157* 143*                   No results for input(s): \"INR\", \"APTT\", \"FIBRINOGEN\" in the last 72 hours.    Invalid input(s): \"DIMER\"      IMAGING:  IR-US GUIDED PIV    (Results Pending)       MEDS:  Current Facility-Administered Medications   Medication Last Admin    insulin GLARGINE (Lantus,Semglee) injection      tbo-filgrastim (Granix) 300 MCG/0.5ML injection 300 mcg 300 mcg at 09/17/24 1400    [START ON 9/24/2024] cyanocobalamin (Vitamin B-12) injection 1,000 mcg      senna-docusate (Pericolace Or Senokot S) 8.6-50 MG per tablet 2 Tablet 2 Tablet at 09/16/24 1755    And    polyethylene glycol/lytes (Miralax) Packet 1 Packet 1 Packet at 09/16/24 1253    SITagliptin (Januvia) tablet 100 mg 100 mg at 09/18/24 0539    vancomycin (Vancocin) 1,250 mg in  mL IVPB Stopped at 09/18/24 0220    acyclovir (Zovirax) tablet 400 mg 400 mg at 09/18/24 0539    voriconazole (Vfend) tablet 200 mg 200 mg at 09/18/24 0600    MD Alert...Vancomycin per Pharmacy      folic acid (Folvite) tablet 1 mg 1 mg at 09/18/24 0540    insulin regular (HumuLIN R,NovoLIN R) injection 2 Units at 09/18/24 1147    And    dextrose 50% (D50W) " injection 25 g      pantoprazole (Protonix) injection 40 mg 40 mg at 09/17/24 1801    acetaminophen (Tylenol) tablet 650 mg 650 mg at 09/13/24 0610    ondansetron (Zofran ODT) dispertab 4 mg      ondansetron (Zofran) syringe/vial injection 4 mg      atorvastatin (Lipitor) tablet 10 mg 10 mg at 09/17/24 2055       ASSESSMENT/PLAN:  84 y.o. male with history of dyslipidemia, BPH, type 2 DM, and recent GI bleed transferred from [facility] admitted for symptomatic pancytopenia and bone marrow biopsy. He was seen by GI at [facility] and had bidirectional endoscopy on 9/5 with biopsies taken from endoscopy but colonoscopy could not be completed due to inadequate bowel prep. Hgb stable but WBC and platelets continue to downtrend, now with critically low absolute neutrophils. Status post platelet transfusion on 9/7 and 9/12.  Bone marrow biopsy performed  9/10. On the morning of 9/11 the patient had dysuria; please see plan below.    * Pancytopenia (HCC)- (present on admission)  Assessment & Plan  Continues to have severe neutropenia with critically low absolute neutrophils. Neutropenic precautions in place.  Vital signs stable, afebrile. Bone marrow biopsy performed 9/10 demonstrating hypocellular bone marrow at 15% cellular with prominent stromal elements, markedly decreased maturing granulocytic precursors, decreased erythroid precursors with slight dyserythropoiesis, and markedly decreased megakaryocytes. 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 Additional results including FISH pending.    Plan:  -CBC q12h  -Transfuse platelets per oncology recs below  -Transfuse pRBCs for Hb goal 7  -Neutropenic precautions, heme-onc is ordered Granix  -Oncology consulted, following:  -Continue supportive measures and B12  -Transfuse for platelets < 20,000 unless febrile, bleeding, sepsis then <10,000,  hemoglobin < 7  -Bone marrow biopsy with nonspecific findings, pending FISH results for further plan  -Acyclovir and voriconazole  -Additional testing per oncology  -Daily Granix per heme/onc    Bacteremia  Assessment & Plan  Blood cultures 9/11 positive for  Bacillus cereus, thuringiensis, & mycoides. Ucx positive for same. Per ID pharm and Dr. Levine of ID, repeat bcx x 2 and treat with vancomycin given pancytopenia.  These updated blood cultures from 9/12 are negative after 5 days.    Plan:  -Vancomycin per [facility] Pharm  -Monitor VS and symptoms  -Patient also receiving acyclovir and voriconazole    Dysuria  Assessment & Plan  Resolved.  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      Plan:  -Phenazopyridine   -Continue vancomycin  -Monitor for recurrence of symptoms    Dyslipidemia- (present on admission)  Assessment & Plan  Continue home atorvastatin.     B12 deficiency- (present on admission)  Assessment & Plan  Per Oncology Dr. Long, essentially undetectable B12 so recommends daily injections of B12 x 2 weeks and then can switch to weekly x 1 month, and then monthly from thereon.  We will follow these recommendations.  Additionally, concerned that long-term metformin use has contributed to profound vitamin B12 deficiency.    Plan:  -Vit B12 1,000mcg x 2 weeks (completed 9/17)   -Then vitamin B12 1000 mcg weekly x 1 month (Last dose 10/15)   -Then vitamin B12 1000 mcg monthly  -Recommend oral supplementation at discharge for vegetarian diet, for now the plan will be IM; patient should follow-up on this outpatient  -Will likely exclude metformin from diabetes regimen at discharge    Type 2 diabetes mellitus, without long-term current use of insulin (HCC)- (present on admission)  Assessment & Plan  On metformin, glipizide, and Januvia at home.  5 units insulin glargine started 9/16 PM, and increased to 7 units 9/18. Januvia resumed 9/16 with better glycemic  control.  Per oncology note, there is concern that long-term use of metformin may be contributing to profound vitamin B12 deficiency.  Patient will likely need a different home regimen that does not include metformin at discharge.    Plan:  -Continue to hold metformin and glipizide   -Insulin glargine 7 units nightly  -Continue CHO diet and SSI + hypoglycemia protocol     GI bleed- (present on admission)  Assessment & Plan  Appears stable, no bloody stool, no melena.  Vital signs stable.     Plan:  -Continue daily PPI  -Bowel regimen with senna and MiraLAX  -Continue to monitor for signs of GI bleed: bloody stool, melena  -Outpatient follow-up with GI for repeat colonoscopy as initial did not have complete prep    Thrombocytopenia (HCC)- (present on admission)  Assessment & Plan  Continues to downtrend. Platelets transfused the evening of 9/7, 9/12, and 9/16.  Vital signs stable, well-appearing.  No obvious bleeding or unprovoked bruising.  Anticipate need for additional platelet transfusions during admission.    Plan:  -CBC q12h  -Transfuse w/ platelets per hematology/oncology recs (see A/P for pancytopenia)  -Oncology following     Core Measures  IV Fluids: none  Diet: vegetarian with carb count  Lines: PIV is blown, attempting long with PIV at this time  Antbx: Vancomycin, voriconazole, acyclovir  DVT ppx: SCDs, chemical prophylaxis held in setting of thrombocytopenia, patient encouraged to ambulate  Code status: Full Code  Dispo: inpatient for supportive care, monitoring of and transfusions for severe pancytopenia and thrombocytopenia pending final results of bone marrow biopsy, oncology recommendations/plan.     Michel Bean M.D.  PGY-2  [facility] Resident

## 2024-09-18 NOTE — PROGRESS NOTES
Oncology/Hematology Progress Note               Author: Donaldo Seals III, M.D. Date & Time created: 9/18/2024  12:46 PM   Pancytopenia likely due to severe B12 deficiency  Interval History:  In stable clinical condition, he will be finish vancomycin today for bacteremia, denies fever, chills, he feels fine, appetite is good, daughter at bedside, started on Granix yesterday, remains with severe pancytopenia    PMHx, PSurgHx, SocHx, FAMHx: Reviewed and unchanged    Review of Systems:  Review of Systems   Constitutional:  Positive for malaise/fatigue. Negative for chills, fever and weight loss.   HENT: Negative.     Eyes: Negative.    Respiratory: Negative.     Cardiovascular: Negative.    Gastrointestinal: Negative.    Genitourinary: Negative.    Musculoskeletal: Negative.    Skin: Negative.    Neurological:  Positive for weakness.   Psychiatric/Behavioral: Negative.         Physical Exam:  Physical Exam  Constitutional:       Appearance: Normal appearance.   HENT:      Head: Normocephalic and atraumatic.      Nose: Nose normal.      Mouth/Throat:      Mouth: Mucous membranes are moist.   Eyes:      Extraocular Movements: Extraocular movements intact.      Pupils: Pupils are equal, round, and reactive to light.   Cardiovascular:      Rate and Rhythm: Normal rate and regular rhythm.   Pulmonary:      Effort: Pulmonary effort is normal.      Breath sounds: Normal breath sounds.   Abdominal:      General: Abdomen is flat.      Palpations: Abdomen is soft.   Neurological:      General: No focal deficit present.      Mental Status: He is alert and oriented to person, place, and time.   Psychiatric:         Mood and Affect: Mood normal.         Labs:          Recent Labs     09/16/24  0050 09/17/24  0246 09/18/24  0050   SODIUM 134* 133* 135   POTASSIUM 3.5* 3.7 4.0   CHLORIDE 98 99 101   CO2 22 21 21   BUN 15 13 13   CREATININE 0.81 0.65 0.76   CALCIUM 8.8 8.7 8.8     Recent Labs     09/16/24  0050 09/17/24 0246  24  0050   GLUCOSE 206* 157* 143*     Recent Labs     24  0246 24  1455 24  0050   RBC 2.34* 2.61* 2.29*   HEMOGLOBIN 7.5* 8.3* 7.4*   HEMATOCRIT 21.2* 23.4* 20.6*   PLATELETCT 61* 57* 45*     Recent Labs     24  0246 24  1455 24  0050   WBC 1.1* 1.2* 1.4*   NEUTSPOLYS 2.70* 4.30* 8.60*   LYMPHOCYTES 93.70* 94.80* 91.40*   MONOCYTES 1.80 0.00 0.00   EOSINOPHILS 1.80 0.90 0.00   BASOPHILS 0.00 0.00 0.00     Recent Labs     24  0050 24  0246 24  0050   SODIUM 134* 133* 135   POTASSIUM 3.5* 3.7 4.0   CHLORIDE 98 99 101   CO2 22 21 21   GLUCOSE 206* 157* 143*   BUN 15 13 13   CREATININE 0.81 0.65 0.76   CALCIUM 8.8 8.7 8.8     Hemodynamics:  Temp (24hrs), Av.6 °C (97.9 °F), Min:36.2 °C (97.2 °F), Max:37.1 °C (98.7 °F)  Temperature: 36.8 °C (98.3 °F)  Pulse  Av.2  Min: 61  Max: 99   Blood Pressure : 125/70     Respiratory:    Respiration: 18, Pulse Oximetry: 97 %        RUL Breath Sounds: Clear, RML Breath Sounds: Clear, RLL Breath Sounds: Diminished, YAZAN Breath Sounds: Clear, LLL Breath Sounds: Diminished  Fluids:    Intake/Output Summary (Last 24 hours) at 2024 1041  Last data filed at 2024 0439  Gross per 24 hour   Intake --   Output 1625 ml   Net -1625 ml        GI/Nutrition:  Orders Placed This Encounter   Procedures    Diet Order Diet: Consistent CHO (Diabetic); Miscellaneous modifications: (optional): Vegetarian, Lactose Free     Standing Status:   Standing     Number of Occurrences:   1     Order Specific Question:   Diet:     Answer:   Consistent CHO (Diabetic) [4]     Order Specific Question:   Miscellaneous modifications: (optional)     Answer:   Vegetarian [13]     Order Specific Question:   Miscellaneous modifications: (optional)     Answer:   Lactose Free [5]     Medical Decision Making, by Problem:  Active Hospital Problems    Diagnosis     *Pancytopenia (HCC) [D61.818]     Bacteremia [R78.81]     Dysuria [R30.0]      Dyslipidemia [E78.5]     B12 deficiency [E53.8]     Type 2 diabetes mellitus, without long-term current use of insulin (HCC) [E11.9]     Thrombocytopenia (HCC) [D69.6]     GI bleed [K92.2]        Plan:  Pancytopenia  related to severe B12 deficiency  -Bone marrow biopsy demonstrating hypocellular bone marrow at 15% cellular with prominent stromal elements, markedly decreased maturing granulocytic precursors, decreased erythroid precursors with slight dyserythropoiesis, and markedly decreased megakaryocytes.  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  Normal male karyotype.  Flow no increase in blast  -9/3/2024 started on intramuscular cyanocobalamin 1000 mcg  -B12 was undetectable but his folic acid was normal.   -  Zinc and copper levels are normal.  EBV, CMV, parvovirus, HIV, and hepatitis PCR are all negative.  TSH is within normal limits.  Heavy metals none detected.  SHANELL negative  -2024 I did review with pharmacy, since 9/3/2024 he did receive 12 intramuscular shots of vitamin B-12.        2.  Anemia, transfuse if Hgb <7 if no symptoms or <8 with symptoms or bleeding    3.  Thrombocytopenia, transfuse if  PLTs <10 and no symptoms or <20 if febrile, septic, or bleeidnu apheresis PLTs      4.  B12 Deficiency  Undetectable B12. Intrinsic factor Abs, Parietal cell Abs, and gastrin negative.Secondary to impaired absorption from combination of vegetarian diet and years of metformin use. Continue B12 1000mcg and folic acid 1mg.   -2024 since 9/3/2024 got 12 shots of B12.      5.  GI Bleed    6.  Chronic Gastritis  Biopsies from EGD demonstrated chronic gastritis which could be worsening his B12 deficiency beyond just his vegetarian diet.    7.  Bacillus cereus bacteremia  -Completed vancomycin until 2024     PLAN:  -finishing with vancomycin today.  He remains severely pancytopenic, so  far minimal response to Granix,  improving at 0.12, he remains severely anemic and thrombocytopenic.  We are expecting a response from the bone marrow next week.  Tomorrow we will consider adding prophylactic antibiotics for severe neutropenia.  -Daily laboratory workup  -We will continue to monitor him closely  -Transfuse blood products with parameters as stated above  -Next dose of cyanocobalamin due on 9/24    High complexity, complex decision making       Quality-Core Measures

## 2024-09-18 NOTE — CARE PLAN
The patient is Watcher - Medium risk of patient condition declining or worsening      Problem: Knowledge Deficit - Standard  Goal: Patient and family/care givers will demonstrate understanding of plan of care, disease process/condition, diagnostic tests and medications  Outcome: Progressing     Problem: Neutropenia Precautions  Goal: Neutropenic precautions will be implemented and maintained for patient protection  Outcome: Progressing     Problem: Fall Risk  Goal: Patient will remain free from falls  Outcome: Progressing  Note: Patient uses call light appropriately. Staff responds in timely manner. Bed in lowest position and locked in place. Patient wears skid socks     Shift Goals  Clinical Goals: monitor labs, administer antibiotics, maintain neutropenic precautions  Patient Goals: rest, timely response  Family Goals: n/a    Progress made toward(s) clinical / shift goals:      Monitor Labs: Critical lab results were read from lab to RN.    Maintain Neutropenic Precautions.    Patient is not progressing towards the following goals:

## 2024-09-19 LAB
ABO GROUP BLD: NORMAL
ANISOCYTOSIS BLD QL SMEAR: ABNORMAL
ANISOCYTOSIS BLD QL SMEAR: ABNORMAL
BARCODED ABORH UBTYP: 6200
BARCODED PRD CODE UBPRD: NORMAL
BARCODED UNIT NUM UBUNT: NORMAL
BASOPHILS # BLD AUTO: 0 % (ref 0–1.8)
BASOPHILS # BLD AUTO: 0 % (ref 0–1.8)
BASOPHILS # BLD: 0 K/UL (ref 0–0.12)
BASOPHILS # BLD: 0 K/UL (ref 0–0.12)
BLD GP AB SCN SERPL QL: NORMAL
COMPONENT R 8504R: NORMAL
EOSINOPHIL # BLD AUTO: 0 K/UL (ref 0–0.51)
EOSINOPHIL # BLD AUTO: 0.03 K/UL (ref 0–0.51)
EOSINOPHIL NFR BLD: 0 % (ref 0–6.9)
EOSINOPHIL NFR BLD: 1.8 % (ref 0–6.9)
ERYTHROCYTE [DISTWIDTH] IN BLOOD BY AUTOMATED COUNT: 50.1 FL (ref 35.9–50)
ERYTHROCYTE [DISTWIDTH] IN BLOOD BY AUTOMATED COUNT: 50.4 FL (ref 35.9–50)
GLUCOSE BLD STRIP.AUTO-MCNC: 196 MG/DL (ref 65–99)
GLUCOSE BLD STRIP.AUTO-MCNC: 200 MG/DL (ref 65–99)
GLUCOSE BLD STRIP.AUTO-MCNC: 205 MG/DL (ref 65–99)
GLUCOSE BLD STRIP.AUTO-MCNC: 242 MG/DL (ref 65–99)
HCT VFR BLD AUTO: 19.6 % (ref 42–52)
HCT VFR BLD AUTO: 20.7 % (ref 42–52)
HGB BLD-MCNC: 7 G/DL (ref 14–18)
HGB BLD-MCNC: 7.3 G/DL (ref 14–18)
LYMPHOCYTES # BLD AUTO: 1.34 K/UL (ref 1–4.8)
LYMPHOCYTES # BLD AUTO: 1.77 K/UL (ref 1–4.8)
LYMPHOCYTES NFR BLD: 89.4 % (ref 22–41)
LYMPHOCYTES NFR BLD: 92.9 % (ref 22–41)
MACROCYTES BLD QL SMEAR: ABNORMAL
MANUAL DIFF BLD: NORMAL
MANUAL DIFF BLD: NORMAL
MCH RBC QN AUTO: 32.2 PG (ref 27–33)
MCH RBC QN AUTO: 32.6 PG (ref 27–33)
MCHC RBC AUTO-ENTMCNC: 35.3 G/DL (ref 32.3–36.5)
MCHC RBC AUTO-ENTMCNC: 35.7 G/DL (ref 32.3–36.5)
MCV RBC AUTO: 91.2 FL (ref 81.4–97.8)
MCV RBC AUTO: 91.2 FL (ref 81.4–97.8)
MICROCYTES BLD QL SMEAR: ABNORMAL
MONOCYTES # BLD AUTO: 0 K/UL (ref 0–0.85)
MONOCYTES # BLD AUTO: 0.03 K/UL (ref 0–0.85)
MONOCYTES NFR BLD AUTO: 0 % (ref 0–13.4)
MONOCYTES NFR BLD AUTO: 1.8 % (ref 0–13.4)
MORPHOLOGY BLD-IMP: NORMAL
MORPHOLOGY BLD-IMP: NORMAL
NEUTROPHILS # BLD AUTO: 0.1 K/UL (ref 1.82–7.42)
NEUTROPHILS # BLD AUTO: 0.13 K/UL (ref 1.82–7.42)
NEUTROPHILS NFR BLD: 5.3 % (ref 44–72)
NEUTROPHILS NFR BLD: 8.8 % (ref 44–72)
NRBC # BLD AUTO: 0 K/UL
NRBC # BLD AUTO: 0 K/UL
NRBC BLD-RTO: 0 /100 WBC (ref 0–0.2)
NRBC BLD-RTO: 0 /100 WBC (ref 0–0.2)
PLATELET # BLD AUTO: 22 K/UL (ref 164–446)
PLATELET # BLD AUTO: 27 K/UL (ref 164–446)
PLATELET BLD QL SMEAR: NORMAL
PLATELET BLD QL SMEAR: NORMAL
PLATELETS.RETICULATED NFR BLD AUTO: 0.7 % (ref 0.6–13.1)
PLATELETS.RETICULATED NFR BLD AUTO: 0.7 % (ref 0.6–13.1)
PMV BLD AUTO: 10.1 FL (ref 9–12.9)
PMV BLD AUTO: 10.6 FL (ref 9–12.9)
POIKILOCYTOSIS BLD QL SMEAR: NORMAL
PRODUCT TYPE UPROD: NORMAL
RBC # BLD AUTO: 2.15 M/UL (ref 4.7–6.1)
RBC # BLD AUTO: 2.27 M/UL (ref 4.7–6.1)
RBC BLD AUTO: PRESENT
RBC BLD AUTO: PRESENT
RH BLD: NORMAL
TEST NAME 95000: ABNORMAL
TOXIC GRANULES BLD QL SMEAR: NORMAL
UNIT STATUS USTAT: NORMAL
WBC # BLD AUTO: 1.5 K/UL (ref 4.8–10.8)
WBC # BLD AUTO: 1.9 K/UL (ref 4.8–10.8)

## 2024-09-19 PROCEDURE — A9270 NON-COVERED ITEM OR SERVICE: HCPCS

## 2024-09-19 PROCEDURE — 85055 RETICULATED PLATELET ASSAY: CPT

## 2024-09-19 PROCEDURE — 85027 COMPLETE CBC AUTOMATED: CPT | Mod: 91

## 2024-09-19 PROCEDURE — 700102 HCHG RX REV CODE 250 W/ 637 OVERRIDE(OP)

## 2024-09-19 PROCEDURE — 700111 HCHG RX REV CODE 636 W/ 250 OVERRIDE (IP): Performed by: INTERNAL MEDICINE

## 2024-09-19 PROCEDURE — 86850 RBC ANTIBODY SCREEN: CPT

## 2024-09-19 PROCEDURE — A9270 NON-COVERED ITEM OR SERVICE: HCPCS | Performed by: STUDENT IN AN ORGANIZED HEALTH CARE EDUCATION/TRAINING PROGRAM

## 2024-09-19 PROCEDURE — 700102 HCHG RX REV CODE 250 W/ 637 OVERRIDE(OP): Performed by: INTERNAL MEDICINE

## 2024-09-19 PROCEDURE — 86923 COMPATIBILITY TEST ELECTRIC: CPT

## 2024-09-19 PROCEDURE — 700105 HCHG RX REV CODE 258: Performed by: FAMILY MEDICINE

## 2024-09-19 PROCEDURE — P9016 RBC LEUKOCYTES REDUCED: HCPCS

## 2024-09-19 PROCEDURE — 86901 BLOOD TYPING SEROLOGIC RH(D): CPT

## 2024-09-19 PROCEDURE — 700111 HCHG RX REV CODE 636 W/ 250 OVERRIDE (IP): Performed by: FAMILY MEDICINE

## 2024-09-19 PROCEDURE — 36430 TRANSFUSION BLD/BLD COMPNT: CPT

## 2024-09-19 PROCEDURE — 36415 COLL VENOUS BLD VENIPUNCTURE: CPT

## 2024-09-19 PROCEDURE — A9270 NON-COVERED ITEM OR SERVICE: HCPCS | Performed by: INTERNAL MEDICINE

## 2024-09-19 PROCEDURE — 85007 BL SMEAR W/DIFF WBC COUNT: CPT | Mod: 91

## 2024-09-19 PROCEDURE — 700111 HCHG RX REV CODE 636 W/ 250 OVERRIDE (IP): Mod: JZ,JG | Performed by: INTERNAL MEDICINE

## 2024-09-19 PROCEDURE — 770004 HCHG ROOM/CARE - ONCOLOGY PRIVATE *

## 2024-09-19 PROCEDURE — 82962 GLUCOSE BLOOD TEST: CPT

## 2024-09-19 PROCEDURE — 700102 HCHG RX REV CODE 250 W/ 637 OVERRIDE(OP): Performed by: STUDENT IN AN ORGANIZED HEALTH CARE EDUCATION/TRAINING PROGRAM

## 2024-09-19 PROCEDURE — 86900 BLOOD TYPING SEROLOGIC ABO: CPT

## 2024-09-19 PROCEDURE — 99232 SBSQ HOSP IP/OBS MODERATE 35: CPT | Mod: GC | Performed by: FAMILY MEDICINE

## 2024-09-19 PROCEDURE — 86945 BLOOD PRODUCT/IRRADIATION: CPT

## 2024-09-19 RX ORDER — DEXTROSE MONOHYDRATE 25 G/50ML
25 INJECTION, SOLUTION INTRAVENOUS
Status: DISCONTINUED | OUTPATIENT
Start: 2024-09-19 | End: 2024-09-24 | Stop reason: HOSPADM

## 2024-09-19 RX ORDER — LEVOFLOXACIN 500 MG/1
500 TABLET, FILM COATED ORAL EVERY 24 HOURS
Status: DISCONTINUED | OUTPATIENT
Start: 2024-09-19 | End: 2024-09-24 | Stop reason: HOSPADM

## 2024-09-19 RX ADMIN — ACYCLOVIR 400 MG: 400 TABLET ORAL at 06:00

## 2024-09-19 RX ADMIN — VORICONAZOLE 200 MG: 200 TABLET ORAL at 06:00

## 2024-09-19 RX ADMIN — SITAGLIPTIN 100 MG: 100 TABLET, FILM COATED ORAL at 06:00

## 2024-09-19 RX ADMIN — PANTOPRAZOLE SODIUM 40 MG: 40 INJECTION, POWDER, FOR SOLUTION INTRAVENOUS at 06:00

## 2024-09-19 RX ADMIN — VORICONAZOLE 200 MG: 200 TABLET ORAL at 17:53

## 2024-09-19 RX ADMIN — VANCOMYCIN HYDROCHLORIDE 1250 MG: 5 INJECTION, POWDER, LYOPHILIZED, FOR SOLUTION INTRAVENOUS at 12:16

## 2024-09-19 RX ADMIN — ATORVASTATIN CALCIUM 10 MG: 10 TABLET, FILM COATED ORAL at 20:50

## 2024-09-19 RX ADMIN — VANCOMYCIN HYDROCHLORIDE 1250 MG: 5 INJECTION, POWDER, LYOPHILIZED, FOR SOLUTION INTRAVENOUS at 00:55

## 2024-09-19 RX ADMIN — INSULIN HUMAN 1 UNITS: 100 INJECTION, SOLUTION PARENTERAL at 12:16

## 2024-09-19 RX ADMIN — ACYCLOVIR 400 MG: 400 TABLET ORAL at 17:53

## 2024-09-19 RX ADMIN — FOLIC ACID 1 MG: 1 TABLET ORAL at 06:00

## 2024-09-19 RX ADMIN — INSULIN HUMAN 2 UNITS: 100 INJECTION, SOLUTION PARENTERAL at 17:56

## 2024-09-19 RX ADMIN — LEVOFLOXACIN 500 MG: 500 TABLET, FILM COATED ORAL at 09:30

## 2024-09-19 RX ADMIN — INSULIN HUMAN 3 UNITS: 100 INJECTION, SOLUTION PARENTERAL at 20:51

## 2024-09-19 RX ADMIN — PANTOPRAZOLE SODIUM 40 MG: 40 INJECTION, POWDER, FOR SOLUTION INTRAVENOUS at 17:53

## 2024-09-19 RX ADMIN — INSULIN HUMAN 2 UNITS: 100 INJECTION, SOLUTION PARENTERAL at 09:31

## 2024-09-19 RX ADMIN — TBO-FILGRASTIM 300 MCG: 300 INJECTION, SOLUTION SUBCUTANEOUS at 13:38

## 2024-09-19 ASSESSMENT — ENCOUNTER SYMPTOMS
GASTROINTESTINAL NEGATIVE: 1
MUSCULOSKELETAL NEGATIVE: 1
RESPIRATORY NEGATIVE: 1
WEIGHT LOSS: 0
FEVER: 0
PSYCHIATRIC NEGATIVE: 1
WEAKNESS: 1
CHILLS: 0
EYES NEGATIVE: 1

## 2024-09-19 ASSESSMENT — PAIN DESCRIPTION - PAIN TYPE: TYPE: ACUTE PAIN

## 2024-09-19 NOTE — PROGRESS NOTES
[facility] FAMILY MEDICINE PROGRESS NOTE        Attending:   Manny Shea M.d.    Resident:   Michel Bean M.D.    PATIENT:   Miri Joseph; [id number]; 1940    ID:   84 y.o. male with history of dyslipidemia, BPH, type 2 DM, and recent GI bleed transferred from [facility] admitted for symptomatic pancytopenia and bone marrow biopsy. He was seen by GI at [facility] and had bidirectional endoscopy on 9/5 with biopsies taken from endoscopy but colonoscopy could not be completed due to inadequate bowel prep. Hgb stable but WBC and platelets continued to drop.  Transferred here for bone marrow biopsy.  Now status post platelet transfusion on 9/7 and 9/12, PRBC transfusion 9/15.    SUBJECTIVE:   Patient encountered twice this morning, first time he was showering, second time he was sleeping comfortably in his bedside chair.  No acute needs from RN.  Heme/onc following.  Updated/discussed patient's case with his son who is a physician.    OBJECTIVE:  Vitals:    09/19/24 0053 09/19/24 0458 09/19/24 0800 09/19/24 1200   BP: 115/57 101/51 131/64 121/64   Pulse: 79 74 72 92   Resp: 18 18 18 18   Temp: 37 °C (98.6 °F) 36.6 °C (97.8 °F) 36.8 °C (98.3 °F) 36.6 °C (97.9 °F)   TempSrc: Oral Oral Oral Oral   SpO2: 97% 97% 99% 99%   Weight:       Height:           Intake/Output Summary (Last 24 hours) at 9/19/2024 1417  Last data filed at 9/19/2024 0900  Gross per 24 hour   Intake 280 ml   Output 3100 ml   Net -2820 ml       PHYSICAL EXAM:   Const: Sleeping in chair in NAD  HEENT: NC/AT  Resp: Normal WOB  CV: No cyanosis observed  Skin: Visible skin without rash, bruising on b/l upper extremities  Ext: no edema      LABS:  Recent Labs     09/18/24  0050 09/18/24  1607 09/19/24  0323   WBC 1.4* 1.6* 1.9*   RBC 2.29* 2.27* 2.27*   HEMOGLOBIN 7.4* 7.3* 7.3*   HEMATOCRIT 20.6* 21.1* 20.7*   MCV 90.0 93.0 91.2   MCH 32.3 32.2 32.2   RDW 48.9 51.3* 50.1*   PLATELETCT 45* 32* 27*   MPV 9.8 10.6 10.6   NEUTSPOLYS  "8.60* 13.70* 5.30*   LYMPHOCYTES 91.40* 85.50* 92.90*   MONOCYTES 0.00 0.80 1.80   EOSINOPHILS 0.00 0.00 0.00   BASOPHILS 0.00 0.00 0.00   RBCMORPHOLO Present Present Present     Recent Labs     09/17/24  0246 09/18/24  0050   SODIUM 133* 135   POTASSIUM 3.7 4.0   CHLORIDE 99 101   CO2 21 21   BUN 13 13   CREATININE 0.65 0.76   CALCIUM 8.7 8.8     Estimated GFR/CRCL = Estimated Creatinine Clearance: 70.1 mL/min (by C-G formula based on SCr of 0.76 mg/dL).  Recent Labs     09/17/24  0246 09/18/24  0050   GLUCOSE 157* 143*                   No results for input(s): \"INR\", \"APTT\", \"FIBRINOGEN\" in the last 72 hours.    Invalid input(s): \"DIMER\"      IMAGING:  IR-US GUIDED PIV   Final Result    Ultrasound-guided PERIPHERAL IV INSERTION performed by    qualified nursing staff as above.          MEDS:  Current Facility-Administered Medications   Medication Last Admin    levoFLOXacin (Levaquin) tablet 500 mg 500 mg at 09/19/24 0930    insulin regular (HumuLIN R,NovoLIN R) injection      And    dextrose 50% (D50W) injection 25 g      insulin GLARGINE (Lantus,Semglee) injection 7 Units at 09/18/24 1741    tbo-filgrastim (Granix) 300 MCG/0.5ML injection 300 mcg 300 mcg at 09/19/24 1338    [START ON 9/24/2024] cyanocobalamin (Vitamin B-12) injection 1,000 mcg      senna-docusate (Pericolace Or Senokot S) 8.6-50 MG per tablet 2 Tablet 2 Tablet at 09/16/24 1755    And    polyethylene glycol/lytes (Miralax) Packet 1 Packet 1 Packet at 09/16/24 1253    SITagliptin (Januvia) tablet 100 mg 100 mg at 09/19/24 0600    acyclovir (Zovirax) tablet 400 mg 400 mg at 09/19/24 0600    voriconazole (Vfend) tablet 200 mg 200 mg at 09/19/24 0600    MD Alert...Vancomycin per Pharmacy      folic acid (Folvite) tablet 1 mg 1 mg at 09/19/24 0600    pantoprazole (Protonix) injection 40 mg 40 mg at 09/19/24 0600    acetaminophen (Tylenol) tablet 650 mg 650 mg at 09/13/24 0610    ondansetron (Zofran ODT) dispertab 4 mg      ondansetron (Zofran) " syringe/vial injection 4 mg      atorvastatin (Lipitor) tablet 10 mg 10 mg at 09/18/24 7676       ASSESSMENT/PLAN:  84 y.o. male with history of dyslipidemia, BPH, type 2 DM, and recent GI bleed transferred from [facility] admitted for symptomatic pancytopenia and bone marrow biopsy. He was seen by GI at [facility] and had bidirectional endoscopy on 9/5 with biopsies taken from endoscopy but colonoscopy could not be completed due to inadequate bowel prep. Hgb stable but WBC and platelets continue to downtrend, now with critically low absolute neutrophils. Status post platelet transfusion on 9/7 and 9/12.  Bone marrow biopsy performed  9/10. On the morning of 9/11 the patient had dysuria; please see plan below.    * Pancytopenia (HCC)- (present on admission)  Assessment & Plan  Continues to have severe neutropenia with critically low absolute neutrophils. Neutropenic precautions in place.  Vital signs stable, afebrile. Bone marrow biopsy performed 9/10 demonstrating hypocellular bone marrow at 15% cellular with prominent stromal elements, markedly decreased maturing granulocytic precursors, decreased erythroid precursors with slight dyserythropoiesis, and markedly decreased megakaryocytes. 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 Additional results including FISH pending.    Plan:  -CBC q12h  -Transfuse platelets per oncology recs below  -Transfuse pRBCs for Hb goal 7  -Neutropenic precautions, heme-onc is ordered Granix  -Oncology consulted, following:  -Continue supportive measures and B12  -Transfuse for platelets < 20,000 unless febrile, bleeding, sepsis then <10,000, hemoglobin < 7  -Bone marrow biopsy with nonspecific findings, pending FISH results for further plan  -Levaquin, Acyclovir and voriconazole  -Additional testing per oncology  -Daily Granix per  heme/onc    Bacteremia  Assessment & Plan  Blood cultures 9/11 positive for  Bacillus cereus, thuringiensis, & mycoides. Ucx positive for same. Per ID pharm and Dr. Levine of ID, repeat bcx x 2 and treat with vancomycin given pancytopenia.  These updated blood cultures from 9/12 are negative after 5 days.    Plan:  -Vancomycin per [facility] Pharm to finish 9/19  -Will start prophylactic Levaquin 9/19  -Monitor VS and symptoms  -Patient also receiving acyclovir and voriconazole for prophylaxis    Dysuria  Assessment & Plan  Resolved.  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      Plan:  -Phenazopyridine   -Continue vancomycin  -Monitor for recurrence of symptoms    Dyslipidemia- (present on admission)  Assessment & Plan  Continue home atorvastatin.     B12 deficiency- (present on admission)  Assessment & Plan  Per Oncology Dr. Long, essentially undetectable B12 so recommends daily injections of B12 x 2 weeks and then can switch to weekly x 1 month, and then monthly from thereon.  We will follow these recommendations.  Additionally, concerned that long-term metformin use has contributed to profound vitamin B12 deficiency.    Plan:  -Vit B12 1,000mcg x 2 weeks (completed 9/17)   -Then vitamin B12 1000 mcg weekly x 1 month (Last dose 10/15)   -Then vitamin B12 1000 mcg monthly  -Recommend oral supplementation at discharge for vegetarian diet, for now the plan will be IM; patient should follow-up on this outpatient  -Will likely exclude metformin from diabetes regimen at discharge    Type 2 diabetes mellitus, without long-term current use of insulin (HCC)- (present on admission)  Assessment & Plan  On metformin, glipizide, and Januvia at home.  5 units insulin glargine started 9/16 PM, and increased to 7 units 9/18. Januvia resumed 9/16 with better glycemic control.  Per oncology note, there is concern that long-term use of metformin may be contributing to profound  vitamin B12 deficiency.  Patient will likely need a different home regimen that does not include metformin at discharge.    Plan:  -Continue to hold metformin and glipizide   -Insulin glargine 7 units nightly  -Continue CHO diet and SSI + hypoglycemia protocol (correctional insulin intensity increased 9/19 giving continued blood glucose ranges up to 280/300 with appropriate fastings in the morning)    GI bleed- (present on admission)  Assessment & Plan  Appears stable, no bloody stool, no melena.  Vital signs stable.     Plan:  -Continue daily PPI  -Bowel regimen with senna and MiraLAX  -Continue to monitor for signs of GI bleed: bloody stool, melena  -Outpatient follow-up with GI for repeat colonoscopy as initial did not have complete prep    Thrombocytopenia (HCC)- (present on admission)  Assessment & Plan  Continues to downtrend. Platelets transfused the evening of 9/7, 9/12, and 9/16.  Vital signs stable, well-appearing.  No obvious bleeding or unprovoked bruising.  Anticipate need for additional platelet transfusions during admission.    Plan:  -CBC q12h  -Transfuse w/ platelets per hematology/oncology recs (see A/P for pancytopenia)  -Oncology following     Core Measures  IV Fluids: none  Diet: vegetarian with carb count  Lines: PIV is blown, attempting long with PIV at this time  Antbx: Prophylactic Levaquin, voriconazole, acyclovir, s/p vancomycin  DVT ppx: SCDs, chemical prophylaxis held in setting of thrombocytopenia, patient encouraged to ambulate  Code status: Full Code  Dispo: inpatient for supportive care, monitoring of and transfusions for severe pancytopenia and thrombocytopenia pending final results of bone marrow biopsy, oncology recommendations/plan.     Michel Bean M.D.  PGY-2  [facility] Family [facility] Resident

## 2024-09-19 NOTE — PROGRESS NOTES
Terri from Lab called with critical result of WBC at 1.9 and ANC at 0.1 at 0509. Critical lab result read back to Terri.   Dr. Eze Hernandez notified of critical lab result at 0515.  Critical lab result read back by Dr. Eze Hernandez.

## 2024-09-19 NOTE — DIETARY
Nutrition Update:    Day 13 of admit.  Miri Joseph is a 84 y.o. male with admitting DX of Pancytopenia ; Patient being followed to optimize nutrition.    Current Diet: Consistent CHO, Vegetarian, lactose Free.  Per ADL, pt taking % of last seven documented meals over past ~ 4 days. Expect adequate intake.     Problem: Nutritional:  Goal: Achieve adequate nutritional intake  Description: Patient will consume ~50% or greater  of meals  Outcome: Goal met.     Plan/Rec:  RD to screen every 7-10 days per department policy to monitor for adequate intake. RD available PRN.

## 2024-09-19 NOTE — PROGRESS NOTES
Oncology/Hematology Progress Note               Author: Donaldo Seals III, M.D. Date & Time created: 9/19/2024  9:45 AM   Pancytopenia likely due to severe B12 deficiency  Interval History:  He remains severely pancytopenic, he is in stable clinical condition, ANC 1.10 despite growth factor.  There is no fever, chills, appetite is good, vital signs stable.  Denies shortness of breath, chest pain, he does have mild lower extremity swelling bilaterally    PMHx, PSurgHx, SocHx, FAMHx: Reviewed and unchanged    Review of Systems:  Review of Systems   Constitutional:  Positive for malaise/fatigue. Negative for chills, fever and weight loss.   HENT: Negative.     Eyes: Negative.    Respiratory: Negative.     Cardiovascular:  Positive for leg swelling.   Gastrointestinal: Negative.    Genitourinary: Negative.    Musculoskeletal: Negative.    Skin: Negative.    Neurological:  Positive for weakness.   Psychiatric/Behavioral: Negative.         Physical Exam:  Physical Exam  Constitutional:       Appearance: Normal appearance.   HENT:      Head: Normocephalic and atraumatic.      Nose: Nose normal.      Mouth/Throat:      Mouth: Mucous membranes are moist.   Eyes:      Extraocular Movements: Extraocular movements intact.      Pupils: Pupils are equal, round, and reactive to light.   Cardiovascular:      Rate and Rhythm: Normal rate and regular rhythm.      Pulses: Normal pulses.      Heart sounds: Normal heart sounds.   Pulmonary:      Effort: Pulmonary effort is normal.      Breath sounds: Normal breath sounds.   Abdominal:      General: Abdomen is flat.      Palpations: Abdomen is soft.   Musculoskeletal:         General: No tenderness.      Right lower leg: Edema present.      Left lower leg: Edema present.   Neurological:      General: No focal deficit present.      Mental Status: He is alert and oriented to person, place, and time.   Psychiatric:         Mood and Affect: Mood normal.         Labs:          Recent Labs      24  0246 24  0050   SODIUM 133* 135   POTASSIUM 3.7 4.0   CHLORIDE 99 101   CO2 21 21   BUN 13 13   CREATININE 0.65 0.76   CALCIUM 8.7 8.8     Recent Labs     24  0246 24  0050   GLUCOSE 157* 143*     Recent Labs     24  0050 24  1607 24  0323   RBC 2.29* 2.27* 2.27*   HEMOGLOBIN 7.4* 7.3* 7.3*   HEMATOCRIT 20.6* 21.1* 20.7*   PLATELETCT 45* 32* 27*     Recent Labs     24  0050 24  1607 24  0323   WBC 1.4* 1.6* 1.9*   NEUTSPOLYS 8.60* 13.70* 5.30*   LYMPHOCYTES 91.40* 85.50* 92.90*   MONOCYTES 0.00 0.80 1.80   EOSINOPHILS 0.00 0.00 0.00   BASOPHILS 0.00 0.00 0.00     Recent Labs     24  0246 24  0050   SODIUM 133* 135   POTASSIUM 3.7 4.0   CHLORIDE 99 101   CO2 21 21   GLUCOSE 157* 143*   BUN 13 13   CREATININE 0.65 0.76   CALCIUM 8.7 8.8     Hemodynamics:  Temp (24hrs), Av.8 °C (98.2 °F), Min:36.5 °C (97.7 °F), Max:37 °C (98.6 °F)  Temperature: 36.8 °C (98.3 °F)  Pulse  Av.2  Min: 61  Max: 99   Blood Pressure : 131/64     Respiratory:    Respiration: 18, Pulse Oximetry: 99 %        RUL Breath Sounds: Clear, RML Breath Sounds: Clear, RLL Breath Sounds: Diminished, YAZAN Breath Sounds: Clear, LLL Breath Sounds: Diminished  Fluids:    Intake/Output Summary (Last 24 hours) at 2024 1041  Last data filed at 2024 0439  Gross per 24 hour   Intake --   Output 1625 ml   Net -1625 ml        GI/Nutrition:  Orders Placed This Encounter   Procedures    Diet Order Diet: Consistent CHO (Diabetic); Miscellaneous modifications: (optional): Vegetarian, Lactose Free     Standing Status:   Standing     Number of Occurrences:   1     Order Specific Question:   Diet:     Answer:   Consistent CHO (Diabetic) [4]     Order Specific Question:   Miscellaneous modifications: (optional)     Answer:   Vegetarian [13]     Order Specific Question:   Miscellaneous modifications: (optional)     Answer:   Lactose Free [5]     Medical Decision Making, by  Problem:  Active Hospital Problems    Diagnosis     *Pancytopenia (HCC) [D61.818]     Bacteremia [R78.81]     Dysuria [R30.0]     Dyslipidemia [E78.5]     B12 deficiency [E53.8]     Type 2 diabetes mellitus, without long-term current use of insulin (HCC) [E11.9]     Thrombocytopenia (HCC) [D69.6]     GI bleed [K92.2]        Plan:  Pancytopenia  related to severe B12 deficiency  -Bone marrow biopsy demonstrating hypocellular bone marrow at 15% cellular with prominent stromal elements, markedly decreased maturing granulocytic precursors, decreased erythroid precursors with slight dyserythropoiesis, and markedly decreased megakaryocytes.  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  Normal male karyotype.  Flow no increase in blast  -9/3/2024 started on intramuscular cyanocobalamin 1000 mcg  -B12 was undetectable but his folic acid was normal.   -  Zinc and copper levels are normal.  EBV, CMV, parvovirus, HIV, and hepatitis PCR are all negative.  TSH is within normal limits.  Heavy metals none detected.  SHANELL negative  -2024 I did review with pharmacy, since 9/3/2024 he did receive 12 intramuscular shots of vitamin B-12.        2.  Anemia, transfuse if Hgb <7 if no symptoms or <8 with symptoms or bleeding    3.  Thrombocytopenia, transfuse if  PLTs <10 and no symptoms or <20 if febrile, septic, or bleeidnu apheresis PLTs      4.  B12 Deficiency  Undetectable B12. Intrinsic factor Abs, Parietal cell Abs, and gastrin negative.Secondary to impaired absorption from combination of vegetarian diet and years of metformin use. Continue B12 1000mcg and folic acid 1mg.   -2024 since 9/3/2024 got 12 shots of B12.      5.  GI Bleed    6.  Chronic Gastritis  Biopsies from EGD demonstrated chronic gastritis which could be worsening his B12 deficiency beyond just his vegetarian diet.    7.  Bacillus cereus  bacteremia  -Completed vancomycin until 9/18/2024     PLAN:  -Has been a little bit more than 2 weeks since he started with B12 supplements on 9/3, which was started seeing a bone marrow response by now.  He is due for next dose of cyanocobalamine on 9/24, we will continue to monitor closely, will continue on Granix, ANC still 1.10  -I would like to start prophylactic antibiotic with Levaquin now he is done with vancomycin for the treatment of Bacillus cereus.  Also continue on acyclovir and voriconazole  Continue monitoring daily laboratory workup  -transfuse if hemoglobin less than 7 g/dL or platelets less than 10,000    High complexity, complex decision making       Quality-Core Measures

## 2024-09-19 NOTE — PROGRESS NOTES
[facility] from Lab called with critical result of WBC at 1.6 and Absolute neutrophil count of 0.22. Critical lab result read back to [facility].   Dr. Eze Hernandez notified of critical lab result at 2001.  Critical lab result read back by Dr. Eze Hernandez.

## 2024-09-19 NOTE — CARE PLAN
The patient is Stable - Low risk of patient condition declining or worsening    Shift Goals  Clinical Goals: monitor labs, and maintain IV access  Patient Goals: Rest comfortably  Family Goals: VALERIO    Progress made toward(s) clinical / shift goals:       Problem: Fall Risk  Goal: Patient will remain free from falls as seen by no patient falls during this shift.   Outcome: Progressing  Note: Patient was educated on the importance of calling for assistance when wanting to get OOB. Patient verbally acknowledged the teaching. Patient belongings and call light are within reach. Bed is locked and in the lowest position.        Patient is not progressing towards the following goals:

## 2024-09-20 PROBLEM — R60.0 BILATERAL LOWER EXTREMITY EDEMA: Status: ACTIVE | Noted: 2024-09-20

## 2024-09-20 LAB
ANISOCYTOSIS BLD QL SMEAR: ABNORMAL
ANISOCYTOSIS BLD QL SMEAR: ABNORMAL
BASOPHILS # BLD AUTO: 0 % (ref 0–1.8)
BASOPHILS # BLD AUTO: 0 % (ref 0–1.8)
BASOPHILS # BLD: 0 K/UL (ref 0–0.12)
BASOPHILS # BLD: 0 K/UL (ref 0–0.12)
COMMENT NL1176: NORMAL
COMMENT NL1176: NORMAL
EOSINOPHIL # BLD AUTO: 0 K/UL (ref 0–0.51)
EOSINOPHIL # BLD AUTO: 0 K/UL (ref 0–0.51)
EOSINOPHIL NFR BLD: 0 % (ref 0–6.9)
EOSINOPHIL NFR BLD: 0 % (ref 0–6.9)
ERYTHROCYTE [DISTWIDTH] IN BLOOD BY AUTOMATED COUNT: 48.6 FL (ref 35.9–50)
ERYTHROCYTE [DISTWIDTH] IN BLOOD BY AUTOMATED COUNT: 50.7 FL (ref 35.9–50)
GLUCOSE BLD STRIP.AUTO-MCNC: 148 MG/DL (ref 65–99)
GLUCOSE BLD STRIP.AUTO-MCNC: 154 MG/DL (ref 65–99)
GLUCOSE BLD STRIP.AUTO-MCNC: 200 MG/DL (ref 65–99)
GLUCOSE BLD STRIP.AUTO-MCNC: 213 MG/DL (ref 65–99)
HCT VFR BLD AUTO: 22.3 % (ref 42–52)
HCT VFR BLD AUTO: 24 % (ref 42–52)
HGB BLD-MCNC: 8.1 G/DL (ref 14–18)
HGB BLD-MCNC: 8.5 G/DL (ref 14–18)
LYMPHOCYTES # BLD AUTO: 1.46 K/UL (ref 1–4.8)
LYMPHOCYTES # BLD AUTO: 1.95 K/UL (ref 1–4.8)
LYMPHOCYTES NFR BLD: 97.3 % (ref 22–41)
LYMPHOCYTES NFR BLD: 97.4 % (ref 22–41)
MACROCYTES BLD QL SMEAR: ABNORMAL
MACROCYTES BLD QL SMEAR: ABNORMAL
MANUAL DIFF BLD: NORMAL
MANUAL DIFF BLD: NORMAL
MCH RBC QN AUTO: 31.6 PG (ref 27–33)
MCH RBC QN AUTO: 32 PG (ref 27–33)
MCHC RBC AUTO-ENTMCNC: 35.4 G/DL (ref 32.3–36.5)
MCHC RBC AUTO-ENTMCNC: 36.3 G/DL (ref 32.3–36.5)
MCV RBC AUTO: 88.1 FL (ref 81.4–97.8)
MCV RBC AUTO: 89.2 FL (ref 81.4–97.8)
MISCELLANEOUS LAB RESULT MISCLAB: NORMAL
MONOCYTES # BLD AUTO: 0 K/UL (ref 0–0.85)
MONOCYTES # BLD AUTO: 0.02 K/UL (ref 0–0.85)
MONOCYTES NFR BLD AUTO: 0 % (ref 0–13.4)
MONOCYTES NFR BLD AUTO: 0.9 % (ref 0–13.4)
MORPHOLOGY BLD-IMP: NORMAL
MORPHOLOGY BLD-IMP: NORMAL
NEUTROPHILS # BLD AUTO: 0.04 K/UL (ref 1.82–7.42)
NEUTROPHILS # BLD AUTO: 0.04 K/UL (ref 1.82–7.42)
NEUTROPHILS NFR BLD: 1.8 % (ref 44–72)
NEUTROPHILS NFR BLD: 2.6 % (ref 44–72)
NRBC # BLD AUTO: 0 K/UL
NRBC # BLD AUTO: 0 K/UL
NRBC BLD-RTO: 0 /100 WBC (ref 0–0.2)
NRBC BLD-RTO: 0 /100 WBC (ref 0–0.2)
PLATELET # BLD AUTO: 14 K/UL (ref 164–446)
PLATELET # BLD AUTO: 17 K/UL (ref 164–446)
PLATELET BLD QL SMEAR: NORMAL
PLATELET BLD QL SMEAR: NORMAL
PLATELETS.RETICULATED NFR BLD AUTO: 0.6 % (ref 0.6–13.1)
PLATELETS.RETICULATED NFR BLD AUTO: 0.8 % (ref 0.6–13.1)
PMV BLD AUTO: 10.8 FL (ref 9–12.9)
PMV BLD AUTO: 11.1 FL (ref 9–12.9)
RBC # BLD AUTO: 2.53 M/UL (ref 4.7–6.1)
RBC # BLD AUTO: 2.69 M/UL (ref 4.7–6.1)
RBC BLD AUTO: PRESENT
RBC BLD AUTO: PRESENT
SMUDGE CELLS BLD QL SMEAR: NORMAL
WBC # BLD AUTO: 1.5 K/UL (ref 4.8–10.8)
WBC # BLD AUTO: 2 K/UL (ref 4.8–10.8)

## 2024-09-20 PROCEDURE — 700102 HCHG RX REV CODE 250 W/ 637 OVERRIDE(OP): Performed by: STUDENT IN AN ORGANIZED HEALTH CARE EDUCATION/TRAINING PROGRAM

## 2024-09-20 PROCEDURE — 85055 RETICULATED PLATELET ASSAY: CPT | Mod: 91

## 2024-09-20 PROCEDURE — 700102 HCHG RX REV CODE 250 W/ 637 OVERRIDE(OP): Performed by: INTERNAL MEDICINE

## 2024-09-20 PROCEDURE — A9270 NON-COVERED ITEM OR SERVICE: HCPCS | Performed by: INTERNAL MEDICINE

## 2024-09-20 PROCEDURE — 306310 ANTI-EMBOLISM STOCKINGS XXLRG REG: Performed by: FAMILY MEDICINE

## 2024-09-20 PROCEDURE — 36415 COLL VENOUS BLD VENIPUNCTURE: CPT

## 2024-09-20 PROCEDURE — 82962 GLUCOSE BLOOD TEST: CPT | Mod: 91

## 2024-09-20 PROCEDURE — A9270 NON-COVERED ITEM OR SERVICE: HCPCS

## 2024-09-20 PROCEDURE — 85007 BL SMEAR W/DIFF WBC COUNT: CPT

## 2024-09-20 PROCEDURE — 700102 HCHG RX REV CODE 250 W/ 637 OVERRIDE(OP)

## 2024-09-20 PROCEDURE — 700111 HCHG RX REV CODE 636 W/ 250 OVERRIDE (IP): Performed by: INTERNAL MEDICINE

## 2024-09-20 PROCEDURE — 700111 HCHG RX REV CODE 636 W/ 250 OVERRIDE (IP): Mod: JZ,JG | Performed by: INTERNAL MEDICINE

## 2024-09-20 PROCEDURE — 770004 HCHG ROOM/CARE - ONCOLOGY PRIVATE *

## 2024-09-20 PROCEDURE — 85027 COMPLETE CBC AUTOMATED: CPT

## 2024-09-20 PROCEDURE — A9270 NON-COVERED ITEM OR SERVICE: HCPCS | Performed by: STUDENT IN AN ORGANIZED HEALTH CARE EDUCATION/TRAINING PROGRAM

## 2024-09-20 PROCEDURE — 99232 SBSQ HOSP IP/OBS MODERATE 35: CPT | Mod: GC | Performed by: FAMILY MEDICINE

## 2024-09-20 RX ADMIN — ACYCLOVIR 400 MG: 400 TABLET ORAL at 05:15

## 2024-09-20 RX ADMIN — PANTOPRAZOLE SODIUM 40 MG: 40 INJECTION, POWDER, FOR SOLUTION INTRAVENOUS at 17:25

## 2024-09-20 RX ADMIN — INSULIN HUMAN 2 UNITS: 100 INJECTION, SOLUTION PARENTERAL at 12:19

## 2024-09-20 RX ADMIN — INSULIN HUMAN 3 UNITS: 100 INJECTION, SOLUTION PARENTERAL at 17:26

## 2024-09-20 RX ADMIN — VORICONAZOLE 200 MG: 200 TABLET ORAL at 05:15

## 2024-09-20 RX ADMIN — ACYCLOVIR 400 MG: 400 TABLET ORAL at 17:25

## 2024-09-20 RX ADMIN — TBO-FILGRASTIM 300 MCG: 300 INJECTION, SOLUTION SUBCUTANEOUS at 14:22

## 2024-09-20 RX ADMIN — LEVOFLOXACIN 500 MG: 500 TABLET, FILM COATED ORAL at 05:15

## 2024-09-20 RX ADMIN — SITAGLIPTIN 100 MG: 100 TABLET, FILM COATED ORAL at 05:15

## 2024-09-20 RX ADMIN — ATORVASTATIN CALCIUM 10 MG: 10 TABLET, FILM COATED ORAL at 20:02

## 2024-09-20 RX ADMIN — PANTOPRAZOLE SODIUM 40 MG: 40 INJECTION, POWDER, FOR SOLUTION INTRAVENOUS at 05:15

## 2024-09-20 RX ADMIN — VORICONAZOLE 200 MG: 200 TABLET ORAL at 17:25

## 2024-09-20 RX ADMIN — INSULIN HUMAN 2 UNITS: 100 INJECTION, SOLUTION PARENTERAL at 20:05

## 2024-09-20 RX ADMIN — FOLIC ACID 1 MG: 1 TABLET ORAL at 05:15

## 2024-09-20 ASSESSMENT — ENCOUNTER SYMPTOMS
PSYCHIATRIC NEGATIVE: 1
EYES NEGATIVE: 1
WEIGHT LOSS: 0
FEVER: 0
WEAKNESS: 1
MUSCULOSKELETAL NEGATIVE: 1
RESPIRATORY NEGATIVE: 1
GASTROINTESTINAL NEGATIVE: 1
CHILLS: 0

## 2024-09-20 ASSESSMENT — PAIN DESCRIPTION - PAIN TYPE
TYPE: ACUTE PAIN
TYPE: ACUTE PAIN

## 2024-09-20 NOTE — PROGRESS NOTES
Oncology/Hematology Progress Note               Author: Donaldo Seals III, M.D. Date & Time created: 9/20/2024  10:55 AM   Pancytopenia likely due to severe B12 deficiency  Interval History:  Patient feels well.  There is no fever, chills.  Unfortunately blood counts are not recovering yet.  He has been ambulating, daughter at bedside.  He does have mild bilateral lower extremity swelling and continues to have some weakness    PMHx, PSurgHx, SocHx, FAMHx: Reviewed and unchanged    Review of Systems:  Review of Systems   Constitutional:  Positive for malaise/fatigue. Negative for chills, fever and weight loss.   HENT: Negative.     Eyes: Negative.    Respiratory: Negative.     Cardiovascular:  Positive for leg swelling.   Gastrointestinal: Negative.    Genitourinary: Negative.    Musculoskeletal: Negative.    Skin: Negative.    Neurological:  Positive for weakness.   Psychiatric/Behavioral: Negative.         Physical Exam:  Physical Exam  Constitutional:       Appearance: Normal appearance.   HENT:      Head: Normocephalic and atraumatic.      Nose: Nose normal.      Mouth/Throat:      Mouth: Mucous membranes are moist.   Eyes:      Extraocular Movements: Extraocular movements intact.      Pupils: Pupils are equal, round, and reactive to light.   Cardiovascular:      Rate and Rhythm: Normal rate and regular rhythm.      Pulses: Normal pulses.      Heart sounds: Normal heart sounds.   Pulmonary:      Effort: Pulmonary effort is normal.      Breath sounds: Normal breath sounds.   Abdominal:      General: Abdomen is flat.      Palpations: Abdomen is soft.   Musculoskeletal:         General: No tenderness.      Right lower leg: Edema present.      Left lower leg: Edema present.   Neurological:      General: No focal deficit present.      Mental Status: He is alert and oriented to person, place, and time.   Psychiatric:         Mood and Affect: Mood normal.         Labs:          Recent Labs     09/18/24  0050   SODIUM  135   POTASSIUM 4.0   CHLORIDE 101   CO2 21   BUN 13   CREATININE 0.76   CALCIUM 8.8     Recent Labs     24  0050   GLUCOSE 143*     Recent Labs     24  0323 24  1508 24  0303   RBC 2.27* 2.15* 2.53*   HEMOGLOBIN 7.3* 7.0* 8.1*   HEMATOCRIT 20.7* 19.6* 22.3*   PLATELETCT 27* 22* 17*     Recent Labs     24  0323 24  1508 24  0303   WBC 1.9* 1.5* 1.5*   NEUTSPOLYS 5.30* 8.80* 2.60*   LYMPHOCYTES 92.90* 89.40* 97.40*   MONOCYTES 1.80 0.00 0.00   EOSINOPHILS 0.00 1.80 0.00   BASOPHILS 0.00 0.00 0.00     Recent Labs     24  0050   SODIUM 135   POTASSIUM 4.0   CHLORIDE 101   CO2 21   GLUCOSE 143*   BUN 13   CREATININE 0.76   CALCIUM 8.8     Hemodynamics:  Temp (24hrs), Av.7 °C (98 °F), Min:36.4 °C (97.5 °F), Max:36.9 °C (98.4 °F)  Temperature: 36.6 °C (97.9 °F)  Pulse  Av.3  Min: 61  Max: 99   Blood Pressure : 139/80     Respiratory:    Respiration: 17, Pulse Oximetry: 98 %        RUL Breath Sounds: Clear, RML Breath Sounds: Clear, RLL Breath Sounds: Diminished, YAZAN Breath Sounds: Clear, LLL Breath Sounds: Diminished  Fluids:    Intake/Output Summary (Last 24 hours) at 2024 1041  Last data filed at 2024 0439  Gross per 24 hour   Intake --   Output 1625 ml   Net -1625 ml        GI/Nutrition:  Orders Placed This Encounter   Procedures    Diet Order Diet: Consistent CHO (Diabetic); Miscellaneous modifications: (optional): Vegetarian, Lactose Free     Standing Status:   Standing     Number of Occurrences:   1     Order Specific Question:   Diet:     Answer:   Consistent CHO (Diabetic) [4]     Order Specific Question:   Miscellaneous modifications: (optional)     Answer:   Vegetarian [13]     Order Specific Question:   Miscellaneous modifications: (optional)     Answer:   Lactose Free [5]     Medical Decision Making, by Problem:  Active Hospital Problems    Diagnosis     *Pancytopenia (HCC) [D61.818]     Bacteremia [R78.81]     Dysuria [R30.0]     Dyslipidemia  [E78.5]     B12 deficiency [E53.8]     Type 2 diabetes mellitus, without long-term current use of insulin (HCC) [E11.9]     Thrombocytopenia (HCC) [D69.6]     GI bleed [K92.2]        Plan:  Severe Pancytopenia  related to severe B12 deficiency probably related to chronic use of metformin impairing absorption also vegetarian diet  -Bone marrow biopsy demonstrating hypocellular bone marrow at 15% cellular with prominent stromal elements, markedly decreased maturing granulocytic precursors, decreased erythroid precursors with slight dyserythropoiesis, and markedly decreased megakaryocytes.  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  Normal male karyotype.  Flow no increase in blast  -9/3/2024 started on intramuscular cyanocobalamin 1000 mcg  -B12 was undetectable but his folic acid was normal.   -  Zinc low  and copper levels are normal.  EBV, CMV, parvovirus, HIV, and hepatitis PCR are all negative.  TSH is within normal limits.  Heavy metals none detected.  SHANELL negative.  PNH panel negative  -2024 I did review with pharmacy, since 9/3/2024 he did receive 12 intramuscular shots of vitamin B-12.       2.  Anemia, transfuse if Hgb <7 if no symptoms or <8 with symptoms or bleeding    3.  Thrombocytopenia, transfuse if  PLTs <10 and no symptoms or <20 if febrile, septic, or bleeidnu apheresis PLTs      4.  B12 Deficiency  Undetectable B12. Intrinsic factor Abs, Parietal cell Abs, and gastrin negative.Secondary to impaired absorption from combination of vegetarian diet and years of metformin use. Continue B12 1000mcg and folic acid 1mg.   -2024 since 9/3/2024 got 12 shots of B12.      5.  GI Bleed    6.  Chronic Gastritis  Biopsies from EGD demonstrated chronic gastritis which could be worsening his B12 deficiency beyond just his vegetarian diet.    7.  Bacillus cereus bacteremia  -Completed  vancomycin until 9/18/2024    8.  Severe neutropenia  -Neutropenic precautions  -9/17 started Granix.   -prophylactic antibiotics  with acyclovir, voriconazole and Levaquin     PLAN:  -Continues to have severe pancytopenia, ANC not going up despite growth factor support  -Check daily labs and transfuse if hemoglobin less than 7 g/dL or platelets less than 10,000  -Consider Mckee catheter removal per primary team  - we should start seeing a bone marrow response soon, PNH testing negative  -Continue on Granix and prophylactic antibiotics.       High complexity, complex decision making       Quality-Core Measures

## 2024-09-20 NOTE — PROGRESS NOTES
[facility] PROGRESS NOTE    Attending:   Kamran Long M.d.    Resident:   Mima Carrasco D.O.    PATIENT:   Miri Joseph; [id number]; 1940    ID:   84 y.o. male with history of dyslipidemia, BPH, type 2 DM, and recent GI bleed transferred from [facility] admitted for symptomatic pancytopenia and bone marrow biopsy. He was seen by GI at [facility] and had bidirectional endoscopy on 9/5 with biopsies taken from endoscopy but colonoscopy could not be completed due to inadequate bowel prep. Hgb stable but WBC and platelets continued to drop.  Transferred here for bone marrow biopsy.  Now status post platelet transfusion on 9/7, 9/12, 9/16 and PRBC transfusion 9/15 and 9/19.    SUBJECTIVE:   Patient encountered with his daughter at bedside this morning.  He reports increasing lower extremity edema that has been gradually increasing.  He does not have any associated foot or calf pain with this.  He and his daughter feel that this is because he is not walking as much as he did prior to hospitalization.  He otherwise denies any headache, lightheadedness, chest pain, dyspnea, abdominal pain, N/V/D, easy bruising or bleeding.    OBJECTIVE:  Vitals:    09/20/24 0017 09/20/24 0437 09/20/24 0813 09/20/24 1118   BP: 122/68 124/56 139/80 105/61   Pulse: 72 68 74 86   Resp: 18 18 17 16   Temp: 36.8 °C (98.2 °F) 36.4 °C (97.6 °F) 36.6 °C (97.9 °F) 36.7 °C (98 °F)   TempSrc: Oral Oral Oral Oral   SpO2: 99% 92% 98% 100%   Weight:       Height:           Intake/Output Summary (Last 24 hours) at 9/20/2024 1327  Last data filed at 9/20/2024 0017  Gross per 24 hour   Intake 636 ml   Output 1200 ml   Net -564 ml     PHYSICAL EXAM:   Const: Awake and sitting on the edge of his bed, NAD  HEENT: NC/AT, MMM  Resp: Normal WOB  CV: Regular rate and rhythm, warm well-perfused, cap refill 2-3 sec  Skin: Visible skin without rash, bruising on b/l upper extremities  Ext: Bilateral lower extremities with  "1+ pitting edema from mid calf down that is symmetric, no tenderness to palpation of the calves    LABS:  Recent Labs     09/19/24  0323 09/19/24  1508 09/20/24  0303   WBC 1.9* 1.5* 1.5*   RBC 2.27* 2.15* 2.53*   HEMOGLOBIN 7.3* 7.0* 8.1*   HEMATOCRIT 20.7* 19.6* 22.3*   MCV 91.2 91.2 88.1   MCH 32.2 32.6 32.0   RDW 50.1* 50.4* 48.6   PLATELETCT 27* 22* 17*   MPV 10.6 10.1 10.8   NEUTSPOLYS 5.30* 8.80* 2.60*   LYMPHOCYTES 92.90* 89.40* 97.40*   MONOCYTES 1.80 0.00 0.00   EOSINOPHILS 0.00 1.80 0.00   BASOPHILS 0.00 0.00 0.00   RBCMORPHOLO Present Present Present     Recent Labs     09/18/24  0050   SODIUM 135   POTASSIUM 4.0   CHLORIDE 101   CO2 21   BUN 13   CREATININE 0.76   CALCIUM 8.8     Estimated GFR/CRCL = Estimated Creatinine Clearance: 70.1 mL/min (by C-G formula based on SCr of 0.76 mg/dL).  Recent Labs     09/18/24  0050   GLUCOSE 143*                   No results for input(s): \"INR\", \"APTT\", \"FIBRINOGEN\" in the last 72 hours.    Invalid input(s): \"DIMER\"      IMAGING:  IR-US GUIDED PIV   Final Result    Ultrasound-guided PERIPHERAL IV INSERTION performed by    qualified nursing staff as above.          MEDS:  Current Facility-Administered Medications   Medication Last Admin    levoFLOXacin (Levaquin) tablet 500 mg 500 mg at 09/20/24 0515    insulin regular (HumuLIN R,NovoLIN R) injection 2 Units at 09/20/24 1219    And    dextrose 50% (D50W) injection 25 g      insulin GLARGINE (Lantus,Semglee) injection 7 Units at 09/19/24 1756    tbo-filgrastim (Granix) 300 MCG/0.5ML injection 300 mcg 300 mcg at 09/19/24 1338    [START ON 9/24/2024] cyanocobalamin (Vitamin B-12) injection 1,000 mcg      senna-docusate (Pericolace Or Senokot S) 8.6-50 MG per tablet 2 Tablet 2 Tablet at 09/16/24 1755    And    polyethylene glycol/lytes (Miralax) Packet 1 Packet 1 Packet at 09/16/24 1253    SITagliptin (Januvia) tablet 100 mg 100 mg at 09/20/24 0515    acyclovir (Zovirax) tablet 400 mg 400 mg at 09/20/24 0515    " voriconazole (Vfend) tablet 200 mg 200 mg at 09/20/24 0515    folic acid (Folvite) tablet 1 mg 1 mg at 09/20/24 0515    pantoprazole (Protonix) injection 40 mg 40 mg at 09/20/24 0515    acetaminophen (Tylenol) tablet 650 mg 650 mg at 09/13/24 0610    ondansetron (Zofran ODT) dispertab 4 mg      ondansetron (Zofran) syringe/vial injection 4 mg      atorvastatin (Lipitor) tablet 10 mg 10 mg at 09/19/24 2050       ASSESSMENT/PLAN:  84 y.o. male with history of dyslipidemia, BPH, type 2 DM, and recent GI bleed transferred from [facility] admitted for symptomatic pancytopenia and bone marrow biopsy. He was seen by GI at [facility] and had bidirectional endoscopy on 9/5 with biopsies taken from endoscopy but colonoscopy could not be completed due to inadequate bowel prep. Hgb stable but WBC and platelets continue to downtrend, now with critically low absolute neutrophils. Status post platelet transfusion on 9/7 and 9/12.  Bone marrow biopsy performed  9/10.     * Pancytopenia (HCC)- (present on admission)  Assessment & Plan  Continues to have severe neutropenia with critically low absolute neutrophils. Neutropenic precautions in place.  Vital signs stable, afebrile. Bone marrow biopsy performed 9/10 demonstrating hypocellular bone marrow at 15% cellular with prominent stromal elements, markedly decreased maturing granulocytic precursors, decreased erythroid precursors with slight dyserythropoiesis, and markedly decreased megakaryocytes. 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 Additional results including FISH pending.  Hematology/oncology consulted and closely following.    Plan:  -CBC q12h  -Transfuse platelets per oncology recs below  -Transfuse pRBCs for Hb goal 7  -Neutropenic precautions  -Oncology consulted, following:  -Continue supportive measures and B12  -Transfuse for platelets  < 20,000 unless febrile, bleeding, sepsis then <10,000, hemoglobin < 7  -Bone marrow biopsy with nonspecific findings, pending FISH results for further plan  -Levaquin, Acyclovir and voriconazole  -Additional testing per oncology  -Daily Granix per heme/onc    Bacteremia  Assessment & Plan  Resolved. Blood cultures 9/11 positive for  Bacillus cereus, thuringiensis, & mycoides. Ucx positive for same. Per ID pharm and Dr. Levine of ID, repeat bcx x 2 and treat with vancomycin given pancytopenia.  Repeat blood cultures from 9/12 are negative after 5 days. Vancomycin completed 9/19, duration per ID pharm.    Plan:  -Prophylactic Levaquin started 9/19  -Monitor VS and symptoms  -Patient also receiving acyclovir and voriconazole for prophylaxis    Dysuria  Assessment & Plan  Resolved.  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      Plan:  -Phenazopyridine prn  -Prophylactic Levaquin started 9/19  -Monitor for recurrence of symptoms    Dyslipidemia- (present on admission)  Assessment & Plan  Continue home atorvastatin.     B12 deficiency- (present on admission)  Assessment & Plan  Per Oncology Dr. Long, essentially undetectable B12 so recommends daily injections of B12 x 2 weeks and then can switch to weekly x 1 month, and then monthly from thereon.  We will follow these recommendations.  Additionally, concerned that long-term metformin use has contributed to profound vitamin B12 deficiency.    Plan:  -Vit B12 1,000mcg x 2 weeks (completed 9/17)   -Then vitamin B12 1000 mcg weekly x 1 month (Last dose 10/15)   -Then vitamin B12 1000 mcg monthly  -Recommend oral supplementation at discharge for vegetarian diet, for now the plan will be IM; patient should follow-up on this outpatient  -Will exclude metformin from diabetes regimen at discharge    Type 2 diabetes mellitus, without long-term current use of insulin (HCC)- (present on admission)  Assessment & Plan  On  metformin, glipizide, and Januvia at home.  Per oncology, there is concern that long-term use of metformin may be contributing to profound vitamin B12 deficiency.  Patient will likely need a different home regimen that does not include metformin at discharge.    Plan:  -Continue to hold metformin and glipizide   -Continue Januvia  -Insulin glargine 7 units nightly  -Continue CHO diet and moderate SSI + hypoglycemia protocol    GI bleed- (present on admission)  Assessment & Plan  Appears stable, no bloody stool, no melena.  Vital signs stable.  Patient continues to require periodic transfusions of PRBC this does appear to be secondary to severe pancytopenia rather than blood loss.    Plan:  -Continue daily PPI  -Bowel regimen with senna and MiraLAX  -Continue to monitor for signs of GI bleed: bloody stool, melena  -Outpatient follow-up with GI for repeat colonoscopy as initial did not have complete prep    Thrombocytopenia (HCC)- (present on admission)  Assessment & Plan  Continues to downtrend. Platelets transfused the evening of 9/7, 9/12, and 9/16.  Vital signs stable.  No obvious bleeding or unprovoked bruising.  Anticipate need for additional platelet transfusions during admission.    Plan:  -CBC q12h  -Transfuse platelets per hematology/oncology recs (see A/P for pancytopenia)  -Oncology following     Core Measures  IV Fluids: none  Diet: vegetarian with carb count  Lines: PIV  Antbx: Prophylactic Levaquin, voriconazole, acyclovir  DVT ppx: SCDs, chemical prophylaxis held in setting of thrombocytopenia, patient encouraged to ambulate  Code status: Full Code  Dispo: inpatient for supportive care, monitoring of and transfusions for severe pancytopenia and thrombocytopenia pending results of additional testing, oncology recommendations/plan.     Mima Carrasco D.O.  PGY-1  [facility] Resident

## 2024-09-20 NOTE — CARE PLAN
The patient is Watcher - Medium risk of patient condition declining or worsening    Shift Goals  Clinical Goals: Monitor labs, transfuse RBC  Patient Goals: Rest  Family Goals: not present    Progress made toward(s) clinical / shift goals:       Problem: Knowledge Deficit - Standard  Goal: Patient and family/care givers will demonstrate understanding of plan of care, disease process/condition, diagnostic tests and medications  Outcome: Progressing  Note: A/Ox4, patient able to understand plan of care, all questions answered at this time. Pt received one unit of PRBCs for a hemoglobin of 7.0, tolerated well.      Problem: Knowledge Deficit - Diabetes  Goal: Patient will demonstrate knowledge of insulin injection, symptoms, and treatment of hypoglycemia and diet prior to discharge  Outcome: Progressing  Note: Pt understands importance of blood glucose checks and insulin administration.      Problem: Pain - Standard  Goal: Alleviation of pain or a reduction in pain to the patient’s comfort goal  Outcome: Progressing  Note: Pt had no complaints of pain throughout the shift.        Patient is not progressing towards the following goals:

## 2024-09-21 PROBLEM — K29.50 CHRONIC GASTRITIS WITHOUT BLEEDING: Status: ACTIVE | Noted: 2024-09-21

## 2024-09-21 PROBLEM — Z87.19 HISTORY OF GI BLEED: Status: ACTIVE | Noted: 2024-09-03

## 2024-09-21 LAB
ALBUMIN SERPL BCP-MCNC: 3.3 G/DL (ref 3.2–4.9)
ALBUMIN/GLOB SERPL: 1.2 G/DL
ALP SERPL-CCNC: 72 U/L (ref 30–99)
ALT SERPL-CCNC: 12 U/L (ref 2–50)
ANION GAP SERPL CALC-SCNC: 11 MMOL/L (ref 7–16)
ANISOCYTOSIS BLD QL SMEAR: ABNORMAL
ANISOCYTOSIS BLD QL SMEAR: ABNORMAL
APPEARANCE UR: CLEAR
AST SERPL-CCNC: 19 U/L (ref 12–45)
BARCODED ABORH UBTYP: 6200
BARCODED PRD CODE UBPRD: NORMAL
BARCODED UNIT NUM UBUNT: NORMAL
BASOPHILS # BLD AUTO: 0 % (ref 0–1.8)
BASOPHILS # BLD AUTO: 0 % (ref 0–1.8)
BASOPHILS # BLD: 0 K/UL (ref 0–0.12)
BASOPHILS # BLD: 0 K/UL (ref 0–0.12)
BILIRUB SERPL-MCNC: 0.7 MG/DL (ref 0.1–1.5)
BILIRUB UR QL STRIP.AUTO: NEGATIVE
BUN SERPL-MCNC: 13 MG/DL (ref 8–22)
CALCIUM ALBUM COR SERPL-MCNC: 9.4 MG/DL (ref 8.5–10.5)
CALCIUM SERPL-MCNC: 8.8 MG/DL (ref 8.5–10.5)
CHLORIDE SERPL-SCNC: 104 MMOL/L (ref 96–112)
CO2 SERPL-SCNC: 21 MMOL/L (ref 20–33)
COLOR UR: YELLOW
COMPONENT P 8504P: NORMAL
CREAT SERPL-MCNC: 0.85 MG/DL (ref 0.5–1.4)
EOSINOPHIL # BLD AUTO: 0 K/UL (ref 0–0.51)
EOSINOPHIL # BLD AUTO: 0.04 K/UL (ref 0–0.51)
EOSINOPHIL NFR BLD: 0 % (ref 0–6.9)
EOSINOPHIL NFR BLD: 2.6 % (ref 0–6.9)
ERYTHROCYTE [DISTWIDTH] IN BLOOD BY AUTOMATED COUNT: 48.1 FL (ref 35.9–50)
ERYTHROCYTE [DISTWIDTH] IN BLOOD BY AUTOMATED COUNT: 48.4 FL (ref 35.9–50)
GFR SERPLBLD CREATININE-BSD FMLA CKD-EPI: 87 ML/MIN/1.73 M 2
GLOBULIN SER CALC-MCNC: 2.8 G/DL (ref 1.9–3.5)
GLUCOSE BLD STRIP.AUTO-MCNC: 121 MG/DL (ref 65–99)
GLUCOSE BLD STRIP.AUTO-MCNC: 183 MG/DL (ref 65–99)
GLUCOSE BLD STRIP.AUTO-MCNC: 197 MG/DL (ref 65–99)
GLUCOSE BLD STRIP.AUTO-MCNC: 206 MG/DL (ref 65–99)
GLUCOSE SERPL-MCNC: 106 MG/DL (ref 65–99)
GLUCOSE UR STRIP.AUTO-MCNC: 100 MG/DL
HCT VFR BLD AUTO: 21 % (ref 42–52)
HCT VFR BLD AUTO: 23.1 % (ref 42–52)
HEMOCCULT STL QL: NEGATIVE
HGB BLD-MCNC: 7.6 G/DL (ref 14–18)
HGB BLD-MCNC: 8.3 G/DL (ref 14–18)
KETONES UR STRIP.AUTO-MCNC: NEGATIVE MG/DL
LEUKOCYTE ESTERASE UR QL STRIP.AUTO: NEGATIVE
LYMPHOCYTES # BLD AUTO: 1.17 K/UL (ref 1–4.8)
LYMPHOCYTES # BLD AUTO: 1.34 K/UL (ref 1–4.8)
LYMPHOCYTES NFR BLD: 95.7 % (ref 22–41)
LYMPHOCYTES NFR BLD: 97.4 % (ref 22–41)
MANUAL DIFF BLD: NORMAL
MANUAL DIFF BLD: NORMAL
MCH RBC QN AUTO: 31.8 PG (ref 27–33)
MCH RBC QN AUTO: 32.1 PG (ref 27–33)
MCHC RBC AUTO-ENTMCNC: 35.9 G/DL (ref 32.3–36.5)
MCHC RBC AUTO-ENTMCNC: 36.2 G/DL (ref 32.3–36.5)
MCV RBC AUTO: 88.5 FL (ref 81.4–97.8)
MCV RBC AUTO: 88.6 FL (ref 81.4–97.8)
MICRO URNS: ABNORMAL
MICROCYTES BLD QL SMEAR: ABNORMAL
MICROCYTES BLD QL SMEAR: ABNORMAL
MONOCYTES # BLD AUTO: 0 K/UL (ref 0–0.85)
MONOCYTES # BLD AUTO: 0.01 K/UL (ref 0–0.85)
MONOCYTES NFR BLD AUTO: 0 % (ref 0–13.4)
MONOCYTES NFR BLD AUTO: 0.9 % (ref 0–13.4)
MORPHOLOGY BLD-IMP: NORMAL
MORPHOLOGY BLD-IMP: NORMAL
NEUTROPHILS # BLD AUTO: 0.02 K/UL (ref 1.82–7.42)
NEUTROPHILS # BLD AUTO: 0.02 K/UL (ref 1.82–7.42)
NEUTROPHILS NFR BLD: 1.7 % (ref 44–72)
NEUTROPHILS NFR BLD: 1.7 % (ref 44–72)
NITRITE UR QL STRIP.AUTO: NEGATIVE
NRBC # BLD AUTO: 0 K/UL
NRBC # BLD AUTO: 0 K/UL
NRBC BLD-RTO: 0 /100 WBC (ref 0–0.2)
NRBC BLD-RTO: 0 /100 WBC (ref 0–0.2)
PH UR STRIP.AUTO: 7 [PH] (ref 5–8)
PLATELET # BLD AUTO: 10 K/UL (ref 164–446)
PLATELET # BLD AUTO: 12 K/UL (ref 164–446)
PLATELET BLD QL SMEAR: NORMAL
PLATELET BLD QL SMEAR: NORMAL
PLATELETS.RETICULATED NFR BLD AUTO: 0.7 % (ref 0.6–13.1)
PLATELETS.RETICULATED NFR BLD AUTO: 1.1 % (ref 0.6–13.1)
PMV BLD AUTO: 12.3 FL (ref 9–12.9)
PMV BLD AUTO: 12.6 FL (ref 9–12.9)
POTASSIUM SERPL-SCNC: 3.8 MMOL/L (ref 3.6–5.5)
PRODUCT TYPE UPROD: NORMAL
PROT SERPL-MCNC: 6.1 G/DL (ref 6–8.2)
PROT UR QL STRIP: NEGATIVE MG/DL
RBC # BLD AUTO: 2.37 M/UL (ref 4.7–6.1)
RBC # BLD AUTO: 2.61 M/UL (ref 4.7–6.1)
RBC BLD AUTO: PRESENT
RBC BLD AUTO: PRESENT
RBC UR QL AUTO: NEGATIVE
SODIUM SERPL-SCNC: 136 MMOL/L (ref 135–145)
SP GR UR STRIP.AUTO: 1.01
UNIT STATUS USTAT: NORMAL
UROBILINOGEN UR STRIP.AUTO-MCNC: 1 MG/DL
WBC # BLD AUTO: 1.2 K/UL (ref 4.8–10.8)
WBC # BLD AUTO: 1.4 K/UL (ref 4.8–10.8)

## 2024-09-21 PROCEDURE — A9270 NON-COVERED ITEM OR SERVICE: HCPCS | Performed by: INTERNAL MEDICINE

## 2024-09-21 PROCEDURE — 99232 SBSQ HOSP IP/OBS MODERATE 35: CPT | Mod: GC | Performed by: FAMILY MEDICINE

## 2024-09-21 PROCEDURE — 86945 BLOOD PRODUCT/IRRADIATION: CPT

## 2024-09-21 PROCEDURE — 85027 COMPLETE CBC AUTOMATED: CPT

## 2024-09-21 PROCEDURE — 770004 HCHG ROOM/CARE - ONCOLOGY PRIVATE *

## 2024-09-21 PROCEDURE — 700102 HCHG RX REV CODE 250 W/ 637 OVERRIDE(OP): Performed by: STUDENT IN AN ORGANIZED HEALTH CARE EDUCATION/TRAINING PROGRAM

## 2024-09-21 PROCEDURE — P9034 PLATELETS, PHERESIS: HCPCS

## 2024-09-21 PROCEDURE — 82962 GLUCOSE BLOOD TEST: CPT

## 2024-09-21 PROCEDURE — 81003 URINALYSIS AUTO W/O SCOPE: CPT

## 2024-09-21 PROCEDURE — A9270 NON-COVERED ITEM OR SERVICE: HCPCS

## 2024-09-21 PROCEDURE — 700111 HCHG RX REV CODE 636 W/ 250 OVERRIDE (IP): Performed by: INTERNAL MEDICINE

## 2024-09-21 PROCEDURE — 700102 HCHG RX REV CODE 250 W/ 637 OVERRIDE(OP): Performed by: INTERNAL MEDICINE

## 2024-09-21 PROCEDURE — 36415 COLL VENOUS BLD VENIPUNCTURE: CPT

## 2024-09-21 PROCEDURE — 700111 HCHG RX REV CODE 636 W/ 250 OVERRIDE (IP): Mod: JZ,JG | Performed by: INTERNAL MEDICINE

## 2024-09-21 PROCEDURE — 36430 TRANSFUSION BLD/BLD COMPNT: CPT

## 2024-09-21 PROCEDURE — 700102 HCHG RX REV CODE 250 W/ 637 OVERRIDE(OP)

## 2024-09-21 PROCEDURE — 82272 OCCULT BLD FECES 1-3 TESTS: CPT

## 2024-09-21 PROCEDURE — A9270 NON-COVERED ITEM OR SERVICE: HCPCS | Performed by: STUDENT IN AN ORGANIZED HEALTH CARE EDUCATION/TRAINING PROGRAM

## 2024-09-21 PROCEDURE — 85055 RETICULATED PLATELET ASSAY: CPT | Mod: 91

## 2024-09-21 PROCEDURE — 80053 COMPREHEN METABOLIC PANEL: CPT

## 2024-09-21 PROCEDURE — 85007 BL SMEAR W/DIFF WBC COUNT: CPT

## 2024-09-21 RX ADMIN — LEVOFLOXACIN 500 MG: 500 TABLET, FILM COATED ORAL at 04:41

## 2024-09-21 RX ADMIN — VORICONAZOLE 200 MG: 200 TABLET ORAL at 04:41

## 2024-09-21 RX ADMIN — INSULIN HUMAN 2 UNITS: 100 INJECTION, SOLUTION PARENTERAL at 21:05

## 2024-09-21 RX ADMIN — INSULIN HUMAN 2 UNITS: 100 INJECTION, SOLUTION PARENTERAL at 12:30

## 2024-09-21 RX ADMIN — OMEPRAZOLE 40 MG: 20 CAPSULE, DELAYED RELEASE ORAL at 17:38

## 2024-09-21 RX ADMIN — SENNOSIDES AND DOCUSATE SODIUM 2 TABLET: 50; 8.6 TABLET ORAL at 17:38

## 2024-09-21 RX ADMIN — PANTOPRAZOLE SODIUM 40 MG: 40 INJECTION, POWDER, FOR SOLUTION INTRAVENOUS at 04:42

## 2024-09-21 RX ADMIN — VORICONAZOLE 200 MG: 200 TABLET ORAL at 17:38

## 2024-09-21 RX ADMIN — ACYCLOVIR 400 MG: 400 TABLET ORAL at 04:41

## 2024-09-21 RX ADMIN — ATORVASTATIN CALCIUM 10 MG: 10 TABLET, FILM COATED ORAL at 21:02

## 2024-09-21 RX ADMIN — TBO-FILGRASTIM 300 MCG: 300 INJECTION, SOLUTION SUBCUTANEOUS at 13:35

## 2024-09-21 RX ADMIN — FOLIC ACID 1 MG: 1 TABLET ORAL at 04:41

## 2024-09-21 RX ADMIN — INSULIN HUMAN 3 UNITS: 100 INJECTION, SOLUTION PARENTERAL at 18:01

## 2024-09-21 RX ADMIN — ACYCLOVIR 400 MG: 400 TABLET ORAL at 17:39

## 2024-09-21 RX ADMIN — SITAGLIPTIN 100 MG: 100 TABLET, FILM COATED ORAL at 04:41

## 2024-09-21 ASSESSMENT — ENCOUNTER SYMPTOMS
MUSCULOSKELETAL NEGATIVE: 1
CHILLS: 0
WEAKNESS: 1
RESPIRATORY NEGATIVE: 1
FEVER: 0
GASTROINTESTINAL NEGATIVE: 1
PSYCHIATRIC NEGATIVE: 1
EYES NEGATIVE: 1
WEIGHT LOSS: 0

## 2024-09-21 ASSESSMENT — FIBROSIS 4 INDEX: FIB4 SCORE: 37.48

## 2024-09-21 ASSESSMENT — PAIN DESCRIPTION - PAIN TYPE
TYPE: ACUTE PAIN
TYPE: ACUTE PAIN

## 2024-09-21 NOTE — ASSESSMENT & PLAN NOTE
Resolved. Likely was secondary to decreased ambulation inside room. Resolved with encouraged ambulation, NO hose.    Plan:  -Encouraged to continue ambulation around room  -NO hose as needed

## 2024-09-21 NOTE — CARE PLAN
The patient is Watcher - Medium risk of patient condition declining or worsening    Shift Goals  Clinical Goals: Monitor labs, transfuse prn  Patient Goals: Rest, cluster care  Family Goals: not present    Progress made toward(s) clinical / shift goals:       Problem: Knowledge Deficit - Standard  Goal: Patient and family/care givers will demonstrate understanding of plan of care, disease process/condition, diagnostic tests and medications  Outcome: Progressing  Note: A/Ox4, patient able to understand plan of care, all questions answered at this time.      Problem: Knowledge Deficit - Diabetes  Goal: Patient will demonstrate knowledge of insulin injection, symptoms, and treatment of hypoglycemia and diet prior to discharge  Outcome: Progressing  Note: Pt understands importance of fingerstick blood glucose checks and insulin administration.      Problem: Pain - Standard  Goal: Alleviation of pain or a reduction in pain to the patient’s comfort goal  Outcome: Progressing  Note: Pt had no complaints of pain throughout the shift.        Patient is not progressing towards the following goals:

## 2024-09-21 NOTE — PROGRESS NOTES
Per Pool in micro     Critical Results: WBC 1.2; Platelet 10; ANC 0.02  Repeated critical results read back for verification. Results verified.     MD notified of critical results    Started platelet transfusion per order.     Will continue to monitor.

## 2024-09-21 NOTE — CARE PLAN
The patient is Stable - Low risk of patient condition declining or worsening    Shift Goals  Clinical Goals: remain free from infection; VSS  Patient Goals: Rest, cluster care  Family Goals: not present    Progress made toward(s) clinical / shift goals:  Isolation Precautions:    Patient is on protective isolation for neutorpenia.    Patient educated on reason for isolation, how the infection may be transmitted, and how to help prevent transmission to others.     Patient educated that protective precautions involves staff and visitors wearing PPE, follow  Standard Precautions and perform meticulous hand hygiene in order to prevent transmission of infection. Patient transport and mobilization on unit. Patient educated that they may leave their room, but prior to exiting, the patient needs to have on a fresh patient gown, ensure the potentially infectious area is covered, perform appropriate hand hygiene immediately prior to exiting the room. (*For Airborne Precautions: Patient educated that time out of the room should be minimized and limited to transport to diagnostic procedures or other activities. Patient is to wear surgical mask when required to leave the patient room).    Patient educated on importance of proper hand washing and oral care, frequent ambulation, and importance of being up out of bed.       Patient is not progressing towards the following goals:

## 2024-09-21 NOTE — PROGRESS NOTES
[facility] MEDICINE PROGRESS NOTE    Attending:   Greta Lal M.d.    Resident:   Mima Carrasco D.O.    Senior Resident:   Michel Bean MD    PATIENT:   Miri Joseph; [id number]; 1940    ID:   84 y.o. male with history of dyslipidemia, BPH, type 2 DM, and recent GI bleed transferred from [facility] admitted for symptomatic pancytopenia and bone marrow biopsy. He was seen by GI at [facility] and had bidirectional endoscopy on 9/5 with biopsies taken from endoscopy but colonoscopy could not be completed due to inadequate bowel prep. Hgb stable but WBC and platelets continued to drop.  Transferred here for bone marrow biopsy.  Now status post platelet transfusion on 9/7, 9/12, 9/16 and PRBC transfusion 9/15 and 9/19.    SUBJECTIVE:   Patient encountered this morning and notes a small area of pain and firmness on his left forearm where he had an IV previously.  He states that his lower extremity swelling has resolved overnight.  He denies any headache, lightheadedness, chest pain, dyspnea, abdominal pain, N/V/D, bloody stool or melena, easy bruising or bleeding.    OBJECTIVE:  Vitals:    09/20/24 1551 09/20/24 2000 09/21/24 0000 09/21/24 0400   BP: 118/60 129/55 (!) 143/71 (!) 148/70   Pulse: 79 84 73 84   Resp: 15 20 18 18   Temp: 36.6 °C (97.8 °F) 36.7 °C (98.1 °F) 36.7 °C (98 °F) 36.6 °C (97.8 °F)   TempSrc: Oral Oral Oral Oral   SpO2: 98% 98% 98% 98%   Weight:       Height:         No intake or output data in the 24 hours ending 09/21/24 0633    PHYSICAL EXAM:   Const: Awake and alert, resting comfortably in bed  HEENT: NC/AT, MMM  Resp: LCTAB, no respiratory distress  CV: Regular rate and rhythm, warm well-perfused, cap refill 2-3 sec  Skin: Visible skin without rash, bruising on b/l upper extremities, warm and dry  Ext: Trace edema bilateral lower extremities, on the left forearm is a small firm area that is mildly tender to palpation with a small overlying ecchymosis, no  "erythema or warmth, no fluctuance    LABS:  Recent Labs     09/20/24  0303 09/20/24  1757 09/21/24  0217   WBC 1.5* 2.0* 1.4*   RBC 2.53* 2.69* 2.37*   HEMOGLOBIN 8.1* 8.5* 7.6*   HEMATOCRIT 22.3* 24.0* 21.0*   MCV 88.1 89.2 88.6   MCH 32.0 31.6 32.1   RDW 48.6 50.7* 48.1   PLATELETCT 17* 14* 12*   MPV 10.8 11.1 12.3   NEUTSPOLYS 2.60* 1.80* 1.70*   LYMPHOCYTES 97.40* 97.30* 95.70*   MONOCYTES 0.00 0.90 0.00   EOSINOPHILS 0.00 0.00 2.60   BASOPHILS 0.00 0.00 0.00   RBCMORPHOLO Present Present Present     Recent Labs     09/21/24  0217   SODIUM 136   POTASSIUM 3.8   CHLORIDE 104   CO2 21   BUN 13   CREATININE 0.85   CALCIUM 8.8   ALBUMIN 3.3     Estimated GFR/CRCL = Estimated Creatinine Clearance: 62.6 mL/min (by C-G formula based on SCr of 0.85 mg/dL).  Recent Labs     09/21/24  0217   GLUCOSE 106*     Recent Labs     09/21/24  0217   ASTSGOT 19   ALTSGPT 12   TBILIRUBIN 0.7   ALKPHOSPHAT 72   GLOBULIN 2.8               No results for input(s): \"INR\", \"APTT\", \"FIBRINOGEN\" in the last 72 hours.    Invalid input(s): \"DIMER\"      IMAGING:  IR-US GUIDED PIV   Final Result    Ultrasound-guided PERIPHERAL IV INSERTION performed by    qualified nursing staff as above.          MEDS:  Current Facility-Administered Medications   Medication Last Admin    levoFLOXacin (Levaquin) tablet 500 mg 500 mg at 09/21/24 0441    insulin regular (HumuLIN R,NovoLIN R) injection 2 Units at 09/20/24 2005    And    dextrose 50% (D50W) injection 25 g      insulin GLARGINE (Lantus,Semglee) injection 7 Units at 09/20/24 1726    tbo-filgrastim (Granix) 300 MCG/0.5ML injection 300 mcg 300 mcg at 09/20/24 1422    [START ON 9/24/2024] cyanocobalamin (Vitamin B-12) injection 1,000 mcg      senna-docusate (Pericolace Or Senokot S) 8.6-50 MG per tablet 2 Tablet 2 Tablet at 09/16/24 1755    And    polyethylene glycol/lytes (Miralax) Packet 1 Packet 1 Packet at 09/16/24 1253    SITagliptin (Januvia) tablet 100 mg 100 mg at 09/21/24 0441    acyclovir " (Zovirax) tablet 400 mg 400 mg at 09/21/24 0441    voriconazole (Vfend) tablet 200 mg 200 mg at 09/21/24 0441    folic acid (Folvite) tablet 1 mg 1 mg at 09/21/24 0441    pantoprazole (Protonix) injection 40 mg 40 mg at 09/21/24 0442    acetaminophen (Tylenol) tablet 650 mg 650 mg at 09/13/24 0610    ondansetron (Zofran ODT) dispertab 4 mg      ondansetron (Zofran) syringe/vial injection 4 mg      atorvastatin (Lipitor) tablet 10 mg 10 mg at 09/20/24 2002       ASSESSMENT/PLAN:  84 y.o. male with history of dyslipidemia, BPH, type 2 DM, and recent GI bleed transferred from [facility] admitted for symptomatic pancytopenia and bone marrow biopsy. He was seen by GI at [facility] and had bidirectional endoscopy on 9/5 with biopsies taken from endoscopy but colonoscopy could not be completed due to inadequate bowel prep. Hgb stable but WBC and platelets continue to downtrend, now with critically low absolute neutrophils. Status post platelet transfusion on 9/7 and 9/12.  Bone marrow biopsy performed 9/10.     * Pancytopenia (HCC)- (present on admission)  Assessment & Plan  Continues to have severe neutropenia with critically low absolute neutrophils, today at 0.02. Neutropenic precautions in place.  Vital signs stable, afebrile. Bone marrow biopsy non-specific findings. Additional results including FISH pending.  Hematology/oncology consulted and closely following.    Plan:  -CBC q12h, would consider move to once daily given overall pancytopenia  -Transfuse platelets per oncology recs below  -Transfuse pRBCs for Hb goal 7  -Neutropenic precautions  -Oncology consulted, following:  -Continue supportive measures and B12  -Transfuse for platelets < 20,000 unless febrile, bleeding, sepsis then <10,000, hemoglobin < 7  -Bone marrow biopsy with nonspecific findings, pending FISH results for further plan  -Levaquin, Acyclovir and voriconazole  -Additional testing per oncology  -Daily Granix per  heme/onc  -Anticipating BM recovery by 9/24, 3 weeks into B12 treatment. If not, then can discharge and continue therapy outpatient  -FOB ordered to evaluate for any ongoing occult blood loss  -UA ordered to evaluate for any ongoing occult blood loss    Chronic gastritis without bleeding- (present on admission)  Assessment & Plan  Stable.  Demonstrated on EGD biopsies.    Plan:  -Discontinue protonix IV 40 mg BID in favor of PO   -Omeprazole 40mg BID    Bilateral lower extremity edema  Assessment & Plan  Improved. Gradual onset.  Bilateral 1+ pitting edema that is symmetric.  Patient feels secondary to decreased ambulation from his typical several miles daily.  No associated pain.  VSS, no signs of volume overload on exam.  Chemistry and albumin checked 9/21 within normal limits.  Edema improved overnight with NO hose and elevation of lower extremities.  Low suspicion for DVT given severe thrombocytopenia, symmetric edema, no associated pain, and patient ambulating around room frequently.    Plan:  -Encouraged to continue ambulation around room  -NO hose as needed    Bacteremia  Assessment & Plan  Resolved. Blood cultures 9/11 positive for  Bacillus cereus, thuringiensis, & mycoides. Ucx positive for same. Per ID pharm and Dr. Levine of ID, repeat bcx x 2 and treat with vancomycin given pancytopenia.  Repeat blood cultures from 9/12 are negative after 5 days. Vancomycin completed 9/19, duration per [facility] pharm.    Plan:  -Prophylactic Levaquin started 9/19  -Monitor VS and symptoms  -Patient also receiving acyclovir and voriconazole for prophylaxis    Dysuria  Assessment & Plan  Resolved.  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      Plan:  -Phenazopyridine prn  -Prophylactic Levaquin started 9/19  -Monitor for recurrence of symptoms    Dyslipidemia- (present on admission)  Assessment & Plan  Continue home atorvastatin.     B12 deficiency- (present on  admission)  Assessment & Plan  Per Oncology Dr. Long, essentially undetectable B12 so recommends daily injections of B12 x 2 weeks and then can switch to weekly x 1 month, and then monthly from thereon.  We will follow these recommendations.  Additionally, concerned that long-term metformin use has contributed to profound vitamin B12 deficiency.    Plan:  -Vit B12 1,000mcg x 2 weeks (completed 9/17)   -Then vitamin B12 1000 mcg weekly x 1 month (Last dose 10/15)   -Then vitamin B12 1000 mcg monthly  -Recommend oral supplementation at discharge for vegetarian diet, for now the plan will be IM; patient should follow-up on this outpatient  -Will exclude metformin from diabetes regimen at discharge    Type 2 diabetes mellitus, without long-term current use of insulin (HCC)- (present on admission)  Assessment & Plan  On metformin, glipizide, and Januvia at home.  Per oncology, there is concern that long-term use of metformin may be contributing to profound vitamin B12 deficiency.  Patient will need a different home regimen that does not include metformin at discharge.    Plan:  -Continue to hold metformin and glipizide   -Continue Januvia  -Insulin glargine 7 units nightly  -Continue CHO diet and moderate SSI + hypoglycemia protocol    History of GI bleed- (present on admission)  Assessment & Plan  Appears stable, no bloody stool, no melena.  Vital signs stable.  Patient continues to require periodic transfusions of PRBC this does appear to be secondary to severe pancytopenia rather than blood loss.    Plan:  -Continue daily PPI, transitioned to PO 9/21  -Bowel regimen with senna and MiraLAX  -Continue to monitor for signs of GI bleed: bloody stool, melena  -FOB ordered to evaluate any occult blood loss  -Outpatient follow-up with GI for repeat colonoscopy as initial did not have complete prep    Thrombocytopenia (HCC)- (present on admission)  Assessment & Plan  Continues to downtrend. Platelets transfused the  evening of 9/7, 9/12, and 9/16.  Vital signs stable.  No obvious bleeding or unprovoked bruising.  Anticipate need for additional platelet transfusions during admission.    Plan:  -CBC q12h  -Transfuse platelets per hematology/oncology recs (see A/P for pancytopenia)  -Oncology following     Core Measures  IV Fluids: none  Diet: vegetarian with carb count  Lines: PIV  Antbx: Prophylactic Levaquin, voriconazole, acyclovir  DVT ppx: SCDs, chemical prophylaxis held in setting of thrombocytopenia, patient ambulating frequently in room  Code status: Full Code  Dispo: inpatient for supportive care, monitoring of and transfusions for severe pancytopenia and thrombocytopenia pending results of additional testing, oncology recommendations/plan.     Mima Carrasco D.O.  PGY-1  [facility] Resident

## 2024-09-21 NOTE — PROGRESS NOTES
Oncology/Hematology Progress Note               Author: Donaldo Seals III, M.D. Date & Time created: 9/21/2024  8:54 AM   Pancytopenia likely due to severe B12 deficiency  Interval History:  No complaints overnight, he feels fine.  No fever, chills, blood counts not recovering yet remains severely pancytopenic, platelet count continues to drop, ANC 0.02 despite growth factor support    PMHx, PSurgHx, SocHx, FAMHx: Reviewed and unchanged    Review of Systems:  Review of Systems   Constitutional:  Positive for malaise/fatigue. Negative for chills, fever and weight loss.   HENT: Negative.     Eyes: Negative.    Respiratory: Negative.     Cardiovascular:  Positive for leg swelling.   Gastrointestinal: Negative.    Genitourinary: Negative.    Musculoskeletal: Negative.    Skin: Negative.    Neurological:  Positive for weakness.   Psychiatric/Behavioral: Negative.         Physical Exam:  Physical Exam  Constitutional:       Appearance: Normal appearance.   HENT:      Head: Normocephalic and atraumatic.      Nose: Nose normal.      Mouth/Throat:      Mouth: Mucous membranes are moist.   Eyes:      Extraocular Movements: Extraocular movements intact.      Pupils: Pupils are equal, round, and reactive to light.   Cardiovascular:      Rate and Rhythm: Normal rate and regular rhythm.      Pulses: Normal pulses.      Heart sounds: Normal heart sounds.   Pulmonary:      Effort: Pulmonary effort is normal.      Breath sounds: Normal breath sounds.   Abdominal:      General: Abdomen is flat.      Palpations: Abdomen is soft.   Musculoskeletal:         General: No tenderness.      Right lower leg: Edema present.      Left lower leg: Edema present.   Neurological:      General: No focal deficit present.      Mental Status: He is alert and oriented to person, place, and time.   Psychiatric:         Mood and Affect: Mood normal.         Labs:          Recent Labs     09/21/24  0217   SODIUM 136   POTASSIUM 3.8   CHLORIDE 104   CO2 21    BUN 13   CREATININE 0.85   CALCIUM 8.8     Recent Labs     24  0217   ALTSGPT 12   ASTSGOT 19   ALKPHOSPHAT 72   TBILIRUBIN 0.7   GLUCOSE 106*     Recent Labs     24  0303 24  0217   RBC 2.53* 2.69* 2.37*   HEMOGLOBIN 8.1* 8.5* 7.6*   HEMATOCRIT 22.3* 24.0* 21.0*   PLATELETCT 17* 14* 12*     Recent Labs     24  0303 09/20/24  1757 09/21/24  021   WBC 1.5* 2.0* 1.4*   NEUTSPOLYS 2.60* 1.80* 1.70*   LYMPHOCYTES 97.40* 97.30* 95.70*   MONOCYTES 0.00 0.90 0.00   EOSINOPHILS 0.00 0.00 2.60   BASOPHILS 0.00 0.00 0.00   ASTSGOT  --   --  19   ALTSGPT  --   --  12   ALKPHOSPHAT  --   --  72   TBILIRUBIN  --   --  0.7     Recent Labs     24  0217   SODIUM 136   POTASSIUM 3.8   CHLORIDE 104   CO2 21   GLUCOSE 106*   BUN 13   CREATININE 0.85   CALCIUM 8.8     Hemodynamics:  Temp (24hrs), Av.7 °C (98 °F), Min:36.6 °C (97.8 °F), Max:36.7 °C (98.1 °F)  Temperature: 36.7 °C (98 °F)  Pulse  Av.4  Min: 61  Max: 99   Blood Pressure : 129/67     Respiratory:    Respiration: 16, Pulse Oximetry: 99 %     Work Of Breathing / Effort: Within Normal Limits  RUL Breath Sounds: Clear, RML Breath Sounds: Clear, RLL Breath Sounds: Diminished, YAZAN Breath Sounds: Clear, LLL Breath Sounds: Diminished  Fluids:    Intake/Output Summary (Last 24 hours) at 2024 1041  Last data filed at 2024 0439  Gross per 24 hour   Intake --   Output 1625 ml   Net -1625 ml        GI/Nutrition:  Orders Placed This Encounter   Procedures    Diet Order Diet: Consistent CHO (Diabetic); Miscellaneous modifications: (optional): Vegetarian, Lactose Free     Standing Status:   Standing     Number of Occurrences:   1     Order Specific Question:   Diet:     Answer:   Consistent CHO (Diabetic) [4]     Order Specific Question:   Miscellaneous modifications: (optional)     Answer:   Vegetarian [13]     Order Specific Question:   Miscellaneous modifications: (optional)     Answer:   Lactose Free [5]     Medical  Decision Making, by Problem:  Active Hospital Problems    Diagnosis     *Pancytopenia (HCC) [D61.818]     Bacteremia [R78.81]     Dysuria [R30.0]     Dyslipidemia [E78.5]     B12 deficiency [E53.8]     Type 2 diabetes mellitus, without long-term current use of insulin (HCC) [E11.9]     Thrombocytopenia (HCC) [D69.6]     GI bleed [K92.2]        Plan:  Severe Pancytopenia  related to severe B12 deficiency probably related to chronic use of metformin impairing absorption also vegetarian diet  -Bone marrow biopsy demonstrating hypocellular bone marrow at 15% cellular with prominent stromal elements, markedly decreased maturing granulocytic precursors, decreased erythroid precursors with slight dyserythropoiesis, and markedly decreased megakaryocytes.  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  Normal male karyotype.  Flow no increase in blast  -9/3/2024 started on intramuscular cyanocobalamin 1000 mcg  -B12 was undetectable but his folic acid was normal.   -  Zinc low  and copper levels are normal.  EBV, CMV, parvovirus, HIV, and hepatitis PCR are all negative.  TSH is within normal limits.  Heavy metals none detected.  SHANELL negative.  PNH panel negative  -2024 I did review with pharmacy, since 9/3/2024 he did receive 12 intramuscular shots of vitamin B-12.       2.  Anemia, transfuse if Hgb <7 if no symptoms or <8 with symptoms or bleeding    3.  Thrombocytopenia, transfuse if  PLTs <10 and no symptoms or <20 if febrile, septic, or bleeidnu apheresis PLTs      4.  B12 Deficiency  Undetectable B12. Intrinsic factor Abs, Parietal cell Abs, and gastrin negative.Secondary to impaired absorption from combination of vegetarian diet and years of metformin use. Continue B12 1000mcg and folic acid 1mg.   -2024 since 9/3/2024 got 12 shots of B12.      5.  GI Bleed    6.  Chronic Gastritis  Biopsies from  EGD demonstrated chronic gastritis which could be worsening his B12 deficiency beyond just his vegetarian diet.    7.  Bacillus cereus bacteremia  -Completed vancomycin until 9/18/2024    8.  Severe neutropenia  -Neutropenic precautions  -9/17 started Granix.   -prophylactic antibiotics  with acyclovir, voriconazole and Levaquin     PLAN:  - he will be 3 weeks into treatment with B12 replacement on 9/24, expecting a bone marrow recovery.  Will plan to keep him in the hospital for a few more days but if no improvement he can continue therapy as an outpatient, if he is discharged and remains severely pancytopenic he will remain on prophylactic antibiotics also we can arrange blood checks twice a week and transfuse if hemoglobin less than 8 g/dL or platelets less than 20,000 in the outpatient setting.  Meantime continue to monitor closely  -Continue on growth factor support  - while he is in the hospital will transfuse blood products with parameters as stated above  -Due for next dose of B12 shot on 9/24      High complexity, complex decision making       Quality-Core Measures

## 2024-09-22 ENCOUNTER — APPOINTMENT (OUTPATIENT)
Dept: RADIOLOGY | Facility: MEDICAL CENTER | Age: 84
DRG: 809 | End: 2024-09-22
Payer: MEDICARE

## 2024-09-22 PROBLEM — R05.8 PRODUCTIVE COUGH: Status: ACTIVE | Noted: 2024-09-22

## 2024-09-22 LAB
ANION GAP SERPL CALC-SCNC: 11 MMOL/L (ref 7–16)
ANISOCYTOSIS BLD QL SMEAR: ABNORMAL
BASOPHILS # BLD AUTO: 0 % (ref 0–1.8)
BASOPHILS # BLD: 0 K/UL (ref 0–0.12)
BUN SERPL-MCNC: 13 MG/DL (ref 8–22)
CALCIUM SERPL-MCNC: 8.8 MG/DL (ref 8.5–10.5)
CHLORIDE SERPL-SCNC: 104 MMOL/L (ref 96–112)
CO2 SERPL-SCNC: 23 MMOL/L (ref 20–33)
COMMENT NL1176: NORMAL
CREAT SERPL-MCNC: 0.85 MG/DL (ref 0.5–1.4)
EOSINOPHIL # BLD AUTO: 0 K/UL (ref 0–0.51)
EOSINOPHIL NFR BLD: 0 % (ref 0–6.9)
ERYTHROCYTE [DISTWIDTH] IN BLOOD BY AUTOMATED COUNT: 48.2 FL (ref 35.9–50)
GFR SERPLBLD CREATININE-BSD FMLA CKD-EPI: 87 ML/MIN/1.73 M 2
GLUCOSE BLD STRIP.AUTO-MCNC: 145 MG/DL (ref 65–99)
GLUCOSE BLD STRIP.AUTO-MCNC: 245 MG/DL (ref 65–99)
GLUCOSE BLD STRIP.AUTO-MCNC: 275 MG/DL (ref 65–99)
GLUCOSE BLD STRIP.AUTO-MCNC: 99 MG/DL (ref 65–99)
GLUCOSE SERPL-MCNC: 136 MG/DL (ref 65–99)
HCT VFR BLD AUTO: 22.6 % (ref 42–52)
HGB BLD-MCNC: 7.9 G/DL (ref 14–18)
LYMPHOCYTES # BLD AUTO: 1.34 K/UL (ref 1–4.8)
LYMPHOCYTES NFR BLD: 96 % (ref 22–41)
MANUAL DIFF BLD: NORMAL
MCH RBC QN AUTO: 31.3 PG (ref 27–33)
MCHC RBC AUTO-ENTMCNC: 35 G/DL (ref 32.3–36.5)
MCV RBC AUTO: 89.7 FL (ref 81.4–97.8)
MICROCYTES BLD QL SMEAR: ABNORMAL
MONOCYTES # BLD AUTO: 0.01 K/UL (ref 0–0.85)
MONOCYTES NFR BLD AUTO: 1 % (ref 0–13.4)
MORPHOLOGY BLD-IMP: NORMAL
NEUTROPHILS # BLD AUTO: 0.04 K/UL (ref 1.82–7.42)
NEUTROPHILS NFR BLD: 3 % (ref 44–72)
NRBC # BLD AUTO: 0 K/UL
NRBC BLD-RTO: 0 /100 WBC (ref 0–0.2)
PLATELET # BLD AUTO: 87 K/UL (ref 164–446)
PLATELET BLD QL SMEAR: NORMAL
PLATELETS.RETICULATED NFR BLD AUTO: 0.6 % (ref 0.6–13.1)
PMV BLD AUTO: 9.8 FL (ref 9–12.9)
POTASSIUM SERPL-SCNC: 4 MMOL/L (ref 3.6–5.5)
RBC # BLD AUTO: 2.52 M/UL (ref 4.7–6.1)
RBC BLD AUTO: PRESENT
SODIUM SERPL-SCNC: 138 MMOL/L (ref 135–145)
WBC # BLD AUTO: 1.4 K/UL (ref 4.8–10.8)

## 2024-09-22 PROCEDURE — 770004 HCHG ROOM/CARE - ONCOLOGY PRIVATE *

## 2024-09-22 PROCEDURE — 700102 HCHG RX REV CODE 250 W/ 637 OVERRIDE(OP): Performed by: INTERNAL MEDICINE

## 2024-09-22 PROCEDURE — 700102 HCHG RX REV CODE 250 W/ 637 OVERRIDE(OP)

## 2024-09-22 PROCEDURE — 99232 SBSQ HOSP IP/OBS MODERATE 35: CPT | Mod: GC | Performed by: FAMILY MEDICINE

## 2024-09-22 PROCEDURE — 36415 COLL VENOUS BLD VENIPUNCTURE: CPT

## 2024-09-22 PROCEDURE — 700111 HCHG RX REV CODE 636 W/ 250 OVERRIDE (IP): Mod: JZ,JG | Performed by: INTERNAL MEDICINE

## 2024-09-22 PROCEDURE — 80048 BASIC METABOLIC PNL TOTAL CA: CPT

## 2024-09-22 PROCEDURE — 85007 BL SMEAR W/DIFF WBC COUNT: CPT

## 2024-09-22 PROCEDURE — A9270 NON-COVERED ITEM OR SERVICE: HCPCS

## 2024-09-22 PROCEDURE — 85027 COMPLETE CBC AUTOMATED: CPT

## 2024-09-22 PROCEDURE — A9270 NON-COVERED ITEM OR SERVICE: HCPCS | Performed by: INTERNAL MEDICINE

## 2024-09-22 PROCEDURE — 700102 HCHG RX REV CODE 250 W/ 637 OVERRIDE(OP): Performed by: STUDENT IN AN ORGANIZED HEALTH CARE EDUCATION/TRAINING PROGRAM

## 2024-09-22 PROCEDURE — 71045 X-RAY EXAM CHEST 1 VIEW: CPT

## 2024-09-22 PROCEDURE — A9270 NON-COVERED ITEM OR SERVICE: HCPCS | Performed by: STUDENT IN AN ORGANIZED HEALTH CARE EDUCATION/TRAINING PROGRAM

## 2024-09-22 PROCEDURE — 82962 GLUCOSE BLOOD TEST: CPT | Mod: 91

## 2024-09-22 PROCEDURE — 85055 RETICULATED PLATELET ASSAY: CPT

## 2024-09-22 RX ADMIN — ACYCLOVIR 400 MG: 400 TABLET ORAL at 17:44

## 2024-09-22 RX ADMIN — TBO-FILGRASTIM 300 MCG: 300 INJECTION, SOLUTION SUBCUTANEOUS at 14:01

## 2024-09-22 RX ADMIN — ACYCLOVIR 400 MG: 400 TABLET ORAL at 05:46

## 2024-09-22 RX ADMIN — LEVOFLOXACIN 500 MG: 500 TABLET, FILM COATED ORAL at 05:46

## 2024-09-22 RX ADMIN — VORICONAZOLE 200 MG: 200 TABLET ORAL at 05:46

## 2024-09-22 RX ADMIN — INSULIN HUMAN 3 UNITS: 100 INJECTION, SOLUTION PARENTERAL at 21:59

## 2024-09-22 RX ADMIN — INSULIN HUMAN 4 UNITS: 100 INJECTION, SOLUTION PARENTERAL at 12:43

## 2024-09-22 RX ADMIN — FOLIC ACID 1 MG: 1 TABLET ORAL at 05:46

## 2024-09-22 RX ADMIN — OMEPRAZOLE 40 MG: 20 CAPSULE, DELAYED RELEASE ORAL at 05:45

## 2024-09-22 RX ADMIN — SITAGLIPTIN 100 MG: 100 TABLET, FILM COATED ORAL at 05:45

## 2024-09-22 RX ADMIN — ATORVASTATIN CALCIUM 10 MG: 10 TABLET, FILM COATED ORAL at 21:55

## 2024-09-22 RX ADMIN — VORICONAZOLE 200 MG: 200 TABLET ORAL at 17:44

## 2024-09-22 RX ADMIN — OMEPRAZOLE 40 MG: 20 CAPSULE, DELAYED RELEASE ORAL at 17:44

## 2024-09-22 ASSESSMENT — ENCOUNTER SYMPTOMS
CHILLS: 0
FEVER: 0
MUSCULOSKELETAL NEGATIVE: 1
PSYCHIATRIC NEGATIVE: 1
EYES NEGATIVE: 1
WEIGHT LOSS: 0
WEAKNESS: 1
RESPIRATORY NEGATIVE: 1
GASTROINTESTINAL NEGATIVE: 1

## 2024-09-22 ASSESSMENT — PAIN DESCRIPTION - PAIN TYPE
TYPE: ACUTE PAIN
TYPE: ACUTE PAIN

## 2024-09-22 NOTE — CARE PLAN
The patient is Watcher - Medium risk of patient condition declining or worsening    Shift Goals  Clinical Goals: VSS; remain free from infection  Patient Goals: rest  Family Goals: not present    Progress made toward(s) clinical / shift goals:  Q4 vitals and hourly rounding in place. Isolation precautions in use. ISOLATION PRECAUTIONS EDUCATION    Educated PATIENT, FAMILY, S.O: patient and family member on isolation for OTHER.    Educated on reason for isolation, how the infection may be transmitted, and how to help prevent transmission to others. Educated precautions involves staff and visitors wearing PPE, following Standard Precautions and performing meticulous hand hygiene in order to prevent transmission of infection.     Protective Precautions: Educated that Protective Precautions involves creating a protective environment for the patient and observing Standard Precautions.    Educated to keep door closed to help maintain the protective environment. No flowers or fruit will be allowed to enter the room. And visitors should not visit if they are ill or think they are becoming ill.    In addition, educated that they may leave their room, a surgical mask must be worn by the patient while out of the patient room.     Patient transport and mobilization on unit  Educated that they may leave their room, but prior to exiting, the patient needs to have on a fresh patient gown, ensure the potentially infectious area is covered, performing appropriate hand hygiene immediately prior to exiting the room.                Patient is not progressing towards the following goals:

## 2024-09-22 NOTE — CARE PLAN
The patient is Stable - Low risk of patient condition declining or worsening    Shift Goals  Clinical Goals: Monitor labs, FSACHS  Patient Goals: Sleep      Progress made toward(s) clinical / shift goals:     Problem: Knowledge Deficit - Standard  Goal: Patient and family/care givers will demonstrate understanding of plan of care, disease process/condition, diagnostic tests and medications  Outcome: Progressing     Problem: Neutropenia Precautions  Goal: Neutropenic precautions will be implemented and maintained for patient protection  Outcome: Progressing     Problem: Urinary Elimination  Goal: Establish and maintain regular urinary output  Outcome: Progressing     Problem: Infection  Goal: Will remain free from infection  Outcome: Progressing     Problem: Fall Risk  Goal: Patient will remain free from falls  Outcome: Progressing          appropriate

## 2024-09-22 NOTE — PROGRESS NOTES
[facility] FAMILY MEDICINE PROGRESS NOTE    Attending:   Greta Lal M.d.    Resident:   Mima Carrasco D.O.    Senior Resident:   Michel Bean MD, [place] Jha DO    PATIENT:   Miri Joseph; [id number]; 1940    ID:   84 y.o. male with history of dyslipidemia, BPH, type 2 DM, and recent GI bleed transferred from [facility] admitted for symptomatic pancytopenia and bone marrow biopsy. He was seen by GI at [facility] and had bidirectional endoscopy on 9/5 with biopsies taken from endoscopy but colonoscopy could not be completed due to inadequate bowel prep. Hgb stable but WBC and platelets continued to drop.  Transferred here for bone marrow biopsy.  Now status post platelet transfusion on 9/7, 9/12, 9/16, 9/21 and PRBC transfusion 9/15 and 9/19.    SUBJECTIVE:   Patient encountered this morning and reports he is feeling well.  He has not had recurrence of lower extremity edema since increasing his ambulation around the room. He denies any headache, chest pain, dyspnea, abdominal pain, N/V/D, bloody stool or melena.  He notes he had more bleeding than usual with his morning lab draw this morning that was stopped with holding pressure.    Patient encountered on revisit in the afternoon reporting cough productive of rust colored sputum.  Denies fever, chest pain, dyspnea.  Denies abdiaziz hemoptysis.    OBJECTIVE:  Vitals:    09/21/24 1745 09/21/24 1754 09/21/24 2024 09/22/24 0346   BP: 132/60 (!) 142/74 133/70 138/65   Pulse: 84 89 89 69   Resp: 18 18 18 16   Temp: 36.9 °C (98.4 °F) 36.9 °C (98.5 °F) 36.4 °C (97.6 °F) 36.4 °C (97.5 °F)   TempSrc: Temporal Temporal Oral Oral   SpO2: 97% 98% 99% 99%   Weight:       Height:         No intake or output data in the 24 hours ending 09/22/24 0527    PHYSICAL EXAM:   Const: Awake and alert, sitting up in chair  HEENT: NC/AT, MMM  Resp: LCTAB, no respiratory distress  CV: Regular rate and rhythm, warm well-perfused, cap refill 2-3 sec  Skin: Visible  "skin without rash, he has bruising on b/l upper extremities secondary to venipuncture for lab tests, skin is warm and dry  Ext: No lower extremity edema, extremities are warm, moving all equally    LABS:  Recent Labs     09/20/24  1757 09/21/24  0217 09/21/24  1515   WBC 2.0* 1.4* 1.2*   RBC 2.69* 2.37* 2.61*   HEMOGLOBIN 8.5* 7.6* 8.3*   HEMATOCRIT 24.0* 21.0* 23.1*   MCV 89.2 88.6 88.5   MCH 31.6 32.1 31.8   RDW 50.7* 48.1 48.4   PLATELETCT 14* 12* 10*   MPV 11.1 12.3 12.6   NEUTSPOLYS 1.80* 1.70* 1.70*   LYMPHOCYTES 97.30* 95.70* 97.40*   MONOCYTES 0.90 0.00 0.90   EOSINOPHILS 0.00 2.60 0.00   BASOPHILS 0.00 0.00 0.00   RBCMORPHOLO Present Present Present     Recent Labs     09/21/24  0217   SODIUM 136   POTASSIUM 3.8   CHLORIDE 104   CO2 21   BUN 13   CREATININE 0.85   CALCIUM 8.8   ALBUMIN 3.3     Estimated GFR/CRCL = Estimated Creatinine Clearance: 62.6 mL/min (by C-G formula based on SCr of 0.85 mg/dL).  Recent Labs     09/21/24  0217   GLUCOSE 106*     Recent Labs     09/21/24  0217   ASTSGOT 19   ALTSGPT 12   TBILIRUBIN 0.7   ALKPHOSPHAT 72   GLOBULIN 2.8               No results for input(s): \"INR\", \"APTT\", \"FIBRINOGEN\" in the last 72 hours.    Invalid input(s): \"DIMER\"      IMAGING:  IR-US GUIDED PIV   Final Result    Ultrasound-guided PERIPHERAL IV INSERTION performed by    qualified nursing staff as above.          MEDS:  Current Facility-Administered Medications   Medication Last Admin    omeprazole (PriLOSEC) capsule 40 mg 40 mg at 09/21/24 1738    levoFLOXacin (Levaquin) tablet 500 mg 500 mg at 09/21/24 0441    insulin regular (HumuLIN R,NovoLIN R) injection 2 Units at 09/21/24 2105    And    dextrose 50% (D50W) injection 25 g      insulin GLARGINE (Lantus,Semglee) injection 7 Units at 09/21/24 1801    tbo-filgrastim (Granix) 300 MCG/0.5ML injection 300 mcg 300 mcg at 09/21/24 1335    [START ON 9/24/2024] cyanocobalamin (Vitamin B-12) injection 1,000 mcg      senna-docusate (Pericolace Or Senokot S) " 8.6-50 MG per tablet 2 Tablet 2 Tablet at 09/21/24 1738    And    polyethylene glycol/lytes (Miralax) Packet 1 Packet 1 Packet at 09/16/24 1253    SITagliptin (Januvia) tablet 100 mg 100 mg at 09/21/24 0441    acyclovir (Zovirax) tablet 400 mg 400 mg at 09/21/24 1739    voriconazole (Vfend) tablet 200 mg 200 mg at 09/21/24 1738    folic acid (Folvite) tablet 1 mg 1 mg at 09/21/24 0441    acetaminophen (Tylenol) tablet 650 mg 650 mg at 09/13/24 0610    ondansetron (Zofran ODT) dispertab 4 mg      ondansetron (Zofran) syringe/vial injection 4 mg      atorvastatin (Lipitor) tablet 10 mg 10 mg at 09/21/24 2102       ASSESSMENT/PLAN:  84 y.o. male with history of dyslipidemia, BPH, type 2 DM, and recent GI bleed transferred from [facility] admitted for symptomatic pancytopenia and bone marrow biopsy. He was seen by GI at [facility] and had bidirectional endoscopy on 9/5 with biopsies taken from endoscopy but colonoscopy could not be completed due to inadequate bowel prep. Hgb stable but WBC and platelets continue to downtrend, now with critically low absolute neutrophils. Status post platelet transfusion on 9/7, 9/12, 9/16, 9/21, PRBC transfusions 9/15 and 9/19.  Bone marrow biopsy performed 9/10.     * Pancytopenia (HCC)- (present on admission)  Assessment & Plan  Continues to have severe neutropenia with critically low absolute neutrophils, today at 0.04. Neutropenic precautions in place.  Vital signs stable, afebrile. Bone marrow biopsy non-specific findings. Additional results including FISH pending.  Hematology/oncology consulted and closely following. FOB and UA negative for occult blood 9/21.    Plan:  -CBC daily to monitor  -Transfusions of PRBC and Plt as indicated per oncology recommendations  -Neutropenic precautions  -Oncology consulted, following:  -Continue supportive measures and B12  -Transfuse for platelets < 20,000 unless febrile, bleeding, sepsis then <10,000, hemoglobin < 7  -Bone  marrow biopsy with nonspecific findings, pending FISH results for further plan  -Levaquin, Acyclovir and voriconazole  -Additional testing per oncology  -Daily Granix per heme/onc  -Anticipating BM recovery by 9/24, 3 weeks into B12 treatment. If not, then can discharge and continue therapy outpatient    Productive cough  Assessment & Plan  Onset afternoon of 9/22, pt reporting rust-colored sputum. Denies abdiaziz hemoptysis, fever, chest pain, dyspnea.     Plan:  - CXR ordered  - Do not see any TB test review of records, Quantiferon gold ordered to be drawn with morning labs  - Monitor vitals   -On prophylactic levaquin. Still, given critically low ANC, low threshold for further infectious workup including sputum culture, viral swab    Chronic gastritis without bleeding- (present on admission)  Assessment & Plan  Stable.  Demonstrated on EGD biopsies.     Plan:  -Omeprazole 40mg BID    Bilateral lower extremity edema  Assessment & Plan  Resolved. Likely was secondary to decreased ambulation inside room. Resolved with encouraged ambulation, NO hose.    Plan:  -Encouraged to continue ambulation around room  -NO hose as needed    Bacteremia  Assessment & Plan  Resolved. Blood cultures 9/11 positive for  Bacillus cereus, thuringiensis, & mycoides. Ucx positive for same. Repeat blood cultures from 9/12 are negative final. S/p Ancef and vancomycin.    Plan:  -Prophylactic Levaquin started 9/19  -Monitor VS and symptoms  -Also receiving acyclovir and voriconazole for prophylaxis    Dysuria  Assessment & Plan  Resolved.  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  S/p Ancef and vancomycin. Now on prophylactic Levaquin.    Dyslipidemia- (present on admission)  Assessment & Plan  Continue home atorvastatin.     B12 deficiency- (present on admission)  Assessment & Plan  Per Oncology Dr. Long, essentially undetectable B12 so recommends daily injections of B12 x 2 weeks  and then can switch to weekly x 1 month, and then monthly from thereon.  We will follow these recommendations.  Additionally, concerned that long-term metformin use has contributed to profound vitamin B12 deficiency.    Plan:  -Vit B12 1,000mcg x 2 weeks (completed 9/17)   -Then vitamin B12 1000 mcg weekly x 1 month (Last dose 10/15)   -Then vitamin B12 1000 mcg monthly  -Recommend oral supplementation at discharge for vegetarian diet, for now the plan will be IM; patient should follow-up on this outpatient  -Will exclude metformin from diabetes regimen at discharge    Type 2 diabetes mellitus, without long-term current use of insulin (HCC)- (present on admission)  Assessment & Plan  On metformin, glipizide, and Januvia at home.  Per oncology, there is concern that long-term use of metformin may be contributing to profound vitamin B12 deficiency.  Patient will need a different home regimen that does not include metformin at discharge.    Plan:  -Continue to hold metformin and glipizide   -Continue Januvia  -Insulin glargine 7 units nightly  -Continue CHO diet and moderate SSI + hypoglycemia protocol    History of GI bleed- (present on admission)  Assessment & Plan  Appears stable, no bloody stool, no melena.  Vital signs stable.  Patient continues to require periodic transfusions of PRBC this does appear to be secondary to severe pancytopenia rather than blood loss. FOB and UA negative for occult blood 9/21.    Plan:  -Continue daily PPI, transitioned to PO 9/21  -Bowel regimen with senna and MiraLAX  -Continue to monitor for signs of GI bleed: bloody stool, melena  -Outpatient follow-up with GI for repeat colonoscopy as initial did not have complete prep    Thrombocytopenia (HCC)- (present on admission)  Assessment & Plan  Continues to downtrend. Platelets transfused the evening of 9/7, 9/12, 9/16, 9/21.  Vital signs stable.  No obvious bleeding or unprovoked bruising.  Anticipate need for additional platelet  transfusions during admission.    Plan:  -CBC daily  -Transfuse platelets per hematology/oncology recs (see A/P for pancytopenia)  -Oncology following     Core Measures  IV Fluids: none  Diet: vegetarian with carb count  Lines: PIV  Antbx: Prophylactic Levaquin, voriconazole, acyclovir  DVT ppx: SCDs, chemical prophylaxis held in setting of thrombocytopenia, patient ambulating frequently in room  Code status: Full Code  Dispo: inpatient for supportive care, monitoring of and transfusions for severe pancytopenia and thrombocytopenia pending results of additional testing, oncology recommendations/plan.     Mima Carrasco D.O.  PGY-1  [facility] Resident

## 2024-09-22 NOTE — PROGRESS NOTES
Oncology/Hematology Progress Note               Author: Donaldo Seals III, M.D. Date & Time created: 9/22/2024  9:39 AM   Pancytopenia likely due to severe B12 deficiency  Interval History:  No complaints overnight.  There is no fever, chills.  He is eating breakfast today, no signs of infection, great response to platelet transfusion yesterday, remains anemic, severely neutropenic despite growth factor support    PMHx, PSurgHx, SocHx, FAMHx: Reviewed and unchanged    Review of Systems:  Review of Systems   Constitutional:  Positive for malaise/fatigue. Negative for chills, fever and weight loss.   HENT: Negative.     Eyes: Negative.    Respiratory: Negative.     Cardiovascular:  Positive for leg swelling.   Gastrointestinal: Negative.    Genitourinary: Negative.    Musculoskeletal: Negative.    Skin: Negative.    Neurological:  Positive for weakness.   Psychiatric/Behavioral: Negative.         Physical Exam:  Physical Exam  Constitutional:       Appearance: Normal appearance.   HENT:      Head: Normocephalic and atraumatic.      Nose: Nose normal.      Mouth/Throat:      Mouth: Mucous membranes are moist.   Eyes:      Extraocular Movements: Extraocular movements intact.      Pupils: Pupils are equal, round, and reactive to light.   Cardiovascular:      Rate and Rhythm: Normal rate and regular rhythm.      Pulses: Normal pulses.      Heart sounds: Normal heart sounds.   Pulmonary:      Effort: Pulmonary effort is normal.      Breath sounds: Normal breath sounds.   Abdominal:      General: Abdomen is flat.      Palpations: Abdomen is soft.   Musculoskeletal:         General: No tenderness.      Right lower leg: Edema present.      Left lower leg: Edema present.   Neurological:      General: No focal deficit present.      Mental Status: He is alert and oriented to person, place, and time.   Psychiatric:         Mood and Affect: Mood normal.         Labs:          Recent Labs     09/21/24  0217 09/22/24  0550   SODIUM  136 138   POTASSIUM 3.8 4.0   CHLORIDE 104 104   CO2 21 23   BUN 13 13   CREATININE 0.85 0.85   CALCIUM 8.8 8.8     Recent Labs     24  0550   ALTSGPT 12  --    ASTSGOT 19  --    ALKPHOSPHAT 72  --    TBILIRUBIN 0.7  --    GLUCOSE 106* 136*     Recent Labs     24  0550   RBC 2.37* 2.61* 2.52*   HEMOGLOBIN 7.6* 8.3* 7.9*   HEMATOCRIT 21.0* 23.1* 22.6*   PLATELETCT 12* 10* 87*     Recent Labs     24  17524  0550   WBC 2.0* 1.4* 1.2* 1.4*   NEUTSPOLYS 1.80* 1.70* 1.70*  --    LYMPHOCYTES 97.30* 95.70* 97.40*  --    MONOCYTES 0.90 0.00 0.90  --    EOSINOPHILS 0.00 2.60 0.00  --    BASOPHILS 0.00 0.00 0.00  --    ASTSGOT  --  19  --   --    ALTSGPT  --  12  --   --    ALKPHOSPHAT  --  72  --   --    TBILIRUBIN  --  0.7  --   --      Recent Labs     24  0550   SODIUM 136 138   POTASSIUM 3.8 4.0   CHLORIDE 104 104   CO2 21 23   GLUCOSE 106* 136*   BUN 13 13   CREATININE 0.85 0.85   CALCIUM 8.8 8.8     Hemodynamics:  Temp (24hrs), Av.8 °C (98.2 °F), Min:36.4 °C (97.5 °F), Max:37.4 °C (99.4 °F)  Temperature: 36.9 °C (98.4 °F)  Pulse  Av.7  Min: 61  Max: 99   Blood Pressure : 116/66     Respiratory:    Respiration: 18, Pulse Oximetry: 98 %     Work Of Breathing / Effort: Within Normal Limits  RUL Breath Sounds: Clear, RML Breath Sounds: Clear, RLL Breath Sounds: Diminished, YAZAN Breath Sounds: Clear, LLL Breath Sounds: Diminished  Fluids:    Intake/Output Summary (Last 24 hours) at 2024 1041  Last data filed at 2024 0439  Gross per 24 hour   Intake --   Output 1625 ml   Net -1625 ml     Weight: 70.3 kg (154 lb 15.7 oz)  GI/Nutrition:  Orders Placed This Encounter   Procedures    Diet Order Diet: Consistent CHO (Diabetic); Miscellaneous modifications: (optional): Vegetarian, Lactose Free     Standing Status:   Standing     Number of Occurrences:   1     Order Specific Question:   Diet:      Answer:   Consistent CHO (Diabetic) [4]     Order Specific Question:   Miscellaneous modifications: (optional)     Answer:   Vegetarian [13]     Order Specific Question:   Miscellaneous modifications: (optional)     Answer:   Lactose Free [5]     Medical Decision Making, by Problem:  Active Hospital Problems    Diagnosis     *Pancytopenia (HCC) [D61.818]     Bacteremia [R78.81]     Dysuria [R30.0]     Dyslipidemia [E78.5]     B12 deficiency [E53.8]     Type 2 diabetes mellitus, without long-term current use of insulin (HCC) [E11.9]     Thrombocytopenia (HCC) [D69.6]     GI bleed [K92.2]        Plan:  Severe Pancytopenia  related to severe B12 deficiency probably related to chronic use of metformin impairing absorption also vegetarian diet  -Bone marrow biopsy demonstrating hypocellular bone marrow at 15% cellular with prominent stromal elements, markedly decreased maturing granulocytic precursors, decreased erythroid precursors with slight dyserythropoiesis, and markedly decreased megakaryocytes.  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  Normal male karyotype.  Flow no increase in blast  -9/3/2024 started on intramuscular cyanocobalamin 1000 mcg  -B12 was undetectable but his folic acid was normal.   -  Zinc low  and copper levels are normal.  EBV, CMV, parvovirus, HIV, and hepatitis PCR are all negative.  TSH is within normal limits.  Heavy metals none detected.  SHANELL negative.  PNH panel negative  -2024 I did review with pharmacy, since 9/3/2024 he did receive 12 intramuscular shots of vitamin B-12.       2.  Anemia, transfuse if Hgb <7 if no symptoms or <8 with symptoms or bleeding    3.  Thrombocytopenia, transfuse if  PLTs <10 and no symptoms or <20 if febrile, septic, or bleeidnu apheresis PLTs      4.  B12 Deficiency  Undetectable B12. Intrinsic factor Abs, Parietal cell Abs, and gastrin  negative.Secondary to impaired absorption from combination of vegetarian diet and years of metformin use. Continue B12 1000mcg and folic acid 1mg.   -9/17/2024 since 9/3/2024 got 12 shots of B12.      5.  GI Bleed    6.  Chronic Gastritis  Biopsies from EGD demonstrated chronic gastritis which could be worsening his B12 deficiency beyond just his vegetarian diet.    7.  Bacillus cereus bacteremia  -Completed vancomycin until 9/18/2024    8.  Severe neutropenia  -Neutropenic precautions  -9/17 started Granix.   -prophylactic antibiotics  with acyclovir, voriconazole and Levaquin     PLAN:  -He is due for weekly dose of cyanocobalamin on 9/24.  Remains severely pancytopenic, remains severely neutropenic despite growth factor support.  On 9/24 will be 3 weeks since he got first dose of cyanocobalamine  - if blood counts not improving this week I think he can be discharged and continue therapy in the outpatient setting with blood checks and transfusion is needed  at [facility].  bone marrow recovery can take 2, 3 weeks or sometimes more to recover from severe B12 deficiency  -Transfuse blood products with parameters as stated above  -Hypoplastic MDS cannot be ruled which in that case best supportive care will be indicated  -Continue on prophylactic antibiotics    Dr. Mckenzie will start following the patient tomorrow        High complexity, complex decision making       Quality-Core Measures

## 2024-09-23 LAB
ANISOCYTOSIS BLD QL SMEAR: ABNORMAL
BASOPHILS # BLD AUTO: 0 % (ref 0–1.8)
BASOPHILS # BLD: 0 K/UL (ref 0–0.12)
EOSINOPHIL # BLD AUTO: 0.01 K/UL (ref 0–0.51)
EOSINOPHIL NFR BLD: 0.9 % (ref 0–6.9)
ERYTHROCYTE [DISTWIDTH] IN BLOOD BY AUTOMATED COUNT: 47.4 FL (ref 35.9–50)
GLUCOSE BLD STRIP.AUTO-MCNC: 146 MG/DL (ref 65–99)
GLUCOSE BLD STRIP.AUTO-MCNC: 162 MG/DL (ref 65–99)
GLUCOSE BLD STRIP.AUTO-MCNC: 228 MG/DL (ref 65–99)
GLUCOSE BLD STRIP.AUTO-MCNC: 276 MG/DL (ref 65–99)
HCT VFR BLD AUTO: 21.3 % (ref 42–52)
HGB BLD-MCNC: 7.5 G/DL (ref 14–18)
LYMPHOCYTES # BLD AUTO: 1.28 K/UL (ref 1–4.8)
LYMPHOCYTES NFR BLD: 98.3 % (ref 22–41)
MACROCYTES BLD QL SMEAR: ABNORMAL
MANUAL DIFF BLD: NORMAL
MCH RBC QN AUTO: 31.4 PG (ref 27–33)
MCHC RBC AUTO-ENTMCNC: 35.2 G/DL (ref 32.3–36.5)
MCV RBC AUTO: 89.1 FL (ref 81.4–97.8)
MICROCYTES BLD QL SMEAR: ABNORMAL
MONOCYTES # BLD AUTO: 0.01 K/UL (ref 0–0.85)
MONOCYTES NFR BLD AUTO: 0.8 % (ref 0–13.4)
MORPHOLOGY BLD-IMP: NORMAL
NEUTROPHILS # BLD AUTO: 0 K/UL (ref 1.82–7.42)
NEUTROPHILS NFR BLD: 0 % (ref 44–72)
NRBC # BLD AUTO: 0 K/UL
NRBC BLD-RTO: 0 /100 WBC (ref 0–0.2)
PLATELET # BLD AUTO: 58 K/UL (ref 164–446)
PLATELET BLD QL SMEAR: NORMAL
PLATELETS.RETICULATED NFR BLD AUTO: 0.4 % (ref 0.6–13.1)
PMV BLD AUTO: 9.8 FL (ref 9–12.9)
RBC # BLD AUTO: 2.39 M/UL (ref 4.7–6.1)
RBC BLD AUTO: PRESENT
WBC # BLD AUTO: 1.3 K/UL (ref 4.8–10.8)

## 2024-09-23 PROCEDURE — 86480 TB TEST CELL IMMUN MEASURE: CPT

## 2024-09-23 PROCEDURE — A9270 NON-COVERED ITEM OR SERVICE: HCPCS | Performed by: STUDENT IN AN ORGANIZED HEALTH CARE EDUCATION/TRAINING PROGRAM

## 2024-09-23 PROCEDURE — 85007 BL SMEAR W/DIFF WBC COUNT: CPT

## 2024-09-23 PROCEDURE — A9270 NON-COVERED ITEM OR SERVICE: HCPCS

## 2024-09-23 PROCEDURE — 85027 COMPLETE CBC AUTOMATED: CPT

## 2024-09-23 PROCEDURE — 700102 HCHG RX REV CODE 250 W/ 637 OVERRIDE(OP)

## 2024-09-23 PROCEDURE — 36415 COLL VENOUS BLD VENIPUNCTURE: CPT

## 2024-09-23 PROCEDURE — 700111 HCHG RX REV CODE 636 W/ 250 OVERRIDE (IP): Mod: JZ,JG | Performed by: INTERNAL MEDICINE

## 2024-09-23 PROCEDURE — 770004 HCHG ROOM/CARE - ONCOLOGY PRIVATE *

## 2024-09-23 PROCEDURE — A9270 NON-COVERED ITEM OR SERVICE: HCPCS | Performed by: INTERNAL MEDICINE

## 2024-09-23 PROCEDURE — 99232 SBSQ HOSP IP/OBS MODERATE 35: CPT | Mod: GC | Performed by: FAMILY MEDICINE

## 2024-09-23 PROCEDURE — 82962 GLUCOSE BLOOD TEST: CPT

## 2024-09-23 PROCEDURE — 700102 HCHG RX REV CODE 250 W/ 637 OVERRIDE(OP): Performed by: STUDENT IN AN ORGANIZED HEALTH CARE EDUCATION/TRAINING PROGRAM

## 2024-09-23 PROCEDURE — 85055 RETICULATED PLATELET ASSAY: CPT

## 2024-09-23 PROCEDURE — 700102 HCHG RX REV CODE 250 W/ 637 OVERRIDE(OP): Performed by: INTERNAL MEDICINE

## 2024-09-23 RX ADMIN — VORICONAZOLE 200 MG: 200 TABLET ORAL at 06:23

## 2024-09-23 RX ADMIN — ATORVASTATIN CALCIUM 10 MG: 10 TABLET, FILM COATED ORAL at 20:27

## 2024-09-23 RX ADMIN — SITAGLIPTIN 100 MG: 100 TABLET, FILM COATED ORAL at 06:25

## 2024-09-23 RX ADMIN — INSULIN HUMAN 4 UNITS: 100 INJECTION, SOLUTION PARENTERAL at 17:23

## 2024-09-23 RX ADMIN — FOLIC ACID 1 MG: 1 TABLET ORAL at 06:23

## 2024-09-23 RX ADMIN — OMEPRAZOLE 40 MG: 20 CAPSULE, DELAYED RELEASE ORAL at 06:22

## 2024-09-23 RX ADMIN — INSULIN GLARGINE-YFGN 10 UNITS: 100 INJECTION, SOLUTION SUBCUTANEOUS at 17:22

## 2024-09-23 RX ADMIN — ACYCLOVIR 400 MG: 400 TABLET ORAL at 06:22

## 2024-09-23 RX ADMIN — LEVOFLOXACIN 500 MG: 500 TABLET, FILM COATED ORAL at 06:23

## 2024-09-23 RX ADMIN — SENNOSIDES AND DOCUSATE SODIUM 2 TABLET: 50; 8.6 TABLET ORAL at 17:18

## 2024-09-23 RX ADMIN — INSULIN HUMAN 3 UNITS: 100 INJECTION, SOLUTION PARENTERAL at 08:34

## 2024-09-23 RX ADMIN — INSULIN HUMAN 2 UNITS: 100 INJECTION, SOLUTION PARENTERAL at 12:23

## 2024-09-23 RX ADMIN — TBO-FILGRASTIM 300 MCG: 300 INJECTION, SOLUTION SUBCUTANEOUS at 13:59

## 2024-09-23 RX ADMIN — ACYCLOVIR 400 MG: 400 TABLET ORAL at 17:18

## 2024-09-23 RX ADMIN — OMEPRAZOLE 40 MG: 20 CAPSULE, DELAYED RELEASE ORAL at 17:18

## 2024-09-23 RX ADMIN — VORICONAZOLE 200 MG: 200 TABLET ORAL at 17:18

## 2024-09-23 ASSESSMENT — ENCOUNTER SYMPTOMS
CLAUDICATION: 0
CONSTIPATION: 0
SHORTNESS OF BREATH: 0
EYE PAIN: 0
FEVER: 0
VOMITING: 0
EYE DISCHARGE: 0
COUGH: 0
HEMOPTYSIS: 0
WEIGHT LOSS: 0
PALPITATIONS: 0
EYE REDNESS: 0
DIARRHEA: 0
NAUSEA: 0
SPUTUM PRODUCTION: 0
CHILLS: 0
ABDOMINAL PAIN: 0
HEARTBURN: 0
SINUS PAIN: 0

## 2024-09-23 ASSESSMENT — PAIN DESCRIPTION - PAIN TYPE
TYPE: ACUTE PAIN
TYPE: ACUTE PAIN

## 2024-09-23 NOTE — PROGRESS NOTES
Report received. Assumed care. Pt in bed awake. A/O x4. VSS. Responds appropriately. Denies pain, SOB. Assessment complete. Discussed POC, , pt verbalizes understanding. Call light and belongings within reach. Bed in the lowest position. Treaded socks in place. Hourly rounding in progress. Will continue to monitor .

## 2024-09-23 NOTE — PROGRESS NOTES
Vivienne from Lab called with critical result of ANC of 0, and WBC of 1.3 at 0752. Critical lab result read back to vivienne.   Dr. Antunez notified of critical lab result at 0800.  Critical lab result read back by Dr. Antunez. No new orders received.

## 2024-09-23 NOTE — DISCHARGE PLANNING
Case Management Discharge Planning    Admission Date: 9/6/2024  GMLOS: 3.4  ALOS: 17    6-Clicks ADL Score: 22  6-Clicks Mobility Score: 19      Anticipated Discharge Dispo: Discharge Disposition: Discharged to home/self care (01)    DME Needed: No    Action(s) Taken: Discussed in IDT rounds, pt will need weekly CBC with poss blood, [facility] to order labs and fax to [facility] [place], per [facility] they cannot give B12, [facility] to give pt OP B12. Once infusions arranged pt can dc home, granix to continue to be given inpatient. B12 to be given tomorrow and if oncology clears, possible discharge.      Escalations Completed: None    Medically Clear: No    Next Steps: Pending [facility] appointments.     Barriers to Discharge: Outpatient Infusion required    Is the patient up for discharge tomorrow: No

## 2024-09-23 NOTE — ASSESSMENT & PLAN NOTE
Onset afternoon of 9/22, pt reporting rust-colored sputum and CXR showed no evidence of acute cardiopulmonary process. No longer complaining of cough. Denies abdiaziz hemoptysis, fever, chills, congestion, dyspnea or chest pain.  Afebrile and SpO2 at 98% in RA with RR of 18, no acute respiratory distress.    Plan:  - Quantiferon gold pending to r/o TB  - Monitor vitals   - On prophylactic levaquin. Still, given critically low ANC, low threshold for further infectious workup including sputum culture, viral swab

## 2024-09-23 NOTE — PROGRESS NOTES
Oncology/Hematology Progress Note               Author: Bret Mckenzie M.D.   Date & Time created: 9/23/2024  11:14 AM   CC:  Pancytopenia likely due to severe B12 deficiency      Interval History:  No new symptoms.  His appetite is fine.  He has no bleeding symptoms.  He has no fevers or chills.  He has no nausea, vomiting, pain.          PMHx, PSurgHx, SocHx, FAMHx: Reviewed and unchanged    Review of Systems:  Review of Systems   Constitutional:  Positive for malaise/fatigue. Negative for chills, fever and weight loss.   HENT:  Negative for congestion, nosebleeds and sinus pain.    Eyes:  Negative for pain, discharge and redness.   Respiratory:  Negative for cough, hemoptysis, sputum production and shortness of breath.    Cardiovascular:  Negative for chest pain, palpitations, claudication and leg swelling.   Gastrointestinal:  Negative for abdominal pain, constipation, diarrhea, heartburn, nausea and vomiting.   Genitourinary:  Negative for dysuria, frequency and urgency.   Skin:  Negative for itching and rash.       Physical Exam:  Physical Exam  Constitutional:       General: He is not in acute distress.     Appearance: Normal appearance. He is not toxic-appearing.   HENT:      Head: Normocephalic and atraumatic.      Mouth/Throat:      Mouth: Mucous membranes are moist.      Pharynx: Oropharynx is clear. No oropharyngeal exudate.   Eyes:      General:         Right eye: No discharge.         Left eye: No discharge.      Extraocular Movements: Extraocular movements intact.      Conjunctiva/sclera: Conjunctivae normal.   Cardiovascular:      Rate and Rhythm: Normal rate and regular rhythm.      Heart sounds: No murmur heard.     No friction rub. No gallop.   Pulmonary:      Effort: Pulmonary effort is normal. No respiratory distress.      Breath sounds: Normal breath sounds. No wheezing or rales.   Abdominal:      General: Bowel sounds are normal. There is no distension.      Palpations: Abdomen is soft.       Tenderness: There is no abdominal tenderness. There is no guarding.   Musculoskeletal:         General: No swelling or tenderness. Normal range of motion.      Cervical back: Normal range of motion and neck supple. No rigidity.      Right lower leg: No edema.      Left lower leg: No edema.   Lymphadenopathy:      Cervical: No cervical adenopathy.   Skin:     General: Skin is warm and dry.      Coloration: Skin is not jaundiced.      Findings: No bruising or lesion.   Neurological:      General: No focal deficit present.      Mental Status: He is alert and oriented to person, place, and time. Mental status is at baseline.   Psychiatric:         Mood and Affect: Mood normal.         Thought Content: Thought content normal.         Judgment: Judgment normal.         Labs:          Recent Labs     24  0550   SODIUM 136 138   POTASSIUM 3.8 4.0   CHLORIDE 104 104   CO2    BUN 13 13   CREATININE 0.85 0.85   CALCIUM 8.8 8.8     Recent Labs     24  0550   ALTSGPT 12  --    ASTSGOT 19  --    ALKPHOSPHAT 72  --    TBILIRUBIN 0.7  --    GLUCOSE 106* 136*     Recent Labs     24  1515 24  0550 24  0526   RBC 2.61* 2.52* 2.39*   HEMOGLOBIN 8.3* 7.9* 7.5*   HEMATOCRIT 23.1* 22.6* 21.3*   PLATELETCT 10* 87* 58*     Recent Labs     247 24  1515 24  0550 24  0526   WBC 1.4* 1.2* 1.4* 1.3*   NEUTSPOLYS 1.70* 1.70* 3.00* 0.00*   LYMPHOCYTES 95.70* 97.40* 96.00* 98.30*   MONOCYTES 0.00 0.90 1.00 0.80   EOSINOPHILS 2.60 0.00 0.00 0.90   BASOPHILS 0.00 0.00 0.00 0.00   ASTSGOT 19  --   --   --    ALTSGPT 12  --   --   --    ALKPHOSPHAT 72  --   --   --    TBILIRUBIN 0.7  --   --   --      Recent Labs     247 24  0550   SODIUM 136 138   POTASSIUM 3.8 4.0   CHLORIDE 104 104   CO2 21 23   GLUCOSE 106* 136*   BUN 13 13   CREATININE 0.85 0.85   CALCIUM 8.8 8.8     Hemodynamics:  Temp (24hrs), Av.6 °C (97.9 °F), Min:36.4 °C (97.5  °F), Max:36.7 °C (98.1 °F)  Temperature: 36.6 °C (97.9 °F)  Pulse  Av.9  Min: 61  Max: 99   Blood Pressure : 117/60     Respiratory:    Respiration: 18, Pulse Oximetry: 96 %     Work Of Breathing / Effort: Within Normal Limits  RUL Breath Sounds: Clear, RML Breath Sounds: Clear, RLL Breath Sounds: Diminished, YAZAN Breath Sounds: Clear, LLL Breath Sounds: Diminished  Fluids:    Intake/Output Summary (Last 24 hours) at 2024 1041  Last data filed at 2024 0439  Gross per 24 hour   Intake --   Output 1625 ml   Net -1625 ml        GI/Nutrition:  Orders Placed This Encounter   Procedures    Diet Order Diet: Consistent CHO (Diabetic); Miscellaneous modifications: (optional): Vegetarian, Lactose Free     Standing Status:   Standing     Number of Occurrences:   1     Order Specific Question:   Diet:     Answer:   Consistent CHO (Diabetic) [4]     Order Specific Question:   Miscellaneous modifications: (optional)     Answer:   Vegetarian [13]     Order Specific Question:   Miscellaneous modifications: (optional)     Answer:   Lactose Free [5]     Medical Decision Making, by Problem:  Active Hospital Problems    Diagnosis     *Pancytopenia (HCC) [D61.818]     Bacteremia [R78.81]     Dysuria [R30.0]     Dyslipidemia [E78.5]     B12 deficiency [E53.8]     Type 2 diabetes mellitus, without long-term current use of insulin (HCC) [E11.9]     Thrombocytopenia (HCC) [D69.6]     GI bleed [K92.2]        Plan:  1.  B12 deficiency--  he presented with severe Pancytopenia thought to be related to severe B12 deficiency probably related to chronic use of metformin impairing absorption also vegetarian diet.       Bone marrow biopsy on 9/10 showed a somewhat hypocellular marrow at 10-15%, with prominent stromal elements, markedly decreased maturing granulocytic precursors, decreased erythroid precursors with slight dyserythropoiesis, and markedly decreased megakaryocytes.  Normal male karyotype.  Flow no increase in blast.  NGS  testing could not be done due to quantity not being sufficient.  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            His B12 level was undetectable, but his folic acid was normal.  Zinc was low, copper was normal.    EBV, CMV, parvovirus, HIV, and hepatitis PCR are all negative.  TSH is within normal limits.  Heavy metals none detected.  SHANELL negative.  PNH panel negative            Started on intramuscular B12 injections on 9/3/2024, 1000 mcg/day, continuing daily for 12 shots.  He is now on weekly injections with his next dose due on 9/24.    -- Now on weekly B12 shots, next dose due on 9/24  --He should continue the shots weekly for 4 doses, and then switch to monthly injections  --With B12 deficiency, may take up to 4 weeks on average to see improvement in blood counts.  --He may have delayed response  --If he does not improve, he may need a repeat bone marrow biopsy as an outpatient to consider repeat NGS testing versus peripheral blood NGS testing   -- He can follow-up with Dr. Molina as an outpatient       2.  Anemia--   transfuse if Hgb <7 if no symptoms while he is an inpatient.  --Will need to be set up with [facility] 2 days/week, for blood counts and potential transfusion  -- Would recommend transfusing if hemoglobin <8 as an outpatient        3.  Thrombocytopenia--   transfuse if  PLTs <10 as an inpatient.  --In the outpatient setting, would recommend transfusing platelets if <20K    4.  GI Bleed--  some question of this on admission.  No source found by GI workup.    5.  Chronic Gastritis--  Biopsies from EGD demonstrated chronic gastritis which could be worsening his B12 deficiency beyond just his vegetarian diet.    6.  Bacillus cereus bacteremia--  -Completed vancomycin until 9/18/2024    7.  Severe neutropenia--  -Neutropenic precautions  -9/17 started Granix.    --No improvement since starting Granix.  This likely rules out an autoimmune neutropenia.  Likely will not improve with Granix, unless B12 is adequately replaced.  --Once discharged, would discontinue Granix  -prophylactic antibiotics  with acyclovir, voriconazole and Levaquin        Disposition: From hematology standpoint, he would be okay for discharge, as long as he had a standing appointment at [facility] for CBC and possible transfusion every Tuesday and Friday (2 days/week).              High complexity, complex decision making       Quality-Core Measures   Reviewed items::  Labs reviewed, Radiology images reviewed and Medications reviewed  Mckee catheter::  No Mckee  DVT prophylaxis pharmacological::  Contraindicated - High bleeding risk

## 2024-09-23 NOTE — CARE PLAN
The patient is Stable - Low risk of patient condition declining or worsening    Shift Goals  Clinical Goals: Monitor labs, FSBG  Patient Goals: Discharge planning      Progress made toward(s) clinical / shift goals:     Problem: Knowledge Deficit - Standard  Goal: Patient and family/care givers will demonstrate understanding of plan of care, disease process/condition, diagnostic tests and medications  Outcome: Progressing     Problem: Neutropenia Precautions  Goal: Neutropenic precautions will be implemented and maintained for patient protection  Outcome: Progressing     Problem: Fall Risk  Goal: Patient will remain free from falls  Outcome: Progressing

## 2024-09-23 NOTE — PROGRESS NOTES
[facility] [facility] [place]     Attending:   Manny Shea MD    Resident:   Miranda Bullock MD PGY-1    PATIENT:   Miri Joseph; [id number]; 1940    ID:  84 y.o. male with history of dyslipidemia, BPH, type 2 DM, and recent GI bleed transferred from [facility] admitted for symptomatic pancytopenia and bone marrow biopsy. He was seen by GI at [facility] and had bidirectional endoscopy on 9/5 with biopsies taken from endoscopy but colonoscopy could not be completed due to inadequate bowel prep. Hgb stable but WBC and platelets continued to drop.  Transferred here for bone marrow biopsy.  Now status post platelet transfusion on 9/7, 9/12, 9/16, 9/21 and PRBC transfusion 9/15 and 9/19.     SUBJECTIVE:   No acute events overnight, patient sitting on the edge of his bed comfortably in no acute distress.  Patient reports that he is feeling well, denies any weakness, lightheadedness or dizziness.  Patient reports his cough is the same with brown sputum production but denies any fever, chills, sore throat, congestion or shortness of breath. Patient is tolerating p.o. intake without any nausea or vomiting.  Patient denies chest pain.  Patient denies any blood in stool with last BM this morning.  Patient has no questions or concerns.    OBJECTIVE:  Vitals:    09/22/24 1921 09/23/24 0006 09/23/24 0350 09/23/24 0817   BP: 126/63 118/74 121/82 117/60   Pulse: 84 79 77 85   Resp: 18 18 18 18   Temp: 36.4 °C (97.5 °F) 36.6 °C (97.9 °F) 36.7 °C (98.1 °F) 36.6 °C (97.9 °F)   TempSrc: Oral Oral Oral Oral   SpO2: 97% 96% 97% 96%   Weight:       Height:         Intake/Output Summary (Last 24 hours) at 9/23/2024 0600  Last data filed at 9/22/2024 0800  Gross per 24 hour   Intake 300 ml   Output --   Net 300 ml       PHYSICAL EXAM:   Physical Exam  Constitutional:       General: He is not in acute distress.     Appearance: He is not ill-appearing or toxic-appearing.   HENT:      Nose: No  "congestion or rhinorrhea.      Mouth/Throat:      Mouth: Mucous membranes are moist.   Eyes:      Pupils: Pupils are equal, round, and reactive to light.   Cardiovascular:      Rate and Rhythm: Normal rate and regular rhythm.      Heart sounds: No murmur heard.  Pulmonary:      Effort: Pulmonary effort is normal.      Breath sounds: Normal breath sounds. No wheezing.   Abdominal:      General: Abdomen is flat. Bowel sounds are normal. There is no distension.      Palpations: Abdomen is soft.      Tenderness: There is no abdominal tenderness.   Musculoskeletal:         General: Normal range of motion.   Skin:     General: Skin is warm.   Neurological:      General: No focal deficit present.      Mental Status: He is alert and oriented to person, place, and time.      Motor: No weakness.   Psychiatric:         Mood and Affect: Mood normal.         Behavior: Behavior normal.        LABS:  Recent Labs     09/21/24  1515 09/22/24  0550 09/23/24  0526   WBC 1.2* 1.4* 1.3*   RBC 2.61* 2.52* 2.39*   HEMOGLOBIN 8.3* 7.9* 7.5*   HEMATOCRIT 23.1* 22.6* 21.3*   MCV 88.5 89.7 89.1   MCH 31.8 31.3 31.4   RDW 48.4 48.2 47.4   PLATELETCT 10* 87* 58*   MPV 12.6 9.8 9.8   NEUTSPOLYS 1.70* 3.00* 0.00*   LYMPHOCYTES 97.40* 96.00* 98.30*   MONOCYTES 0.90 1.00 0.80   EOSINOPHILS 0.00 0.00 0.90   BASOPHILS 0.00 0.00 0.00   RBCMORPHOLO Present Present Present     Recent Labs     09/21/24 0217 09/22/24  0550   SODIUM 136 138   POTASSIUM 3.8 4.0   CHLORIDE 104 104   CO2 21 23   BUN 13 13   CREATININE 0.85 0.85   CALCIUM 8.8 8.8   ALBUMIN 3.3  --      Estimated GFR/CRCL = Estimated Creatinine Clearance: 62.6 mL/min (by C-G formula based on SCr of 0.85 mg/dL).  Recent Labs     09/21/24  0217 09/22/24  0550   GLUCOSE 106* 136*     Recent Labs     09/21/24 0217   ASTSGOT 19   ALTSGPT 12   TBILIRUBIN 0.7   ALKPHOSPHAT 72   GLOBULIN 2.8             No results for input(s): \"INR\", \"APTT\", \"FIBRINOGEN\" in the last 72 hours.    Invalid input(s): " "\"DIMER\"    MICROBIOLOGY:   No results found for: \"BLOODCULTU\", \"BLDCULT\", \"BCHOLD\"     IMAGING:   DX-CHEST-PORTABLE (1 VIEW)   Final Result      No evidence of acute cardiopulmonary process.      IR-US GUIDED PIV   Final Result    Ultrasound-guided PERIPHERAL IV INSERTION performed by    qualified nursing staff as above.          CULTURES:   Results       Procedure Component Value Units Date/Time    URINALYSIS [[id number]]  (Abnormal) Collected: 09/21/24 1625    Order Status: Completed Specimen: Urine, Clean Catch Updated: 09/21/24 1645     Color Yellow     Character Clear     Specific Gravity 1.010     Ph 7.0     Glucose 100 mg/dL      Ketones Negative mg/dL      Protein Negative mg/dL      Bilirubin Negative     Urobilinogen, Urine 1.0     Nitrite Negative     Leukocyte Esterase Negative     Occult Blood Negative     Micro Urine Req see below     Comment: Microscopic examination not performed when specimen is clear  and chemically negative for protein, blood, leukocyte esterase  and nitrite.         BLOOD CULTURE [[id number]]  (Abnormal) Collected: 09/11/24 1043    Order Status: Completed Specimen: Blood from Peripheral Updated: 09/18/24 1106     Significant Indicator POS     Source BLD     Site PERIPHERAL     Culture Result Growth detected by Bactec instrument. 09/11/2024  21:27      Bacillus cereus  See previous culture for sensitivity report.      BLOOD CULTURE [[id number]]  (Abnormal) Collected: 09/11/24 1043    Order Status: Completed Specimen: Blood from Peripheral Updated: 09/18/24 1103     Significant Indicator POS     Source BLD     Site PERIPHERAL     Culture Result Growth detected by Bactec instrument. 09/11/2024  21:26      Bacillus cereus    Narrative:      Antimicrobial Susceptibility - Gram Positive Jasper [id number] [facility]. Actively  growing Bacillus cereus in pure culture. Source: Blood. Ambient.  Anaerobic Organism Identification [id number] [facility]. Being added to sample at  [facility].  09/18/2024  10:05    URINE " CULTURE(NEW) [[id number]]  (Abnormal) Collected: 09/11/24 1113    Order Status: Completed Specimen: Urine, Ureter Updated: 09/18/24 1057     Significant Indicator POS     Source UR     Site URINE, URETER     Culture Result Usual skin rosalino 10-50,000 cfu/mL      Bacillus species  10-50,000 cfu/mL  Bacillus cereus group (cereus/thuringiensis)      BLOOD CULTURE [[id number]] Collected: 09/12/24 1539    Order Status: Completed Specimen: Blood from Peripheral Updated: 09/17/24 1700     Significant Indicator NEG     Source BLD     Site PERIPHERAL     Culture Result No growth after 5 days of incubation.    BLOOD CULTURE [[id number]] Collected: 09/12/24 1539    Order Status: Completed Specimen: Blood from Peripheral Updated: 09/17/24 1700     Significant Indicator NEG     Source BLD     Site PERIPHERAL     Culture Result No growth after 5 days of incubation.            MEDS:  Current Facility-Administered Medications   Medication Last Admin    insulin GLARGINE (Lantus,Semglee) injection      omeprazole (PriLOSEC) capsule 40 mg 40 mg at 09/23/24 0622    levoFLOXacin (Levaquin) tablet 500 mg 500 mg at 09/23/24 0623    insulin regular (HumuLIN R,NovoLIN R) injection 3 Units at 09/23/24 0834    And    dextrose 50% (D50W) injection 25 g      tbo-filgrastim (Granix) 300 MCG/0.5ML injection 300 mcg 300 mcg at 09/22/24 1401    [START ON 9/24/2024] cyanocobalamin (Vitamin B-12) injection 1,000 mcg      senna-docusate (Pericolace Or Senokot S) 8.6-50 MG per tablet 2 Tablet 2 Tablet at 09/21/24 1738    And    polyethylene glycol/lytes (Miralax) Packet 1 Packet 1 Packet at 09/16/24 1253    SITagliptin (Januvia) tablet 100 mg 100 mg at 09/23/24 0625    acyclovir (Zovirax) tablet 400 mg 400 mg at 09/23/24 0622    voriconazole (Vfend) tablet 200 mg 200 mg at 09/23/24 0623    folic acid (Folvite) tablet 1 mg 1 mg at 09/23/24 0623    acetaminophen (Tylenol) tablet 650 mg 650 mg at 09/13/24 0610    ondansetron (Zofran ODT) dispertab 4 mg       ondansetron (Zofran) syringe/vial injection 4 mg      atorvastatin (Lipitor) tablet 10 mg 10 mg at 09/22/24 6564       ASSESSMENT/PLAN: 84 y.o. male with history of dyslipidemia, BPH, type 2 DM, and recent GI bleed transferred from [facility] admitted for symptomatic pancytopenia and bone marrow biopsy. He was seen by GI at [facility] and had bidirectional endoscopy on 9/5 with biopsies taken from endoscopy but colonoscopy could not be completed due to inadequate bowel prep. Hgb stable but WBC and platelets continue to downtrend, now with critically low absolute neutrophils. Status post platelet transfusion on 9/7, 9/12, 9/16, 9/21, PRBC transfusions 9/15 and 9/19.  Bone marrow biopsy performed 9/10.     * Pancytopenia (HCC)- (present on admission)  Assessment & Plan  Continues to have severe neutropenia with critically low absolute neutrophils, today at 0.00 down from 0.04 yesterday.  Hx of T2DM with long term use of metformin and Vegetarian diet. Vital signs stable, afebrile. Neutropenic precautions in place. Hematology/oncology consulted and closely following. 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   Workup to date significant for undetectable B12 and low zinc levels.  No evidence of GI or hematuria with negative FOB and UA 9/21.    Plan:  -CBC daily to monitor  -Transfusions of PRBC and Plt as indicated per oncology recommendations  -Neutropenic precautions  -Oncology consulted, following:  -Continue supportive measures and B12, (next dose 9/24)  -Transfuse for platelets < 20,000 unless febrile, bleeding, sepsis then <10,000, hemoglobin < 7  -Bone marrow biopsy with nonspecific findings, NGS unable to be completed on submitted sample, chromosome analysis non-diagnostic  -PPX: Levaquin, Acyclovir and voriconazole  -Additional testing per oncology  -Daily Granix per heme/onc  -Anticipating BM recovery by 9/24, 3 weeks into B12 treatment. If not, then can  discharge and continue therapy outpatient, possible further bone marrow biopsy    Productive cough  Assessment & Plan  Onset afternoon of 9/22, pt reporting rust-colored sputum and CXR showed no evidence of acute cardiopulmonary process. Unchanged this morning. Denies abdiaziz hemoptysis, fever, chills, congestion, dyspnea or chest pain.  Afebrile and SpO2 at 96% in RA with RR of 18, no acute respiratory distress.    Plan:  - Quantiferon gold pending to r/o TB  - Monitor vitals   - On prophylactic levaquin. Still, given critically low ANC, low threshold for further infectious workup including sputum culture, viral swab    Chronic gastritis without bleeding- (present on admission)  Assessment & Plan  Stable.  Demonstrated on EGD biopsies.     Plan:  -Omeprazole 40mg BID    Bilateral lower extremity edema  Assessment & Plan  Resolved. Likely was secondary to decreased ambulation inside room. Resolved with encouraged ambulation, NO hose.    Plan:  -Encouraged to continue ambulation around room  -NO hose as needed    Bacteremia  Assessment & Plan  Resolved. Blood cultures 9/11 positive for  Bacillus cereus, thuringiensis, & mycoides. Ucx positive for same. Repeat blood cultures from 9/12 are negative final. S/p Ancef and vancomycin.    Plan:  -Prophylactic Levaquin started 9/19  -Monitor VS and symptoms  -Also receiving acyclovir and voriconazole for prophylaxis    Dysuria  Assessment & Plan  Resolved.  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  S/p Ancef and vancomycin. Now on prophylactic Levaquin.    Dyslipidemia- (present on admission)  Assessment & Plan  Continue home atorvastatin.     B12 deficiency- (present on admission)  Assessment & Plan  Per Oncology Dr. Long, essentially undetectable B12 so recommends daily injections of B12 x 2 weeks and then can switch to weekly x 1 month, and then monthly from thereon.  We will follow these recommendations.   Additionally, concerned that long-term metformin use has contributed to profound vitamin B12 deficiency.    Plan:  -Vit B12 1,000mcg x 2 weeks (completed 9/17)   -Then vitamin B12 1000 mcg weekly x 1 month (Last dose 10/15)   -Then vitamin B12 1000 mcg monthly  -Recommend oral supplementation at discharge for vegetarian diet, for now the plan will be IM; patient should follow-up on this outpatient  -Will exclude metformin from diabetes regimen at discharge    Type 2 diabetes mellitus, without long-term current use of insulin (HCC)- (present on admission)  Assessment & Plan  On metformin, glipizide, and Januvia at home.  Per oncology, there is concern that long-term use of metformin may be contributing to profound vitamin B12 deficiency.  Patient will need a different home regimen that does not include metformin at discharge. POC BG elevated this AM at 288    Plan:  -Continue to hold metformin and glipizide   -Continue Januvia  -Increase Insulin glargine from 7 units nightly to 10 units nightly  -Continue CHO diet and moderate SSI + hypoglycemia protocol    History of GI bleed- (present on admission)  Assessment & Plan  Appears stable, no bloody stool, no melena.  Vital signs stable.  Patient continues to require periodic transfusions of PRBC this does appear to be secondary to severe pancytopenia rather than blood loss. FOB and UA negative for occult blood 9/21.    Plan:  -Continue daily PPI, transitioned to PO 9/21  -Bowel regimen with senna and MiraLAX  -Continue to monitor for signs of GI bleed: bloody stool, melena  -Outpatient follow-up with GI for repeat colonoscopy as initial did not have complete prep    Thrombocytopenia (HCC)- (present on admission)  Assessment & Plan  Continues to downtrend. Platelets transfused the evening of 9/7, 9/12, 9/16, 9/21.  Today platelets 58 down from 87 yesterday. Vital signs stable.  No obvious bleeding or unprovoked bruising. Anticipate need for additional platelet  transfusions during admission.    Plan:  - CBC daily  -Transfuse platelets per hematology/oncology recs (see A/P for pancytopenia)  - Oncology following      Core Measures  IV Fluids: none  Diet: vegetarian with carb count  Lines: PIV  Antbx: Prophylactic Levaquin, voriconazole, acyclovir  DVT ppx: SCDs, chemical prophylaxis held in setting of thrombocytopenia, patient ambulating frequently in room  Code status: Full Code    Dispo: Inpatient for supportive care, monitoring of and transfusions for severe pancytopenia and thrombocytopenia pending results of additional testing, oncology recommendations/plan.     Miranda Bullock MD   PGY-3 Family Medicine Resident   [facility]

## 2024-09-23 NOTE — CARE PLAN
The patient is Watcher - Medium risk of patient condition declining or worsening    Shift Goals  Clinical Goals: stable VS/labs, pain control  Patient Goals: rest, dc planning  Family Goals: not present at this time    Progress made toward(s) clinical / shift goals:    Problem: Knowledge Deficit - Standard  Goal: Patient and family/care givers will demonstrate understanding of plan of care, disease process/condition, diagnostic tests and medications  Outcome: Progressing  Note: Educated pt on POC, monitor VS/labs, pain control, protective isolation precautions, safety, all questions answered, hourly rounding in progress     Problem: Pain - Standard  Goal: Alleviation of pain or a reduction in pain to the patient’s comfort goal  Outcome: Progressing  Note: VSS, denies any pain at this time, no s/s of distress, hourly rounding in progress       Patient is not progressing towards the following goals:

## 2024-09-24 ENCOUNTER — PHARMACY VISIT (OUTPATIENT)
Dept: PHARMACY | Facility: MEDICAL CENTER | Age: 84
End: 2024-09-24
Payer: COMMERCIAL

## 2024-09-24 VITALS
SYSTOLIC BLOOD PRESSURE: 136 MMHG | DIASTOLIC BLOOD PRESSURE: 77 MMHG | BODY MASS INDEX: 24.33 KG/M2 | OXYGEN SATURATION: 99 % | WEIGHT: 154.98 LBS | HEIGHT: 67 IN | RESPIRATION RATE: 17 BRPM | TEMPERATURE: 97.5 F | HEART RATE: 77 BPM

## 2024-09-24 LAB
ABO GROUP BLD: NORMAL
ANISOCYTOSIS BLD QL SMEAR: ABNORMAL
BARCODED ABORH UBTYP: 2800
BARCODED PRD CODE UBPRD: NORMAL
BARCODED UNIT NUM UBUNT: NORMAL
BASOPHILS # BLD AUTO: 0 % (ref 0–1.8)
BASOPHILS # BLD: 0 K/UL (ref 0–0.12)
BLD GP AB SCN SERPL QL: NORMAL
COMMENT NL1176: NORMAL
COMPONENT R 8504R: NORMAL
EOSINOPHIL # BLD AUTO: 0 K/UL (ref 0–0.51)
EOSINOPHIL NFR BLD: 0 % (ref 0–6.9)
ERYTHROCYTE [DISTWIDTH] IN BLOOD BY AUTOMATED COUNT: 46.9 FL (ref 35.9–50)
GAMMA INTERFERON BACKGROUND BLD IA-ACNC: 0.03 IU/ML
GLUCOSE BLD STRIP.AUTO-MCNC: 141 MG/DL (ref 65–99)
GLUCOSE BLD STRIP.AUTO-MCNC: 204 MG/DL (ref 65–99)
GLUCOSE BLD STRIP.AUTO-MCNC: 314 MG/DL (ref 65–99)
HCT VFR BLD AUTO: 20.7 % (ref 42–52)
HGB BLD-MCNC: 7.3 G/DL (ref 14–18)
LYMPHOCYTES # BLD AUTO: 1.39 K/UL (ref 1–4.8)
LYMPHOCYTES NFR BLD: 99.1 % (ref 22–41)
M TB IFN-G BLD-IMP: NEGATIVE
M TB IFN-G CD4+ BCKGRND COR BLD-ACNC: 0.09 IU/ML
MACROCYTES BLD QL SMEAR: ABNORMAL
MANUAL DIFF BLD: NORMAL
MCH RBC QN AUTO: 31.3 PG (ref 27–33)
MCHC RBC AUTO-ENTMCNC: 35.3 G/DL (ref 32.3–36.5)
MCV RBC AUTO: 88.8 FL (ref 81.4–97.8)
MITOGEN IGNF BCKGRD COR BLD-ACNC: 2.36 IU/ML
MONOCYTES # BLD AUTO: 0 K/UL (ref 0–0.85)
MONOCYTES NFR BLD AUTO: 0 % (ref 0–13.4)
MORPHOLOGY BLD-IMP: NORMAL
NEUTROPHILS # BLD AUTO: 0.01 K/UL (ref 1.82–7.42)
NEUTROPHILS NFR BLD: 0.9 % (ref 44–72)
NRBC # BLD AUTO: 0 K/UL
NRBC BLD-RTO: 0 /100 WBC (ref 0–0.2)
PLATELET # BLD AUTO: 40 K/UL (ref 164–446)
PLATELET BLD QL SMEAR: NORMAL
PLATELETS.RETICULATED NFR BLD AUTO: 0.5 % (ref 0.6–13.1)
PMV BLD AUTO: 9.8 FL (ref 9–12.9)
PRODUCT TYPE UPROD: NORMAL
QFT TB2 - NIL TBQ2: 0.1 IU/ML
RBC # BLD AUTO: 2.33 M/UL (ref 4.7–6.1)
RBC BLD AUTO: PRESENT
RH BLD: NORMAL
UNIT STATUS USTAT: NORMAL
WBC # BLD AUTO: 1.4 K/UL (ref 4.8–10.8)

## 2024-09-24 PROCEDURE — A9270 NON-COVERED ITEM OR SERVICE: HCPCS

## 2024-09-24 PROCEDURE — P9016 RBC LEUKOCYTES REDUCED: HCPCS

## 2024-09-24 PROCEDURE — 82962 GLUCOSE BLOOD TEST: CPT | Mod: 91

## 2024-09-24 PROCEDURE — 700102 HCHG RX REV CODE 250 W/ 637 OVERRIDE(OP)

## 2024-09-24 PROCEDURE — 86901 BLOOD TYPING SEROLOGIC RH(D): CPT

## 2024-09-24 PROCEDURE — 700102 HCHG RX REV CODE 250 W/ 637 OVERRIDE(OP): Performed by: STUDENT IN AN ORGANIZED HEALTH CARE EDUCATION/TRAINING PROGRAM

## 2024-09-24 PROCEDURE — 700102 HCHG RX REV CODE 250 W/ 637 OVERRIDE(OP): Performed by: INTERNAL MEDICINE

## 2024-09-24 PROCEDURE — 700111 HCHG RX REV CODE 636 W/ 250 OVERRIDE (IP): Mod: JZ,JG | Performed by: INTERNAL MEDICINE

## 2024-09-24 PROCEDURE — 86900 BLOOD TYPING SEROLOGIC ABO: CPT

## 2024-09-24 PROCEDURE — 85027 COMPLETE CBC AUTOMATED: CPT

## 2024-09-24 PROCEDURE — A9270 NON-COVERED ITEM OR SERVICE: HCPCS | Performed by: INTERNAL MEDICINE

## 2024-09-24 PROCEDURE — 86850 RBC ANTIBODY SCREEN: CPT

## 2024-09-24 PROCEDURE — 99238 HOSP IP/OBS DSCHRG MGMT 30/<: CPT | Mod: GC | Performed by: FAMILY MEDICINE

## 2024-09-24 PROCEDURE — 36415 COLL VENOUS BLD VENIPUNCTURE: CPT

## 2024-09-24 PROCEDURE — RXMED WILLOW AMBULATORY MEDICATION CHARGE

## 2024-09-24 PROCEDURE — 85007 BL SMEAR W/DIFF WBC COUNT: CPT

## 2024-09-24 PROCEDURE — A9270 NON-COVERED ITEM OR SERVICE: HCPCS | Performed by: STUDENT IN AN ORGANIZED HEALTH CARE EDUCATION/TRAINING PROGRAM

## 2024-09-24 PROCEDURE — 36430 TRANSFUSION BLD/BLD COMPNT: CPT

## 2024-09-24 PROCEDURE — 85055 RETICULATED PLATELET ASSAY: CPT

## 2024-09-24 PROCEDURE — 86923 COMPATIBILITY TEST ELECTRIC: CPT

## 2024-09-24 RX ORDER — FOLIC ACID 1 MG/1
1 TABLET ORAL DAILY
Qty: 30 TABLET | Refills: 1 | Status: SHIPPED | OUTPATIENT
Start: 2024-09-25

## 2024-09-24 RX ORDER — LEVOFLOXACIN 500 MG/1
500 TABLET, FILM COATED ORAL EVERY 24 HOURS
Qty: 30 TABLET | Refills: 0 | Status: ACTIVE | OUTPATIENT
Start: 2024-09-25 | End: 2024-10-25

## 2024-09-24 RX ORDER — VORICONAZOLE 200 MG/1
200 TABLET, FILM COATED ORAL EVERY 12 HOURS
Qty: 60 TABLET | Refills: 0 | Status: ACTIVE | OUTPATIENT
Start: 2024-09-24 | End: 2024-10-24

## 2024-09-24 RX ORDER — CYANOCOBALAMIN 1000 UG/ML
1000 INJECTION, SOLUTION INTRAMUSCULAR; SUBCUTANEOUS
Status: SHIPPED
Start: 2024-10-01 | End: 2024-10-31

## 2024-09-24 RX ORDER — ACYCLOVIR 400 MG/1
400 TABLET ORAL 2 TIMES DAILY
Qty: 60 TABLET | Refills: 0 | Status: ACTIVE | OUTPATIENT
Start: 2024-09-24

## 2024-09-24 RX ORDER — OMEPRAZOLE 40 MG/1
40 CAPSULE, DELAYED RELEASE ORAL 2 TIMES DAILY
Qty: 60 CAPSULE | Refills: 0 | Status: SHIPPED | OUTPATIENT
Start: 2024-09-24 | End: 2024-10-24

## 2024-09-24 RX ADMIN — LEVOFLOXACIN 500 MG: 500 TABLET, FILM COATED ORAL at 05:30

## 2024-09-24 RX ADMIN — VORICONAZOLE 200 MG: 200 TABLET ORAL at 08:07

## 2024-09-24 RX ADMIN — INSULIN GLARGINE-YFGN 10 UNITS: 100 INJECTION, SOLUTION SUBCUTANEOUS at 18:02

## 2024-09-24 RX ADMIN — OMEPRAZOLE 40 MG: 20 CAPSULE, DELAYED RELEASE ORAL at 05:30

## 2024-09-24 RX ADMIN — VORICONAZOLE 200 MG: 200 TABLET ORAL at 18:00

## 2024-09-24 RX ADMIN — INSULIN HUMAN 3 UNITS: 100 INJECTION, SOLUTION PARENTERAL at 08:08

## 2024-09-24 RX ADMIN — FOLIC ACID 1 MG: 1 TABLET ORAL at 05:31

## 2024-09-24 RX ADMIN — ACYCLOVIR 400 MG: 400 TABLET ORAL at 18:00

## 2024-09-24 RX ADMIN — POLYETHYLENE GLYCOL 3350 1 PACKET: 17 POWDER, FOR SOLUTION ORAL at 05:31

## 2024-09-24 RX ADMIN — TBO-FILGRASTIM 300 MCG: 300 INJECTION, SOLUTION SUBCUTANEOUS at 16:29

## 2024-09-24 RX ADMIN — ACYCLOVIR 400 MG: 400 TABLET ORAL at 05:30

## 2024-09-24 RX ADMIN — OMEPRAZOLE 40 MG: 20 CAPSULE, DELAYED RELEASE ORAL at 17:59

## 2024-09-24 RX ADMIN — INSULIN HUMAN 5 UNITS: 100 INJECTION, SOLUTION PARENTERAL at 18:03

## 2024-09-24 RX ADMIN — SITAGLIPTIN 100 MG: 100 TABLET, FILM COATED ORAL at 05:30

## 2024-09-24 RX ADMIN — CYANOCOBALAMIN 1000 MCG: 1000 INJECTION, SOLUTION INTRAMUSCULAR; SUBCUTANEOUS at 08:07

## 2024-09-24 ASSESSMENT — ENCOUNTER SYMPTOMS
ABDOMINAL PAIN: 0
EYE DISCHARGE: 0
DIARRHEA: 0
EYE REDNESS: 0
SHORTNESS OF BREATH: 0
CHILLS: 0
COUGH: 0
NAUSEA: 0
HEARTBURN: 0
PALPITATIONS: 0
BLURRED VISION: 0
FEVER: 0
SINUS PAIN: 0
SPUTUM PRODUCTION: 0
EYE PAIN: 0
SORE THROAT: 0
VOMITING: 0

## 2024-09-24 ASSESSMENT — PAIN DESCRIPTION - PAIN TYPE: TYPE: ACUTE PAIN

## 2024-09-24 NOTE — PROGRESS NOTES
Oncology/Hematology Progress Note               Author: Bret Mckenzie M.D.   Date & Time created: 9/24/2024  9:08 AM   CC:  Pancytopenia likely due to severe B12 deficiency      Interval History:  No new symptoms.  No changes.  He still has a mild ongoing cough.  He has no bleeding symptoms.  His appetite is fine.  No nausea, vomiting, diarrhea.          PMHx, PSurgHx, SocHx, FAMHx: Reviewed and unchanged    Review of Systems:  Review of Systems   Constitutional:  Positive for malaise/fatigue. Negative for chills and fever.   HENT:  Negative for congestion, nosebleeds, sinus pain and sore throat.    Eyes:  Negative for blurred vision, pain, discharge and redness.   Respiratory:  Negative for cough, sputum production and shortness of breath.    Cardiovascular:  Negative for chest pain, palpitations and leg swelling.   Gastrointestinal:  Negative for abdominal pain, diarrhea, heartburn, nausea and vomiting.   Genitourinary:  Negative for dysuria, frequency, hematuria and urgency.       Physical Exam:  Physical Exam  Constitutional:       General: He is not in acute distress.     Appearance: He is not toxic-appearing.   HENT:      Head: Normocephalic and atraumatic.      Mouth/Throat:      Pharynx: Oropharynx is clear. No oropharyngeal exudate.   Eyes:      General:         Right eye: No discharge.         Left eye: No discharge.      Conjunctiva/sclera: Conjunctivae normal.   Cardiovascular:      Rate and Rhythm: Normal rate and regular rhythm.      Heart sounds: No murmur heard.     No gallop.   Pulmonary:      Effort: Pulmonary effort is normal. No respiratory distress.      Breath sounds: Normal breath sounds. No wheezing or rales.   Abdominal:      General: Bowel sounds are normal. There is no distension.      Tenderness: There is no abdominal tenderness. There is no guarding.   Musculoskeletal:         General: No tenderness. Normal range of motion.      Cervical back: Normal range of motion and neck supple.       Right lower leg: No edema.      Left lower leg: No edema.   Lymphadenopathy:      Cervical: No cervical adenopathy.   Skin:     General: Skin is warm and dry.      Coloration: Skin is not jaundiced.      Findings: No lesion.   Neurological:      General: No focal deficit present.      Mental Status: He is alert and oriented to person, place, and time. Mental status is at baseline.   Psychiatric:         Mood and Affect: Mood normal.         Thought Content: Thought content normal.         Judgment: Judgment normal.         Labs:          Recent Labs     24  0550   SODIUM 138   POTASSIUM 4.0   CHLORIDE 104   CO2 23   BUN 13   CREATININE 0.85   CALCIUM 8.8     Recent Labs     24  0550   GLUCOSE 136*     Recent Labs     24  0550 24  0526 24  0503   RBC 2.52* 2.39* 2.33*   HEMOGLOBIN 7.9* 7.5* 7.3*   HEMATOCRIT 22.6* 21.3* 20.7*   PLATELETCT 87* 58* 40*     Recent Labs     24  0550 24  0526 24  0503   WBC 1.4* 1.3* 1.4*   NEUTSPOLYS 3.00* 0.00* 0.90*   LYMPHOCYTES 96.00* 98.30* 99.10*   MONOCYTES 1.00 0.80 0.00   EOSINOPHILS 0.00 0.90 0.00   BASOPHILS 0.00 0.00 0.00     Recent Labs     24  0550   SODIUM 138   POTASSIUM 4.0   CHLORIDE 104   CO2 23   GLUCOSE 136*   BUN 13   CREATININE 0.85   CALCIUM 8.8     Hemodynamics:  Temp (24hrs), Av.4 °C (97.6 °F), Min:36 °C (96.8 °F), Max:36.7 °C (98 °F)  Temperature: 36.4 °C (97.6 °F)  Pulse  Av.8  Min: 61  Max: 99   Blood Pressure : 121/61     Respiratory:    Respiration: 18, Pulse Oximetry: 96 %        RUL Breath Sounds: Clear, RML Breath Sounds: Clear, RLL Breath Sounds: Diminished, YAZAN Breath Sounds: Clear, LLL Breath Sounds: Diminished  Fluids:    Intake/Output Summary (Last 24 hours) at 2024 1041  Last data filed at 2024 0439  Gross per 24 hour   Intake --   Output 1625 ml   Net -1625 ml        GI/Nutrition:  Orders Placed This Encounter   Procedures    Diet Order Diet: Consistent CHO (Diabetic);  Miscellaneous modifications: (optional): Vegetarian, Lactose Free     Standing Status:   Standing     Number of Occurrences:   1     Order Specific Question:   Diet:     Answer:   Consistent CHO (Diabetic) [4]     Order Specific Question:   Miscellaneous modifications: (optional)     Answer:   Vegetarian [13]     Order Specific Question:   Miscellaneous modifications: (optional)     Answer:   Lactose Free [5]     Medical Decision Making, by Problem:  Active Hospital Problems    Diagnosis     *Pancytopenia (HCC) [D61.818]     Bacteremia [R78.81]     Dysuria [R30.0]     Dyslipidemia [E78.5]     B12 deficiency [E53.8]     Type 2 diabetes mellitus, without long-term current use of insulin (HCC) [E11.9]     Thrombocytopenia (HCC) [D69.6]     GI bleed [K92.2]        Plan:  1.  B12 deficiency--  he presented with severe Pancytopenia thought to be related to severe B12 deficiency probably related to chronic use of metformin impairing absorption also vegetarian diet.       Bone marrow biopsy on 9/10 showed a somewhat hypocellular marrow at 10-15%, with prominent stromal elements, markedly decreased maturing granulocytic precursors, decreased erythroid precursors with slight dyserythropoiesis, and markedly decreased megakaryocytes.  Normal male karyotype.  Flow no increase in blast.  NGS testing could not be done due to quantity not being sufficient.  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            His B12 level was undetectable, but his folic acid was normal.  Zinc was low, copper was normal.    EBV, CMV, parvovirus, HIV, and hepatitis PCR are all negative.  TSH is within normal limits.  Heavy metals none detected.  SHANELL negative.  PNH panel negative            Started on intramuscular B12 injections on 9/3/2024, 1000 mcg/day, continuing daily for 14 shots which will complete on on  9/24.  He would then switch to a weekly shot.    -- Completing daily course of B12 shots, today on 9/24  --He should then continue the shots weekly for 4 doses, and then switch to monthly injections.  These can be arranged as an outpatient.  --With B12 deficiency, may take up to 4 weeks on average to see improvement in blood counts.  However in my experience, sometimes it can take up to 8 weeks.  --He may have delayed response  --If he does not improve, he may need a repeat bone marrow biopsy as an outpatient to consider repeat NGS testing versus peripheral blood NGS testing   -- He can follow-up with Dr. Molina as an outpatient       2.  Anemia--   transfuse if Hgb <7 if no symptoms while he is an inpatient.  --Will need to be set up with [facility] 2 days/week, for blood counts and potential transfusion  -- Would recommend transfusing if hemoglobin <8 as an outpatient  -- In fact I will transfuse him 1 unit of blood today, to push him up to greater than 8, in preparation for outpatient monitoring.        3.  Thrombocytopenia--   transfuse if  PLTs <10 as an inpatient.  --In the outpatient setting, would recommend transfusing platelets if <20K    4.  GI Bleed--  some question of this on admission.  No source found by GI workup.  -- Colonoscopy prep was poor on recent colonoscopy  -- Will need repeat colonoscopy in the outpatient setting in the future    5.  Chronic Gastritis--  Biopsies from EGD demonstrated chronic gastritis which could be worsening his B12 deficiency beyond just his vegetarian diet.    6.  Bacillus cereus bacteremia--  -Completed vancomycin until 9/18/2024    7.  Severe neutropenia--  -Neutropenic precautions  -9/17 started Granix.   --No improvement since starting Granix.  This likely rules out an autoimmune neutropenia.  Likely will not improve with Granix, unless B12 is adequately replaced.  --Once discharged, would discontinue Granix  --Continue on prophylactic antibiotics  with acyclovir,  voriconazole and Levaquin, during inpatient stay as well as at discharge    8.  Diabetes--     management per medicine team.  However, he will now be on B12 injections, so less worried about malabsorption of B12 with metformin.  If metformin is the best medication for him, he can go back on that this as far as we are concerned.      Disposition: From hematology standpoint, he would be okay for discharge, as long as he had a standing appointment at [facility] for CBC and possible transfusion every Tuesday and Friday (2 days/week).  His insurance would mandate this being set up at [facility].  Our office is working to set this up.  This should be set up to start this week on either Thursday or Friday.          I spoke with his son today who is a physician and [place].  Discussed overall plan and answered questions.        High complexity, complex decision making       Quality-Core Measures   Reviewed items::  Labs reviewed and Medications reviewed  Mckee catheter::  No Mckee  DVT prophylaxis pharmacological::  Contraindicated - High bleeding risk

## 2024-09-24 NOTE — PROGRESS NOTES
[facility] [place] FAMILY MEDICINE PROGRESS NOTE     Attending:   Manny Shea MD    Resident:   Miranda Bullock MD PGY-3    PATIENT:   Miri Joseph; [id number]; 1940    ID:  84 y.o. male with history of dyslipidemia, BPH, type 2 DM, and recent GI bleed transferred from [facility] admitted for symptomatic pancytopenia and bone marrow biopsy. He was seen by GI at [facility] and had bidirectional endoscopy on 9/5 with biopsies taken from endoscopy but colonoscopy could not be completed due to inadequate bowel prep. Hgb stable but WBC and platelets continued to drop.  Transferred here for bone marrow biopsy.  Now status post platelet transfusion on 9/7, 9/12, 9/16, 9/21 and PRBC transfusion 9/15 and 9/19.     SUBJECTIVE:   No acute events overnight, patient sitting in chair eating breakfast in no acute distress.  Patient reports that he is feeling well.  Denies any lightheadedness, fever, chills, cough, shortness of breath, chest pain, abdominal pain, nausea, vomiting or diarrhea.  Patient asked if his family could bring him in some goat soup.  Patient also requested prune juice.  No questions or concerns.    OBJECTIVE:  Vitals:    09/23/24 1955 09/24/24 0002 09/24/24 0341 09/24/24 0805   BP: 119/65 134/69 134/59 121/61   Pulse: 74 69 69 74   Resp: 18 18 18 18   Temp: 36 °C (96.8 °F) 36.3 °C (97.3 °F) 36.6 °C (97.8 °F) 36.4 °C (97.6 °F)   TempSrc: Temporal Temporal Temporal Temporal   SpO2: 98% 99% 100% 96%   Weight:       Height:         Intake/Output Summary (Last 24 hours) at 9/23/2024 0600  Last data filed at 9/22/2024 0800  Gross per 24 hour   Intake 300 ml   Output --   Net 300 ml       PHYSICAL EXAM:   Physical Exam  Constitutional:       General: He is not in acute distress.     Appearance: Normal appearance. He is not ill-appearing, toxic-appearing or diaphoretic.   HENT:      Nose: No congestion or rhinorrhea.      Mouth/Throat:      Mouth: Mucous membranes are moist.   Eyes:      Pupils:  "Pupils are equal, round, and reactive to light.   Cardiovascular:      Rate and Rhythm: Normal rate and regular rhythm.      Heart sounds: No murmur heard.  Pulmonary:      Effort: Pulmonary effort is normal. No respiratory distress.      Breath sounds: Normal breath sounds. No wheezing.   Abdominal:      General: Abdomen is flat. Bowel sounds are normal. There is no distension.      Palpations: Abdomen is soft.      Tenderness: There is no abdominal tenderness.   Musculoskeletal:         General: Normal range of motion.      Right lower leg: No edema.      Left lower leg: No edema.   Skin:     General: Skin is warm.      Capillary Refill: Capillary refill takes less than 2 seconds.   Neurological:      General: No focal deficit present.      Mental Status: He is alert and oriented to person, place, and time.      Motor: No weakness.   Psychiatric:         Mood and Affect: Mood normal.         Behavior: Behavior normal.        LABS:  Recent Labs     09/22/24  0550 09/23/24  0526 09/24/24  0503   WBC 1.4* 1.3* 1.4*   RBC 2.52* 2.39* 2.33*   HEMOGLOBIN 7.9* 7.5* 7.3*   HEMATOCRIT 22.6* 21.3* 20.7*   MCV 89.7 89.1 88.8   MCH 31.3 31.4 31.3   RDW 48.2 47.4 46.9   PLATELETCT 87* 58* 40*   MPV 9.8 9.8 9.8   NEUTSPOLYS 3.00* 0.00* 0.90*   LYMPHOCYTES 96.00* 98.30* 99.10*   MONOCYTES 1.00 0.80 0.00   EOSINOPHILS 0.00 0.90 0.00   BASOPHILS 0.00 0.00 0.00   RBCMORPHOLO Present Present Present     Recent Labs     09/22/24  0550   SODIUM 138   POTASSIUM 4.0   CHLORIDE 104   CO2 23   BUN 13   CREATININE 0.85   CALCIUM 8.8     Estimated GFR/CRCL = Estimated Creatinine Clearance: 62.6 mL/min (by C-G formula based on SCr of 0.85 mg/dL).  Recent Labs     09/22/24  0550   GLUCOSE 136*                   No results for input(s): \"INR\", \"APTT\", \"FIBRINOGEN\" in the last 72 hours.    Invalid input(s): \"DIMER\"    MICROBIOLOGY:   No results found for: \"BLOODCULTU\", \"BLDCULT\", \"BCHOLD\"     IMAGING:   DX-CHEST-PORTABLE (1 VIEW)   Final Result "      No evidence of acute cardiopulmonary process.      IR-US GUIDED PIV   Final Result    Ultrasound-guided PERIPHERAL IV INSERTION performed by    qualified nursing staff as above.          CULTURES:   Results       Procedure Component Value Units Date/Time    URINALYSIS [[id number]]  (Abnormal) Collected: 09/21/24 1625    Order Status: Completed Specimen: Urine, Clean Catch Updated: 09/21/24 1645     Color Yellow     Character Clear     Specific Gravity 1.010     Ph 7.0     Glucose 100 mg/dL      Ketones Negative mg/dL      Protein Negative mg/dL      Bilirubin Negative     Urobilinogen, Urine 1.0     Nitrite Negative     Leukocyte Esterase Negative     Occult Blood Negative     Micro Urine Req see below     Comment: Microscopic examination not performed when specimen is clear  and chemically negative for protein, blood, leukocyte esterase  and nitrite.         BLOOD CULTURE [[id number]]  (Abnormal) Collected: 09/11/24 1043    Order Status: Completed Specimen: Blood from Peripheral Updated: 09/18/24 1106     Significant Indicator POS     Source BLD     Site PERIPHERAL     Culture Result Growth detected by Bactec instrument. 09/11/2024  21:27      Bacillus cereus  See previous culture for sensitivity report.      BLOOD CULTURE [[id number]]  (Abnormal) Collected: 09/11/24 1043    Order Status: Completed Specimen: Blood from Peripheral Updated: 09/18/24 1103     Significant Indicator POS     Source BLD     Site PERIPHERAL     Culture Result Growth detected by Bactec instrument. 09/11/2024  21:26      Bacillus cereus    Narrative:      Antimicrobial Susceptibility - Gram Positive [place]asper [id number] [facility]. Actively  growing Bacillus cereus in pure culture. Source: Blood. Ambient.  Anaerobic Organism Identification [id number] [facility]. Being added to sample at  [facility].  09/18/2024  10:05    URINE CULTURE(NEW) [[id number]]  (Abnormal) Collected: 09/11/24 1113    Order Status: Completed Specimen: Urine, Ureter Updated: 09/18/24 1057      Significant Indicator POS     Source UR     Site URINE, URETER     Culture Result Usual skin rosalino 10-50,000 cfu/mL      Bacillus species  10-50,000 cfu/mL  Bacillus cereus group (cereus/thuringiensis)      BLOOD CULTURE [[id number]] Collected: 09/12/24 1539    Order Status: Completed Specimen: Blood from Peripheral Updated: 09/17/24 1700     Significant Indicator NEG     Source BLD     Site PERIPHERAL     Culture Result No growth after 5 days of incubation.    BLOOD CULTURE [[id number]] Collected: 09/12/24 1539    Order Status: Completed Specimen: Blood from Peripheral Updated: 09/17/24 1700     Significant Indicator NEG     Source BLD     Site PERIPHERAL     Culture Result No growth after 5 days of incubation.            MEDS:  Current Facility-Administered Medications   Medication Last Admin    insulin GLARGINE (Lantus,Semglee) injection 10 Units at 09/23/24 1722    omeprazole (PriLOSEC) capsule 40 mg 40 mg at 09/24/24 0530    levoFLOXacin (Levaquin) tablet 500 mg 500 mg at 09/24/24 0530    insulin regular (HumuLIN R,NovoLIN R) injection 3 Units at 09/24/24 0808    And    dextrose 50% (D50W) injection 25 g      tbo-filgrastim (Granix) 300 MCG/0.5ML injection 300 mcg 300 mcg at 09/23/24 1359    cyanocobalamin (Vitamin B-12) injection 1,000 mcg 1,000 mcg at 09/24/24 0807    senna-docusate (Pericolace Or Senokot S) 8.6-50 MG per tablet 2 Tablet 2 Tablet at 09/23/24 1718    And    polyethylene glycol/lytes (Miralax) Packet 1 Packet 1 Packet at 09/24/24 0531    SITagliptin (Januvia) tablet 100 mg 100 mg at 09/24/24 0530    acyclovir (Zovirax) tablet 400 mg 400 mg at 09/24/24 0530    voriconazole (Vfend) tablet 200 mg 200 mg at 09/24/24 0807    folic acid (Folvite) tablet 1 mg 1 mg at 09/24/24 0531    acetaminophen (Tylenol) tablet 650 mg 650 mg at 09/13/24 0610    ondansetron (Zofran ODT) dispertab 4 mg      ondansetron (Zofran) syringe/vial injection 4 mg      atorvastatin (Lipitor) tablet 10 mg 10 mg at 09/23/24 2027        ASSESSMENT/PLAN: 84 y.o. male with history of dyslipidemia, BPH, type 2 DM, and recent GI bleed transferred from [facility] admitted for symptomatic pancytopenia and bone marrow biopsy. He was seen by GI at [facility] and had bidirectional endoscopy on 9/5 with biopsies taken from endoscopy but colonoscopy could not be completed due to inadequate bowel prep. Hgb stable but WBC and platelets continue to downtrend, now with critically low absolute neutrophils. Status post platelet transfusion on 9/7, 9/12, 9/16, 9/21, PRBC transfusions 9/15 and 9/19.  Bone marrow biopsy performed 9/10.     * Pancytopenia (HCC)- (present on admission)  Assessment & Plan  Continues to have severe neutropenia with critically low absolute neutrophils, today at 0.90 which is up from 0.00 yesterday.  Hx of T2DM with long term use of metformin and Vegetarian diet. Vital signs stable, afebrile. Neutropenic precautions in place. Hematology/oncology consulted and closely following. 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   Workup to date significant for undetectable B12 and low zinc levels.  No evidence of GI or hematuria with negative FOB and UA 9/21.    Plan:  -CBC daily to monitor  -Transfusions of PRBC and Plt as indicated per oncology recommendations  -Neutropenic precautions  -Will receive 1 unit of blood per oncology today  -Oncology consulted, appreciate recommendations:   - On weekly B12 shots, received 9/24  - Continue shots weekly for 4 doses, and then switch to monthly injections  - With B12 deficiency, may take up to 4 weeks on average to see improvement in blood counts.  - He may have delayed response  - If he does not improve, he may need a repeat bone marrow biopsy as an outpatient to consider repeat NGS   testing versus peripheral blood NGS testing   - Follow-up with Dr. Molina as an outpatient  - Continue PPX: Levaquin, Acyclovir and voriconazole  - Will need to be set  up with [facility] 2 days/week, for blood counts and potential transfusion  -Recommend transfusing if hemoglobin <8 as an outpatient  -Recommend transfusing platelets if <20K  -No improvement since starting Granix.  This likely rules out an autoimmune neutropenia.  Likely will not improve with Granix, unless B12 is adequately replaced.  -Once discharged, would discontinue Granix  -Continue prophylactic antibiotics  with acyclovir, voriconazole and Levaquin  -From hematology standpoint, he would be okay for discharge, as long as he had a standing appointment at [facility] for CBC and possible transfusion every Tuesday and Friday (2 days/week).       Productive cough  Assessment & Plan  Onset afternoon of 9/22, pt reporting rust-colored sputum and CXR showed no evidence of acute cardiopulmonary process. No longer complaining of cough. Denies abdiaziz hemoptysis, fever, chills, congestion, dyspnea or chest pain.  Afebrile and SpO2 at 98% in RA with RR of 18, no acute respiratory distress.    Plan:  - Quantiferon gold pending to r/o TB  - Monitor vitals   - On prophylactic levaquin. Still, given critically low ANC, low threshold for further infectious workup including sputum culture, viral swab    Chronic gastritis without bleeding- (present on admission)  Assessment & Plan  Stable.  Demonstrated on EGD biopsies.     Plan:  -Omeprazole 40mg BID    Bilateral lower extremity edema  Assessment & Plan  Resolved. Likely was secondary to decreased ambulation inside room. Resolved with encouraged ambulation, NO hose.    Plan:  -Encouraged to continue ambulation around room  -NO hose as needed    Bacteremia  Assessment & Plan  Resolved. Blood cultures 9/11 positive for  Bacillus cereus, thuringiensis, & mycoides. Ucx positive for same. Repeat blood cultures from 9/12 are negative final. S/p Ancef and vancomycin.    Plan:  -Prophylactic Levaquin started 9/19  -Monitor VS and symptoms  -Also receiving acyclovir and voriconazole for  prophylaxis    Dysuria  Assessment & Plan  Resolved.  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  S/p Ancef and vancomycin. Now on prophylactic Levaquin.    Dyslipidemia- (present on admission)  Assessment & Plan  Continue home atorvastatin.     B12 deficiency- (present on admission)  Assessment & Plan  Per Oncology Dr. Long, essentially undetectable B12 so recommends daily injections of B12 x 2 weeks and then can switch to weekly x 1 month, and then monthly from thereon.  We will follow these recommendations.  Additionally, concerned that long-term metformin use has contributed to profound vitamin B12 deficiency.    Plan:  -Vit B12 1,000mcg x 2 weeks (completed 9/17)   -Then vitamin B12 1000 mcg weekly x 1 month (Last dose 10/15)   -Then vitamin B12 1000 mcg monthly  -Recommend oral supplementation at discharge for vegetarian diet, for now the plan will be IM; patient should follow-up on this outpatient  -Will exclude metformin from diabetes regimen at discharge    Type 2 diabetes mellitus, without long-term current use of insulin (HCC)- (present on admission)  Assessment & Plan  On metformin, glipizide, and Januvia at home.  Per oncology, there is concern that long-term use of metformin may be contributing to profound vitamin B12 deficiency.  Patient will need a different home regimen that does not include metformin at discharge. POC  this AM     Plan:  -Continue to hold metformin and glipizide   -Continue Januvia  -Continue insulin glargine 10 units nightly  -Continue CHO diet and moderate SSI + hypoglycemia protocol    History of GI bleed- (present on admission)  Assessment & Plan  Appears stable, no bloody stool, no melena.  Vital signs stable.  Patient continues to require periodic transfusions of PRBC this does appear to be secondary to severe pancytopenia rather than blood loss. FOB and UA negative for occult blood 9/21.    Plan:  -Continue daily  PPI, transitioned to PO 9/21  -Bowel regimen with senna and MiraLAX  -Continue to monitor for signs of GI bleed: bloody stool, melena  -Outpatient follow-up with GI for repeat colonoscopy as initial did not have complete prep    Thrombocytopenia (HCC)- (present on admission)  Assessment & Plan  Continues to downtrend. Platelets transfused the evening of 9/7, 9/12, 9/16, 9/21.  Today platelets 58 down from 87 yesterday. Vital signs stable.  No obvious bleeding or unprovoked bruising. Anticipate need for additional platelet transfusions during admission.    Plan:  - CBC daily  -Transfuse platelets per hematology/oncology recs (see A/P for pancytopenia)  - Oncology following      Core Measures  IV Fluids: none  Diet: vegetarian with carb count  Lines: PIV  Antbx: Prophylactic Levaquin, voriconazole, acyclovir  DVT ppx: SCDs, chemical prophylaxis held in setting of thrombocytopenia, patient ambulating frequently in room  Code status: Full Code    Dispo: Patient is clear for discharge pending receiving 1 unit of blood and conformation of appointments in place for biweekly labs at infusion center in case patient has needed transfusion and vitamin B12 injections.  Patient will follow-up with Dr. Tracy Bullock MD   PGY-3 Family Medicine Resident   [facility]

## 2024-09-24 NOTE — CARE PLAN
The patient is Stable - Low risk of patient condition declining or worsening    Shift Goals  Clinical Goals: Patient will complete B12 injection, will be asymptomatic of anemia, and remain free from S/S of infection  Patient Goals: To have breakfast  Family Goals: Not present    Progress made toward(s) clinical / shift goals:      Problem: Knowledge Deficit - Standard  Goal: Patient and family/care givers will demonstrate understanding of plan of care, disease process/condition, diagnostic tests and medications  Outcome: Progressing  Note: Discussed POC and shift goals with patient and primary team. All questions answered. Discussed blood transfusion, B12 shot and DC plan for today.      Problem: Infection  Goal: Will remain free from infection  Outcome: Progressing  Note: Neutropenic precautions in place-  Hand hygiene for all staff visitors  Avoid exposure to visitors or staff with infection   No fresh flowers or plants   Avoid invasive procedures (suppositories, rectal temp, bishop cath)   Ensure all food is cooked thoroughly, no raw or rare meat.   Patient remains afebrile this shift.      Problem: Fall Risk  Goal: Patient will remain free from falls  Outcome: Progressing; Patient remains free from injury and falls this shift. Patient up in room independently, demonstrates strong, steady gait.       Patient is not progressing towards the following goals: NA

## 2024-09-24 NOTE — DISCHARGE PLANNING
Case Management Discharge Planning    Admission Date: 9/6/2024  GMLOS: 3.4  ALOS: 18    6-Clicks ADL Score: 22  6-Clicks Mobility Score: 19      Anticipated Discharge Dispo: Discharge Disposition: Discharged to home/self care (01)    DME Needed: No    Action(s) Taken: Pt scheduled at [facility] for twice weekly CBCs, pt's first appointment is on Thursday 9/26 @ 12pm, pt aware, attempted to speak to pt's son but no answer. IMM given. Pt to discharge home today and follow up with [facility] next week.     Escalations Completed: None    Medically Clear: Yes

## 2024-09-24 NOTE — CARE PLAN
The patient is Stable - Low risk of patient condition declining or worsening    Shift Goals  Clinical Goals: monitor labs,pain control  Patient Goals: sleep  Family Goals: get updates    Progress made toward(s) clinical / shift goals:    Problem: Infection  Goal: Will remain free from infection  Outcome: Progressing     Problem: Pain - Standard  Goal: Alleviation of pain or a reduction in pain to the patient’s comfort goal  Outcome: Progressing     Problem: Neutropenia Precautions  Goal: Neutropenic precautions will be implemented and maintained for patient protection  9/24/2024 0357 by Cassie Christian R.N.  Outcome: Progressing  9/24/2024 0356 by Cassie Christian R.N.  Outcome: Progressing     Patient is alert and oriented, needs addressed at this time. Hourly rounding in place. Call light in place.   Patient is not progressing towards the following goals:

## 2024-09-24 NOTE — PROGRESS NOTES
Sheila from clinical lab called with a critical result at 0635. WBC at 1.4 neutrophils  at 0.01. This is an improvement from yesterdays Labs.

## 2024-09-25 NOTE — DISCHARGE SUMMARY
[facility] Internal Medicine Discharge Summary    Attending: Dr. Manny Shea  Senior Resident: Dr. Miranda Bullock    CHIEF COMPLAINT ON ADMISSION  Blood in stool    Reason for Admission  GI Bleed     Admission Date  9/6/2024    CODE STATUS  FULL CODE    HPI & HOSPITAL COURSE  This is a 84 y.o. male was initially admitted to [facility] for an acute to subacute lower GI bleed.  His initial blood work revealed pancytopenia thrombocytopenia that required transfusion.  He was evaluated by GI and underwent bidirectional endoscopy that revealed no large lesions in the upper GI tract and no active bleeding.  Colonoscopy was nondiagnostic due to inadequate bowel prep but was without signs of active bleeding.  Biopsies obtained during EGD revealed no significant abnormalities of the duodenum.  Gastric antrum with mild chronic active gastritis, negative for H. pylori by Warthin-Starry stain, gastric body and fundus biopsies with minimal chronic inactive gastritis, negative for H. pylori.  No evidence of autoimmune gastritis.    He continued to have worsening pancytopenia and thrombocytopenia in the absence of any active bleeding, and oncology hematology was consulted.  Patient was subsequently transferred to [facility] for bone marrow biopsy and further workup for the etiology of the pancytopenia and thrombocytopenia.  No further signs of GI blood loss were observed during admission.    In reviewing records, patient had normal labs ordered by his PCP 6/2024: WBC 7, Hb 13.8, Plt 194. Suspect current process developed between that time until leading up to admission. Patient does report that he was visiting [place] the [place] during that interval.     Bone marrow biopsy was completed 9/10 and demonstrated bone marrow suppression but was unfortunately unhelpful in determining a specific underlying cause.  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 NGS was unable to be completed on the submitted sample.  Chromosome analysis was normal male karyotype.      Extensive lab workup was completed by the oncology team. Patient was found to have essentially undetectable vitamin B12 levels suspected to be secondary to life-long vegetarian diet in combination with long-term Metformin use. He was treated with a total of 2 weeks daily vitamin B12 injections for this, with anticipation of improving bone marrow function within 3 weeks of initiation of treatment if this was indeed the etiology of the pancytopenia.  Infectious disease testing, autoimmune testing, urine tests for heavy metals, vitamin and mineral levels were tested and resulted otherwise largely unremarkable as noted.      On 9/11, patient developed dysuria, fever, chills.  Blood cultures and urine cultures drawn were positive for Bacillus cereus.  He was treated with Ancef and vancomycin pending final culture and sensitivity results after which he was narrowed to vancomycin for total of 8 days treatment with resolution of his symptoms.  Additionally he was started on prophylactic voriconazole and acyclovir.  Following vancomycin he was started on prophylactic Levaquin.    Patient's absolute neutrophil count continued to drop after admission reaching a minimum value of 0.02.  He was started on daily Granix with no response.  He was kept on neutropenic precautions.    3 weeks after initiation of IM cyanocobalamin injections, lab work demonstrated no signs of bone marrow recovery.  Heme/onc recommended discharge with the biweekly labs at a transfusion center and weekly B12 vitamin injections.      Patient required a total of 5 platelet transfusions and 3 PRBC transfusions during admission.    Appointment for Thursday was made at [facility] for labs, if patient's hemoglobin is less than 8 will transfuse if  patient's platelets are less than 20 will transfuse. Patient will continue Levaquin, acyclovir and voriconazole for prophylaxis.  Granix will be discontinued at discharge.  Patient to follow-up with Dr. Molina outpatient.  Patient will need follow-up colonoscopy due to blood in stool and colonoscopy and patient being performed without full prep.     Relevant Lab Workup:   09/03/24 12:38   Akil With Anti-IgG Reagent NEG   Akil With Anti-C3D Reagent NEG      Latest Reference Range & Units 09/03/24 12:38   Reticulocyte Count 0.8 - 2.6 % 1.2   Retic, Absolute 0.04 - 0.12 M/uL 0.02 (L)   Imm. Reticulocyte Fraction 2.6 - 16.1 % 16.9 (H)   Retic Hgb Equivalent 29.0 - 35.0 pg/cell 45.4 (H)   (L): Data is abnormally low  (H): Data is abnormally high     Latest Reference Range & Units 09/03/24 11:09   Hepatitis C Antibody Non-Reactive  Non-Reactive   HIV Ag/Ab Combo Assay Non Reactive  Non-Reactive      Latest Reference Range & Units 09/09/24 15:12   Hepatitis A Virus Ab, IgM Non-Reactive  Non-Reactive   Hepatitis B Surface Antigen Non-Reactive  Non-Reactive   Hepatitis B Cors Ab,IgM Non-Reactive  Non-Reactive   Hepatitis C Antibody Non-Reactive  Non-Reactive   Cmv Ab Igm <=29.9 AU/mL <8.0   CMV IgG Qnt <=0.70 U/mL >10.00 (H)   Ebv Ab Vca, Igg 0.0 - 21.9 U/mL 192.0 (H)   Ebv Ab Vca, Igm 0.0 - 43.9 U/mL <10.0   Ebv Ea-Igg 0.0 - 10.9 U/mL <5.0   Ebv Na-Abs 0.0 - 21.9 U/mL >600.0 (H)   (H): Data is abnormally high   Latest Reference Range & Units 09/10/24 03:31   Cmv Interpretation Not Detected  Not Detected   CMV, Qnt IU/mL IU/mL Not Detected   CMV, Qnt Log IU/mL log IU/mL Not Detected   EBV Plasma IU/mL, Qnt NAAT IU/mL Not Detected   EBV Plasma log IU/mL, Qnt NAAT log IU/mL Not Detected   EBV Plasma Interp, Qnt NAAT Not Detected  Not Detected   Parvovirus B19 Interp PCR Not Detected  Not Detected   Parvovirus B19 IU/mL PCR IU/mL <100   Parvovirus B19 Log IU/mL PCR log IU/mL <2.0   Parvovirus B19 Source PCR  EDTA Plasma    Source:  Blood      Latest Reference Range & Units 09/03/24 11:09 09/03/24 12:38   Ferritin 22.0 - 322.0 ng/mL 320.0    Haptoglobin 30 - 200 mg/dL  101   Vitamin B12 -True Cobalamin 211 - 911 pg/mL <150 (L)    (L): Data is abnormally low     Latest Reference Range & Units 09/05/24 17:16   Ferritin 22.0 - 322.0 ng/mL 643.0 (H)   (H): Data is abnormally high     Latest Reference Range & Units 09/05/24 17:16   Iron 50 - 180 ug/dL 285 (H)   Total Iron Binding 250 - 450 ug/dL see below  Comment: Unable to calculate TIBC due to IRON or UIBC result being  outside the measurement range of the analyzer.    % Saturation 15 - 55 % see below  Comment: Unable to calculate % Saturation due to Iron of <10 ug/dL or TIBC  of <105 ug/dL    Unsat Iron Binding 110 - 370 ug/dL <17 (L)   (H): Data is abnormally high  (L): Data is abnormally low   Latest Reference Range & Units 09/09/24 15:12   Folate -Folic Acid >4.0 ng/mL 26.0      Latest Reference Range & Units 09/12/24 04:13   25-Hydroxy   Vitamin D 25 30 - 100 ng/mL 16 (L)   (L): Data is abnormally low     Latest Reference Range & Units 09/09/24 15:12   TSH 0.380 - 5.330 uIU/mL 1.490      Latest Reference Range & Units 09/03/24 16:49   EXHUPH40 Activity 40 - 130 % 85   NTXRQL14 INHIBITOR Negative  Negative   JEMFFH31 Antibody <12 U/mL U/mL 2      Latest Reference Range & Units 09/05/24 02:39   Intrinsic Factor Blocking Ab Negative  Negative      Latest Reference Range & Units 09/09/24 15:12   Parietal Cell Abs 0.0 - 24.9 Units 1.9      Latest Reference Range & Units 09/14/24 11:06   Antinuclear Antibody None Detected  None Detected   Myeloperoxidase Ab 0 - 19 AU/mL 0   Serine Proteinase 3, IgG 0 - 19 AU/mL 1      Latest Reference Range & Units 09/10/24 03:31   Gastrin 0 - 100 pg/mL 67      Latest Reference Range & Units 09/10/24 06:02   Copper Serum 70.0 - 140.0 ug/dL 99.5   Zinc Serum 60.0 - 120.0 ug/dL 56.9 (L)   (L): Data is abnormally low     Latest Reference Range & Units  09/14/24 00:57   Creatinine Urine mg/dL 44   Arsenic Total 0.0 - 34.9 ug/L <10.0   Arsenic Cr Urine 0.0 - 29.9 ug/g CRT Not Applicable   Arsenic 24Hr Urine 0.0 - 49.9 ug/d Not Applicable   Copper Urine   ug/dL <=3.2 ug/dL 2.9   Copper/Creat 10.0 - 45.0 ug/g CRT 65.9 (H)   Copper/Creat Ur 3.0 - 45.0 ug/d Not Applicable   Lead, Urine 0.0 - 5.0 ug/L <5.0   Lead-Creatinine Ratio 0.0 - 5.0 ug/g CRT Not Applicable   Lead 24Hr Urine 0.0 - 8.1 ug/d Not Applicable   Mercury/Creat Urine 0.0 - 20.0 ug/g CRT Not Applicable   Mercury, Ur ug/day 0.0 - 20.0 ug/d Not Applicable   Mercury, Ur ug/L 0.0 - 5.0 ug/L <2.5   Zinc, Urine 15.0 - 120.0 ug/dL 26.0   Zinc/Creat Urine 110.0 - 750.0 ug/g .9   Zinc Ur ug/day 150.0 - 1200.0 ug/d Not Applicable   (H): Data is abnormally high   Latest Reference Range & Units 09/14/24 03:17   Arsenic, Blood <=12.0 ug/L <10.0   Lead Blood <=4.9 ug/dL <2.0   Cadmium Blood <=5.0 ug/L <1.0   Mercury, Blood <=10.0 ug/L <2.5     Therefore, he is discharged in fair and stable condition to home with close outpatient follow-up.    The patient met 2-midnight criteria for an inpatient stay at the time of discharge.    Discharge Date  9/24/2024    FOLLOW UP ITEMS POST DISCHARGE  Oncology:  - Follow-up with Dr. Molina as an outpatient  - Weekly B12 shots, first received 9/24. Continue shots weekly for 4 doses, and then switch to monthly injections.  With B12 deficiency, may take up to 4 weeks on average to see improvement in blood counts. He may have delayed response. If he does not improve, he may need a repeat bone marrow biopsy as an outpatient to consider repeat NGS   - Continue PPX: Levaquin, Acyclovir and voriconazole  - Will need labs 2 days/week, for blood counts and potential transfusion  -Recommend transfusing if hemoglobin <8 as an outpatient  -Recommend transfusing platelets if <20K  GI: Repeat colonoscopy    DISCHARGE DIAGNOSES  Principal Problem:    Pancytopenia (HCC) (POA: Yes)  Active  Problems:    Thrombocytopenia (HCC) (POA: Yes)    History of GI bleed (POA: Yes)    Type 2 diabetes mellitus, without long-term current use of insulin (HCC) (POA: Yes)    B12 deficiency (POA: Yes)    Dyslipidemia (POA: Yes)    Dysuria (POA: No)    Bacteremia (POA: No)    Bilateral lower extremity edema (POA: No)    Chronic gastritis without bleeding (POA: Yes)    Productive cough (POA: No)  Resolved Problems:    * No resolved hospital problems. *    FOLLOW UP  No future appointments.  [facility]  [address]  [place] [address]  [phone]  Go in 2 day(s)      [facility]  [address]  [place] [id number] [phone]  Schedule an appointment as soon as possible for a visit in 1 week(s)      Danielle Dawson M.D.  [address] [facility]   [address]  [place] [facility] [address]  [phone]          MEDICATIONS ON DISCHARGE     Medication List        START taking these medications        Instructions   acyclovir 400 MG tablet  Commonly known as: Zovirax   Take 1 Tablet by mouth 2 times a day.  Dose: 400 mg     cyanocobalamin 1000 MCG/ML Soln  Start taking on: October 1, 2024  Commonly known as: Vitamin B-12   Inject 1 mL into the shoulder, thigh, or buttocks every 7 days for 30 days.  Dose: 1,000 mcg     folic acid 1 MG Tabs  Commonly known as: Folvite   Take 1 Tablet by mouth every day.  Dose: 1 mg     levoFLOXacin 500 MG tablet  Commonly known as: Levaquin   Take 1 Tablet by mouth every 24 hours for 30 days.  Dose: 500 mg     omeprazole 40 MG delayed-release capsule  Commonly known as: PriLOSEC   Take 1 Capsule by mouth 2 times a day for 30 days.  Dose: 40 mg     voriconazole 200 MG Tabs  Commonly known as: Vfend   Take 1 Tablet by mouth every 12 hours for 30 days.  Dose: 200 mg            CONTINUE taking these medications        Instructions   atorvastatin 10 MG Tabs  Commonly known as: Lipitor   Take 10 mg by mouth every evening.  Dose: 10 mg     glipiZIDE ER 5 MG  Tb24  Commonly known as: Glucotrol XL   Take 5 mg by mouth every day.  Dose: 5 mg     Januvia 100 MG Tabs  Generic drug: SITagliptin   Take 100 mg by mouth every day.  Dose: 100 mg     Non Formulary Request   Take 1 Tab by mouth every day. Cadifast from [place] for eyes  Dose: 1 Tablet     TYLENOL PO   Take 1 Tablet by mouth 2 times a day as needed (For pain). Pt and pts son are not sure the strength (OTC)  Dose: 1 Tablet            STOP taking these medications      metformin 1000 MG tablet  Commonly known as: Glucophage            Allergies  No Known Allergies    DIET  No orders of the defined types were placed in this encounter.    ACTIVITY  As tolerated.  Weight bearing as tolerated    CONSULTATIONS  Gastroenterology  Pulmonology  Hematology and oncology    PROCEDURES  EGD/colonoscopy  Bone marrow biopsy    LABORATORY  Lab Results   Component Value Date    SODIUM 138 09/22/2024    POTASSIUM 4.0 09/22/2024    CHLORIDE 104 09/22/2024    CO2 23 09/22/2024    GLUCOSE 136 (H) 09/22/2024    BUN 13 09/22/2024    CREATININE 0.85 09/22/2024        Lab Results   Component Value Date    WBC 1.4 (LL) 09/24/2024    HEMOGLOBIN 7.3 (L) 09/24/2024    HEMATOCRIT 20.7 (L) 09/24/2024    PLATELETCT 40 (L) 09/24/2024

## 2024-09-28 LAB — COMPONENT CELLULAR 8504CLL: NORMAL

## 2024-10-03 NOTE — DOCUMENTATION QUERY
[facility]                                                                       Query Response Note      PATIENT:               STEVEN BECKER  ACCT #:                  [id number]  MRN:                     [id number]  :                      1940  ADMIT DATE:       2024 4:13 PM  DISCH DATE:        2024 7:50 PM  RESPONDING  PROVIDER #:        [id number]           QUERY TEXT:    Patient had a bone marrow biopsy with aspiration on 9/10/24 by Alexis Rosas MD from the left buttock.  Can the site be further clarified.        Please clarify the clinical/diagnostic relevance for the documented findings.    The patient's clinical indicators include:  Clinical Indicators:  Pancytopenia.  B12 deficiency.    Treatment:  RBC and platelet transfusions.  Bone marrow biopsy w/aspiration.    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    Akanksha Cross  HIM [email or web address]  Options provided:   -- Bone Marrow, Iliac   -- Bone Marrow, Sternum   -- Bone Marrow, Other Specified Site, (Please specify the other explanation)   -- Unable to determine      Query created by: Zahira Cross on 10/1/2024 3:13 AM    RESPONSE TEXT:    Bone Marrow, Iliac          Electronically signed by:  ALEXIS ROSAS MD 10/3/2024 12:57 PM

## 2024-10-10 NOTE — DOCUMENTATION QUERY
"                                                                         [facility]                                                                       Query Response Note      PATIENT:               STEVEN BECKER  ACCT #:                  [id number]  MRN:                     [id number]  :                      1940  ADMIT DATE:       2024 4:13 PM  DISCH DATE:        2024 7:50 PM  RESPONDING  PROVIDER #:        [id number]           QUERY TEXT:    Documentation in the medical record contains a pathology/radiology finding of MDS/CMML.    Addended Pathology report has documented -  \"This case is being addended to report the results of MDS/CMML\"    Can you please confirm if you agree with the diagnosis of **(Radiology or Pathology Findings)** based upon the above clinical indicators?      **(Remove any choice not supported in the clinical findings presented)**    The patient's Clinical Indicators include:  Clinical Indicators:  Addended Pathology documents \"This case is being addended to report the results of MDS/CMML\"    Treatment:  Bone marrow biopsy.  Blood transfusions.    Risk Factors:  Pancytopenia.      Akanksha Cross  HIM Coding  [email or web address]  Options provided:   -- Yes, agree with diagnosis MDS/CMML and is a clinically significant finding   -- No, do not agree with diagnosis MDS/CMML.   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

## 2024-10-29 LAB — COMPONENT P 8504P: NORMAL

## 2024-11-06 PROBLEM — E46 PROTEIN CALORIE MALNUTRITION (HCC): Status: ACTIVE | Noted: 2024-01-01

## 2024-11-06 PROBLEM — D61.818 PANCYTOPENIA (HCC): Status: RESOLVED | Noted: 2024-01-01 | Resolved: 2024-01-01

## 2024-11-06 PROBLEM — R50.81 NEUTROPENIC FEVER (HCC): Status: ACTIVE | Noted: 2024-01-01

## 2024-11-06 PROBLEM — Z51.11 ADMISSION FOR CHEMOTHERAPY: Status: RESOLVED | Noted: 2024-01-01 | Resolved: 2024-01-01

## 2024-11-06 PROBLEM — R23.3 PETECHIAL RASH: Status: ACTIVE | Noted: 2024-01-01

## 2024-11-06 PROBLEM — K21.9 GERD (GASTROESOPHAGEAL REFLUX DISEASE): Status: ACTIVE | Noted: 2024-01-01

## 2024-11-06 PROBLEM — D70.9 NEUTROPENIC FEVER (HCC): Status: ACTIVE | Noted: 2024-01-01

## 2024-11-06 PROBLEM — J30.2 SEASONAL ALLERGIES: Status: ACTIVE | Noted: 2024-01-01

## 2024-11-06 PROBLEM — D61.9 APLASTIC ANEMIA (HCC): Status: ACTIVE | Noted: 2024-01-01

## 2024-11-06 PROBLEM — Z51.11 ADMISSION FOR CHEMOTHERAPY: Status: ACTIVE | Noted: 2024-01-01

## 2024-11-06 PROBLEM — E83.39 HYPOPHOSPHATEMIA: Status: ACTIVE | Noted: 2024-01-01

## 2024-11-06 PROBLEM — E83.42 HYPOMAGNESEMIA: Status: ACTIVE | Noted: 2024-01-01

## 2024-11-06 PROBLEM — I10 HYPERTENSION: Status: ACTIVE | Noted: 2024-01-01

## 2024-11-07 NOTE — ASSESSMENT & PLAN NOTE
Afebrile since arrival at [facility].  Patient now on prophylactic Levaquin, acyclovir, and posaconazole. Vancomycin for MRSA pneumonia.    Plan  -Continue prophylactic Levofloxacin 500 mg daily , Acyclovir 400 mg BID, and posaconazole 300 mg daily.    -Neutropenia previously refractory to GSF. Will continue to follow neutrophil count while inpatient.  -Following results for blood cultures, urine cultures, sputum sensitivity

## 2024-11-07 NOTE — ASSESSMENT & PLAN NOTE
Patient with hx of type 2 diabetes. Hemoglobin A1c of 7.3.     Plan:   -Decrease Lantus to 4u in AM and low dose SSI while NPO  -Hypoglycemia protocol

## 2024-11-07 NOTE — PROGRESS NOTES
4 Eyes Skin Assessment Completed by Mary Bhatt, RN and Susu Mendez RN.    Head WDL  Ears WDL  Nose WDL  Mouth WDL  Neck WDL  Breast/Chest Bruising, Rash, and Discoloration  Shoulder Blades WDL  Spine Bruising, biopsy scars   (R) Arm/Elbow/Hand Redness, Bruising, Discoloration, and Rash  (L) Arm/Elbow/Hand Redness, Bruising, Discoloration, and Rash  Abdomen Redness  Groin WDL  Scrotum/Coccyx/Buttocks Redness, Excoriation, and Discoloration  (R) Leg Redness, Rash, Bruising, and Swelling  (L) Leg Redness, Rash, Bruising, and Swelling  (R) Heel/Foot/Toe Blanching and Bruising, and swelling   (L) Heel/Foot/Toe Blanching, Bruising, and Swelling           Devices In Places Pulse Ox, Condom Cath, and Nasal Cannula      Interventions In Place Gray Ear Foams, Sacral Mepilex, and Pillows, Mepilex to heels, mepilex to elbows     Possible Skin Injury Yes    Pictures Uploaded Into Epic Yes  Wound Consult Placed Yes  RN Wound Prevention Protocol Ordered Yes

## 2024-11-07 NOTE — ASSESSMENT & PLAN NOTE
Sputum culture collected 11/7 growing MRSA. Rapid response morning of 11/9 for decompensated respiratory status. Required HHF 40L for several hours before returning to 6L NC. Repeat CXR howed unchanged bilateral pneumonia, no pleural effusion or pulmonary edema. EKG unremarkable. Troponin and BNP elevated, patient without chest pain. Previously treated for MRSA pneumonia at [facility].       Plan:   -ID consulted, recommended vancomycin for this pneumonia.   -RT consulted/treating  -Follow culture sensitivities

## 2024-11-07 NOTE — H&P
FAMILY MEDICINE HISTORY AND PHYSICAL       PATIENT ID:  NAME:  Miri Joseph  MRN:               [id number]  YOB: 1940    Date of Admission: 11/6/2024     Attending: Sami Kwon M.d.     Resident: Ivanna Longo M.D.    Primary Care Physician:  Danielle Dawson M.D.    CC:  No chief complaint on file.      HPI: Miri Joseph is a 84 y.o. male with past medical history of type 2 diabetes and recent diagnosis of aplastic anemia who is a transfer [facility] at the request of Dr. Seals with oncology to initiate chemotherapy.  Per review of records from [facility] patient with new diagnosis of aplastic anemia with pancytopenia  Patient was at [facility] starting on 10/19 and was being treated for neutropenic fever and pneumonia with ongoing care from oncology regarding aplastic anemia and pancytopenia.  He at that time presented due to fever with rigors and chills as well as cough with sputum production.  He was treated for MRSA pneumonia and completed a course of linezolid. There was a concern for funal pneumonia however karius test was negative. Pulmonology and infectious disease were following patient.  He now is currently on prophylactic posaconazole, fluoroquinolone, and acyclovir.  Ongoing conversations were had with family regarding end-of-life care.  Patient's family desiring full code and maximal therapy.   Patient then transferred to our service for ongoing care with chemotherapy.  Patient at bedside is non-English speaking.  Family is not at bedside with him.  Per documentation patient's son is a family physician in [place] and has been deeply involved in his care.  Patient does have a cough on exam and is requiring 3 L nasal cannula to maintain saturations greater than 90%.  Nursing staff also at bedside reporting patient has not been following directions or commands even with a .             PAST MEDICAL HISTORY:   has a past  "medical history of Diabetes (HCC).     PAST SURGICAL HISTORY:   has a past surgical history that includes turp-vapor (2017); panendoscopy (N/A, 2024); and bone aspiration biopsy (Left, 9/10/2024).     FAMILY HISTORY:  family history is not on file.     SOCIAL HISTORY:   Employment: retired, his son Casper Prado is involved with his care     DIET:   Orders Placed This Encounter   Procedures    Diet Order Diet: Regular     Standing Status:   Standing     Number of Occurrences:   1     Order Specific Question:   Diet:     Answer:   Regular [1]       ALLERGIES:  No Known Allergies    OUTPATIENT MEDICATIONS:  No current facility-administered medications for this encounter.    PHYSICAL EXAM:  Vitals:    24   BP: (!) 155/99     Pulse: (!) 110     Resp: 20     Temp: 37.6 °C (99.6 °F)     TempSrc: Oral     SpO2: (!) 87% (!) 81% 92%   , Temp (24hrs), Av.6 °C (99.6 °F), Min:37.6 °C (99.6 °F), Max:37.6 °C (99.6 °F)  , Pulse Oximetry: 92 %, O2 (LPM): 5, O2 Delivery Device: Nasal Cannula    Physical Exam  General: Cachectic appearing, thin appearing, ill-appearing  HEENT: Moist mucous membranes  Pulm: Clear to auscultation throughout, noisy breathing, normal respiratory effort  Cardiac: Regular rate and rhythm  Abdomen: Soft, nontender, nondistended  Skin: Diffuse petechial rash of the arms and legs  Neuro: Alert    LAB TESTS:           No results for input(s): \"ALTSGPT\", \"ASTSGOT\", \"ALKPHOSPHAT\", \"TBILIRUBIN\", \"DBILIRUBIN\", \"GAMMAGT\", \"AMYLASE\", \"LIPASE\", \"ALB\", \"PREALBUMIN\", \"GLUCOSE\" in the last 72 hours.      No results for input(s): \"NTPROBNP\" in the last 72 hours.      No results for input(s): \"TROPONINT\" in the last 72 hours.    CULTURES:   Results       ** No results found for the last 168 hours. **            IMAGES:  DX-CHEST-2 VIEWS    (Results Pending)     CONSULTS:   None     ASSESSMENT/PLAN:   84 y.o. male with past medical history of aplastic anemia and " thrombocytopenia here as a direct transfer from [facility] for initiation of chemotherapy.     Neutropenic fever (HCC)  Assessment & Plan  Patient documented to have neutropenic fever at [facility].  There was nodular opacities found on CT chest as well as diagnosis of MRSA pneumonia.  This was treated with linezolid.  There was also concern for aspergillosis but ultimately negative Kerecis test.  Patient now on prophylactic Levaquin, acyclovir, and econazole.  Plan  -Will continue prophylactic Levofloxacin 500 mg daily , Acyclovir 400 mg BID, and posaconazole 300 mg daily.  Appreciate recommendations from oncology tomorrow.  Can consider infectious disease consult as infectious disease was following at [facility].  -Will continue to follow neutrophil count while inpatient.  -Ordered repeat CXR, blood cultures, urine cultures, and sputum collection     Aplastic anemia (HCC)  Assessment & Plan  Patient with recent diagnosis of aplastic anemia by bone marrow biopsy.  Has been followed by oncology at [facility].  Patient is a transfer here to initiate chemotherapy.  Family is desiring full code and maximal therapy [place] [facility] [place].  Plan:   -Day team to consult oncology in the a.m. regarding chemotherapy and ongoing management  -Monitor CBC and transfuse as needed  -Prophylactic antiviral, antifungal, and antibacterial as discussed below    Productive cough- (present on admission)  Assessment & Plan  Pt with productive cough on exam and he is requiring 3 L NC oxygen supplementation. Patient was treated for MRSA pneumonia at [facility]. Unclear if active pneumonia at this time.   Plan:   - Prophylactic antibiotics as above   - Repeat CXR pending. May also need repeat CT chest     Thrombocytopenia (HCC)- (present on admission)  Assessment & Plan  Patient with thrombocytopenia.  Platelet count from records in the low 40s.  Will continue to monitor platelet count  while inpatient.  Will hold VTE prophylaxis.    Protein calorie malnutrition (HCC)  Assessment & Plan  Patient documented to have poor intake at [facility].  Patient was encouraged to eat at a limit.  I will have nutritionist evaluate patient as he is currently undergoing full treatment at the request of his family.    Seasonal allergies  Assessment & Plan  Continue from [facility] loratadine 10 mg daily     GERD (gastroesophageal reflux disease)  Assessment & Plan  Will continue from [facility] famotidine 20 mg daily.     Hypertension  Assessment & Plan  Continue from [facility] carvedilol 3.125 mg BID, unclear if for htn and heart failure. Will continue and follow with echo as needed.     Hypomagnesemia  Assessment & Plan  Patient documented to have low potassium and magnesium at [facility].  Was advised to monitor while inpatient and replete as necessary.  Repeat magnesium level pending    Hypophosphatemia  Assessment & Plan  Patient documented to have low potassium and magnesium at [facility].  Was advised to monitor while inpatient and replete as necessary.  Repeat phosphorus level pending    Dyslipidemia- (present on admission)  Assessment & Plan  From [facility] will continue atorvastatin 10 mg daily     Type 2 diabetes mellitus, without long-term current use of insulin (HCC)- (present on admission)  Assessment & Plan  Patient with hx of type 2 diabetes. Hemoglobin A1c of 7.3.   Plan:   - will continue [facility] regimen of 8 u lantus in AM and low dose SSI          Core Measures:  Fluids: None   Lines: PIV   Abx: Levofloxacin, posaconazole, acyclovir   Diet: Regular   PPX: SCDs/TEDs    CODE Status: Full Code    Ivanna Longo M.D. PGY-3 [facility]

## 2024-11-07 NOTE — ASSESSMENT & PLAN NOTE
Patient with recent diagnosis of aplastic anemia by bone marrow biopsy.  Has been followed by oncology at [facility].  Patient is a transfer here to initiate chemotherapy. S/p 1u PRBC 11/8.      Plan:   -Hold VTE prophylaxis.    -Daily CBC, CMP, Magnesium, Phos  -Continue to follow oncology's recommendations:  -Transfuse PRBC if hemoglobin less than 7 g/dL         -Transfuse platelets if platelets less than 10,000  -Continue antibiotic prophylaxis: posaconazole, acyclovir and loevofloxacin  -Cyclosporine started 11/8, managed by hematology oncology  -Awaiting arrangement of Promacta, managed by hematology oncology

## 2024-11-07 NOTE — THERAPY
Occupational Therapy   Initial Evaluation     Patient Name: Miri Joseph  Age:  84 y.o., Sex:  male  Medical Record #: [id number]  Today's Date: 11/7/2024          Assessment  Patient is 84 y.o. male with a diagnosis of txf from [facility] for initiation of chemotherapy.  Pt currently limited by decreased functional mobility, activity tolerance, strength, balance, and pain which are affecting pt's ability to complete ADLs/IADLs at baseline. Pt would benefit from OT services in the acute care setting to maximize functional recovery.       Plan    Occupational Therapy Initial Treatment Plan   Treatment Interventions: (P) Self Care / Activities of Daily Living, Therapeutic Activity  Treatment Frequency: (P) 3 Times per Week  Duration: (P) Until Therapy Goals Met       Discharge Recommendations: (P) Anticipate that the patient will have no further occupational therapy needs after discharge from the hospital        11/07/24 0848   Prior Living Situation   Housing / Facility 2 Story House   Steps Into Home 2   Equipment Owned Front-Wheel Walker   Lives with - Patient's Self Care Capacity Adult Children;Spouse   Prior Level of ADL Function   Self Feeding Independent   Grooming / Hygiene Independent   Bathing Independent   Dressing Independent   Toileting Independent   Strength Upper Body   Comments 4/5 B U)Es   Balance Assessment   Sitting Balance (Static) Fair +   Sitting Balance (Dynamic) Fair   Standing Balance (Static) Fair   Standing Balance (Dynamic) Fair -   Weight Shift Sitting Fair   Weight Shift Standing Fair   ADL Assessment   Grooming Supervision   Upper Body Dressing Supervision   Lower Body Dressing Minimal Assist   Functional Mobility   Sit to Stand Standby Assist   Toilet Transfers Minimal Assist   Short Term Goals   Short Term Goal # 1 supervised with LB dressing   Short Term Goal # 2 supervised with toilet txfs   Occupational Therapy Initial Treatment Plan    Treatment Interventions Self Care / Activities of  Daily Living;Therapeutic Activity   Treatment Frequency 3 Times per Week   Duration Until Therapy Goals Met   Anticipated Discharge Equipment and Recommendations   Discharge Recommendations Anticipate that the patient will have no further occupational therapy needs after discharge from the hospital

## 2024-11-07 NOTE — DIETARY
NUTRITION SERVICES - Alert received for newly identified wound. Wound team consult pending, will await wound staging to make recommendations if appropriate. RD will monitor per dept policy.     RD following

## 2024-11-07 NOTE — PROGRESS NOTES
"[facility] MEDICINE PROGRESS NOTE     Attending: Leila Kingsley MD    PATIENT: Miri Joseph; [id number]; 1940    ID: 84 y.o. male with past medical history of aplastic anemia and thrombocytopenia here as a direct transfer from [facility] for initiation of chemotherapy on 11/6/2024.    SUBJECTIVE: No acute events overnight. Patient in bed. Non verbal at this time. RN reports that patient was just seen by oncologist.     OBJECTIVE:     Vitals:    11/06/24 2058 11/06/24 2254 11/06/24 2336 11/07/24 0415   BP:   (!) 148/69 (!) 156/63   Pulse:   96 (!) 101   Resp:   18 20   Temp:   36.6 °C (97.8 °F) 36.9 °C (98.4 °F)   TempSrc:   Oral Oral   SpO2: 92%  98% 96%   Weight:  68.6 kg (151 lb 5.5 oz)     Height:  1.753 m (5' 9\")       No intake or output data in the 24 hours ending 11/07/24 0512    PE:   General: No acute distress, resting in bed. Ill appearing.  HEENT: NC/AT.  Cardiovascular: Normal S1/S2, RRR, no m/r/g  Respiratory: Symmetric inspiratory effort.   Abdomen: BS+, soft, NT/ND   Neuro: Non focal      LABS x 2 DAYS:  Recent Labs     11/06/24 2244 11/07/24 0241   WBC 0.8* 0.8*   RBC 2.51* 2.41*   HEMOGLOBIN 7.7* 7.1*   HEMATOCRIT 21.5* 20.9*   MCV 85.7 86.7   MCH 30.7 29.5   RDW 44.0 44.0   PLATELETCT 56* 44*   MPV 9.6 11.4   NEUTSPOLYS 0.00*  --    LYMPHOCYTES 100.00*  --    MONOCYTES 0.00  --    EOSINOPHILS 0.00  --    BASOPHILS 0.00  --    RBCMORPHOLO Present  --      Recent Labs     11/06/24 2244 11/07/24  0241   SODIUM 135 133*   POTASSIUM 3.6 3.6   CHLORIDE 103 104   CO2 21 20   GLUCOSE 220* 197*   BUN 17 15     Recent Labs     11/06/24 2244 11/07/24  0241   ALBUMIN 2.4* 2.2*   TBILIRUBIN 1.2 1.1   ALKPHOSPHAT 107* 101*   TOTPROTEIN 6.8 6.8   ALTSGPT 24 23   ASTSGOT 29 31   CREATININE 0.54 0.62       MICROBIOLOGY:  Results       Procedure Component Value Units Date/Time    GRAM STAIN [[id number]] Collected: 11/07/24 0820    Order Status: Completed Specimen: Respirate Updated: 11/07/24 1108     " Report called to CEFERINO Larios and patient to transfer to room 272 via wheelchair.   Significant Indicator .     Source RESP     Site SPUTUM     Gram Stain Result Few WBCs.  Rare epithelial cells.  Rare Gram positive rods.  Specimen Quality Score: 1+      CULTURE RESPIRATORY W/ GRM STN [[id number]] Collected: 11/07/24 0820    Order Status: Sent Specimen: Respirate from Sputum Updated: 11/07/24 1108     Significant Indicator NEG     Source RESP     Site SPUTUM     Culture Result -     Gram Stain Result Few WBCs.  Rare epithelial cells.  Rare Gram positive rods.  Specimen Quality Score: 1+      URINALYSIS [[id number]]  (Abnormal) Collected: 11/07/24 0550    Order Status: Completed Specimen: Urine, Cath Updated: 11/07/24 0747     Color Yellow     Character Clear     Specific Gravity 1.017     Ph 6.5     Glucose 500 mg/dL      Ketones Negative mg/dL      Protein 30 mg/dL      Bilirubin Negative     Urobilinogen, Urine 4.0 EU/dL      Nitrite Negative     Leukocyte Esterase Negative     Occult Blood Negative     Micro Urine Req Microscopic    BLOOD CULTURE [[id number]] Collected: 11/07/24 0241    Order Status: Completed Specimen: Blood from Peripheral Updated: 11/07/24 0408     Significant Indicator NEG     Source BLD     Site PERIPHERAL     Culture Result No Growth  Note: Blood cultures are incubated for 5 days and  are monitored continuously.Positive blood cultures  are called to the RN and reported as soon as  they are identified.      BLOOD CULTURE [[id number]] Collected: 11/07/24 0241    Order Status: Completed Specimen: Blood from Peripheral Updated: 11/07/24 0408     Significant Indicator NEG     Source BLD     Site PERIPHERAL     Culture Result No Growth  Note: Blood cultures are incubated for 5 days and  are monitored continuously.Positive blood cultures  are called to the RN and reported as soon as  they are identified.            IMAGING:   DX-CHEST-PORTABLE (1 VIEW)   Final Result      1.  Unchanged BILATERAL pneumonia   2.  Possible RIGHT pleural effusion      DX-CHEST-2 VIEWS   Final Result          1.  Pulmonary edema and/or infiltrates.   2.  Trace left pleural effusion   3.  Atherosclerosis          MEDS:  Current Facility-Administered Medications   Medication Last Admin    cycloSPORINE (SandIMMUNE) capsule 100 mg 100 mg at 11/07/24 1100    tamsulosin (Flomax) capsule 0.4 mg 0.4 mg at 11/07/24 1226    acyclovir (Zovirax) tablet 400 mg 400 mg at 11/07/24 0539    levoFLOXacin (Levaquin) tablet 500 mg 500 mg at 11/07/24 0539    posaconazole (Noxafil) tablet 300 mg 300 mg at 11/07/24 0540    atorvastatin (Lipitor) tablet 10 mg      carvedilol (Coreg) tablet 3.125 mg 3.125 mg at 11/07/24 0835    loratadine (Claritin) tablet 10 mg 10 mg at 11/07/24 0540    famotidine (Pepcid) tablet 20 mg 20 mg at 11/07/24 0539    insulin GLARGINE (Lantus,Semglee) injection 8 Units at 11/07/24 0602    And    insulin lispro (HumaLOG,AdmeLOG) subcutaneous injection 2 Units at 11/07/24 1229    And    dextrose 50% (D50W) injection 25 g         PROBLEM LIST:  No problems updated.    ASSESSMENT/PLAN: Miri Joseph is a 84 y.o. male here with:    Aplastic anemia (HCC)  Neutropenia  Thrombocytopenia  Assessment & Plan  Patient with recent diagnosis of aplastic anemia by bone marrow biopsy.  Has been followed by oncology at [facility].  Patient is a transfer here to initiate chemotherapy.  Family is desiring full code and maximal therapy per [facility] records.    Plan:   -Hold VTE prophylaxis.    -Will continue checking CBC, CMP, Magnesium levels every day.   -Continue to follow oncology's recommendations:   Severe anemia   -Transfuse if hemoglobin less than 7 g/dL   Severe Thrombocytopenia   -Transfuse if platelets less than 10,000  -Continue antibiotic prophylaxis: posaconazole, acyclovir and loevofloxacin    -Oncologist will start Cyclosporine. Dose will be delivered at around 3 mg/kg daily, 100 mg twice a day.  Dose adjusted given age, frail and also on posaconazole.    -Oncologist will start checking cyclosporine trough level at  48 hours, with a target between 200-400.  -Oncologist will start Promacta at 75 mg daily. Dose adjustment for patient's age and comorbidities.    Neutropenic fever (HCC)  Assessment & Plan  Patient documented to have neutropenic fever at [facility].  There was nodular opacities found on CT chest as well as diagnosis of MRSA pneumonia.  This was treated with linezolid.  There was also concern for aspergillosis but ultimately negative Karius test.  Patient now on prophylactic Levaquin, acyclovir, and econazole. Patient has had normal to low-grade fever since admission.     Plan:  -Continue prophylactic levofloxacin 500 mg qd, acyclovir 400 mg BID, posaconazole 300 mg qd.    -Continue to monitor neutrophil count on CBC.  -Infectious disease consult as ID was following at [facility].  -CXR unchanged bilateral pneumonia and possible right pleural effusion. Blood cultures NGTD cultures, and sputum culture negative. CTM.     Hypertension  Assessment & Plan  -Continue Carvedilol 3.125 mg BID    Hypomagnesemia  Assessment & Plan  Patient documented to have low potassium and magnesium at [facility].  Was advised to monitor while inpatient and replete as necessary.  Repeat magnesium level at 2.    Plan:  -Continue to check level everyday.  -Replete as necessary    Hypophosphatemia  Assessment & Plan  Patient documented to have low phosphate at [facility].  Was advised to monitor while inpatient and replete as necessary.  Repeat phosphorus level.  -Replete as necessary    Protein calorie malnutrition (HCC)  Assessment & Plan  Patient documented to have poor intake at [facility].  Patient was encouraged to eat at a limit.      Plan:  -Dietary following  -Diet: minced and moist, liquid, diabetic, vegetarian  -Oral care in place    Deconditioning  Patient is fatigue and debility.    Plan:  -PT/OT consulted for evaluation and treatment. Appreciate recommendations.    Productive cough- (present on admission)  Assessment & Plan  Patient with productive cough on  exam, requiring 3 L oxygen supplementation via NC. Patient was treated for MRSA pneumonia at [facility]. Unclear if active pneumonia at this time. CXR on admission, left pleural effusion, pulmonary edema present. Repeat CXR showed unchanged bilateral pneumonia, possible right pleural effusion.      Plan:   -Continue prophylactic antibiotics as above   -CXR unchanged bilateral pneumonia and possible right pleural effusion. Blood cultures NGTD cultures, and sputum culture negative. CTM.     Dyslipidemia- (present on admission)  Assessment & Plan  -Continue atorvastatin 10 mg daily     Type 2 diabetes mellitus, without long-term current use of insulin (HCC)- (present on admission)  Assessment & Plan  Hemoglobin A1c of 7.3.   -Continue [facility] regimen of 8 units lantus in AM and low dose SSI     GERD (gastroesophageal reflux disease)  Assessment & Plan  - Continue Famotidine 20 mg daily.     Petechial rash  Assessment & Plan  Patient with a diffuse petechial rash on exam.  Per nursing staff it was reported from [facility] that rash may have been from vancomycin, however, does appear to be likely related to aplastic anemia.      Plan:  -Will appreciate recommendations from oncology.    Seasonal allergies  Assessment & Plan    Plan:   -Continue Loratadine 10 mg daily         Goal of Care: Patient and family members were offered comfort care by oncologist, however, they declined and choose to proceed with treatment at this time.      Core Measures:  Fluids: None   Lines: PIV   Abx: Levofloxacin, posaconazole, acyclovir   Diet:  minced and moist, liquid, diabetic, vegetarian  PPX: SCDs/TEDs      CODE STATUS: Full    Antony Sidhu, PGY3  [facility]

## 2024-11-07 NOTE — ASSESSMENT & PLAN NOTE
Noted during admission at [facility].    Plan:  -Daily magnesium, replace as needed

## 2024-11-07 NOTE — DISCHARGE PLANNING
Care Transition Team Assessment    Patient is a 84 year old male admitted for pancytopenia. Please see patient's H&P for prior medical history. LMSW met with patient at bedside to complete assessment. Patient's son helped translate. Patient is A/Ox4 and able to verify information on the face sheet. Patient lives with his spouse, son, daughter in law and their 3 kids in a single story house with 1 step to enter, Geeta Shirley (p)[phone]; emergency contact. No Advanced directive on file. Patient's NOK is his wife, however she does not speak English and has the help of patient's son Johan Shirley make medical decisions if needed. Patient reports, prior to admission patient is independent with ADL's and IADL's. Patient does not use any DME at baseline. Patient reports his family is good support for him. Patient currently has no income but family is going to apply for SSI as patient recently stopped working. The patient's PCP is Dr. Danielle Dawson. Patient's preferred [facility] is [facility] on [address]. Patient denies a history of SNF/IPR nor HHC use in the past. Patient denies any SA or MH concerns. [facility] and Medicaid FFS. Patient has means to transportation and son will provide transport once medically stable for discharge. Pt's primary oncologist is Dr. Prado at [facility].      Information Source  Orientation Level: Oriented X4  Information Given By: Patient  Who is responsible for making decisions for patient? : Patient    Readmission Evaluation  Is this a readmission?: No    Elopement Risk  Legal Hold: No  Ambulatory or Self Mobile in Wheelchair: No-Not an Elopement Risk  Elopement Risk: Not at Risk for Elopement    Interdisciplinary Discharge Planning  Lives with - Patient's Self Care Capacity: Adult Children, Spouse  Housing / Facility: 2 Story House    Discharge Preparedness  What is your plan after discharge?: Home with help  What are your discharge  supports?: Child  Prior Functional Level: Ambulatory  Difficulity with ADLs: None  Difficulity with IADLs: None    Functional Assesment  Prior Functional Level: Ambulatory    Finances  Financial Barriers to Discharge: No  Prescription Coverage: Yes    Advance Directive  Advance Directive?: None    Psychological Assessment  History of Substance Abuse: None  History of Psychiatric Problems: No  Non-compliant with Treatment: No  Newly Diagnosed Illness: No    Discharge Risks or Barriers  Discharge risks or barriers?: Complex medical needs  Patient risk factors: Complex medical needs    Anticipated Discharge Information  Discharge Disposition: Discharged to home/self care (01)

## 2024-11-07 NOTE — ASSESSMENT & PLAN NOTE
Patient with a diffuse petechial rash on exam.  Per nursing staff it was reported from [facility] that rash may have been from vancomycin versus thrombocytopenia. Per records from [facility], this was thought to be infusion reaction related to rate of infusion rather than allergy. Continue to monitor.

## 2024-11-07 NOTE — PROGRESS NOTES
[facility] DIRECT ADMISSION REPORT  Transferring facility: [facility], [place]   Transferring physician: Dr. Blackmon     Chief complaint: transfer for chemotherapy  Pertinent history & patient course:   84 male with aplastic anemia acellular bone marrow is being transferred for chemotherapy as per Dr. Seals.   Pertinent imaging & lab results: stable  Consultants called prior to transfer and pertinent input from consultants: none   Code Status: Full per transferring provider, I personally verified with the transferring provider patient's code status and the transferring provider has confirmed this with the patient.  Reason for Transfer: inpatient chemotherapy  Further work up or recommendations requested prior to transfer: none    Patient accepted for transfer: Yes  Accepting [facility] Facility: [facility] - Nursing to notify the Triage Coordinator in the RTOC via Voalte or Phone ext. [phone] when patient arrives to the unit. The Triage Coordinator will assign the admitting provider.    Consultants to be called upon arrival: Oncology in am  Admission status: Inpatient.   Floor requested: oncology  If ICU transfer, name of intensivist case discussed with and pertinent input from critical care: none    The admitting provider is the point of contact for questions or concerns regarding patient's care.

## 2024-11-07 NOTE — DISCHARGE PLANNING
Case Management Discharge Planning    Admission Date: 11/6/2024  GMLOS: 3.9  ALOS: 1    TRANSFER BACK PATIENT    Referring Facility: [facility]  Reason for Transfer: Chemotherapy    Signed Repatriation/Transfer Back Agreement saved in .    Once this patient has received the requested service or the patient no longer requires tertiary level of care from [facility] and is stable to transfer back to referring facility, we can facilitate a transfer back. Please contact the [facility] at [phone] to coordinate this transfer request.

## 2024-11-07 NOTE — PROGRESS NOTES
Oncology/Hematology Progress Note               Author: Donaldo Seals III, M.D. Date & Time created: 11/7/2024  9:37 AM   Severe aplastic anemia  Interval History:  Patient was transferred from [facility] to start inpatient treatment with Promacta and cyclosporine, he remains severely pancytopenic.  There is no fever, chills, he has been complaining of lack of appetite, also he is denture bothering him when eating, he does have intermittent cough as well.  He has been on broad-spectrum antibiotics,.  Laboratory workup remained stable, he is fatigued, weak and debilitated    -PMHx, PSurgHx, SocHx, FAMHx: Reviewed and unchanged    Review of Systems:  Review of Systems   Constitutional:  Positive for malaise/fatigue. Negative for chills and fever.   HENT: Negative.     Eyes: Negative.    Respiratory: Negative.     Cardiovascular: Negative.    Gastrointestinal:  Negative for abdominal pain, blood in stool, constipation, diarrhea, heartburn, melena, nausea and vomiting.   Genitourinary: Negative.  Negative for dysuria, frequency and urgency.   Musculoskeletal: Negative.    Skin: Negative.    Neurological:  Positive for weakness. Negative for dizziness, tingling, sensory change, speech change, focal weakness, seizures, loss of consciousness and headaches.   Endo/Heme/Allergies:  Bruises/bleeds easily.   Psychiatric/Behavioral: Negative.         Physical Exam:  Physical Exam  Constitutional:       General: He is not in acute distress.     Appearance: He is ill-appearing. He is not toxic-appearing.   HENT:      Head: Normocephalic and atraumatic.      Mouth/Throat:      Mouth: Mucous membranes are dry.      Pharynx: Oropharyngeal exudate present.   Cardiovascular:      Rate and Rhythm: Normal rate and regular rhythm.      Pulses: Normal pulses.      Heart sounds: Normal heart sounds.   Pulmonary:      Breath sounds: Normal breath sounds.   Abdominal:      General: Abdomen is flat.   Musculoskeletal:          General: No swelling.   Skin:     Findings: Rash present.   Neurological:      General: No focal deficit present.      Mental Status: He is oriented to person, place, and time.      Cranial Nerves: No cranial nerve deficit.      Sensory: No sensory deficit.      Motor: Weakness present.      Gait: Gait abnormal.   Psychiatric:         Mood and Affect: Mood normal.         Labs:          Recent Labs     24  0241   SODIUM 135 133*   POTASSIUM 3.6 3.6   CHLORIDE 103 104   CO2 21 20   BUN 17 15   CREATININE 0.54 0.62   MAGNESIUM  --  2.0   PHOSPHORUS  --  2.1*   CALCIUM 7.9* 7.9*     Recent Labs     24  0241   ALTSGPT 24 23   ASTSGOT 29 31   ALKPHOSPHAT 107* 101*   TBILIRUBIN 1.2 1.1   GLUCOSE 220* 197*     Recent Labs     24  0241   RBC 2.51* 2.41*   HEMOGLOBIN 7.7* 7.1*   HEMATOCRIT 21.5* 20.9*   PLATELETCT 56* 44*     Recent Labs     24  0241   WBC 0.8* 0.8*   NEUTSPOLYS 0.00*  --    LYMPHOCYTES 100.00*  --    MONOCYTES 0.00  --    EOSINOPHILS 0.00  --    BASOPHILS 0.00  --    ASTSGOT 29 31   ALTSGPT 24 23   ALKPHOSPHAT 107* 101*   TBILIRUBIN 1.2 1.1     Recent Labs     24  0241   SODIUM 135 133*   POTASSIUM 3.6 3.6   CHLORIDE 103 104   CO2 21 20   GLUCOSE 220* 197*   BUN 17 15   CREATININE 0.54 0.62   CALCIUM 7.9* 7.9*     Hemodynamics:  Temp (24hrs), Av.3 °C (99.1 °F), Min:36.6 °C (97.8 °F), Max:38 °C (100.4 °F)  Temperature: 38 °C (100.4 °F)  Pulse  Av.8  Min: 96  Max: 110   Blood Pressure : (!) 147/52     Respiratory:    Respiration: 20, Pulse Oximetry: 94 %        RUL Breath Sounds: Crackles, RML Breath Sounds: Crackles, RLL Breath Sounds: Crackles, YAZAN Breath Sounds: Crackles, LLL Breath Sounds: Diminished  Fluids:    Intake/Output Summary (Last 24 hours) at 2024 0937  Last data filed at 2024 0600  Gross per 24 hour   Intake --   Output 550 ml   Net -550 ml     Weight: 68.6 kg (151 lb 5.5  oz)  GI/Nutrition:  Orders Placed This Encounter   Procedures    Diet Order Diet: Level 5 - Minced and Moist; Liquid level: Level 0 - Thin; Second Modifier: (optional): Consistent CHO (Diabetic); Miscellaneous modifications: (optional): Vegetarian     Standing Status:   Standing     Number of Occurrences:   1     Order Specific Question:   Diet:     Answer:   Level 5 - Minced and Moist [24]     Order Specific Question:   Liquid level     Answer:   Level 0 - Thin     Order Specific Question:   Second Modifier: (optional)     Answer:   Consistent CHO (Diabetic) [4]     Order Specific Question:   Miscellaneous modifications: (optional)     Answer:   Vegetarian [13]     Medical Decision Making, by Problem:  Active Hospital Problems    Diagnosis     Aplastic anemia (HCC) [D61.9]     Neutropenic fever (HCC) [D70.9, R50.81]     Hypophosphatemia [E83.39]     Hypomagnesemia [E83.42]     Petechial rash [R23.3]     Hypertension [I10]     GERD (gastroesophageal reflux disease) [K21.9]     Seasonal allergies [J30.2]     Protein calorie malnutrition (HCC) [E46]     Productive cough [R05.8]     Dyslipidemia [E78.5]     Type 2 diabetes mellitus, without long-term current use of insulin (HCC) [E11.9]     Thrombocytopenia (HCC) [D69.6]        Plan:  1.  Severe aplastic anemia  -10/29/2024 bone marrow biopsy consistent with severe aplastic anemia after discussing case with Dr. Napoles    2.  Severe anemia, secondary to #1  -Transfuse if hemoglobin less than 7 g/dL    3.  Severe neutropenia,  secondary to #1   -Refractory to growth factor support in the past    4.  Severe thrombocytopenia, secondary to #1  -Transfuse if platelets less than 10,000    5.  Antibiotic prophylaxis  -On posaconazole, acyclovir and Levaquin    6.  Skin rash, induced by recent antibiotics    7.  Deconditioning, fatigue and debility  -PT OT      Plan  -I did have a long discussion with patient, family members, comfort care has been offered and declined, they  want to proceed with treatment, they do understand his prognosis is not good and response can be seen at 2 months.   hematologic response rate in the range of 45%, they understand he is not a candidate to receive ATG given overall clinical condition, they do want to proceed with cyclosporine along with Promacta  -Cyclosporine dose will be delivered at around 3 mg/kg daily, 100 mg twice a day.  Dose adjusted given age, frail and also on posaconazole.  Will start checking cyclosporine trough level at 48 hours, with a target between 200-400, main side effect explained including nephrotoxicity, hypertension along with Promacta will plan to start at 75 mg daily, dose adjustment for patient's age and comorbidities, main side effect includes elevated liver enzymes.  Promacta already ordered from oral pharmacy at [facility].  Will continue checking CBC, CMP-, Magnesium levels every day.    -Transfuse blood products with parameters as stated above  -Continue with prophylactic antibiotics  -Guarded prognosis, family and patient aware  -Recommended oral care  -PT OT    High complexity, complex decision making requiring frequent drug monitoring for toxicity    Quality-Core Measures

## 2024-11-07 NOTE — CARE PLAN
The patient is Watcher - Medium risk of patient condition declining or worsening    Shift Goals  Clinical Goals: Monitor labs  Patient Goals: VALERIO    Progress made toward(s) clinical / shift goals:  IV access established, labs drawn.   Problem: Skin Integrity  Goal: Skin integrity is maintained or improved  Outcome: Progressing  Note: Patient on q2hr turns, protective mepilex and barrier paste applied.     Problem: Fall Risk  Goal: Patient will remain free from falls  Outcome: Progressing  Note: Patient belongings and call light within reach        Patient is not progressing towards the following goals:

## 2024-11-07 NOTE — ASSESSMENT & PLAN NOTE
Patient documented to have poor intake at [facility].  Appearing cachectic on exam. Hypoalbuminemia.     Plan:  -Nutrition consulted, appreciate recs

## 2024-11-08 PROBLEM — L89.159 PRESSURE INJURY OF SKIN OF SACRAL REGION: Status: ACTIVE | Noted: 2024-01-01

## 2024-11-08 PROBLEM — L89.156 PRESSURE INJURY OF DEEP TISSUE OF SACRAL REGION: Status: ACTIVE | Noted: 2024-01-01

## 2024-11-08 NOTE — WOUND TEAM
[facility]  Inpatient Services  Initial Wound and Skin Care Evaluation    Admission Date: 11/6/2024     Last order of IP CONSULT TO WOUND CARE was found on 11/7/2024 from Hospital Encounter on 11/6/2024     HPI, PMH, SH: Reviewed    Past Surgical History:   Procedure Laterality Date    BONE ASPIRATION BIOPSY Left 9/10/2024    Procedure: ASPIRATION, BONE MARROW, WITH BIOPSY - REGANTI;  Surgeon: Alexis Rosas M.D.;  Location: SURGERY SAME DAY [facility];  Service: Orthopedics    PANENDOSCOPY N/A 9/5/2024    Procedure: EGD, WITH COLONOSCOPY;  Surgeon: Alexis Anglin M.D.;  Location: SURGERY [facility];  Service: Gastroenterology    TURP-VAPOR  8/14/2017    Procedure: Green Light Photorelective Vaporization of the Prostate;  Surgeon: Sagar Boykin M.D.;  Location: SURGERY [facility];  Service:      Social History     Tobacco Use    Smoking status: Never    Smokeless tobacco: Never   Substance Use Topics    Alcohol use: No     No chief complaint on file.    Diagnosis: Admission for chemotherapy [Z51.11]    Unit where seen by Wound Team: R318/02     WOUND CONSULT RELATED TO:  Sacrum    WOUND TEAM PLAN OF CARE - Frequency of Follow-up:   Nursing to follow dressing orders written for wound care. Contact wound team if area fails to progress, deteriorates or with any questions/concerns if something comes up before next scheduled follow up (See below as to whether wound is following and frequency of wound follow up)   Not following, consult as needed  - Sacrum    WOUND HISTORY:   84 y.o. male with past medical history of aplastic anemia and thrombocytopenia here as a direct transfer from [facility] for initiation of chemotherapy on 11/6/2024.       WOUND ASSESSMENT/LDA  Wound 11/06/24 Pressure Injury Coccyx;Sacrum Right POA sDTI (Active)   Date First Assessed/Time First Assessed: 11/06/24 2050   Present on Original Admission: Yes  Hand Hygiene Completed: Yes  Primary Wound Type: Pressure Injury  Location:  Coccyx;Sacrum  Laterality: Right  Wound Description (Comments): POA sDTI      Assessments 11/7/2024  5:00 PM   Wound Image      Site Assessment Purple;Other (Comment)   Periwound Assessment Clean;Dry;Intact   Margins Attached edges   Closure Adhesive bandage   Drainage Amount None   Treatments Cleansed;Nonselective debridement;Site care   Wound Cleansing Not Applicable   Periwound Protectant Skin Protectant Wipes to Periwound   Dressing Status Clean;Dry;Intact   Dressing Changed Changed   Dressing Cleansing/Solutions Not Applicable   Dressing Options Offloading Dressing - Sacral   Dressing Change/Treatment Frequency Every 72 hrs, and As Needed   NEXT Dressing Change/Treatment Date 11/10/24   Wound Team Following Not following   Wound Length (cm) 3.1 cm   Wound Width (cm) 3.2 cm   Wound Surface Area (cm^2) 9.92 cm^2   Shape circular   Wound Odor None   Pulses N/A   WOUND NURSE ONLY - Time Spent with Patient (mins) 30       L heel  R heel     Vascular:    JOANIE:   No results found.    Lab Values:    Lab Results   Component Value Date/Time    WBC 0.8 (LL) 11/07/2024 02:41 AM    RBC 2.41 (L) 11/07/2024 02:41 AM    HEMOGLOBIN 7.1 (L) 11/07/2024 02:41 AM    HEMATOCRIT 20.9 (L) 11/07/2024 02:41 AM    HBA1C 7.3 (H) 09/12/2024 04:13 AM         Culture Results show:  No results found for this or any previous visit (from the past 720 hours).    Pain Level/Medicated:  None, Tolerated without pain medication       INTERVENTIONS BY WOUND TEAM:  Chart and images reviewed. Discussed with bedside RN. All areas of concern (based on picture review, LDA review and discussion with bedside RN) have been thoroughly assessed. Documentation of areas based on significant findings. This RN in to assess patient. Performed standard wound care which includes appropriate positioning, dressing removal and non-selective debridement. Pictures and measurements obtained weekly if/when required.    Wound:  Sacrococcygeal area  Cleansed/Non-selectively  Debrided with:  Perineal Wipes (Barrier wipes)  Primary Dressing:  Sacral offloading dressing    Advanced Wound Care Discharge Planning  Number of Clinicians necessary to complete wound care: 12- (turns)  Is patient requiring IV pain medications for dressing changes:  No   Length of time for dressing change 15 min. (This does not include chart review, pre-medication time, set up, clean up or time spent charting.)    Interdisciplinary consultation: Patient, Bedside RN (Graciela),  Ani Johns .  Pressure injury and staging reviewed with N/A.    EVALUATION / RATIONALE FOR TREATMENT:     Date:  11/07/24  Wound Status:  Initial evaluation    Bilateral heels are intact, and blanching. The tissue surrounding heels with some slight discoloration noted. Bilateral legs with rash, and edema present. The sacrococcygeal area with a purple, non-blanching sDTI POA noted. Bedside nursing already initiated proper sacral offloading dressings. Dressing changed. Continue with offloading measures, and Q2 turns.       NURSING PLAN OF CARE ORDERS:  Dressing changes: See Dressing Care orders    NUTRITION RECOMMENDATIONS   Wound Team Recommendations:  N/A    DIET ORDERS (From admission to next 24h)       Start     Ordered    11/07/24 0914  Supplements  ALL MEALS        Question Answer Comment   Which Supplement Glucerna    Glucerna: Glucerna Shake Carton        11/07/24 0916    11/07/24 0858  Diet Order Diet: Level 5 - Minced and Moist; Liquid level: Level 0 - Thin; Second Modifier: (optional): Consistent CHO (Diabetic); Miscellaneous modifications: (optional): Vegetarian  ALL MEALS        Question Answer Comment   Diet: Level 5 - Minced and Moist    Liquid level Level 0 - Thin    Second Modifier: (optional) Consistent CHO (Diabetic)    Miscellaneous modifications: (optional) Vegetarian        11/07/24 0859                    PREVENTATIVE INTERVENTIONS:    Q shift Jarrod - performed per nursing policy  Q shift pressure point  assessments - performed per nursing policy    Surface/Positioning  Standard/trauma mattress - Currently in Place  Reposition q 2 hours - Currently in Place  TAPs Turning system - Ordered    Offloading/Redistribution  Sacral offloading dressing (Silicone dressing) - Currently in Place  Heel offloading dressing (Silicone dressing) - Currently in Place      Respiratory  Silicone O2 tubing - Currently in Place    Containment/Moisture Prevention    Dri-roque pad - Currently in Place    Anticipated discharge plans:  N/A        Vac Discharge Needs:  Vac Discharge plan is purely a recommendation from wound team and not a requirement for discharge unless otherwise stated by physician.  Not Applicable Pt not on a wound vac

## 2024-11-08 NOTE — CARE PLAN
The patient is Watcher - Medium risk of patient condition declining or worsening    Shift Goals  Clinical Goals: Monitor labs  Patient Goals: Rest  Family Goals: N/A    Progress made toward(s) clinical / shift goals: Patient resting, lab results pending   Problem: Skin Integrity  Goal: Skin integrity is maintained or improved  11/8/2024 0320 by Mary Bhatt R.N.  Outcome: Progressing  Note: Patient on q2hr turns,  mepilex on bony prominences       Problem: Fall Risk  Goal: Patient will remain free from falls  11/8/2024 0320 by Mary Bhatt R.N.  Outcome: Progressing  Note: Nonskid sock, call light within reach     Patient is not progressing towards the following goals:

## 2024-11-08 NOTE — THERAPY
Physical Therapy   Initial Evaluation     Patient Name: Miri Joseph  Age:  84 y.o., Sex:  male  Medical Record #: [id number]  Today's Date: 11/8/2024     Precautions  Precautions: Fall Risk    Assessment  Patient is 84 y.o. male w/ diagnosis of aplastic anemia.  Has been followed by oncology at [facility].  Patient is a transfer here due to neutropenic fever and pneumonia and to initiate  chemotherapy regarding aplastic anemia and pancytopenia. Pt presenting with generalized weakness and decreased activity tolerance. Pt agree able to PT session and was able to ambulate with FWW for 300 ft with assist for supplemental O2.   PT services to follow while in house.         Plan    Physical Therapy Initial Treatment Plan   Treatment Plan : (P) Bed Mobility, Family / Caregiver Training, Equipment, Gait Training, Neuro Re-Education / Balance, Self Care / Home Evaluation, Therapeutic Activities, Therapeutic Exercise  Treatment Frequency: (P) 3 Times per Week  Duration: (P) Until Therapy Goals Met    DC Equipment Recommendations: (P) Unable to determine at this time  Discharge Recommendations: (P)  (unable to determine; pending treatment plan.)         Initial Contact Note    Initial Contact Note Order Received and Verified, Physical Therapy Evaluation in Progress with Full Report to Follow.   Precautions   Precautions Fall Risk   Vitals   O2 (LPM) 2   O2 Delivery Device Silicone Nasal Cannula   Pain 0 - 10 Group   Therapist Pain Assessment During Activity;0   Prior Living Situation   Prior Services None   Housing / Facility 2 Story House   Steps Into Home 2   Equipment Owned Front-Wheel Walker   Lives with - Patient's Self Care Capacity Adult Children;Spouse   Prior Level of Functional Mobility   Bed Mobility Independent   Transfer Status Independent   Ambulation Independent   Ambulation Distance community   Cognition    Level of Consciousness Alert   Active ROM Lower Body    Active ROM Lower Body  WDL    Strength Lower Body   Lower Body Strength  X   Gross Strength Generalized Weakness, Equal Bilaterally   Balance Assessment   Sitting Balance (Static) Good   Sitting Balance (Dynamic) Fair +   Standing Balance (Static) Fair   Standing Balance (Dynamic) Fair -   Weight Shift Sitting Fair   Weight Shift Standing Fair   Comments w/FWW   Bed Mobility    Supine to Sit Minimal Assist   Sit to Supine   (up to chair post session.)   Gait Analysis   Gait Level Of Assist Standby Assist   Assistive Device Front Wheel Walker   Distance (Feet) 300   # of Times Distance was Traveled 1   Deviation Decreased Heel Strike;Decreased Toe Off;Shuffled Gait   Weight Bearing Status no restrictions   Comments assist with O2   Functional Mobility   Sit to Stand Standby Assist   Bed, Chair, Wheelchair Transfer Contact Guard Assist   Toilet Transfers Standby Assist   Transfer Method Stand Step   6 Clicks Assessment - How much HELP from another person do you currently need... (If the patient hasn't done an activity recently, how much help from another person do you think he/she would need if he/she tried?)   Turning from your back to your side while in a flat bed without using bedrails? 3   Moving from lying on your back to sitting on the side of a flat bed without using bedrails? 3   Moving to and from a bed to a chair (including a wheelchair)? 3   Standing up from a chair using your arms (e.g., wheelchair, or bedside chair)? 3   Walking in hospital room? 3   Climbing 3-5 steps with a railing? 3   6 clicks Mobility Score 18   Activity Tolerance   Sitting in Chair 4 min on toilet   Standing 5 min   Short Term Goals    Short Term Goal # 1 PT will perform bed mobility at IND level by tx 6   Short Term Goal # 2 Pt will ambulate with FWW for 500 ft to increase endurance w/ S by tx 6   Education Group   Education Provided Role of Physical Therapist   Role of Physical Therapist Patient Response Patient;Acceptance;Explanation;Verbal Demonstration    Physical Therapy Initial Treatment Plan    Treatment Plan  Bed Mobility;Family / Caregiver Training;Equipment;Gait Training;Neuro Re-Education / Balance;Self Care / Home Evaluation;Therapeutic Activities;Therapeutic Exercise   Treatment Frequency 3 Times per Week   Duration Until Therapy Goals Met   Problem List    Problems Decreased Activity Tolerance;Impaired Balance;Impaired Ambulation;Functional Strength Deficit;Impaired Transfers;Impaired Bed Mobility   Anticipated Discharge Equipment and Recommendations   DC Equipment Recommendations Unable to determine at this time   Discharge Recommendations   (unable to determine; pending treatment plan.)   Interdisciplinary Plan of Care Collaboration   IDT Collaboration with  Nursing   Patient Position at End of Therapy Chair Alarm On;Seated;Call Light within Reach;Tray Table within Reach;Phone within Reach   Collaboration Comments RN notified via Voalte that patient is up in chair with chair alarm on   Session Information   Date / Session Number  11/8- 1 ( 1/3,11/14)

## 2024-11-08 NOTE — DISCHARGE PLANNING
Case Management Discharge Planning    Admission Date: 11/6/2024  GMLOS: 3.9  ALOS: 2    6-Clicks ADL Score: 22  6-Clicks Mobility Score:        Anticipated Discharge Dispo: Discharge Disposition: Discharged to home/self care (01)    DME Needed: No    Action(s) Taken: Discussed in IDT rounds, pt started cyclosporine yesterday, [facility] has ordered Promacta to the outpatient pharmacy, once delivered to [facility], plan for pt to begin promacta inpatient.     Escalations Completed: None    Medically Clear: No    Next Steps: Pending oral promacta to be delivered.     Barriers to Discharge: Medical clearance    Is the patient up for discharge tomorrow: No

## 2024-11-08 NOTE — PROGRESS NOTES
[facility] MEDICINE PROGRESS NOTE     Attending: Leila Kingsley MD    PATIENT: Miri Joseph; [id number]; 1940    ID: 84 y.o. male with past medical history of aplastic anemia and thrombocytopenia here as a direct transfer from [facility] for initiation of chemotherapy on 11/6/2024.     SUBJECTIVE: Patient evaluated this morning and reports he is continuing to feel short of breath.  He also does not have much of an appetite.  He denies headache, chest pain, abdominal pain, nausea.  He reports his dentures are feeling uncomfortable in his mouth.  Subjective somewhat limited by acute illness, sleepiness, dyspnea.    OBJECTIVE:     Vitals:    11/07/24 1919 11/08/24 0006 11/08/24 0405 11/08/24 0437   BP: (!) 141/65 130/55 (!) 145/59 (!) 142/52   Pulse: (!) 102 96 (!) 101 (!) 102   Resp: 18 20 (!) 26 20   Temp: 36.8 °C (98.3 °F) 36.8 °C (98.3 °F) 37.3 °C (99.2 °F) 36.3 °C (97.3 °F)   TempSrc: Oral Oral Oral Oral   SpO2: 94% 96% 96% 96%   Weight:       Height:           Intake/Output Summary (Last 24 hours) at 11/8/2024 0452  Last data filed at 11/7/2024 0600  Gross per 24 hour   Intake --   Output 550 ml   Net -550 ml       PE:   General: Ill-appearing, cachectic, mild distress, sleepy   HEENT: NC/AT. EOMI. MMM, small areas of erythema/slight abrasions on oral mucosa or dentures sit, no ulcerations or sores  Cardiovascular: Normal S1/S2, RRR, no m/r/g  Respiratory: Respiratory rate 30, dyspneic, decreased breath sounds throughout   Abdomen: Soft, NT/ND   EXT:  SOL, scattered petechial rash and ecchymoses, 2+ pitting edema bilaterally to the knees  Neuro: Non focal    LABS x 2 DAYS:  Recent Labs     11/06/24  2244 11/07/24  0241 11/08/24  0137   WBC 0.8* 0.8* 0.9*   RBC 2.51* 2.41* 2.19*   HEMOGLOBIN 7.7* 7.1* 6.5*   HEMATOCRIT 21.5* 20.9* 18.9*   MCV 85.7 86.7 86.3   MCH 30.7 29.5 29.7   RDW 44.0 44.0 44.2   PLATELETCT 56* 44* 22*   MPV 9.6 11.4 10.3   NEUTSPOLYS 0.00*  --  0.00*   LYMPHOCYTES 100.00*  --  98.60*    MONOCYTES 0.00  --  0.50   EOSINOPHILS 0.00  --  0.40   BASOPHILS 0.00  --  0.00   RBCMORPHOLO Present  --  Present     Recent Labs     11/06/24 2244 11/07/24 0241 11/08/24 0137   SODIUM 135 133* 134*   POTASSIUM 3.6 3.6 3.2*   CHLORIDE 103 104 103   CO2 21 20 22   GLUCOSE 220* 197* 223*   BUN 17 15 19     Recent Labs     11/06/24 2244 11/07/24 0241 11/08/24 0137   ALBUMIN 2.4* 2.2* 1.8*   TBILIRUBIN 1.2 1.1 1.5   ALKPHOSPHAT 107* 101* 101*   TOTPROTEIN 6.8 6.8 6.1   ALTSGPT 24 23 36   ASTSGOT 29 31 56*   CREATININE 0.54 0.62 0.59         MICROBIOLOGY:  Results       Procedure Component Value Units Date/Time    GRAM STAIN [[id number]] Collected: 11/07/24 0820    Order Status: Completed Specimen: Respirate Updated: 11/07/24 1108     Significant Indicator .     Source RESP     Site SPUTUM     Gram Stain Result Few WBCs.  Rare epithelial cells.  Rare Gram positive rods.  Specimen Quality Score: 1+      CULTURE RESPIRATORY W/ GRM STN [[id number]] Collected: 11/07/24 0820    Order Status: Sent Specimen: Respirate from Sputum Updated: 11/07/24 1108     Significant Indicator NEG     Source RESP     Site SPUTUM     Culture Result -     Gram Stain Result Few WBCs.  Rare epithelial cells.  Rare Gram positive rods.  Specimen Quality Score: 1+      URINALYSIS [[id number]]  (Abnormal) Collected: 11/07/24 0550    Order Status: Completed Specimen: Urine, Cath Updated: 11/07/24 0747     Color Yellow     Character Clear     Specific Gravity 1.017     Ph 6.5     Glucose 500 mg/dL      Ketones Negative mg/dL      Protein 30 mg/dL      Bilirubin Negative     Urobilinogen, Urine 4.0 EU/dL      Nitrite Negative     Leukocyte Esterase Negative     Occult Blood Negative     Micro Urine Req Microscopic    BLOOD CULTURE [[id number]] Collected: 11/07/24 0241    Order Status: Completed Specimen: Blood from Peripheral Updated: 11/07/24 0408     Significant Indicator NEG     Source BLD     Site PERIPHERAL     Culture Result No Growth  Note:  Blood cultures are incubated for 5 days and  are monitored continuously.Positive blood cultures  are called to the RN and reported as soon as  they are identified.      BLOOD CULTURE [[id number]] Collected: 11/07/24 0241    Order Status: Completed Specimen: Blood from Peripheral Updated: 11/07/24 0408     Significant Indicator NEG     Source BLD     Site PERIPHERAL     Culture Result No Growth  Note: Blood cultures are incubated for 5 days and  are monitored continuously.Positive blood cultures  are called to the RN and reported as soon as  they are identified.            IMAGING:   DX-CHEST-PORTABLE (1 VIEW)   Final Result      1.  Unchanged BILATERAL pneumonia   2.  Possible RIGHT pleural effusion      DX-CHEST-2 VIEWS   Final Result         1.  Pulmonary edema and/or infiltrates.   2.  Trace left pleural effusion   3.  Atherosclerosis          MEDS:  Current Facility-Administered Medications   Medication Last Admin    cycloSPORINE (SandIMMUNE) capsule 100 mg 100 mg at 11/08/24 0441    tamsulosin (Flomax) capsule 0.4 mg 0.4 mg at 11/07/24 1226    acyclovir (Zovirax) tablet 400 mg 400 mg at 11/08/24 0440    levoFLOXacin (Levaquin) tablet 500 mg 500 mg at 11/08/24 0440    posaconazole (Noxafil) tablet 300 mg 300 mg at 11/08/24 0440    atorvastatin (Lipitor) tablet 10 mg 10 mg at 11/07/24 1751    carvedilol (Coreg) tablet 3.125 mg 3.125 mg at 11/07/24 1751    loratadine (Claritin) tablet 10 mg 10 mg at 11/08/24 0440    famotidine (Pepcid) tablet 20 mg 20 mg at 11/08/24 0440    insulin GLARGINE (Lantus,Semglee) injection 8 Units at 11/08/24 0623    And    insulin lispro (HumaLOG,AdmeLOG) subcutaneous injection 1 Units at 11/08/24 0620    And    dextrose 50% (D50W) injection 25 g         PROBLEM LIST:  Problem Noted   Aplastic Anemia (Hcc) 11/6/2024   Neutropenic Fever (Hcc) 11/6/2024   Hypophosphatemia 11/6/2024   Hypomagnesemia 11/6/2024   Petechial Rash 11/6/2024   Hypertension 11/6/2024   Gerd (Gastroesophageal  Reflux Disease) 11/6/2024   Seasonal Allergies 11/6/2024   Protein Calorie Malnutrition (Hcc) 11/6/2024   Productive Cough 9/22/2024   Chronic Gastritis Without Bleeding 9/21/2024   Bilateral Lower Extremity Edema 9/20/2024   Bacteremia 9/13/2024   Dysuria 9/11/2024   Dyslipidemia 9/6/2024   B12 Deficiency 9/5/2024   Thrombocytopenia (Hcc) 9/3/2024   History of GI Bleed 9/3/2024   Type 2 Diabetes Mellitus, Without Long-Term Current Use of Insulin (Hcc) 9/3/2024       ASSESSMENT/PLAN: Miri Joseph is a 84 y.o. male here with:    Pressure injury of deep tissue of sacral region  Assessment & Plan  Wound care consulted.     Protein calorie malnutrition (HCC)  Assessment & Plan  Patient documented to have poor intake at [facility].  Appearing cachectic on exam. Hypoalbuminemia.     Plan:  -Nutrition consulted, appreciate recs    Seasonal allergies  Assessment & Plan  Continue loratadine 10 mg daily     GERD (gastroesophageal reflux disease)  Assessment & Plan  Will continue from [facility] famotidine 20 mg daily.     Hypertension  Assessment & Plan  Continue carvedilol 3.125 mg BID.    Petechial rash  Assessment & Plan  Patient with a diffuse petechial rash on exam.  Per nursing staff it was reported from [facility] that rash may have been from vancomycin versus thrombocytopenia. Not currently on vancomycin. Will continue to monitor for improvement, change.     Hypomagnesemia  Assessment & Plan  Noted during admission at [facility].    Plan:  -Daily magnesium, replace as needed    Hypophosphatemia  Assessment & Plan  Low phos on 11/7 was repleted.    Plan:  -Daily phos monitoring, replace prn    Neutropenic fever (HCC)  Assessment & Plan  Afebrile since arrival at [facility].  Patient now on prophylactic Levaquin, acyclovir, and posaconazole.    Plan  -Continue prophylactic Levofloxacin 500 mg daily , Acyclovir 400 mg BID, and posaconazole 300 mg daily.    -Neutropenia previously  refractory to GSF. Will continue to follow neutrophil count while inpatient.  -Following results for blood cultures, urine cultures, sputum collection/culture    Aplastic anemia ([facility])  Assessment & Plan  Patient with recent diagnosis of aplastic anemia by bone marrow biopsy.  Has been followed by oncology at [facility].  Patient is a transfer here to initiate chemotherapy  Family is desiring full code and maximal therapy per [facility] records. Hgb 11/8 at 6.5, transfused 1u PRBC.     Plan:   -Hold VTE prophylaxis.    -Daily CBC, CMP, Magnesium  -Continue to follow oncology's recommendations:  -Transfuse PRBC if hemoglobin less than 7 g/dL         -Transfuse platelets if platelets less than 10,000  -Continue antibiotic prophylaxis: posaconazole, acyclovir and loevofloxacin  -Cyclosporine started today, managed by hematology oncology  -Awaiting arrangement of Promacta, managed by hematology oncology    Productive cough  Assessment & Plan  Pt with productive cough on exam and he is requiring 3 L NC supplemental oxygen.  Treated for MRSA pneumonia at [facility]. Unclear if active pneumonia at this time. CXR on admission showed left pleural effusion, pulmonary edema present.  Repeat CXR showed unchanged bilateral pneumonia, possible right pleural effusion.      Plan:   -Prophylactic antibiotics as noted in A/P for aplastic anemia   -New pleural effusion, likely related to aplastic anemia picture.  Oncology following.   -Sputum culture    Dyslipidemia  Assessment & Plan  Continue atorvastatin 10 mg daily     Type 2 diabetes mellitus, without long-term current use of insulin ([facility])  Assessment & Plan  Patient with hx of type 2 diabetes. Hemoglobin A1c of 7.3.     Plan:   -Continue 8 u lantus in AM and low dose SSI  -Hypoglycemia protocol    Thrombocytopenia ([facility])  Assessment & Plan  See A/P for aplastic anemia.  Transfuse for platelets less than 10,000.      Goal of Care: Patient and family members were offered comfort care by  oncologist, however, they declined and choose to proceed with treatment at this time.       Core Measures:  Fluids: None   Lines: PIV   Abx: Levofloxacin, posaconazole, acyclovir   Diet:  minced and moist, diabetic, vegetarian  PPX: SCDs/TEDs     CODE STATUS: Full     Mima Carrasco D.O.  PGY-1  [facility] Resident

## 2024-11-08 NOTE — PROGRESS NOTES
Oncology/Hematology Progress Note               Author: Donaldo Seals III, M.D. Date & Time created: 11/8/2024  9:33 AM   Severe aplastic anemia  Interval History:  Overall in stable clinical condition, no events overnight, no fever or chills, he is fatigued, weak and debilitated, he did ambulate twice yesterday, he does complain of mouth sores related to his dentures, sputum culture negative.     -PMHx, PSurgHx, SocHx, FAMHx: Reviewed and unchanged    Review of Systems:  Review of Systems   Constitutional:  Positive for malaise/fatigue. Negative for chills and fever.   HENT: Negative.          Gingival pain   Eyes: Negative.    Respiratory: Negative.     Cardiovascular: Negative.    Gastrointestinal:  Negative for abdominal pain, blood in stool, constipation, diarrhea, heartburn, melena, nausea and vomiting.   Genitourinary: Negative.  Negative for dysuria, frequency and urgency.   Musculoskeletal: Negative.    Skin: Negative.    Neurological:  Positive for weakness. Negative for dizziness, tingling, sensory change, speech change, focal weakness, seizures, loss of consciousness and headaches.   Endo/Heme/Allergies:  Bruises/bleeds easily.   Psychiatric/Behavioral: Negative.         Physical Exam:  Physical Exam  Constitutional:       General: He is not in acute distress.     Appearance: He is ill-appearing. He is not toxic-appearing.   HENT:      Head: Normocephalic and atraumatic.      Mouth/Throat:      Mouth: Mucous membranes are dry.      Pharynx: No oropharyngeal exudate or posterior oropharyngeal erythema.      Comments: Tiny lesions related to dentures  Cardiovascular:      Rate and Rhythm: Normal rate and regular rhythm.      Pulses: Normal pulses.      Heart sounds: Normal heart sounds.   Pulmonary:      Breath sounds: Normal breath sounds.   Abdominal:      General: Abdomen is flat.   Musculoskeletal:         General: No swelling.   Skin:     Findings: Rash present.   Neurological:      General: No focal  deficit present.      Mental Status: He is alert and oriented to person, place, and time.      Cranial Nerves: No cranial nerve deficit.      Sensory: No sensory deficit.      Motor: Weakness present.      Gait: Gait abnormal.   Psychiatric:         Mood and Affect: Mood normal.         Labs:          Recent Labs     24   SODIUM 135 133* 134*   POTASSIUM 3.6 3.6 3.2*   CHLORIDE 103 104 103   CO2 21 20 22   BUN 17 15 19   CREATININE 0.54 0.62 0.59   MAGNESIUM  --  2.0 1.8   PHOSPHORUS  --  2.1*  --    CALCIUM 7.9* 7.9* 7.9*     Recent Labs     24   ALTSGPT  36   ASTSGOT 29 31 56*   ALKPHOSPHAT 107* 101* 101*   TBILIRUBIN 1.2 1.1 1.5   GLUCOSE 220* 197* 223*     Recent Labs     24   RBC 2.51* 2.41* 2.19*   HEMOGLOBIN 7.7* 7.1* 6.5*   HEMATOCRIT 21.5* 20.9* 18.9*   PLATELETCT 56* 44* 22*     Recent Labs     24   WBC 0.8* 0.8* 0.9*   NEUTSPOLYS 0.00*  --  0.00*   LYMPHOCYTES 100.00*  --  98.60*   MONOCYTES 0.00  --  0.50   EOSINOPHILS 0.00  --  0.40   BASOPHILS 0.00  --  0.00   ASTSGOT 29 31 56*   ALTSGPT 24 23 36   ALKPHOSPHAT 107* 101* 101*   TBILIRUBIN 1.2 1.1 1.5     Recent Labs     24   SODIUM 135 133* 134*   POTASSIUM 3.6 3.6 3.2*   CHLORIDE 103 104 103   CO2 21 20 22   GLUCOSE 220* 197* 223*   BUN 17 15 19   CREATININE 0.54 0.62 0.59   CALCIUM 7.9* 7.9* 7.9*     Hemodynamics:  Temp (24hrs), Av.9 °C (98.5 °F), Min:36.3 °C (97.3 °F), Max:37.6 °C (99.6 °F)  Temperature: 37.1 °C (98.8 °F)  Pulse  Av.8  Min: 96  Max: 110   Blood Pressure : (!) 146/57     Respiratory:    Respiration: (!) 32, Pulse Oximetry: 94 %        RUL Breath Sounds: Diminished, RML Breath Sounds: Diminished, RLL Breath Sounds: Crackles, YAZAN Breath Sounds: Diminished, LLL Breath Sounds: Crackles  Fluids:    Intake/Output  Summary (Last 24 hours) at 11/7/2024 0937  Last data filed at 11/7/2024 0600  Gross per 24 hour   Intake --   Output 550 ml   Net -550 ml     Weight: 73 kg (160 lb 15 oz)  GI/Nutrition:  Orders Placed This Encounter   Procedures    Diet Order Diet: Level 5 - Minced and Moist; Liquid level: Level 0 - Thin; Second Modifier: (optional): Consistent CHO (Diabetic); Miscellaneous modifications: (optional): Vegetarian     Standing Status:   Standing     Number of Occurrences:   1     Order Specific Question:   Diet:     Answer:   Level 5 - Minced and Moist [24]     Order Specific Question:   Liquid level     Answer:   Level 0 - Thin     Order Specific Question:   Second Modifier: (optional)     Answer:   Consistent CHO (Diabetic) [4]     Order Specific Question:   Miscellaneous modifications: (optional)     Answer:   Vegetarian [13]     Medical Decision Making, by Problem:  Active Hospital Problems    Diagnosis     Aplastic anemia (HCC) [D61.9]     Neutropenic fever (HCC) [D70.9, R50.81]     Hypophosphatemia [E83.39]     Hypomagnesemia [E83.42]     Petechial rash [R23.3]     Hypertension [I10]     GERD (gastroesophageal reflux disease) [K21.9]     Seasonal allergies [J30.2]     Protein calorie malnutrition (HCC) [E46]     Productive cough [R05.8]     Dyslipidemia [E78.5]     Type 2 diabetes mellitus, without long-term current use of insulin (HCC) [E11.9]     Thrombocytopenia (HCC) [D69.6]        Plan:  1.  Severe aplastic anemia  -10/29/2024 bone marrow biopsy consistent with severe aplastic anemia after discussing case with Dr. Napoles    2.  Severe anemia, secondary to #1  -Transfuse if hemoglobin less than 7 g/dL    3.  Severe neutropenia,  secondary to #1   -Refractory to growth factor support in the past    4.  Severe thrombocytopenia, secondary to #1  -Transfuse if platelets less than 10,000    5.  Antibiotic prophylaxis  -On posaconazole, acyclovir and Levaquin    6.  Skin rash, induced by recent antibiotics    7.   Deconditioning, fatigue and debility  -PT OT      Plan  -Continue on cyclosporin 100 mg twice a day, tomorrow will start checking  cyclosporine trough level, awaiting for Promacta which has been arranged from our office.   -for mouth lesions- related to his dentures recommended Magic mouthwash with nystatin also baking soda and salt water swish and spit, oral care  - daily labs and transfuse blood products with parameters as stated above  -PT OT  -Skin rash is stable    High complexity, complex decision making requiring frequent drug monitoring for toxicity    Quality-Core Measures     English

## 2024-11-09 PROBLEM — J96.01 ACUTE RESPIRATORY FAILURE WITH HYPOXEMIA (HCC): Status: RESOLVED | Noted: 2024-01-01 | Resolved: 2024-01-01

## 2024-11-09 PROBLEM — J80 ARDS (ADULT RESPIRATORY DISTRESS SYNDROME) (HCC): Status: ACTIVE | Noted: 2024-01-01

## 2024-11-09 PROBLEM — R79.89 ELEVATED LFTS: Status: ACTIVE | Noted: 2024-01-01

## 2024-11-09 PROBLEM — J15.212 PNEUMONIA OF BOTH LUNGS DUE TO METHICILLIN RESISTANT STAPHYLOCOCCUS AUREUS (MRSA) (HCC): Status: ACTIVE | Noted: 2024-01-01

## 2024-11-09 PROBLEM — R74.01 TRANSAMINITIS: Status: ACTIVE | Noted: 2024-01-01

## 2024-11-09 PROBLEM — R79.89 ELEVATED TROPONIN: Status: ACTIVE | Noted: 2024-01-01

## 2024-11-09 PROBLEM — R13.10 DYSPHAGIA: Status: ACTIVE | Noted: 2024-01-01

## 2024-11-09 PROBLEM — J15.212 PNEUMONIA OF BOTH LUNGS DUE TO METHICILLIN RESISTANT STAPHYLOCOCCUS AUREUS (MRSA) (HCC): Status: RESOLVED | Noted: 2024-01-01 | Resolved: 2024-01-01

## 2024-11-09 PROBLEM — E43 SEVERE MALNUTRITION (HCC): Status: ACTIVE | Noted: 2024-01-01

## 2024-11-09 PROBLEM — J96.01 ACUTE RESPIRATORY FAILURE WITH HYPOXEMIA (HCC): Status: ACTIVE | Noted: 2024-01-01

## 2024-11-09 NOTE — PROGRESS NOTES
Rapid Response Summary     Rapid response called at 0424 for: Shortness of breath  and Increased oxygen demand     VS: WDL (See Vitals Flowsheet)  Additional info: Patient with increased WOB and O2 demand. Admx for possible immunotherapy related to aplastic anemia.  MD Paged: Ivanna Longo  Interventions:    Imaging/Tests: Chest X-ray and EKG    Labs: CBC, CMP, and ABG    Medications: Lasix   Other: N/A  Disposition: Improved with rapid response team interventions. Primary RN updated on plan of care. Transfer not indicated at this time.

## 2024-11-09 NOTE — PROGRESS NOTES
Pharmacy Vancomycin Kinetics Note for 11/9/2024     84 y.o. male on Vancomycin day # 1     Vancomycin Indication (AUC Dosing): S. aureus pneumonia    Provider specified end date: 11/16/24    Active Antibiotics (From admission, onward)      Ordered     Ordering Provider       Sat Nov 9, 2024  8:34 AM    11/09/24 0834  vancomycin (Vancocin) 1,750 mg in  mL IVPB  ONCE         Mima Carrasco D.O.       Sat Nov 9, 2024  8:16 AM    11/09/24 0816  MD Alert...Vancomycin per Pharmacy  (MD Alert...Vancomycin per Pharmacy)  PHARMACY TO DOSE        Question:  Indication(s) for vancomycin?  Answer:  Pneumonia  Comment:  MRSA    Mima Carrasco D.O.       Wed Nov 6, 2024 10:55 PM    11/06/24 2255  acyclovir (Zovirax) tablet 400 mg  2 TIMES DAILY         Ivanna Longo M.D.    11/06/24 2255  levoFLOXacin (Levaquin) tablet 500 mg  EVERY 24 HOURS         Ivanna Longo M.D.    11/06/24 2255  posaconazole (Noxafil) tablet 300 mg  (posaconazole (Noxafil) tablet Order Panel)  DAILY        Question Answer Comment   Indication Aspergillosis    Indication Prophylaxis        Ivanna Longo M.D.            Dosing Weight: 73 kg (160 lb 15 oz)      Admission History: Admitted on 11/6/2024 for Admission for chemotherapy [Z51.11]  Pertinent history: Patient was transferred from [facility] on 11/6 for new diagnosis aplastic anemia. At [facility] pt was being treated for febrile neutropenia and pneumonia. Patient has been afebrile while here but remains neutropenic. Sputum cx from 11/7 growing MRSA. Vancomycin intiated and ID consulted. Pt is on levofloxacin, acyclovir and posaconazole for prophylaxis.    Allergies:     Patient has no known allergies.     Pertinent cultures to date:     Results       Procedure Component Value Units Date/Time    CULTURE RESPIRATORY W/ GRM STN [[id number]]  (Abnormal)  (Susceptibility) Collected: 11/07/24 0820    Order Status: Completed Specimen: Respirate from Sputum Updated: 11/09/24 0823     Significant  Indicator POS     Source RESP     Site SPUTUM     Culture Result Light growth usual upper respiratory rosalino     Gram Stain Result Few WBCs.  Rare epithelial cells.  Rare Gram positive rods.  Specimen Quality Score: 1+       Culture Result Methicillin Resistant Staphylococcus aureus  Light growth  Methicillin resistant by screening method      GRAM STAIN [[id number]] Collected: 11/07/24 0820    Order Status: Completed Specimen: Respirate Updated: 11/07/24 1108     Significant Indicator .     Source RESP     Site SPUTUM     Gram Stain Result Few WBCs.  Rare epithelial cells.  Rare Gram positive rods.  Specimen Quality Score: 1+      URINALYSIS [[id number]]  (Abnormal) Collected: 11/07/24 0550    Order Status: Completed Specimen: Urine, Cath Updated: 11/07/24 0747     Color Yellow     Character Clear     Specific Gravity 1.017     Ph 6.5     Glucose 500 mg/dL      Ketones Negative mg/dL      Protein 30 mg/dL      Bilirubin Negative     Urobilinogen, Urine 4.0 EU/dL      Nitrite Negative     Leukocyte Esterase Negative     Occult Blood Negative     Micro Urine Req Microscopic    BLOOD CULTURE [[id number]] Collected: 11/07/24 0241    Order Status: Completed Specimen: Blood from Peripheral Updated: 11/07/24 0408     Significant Indicator NEG     Source BLD     Site PERIPHERAL     Culture Result No Growth  Note: Blood cultures are incubated for 5 days and  are monitored continuously.Positive blood cultures  are called to the RN and reported as soon as  they are identified.      BLOOD CULTURE [[id number]] Collected: 11/07/24 0241    Order Status: Completed Specimen: Blood from Peripheral Updated: 11/07/24 0408     Significant Indicator NEG     Source BLD     Site PERIPHERAL     Culture Result No Growth  Note: Blood cultures are incubated for 5 days and  are monitored continuously.Positive blood cultures  are called to the RN and reported as soon as  they are identified.              Labs:     Estimated Creatinine Clearance:  "83.8 mL/min (by C-G formula based on SCr of 0.68 mg/dL).  Recent Labs     244 24  0241 24  0137 24  0128   WBC 0.8* 0.8* 0.9* 0.7*   NEUTSPOLYS 0.00*  --  0.00* 0.00*     Recent Labs     24  2244 24  0241 24  0137 24  0128   BUN 17 15 19 22   CREATININE 0.54 0.62 0.59 0.68   ALBUMIN 2.4* 2.2* 1.8* 2.1*       Intake/Output Summary (Last 24 hours) at 2024 1354  Last data filed at 2024 0359  Gross per 24 hour   Intake --   Output 1100 ml   Net -1100 ml      /48   Pulse (!) 101   Temp 37.2 °C (99 °F) (Temporal)   Resp 20   Ht 1.753 m (5' 9\")   Wt 73 kg (160 lb 15 oz)   SpO2 96%  Temp (24hrs), Av.1 °C (98.7 °F), Min:36.5 °C (97.7 °F), Max:37.6 °C (99.7 °F)      List concerns for Vancomycin clearance:     Age;BUN/Scr ratio greater than 20:1;Malnutrition/Low albumin    Pharmacokinetics:     AUC kinetics:   Ke (hr ^-1): 0.0733 hr^-1  Half life: 9.46 hr  Clearance: 3.478  Estimated TDD: 1739  Estimated Dose: 833  Estimated interval: 11.5      A/P:     -  Vancomycin dose: 750 mg (10 mg/kg) q12h (1000/2200)    -  Next vancomycin level(s):    -TBD    -  Predicted vancomycin AUC from initial AUC test calculator: 431 mg·hr/L    -  Comments:    >> Per chart review at [facility] there was a possible concern for Redmans syndrome however not confirmed. Spoke w/ RN and currently no concerns following loading dose. Monitor closely      Cristina Black, PharmD    "

## 2024-11-09 NOTE — CARE PLAN
The patient is Watcher - Medium risk of patient condition declining or worsening    Shift Goals  Clinical Goals: Titrate O2, oral care, Q2 turns  Patient Goals: Sleep      Progress made toward(s) clinical / shift goals:     Problem: Knowledge Deficit - Standard  Goal: Patient and family/care givers will demonstrate understanding of plan of care, disease process/condition, diagnostic tests and medications  Outcome: Progressing     Problem: Skin Integrity  Goal: Skin integrity is maintained or improved  Outcome: Progressing     Problem: Fall Risk  Goal: Patient will remain free from falls  Outcome: Progressing

## 2024-11-09 NOTE — PROGRESS NOTES
Oncology/Hematology Progress Note               Author: Donaldo Seals III, M.D. Date & Time created: 11/9/2024  8:43 AM   Severe aplastic anemia  Interval History:    Overnight he started to have worsening shortness of breath rapid response team was called, , he is currently on high flow 40 L of nasal cannula, chest x-ray appears largely the same with hazy infiltrates throughout with no obvious changes in pulmonary edema or pleural effusion, he also is not given Lasix, pancultured, not spiking fevers, he is lethargic.      -PMHx, PSurgHx, SocHx, FAMHx: Reviewed and unchanged    Review of Systems:  Review of Systems   Constitutional:  Positive for malaise/fatigue. Negative for chills and fever.   HENT: Negative.          Gingival pain   Eyes: Negative.    Respiratory:  Positive for shortness of breath. Negative for cough and hemoptysis.    Cardiovascular: Negative.    Gastrointestinal:  Negative for abdominal pain, blood in stool, constipation, diarrhea, heartburn, melena, nausea and vomiting.   Genitourinary: Negative.  Negative for dysuria, frequency and urgency.   Musculoskeletal: Negative.    Skin: Negative.    Neurological:  Positive for weakness. Negative for dizziness, tingling, sensory change, speech change, focal weakness, seizures, loss of consciousness and headaches.        Lethargic   Endo/Heme/Allergies:  Bruises/bleeds easily.   Psychiatric/Behavioral: Negative.         Physical Exam:  Physical Exam  Constitutional:       General: He is not in acute distress.     Appearance: He is ill-appearing. He is not toxic-appearing.   HENT:      Head: Normocephalic and atraumatic.      Mouth/Throat:      Mouth: Mucous membranes are dry.      Pharynx: No oropharyngeal exudate or posterior oropharyngeal erythema.      Comments: Tiny lesions related to dentures  Cardiovascular:      Rate and Rhythm: Normal rate and regular rhythm.      Pulses: Normal pulses.      Heart sounds: Normal heart sounds.   Pulmonary:       Breath sounds: Normal breath sounds.   Abdominal:      General: Abdomen is flat.   Musculoskeletal:         General: No swelling.   Skin:     Findings: Rash present.   Neurological:      General: No focal deficit present.      Mental Status: He is alert and oriented to person, place, and time.      Cranial Nerves: No cranial nerve deficit.      Sensory: No sensory deficit.      Motor: Weakness present.      Gait: Gait abnormal.   Psychiatric:         Mood and Affect: Mood normal.         Labs:  Recent Labs     11/09/24  0504   OYWBS40J 7.46   NIUSFT494H 30.8   HYDUT414B 129.6*   LBJV5LIU 98.2   ARTHCO3 22   J4NWWRFFS 60 %   ARTBE -2         Recent Labs     11/07/24 0241 11/08/24 0137 11/09/24 0128   SODIUM 133* 134* 134*   POTASSIUM 3.6 3.2* 2.9*   CHLORIDE 104 103 104   CO2 20 22 23   BUN 15 19 22   CREATININE 0.62 0.59 0.68   MAGNESIUM 2.0 1.8 1.6   PHOSPHORUS 2.1*  --  1.9*   CALCIUM 7.9* 7.9* 7.8*     Recent Labs     11/07/24  0241 11/08/24 0137 11/09/24 0128   ALTSGPT 23 36 60*   ASTSGOT 31 56* 74*   ALKPHOSPHAT 101* 101* 119*   TBILIRUBIN 1.1 1.5 2.0*   GLUCOSE 197* 223* 195*     Recent Labs     11/07/24 0241 11/08/24 0137 11/09/24 0128   RBC 2.41* 2.19* 2.73*   HEMOGLOBIN 7.1* 6.5* 8.5*   HEMATOCRIT 20.9* 18.9* 23.7*   PLATELETCT 44* 22* 11*     Recent Labs     11/06/24  2244 11/07/24 0241 11/08/24 0137 11/09/24 0128   WBC 0.8* 0.8* 0.9* 0.7*   NEUTSPOLYS 0.00*  --  0.00* 0.00*   LYMPHOCYTES 100.00*  --  98.60* 99.10*   MONOCYTES 0.00  --  0.50 0.90   EOSINOPHILS 0.00  --  0.40 0.00   BASOPHILS 0.00  --  0.00 0.00   ASTSGOT 29 31 56* 74*   ALTSGPT 24 23 36 60*   ALKPHOSPHAT 107* 101* 101* 119*   TBILIRUBIN 1.2 1.1 1.5 2.0*     Recent Labs     11/07/24  0241 11/08/24  0137 11/09/24  0128   SODIUM 133* 134* 134*   POTASSIUM 3.6 3.2* 2.9*   CHLORIDE 104 103 104   CO2 20 22 23   GLUCOSE 197* 223* 195*   BUN 15 19 22   CREATININE 0.62 0.59 0.68   CALCIUM 7.9* 7.9* 7.8*     Hemodynamics:  Temp (24hrs),  Av °C (98.6 °F), Min:36.5 °C (97.7 °F), Max:37.6 °C (99.7 °F)  Temperature: 36.8 °C (98.2 °F)  Pulse  Av.2  Min: 91  Max: 119   Blood Pressure : 132/64     Respiratory:    Respiration: (!) 24, Pulse Oximetry: 93 %     Work Of Breathing / Effort: Mild;Increased Work of Breathing  RUL Breath Sounds: Diminished;Rhonchi, RML Breath Sounds: Diminished, RLL Breath Sounds: Diminished, YAZAN Breath Sounds: Diminished;Rhonchi, LLL Breath Sounds: Diminished  Fluids:    Intake/Output Summary (Last 24 hours) at 2024 0937  Last data filed at 2024 0600  Gross per 24 hour   Intake --   Output 550 ml   Net -550 ml        GI/Nutrition:  Orders Placed This Encounter   Procedures    Diet Order Diet: Level 5 - Minced and Moist; Liquid level: Level 0 - Thin; Second Modifier: (optional): Consistent CHO (Diabetic); Miscellaneous modifications: (optional): Vegetarian     Standing Status:   Standing     Number of Occurrences:   1     Order Specific Question:   Diet:     Answer:   Level 5 - Minced and Moist [24]     Order Specific Question:   Liquid level     Answer:   Level 0 - Thin     Order Specific Question:   Second Modifier: (optional)     Answer:   Consistent CHO (Diabetic) [4]     Order Specific Question:   Miscellaneous modifications: (optional)     Answer:   Vegetarian [13]     Medical Decision Making, by Problem:  Active Hospital Problems    Diagnosis     Aplastic anemia (HCC) [D61.9]     Neutropenic fever (HCC) [D70.9, R50.81]     Hypophosphatemia [E83.39]     Hypomagnesemia [E83.42]     Petechial rash [R23.3]     Hypertension [I10]     GERD (gastroesophageal reflux disease) [K21.9]     Seasonal allergies [J30.2]     Protein calorie malnutrition (HCC) [E46]     Productive cough [R05.8]     Dyslipidemia [E78.5]     Type 2 diabetes mellitus, without long-term current use of insulin (HCC) [E11.9]     Thrombocytopenia (HCC) [D69.6]        Plan:  1.  Severe aplastic anemia  -10/29/2024 bone marrow biopsy consistent  with severe aplastic anemia after discussing case with Dr. Napoles    2.  Severe anemia, secondary to #1  -Transfuse if hemoglobin less than 7 g/dL    3.  Severe neutropenia,  secondary to #1   -Refractory to growth factor support in the past    4.  Severe thrombocytopenia, secondary to #1  -Transfuse if platelets less than 10,000    5.  Antibiotic prophylaxis  -On posaconazole, acyclovir and Levaquin    6.  Skin rash, induced by recent antibiotics    7.  Deconditioning, fatigue and debility  -PT OT    8.  Respiratory failure, 11/9  -Chest x-ray showed stable pulmonary opacities      Plan  -Plan to consult ID, recommended linezolid, the sputum showing MRSA.  -He is resting in more comfortable condition, remains on high flow nasal cannula, 40 L  -Discussed CODE STATUS  with son Bill, agreed for DNR/DNI, did communicate  resident about decision of CODE STATUS  -Cyclosporin trough level pending  -Transfuse blood products with parameters as stated above  -Guarded prognosis    High complexity, complex decision making requiring frequent drug monitoring for toxicity    Quality-Core Measures

## 2024-11-09 NOTE — DOCUMENTATION QUERY
Critical access hospital                                                                       Query Response Note      PATIENT:               STEVEN BECKER  ACCT #:                  [id number]  MRN:                     [id number]  :                      1940  ADMIT DATE:       2024 8:31 PM  DISCH DATE:          RESPONDING  PROVIDER #:        [id number]           QUERY TEXT:    POA deep tissue pressure injury coccyx; right sacrum is documented in the Wound Team Eval.  Can you clarify if this is a relevant diagnosis?    The patient's Clinical Indicators include:   Wound Note:  Pressure Injury Coccyx; Sacrum Right POA  Risk Factors: malnutrition, fatigue/ debility, DM   Treatment: reposition q 2 h, TAPs turning system, sacral offloading dressing    Important Note:  if new diagnosis or change to documentation made via query please include in daily documentation and/or DC Summary    Thank you,  Michael Rivera RN, BSN, CCDS  Clinical   Connect via [employer]  Options provided:   -- POA deep tissue pressure injury coccyx/ sacrum ruled in   -- Other explanation of clinical findings, please specify   -- Findings of no clinical significance      Query created by: Michael Rivera on 2024 8:31 AM    RESPONSE TEXT:    POA deep tissue pressure injury coccyx/ sacrum ruled in          Electronically signed by:  DAWN TAY MD 2024 7:29 PM

## 2024-11-09 NOTE — PROGRESS NOTES
[facility] [place] FAMILY MEDICINE PROGRESS NOTE     Attending: Sandy Trujillo M.d.    Resident: Mima Carrasco D.O.    Senior Resident: Antony Sidhu M.D.    PATIENT: Miri Joseph; [id number]; 1940    ID: 84 y.o. male with past medical history of aplastic anemia and thrombocytopenia here as a direct transfer from [facility] for initiation of chemotherapy on 11/6/2024. He had a rapid response early morning 11/9 for acute respiratory failure that required HHF O2 for several hours before transitioning back to NC.    SUBJECTIVE: Patient evaluated with his daughter bedside. He is unable to answer most questions even with his daughter asking him. She reports she and the nurse tried to feed him applesauce with crushed pills and it this morning followed by a sip of water and this immediately triggered significant coughing episode.  He has been unable to safely swallow today. ROS limited by patient's acute illness.     OBJECTIVE:     Vitals:    11/09/24 0453 11/09/24 0507 11/09/24 0518 11/09/24 0526   BP: 125/48 117/41 113/41 116/40   Pulse: (!) 110 (!) 107 (!) 104 (!) 104   Resp:  (!) 28 (!) 30 (!) 28   Temp:       TempSrc:       SpO2: 97%  98% 98%   Weight:       Height:           Intake/Output Summary (Last 24 hours) at 11/9/2024 0554  Last data filed at 11/8/2024 0815  Gross per 24 hour   Intake 466 ml   Output --   Net 466 ml       PE:   General: Ill-appearing, cachectic, sleepy   HEENT: NC/AT. Slightly dry mucous membranes, no nasal drainage  Cardiovascular: Normal S1/S2, RRR, no m/r/g  Respiratory: Mouth breathing, increased respiratory rate, weakly coughing frequently through exam, decreased and rhonchorus breath sounds throughout   Abdomen: Soft, NT/ND   EXT:  SOL, scattered petechial rash and ecchymoses, 2+ pitting edema bilaterally to the knees  Neuro: Limited answers to questions, not following most commands, sleepy but rouses to voice    LABS x 2 DAYS:  Recent Labs     11/06/24  2244 11/07/24  0241 11/08/24  0137  11/09/24  0128   WBC 0.8* 0.8* 0.9* 0.7*   RBC 2.51* 2.41* 2.19* 2.73*   HEMOGLOBIN 7.7* 7.1* 6.5* 8.5*   HEMATOCRIT 21.5* 20.9* 18.9* 23.7*   MCV 85.7 86.7 86.3 86.8   MCH 30.7 29.5 29.7 31.1   RDW 44.0 44.0 44.2 45.1   PLATELETCT 56* 44* 22* 11*   MPV 9.6 11.4 10.3 10.5   NEUTSPOLYS 0.00*  --  0.00* 0.00*   LYMPHOCYTES 100.00*  --  98.60* 99.10*   MONOCYTES 0.00  --  0.50 0.90   EOSINOPHILS 0.00  --  0.40 0.00   BASOPHILS 0.00  --  0.00 0.00   RBCMORPHOLO Present  --  Present Present     Recent Labs     11/07/24 0241 11/08/24 0137 11/09/24 0128   SODIUM 133* 134* 134*   POTASSIUM 3.6 3.2* 2.9*   CHLORIDE 104 103 104   CO2 20 22 23   GLUCOSE 197* 223* 195*   BUN 15 19 22     Recent Labs     11/07/24 0241 11/08/24 0137 11/09/24 0128   ALBUMIN 2.2* 1.8* 2.1*   TBILIRUBIN 1.1 1.5 2.0*   ALKPHOSPHAT 101* 101* 119*   TOTPROTEIN 6.8 6.1 6.5   ALTSGPT 23 36 60*   ASTSGOT 31 56* 74*   CREATININE 0.62 0.59 0.68         MICROBIOLOGY:  Results       Procedure Component Value Units Date/Time    CULTURE RESPIRATORY W/ GRM STN [[id number]]  (Abnormal) Collected: 11/07/24 0820    Order Status: Completed Specimen: Respirate from Sputum Updated: 11/08/24 1529     Significant Indicator POS     Source RESP     Site SPUTUM     Culture Result Light growth usual upper respiratory rosalino     Gram Stain Result Few WBCs.  Rare epithelial cells.  Rare Gram positive rods.  Specimen Quality Score: 1+       Culture Result Staphylococcus aureus  Light growth  Methicillin resistant by screening method  Susceptibilities in progress      GRAM STAIN [[id number]] Collected: 11/07/24 0820    Order Status: Completed Specimen: Respirate Updated: 11/07/24 1108     Significant Indicator .     Source RESP     Site SPUTUM     Gram Stain Result Few WBCs.  Rare epithelial cells.  Rare Gram positive rods.  Specimen Quality Score: 1+      URINALYSIS [[id number]]  (Abnormal) Collected: 11/07/24 0550    Order Status: Completed Specimen: Urine, Cath Updated:  11/07/24 0747     Color Yellow     Character Clear     Specific Gravity 1.017     Ph 6.5     Glucose 500 mg/dL      Ketones Negative mg/dL      Protein 30 mg/dL      Bilirubin Negative     Urobilinogen, Urine 4.0 EU/dL      Nitrite Negative     Leukocyte Esterase Negative     Occult Blood Negative     Micro Urine Req Microscopic    BLOOD CULTURE [[id number]] Collected: 11/07/24 0241    Order Status: Completed Specimen: Blood from Peripheral Updated: 11/07/24 0408     Significant Indicator NEG     Source BLD     Site PERIPHERAL     Culture Result No Growth  Note: Blood cultures are incubated for 5 days and  are monitored continuously.Positive blood cultures  are called to the RN and reported as soon as  they are identified.      BLOOD CULTURE [[id number]] Collected: 11/07/24 0241    Order Status: Completed Specimen: Blood from Peripheral Updated: 11/07/24 0408     Significant Indicator NEG     Source BLD     Site PERIPHERAL     Culture Result No Growth  Note: Blood cultures are incubated for 5 days and  are monitored continuously.Positive blood cultures  are called to the RN and reported as soon as  they are identified.            IMAGING:   DX-CHEST-PORTABLE (1 VIEW)   Final Result         1.  Patchy bilateral pulmonary infiltrates, similar to prior study      DX-CHEST-PORTABLE (1 VIEW)   Final Result      1.  Unchanged BILATERAL pneumonia   2.  Possible RIGHT pleural effusion      DX-CHEST-2 VIEWS   Final Result         1.  Pulmonary edema and/or infiltrates.   2.  Trace left pleural effusion   3.  Atherosclerosis          MEDS:  Current Facility-Administered Medications   Medication Last Admin    ipratropium-albuterol (DUONEB) nebulizer solution 3 mL at 11/09/24 0435    Respiratory Therapy Consult      MBX (diphenhydrAMINE-lidocaine-Maalox) oral susp Cup 5 mL 5 mL at 11/09/24 0321    sodium bicarb 0.5 mEq/mL oral rinse 10 mL 10 mL at 11/08/24 1821    NS infusion New Bag at 11/08/24 1852    cycloSPORINE (SandIMMUNE)  capsule 100 mg 100 mg at 11/08/24 1820    tamsulosin (Flomax) capsule 0.4 mg 0.4 mg at 11/08/24 0823    acyclovir (Zovirax) tablet 400 mg 400 mg at 11/08/24 1819    levoFLOXacin (Levaquin) tablet 500 mg 500 mg at 11/08/24 0440    posaconazole (Noxafil) tablet 300 mg 300 mg at 11/08/24 0440    atorvastatin (Lipitor) tablet 10 mg 10 mg at 11/08/24 1819    carvedilol (Coreg) tablet 3.125 mg 3.125 mg at 11/08/24 1818    loratadine (Claritin) tablet 10 mg 10 mg at 11/08/24 0440    famotidine (Pepcid) tablet 20 mg 20 mg at 11/08/24 1818    insulin GLARGINE (Lantus,Semglee) injection 8 Units at 11/08/24 0623    And    insulin lispro (HumaLOG,AdmeLOG) subcutaneous injection 2 Units at 11/09/24 0010    And    dextrose 50% (D50W) injection 25 g         PROBLEM LIST:  Problem Noted   Pressure Injury of Deep Tissue of Sacral Region 11/8/2024   Aplastic Anemia (Hcc) 11/6/2024   Neutropenic Fever (Hcc) 11/6/2024   Hypophosphatemia 11/6/2024   Hypomagnesemia 11/6/2024   Petechial Rash 11/6/2024   Hypertension 11/6/2024   Gerd (Gastroesophageal Reflux Disease) 11/6/2024   Seasonal Allergies 11/6/2024   Protein Calorie Malnutrition (Hcc) 11/6/2024   Productive Cough 9/22/2024   Chronic Gastritis Without Bleeding 9/21/2024   Bilateral Lower Extremity Edema 9/20/2024   Bacteremia 9/13/2024   Dysuria 9/11/2024   Dyslipidemia 9/6/2024   B12 Deficiency 9/5/2024   Thrombocytopenia (Hcc) 9/3/2024   History of GI Bleed 9/3/2024   Type 2 Diabetes Mellitus, Without Long-Term Current Use of Insulin (Hcc) 9/3/2024       ASSESSMENT/PLAN: Miri Joseph is a 84 y.o. male here with:    Elevated troponin  Assessment & Plan  Rapid response morning of 11/9 for acutely worsened respiratory status. Troponin obtained during rapid was elevated. BNP elevated as well. EKG was normal, patient without chest pain. Ddimer positive. Ddx for PE or other thrombus, elevation related to acute illness, aplastic process. Patient is not a candidate for thrombolytic  therapy due to severe aplastic anemia and thrombocytopenia. Given 20 mg IV lasix during rapid. Patient appearing euvolemic. CXR without pulmonary edema or pleural effusion.    Plan:  -Repeat troponin ordered for 4 hr later  -Telemetry  -Repeat EKG if develops chest pain or other cardiac symptoms.    Dysphagia  Assessment & Plan  Patient with increasing difficulty swallowing starting gradually the afternoon of 11/8, unable to swallow medications, water, applesauce without triggering coughing morning of 11/9.     Plan:  -NPO  -SLP evaluation  -Discussed with pharmacy which meds are available IV. Some were switched over today, not all are available IV.   -Further medication management pending SLP eval, potential of swallow recovering quickly.    Acute respiratory failure with hypoxemia (HCC)  Assessment & Plan  Rapid response morning of 11/9 for decompensated respiratory status. Required HHF 40L for several hours before returning to 6L NC. Repeat CXR howed unchanged bilateral pneumonia, no pleural effusion or pulmonary edema. EKG unremarkable. Troponin and BNP elevated, patient without chest pain. Previously treated for MRSA pneumonia at [facility] that ended 10/28.    Plan:  -Supplemental O2 prn  -Treat underlying pneumonia  -ID consulted, recommended CT chest w/o to better characterize previous finding that may be suspicious for mold pnuemonia  -CT chest w/o    Transaminitis  Assessment & Plan  Uptrending AST, ALT, alk phos, bilirubin since admission. Suspect this is secondary to initiation of cyclosporine. No RUQ tenderness on exam. Negative viral hepatitis panel during previous admission.    Plan:  -Monitor with CMP daily  -Low threshold for further imaging if develops symptoms, significantly worsening labs    Pressure injury of deep tissue of sacral region  Assessment & Plan  Wound care consulted.     Severe malnutrition (HCC)  Assessment & Plan  Patient documented to have poor intake at [facility].   Appearing cachectic on exam. Hypoalbuminemia.     Plan:  -Nutrition consulted, appreciate recs    Seasonal allergies  Assessment & Plan  Continue loratadine 10 mg daily     GERD (gastroesophageal reflux disease)  Assessment & Plan  Continue famotidine 20 mg daily.     Hypertension  Assessment & Plan  Continue carvedilol 3.125 mg BID.    Petechial rash  Assessment & Plan  Patient with a diffuse petechial rash on exam.  Per nursing staff it was reported from [facility] that rash may have been from vancomycin versus thrombocytopenia. Per records from [facility], this was thought to be infusion reaction related to rate of infusion rather than allergy. Continue to monitor.     Hypomagnesemia  Assessment & Plan  Noted during admission at [facility].    Plan:  -Daily magnesium, replace as needed    Hypophosphatemia  Assessment & Plan  Low phos on 11/9 was repleted.    Plan:  -Daily phos monitoring, replace prn    Neutropenic fever (HCC)  Assessment & Plan  Afebrile since arrival at [facility].  Patient now on prophylactic Levaquin, acyclovir, and posaconazole. Vancomycin for MRSA pneumonia.    Plan  -Continue prophylactic Levofloxacin 500 mg daily , Acyclovir 400 mg BID, and posaconazole 300 mg daily.    -Neutropenia previously refractory to GSF. Will continue to follow neutrophil count while inpatient.  -Following results for blood cultures, urine cultures, sputum sensitivity    Aplastic anemia (HCC)  Assessment & Plan  Patient with recent diagnosis of aplastic anemia by bone marrow biopsy.  Has been followed by oncology at [facility].  Patient is a transfer here to initiate chemotherapy. S/p 1u PRBC 11/8.      Plan:   -Hold VTE prophylaxis.    -Daily CBC, CMP, Magnesium, Phos  -Continue to follow oncology's recommendations:  -Transfuse PRBC if hemoglobin less than 7 g/dL         -Transfuse platelets if platelets less than 10,000  -Continue antibiotic prophylaxis: posaconazole, acyclovir and  loevofloxacin  -Cyclosporine started 11/8, managed by hematology oncology  -Awaiting arrangement of Promacta, managed by hematology oncology    Pneumonia of both lungs due to methicillin resistant Staphylococcus aureus (MRSA) ([facility])  Assessment & Plan  Sputum culture collected 11/7 growing MRSA. Rapid response morning of 11/9 for decompensated respiratory status. Required HHF 40L for several hours before returning to 6L NC. Repeat CXR howed unchanged bilateral pneumonia, no pleural effusion or pulmonary edema. EKG unremarkable. Troponin and BNP elevated, patient without chest pain. Previously treated for MRSA pneumonia at [facility].       Plan:   -ID consulted, recommended vancomycin for this pneumonia.   -RT consulted/treating  -Follow culture sensitivities    Dyslipidemia  Assessment & Plan  Continue atorvastatin 10 mg daily     Type 2 diabetes mellitus, without long-term current use of insulin ([facility])  Assessment & Plan  Patient with hx of type 2 diabetes. Hemoglobin A1c of 7.3.     Plan:   -Decrease Lantus to 4u in AM and low dose SSI while NPO  -Hypoglycemia protocol    Thrombocytopenia ([facility])  Assessment & Plan  See A/P for aplastic anemia.  Transfuse for platelets less than 10,000.      Goal of Care: Patient and family members were offered comfort care by oncologist, however, they declined and choose to proceed with treatment at this time.  They did agree to DNR/DNI morning of 11/9.     Core Measures:  Fluids: NS 75 mL/hr  Lines: PIV   Abx: Levofloxacin, posaconazole, acyclovir, vancomycin   Diet:  NPO  PPX: SCDs/TEDs     CODE STATUS: DNAR/DNI     Mima Carrasco D.O.  PGY-1  [facility] Resident

## 2024-11-09 NOTE — DIETARY
"Nutrition Services: Initial Assessment     Day 3 of admit. Miri Joseph is a 84 y.o. male with admitting DX of Admission for chemotherapy [Z51.11]     Consult received for poor PO intake and MD diagnosis of malnutrition.     Nutrition Assessment:      Height: 175.3 cm (5' 9\")  Weight: 73 kg (160 lb 15 oz)  Weight taken via bed scale on 11/8  Body mass index is 23.77 kg/m². BMI classification: Normal.  Wt Readings from Last 6 Encounters:   11/08/24 73 kg (160 lb 15 oz)   09/21/24 70.3 kg (154 lb 15.7 oz)   09/03/24 74.3 kg (163 lb 12.8 oz)   08/16/17 77.5 kg (170 lb 13.7 oz)   08/14/17 79 kg (174 lb 2.6 oz)      Wt stable over the last 2 months     Objective:  Pt is a transfer from [facility] for initiation of chemotherapy  Pertinent medical hx: aplastic anemia, thrombocytopenia, T2DM  Pertinent labs: POC glucose 196, sodium 134, potassium 2.9, phosphorous 1.9  Pertinent meds: Lipitor, pepcid, SSI, magnesium oxide, phosphorous,   Skin/wounds: unstageable PI to coccyx, wound team not following. 3+ BLE edema  Food Allergies: NKFA  Last BM: 11/08/24 per flowsheets     Current diet order:   Consistent CHO diet, IDDSI level 5 - minced/moist, and vegetarian  PO intake per ADLs this admit is 0% x 1meal    Subjective:   Attempted to visit pt at bedside, pt asleep with no family present. Conducted nutrition focused visual exam    Malnutrition assessment  Weight loss: No evidence of losses over the last year.  Muscle mass: Severe loss evident in protrusion of clavicles, depression of temples  Subcutaneous fat: Severe loss evident in lack of mass of upper arms, hollowing of orbital regions  Fluid Accumulation: 3+ BLE edema  Reduced  Strength: N/A in acute care setting.     Nutrition Diagnosis:      Pt meets ASPEN criteria for severe malnutrition in the context of acute illness 2/2 aplastic anemia as evidenced by severe muscle wasting and fat loss.       Nutrition Interventions:      Encourage intake of >50% of meals  Continue " Glucerna (provides 220 calories, 10 g protein per 8 fl oz) TID.  Monitor for refeeding: Order BMP w/ Mg and Phos x 7 days. Replete K, Phos and Mg prn. Supplement 100 mg Thiamine x 7 days to reduce risk of refeeding    Nutrition Monitoring and Evaluation:     Monitor nutrition POC, if pt unable to consume adequate PO, may consider initiation of artificial nutrition as it aligns with GOC  Additional fluids per MD/DO  Monitor vital signs pertinent to nutrition       RD following and will provide updated recommendations as indicated.

## 2024-11-09 NOTE — PROGRESS NOTES
Received page form nursing staff. Patient with gradually worsening shortness of breath overnight now on 9 L. Rapid response was called. RT at bedside placed patient on High Flow 60 L patient now sating well at 97%. Patient on exam does not have wheezing, poor aeration throughout,  mild crackles. Stat chest xray, EKG, troponin, d dimer, bnp, and abg ordered. Reviewed portable Xray of the chest at bedside, appears largely the same as previous with hazy infiltrates throughout, no obvious change in pulmonary edema or pleural effusion. Differential includes volume overload, worsening respiratory status, possible PE. Duonebs ordered. Reviewed ABG results, pH 7.46 PCO2 30.8, PO2 129.6 HCO3 22. RT reduced High Flow to 50 L. Patient tolerating High Flow well. Will also give IV 40 mg lasix as well to see if this helps shortness of breath. If patient does not improve and can not be weaned RN will reach out to on call physician. Gave sign out to day team provider.

## 2024-11-09 NOTE — CARE PLAN
The patient is Watcher - Medium risk of patient condition declining or worsening    Shift Goals  Clinical Goals: Patient will get up to chair for meals, titrate O2 to 1L by end of shift, Q2T and oral care  Patient Goals: Rest  Family Goals: Oral care and make sure patient eats    Progress made toward(s) clinical / shift goals:  Oral care performed as ordered. Patient participates as able. O2 titrated down to 1L, however nursing concern for respiratory status; RR 32 for duration of shift.     Problem: Knowledge Deficit - Standard  Goal: Patient and family/care givers will demonstrate understanding of plan of care, disease process/condition, diagnostic tests and medications  Outcome: Progressing  Note: Discussed POC and shift goals with patient, family at bedside and primary team. All questions answered. Patient A&Ox4, but limited by language barrier and very lethargic.     Problem: Skin Integrity  Goal: Skin integrity is maintained or improved  Outcome: Progressing  Note: Sacral and heel offloading dressings in place. Q2T in place with wedges, patient tolerates well and participates as able. Up to chair x2 this shift, also ambulated     Problem: Fall Risk  Goal: Patient will remain free from falls  Outcome: Progressing  Note: Patient remains free from injury and falls this shift. Fall precautions in place. Bed alarm on and sounding appropriately. Patient with shuffling gait using FWW and 1PA     Problem: Respiratory:  Goal: Respiratory status will improve  Outcome: Progressing  Note: Titrated O2 down to 1L from 3L this shift. Patient saturating 91%. Provided IS, patient has difficult time performing.     Problem: Mobility  Goal: Risk for activity intolerance will decrease  Outcome: Progressing  Note: Patient up with 1PA and FWW. Up with staff to chair and bathroom today. Also participated in physical therapy, ambulated hallways with PT and again with CNA       Patient is not progressing towards the following  goals:      Problem: Nutrition  Goal: Patient's nutritional and fluid intake will be adequate or improve  Outcome: Not Progressing  Note: Patient demonstrates very poor PO intake. Encouraged to drink glucerna protein drinks, tolerates about half of a carton at a time. Limited by lethargy

## 2024-11-10 PROBLEM — J18.9 PNEUMONIA: Status: ACTIVE | Noted: 2024-01-01

## 2024-11-10 PROBLEM — R74.01 TRANSAMINITIS: Status: RESOLVED | Noted: 2024-01-01 | Resolved: 2024-01-01

## 2024-11-10 PROBLEM — J15.212 PNEUMONIA OF BOTH LUNGS DUE TO METHICILLIN RESISTANT STAPHYLOCOCCUS AUREUS (MRSA) (HCC): Status: RESOLVED | Noted: 2024-01-01 | Resolved: 2024-01-01

## 2024-11-10 PROBLEM — J18.9 PNEUMONIA: Status: RESOLVED | Noted: 2024-01-01 | Resolved: 2024-01-01

## 2024-11-10 PROBLEM — J96.90 RESPIRATORY FAILURE (HCC): Status: ACTIVE | Noted: 2024-01-01

## 2024-11-10 NOTE — PROGRESS NOTES
Patient received from EMILIO White, patient transferred to [facility] with nonrebreather facemask. Patient now on 6L O2 via oxymask

## 2024-11-10 NOTE — ASSESSMENT & PLAN NOTE
Present on admission  Source respiratory  MRSA from sputum  Possible fungal: beta D glucan negative.   Serum Aspergillus galactomannan, Coccidioides serology are still pending.  Recent Karius at outside hospital was negative for fungal organisms   Probable aspiration pneumonia  ID managing antibiotics: Have stopped vancomycin due to renal function, not giving linezolid due to platelet count.  Currently on ceftaroline, acyclovir and posaconazole the last two for prophylaxis  Prophylactic agents ongoing for severe neutropenia  Amphotericin discontinued by ID    11/20/2024  Continue posaconazole, atovaquone, acyclovir, ceftaroline  Adequately fluid resuscitated      11/18/2024    Now on room air saturating over 90%  Continue on ceftaroline, atovaquone, acyclovir, posaconazole  Continue on on cyclosporine  Waiting for Promacta  Monitor oxygen recommend closely  Repeat BMP in a.m. to monitor electrolytes and renal function  Repeat CBC in a.m. to monitor white count and hemoglobin  Requiring IV antibiotics, need close monitoring for toxicity  Case discussed with oncology Dr. Lua .   Patient has high risk for deterioration.  Palliative care has been consulted  High risk of deterioration into septic shock and worsening respiratory failure.  Need close monitoring.    11/19/2024  Continues to remain on room air  Continue ceftaroline, atovaquone, acyclovir, and posaconazole  Continue cyclosporine  Continue to monitor for severe bone marrow toxicity requiring further transfusion    11/21/2024  Continue current medications    11/23/2024  Transitioning to comfort care

## 2024-11-10 NOTE — ASSESSMENT & PLAN NOTE
>>ASSESSMENT AND PLAN FOR DYSPHAGIA WRITTEN ON 11/10/2024 11:36 AM BY RALF MARKS M.D.    Patient with worsening dysphagia over the last few days  Unable to swallow, has been aspirating, weak phonation  Suspect airway in part desiccated due to tachypnea/pneumonia and poor p.o. intake    Swallow evaluation/FEES pending for today  High risk for airway scope given thrombocytopenia  Not a candidate for NIV or PPV given inability to protect airway  NPO for now  Oral care every 1-2-hour, this seemed to help overnight, phonation a bit better - significant secretions cleared from airway overnight by RN  CT soft tissue neck without airway issue or obvious abscess/obstructing process

## 2024-11-10 NOTE — ASSESSMENT & PLAN NOTE
Uptrending AST, ALT, alk phos, bilirubin since admission. Suspect this is secondary to initiation of cyclosporine. No RUQ tenderness on exam. Negative viral hepatitis panel during previous admission.    Plan:  -Monitor with CMP daily  -Low threshold for further imaging if develops symptoms, significantly worsening labs

## 2024-11-10 NOTE — PROGRESS NOTES
4 Eyes Skin Assessment Completed by EMILIO Machuca and EMILIO Casey.    Head WDL  Ears Redness and Blanching  Nose Redness and Blanching  Mouth Redness, Discoloration, and Bleeding  Neck Redness and Blanching  Breast/Chest Redness, Blanching, and Rash  Shoulder Blades Redness and Blanching  Spine Redness and Blanching  (R) Arm/Elbow/Hand Redness, Blanching, Discoloration, and Rash  (L) Arm/Elbow/Hand Redness, Blanching, and Rash  Abdomen WDL  Groin Redness and Blanching  Scrotum/Coccyx/Buttocks - sacral DTI, rash  (R) Leg Redness, Blanching, Rash, Scar, and Scab  (L) Leg Redness, Blanching, Rash, Scar, and Scab  (R) Heel/Foot/Toe Redness, Blanching, and Discoloration  (L) Heel/Foot/Toe Redness, Blanching, and Discoloration          Devices In Places Tele Box, Blood Pressure Cuff, Pulse Ox, and HFNC      Interventions In Place NC Cheek Stickers, Sacral Mepilex, TAP System, Pillows, Q2 Turns, and Low Air Loss Mattress    Possible Skin Injury Yes    Pictures Uploaded Into Epic Yes  Wound Consult Placed Yes  RN Wound Prevention Protocol Ordered Yes

## 2024-11-10 NOTE — PROGRESS NOTES
"Pharmacy Vancomycin Kinetics Note for 11/10/2024     84 y.o. male on Vancomycin day # 2     Vancomycin Indication (AUC Dosing): S. aureus pneumonia (MRSA)    Provider specified end date: 11/17/24    Active Antibiotics (From admission, onward)      Ordered     Ordering Provider       Sun Nov 10, 2024 11:30 AM    11/10/24 1130  acyclovir (Zovirax) tablet 400 mg  2 TIMES DAILY         Michel Conteh M.D.    11/10/24 1130  atovaquone (Mepron) 750 MG/5ML suspension 750 mg  2 TIMES DAILY         ELVIRA Tavera Nov 10, 2024 10:20 AM    11/10/24 1020  MD Alert...Vancomycin per Pharmacy  (MD Alert...Vancomycin per Pharmacy)  PHARMACY TO DOSE        Question:  Indication(s) for vancomycin?  Answer:  Pneumonia    ELVIRA Richardson Nov 10, 2024 10:05 AM    11/10/24 1005  amphotericin B liposome (Ambisome) 350 mg in dextrose 5% 250 mL IVPB  (amphotericin B IV Panel and Pharmacy Consult)  EVERY 24 HOURS        Question Answer Comment   Indication Other    Please specify: Empiric treatment for invasive mold infection       Placed in \"And\" Linked Group    ELVIRA Richardson Nov 9, 2024 11:22 PM    11/09/24 2322  piperacillin-tazobactam (Zosyn) 4.5 g in  mL IVPB  (piperacillin-tazobactam (ZOSYN) IV (Extended-infusion) PANEL )  ONCE        Placed in \"And\" Linked Group    Gilbert Pineda M.D.    11/09/24 2322  piperacillin-tazobactam (Zosyn) 4.5 g in  mL IVPB  (piperacillin-tazobactam (ZOSYN) IV (Extended-infusion) PANEL )  EVERY 8 HOURS        Placed in \"And\" Linked Group    Gilbert Pineda M.D.            Dosing Weight: 73 kg (160 lb 15 oz)      Admission History: Admitted on 11/6/2024 for Admission for chemotherapy [Z51.11]    Pertinent history: Patient was transferred from [facility] on 11/6 for new diagnosis aplastic anemia. At [facility], pt was being treated for febrile neutropenia and MRSA pneumonia. Patient had an episode of fever (Tmax 38C) on 11/7 and has been afebrile " but remains neutropenic with ANC of 10. Sputum cx from 11/7 growing MRSA. Vancomycin intiated and ID consulted, now concern for invasive mold infection seen on CT. Pt was on on levofloxacin 500 mg PO daily, acyclovir 400 mg PO twice daily and posaconazole 300 mg daily for prophylaxis PTA.    Allergies:     Patient has no known allergies.     Pertinent cultures to date:     Results       Procedure Component Value Units Date/Time    Cryptococcal Antigen, Serum [[id number]] Collected: 11/10/24 1058    Order Status: Sent Specimen: Blood Updated: 11/10/24 1124    BLOOD CULTURE [[id number]] Collected: 11/10/24 0825    Order Status: Sent Specimen: Blood from Peripheral Updated: 11/10/24 0934    BLOOD CULTURE [[id number]] Collected: 11/10/24 0825    Order Status: Sent Specimen: Blood from Peripheral Updated: 11/10/24 0933    MRSA By PCR (Amp) [[id number]] Collected: 11/10/24 0111    Order Status: Completed Specimen: Respirate from Nares Updated: 11/10/24 0824     MRSA by PCR POSITIVE    Fungal Culture [[id number]] Collected: 11/10/24 0300    Order Status: Sent Specimen: Respirate from Sputum Updated: 11/10/24 0642    AFB Culture [[id number]] Collected: 11/10/24 0300    Order Status: Sent Specimen: Respirate from Sputum Updated: 11/10/24 0642    CULTURE RESPIRATORY W/ GRM STN [[id number]] Collected: 11/10/24 0300    Order Status: Sent Specimen: Respirate from Sputum Updated: 11/10/24 0642    CULTURE RESPIRATORY W/ GRM STN [[id number]]  (Abnormal)  (Susceptibility) Collected: 11/07/24 0820    Order Status: Completed Specimen: Respirate from Sputum Updated: 11/09/24 0823     Significant Indicator POS     Source RESP     Site SPUTUM     Culture Result Light growth usual upper respiratory rosalino     Gram Stain Result Few WBCs.  Rare epithelial cells.  Rare Gram positive rods.  Specimen Quality Score: 1+       Culture Result Methicillin Resistant Staphylococcus aureus  Light growth  Methicillin resistant by screening method      GRAM  STAIN [[id number]] Collected: 11/07/24 0820    Order Status: Completed Specimen: Respirate Updated: 11/07/24 1108     Significant Indicator .     Source RESP     Site SPUTUM     Gram Stain Result Few WBCs.  Rare epithelial cells.  Rare Gram positive rods.  Specimen Quality Score: 1+      URINALYSIS [[id number]]  (Abnormal) Collected: 11/07/24 0550    Order Status: Completed Specimen: Urine, Cath Updated: 11/07/24 0747     Color Yellow     Character Clear     Specific Gravity 1.017     Ph 6.5     Glucose 500 mg/dL      Ketones Negative mg/dL      Protein 30 mg/dL      Bilirubin Negative     Urobilinogen, Urine 4.0 EU/dL      Nitrite Negative     Leukocyte Esterase Negative     Occult Blood Negative     Micro Urine Req Microscopic    BLOOD CULTURE [[id number]] Collected: 11/07/24 0241    Order Status: Completed Specimen: Blood from Peripheral Updated: 11/07/24 0408     Significant Indicator NEG     Source BLD     Site PERIPHERAL     Culture Result No Growth  Note: Blood cultures are incubated for 5 days and  are monitored continuously.Positive blood cultures  are called to the RN and reported as soon as  they are identified.      BLOOD CULTURE [[id number]] Collected: 11/07/24 0241    Order Status: Completed Specimen: Blood from Peripheral Updated: 11/07/24 0408     Significant Indicator NEG     Source BLD     Site PERIPHERAL     Culture Result No Growth  Note: Blood cultures are incubated for 5 days and  are monitored continuously.Positive blood cultures  are called to the RN and reported as soon as  they are identified.              Labs:     Estimated Creatinine Clearance: 86.3 mL/min (by C-G formula based on SCr of 0.66 mg/dL).  Recent Labs     11/08/24  0137 11/09/24 0128 11/09/24  1504 11/09/24  2158 11/10/24  0155   WBC 0.9* 0.7* 0.7* 0.8* 0.5*   NEUTSPOLYS 0.00* 0.00*  --   --  1.00*     Recent Labs     11/08/24 0137 11/09/24 0128 11/10/24  0155   BUN 19 22 19   CREATININE 0.59 0.68 0.66   ALBUMIN 1.8* 2.1* 2.0*  "      Intake/Output Summary (Last 24 hours) at 11/10/2024 1137  Last data filed at 11/10/2024 1000  Gross per 24 hour   Intake 2318.28 ml   Output 1050 ml   Net 1268.28 ml      BP 97/54   Pulse 94   Temp 36.4 °C (97.5 °F) (Temporal)   Resp (!) 25   Ht 1.753 m (5' 9\")   Wt 73 kg (160 lb 15 oz)   SpO2 97%  Temp (24hrs), Av.6 °C (97.9 °F), Min:35.7 °C (96.2 °F), Max:37.2 °C (99 °F)      List concerns for Vancomycin clearance:     BUN/Scr ratio greater than 20:1;Malnutrition/Low albumin;Age;Nephrotoxic drugs    Concurrent nephrotoxic medications:  -Amphotericin B liposomal  -Cyclosporine   -Piperacillin/tazobactam    Pharmacokinetics:     AUC kinetics:   Ke (hr ^-1): 0.072 hr^-1  Half life: 9.63 hr  Clearance: 3.416  Estimated TDD: 1708  Estimated Dose: 826  Estimated interval: 11.6    A/P:     -  Vancomycin dose: 750 mg IV Q12hr    -  Next vancomycin level(s):    -24  @ 1700   -24 Vt @ 0000     -  Predicted vancomycin AUC from initial AUC test calculator: 439 mg·hr/L    -  Comments: Patient reportedly had vancomycin-infusion reaction (rash that required diphenhydramine without improvement) from vancomycin infusion over 90 mins at [facility]. Patient has been tolerating vancomycin infusion here.     Patient received LD vancomycin followed by a maintenance dose of 750 mg @ 2216 on 24. Vancomycin was switched to linezolid on 11/10, and 2 doses of linezolid 600 mg were given @ 0103 and 0551. Linezolid was then switched back to vancomycin.     Nessa Mckinney, PharmD    "

## 2024-11-10 NOTE — ASSESSMENT & PLAN NOTE
Reviewed hypoxic respiratory failure secondary to pneumonia  MRSA positive cultures recently at [facility] and they are recurrently positive now  DNR/DNI reaffirmed  Moved to unit for aggressive pulmonary toilet and oral care  HFNC O2 initiated initially on 100% / 60 L flow-since improved to 40/60  Not a candidate for BiPAP or diagnostic bronchoscopy, he would need to be intubated for that given his current status  RT protocols  Oral care every-2 hours  Aspiration precautions, swallow eval pending  Hydrocortisone added to antibiotic regimen for severe pneumonia with respiratory failure ID managing antibiotics  If patient clinically deteriorates transition to comfort care/hospice, discussed with daughter and son who are in agreement    Tapering off hydrocortisone by 11/14

## 2024-11-10 NOTE — ASSESSMENT & PLAN NOTE
Patient with worsening dysphagia over the last few days  Unable to swallow, has been aspirating, weak phonation  Suspect airway in part desiccated due to tachypnea/pneumonia and poor p.o. intake    Swallow evaluation/FEES pending for today  High risk for airway scope given thrombocytopenia  Not a candidate for NIV or PPV given inability to protect airway  NPO for now  Oral care every 1-2-hour, this seemed to help overnight, phonation a bit better - significant secretions cleared from airway overnight by RN  CT soft tissue neck without airway issue or obvious abscess/obstructing process

## 2024-11-10 NOTE — PROGRESS NOTES
Critical Care Progress Note    Date of admission  11/6/2024    Chief Complaint  84 y.o. male who presented 11/6/2024 with medical history of type II DM, recent diagnosis of aplastic anemia who was transferred to [facility] for oncology care.  Evidently patient admitted to [facility] mid-October was treated for neutropenic fever with pneumonia and was noted to have pancytopenia.  He had a bone marrow biopsy performed consistent with severe aplastic anemia.  He was specifically transferred over here to obtain medical oncology services into initiate inpatient treatment with Promacta and cyclosporine which was initiated upon admission.     Throughout his hospitalization including [facility] he has had respiratory failure and hypoxemia and pulmonary infiltrates, initially had cultures at the [facility] with MRSA he was treated with broad-spectrum antibiotics for a course of pneumonia therapy there and also treated with antifungals given nodular pulmonary opacities and immunocompromise but reportedly had a negative galactomannan and Karius there. He was on posi, then amphoB, then back to posi as dictated by ID at [facility]. Completed course of linezolid and cefepime as well.     According to his discharge summary from 10/19/2024 from [facility] he had a mopped assist, pneumonia aplastic anemia and was treated for neutropenic fever with presumed pneumonia.     Ultimately he was transferred to [facility] for the initiation of chemotherapy and medical oncology evaluation.     During ED on 11/9 I was called to his bedside for respiratory stress, work of breathing, fatigue, and worsening oxygenation requiring max high flow.  Patient to CT was performed today showing dense bilateral consolidations with scattered nodularity involving most lobes of the lung.  Patient was transferred to ICU for further care  (Dr Pineda HPI 11/9)    Hospital Course  No notes on file    Interval Problem Update  Reviewed last 24 hour events:    Case  d/w ID at the bedside at length    A&O x 2  Follows, voice off  SR/ST  -170s   cc / 12 hours  HFNC 40/60%  O2 sats 99%  AB.48/36/53   CXR: Bilateral opacities similar to the most recent film, markedly worsened versus 10/19 film  WOB improved borderline tachypneic but not labored now  Oral care quite productive per RN  CT soft tissue neck today without airway narrowing some right mastoid sinus fluid  Tmax 99.9  WBC 0.5   sputum MRSA  Blood cultures negative so far  UA negative  Normal RUQ GB U/S  NPO c/o aspiration    Continue current broad antibiotic regimen  Follow-up w/ID consultation they are following  Follow-up w/Hematology they are following  Aggressive pulmonary toilet  Wean HFNC  Mobilize  Encourage nutrition  Swallow eval  Oral care Q2H by RN/RT  Add IV thiamine  Change linezolid to vancomycin for thrombocytopenia    Case reviewed with daughter and son.  Daughter here in the ICU with son on the phone.  Outlined condition again today and reviewed our treatment regimen.  Reaffirmed DNR/DNI.  Discussed options for medications and nutrition and both are okay with a soft flexible small feeding tube for medications as well as nutrition if he is willing and the daughter will speak to him for us.  Outlined MRSA positive cultures and concerns recurrent staph pneumonia.  Thrombocytopenia may force us to change antibiotics away from linezolid to something else.  Per son patient may have had a vancomycin reaction.  I spoke with our ID specialist who will reach out to the [facility] ID specialist at clear that issue up.    Review of Systems  Review of Systems   Unable to perform ROS: Acuity of condition        Vital Signs for last 24 hours   Temp:  [35.7 °C (96.2 °F)-37.2 °C (99 °F)] 36.4 °C (97.5 °F)  Pulse:  [] 94  Resp:  [10-40] 25  BP: ()/(48-78) 97/54  SpO2:  [90 %-100 %] 97 %    Hemodynamic parameters for last 24 hours       Respiratory Information for the last 24  hours       Physical Exam   Physical Exam  Vitals reviewed.   Constitutional:       General: He is not in acute distress.     Appearance: He is normal weight. He is ill-appearing. He is not toxic-appearing or diaphoretic.      Comments: HFNC   HENT:      Head: Normocephalic and atraumatic.   Cardiovascular:      Rate and Rhythm: Regular rhythm. Tachycardia present.      Pulses: Normal pulses.      Heart sounds: No murmur heard.  Pulmonary:      Breath sounds: Rhonchi and rales present.      Comments: HFNC  Abdominal:      General: Bowel sounds are normal. There is no distension.      Palpations: Abdomen is soft.      Tenderness: There is no guarding.   Musculoskeletal:      Cervical back: Neck supple. No rigidity.   Lymphadenopathy:      Cervical: No cervical adenopathy.   Skin:     General: Skin is warm and dry.      Coloration: Skin is not cyanotic.      Findings: Bruising present.      Nails: There is no clubbing.   Neurological:      Mental Status: He is lethargic.      Cranial Nerves: Cranial nerves 2-12 are intact.      Comments: Moves all 4 to command   Psychiatric:         Behavior: Behavior is cooperative.         Medications  Current Facility-Administered Medications   Medication Dose Route Frequency Provider Last Rate Last Admin    Pharmacy Consult Request for Amphotericin B monitoring  1 Each Other PRN Sven Orlando M.D.        NS infusion 500 mL  500 mL Intravenous Q24HR Sven Orlando M.D. 500 mL/hr at 11/10/24 1111 500 mL at 11/10/24 1111    And    acetaminophen (Tylenol) tablet 650 mg  650 mg Oral Q24HR Sven Orlando M.D.        And    diphenhydrAMINE (Benadryl) tablet/capsule 25 mg  25 mg Oral Q24HR Sven Orlando M.D.        And    dextrose 5% infusion 30 mL  30 mL Intravenous Q24HR Sven Orlando M.D.        And    amphotericin B liposome (Ambisome) 350 mg in dextrose 5% 250 mL IVPB  350 mg Intravenous Q24HR Sven Orlando M.D.        And    dextrose 5% infusion 30 mL  30 mL  Intravenous Q24HR Sven Orlando M.D.        And    NS infusion 250 mL  250 mL Intravenous Q24HR Sven Orlando M.D.        magnesium sulfate IVPB premix 2 g  2 g Intravenous Once Michel Conteh M.D. 25 mL/hr at 11/10/24 1104 2 g at 11/10/24 1104    K+ Scale: Goal of 4.5  1 Each Intravenous Q6HRS Michel Conteh M.D.        insulin lispro (HumaLOG,AdmeLOG) subcutaneous injection  3-14 Units Subcutaneous Q6HRS Michel Conteh M.D.        And    dextrose 50% (D50W) injection 25 g  25 g Intravenous Q15 MIN PRN Michel Conteh M.D.        thiamine (B-1) injection 200 mg  200 mg Intravenous BID Michel Conteh M.D.   200 mg at 11/10/24 1055    MD Alert...Vancomycin per Pharmacy   Other PHARMACY TO DOSE Sven Orlando M.D.        lidocaine jelly (Uro-Jet) 2 %   Topical Once PRN Michel Conteh M.D.        Pharmacy Consult: Enteral tube insertion - review meds/change route/product selection   Other PHARMACY TO DOSE Michel Conteh M.D.        acyclovir (Zovirax) tablet 400 mg  400 mg Enteral Tube BID Michel Conteh M.D.        atorvastatin (Lipitor) tablet 10 mg  10 mg Enteral Tube Q EVENING Michel Conteh M.D.        atovaquone (Mepron) 750 MG/5ML suspension 750 mg  750 mg Enteral Tube BID Michel Conteh M.D.        carvedilol (Coreg) tablet 3.125 mg  3.125 mg Enteral Tube BID WITH MEALS Michel Conteh M.D.        cycloSPORINE (SandIMMUNE) 100 MG/ML oral solution 100 mg  100 mg Enteral Tube BID Michel Conteh M.D.        [START ON 11/11/2024] loratadine (Claritin) tablet 10 mg  10 mg Enteral Tube DAILY Michel Conteh M.D.        ipratropium-albuterol (DUONEB) nebulizer solution  3 mL Nebulization Q2HRS PRN (RT) Ivanna Longo M.D.   3 mL at 11/09/24 0435    Respiratory Therapy Consult   Nebulization Continuous RT Mima Carrasco D.O.        famotidine (Pepcid) injection 20 mg  20 mg Intravenous BID Scottie Sidhu M.D.   20 mg at 11/10/24 0609    insulin  GLARGINE (Lantus,Semglee) injection  4 Units Subcutaneous QAM INSULIN Mima Carrasco D.O.   4 Units at 11/10/24 0602    piperacillin-tazobactam (Zosyn) 4.5 g in  mL IVPB  4.5 g Intravenous Q8HRS Gilbert Pineda M.D.   Stopped at 11/10/24 0737    hydrocortisone sodium succinate PF (Solu-CORTEF) 100 MG injection 50 mg  50 mg Intravenous Q6HRS Gilbert Pineda M.D.   50 mg at 11/10/24 0608    MD Alert...ICU Electrolyte Replacement per Pharmacy   Other PHARMACY TO DOSE Gilbert Pineda M.D.        MBX (diphenhydrAMINE-lidocaine-Maalox) oral susp Cup 5 mL  5 mL Swish & Spit Q6HR Donaldo Seals III, M.D.   5 mL at 11/09/24 0321    sodium bicarb 0.5 mEq/mL oral rinse 10 mL  10 mL Swish & Spit Q6HRS Donaldo Seals III, M.D.   10 mL at 11/08/24 1821    [Held by provider] tamsulosin (Flomax) capsule 0.4 mg  0.4 mg Oral AFTER BREAKFAST Scottie Sidhu M.D.   0.4 mg at 11/08/24 0823       Fluids    Intake/Output Summary (Last 24 hours) at 11/10/2024 1157  Last data filed at 11/10/2024 1000  Gross per 24 hour   Intake 2318.28 ml   Output 1050 ml   Net 1268.28 ml       Laboratory  Recent Labs     11/09/24  0504 11/09/24  2158 11/10/24  0257   NSPZX98Q 7.46 7.41  --    LEZGPR091N 30.8 42.1*  --    WGZKH018P 129.6* 66.8  --    WQUL5HVK 98.2 91.5*  --    ARTHCO3 22 26*  --    E3KJXGBFQ 60 % 50  --    ARTBE -2 2  --    ISTATAPH  --   --  7.466   ISTATAPCO2  --   --  37.0   ISTATAPO2  --   --  55*   ISTATATCO2  --   --  28   [id number]  --   --  90*   ISTATARTHCO3  --   --  26.7*   ISTATARTBE  --   --  3   ISTATTEMP  --   --  97.4 F   ISTATSPEC  --   --  Arterial   ISTATAPHTC  --   --  7.476   CBTJQWWR8AU  --   --  53*         Recent Labs     11/08/24  0137 11/09/24  0128 11/10/24  0155   SODIUM 134* 134* 137   POTASSIUM 3.2* 2.9* 2.8*   CHLORIDE 103 104 101   CO2 22 23 24   BUN 19 22 19   CREATININE 0.59 0.68 0.66   MAGNESIUM 1.8 1.6 1.3*   PHOSPHORUS  --  1.9* 3.8   CALCIUM 7.9* 7.8* 8.0*     Recent Labs      11/08/24  0137 11/09/24  0128 11/10/24  0155   ALTSGPT 36 60* 34   ASTSGOT 56* 74* 29   ALKPHOSPHAT 101* 119* 95   TBILIRUBIN 1.5 2.0* 2.1*   DBILIRUBIN  --   --  1.4*   GLUCOSE 223* 195* 291*     Recent Labs     11/08/24  0137 11/09/24  0128 11/09/24  1504 11/09/24  2158 11/10/24  0155   WBC 0.9* 0.7* 0.7* 0.8* 0.5*   NEUTSPOLYS 0.00* 0.00*  --   --  1.00*   LYMPHOCYTES 98.60* 99.10*  --   --  98.00*   MONOCYTES 0.50 0.90  --   --  1.00   EOSINOPHILS 0.40 0.00  --   --  0.00   BASOPHILS 0.00 0.00  --   --  0.00   ASTSGOT 56* 74*  --   --  29   ALTSGPT 36 60*  --   --  34   ALKPHOSPHAT 101* 119*  --   --  95   TBILIRUBIN 1.5 2.0*  --   --  2.1*     Recent Labs     11/09/24  1504 11/09/24  2158 11/10/24  0155   RBC 2.73* 2.89* 2.59*   HEMOGLOBIN 8.3* 8.8* 7.9*   HEMATOCRIT 23.6* 25.0* 22.3*   PLATELETCT 7* 64* 48*   PROTHROMBTM  --   --  21.6*   INR  --   --  1.87*       Imaging  X-Ray:  I have personally reviewed the images and compared with prior images.  CT:    Reviewed  Ultrasound:  Reviewed    Assessment/Plan  * Respiratory failure (HCC)  Assessment & Plan  Reviewed hypoxic respiratory failure secondary to pneumonia  MRSA positive cultures recently at [facility] and they are recurrently positive now  DNR/DNI reaffirmed  Moved to unit for aggressive pulmonary toilet and oral care  HFNC O2 initiated initially on 100% / 60 L flow-since improved to 40/60  Not a candidate for BiPAP or diagnostic bronchoscopy, he would need to be intubated for that given his current status  RT protocols  Oral care every-2 hours  Aspiration precautions, swallow eval pending  Hydrocortisone added to antibiotic regimen for severe pneumonia with respiratory failure ID managing antibiotics  If patient clinically deteriorates transition to comfort care/hospice, discussed with daughter and son who are in agreement    ARDS (adult respiratory distress syndrome) (HCC) ???  Assessment & Plan  Worsening bilateral infiltrates over the last couple  days  Now on max HFNO with occasional desaturations    Likely multifactorial: has had some aspiration events witnessed, MRSA on sputum gram stain, HAP, opportunistic infection including fungal, possible DAH iso of severe thrombocytopenia, TRALI/TACO given recent trasnfusions    Likely infectious trigger  P/F ~ 60 on HFNO at Fi02 1.0  ABG with shunt and probable dead space, concerning that with his respiratory distress pC02 is normal, likely represents some degree of dead space/fatigue    Linezolid and Ptazo plus atovaquone (PJP coverage)  Corticosteroids HC 50 q6 x 5 days, have discussed risks/benefits with family given imunocompromise  Treatment dose IV voriconazole for now, needs to be IV for now as patient unable to take anything oral safely    BDG/Galactomannan/PJP sputum sent  Fungal sputum cultures sent    Conservative fluid strategy, diurese to net even to slightly negative balance  DNI so will attempt to manage with HFNO and non-invasive strategies  Not a candidate for NIV given poor airway management and inability to clear copious secretions  Can trial supervised intermittent runs of CPAP if mental status improves      Pneumonia of both lungs due to methicillin resistant Staphylococcus aureus (MRSA) ([facility])- (present on admission)  Assessment & Plan  Was treated with course of abx at [facility] for MRSA PNA (vancomycin/linezolid and cefepime)  Has had pulmonary infitrates and hypoxemia to some degree since September  Since transfer to [facility] has been on ppx dosed antibiotics only (acyclovir, vori, levaquin)  10/19 chest x-ray from [facility] minimal opacities    Marked worsening of hypoxemia and lung injury 11/8-11/9 and new extensive bilateral pneumonic appearing infiltrates  11/9-Sputum gram stain results with MRSA, unclear if causative pathogen given prior respiratory cultures with MRSA, but with new hypoxemia and infiltrates will consider this pathogenic and treat accordingly     Severe CAP, ARDS in the setting of severe  immunocompromise and neutropenia  CT chest with dense BLL consolidations    Linezolid and piperacillin/tazobactam-ID to follow-up and modify  Corticosteroids HC 50 q6 x 5 days, have discussed risks/benefits with family given imunocompromised state  And thiamine intravenously to the regimen as well  Treatment dose Posiconazole for now, needs to be IV for now as patient unable to take anything oral safely ID follow-up ongoing    BDG and gallactomanan sent - review when available  History reviewed with ID, some concern of possible fungal infection, considering empiric Amphotericin    PEP  ISS  HFNC O2  Mobility stable  DNR/DNI so will manage non-invasively to the best of our ability  Not a candidate for BiPAP    Dysphagia  Assessment & Plan  Patient with worsening dysphagia over the last few days  Unable to swallow, has been aspirating, weak phonation  Suspect airway in part desiccated due to tachypnea/pneumonia and poor p.o. intake    Swallow evaluation/FEES pending for today  High risk for airway scope given thrombocytopenia  Not a candidate for NIV or PPV given inability to protect airway  NPO for now  Oral care every 1-2-hour, this seemed to help overnight, phonation a bit better - significant secretions cleared from airway overnight by RN  CT soft tissue neck without airway issue or obvious abscess/obstructing process    Pancytopenia (HCC)- (present on admission)  Assessment & Plan  BM biopsy consistent with severe aplastic anemia, etiology unclear to me at this time DDX includes hypoplastic MDS versus PNH, and aplastic process, or infection/nutritional disorder/toxic myelosuppression.     Diagnostic evaluation in september and at [facility], viral studies negative, hepatitis panel negative,   Oncology following    On cyclosporine 100 bid, but cannot take orals currently  Promacta (eltrombopag) for thrombocytopenia 75 mg daily  Continue prophylactic antimicrobials  B12/MMA, folate levels resent, previously  low    Tranfuse PRBC for <7  Transfuse platelets < 10, may warrant higher threshold given possibility of DAH though CT doesn't look strikingly like DAH  CT more likely MRSA pneumonia with positive cultures    Will review with hematology when they are available re: likelihood of recovery, apparently resistant to colony-stimulating factor so far      Elevated LFTs  Assessment & Plan  Worsening today, no focal abdominal pain  RUQUS ordered-> normal  Trend LFTs while on antifungals  Avoid hepatotoxins  Monitor synthetic function as clinically prudent      GERD (gastroesophageal reflux disease)- (present on admission)  Assessment & Plan  With hydrocortisone and thrombocytopenia will start IV PPI daily for prophylaxis  Antireflux measures    Severe malnutrition (HCC)- (present on admission)  Assessment & Plan  Eval pending  Family okay with soft flexible feeding tube and enteral nutrition  Swallow eval pending  If necessary cautiously place tube with thrombocytopenia, lidocaine jelly ordered    Type 2 diabetes mellitus, without long-term current use of insulin (HCC)- (present on admission)  Assessment & Plan  Target normoglycemia  ISS  Monitor for need for insulin infusion  Hypoglycemia protocols    Thrombocytopenia (HCC)- (present on admission)  Assessment & Plan  Attributable to pancytopenia    Transfuse for platelets < 10 per med onc or if bleeding           VTE:  Contraindicated  Ulcer: Not Indicated  Lines: None    I have performed a physical exam and reviewed and updated ROS and Plan today (11/10/2024). In review of yesterday's note (11/9/2024), there are no changes except as documented above.     Discussed patient condition and risk of morbidity and/or mortality with Family, RN, RT, Pharmacy, [employer] [facility] resident, Code status disscussed, Charge nurse / hot rounds, Patient, and infectious disease    The patient remains critically ill.  Critical care time = 65 minutes in directly providing and coordinating critical care  and extensive data review.  No time overlap and excludes procedures.

## 2024-11-10 NOTE — CARE PLAN
The patient is Unstable - High likelihood or risk of patient condition declining or worsening    Shift Goals  Clinical Goals: Titrate O2, oral care, Q2 turns  Patient Goals: Sleep  Family Goals: Oral care and make sure patient eats    Progress made toward(s) clinical / shift goals:    Problem: Respiratory:  Goal: Respiratory status will improve  Outcome: Not Progressing     Problem: Risk for Aspiration  Goal: Patient's risk for aspiration will be absent or decrease  Outcome: Not Progressing     Problem: Knowledge Deficit - Standard  Goal: Patient and family/care givers will demonstrate understanding of plan of care, disease process/condition, diagnostic tests and medications  Outcome: Progressing     Problem: Skin Integrity  Goal: Skin integrity is maintained or improved  Outcome: Progressing     Problem: Fall Risk  Goal: Patient will remain free from falls  Outcome: Progressing     Problem: Pain - Standard  Goal: Alleviation of pain or a reduction in pain to the patient’s comfort goal  Outcome: Progressing       Patient is not progressing towards the following goals:      Problem: Respiratory:  Goal: Respiratory status will improve  Outcome: Not Progressing     Problem: Risk for Aspiration  Goal: Patient's risk for aspiration will be absent or decrease  Outcome: Not Progressing

## 2024-11-10 NOTE — ASSESSMENT & PLAN NOTE
Rapid response morning of 11/9 for decompensated respiratory status. Required HHF 40L for several hours before returning to 6L NC. Repeat CXR howed unchanged bilateral pneumonia, no pleural effusion or pulmonary edema. EKG unremarkable. Troponin and BNP elevated, patient without chest pain. Previously treated for MRSA pneumonia at [facility] that ended 10/28.    Plan:  -Supplemental O2 prn  -Treat underlying pneumonia  -ID consulted, recommended CT chest w/o to better characterize previous finding that may be suspicious for mold pnuemonia  -CT chest w/o

## 2024-11-10 NOTE — ASSESSMENT & PLAN NOTE
Patient with increasing difficulty swallowing starting gradually the afternoon of 11/8, unable to swallow medications, water, applesauce without triggering coughing morning of 11/9.     Plan:  -NPO  -SLP evaluation  -Discussed with pharmacy which meds are available IV. Some were switched over today, not all are available IV.   -Further medication management pending SLP eval, potential of swallow recovering quickly.

## 2024-11-10 NOTE — THERAPY
Speech Language Pathology   Clinical Swallow Evaluation     Patient Name: Miri Joseph  AGE:  84 y.o., SEX:  male  Medical Record #: [id number]  Date of Service: 11/9/2024      History of Present Illness  This is an 83 y/o male admitted with aplasmic anemia for chemotherapy. Last night he was noted to start coughing with thin liquids and meds in puree. He also began to pocket bolus and not initiate his swallow. Pt had a rapid response called on him as well for acute change in respiratory status. Pt speaks [place] and communicating with him has been challenging as the language line interpreters do not always speak his dialect. Pt now NPO with some IV meds but not all changed over.     General Information:  Vitals  O2 (LPM): 6  O2 Delivery Device: Silicone Nasal Cannula  Level of Consciousness: Lethargic       Oral Mechanism Evaluation:  Dentition: Pt did not follow commands to assess   Facial Symmetry: Pt did not follow commands to assess  Facial Sensation: Pt did not follow commands to assess     Labial Observations: Pt did not follow commands to assess   Lingual Observations: Pt did not follow commands to assess  Motor Speech: Decreased precision            Laryngeal Function:  Voice Quality: Breathy continuous, Whisper     Subjective  RN cleared pt to be seen and reported that all liquids and pills in puree have been causing pt to cough or that he is pocketing them and not swallowing. Pt with a rapid response also called on him after this started yesterday. The language line was used in attempt to communicate with pt and  [id number], Claribel, appeared to speak the same or a similar dialect as pt as he responded to her somewhat.      Assessment  Current Method of Nutrition: NPO until cleared by speech pathology  O2 (LPM): 6 O2 Delivery Device: Silicone Nasal Cannula        Swallowing Trials:  No trials given at this time due to pt's severe lethargy and inability to follow commands by model or with the  . He remains most appropriate for a FEES to assess the physiology of his swallow as he would not tolerate transfer to swallow chair. Coordination with pt's family AND use of  is recommended for that study.        Clinical Impressions  Pt with an acute change in his swallow function and would benefit most from FEES.     Recommendations  Diet Consistency: NPO  Instrumentation: FEES  Oral Care: Q2h         SLP Treatment Plan  Treatment Plan: Dysphagia Treatment  SLP Frequency:  (FEES then determine POC)  Estimated Duration: Until Therapy Goals Met      Anticipated Discharge Needs  Discharge Recommendations: Other   Therapy Recommendations Upon DC: Other (See Comments)        Patient / Family Goals  Patient / Family Goal #1: No goals stated at this time.  Short Term Goals  Short Term Goal # 1: Pt will complete instrumental assessment to determine his swallow physiology and POC.      Comfort Jackson MS, CCC-SLP

## 2024-11-10 NOTE — ACP (ADVANCE CARE PLANNING)
Advanced Care Planing and Goals of Care       Date/time: 1030pm      Medical decision maker: Her two sons, Casper is here, his brother whose name I did not get into as a family physician was on the phone and we all talk together    Narrative of discussion:     I was able to talk with the patient and his 2 sons after being called to his bedside during a rapid response and a moment of significant clinical deterioration marked by refractory hypoxemia and severe work of breathing and lethargy.    The 3 of us went over Sonja case thus far, it has been prolonged and refractory to treatments to date.  He has been hospitalized essentially some September with respiratory failure, diagnosed with aplastic anemia, has been losing a lot of strength deconditioning, and has become significantly functionally limited.    First and foremost I discussed with him that the patient is somewhat an extremis at this time with significant work of breathing and desaturating even on high flow nasal oxygen at FiO2 of 100%.  We reviewed his CODE STATUS which has been previously and fairly recently changed to DNR/DNI and the sons are clear that they do not want to employ hyper invasive therapies such as cardiac arrest treatments, CPR, or mechanical ventilation or life support.    In the moment, given his respiratory distress profound hypoxemia and his chest imaging which shows severe lung injury and ARDS I did convey to them my concern that we do not have a lot of options to treat him.  Given his lethargy and his upper airway compromise, his inability to swallow, weak cough, airway secretions that noninvasive therapies are going to be very difficult and that positive pressure ventilation including BiPAP and CPAP are probably contraindicated at this time given those things.    The 2 of them agreed, and support my decision to avoid positive pressure at least for the time being unless his mental status improves and his airway becomes a little  bit more safe.    Regarding big picture goals of care I did ask them how they saw the circumstances and the progression and how they were feeling about transitioning to a more hospice based plan of attack given his failure to respond to initial therapies and his continuing having multiple deteriorations in complications in the hospital, which have been heralded by of couple different significant infectious complications, pneumonia, respiratory failure hypoxemia and severe deconditioning.    They tell me that they are prepared to transition to hospice and comfort focused care when the time comes.  They struggle as to what that time is, and what would be the trigger to make the change, understandably.  They tell me that they are still able to converse with him and have meaningful interactions with them and feel that his mental status has not deteriorated as much as we are concerned, though the care team tells me that he is becoming increasingly somnolent over the last couple of days and weak.    I did tell them, that my personal opinion is that it is time to transition him to a comfort focused care given his respiratory failure and the difficulties with which a very deconditioned body with minimal strength and minimal ability to protect his own airway, handle his own secretions and with such a weak cough and such as severe lung injury that is very difficult to survive this type of injury when you have advanced disease such as this.  And I did convey to them that it was my personal take that anything else above and beyond this that we due to him will be invasive, very difficult on him, and unlikely to change the overall course of his disease or his outcome.  We did discuss the difference between a beneficial and not beneficial care and all agreed that it is difficult to assess whether would be of any benefit to transition him to ICU and escalate his care at all.    With that I did recommend we transition to comfort  care at this time just given how miserable he appears at this moment and specifically my concern for his severe lung injury and ARDS and airway compromise.  I told them that my primary concern here is that he is going to fatigue, and that he is likely experiencing some fear and breathlessness associated with respiratory failure and aspiration.    Whereas they agree with me, they do not want to escalate treatments to invasive therapies, and agree that he is not a safe candidate for noninvasive positive pressure, they have asked me to transfer him to the ICU to attempt to stabilize him and see how he does overnight and in the coming day, and I went over them my thought process as to what we could do including broaden antibiotics, corticosteroids both for severe lung injury and possible airway edema, and severe pneumonia.  I discussed with him that at this point given his rapid deterioration from a pulmonary standpoint that he could have any number of infectious agents contributing including opportunistic infections.    So, after lengthy discussion we all agreed to take him to the ICU, treated with high flow nasal oxygen, PAP and secretion management, RT protocol therapy, broaden his antibiotics, try some diuresis, try some steroids and to see how he does over the next 24 hours.    If it anytime he starts to fail from a respiratory standpoint to become more lethargic, somnolent, or fatigues we will transition him to comfort care.  They would like me to try these interventions to see if his lung injury is reversible for a short time trial.    Summary: DNR/DNI, they are okay with ICU level care and broadening medical therapies, do not want invasive procedures, do not want positive pressure at least for now given his airway compromise.  Low threshold to transition to comfort focused care if worsens    Time statement:  I discussed advance care planning with the patient's family for at least 40 minutes, including diagnosis,  prognosis, plan of care, risks and benefits of any therapies that could be offered, as well as alternatives including palliation and hospice, as appropriate, exclusive of evaluation and management or other separately billable procedures.

## 2024-11-10 NOTE — PROGRESS NOTES
[facility] ICU Progress Note      Admit Date: 11/6/2024    Resident(s): Fani Walker M.D.  Attending: MARITZA CAT/ Dr. Conteh    Date & Time:   11/10/2024   0700  Patient ID:    Name:             Miri Joseph   YOB: 1940  Age:                 84 y.o.  male   MRN:               [id number]    HPI:   84 y.o. male who presented 11/6/2024 with medical history of type II DM, recent diagnosis of aplastic anemia who was transferred to [facility] for oncology care.  Evidently patient admitted to [facility] mid-October was treated for neutropenic fever with pneumonia and was noted to have pancytopenia.  He had a bone marrow biopsy performed consistent with severe aplastic anemia.  He was specifically transferred over here to obtain medical oncology services into initiate inpatient treatment with Promacta and cyclosporine which was initiated upon admission.     Throughout his hospitalization including [facility] he has had respiratory failure and hypoxemia and pulmonary infiltrates, initially had cultures at the [facility] with MRSA he was treated with broad-spectrum antibiotics for a course of pneumonia therapy there and also treated with antifungals given nodular pulmonary opacities and immunocompromise but reportedly had a negative galactomannan and Karius there. He was on posi, then amphoB, then back to posi as dictated by ID at [facility]. Completed course of linezolid and cefepime as well.     According to his discharge summary from 10/19/2024 from [facility] he had a mopped assist, pneumonia aplastic anemia and was treated for neutropenic fever with presumed pneumonia.     Ultimately he was transferred to [facility] for the initiation of chemotherapy and medical oncology evaluation.     During ED on 11/9 I was called to his bedside for respiratory stress, work of breathing, fatigue, and worsening oxygenation requiring max high flow.  Patient to CT was performed today showing dense bilateral  "consolidations with scattered nodularity involving most lobes of the lung.  Patient was transferred to ICU for further care- Dr. Pineda H&P    Interval Events:    11/10:   On HFNC  Oriented x2   SR/-120's  -160's  AB.48/36/53   CXR: Bilateral opacities, similar to prior  Tmax 99.9  WBC 0.5  Platelet 48 from 64  Sputum culture positive for  MRSA  Blood cultures negative so far  RUQ U/S unremarkable    SLP eval  Change linezolid to vancomycin for thrombocytopenia  switch voriconazole to IV amphotericin B per ID  Continue atovaquone  Cyclosporine  Zosyn  Follow cultures  ID recommendations of bronchoscopy with fungal, AFB, regular cultures, however patient however like patient unlikely to tolerate procedure with his pancytopenia  Add IV thiamine  ID and  oncology following  Oral care      Review of Systems   Unable to perform ROS: Intubated       PHYSICAL EXAM  Vitals:    11/10/24 1000 11/10/24 1100 11/10/24 1116 11/10/24 1400   BP: 99/52 97/54     Pulse: 92 94  82   Resp: (!) 24 (!) 25 (!) 25 19   Temp:       TempSrc:       SpO2: 99% 97% 97% 97%   Weight:       Height:         Body mass index is 23.77 kg/m².  BP 97/54   Pulse 82   Temp 36.4 °C (97.5 °F) (Temporal)   Resp 19   Ht 1.753 m (5' 9\")   Wt 73 kg (160 lb 15 oz)   SpO2 97%   BMI 23.77 kg/m²   O2 therapy: Pulse Oximetry: 97 %, O2 (LPM): 30, O2 Delivery Device: Heated High Flow Nasal Cannula    Physical Exam  Constitutional:       General: He is not in acute distress.     Appearance: He is normal weight. He is ill-appearing. He is not toxic-appearing or diaphoretic.      Comments: [facility]   HENT:      Head: Normocephalic and atraumatic.   Cardiovascular:      Rate and Rhythm: Regular rhythm. Tachycardia present.      Pulses: Normal pulses.      Heart sounds: No murmur heard.  Pulmonary:      Breath sounds: Rhonchi and rales present.      Comments: [facility]  Abdominal:      General: Bowel sounds are normal. There is no distension.      " Palpations: Abdomen is soft.      Tenderness: There is no guarding.   Musculoskeletal:      Cervical back: Neck supple. No rigidity.   Lymphadenopathy:      Cervical: No cervical adenopathy.   Skin:     General: Skin is warm and dry.      Coloration: Skin is not cyanotic.      Findings: Bruising present.      Nails: There is no clubbing.   Neurological:      Mental Status: He is lethargic.      Cranial Nerves: Cranial nerves 2-12 are intact.       Respiratory:     Respiration: 19, Pulse Oximetry: 97 %    Chest Tube Drains:    Recent Labs     11/09/24  0504 11/09/24  2158 11/10/24  0257   BHRWJ21R 7.46 7.41  --    [id number] 30.8 42.1*  --    NSQUJ594P 129.6* 66.8  --    CRNA1TNI 98.2 91.5*  --    ARTHCO3 22 26*  --    T1OKDEZGX 60 % 50  --    ARTBE -2 2  --    ISTATAPH  --   --  7.466   ISTATAPCO2  --   --  37.0   ISTATAPO2  --   --  55*   ISTATATCO2  --   --  28   PTTYZOM0HVQ  --   --  90*   ISTATARTHCO3  --   --  26.7*   ISTATARTBE  --   --  3   ISTATTEMP  --   --  97.4 F   ISTATSPEC  --   --  Arterial   ISTATAPHTC  --   --  7.476   [id number]  --   --  53*       HemoDynamics:  Pulse: 82 Blood Pressure : 97/54      Neuro:      Fluids:  Date 11/10/24 0700 - 11/11/24 0659   Shift 5735-6250 1315-4765 9165-2582 24 Hour Total   INTAKE   I.V. 171.9   171.9     Magnesium Sulfate Volume 42.9   42.9     Volume (mL) (NS infusion) 129   129   IV Piggyback 1900.4   1900.4     Volume (mL) (vancomycin (Vancocin) 1,750 mg in  mL IVPB) 495.8   495.8     Volume (mL) (potassium phosphate 15 mmol in dextrose 5% 250 mL ivpb) 250   250     Volume (mL) (vancomycin (Vancocin) 750 mg in  mL IVPB) 147.2   147.2     Volume (mL) (Linezolid (Zyvox) premix 600 mg) 575.6   575.6     Volume (mL) (piperacillin-tazobactam (Zosyn) 4.5 g in  mL IVPB) 99.6   99.6     Volume (mL) (voriconazole (Vfend) 440 mg in  mL IVPB) 145.7   145.7     Volume (mL) (potassium chloride (KCL) ivpb 10 mEq) 186.4   186.4   Shift Total 2072.3    2.3   OUTPUT   Urine 300   300     Urine Void (mL) 300   300   Shift Total 300   300   NET 1772.3   1772.3        Intake/Output Summary (Last 24 hours) at 11/10/2024 0704  Last data filed at 11/10/2024 0203  Gross per 24 hour   Intake 246 ml   Output 750 ml   Net -504 ml          Body mass index is 23.77 kg/m².    Recent Labs     11/08/24  0137 11/09/24  0128 11/10/24  0155   SODIUM 134* 134* 137   POTASSIUM 3.2* 2.9* 2.8*   CHLORIDE 103 104 101   CO2  24   BUN 19  19   CREATININE 0.59 0.68 0.66   MAGNESIUM 1.8 1.6 1.3*   PHOSPHORUS  --  1.9* 3.8   CALCIUM 7.9* 7.8* 8.0*       GI/Nutrition:  Recent Labs     11/08/24  0137 11/09/24  0128 11/10/24  0155   ALTSGPT 36 60* 34   ASTSGOT 56* 74* 29   ALKPHOSPHAT 101* 119* 95   TBILIRUBIN 1.5 2.0* 2.1*   DBILIRUBIN  --   --  1.4*   GLUCOSE 223* 195* 291*       Heme:  Recent Labs     24  1504 11/09/24  2158 11/10/24  0155   RBC 2.73* 2.89* 2.59*   HEMOGLOBIN 8.3* 8.8* 7.9*   HEMATOCRIT 23.6* 25.0* 22.3*   PLATELETCT 7* 64* 48*   PROTHROMBTM  --   --  21.6*   INR  --   --  1.87*       Infectious Disease:  Temp  Av.5 °C (97.7 °F)  Min: 35.7 °C (96.2 °F)  Max: 37 °C (98.6 °F)  Recent Labs     24  1504 11/09/24  2158 11/10/24  0155   WBC 0.9* 0.7* 0.7* 0.8* 0.5*   NEUTSPOLYS 0.00* 0.00*  --   --  1.00*   LYMPHOCYTES 98.60* 99.10*  --   --  98.00*   MONOCYTES 0.50 0.90  --   --  1.00   EOSINOPHILS 0.40 0.00  --   --  0.00   BASOPHILS 0.00 0.00  --   --  0.00   ASTSGOT 56* 74*  --   --  29   ALTSGPT 36 60*  --   --  34   ALKPHOSPHAT 101* 119*  --   --  95   TBILIRUBIN 1.5 2.0*  --   --  2.1*       Meds:   Pharmacy  1 Each      NS  500 mL 500 mL (11/10/24 1111)    And    acetaminophen  650 mg      And    diphenhydrAMINE  25 mg      And    dextrose 5%  30 mL 30 mL (11/10/24 1225)    And    amphotericin b liposomal (AMBISOME) IV  350 mg 350 mg (11/10/24 1437)    And    dextrose 5%  30 mL      And    NS  250 mL      K+ Scale:  Goal of 4.5  1 Each      insulin lispro  3-14 Units      And    dextrose bolus  25 g      thiamine  200 mg      MD Alert...Vancomycin per Pharmacy        Pharmacy        acyclovir  400 mg      atorvastatin  10 mg      atovaquone  750 mg      carvedilol  3.125 mg      [START ON 11/11/2024] loratadine  10 mg      vancomycin  750 mg 750 mg (11/10/24 1324)    cycloSPORINE (modified)  100 mg      ipratropium-albuterol  3 mL      Respiratory Therapy Consult        famotidine  20 mg      insulin GLARGINE  4 Units      piperacillin-tazobactam  4.5 g 4.5 g (11/10/24 1258)    hydrocortisone sodium succinate PF  50 mg      MD Alert...Adult ICU Electrolyte Replacement per Pharmacy        MBX (diphenhydrAMINE-lidocaine-Maalox)  5 mL      sodium bicarb 0.5 mEq/mL  10 mL      [Held by provider] tamsulosin  0.4 mg              Imaging:  DX-ABDOMEN FOR TUBE PLACEMENT   Final Result      1.  Enteric tube has been placed and the tip projects over the stomach.      2.  Nonspecific pulmonary airspace process      US-RUQ   Final Result         1. Normal gallbladder and right upper quadrant ultrasound.      2. Small right pleural effusion.      CT-SOFT TISSUE NECK WITH   Final Result         1. No airway narrowing. Mild uvula swelling suggested. The epiglottis and larynx appear normal.      2. Upper normal size cervical lymph nodes. No cervical mass otherwise.      3. Trace right mastoid sinus fluid. No paranasal sinus fluid. The middle ears are clear.      4. The visible upper chest again shows dense infiltrates with small pleural effusions and mild upper mediastinal adenopathy.      CT-CHEST (THORAX) W/O   Final Result      1.  Multifocal bilateral pulmonary airspace process most consistent with pneumonia, with interval worsening      2.  Bilateral pleural effusion, most small in size, right greater than left      3.  Single enlarged prevascular space lymph node which is most likely reactive      Fleischner Society pulmonary nodule  recommendations:   Not Applicable         DX-CHEST-PORTABLE (1 VIEW)   Final Result         1.  Patchy bilateral pulmonary infiltrates, similar to prior study      DX-CHEST-PORTABLE (1 VIEW)   Final Result      1.  Unchanged BILATERAL pneumonia   2.  Possible RIGHT pleural effusion      DX-CHEST-2 VIEWS   Final Result         1.  Pulmonary edema and/or infiltrates.   2.  Trace left pleural effusion   3.  Atherosclerosis          Assessment and Plan:      Multifocal pneumonia- (present on admission)  Assessment & Plan  Severe     Was treated with course of abx at [facility] for MRSA PNA (linezolid and cefepime)  Has had pulmonary infitrates and hypoxemia to some degree since September  Since transfer to [facility] has been on ppx dosed antibiotics only (acyclovir, posaconazole, levaquin)     Marked worsening of hypoxemia and lung injury 11/8-11/9 11/9-Sputum gram stain results with MRSA, unclear if causative pathogen given prior respiratory cultures with MRSA, but with new hypoxemia and infiltrates will consider this pathogenic and treat accordingly     Severe CAP, ARDS in the setting of severe immunocompromise and neutropenia  CT chest with dense BLL consolidations, scattered nodular densities in multiple lobes certainly concerning for fungal etiology though non-diagnostic     Linezolid and Ptazo plus treatment dose PJP coverage (atovaquone d/t risk of myelosuppression) for now in the event this represent PJP  Corticosteroids HC 50 q6 x 5 days, have discussed risks/benefits with family given imunocompromise  Treatment dose Posiconazole for now, needs to be IV for now as patient unable to take anything oral safely     BDG and gallactomanan and PJP DFA and fungal sputum cultures sent       PEP  ISS  HFNO  Mobility  DNR/DNI so will manage non-invasively to the best of our ability    ID with recommendations to shift voriconazole to amphotericin B      ARDS (adult respiratory distress syndrome) (HCC) ???  Assessment & Plan  Worsening  bilateral infiltrates over the last couple days  Now on max HFNO with occasional desaturations     Likely multifactorial: has had some aspiration events witnessed, MRSA on sputum gram stain, HAP, opportunistic infection including fungal, possible DAH iso of severe thrombocytopenia, TRALI/TACO given recent trasnfusions     Likely infectious trigger  P/F ~ 60 on HFNO at Fi02 1.0  ABG with shunt and probable dead space, concerning that with his respiratory distress pC02 is normal, likely represents some degree of dead space/fatigue     Linezolid and Ptazo plus atovaquone (PJP coverage)  Corticosteroids HC 50 q6 x 5 days, have discussed risks/benefits with family given imunocompromise  Treatment dose IV voriconazole for now, needs to be IV for now as patient unable to take anything oral safely     BDG/Galactomannan/PJP sputum sent  Fungal sputum cultures sent     Conservative fluid strategy, diurese to net even to slightly negative balance  DNI so will attempt to manage with HFNO and non-invasive strategies  Not a candidate for NIV given poor airway management and inability to clear copious secretions  Can trial supervised intermittent runs of CPAP if mental status improves    Dysphagia  Assessment & Plan  Patient with worsening dysphagia over the last few days  Unable to swallow, has been aspirating, weak phonation  Going to get CT soft tissue neck to evaluate for obstructing lesion     FEES pending  High risk for airway scope given thrombocytopenia  Not a candidate for NIV or PPV given inability to protect airway  NPO for now    MRSA pneumonia (HCC)- (present on admission)  Assessment & Plan  change linezolid to vancomycin due to thrombocytopenia   isolation precautions        Type 2 diabetes mellitus, without long-term current use of insulin (HCC)- (present on admission)  Assessment & Plan  Target normoglycemia  ISS    Thrombocytopenia (HCC)- (present on admission)  Assessment & Plan  Change linezolid to  vancomycin  Attributable to pancytopenia     Transfuse for platelets < 10 per med onc or if bleeding    Pancytopenia (HCC)- (present on admission)  Assessment & Plan  BM biopsy consistent with severe aplastic anemia, etiology unclear to me at this time DDX includes hypoplastic MDS versus PNH, and aplastic process, or infection/nutritional disorder/toxic myelosuppression.      Diagnostic evaluation in september and at [facility], viral studies negative, hepatitis panel negative,   Oncology following     On cyclosporine 100 bid, but cannot take orals currently  Promacta (eltrombopag) for thrombocytopenia 75 mg daily  Continue prophylactic antimicrobials  B12/MMA, folate levels resent, previously low     Tranfuse PRBC for <7  Transfuse platelets < 10, may warrant higher threshold given possibility of DAH though CT doesn't look strikingly like DAH              DISPO: ICU    CODE STATUS: DNAR/DNI

## 2024-11-10 NOTE — ASSESSMENT & PLAN NOTE
Was treated with course of abx at [facility] for MRSA PNA (vancomycin/linezolid and cefepime)  Has had pulmonary infitrates and hypoxemia to some degree since September  Since transfer to [facility] has been on ppx dosed antibiotics only (acyclovir, vori, levaquin)  10/19 chest x-ray from [facility] minimal opacities    Marked worsening of hypoxemia and lung injury 11/8-11/9 and new extensive bilateral pneumonic appearing infiltrates  11/9-Sputum gram stain results with MRSA, unclear if causative pathogen given prior respiratory cultures with MRSA, but with new hypoxemia and infiltrates will consider this pathogenic and treat accordingly     Severe CAP, ARDS in the setting of severe immunocompromise and neutropenia  CT chest with dense BLL consolidations    Linezolid and piperacillin/tazobactam-ID to follow-up and modify  Corticosteroids HC 50 q6 x 5 days, have discussed risks/benefits with family given imunocompromised state  Continue thiamine intravenously to the regimen as well    BDG and gallactomanan sent - review when available  History reviewed with ID, some concern of possible fungal infection, empiric Amphotericin per ID  CT scan reviewed and imaging most suggestive of consolidated pneumonia/MRSA, I do not see a halo sign    PEP/IS encouraged  HFNC O2, weaning  Mobility as able, patient very weak  DNR/DNI so will manage non-invasively to the best of our ability  Not a candidate for BiPAP

## 2024-11-10 NOTE — ASSESSMENT & PLAN NOTE
Worsening bilateral infiltrates over the last couple days  Now on max HFNO with occasional desaturations    Likely multifactorial: has had some aspiration events witnessed, MRSA on sputum gram stain, HAP, opportunistic infection including fungal, possible DAH iso of severe thrombocytopenia, TRALI/TACO given recent trasnfusions    Likely infectious trigger  P/F ~ 60 on HFNO at Fi02 1.0  ABG with shunt and probable dead space, concerning that with his respiratory distress pC02 is normal, likely represents some degree of dead space/fatigue    Linezolid and Ptazo plus atovaquone (PJP coverage)  Corticosteroids HC 50 q6 x 5 days, have discussed risks/benefits with family given imunocompromise  Treatment dose IV voriconazole for now, needs to be IV for now as patient unable to take anything oral safely    BDG/Galactomannan/PJP sputum sent  Fungal sputum cultures sent    Conservative fluid strategy, diurese to net even to slightly negative balance  DNI so will attempt to manage with HFNO and non-invasive strategies  Not a candidate for NIV given poor airway management and inability to clear copious secretions  Can trial supervised intermittent runs of CPAP if mental status improves

## 2024-11-10 NOTE — ASSESSMENT & PLAN NOTE
Secondary to pancytopenia from aplastic anemia  Back in single digits again today: giving one unit of platelets  No signs of active bleeding on exam today    11/19/2024  No further transfusion required overnight  Platelets improving to 18 following transfusion yesterday    11/20/2024  Platelets dropping to 5  Patient has been transfused 1 unit platelets  Monitor closely for transfusion reaction and bleeding.    11/21/2024  Platelets improving to 20 following 1 unit transfusion    11/22/2024  Platelets dropping to 7.  Transfusing 1 unit platelets.    11/23/2024  Transitioning to comfort care

## 2024-11-10 NOTE — ASSESSMENT & PLAN NOTE
BM biopsy consistent with severe aplastic anemia  Also consider infection/nutritional disorder/toxic myelosuppression.      Diagnostic evaluation in september and at [facility], viral studies negative, hepatitis panel negative,   Oncology following   On cyclosporine 100 bid, but cannot take orals currently  Promacta (eltrombopag) for thrombocytopenia 75 mg daily  Continue prophylactic antimicrobials  B12/MMA, folate levels resent, previously low     Tranfuse PRBC for <7  Transfuse platelets < 10, may warrant higher threshold given possibility of DAH though CT doesn't look strikingly like DAH  CT more likely MRSA pneumonia with positive cultures     Discuss with hematology regarding prognosis, not responsive to G-CSF in the past

## 2024-11-10 NOTE — ASSESSMENT & PLAN NOTE
Attributable to pancytopenia -aplastic anemia    Transfuse for platelets < 10 per med onc or if bleeding  Last unit of platelets 11/12

## 2024-11-10 NOTE — ASSESSMENT & PLAN NOTE
Was on linezolid and vancomycin  Continue ceftaroline for now   isolation precautions  ID following    11/19/2024  Continue ceftaroline

## 2024-11-10 NOTE — ASSESSMENT & PLAN NOTE
BM biopsy consistent with severe aplastic anemia, etiology unclear to me at this time DDX includes hypoplastic MDS versus PNH, and aplastic process, or infection/nutritional disorder/toxic myelosuppression.     Diagnostic evaluation in september and at [facility], viral studies negative, hepatitis panel negative,   Oncology following    On cyclosporine 100 bid, but cannot take orals currently  Promacta (eltrombopag) for thrombocytopenia 75 mg daily  Continue prophylactic antimicrobials  B12/MMA, folate levels resent, previously low    Tranfuse PRBC for <7  Transfuse platelets < 10, may warrant higher threshold given possibility of DAH though CT doesn't look strikingly like DAH  CT more likely MRSA pneumonia with positive cultures    Will review with hematology when they are available re: likelihood of recovery, apparently resistant to colony-stimulating factor so far    Last pRBC 11/12  Last unit platelets 11/12

## 2024-11-10 NOTE — ASSESSMENT & PLAN NOTE
Improved/normalized  RUQUS ordered-> normal  Trend LFTs while on antifungals  Avoid hepatotoxins  Monitor synthetic function as clinically prudent

## 2024-11-10 NOTE — ASSESSMENT & PLAN NOTE
Patient has been quite hyperglycemic  We have titrated up his glargine: currently on 45 units  Now running mid to high 100s; monitor over next 24hrs and titrate glargine as appropriate  Continue a high sliding scale  A1c 7.32 months ago    11/21/2024  Ongoing hypoglycemia blood glucose 77   Decrease glargine insulin to 35 units daily.  Currently being held today due to aspiration event and withholding of tube feeds.    11/22/2024  Resume glargine insulin at 20 units daily.  As the patient is tolerating tube feeds.f    11/23/2024  Transitioning to comfort care

## 2024-11-10 NOTE — THERAPY
"Speech Language Pathology   Daily Treatment     Patient Name: Miri Joseph  AGE:  84 y.o., SEX:  male  Medical Record #: [id number]  Date of Service: 11/10/2024      Precautions:  Precautions: Fall Risk, Swallow Precautions       Subjective  Pt agreeable and cooperative with SLP tx tasks. Dtr Jessica present to assist with translation. Pt reporting pain in his throat when coughing and swallowing, requesting \"warm water.\"    Spoke with RN and Dr. Conteh prior to session; per MD - hold FEES this date and recommend NGT should patient demonstrate difficulty with PO to allow for more time for pulmonary toilet.     Prior to ICU admission/NPO status, dtr Jessica reports that patient was having oral phase deficits including oral residue and anterior loss.      Assessment  Pt seen for dysphagia management. On 40L/60% FiO2, seated in semi-Samaniego's in bed. Noted to have xerostomia. Pt requested to place his dentures; he placed lower denture and then reported buccal pain with dentures in situ and they were ultimately removed. Trials of ice chips provided. Pt with prolonged oral preparatory phase; query if pt was unable to swallow due to severity of xerostomia. When swallow was appreciated with further ice chip trials, patient noted to have immediate unproductive cough, concerning for airway invasion and impaired airway protective systems. Further trials held due to concern for patient's safety; discussed recommendations with dtr and RN at bedside.       Clinical Impressions  Pt presents with clinical indicators of and is at elevated risk for oropharyngeal dysphagia given acute respiratory failure and dysphonia/likely dystussia.    Following discussion with Dr. Conteh, recommend to hold FEES this date and place NGT for nutrition/medications; SLP will continue to follow to determine readiness for diagnostic study as respiratory status progresses.       Recommendations  NPO / recommend NGT   - Limited ice chips after oral care with " "direct A from trained staff/family  Instrumentation: Instrumental swallow study pending clinical progress   - Likely to benefit from FEES with ongoing progression in respiratory status  Oral Care: Q2h        SLP Treatment Plan  Treatment Plan: Dysphagia Treatment, Patient/Family/Caregiver Training  SLP Frequency: 5x Per Week  Estimated Duration: Until Therapy Goals Met      Anticipated Discharge Needs  Discharge Recommendations: Recommend post-acute placement for additional speech therapy services prior to discharge home  Therapy Recommendations Upon DC: Dysphagia Training, Patient / Family / Caregiver Education, Community Re-Integration      Patient / Family Goals  Patient / Family Goal #1: \"He was consuming it all\" per dtr  Short Term Goals  Short Term Goal # 1: Pt will complete instrumental assessment to determine his swallow physiology and POC.  Goal Outcome # 1: Goal not met  Short Term Goal # 2: Pt will participate in prfdg to determine readiness for diagnostic evaluation vs. PO diet.      Lissa Siegel, SLP  "

## 2024-11-10 NOTE — ASSESSMENT & PLAN NOTE
Eval pending  Family okay with soft flexible feeding tube and enteral nutrition  Swallow eval failed, small bore soft flexible tube  If necessary cautiously place tube with thrombocytopenia, lidocaine jelly ordered  Tolerating enteral feeding via nasal tube

## 2024-11-10 NOTE — PROGRESS NOTES
Oncology/Hematology Progress Note               Author: Donaldo Seals III, M.D. Date & Time created: 11/10/2024  10:32 AM   Severe aplastic anemia  Interval History:    Patient got a worsening respiratory distress overnight, he was transferred to the ICU.  Currently he is on high flow nasal cannula overall stabilized.  Remains severely pancytopenic, no fevers.  He did not pass swallowing study.  On broad-spectrum antibiotics.  He is alert and oriented today.  CT thorax showed multifocal bilateral pulmonary airspace process most consistent with pneumonia, worsening, bilateral pleural effusions, ID following.       PMHx, PSurgHx, SocHx, FAMHx: Reviewed and unchanged    Review of Systems:  Review of Systems   Constitutional:  Positive for malaise/fatigue. Negative for chills and fever.   HENT: Negative.          Gingival pain   Eyes: Negative.    Respiratory:  Positive for shortness of breath. Negative for cough and hemoptysis.    Cardiovascular: Negative.    Gastrointestinal:  Negative for abdominal pain, blood in stool, constipation, diarrhea, heartburn, melena, nausea and vomiting.   Genitourinary: Negative.  Negative for dysuria, frequency and urgency.   Musculoskeletal: Negative.    Skin: Negative.    Neurological:  Positive for weakness. Negative for dizziness, tingling, sensory change, speech change, focal weakness, seizures, loss of consciousness and headaches.        Lethargic   Endo/Heme/Allergies:  Bruises/bleeds easily.   Psychiatric/Behavioral: Negative.         Physical Exam:  Physical Exam  Constitutional:       General: He is not in acute distress.     Appearance: He is ill-appearing. He is not toxic-appearing.   HENT:      Head: Normocephalic and atraumatic.      Mouth/Throat:      Mouth: Mucous membranes are dry.      Pharynx: No oropharyngeal exudate or posterior oropharyngeal erythema.      Comments: Tiny lesions related to dentures  Cardiovascular:      Rate and Rhythm: Normal rate and regular  rhythm.      Pulses: Normal pulses.      Heart sounds: Normal heart sounds.   Pulmonary:      Effort: Respiratory distress present.      Breath sounds: Rhonchi and rales present.   Abdominal:      General: Abdomen is flat.   Musculoskeletal:         General: No swelling.   Skin:     Findings: Rash present.   Neurological:      General: No focal deficit present.      Mental Status: He is alert and oriented to person, place, and time.      Cranial Nerves: No cranial nerve deficit.      Sensory: No sensory deficit.      Motor: Weakness present.      Gait: Gait abnormal.   Psychiatric:         Mood and Affect: Mood normal.         Labs:  Recent Labs     11/09/24  0504 11/09/24 2158 11/10/24  0257   KXMTL22S 7.46 7.41  --    TCYARO830E 30.8 42.1*  --    CAVGW504S 129.6* 66.8  --    LRDI2XVW 98.2 91.5*  --    ARTHCO3 22 26*  --    J5ONIIKFD 60 % 50  --    ARTBE -2 2  --    ISTATAPH  --   --  7.466   ISTATAPCO2  --   --  37.0   ISTATAPO2  --   --  55*   ISTATATCO2  --   --  28   VBRQGWO2RHI  --   --  90*   ISTATARTHCO3  --   --  26.7*   ISTATARTBE  --   --  3   ISTATTEMP  --   --  97.4 F   ISTATSPEC  --   --  Arterial   ISTATAPHTC  --   --  7.476   EEYIUTDS1MJ  --   --  53*         Recent Labs     11/08/24  0137 11/09/24  0128 11/10/24  0155   SODIUM 134* 134* 137   POTASSIUM 3.2* 2.9* 2.8*   CHLORIDE 103 104 101   CO2 22 23 24   BUN 19 22 19   CREATININE 0.59 0.68 0.66   MAGNESIUM 1.8 1.6 1.3*   PHOSPHORUS  --  1.9* 3.8   CALCIUM 7.9* 7.8* 8.0*     Recent Labs     11/08/24 0137 11/09/24 0128 11/10/24  0155   ALTSGPT 36 60* 34   ASTSGOT 56* 74* 29   ALKPHOSPHAT 101* 119* 95   TBILIRUBIN 1.5 2.0* 2.1*   DBILIRUBIN  --   --  1.4*   GLUCOSE 223* 195* 291*     Recent Labs     11/09/24  1504 11/09/24  2158 11/10/24  0155   RBC 2.73* 2.89* 2.59*   HEMOGLOBIN 8.3* 8.8* 7.9*   HEMATOCRIT 23.6* 25.0* 22.3*   PLATELETCT 7* 64* 48*   PROTHROMBTM  --   --  21.6*   INR  --   --  1.87*     Recent Labs     11/08/24  0137  24  0128 24  1504 24  2158 11/10/24  0155   WBC 0.9* 0.7* 0.7* 0.8* 0.5*   NEUTSPOLYS 0.00* 0.00*  --   --  1.00*   LYMPHOCYTES 98.60* 99.10*  --   --  98.00*   MONOCYTES 0.50 0.90  --   --  1.00   EOSINOPHILS 0.40 0.00  --   --  0.00   BASOPHILS 0.00 0.00  --   --  0.00   ASTSGOT 56* 74*  --   --  29   ALTSGPT 36 60*  --   --  34   ALKPHOSPHAT 101* 119*  --   --  95   TBILIRUBIN 1.5 2.0*  --   --  2.1*     Recent Labs     24  0137 24  0128 11/10/24  0155   SODIUM 134* 134* 137   POTASSIUM 3.2* 2.9* 2.8*   CHLORIDE 103 104 101   CO2 22 23 24   GLUCOSE 223* 195* 291*   BUN 19 22 19   CREATININE 0.59 0.68 0.66   CALCIUM 7.9* 7.8* 8.0*     Hemodynamics:  Temp (24hrs), Av.6 °C (97.9 °F), Min:35.7 °C (96.2 °F), Max:37.2 °C (99 °F)  Temperature: 36.4 °C (97.5 °F)  Pulse  Av.9  Min: 87  Max: 122   Blood Pressure : 106/58     Respiratory:    Respiration: (!) 23, Pulse Oximetry: 100 %     Work Of Breathing / Effort: Mild  RUL Breath Sounds: Crackles, RML Breath Sounds: Crackles, RLL Breath Sounds: Diminished, YAZAN Breath Sounds: Crackles, LLL Breath Sounds: Diminished  Fluids:    Intake/Output Summary (Last 24 hours) at 2024 0937  Last data filed at 2024 0600  Gross per 24 hour   Intake --   Output 550 ml   Net -550 ml        GI/Nutrition:  Orders Placed This Encounter   Procedures    Diet NPO Restrict to: Ice Chips     Standing Status:   Standing     Number of Occurrences:   1     Order Specific Question:   Diet NPO Restrict to:     Answer:   Ice Chips [2]     Medical Decision Making, by Problem:  Active Hospital Problems    Diagnosis     Aplastic anemia (HCC) [D61.9]     Neutropenic fever (HCC) [D70.9, R50.81]     Hypophosphatemia [E83.39]     Hypomagnesemia [E83.42]     Petechial rash [R23.3]     Hypertension [I10]     GERD (gastroesophageal reflux disease) [K21.9]     Seasonal allergies [J30.2]     Protein calorie malnutrition (HCC) [E46]     Productive cough [R05.8]      Dyslipidemia [E78.5]     Type 2 diabetes mellitus, without long-term current use of insulin (HCC) [E11.9]     Thrombocytopenia (HCC) [D69.6]        Plan:  1.  Severe aplastic anemia.   -10/29/2024 bone marrow biopsy consistent with severe aplastic anemia after discussing case with Dr. Napoles    2.  Severe anemia, secondary to #1  -Transfuse if hemoglobin less than 7 g/dL    3.  Severe neutropenia,  secondary to #1   -Refractory to growth factor support in the past    4.  Severe thrombocytopenia, secondary to #1  -Transfuse if platelets less than 10,000    5.  Antibiotic prophylaxis  -On posaconazole, acyclovir and Levaquin    6.  Skin rash, induced by recent antibiotics    7.  Deconditioning, fatigue and debility  -PT OT    8.  Respiratory failure, 11/9  -CT thorax without contrast with worsening pulmonary infiltrates    9.  Aspiration pneumonia    10.  CODE STATUS DNR/DNI      Plan  - currently on Zosyn, linezolid and  Amphotericin.  Per ID recommendations, recommended BAL and pancultured including AFB.  Not a candidate given ongoing respiratory distress.  -Discussed goals of care with son, DNR/DNI by they want to continue on treatment.  Family members declining hospice for now.   -Cyclosporine and Promacta on hold given aspiration pneumonia.  Speech therapy following, okay to restart oral drugs once cleared by speech therapy  -Transfuse blood products with parameters as stated above  - poor prognosis    Dr. Howie Long will start following the patient tomorrow.     High complexity, complex decision making requiring frequent drug monitoring for toxicity    Quality-Core Measures

## 2024-11-10 NOTE — ASSESSMENT & PLAN NOTE
With hydrocortisone and thrombocytopenia -continue IV PPI daily for prophylaxis  Antireflux measures

## 2024-11-10 NOTE — ASSESSMENT & PLAN NOTE
>>ASSESSMENT AND PLAN FOR DYSPHAGIA WRITTEN ON 11/22/2024  6:49 PM BY YUKI VIVAR M.D.    Still has altered level of conciousness  If he starts to clear will re consult SLP  [employer] if very high risk for aspiration  Currently getting nutrition via NGT  Oral care every 1-2-hour  CT soft tissue neck without airway issue or obvious abscess/obstructing process  Now more alert and off HFNC: re consult SLP      11/22/2024  Tolerating tube feeds at 35 mL/h.  Increasing to 45 at goal.

## 2024-11-10 NOTE — PROGRESS NOTES
Reached out to [facility]. Resident to notify and follow up on IMCU evaluating patient and patient not yet been seen. Resident will reach out to them.

## 2024-11-10 NOTE — PROGRESS NOTES
Contacted Resident regarding patient SOB, Respirations 37, saturating 87-88% on nasal canula, switched to oxymask 10L, still saturating at 89%. Called respiratory, per respiratory, place patient on non-rebreather.   2130- Resident at bedside. Orders placed. RT will be up short to place pt on high flow nasal cannula.

## 2024-11-10 NOTE — WOUND TEAM
Wound consult received on 11/10/24 regarding pts sacrococcygeal area. Pt was evaluated by wound team on 11/7/24 for area. Area was found to be a POA sDTI. Per chart review pt has no changed units, wound is appearing improved. Wound consult completed.

## 2024-11-10 NOTE — CONSULTS
Critical Care Consultation    Date of consult: 11/9/2024    Referring Physician  Sandy Trujillo M.D.    Reason for Consultation  ARDS, neutropenia, aplastic anemia    History of Presenting Illness  84 y.o. male who presented 11/6/2024 with medical history of type II DM, recent diagnosis of aplastic anemia who [place] [facility] [place].  Evidently patient admitted to [facility] mid-October was treated for neutropenic fever with pneumonia and was noted to have pancytopenia.  He had a bone marrow biopsy performed consistent with severe aplastic anemia.  He was specifically transferred over here to obtain medical oncology services into initiate inpatient treatment with Promacta and cyclosporine which was initiated upon admission.    Throughout his hospitalization including [facility] he has had respiratory failure and hypoxemia and pulmonary infiltrates, initially had cultures at the [facility] with MRSA he was treated with broad-spectrum antibiotics for a course of pneumonia therapy there and also treated with antifungals given nodular pulmonary opacities and immunocompromise but reportedly had a negative galactomannan and Karius there. He was on posi, then amphoB, then back to posi as dictated by ID at [facility]. Completed course of linezolid and cefepime as well.    According to his discharge summary from 10/19/2024 from [facility] he had a mopped assist, pneumonia aplastic anemia and was treated for neutropenic fever with presumed pneumonia.    Ultimately [place] [facility] [place] of [place] and medical oncology evaluation.    During ED on 11/9 I was called to his bedside for respiratory stress, work of breathing, fatigue, and worsening oxygenation requiring max high flow.  Patient to CT was performed today showing dense bilateral consolidations with scattered nodularity involving most lobes of the lung.  Patient was transferred to ICU for further  care        Code Status  DNAR/DNI    Review of Systems  Review of Systems   Unable to perform ROS: Critical illness       Past Medical History   has a past medical history of Diabetes (HCC).    Surgical History   has a past surgical history that includes turp-vapor (8/14/2017); panendoscopy (N/A, 9/5/2024); and bone aspiration biopsy (Left, 9/10/2024).    Family History  family history is not on file.    Social History   reports that he has never smoked. He has never used smokeless tobacco. He reports that he does not drink alcohol and does not use drugs.    Medications  Home Medications       Reviewed by Susu Mendez R.N. (Registered Nurse) on 11/06/24 at 2302  Med List Status: Complete     Medication Last Dose Status   Acetaminophen (TYLENOL PO) 11/6/2024 Active   acyclovir (ZOVIRAX) 400 MG tablet 11/6/2024 Active   atorvastatin (LIPITOR) 10 MG Tab 11/6/2024 Active   famotidine (PEPCID) 20 MG Tab 11/6/2024 Active   folic acid (FOLVITE) 1 MG Tab 11/6/2024 Active   levoFLOXacin (LEVAQUIN) 500 MG tablet 11/6/2024 Active   Non Formulary Request  Active   senna-docusate (PERICOLACE OR SENOKOT S) 8.6-50 MG Tab 11/6/2024 Active                  Audit from Redirected Encounters    **Home medications have not yet been reviewed for this encounter**       Current Facility-Administered Medications   Medication Dose Route Frequency Provider Last Rate Last Admin    voriconazole (Vfend) 440 mg in  mL IVPB  6 mg/kg Intravenous Q12HRS Gilbert Pineda M.D.        Followed by    [START ON 11/11/2024] voriconazole (Vfend) 290 mg in  mL IVPB  4 mg/kg Intravenous Q12HRS Gilbert Pineda M.D.        [COMPLETED] iohexol (OMNIPAQUE) 350 mg/mL (IV)  80 mL Intravenous Once Gilbert Pineda M.D.   80 mL at 11/10/24 0300    atovaquone (Mepron) 750 MG/5ML suspension 750 mg  750 mg Oral BID Gilbert Pineda M.D.        furosemide (Lasix) injection 20 mg  20 mg Intravenous Once Gilbert Pineda M.D.         ipratropium-albuterol (DUONEB) nebulizer solution  3 mL Nebulization Q2HRS PRN (RT) Ivanna Longo M.D.   3 mL at 11/09/24 0435    Respiratory Therapy Consult   Nebulization Continuous RT Mima Carrasco D.O.        magnesium oxide tablet 400 mg  400 mg Oral Once Mima Carrasco D.O.        famotidine (Pepcid) injection 20 mg  20 mg Intravenous BID Scottie Sidhu M.D.   20 mg at 11/09/24 1738    insulin GLARGINE (Lantus,Semglee) injection  4 Units Subcutaneous QAM INSULIN Mima Carrasco D.O.        And    insulin lispro (HumaLOG,AdmeLOG) subcutaneous injection  1-6 Units Subcutaneous Q6HRS ANABEL Pablo.BRADLEY   2 Units at 11/10/24 0137    And    dextrose 50% (D50W) injection 25 g  25 g Intravenous Q15 MIN PRN Mima Carrasco D.O.        Linezolid (Zyvox) premix 600 mg  600 mg Intravenous Q12HRS Gilbert Pineda M.D.   Stopped at 11/10/24 0203    piperacillin-tazobactam (Zosyn) 4.5 g in  mL IVPB  4.5 g Intravenous Q8HRS Gilbert Pineda M.D.        hydrocortisone sodium succinate PF (Solu-CORTEF) 100 MG injection 50 mg  50 mg Intravenous Q6HRS Gilbert Pineda M.D.   50 mg at 11/10/24 0058    MD Alert...ICU Electrolyte Replacement per Pharmacy   Other PHARMACY TO DOSE Gilbert Pineda M.D.        MBX (diphenhydrAMINE-lidocaine-Maalox) oral susp Cup 5 mL  5 mL Swish & Spit Q6HR Donaldo Seals III, M.D.   5 mL at 11/09/24 0321    sodium bicarb 0.5 mEq/mL oral rinse 10 mL  10 mL Swish & Spit Q6HRS Donaldo Seals III, M.D.   10 mL at 11/08/24 1821    cycloSPORINE (SandIMMUNE) capsule 100 mg  100 mg Oral BID Donaldo Seals III, M.D.   100 mg at 11/08/24 1820    tamsulosin (Flomax) capsule 0.4 mg  0.4 mg Oral AFTER BREAKFAST Scottie Sidhu M.D.   0.4 mg at 11/08/24 0823    acyclovir (Zovirax) tablet 400 mg  400 mg Oral BID Ivanna Longo M.D.   400 mg at 11/08/24 1819    atorvastatin (Lipitor) tablet 10 mg  10 mg Oral Q EVENING Ivanna Longo M.D.   10 mg at 11/08/24 1819     carvedilol (Coreg) tablet 3.125 mg  3.125 mg Oral BID WITH MEALS Ivanna Longo M.D.   3.125 mg at 11/08/24 1818    loratadine (Claritin) tablet 10 mg  10 mg Oral DAILY Ivanna Longo M.D.   10 mg at 11/08/24 0440       Allergies  No Known Allergies    Vital Signs last 24 hours  Temp:  [35.7 °C (96.2 °F)-37.2 °C (99 °F)] 35.7 °C (96.2 °F)  Pulse:  [] 122  Resp:  [20-40] 29  BP: (113-175)/(40-81) 131/66  SpO2:  [90 %-98 %] 92 %    Physical Exam  Physical Exam  Constitutional:       General: He is in acute distress.      Appearance: He is ill-appearing and toxic-appearing.   HENT:      Head: Normocephalic.      Mouth/Throat:      Mouth: Mucous membranes are dry.      Comments: His voice if muffled and has very weak cough and secretion clearance, no overt posterior pharnygeal swelling    Tongue is dry with adherent dark material  Tonsillar pillars are visualized and fairly normal but unable to see distally  Voice is very weak though there is no stridor  Eyes:      Pupils: Pupils are equal, round, and reactive to light.   Cardiovascular:      Rate and Rhythm: Normal rate and regular rhythm.   Pulmonary:      Effort: Respiratory distress present.      Comments: WOB elevated, tachypenic to 30s and 40  Lungs coarse and rhonchorous throughout  Abdominal:      Palpations: Abdomen is soft.   Musculoskeletal:      Comments: Thin, muscle wasting throughout  Some pitting edema to feet, but in general cachectic without anasarca   Skin:     Capillary Refill: Capillary refill takes less than 2 seconds.   Neurological:      General: No focal deficit present.      Comments: He is drowsy and occasionally difficult to arouse, but this may be due to hearing impariment    He does awake and follows and has no defecits elicitable  His family says he is at his baseline as compared to the last couple of days         Fluids    Intake/Output Summary (Last 24 hours) at 11/10/2024 0251  Last data filed at 11/10/2024 0203  Gross per 24  hour   Intake 246 ml   Output 1850 ml   Net -1604 ml       Laboratory  Recent Results (from the past 48 hours)   POCT glucose device results    Collection Time: 11/08/24  6:16 AM   Result Value Ref Range    POC Glucose, Blood 199 (H) 65 - 99 mg/dL   POCT glucose device results    Collection Time: 11/08/24 12:09 PM   Result Value Ref Range    POC Glucose, Blood 237 (H) 65 - 99 mg/dL   POCT glucose device results    Collection Time: 11/08/24  6:06 PM   Result Value Ref Range    POC Glucose, Blood 310 (H) 65 - 99 mg/dL   POCT glucose device results    Collection Time: 11/09/24 12:06 AM   Result Value Ref Range    POC Glucose, Blood 208 (H) 65 - 99 mg/dL   CBC WITH DIFFERENTIAL    Collection Time: 11/09/24  1:28 AM   Result Value Ref Range    WBC 0.7 (LL) 4.8 - 10.8 K/uL    RBC 2.73 (L) 4.70 - 6.10 M/uL    Hemoglobin 8.5 (L) 14.0 - 18.0 g/dL    Hematocrit 23.7 (L) 42.0 - 52.0 %    MCV 86.8 81.4 - 97.8 fL    MCH 31.1 27.0 - 33.0 pg    MCHC 35.9 32.3 - 36.5 g/dL    RDW 45.1 35.9 - 50.0 fL    Platelet Count 11 (LL) 164 - 446 K/uL    MPV 10.5 9.0 - 12.9 fL    Neutrophils-Polys 0.00 (L) 44.00 - 72.00 %    Lymphocytes 99.10 (H) 22.00 - 41.00 %    Monocytes 0.90 0.00 - 13.40 %    Eosinophils 0.00 0.00 - 6.90 %    Basophils 0.00 0.00 - 1.80 %    Nucleated RBC 0.00 0.00 - 0.20 /100 WBC    Neutrophils (Absolute) 0.00 (LL) 1.82 - 7.42 K/uL    Lymphs (Absolute) 0.69 (L) 1.00 - 4.80 K/uL    Monos (Absolute) 0.01 0.00 - 0.85 K/uL    Eos (Absolute) 0.00 0.00 - 0.51 K/uL    Baso (Absolute) 0.00 0.00 - 0.12 K/uL    NRBC (Absolute) 0.00 K/uL   Comp Metabolic Panel    Collection Time: 11/09/24  1:28 AM   Result Value Ref Range    Sodium 134 (L) 135 - 145 mmol/L    Potassium 2.9 (L) 3.6 - 5.5 mmol/L    Chloride 104 96 - 112 mmol/L    Co2 23 20 - 33 mmol/L    Anion Gap 7.0 7.0 - 16.0    Glucose 195 (H) 65 - 99 mg/dL    Bun 22 8 - 22 mg/dL    Creatinine 0.68 0.50 - 1.40 mg/dL    Calcium 7.8 (L) 8.5 - 10.5 mg/dL    Correct Calcium 9.3 8.5 -  10.5 mg/dL    AST(SGOT) 74 (H) 12 - 45 U/L    ALT(SGPT) 60 (H) 2 - 50 U/L    Alkaline Phosphatase 119 (H) 30 - 99 U/L    Total Bilirubin 2.0 (H) 0.1 - 1.5 mg/dL    Albumin 2.1 (L) 3.2 - 4.9 g/dL    Total Protein 6.5 6.0 - 8.2 g/dL    Globulin 4.4 (H) 1.9 - 3.5 g/dL    A-G Ratio 0.5 g/dL   PHOSPHORUS    Collection Time: 24  1:28 AM   Result Value Ref Range    Phosphorus 1.9 (L) 2.5 - 4.5 mg/dL   MAGNESIUM    Collection Time: 24  1:28 AM   Result Value Ref Range    Magnesium 1.6 1.5 - 2.5 mg/dL   ESTIMATED GFR    Collection Time: 24  1:28 AM   Result Value Ref Range    GFR (CKD-EPI) 92 >60 mL/min/1.73 m 2   DIFFERENTIAL MANUAL    Collection Time: 24  1:28 AM   Result Value Ref Range    Manual Diff Status PERFORMED     Comment See Comment    PERIPHERAL SMEAR REVIEW    Collection Time: 24  1:28 AM   Result Value Ref Range    Peripheral Smear Review see below    PLATELET ESTIMATE    Collection Time: 24  1:28 AM   Result Value Ref Range    Plt Estimation SEE BELOW    MORPHOLOGY    Collection Time: 24  1:28 AM   Result Value Ref Range    RBC Morphology Present     Smudge Cells Moderate    IMMATURE PLT FRACTION    Collection Time: 24  1:28 AM   Result Value Ref Range    Imm. Plt Fraction 0.0 (L) 0.6 - 13.1 %   EKG    Collection Time: 24  4:38 AM   Result Value Ref Range    Report       [facility]    Test Date:  2024  Pt Name:    STEVEN BECKER               Department: [facility]  MRN:        [id number]                      Room:       R318  Gender:     Male                         Technician: SRINIVASA  :        1940                   Requested By:MADIE NOLASCO  Order #:    [id number]                    Reading MD: Juan Cavazos MD    Measurements  Intervals                                Axis  Rate:       114                          P:          32  NV:         163                          QRS:        55  QRSD:       87                           T:           38  QT:         342  QTc:        472    Interpretive Statements  Sinus tachycardia  Electronically Signed On 11- 08:06:01 PST by Juan Cavazos MD     ABG - LAB    Collection Time: 11/09/24  5:04 AM   Result Value Ref Range    Ph 7.46 7.40 - 7.50    Pco2 30.8 26.0 - 37.0 mmHg    Po2 129.6 (H) 64.0 - 87.0 mmHg    O2 Saturation 98.2 93.0 - 99.0 %    Hco3 22 17 - 25 mmol/L    Base Excess -2 -4 - 3 mmol/L    Body Temp 36.6 Centigrade    O2 Therapy 60 %     Ph -TC 7.47 7.40 - 7.50    Pco2 -TC 30.3 26.0 - 37.0 mmHg    Po2 -.1 (H) 64.0 - 87.0 mmHg   TROPONIN    Collection Time: 11/09/24  5:12 AM   Result Value Ref Range    Troponin T 89 (H) 6 - 19 ng/L   D-DIMER    Collection Time: 11/09/24  5:12 AM   Result Value Ref Range    D-Dimer 19.18 (H) 0.00 - 0.50 ug/mL (FEU)   CORTISOL    Collection Time: 11/09/24  5:12 AM   Result Value Ref Range    Cortisol 21.8 0.0 - 23.0 ug/dL   proBrain Natriuretic Peptide, NT    Collection Time: 11/09/24  5:12 AM   Result Value Ref Range    NT-proBNP 2899 (H) 0 - 125 pg/mL   POCT glucose device results    Collection Time: 11/09/24  6:46 AM   Result Value Ref Range    POC Glucose, Blood 196 (H) 65 - 99 mg/dL   POCT glucose device results    Collection Time: 11/09/24  1:48 PM   Result Value Ref Range    POC Glucose, Blood 155 (H) 65 - 99 mg/dL   TROPONIN    Collection Time: 11/09/24  3:04 PM   Result Value Ref Range    Troponin T 134 (H) 6 - 19 ng/L   CBC WITHOUT DIFFERENTIAL    Collection Time: 11/09/24  3:04 PM   Result Value Ref Range    WBC 0.7 (LL) 4.8 - 10.8 K/uL    RBC 2.73 (L) 4.70 - 6.10 M/uL    Hemoglobin 8.3 (L) 14.0 - 18.0 g/dL    Hematocrit 23.6 (L) 42.0 - 52.0 %    MCV 86.4 81.4 - 97.8 fL    MCH 30.4 27.0 - 33.0 pg    MCHC 35.2 32.3 - 36.5 g/dL    RDW 43.8 35.9 - 50.0 fL    Platelet Count 7 (LL) 164 - 446 K/uL    MPV 12.5 9.0 - 12.9 fL   IMMATURE PLT FRACTION    Collection Time: 11/09/24  3:04 PM   Result Value Ref Range    Imm. Plt Fraction 0.8 0.6 - 13.1 %    PLATELETS REQUEST    Collection Time: 11/09/24  5:04 PM   Result Value Ref Range    Component P       Plts,PheresisIRR    [id number]   transfused   11/09/24 17:12    Product Type Platelets Pheresis IRR LR     Dispense Status transfused     Unit Number (Barcoded) [id number]     Product Code (Barcoded) L4742J36     Blood Type (Barcoded) 8400    POCT glucose device results    Collection Time: 11/09/24  5:55 PM   Result Value Ref Range    POC Glucose, Blood 138 (H) 65 - 99 mg/dL   TROPONIN    Collection Time: 11/09/24  9:58 PM   Result Value Ref Range    Troponin T 159 (H) 6 - 19 ng/L   ABG - LAB    Collection Time: 11/09/24  9:58 PM   Result Value Ref Range    Ph 7.41 7.40 - 7.50    Pco2 42.1 (H) 26.0 - 37.0 mmHg    Po2 66.8 64.0 - 87.0 mmHg    O2 Saturation 91.5 (L) 93.0 - 99.0 %    Hco3 26 (H) 17 - 25 mmol/L    Base Excess 2 -4 - 3 mmol/L    Body Temp 36.8 Centigrade    O2 Therapy 50     Ph -TC 7.43 7.40 - 7.50    Pco2 -TC 40.7 (H) 26.0 - 37.0 mmHg    Po2 -TC 63.2 (L) 64.0 - 87.0 mmHg   CBC WITHOUT DIFFERENTIAL    Collection Time: 11/09/24  9:58 PM   Result Value Ref Range    WBC 0.8 (LL) 4.8 - 10.8 K/uL    RBC 2.89 (L) 4.70 - 6.10 M/uL    Hemoglobin 8.8 (L) 14.0 - 18.0 g/dL    Hematocrit 25.0 (L) 42.0 - 52.0 %    MCV 86.5 81.4 - 97.8 fL    MCH 30.4 27.0 - 33.0 pg    MCHC 35.2 32.3 - 36.5 g/dL    RDW 44.6 35.9 - 50.0 fL    Platelet Count 64 (L) 164 - 446 K/uL    MPV 9.8 9.0 - 12.9 fL   IMMATURE PLT FRACTION    Collection Time: 11/09/24  9:58 PM   Result Value Ref Range    Imm. Plt Fraction 2.3 0.6 - 13.1 %   POCT glucose device results    Collection Time: 11/10/24  1:35 AM   Result Value Ref Range    POC Glucose, Blood 244 (H) 65 - 99 mg/dL   Comp Metabolic Panel    Collection Time: 11/10/24  1:55 AM   Result Value Ref Range    Sodium 137 135 - 145 mmol/L    Potassium 2.8 (L) 3.6 - 5.5 mmol/L    Chloride 101 96 - 112 mmol/L    Co2 24 20 - 33 mmol/L    Anion Gap 12.0 7.0 - 16.0    Glucose 291 (H) 65 - 99 mg/dL     Bun 19 8 - 22 mg/dL    Creatinine 0.66 0.50 - 1.40 mg/dL    Calcium 8.0 (L) 8.5 - 10.5 mg/dL    Correct Calcium 9.6 8.5 - 10.5 mg/dL    AST(SGOT) 29 12 - 45 U/L    ALT(SGPT) 34 2 - 50 U/L    Alkaline Phosphatase 95 30 - 99 U/L    Total Bilirubin 2.1 (H) 0.1 - 1.5 mg/dL    Albumin 2.0 (L) 3.2 - 4.9 g/dL    Total Protein 6.3 6.0 - 8.2 g/dL    Globulin 4.3 (H) 1.9 - 3.5 g/dL    A-G Ratio 0.5 g/dL   MAGNESIUM    Collection Time: 11/10/24  1:55 AM   Result Value Ref Range    Magnesium 1.3 (L) 1.5 - 2.5 mg/dL   PHOSPHORUS    Collection Time: 11/10/24  1:55 AM   Result Value Ref Range    Phosphorus 3.8 2.5 - 4.5 mg/dL   VITAMIN B12    Collection Time: 11/10/24  1:55 AM   Result Value Ref Range    Vitamin B12 -True Cobalamin 977 (H) 211 - 911 pg/mL   HEPATIC FUNCTION PANEL    Collection Time: 11/10/24  1:55 AM   Result Value Ref Range    Direct Bilirubin 1.4 (H) 0.1 - 0.5 mg/dL    Indirect Bilirubin 0.7 0.0 - 1.0 mg/dL   LACTIC ACID    Collection Time: 11/10/24  1:55 AM   Result Value Ref Range    Lactic Acid 2.0 0.5 - 2.0 mmol/L   ESTIMATED GFR    Collection Time: 11/10/24  1:55 AM   Result Value Ref Range    GFR (CKD-EPI) 93 >60 mL/min/1.73 m 2       Imaging  CT-CHEST (THORAX) W/O   Final Result      1.  Multifocal bilateral pulmonary airspace process most consistent with pneumonia, with interval worsening      2.  Bilateral pleural effusion, most small in size, right greater than left      3.  Single enlarged prevascular space lymph node which is most likely reactive      Fleischner Society pulmonary nodule recommendations:   Not Applicable         DX-CHEST-PORTABLE (1 VIEW)   Final Result         1.  Patchy bilateral pulmonary infiltrates, similar to prior study      DX-CHEST-PORTABLE (1 VIEW)   Final Result      1.  Unchanged BILATERAL pneumonia   2.  Possible RIGHT pleural effusion      DX-CHEST-2 VIEWS   Final Result         1.  Pulmonary edema and/or infiltrates.   2.  Trace left pleural effusion   3.   Atherosclerosis      US-RUQ    (Results Pending)   CT-SOFT TISSUE NECK WITH    (Results Pending)       Assessment/Plan  Pneumonia  Assessment & Plan  Severe    Was treated with course of abx at [facility] for MRSA PNA (linezolid and cefepime)  Has had pulmonary infitrates and hypoxemia to some degree since September  Since [place] [facility] [place] has been on ppx dosed antibiotics only (acyclovir, posaconazole, levaquin)     Marked worsening of hypoxemia and lung injury 11/8-11/9 11/9-Sputum gram stain results with MRSA, unclear if causative pathogen given prior respiratory cultures with MRSA, but with new hypoxemia and infiltrates will consider this pathogenic and treat accordingly     Severe CAP, ARDS in the setting of severe immunocompromise and neutropenia  CT chest with dense BLL consolidations, scattered nodular densities in multiple lobes certainly concerning for fungal etiology though non-diagnostic     Linezolid and Ptazo plus treatment dose PJP coverage (atovaquone d/t risk of myelosuppression) for now in the event this represent PJP  Corticosteroids HC 50 q6 x 5 days, have discussed risks/benefits with family given imunocompromise  Treatment dose Posiconazole for now, needs to be IV for now as patient unable to take anything oral safely     BDG and gallactomanan and PJP DFA and fungal sputum cultures sent  ID consult tomorrow     PEP  ISS  HFNO  Mobility  DNR/DNI so will manage non-invasively to the best of our ability    Elevated LFTs  Assessment & Plan  Worsening today, no focal abdominal pain  RUQUS ordered    Trend LFTs while on antifungals  Avoid hepatotoxins      ARDS (adult respiratory distress syndrome) (HCC)  Assessment & Plan  Worsening bilateral infiltrates over the last couple days  Now on max HFNO with occasional desaturations    Likely multifactorial: has had some aspiration events witnessed, MRSA on sputum gram stain, HAP, opportunistic infection including fungal, possible DAH iso of severe  thrombocytopenia, TRALI/TACO given recent trasnfusions    Likely infectious trigger  P/F ~ 60 on HFNO at Fi02 1.0  ABG with shunt and probable dead space, concerning that with his respiratory distress pC02 is normal, likely represents some degree of dead space/fatigue    Linezolid and Ptazo plus atovaquone (PJP coverage)  Corticosteroids HC 50 q6 x 5 days, have discussed risks/benefits with family given imunocompromise  Treatment dose IV voriconazole for now, needs to be IV for now as patient unable to take anything oral safely    BDG/Galactomannan/PJP sputum sent  Fungal sputum cultures sent    Conservative fluid strategy, diurese to net even to slightly negative balance  DNI so will attempt to manage with HFNO and non-invasive strategies  Not a candidate for NIV given poor airway management and inability to clear copious secretions  Can trial supervised intermittent runs of CPAP if mental status improves          Dysphagia  Assessment & Plan  Patient with worsening dysphagia over the last few days  Unable to swallow, has been aspirating, weak phonation  Going to get CT soft tissue neck to evaluate for obstructing lesion    FEES pending  High risk for airway scope given thrombocytopenia  Not a candidate for NIV or PPV given inability to protect airway  NPO for now    Type 2 diabetes mellitus, without long-term current use of insulin (HCC)- (present on admission)  Assessment & Plan  Target normoglycemia  ISS    Thrombocytopenia (HCC)- (present on admission)  Assessment & Plan  Attributable to pancytopenia    Transfuse for platelets < 10 per med onc or if bleeding      Pancytopenia (HCC)- (present on admission)  Assessment & Plan  BM biopsy consistent with severe aplastic anemia, etiology unclear to me at this time DDX includes hypoplastic MDS versus PNH, and aplastic process, or infection/nutritional disorder/toxic myelosuppression.     Diagnostic evaluation in september and at [facility], viral studies negative, hepatitis  panel negative,   Oncology following    On cyclosporine 100 bid, but cannot take orals currently  Promacta (eltrombopag) for thrombocytopenia 75 mg daily  Continue prophylactic antimicrobials  B12/MMA, folate levels resent, previously low    Tranfuse PRBC for <7  Transfuse platelets < 10, may warrant higher threshold given possibility of DAH though CT doesn't look strikingly like DAH              Discussed patient condition and risk of morbidity and/or mortality with Hospitalist, Family, RN, RT, Therapies, Pharmacy, Patient, and oncology.    The patient remains critically ill.  Critical care time = 140 minutes in directly providing and coordinating critical care and extensive data review.  No time overlap and excludes procedures.

## 2024-11-10 NOTE — ASSESSMENT & PLAN NOTE
Still has altered level of conciousness  If he starts to clear will re consult SLP  [employer] if very high risk for aspiration  Currently getting nutrition via NGT  Oral care every 1-2-hour  CT soft tissue neck without airway issue or obvious abscess/obstructing process  Now more alert and off HFNC: re consult SLP      11/22/2024  Tolerating tube feeds at 35 mL/h.  Increasing to 45 at goal.

## 2024-11-10 NOTE — CARE PLAN
The patient is Unstable - High likelihood or risk of patient condition declining or worsening    Shift Goals  Clinical Goals: Titrate O2, oral care, Q2 turns  Patient Goals: Sleep  Family Goals: Oral care and make sure patient eats    Progress made toward(s) clinical / shift goals:      Problem: Fall Risk  Goal: Patient will remain free from falls  Outcome: Progressing            Patient is not progressing towards the following goals:    Pt increased to highflow NC, 60L at 100%. Will be transferred to ICU for higher level of care.     Problem: Knowledge Deficit - Standard  Goal: Patient and family/care givers will demonstrate understanding of plan of care, disease process/condition, diagnostic tests and medications  Outcome: Not Progressing     Problem: Respiratory:  Goal: Respiratory status will improve  Outcome: Not Progressing

## 2024-11-10 NOTE — CARE PLAN
Problem: Humidified High Flow Nasal Cannula  Goal: Maintain adequate oxygenation dependent on patient condition  Description: Target End Date:  resolve prior to discharge or when underlying condition is resolved/stabilized    1.  Implement humidified high flow oxygen therapy  2.  Titrate high flow oxygen to maintain appropriate SpO2  Outcome: Not Progressing

## 2024-11-10 NOTE — ASSESSMENT & PLAN NOTE
>>ASSESSMENT AND PLAN FOR DYSPHAGIA WRITTEN ON 11/9/2024  5:11 PM BY DEJUAN SANTOS D.O.    Patient with increasing difficulty swallowing starting gradually the afternoon of 11/8, unable to swallow medications, water, applesauce without triggering coughing morning of 11/9.     Plan:  -NPO  -SLP evaluation  -Discussed with pharmacy which meds are available IV. Some were switched over today, not all are available IV.   -Further medication management pending SLP eval, potential of swallow recovering quickly.

## 2024-11-10 NOTE — CARE PLAN
The patient is Unstable - High likelihood or risk of patient condition declining or worsening    Shift Goals  Clinical Goals: Titrate O2, oral care, Q2 turns  Patient Goals: Sleep  Family Goals: Oral care and make sure patient eats    Progress made toward(s) clinical / shift goals:      Problem: Knowledge Deficit - Standard  Goal: Patient and family/care givers will demonstrate understanding of plan of care, disease process/condition, diagnostic tests and medications  Outcome: Progressing  Note: Family at bedside, patient altered. Informed patient's family about rapid response on night shift, CT to be done today, necessity for NPO, new telemetry monitoring, and POC for this shift. Family demonstrates understanding.      Problem: Skin Integrity  Goal: Skin integrity is maintained or improved  Outcome: Progressing  Note: Skin preservation interventions in place. All bony prominences checked for breakdown. Q2T in place with wedges. Sacral offloading dressing in place.      Problem: Fall Risk  Goal: Patient will remain free from falls  Outcome: Progressing  Note: Patient remains free from injury and falls this shift. Fall precautions in place. Bed alarm on and sounds appropriately     Problem: Respiratory:  Goal: Respiratory status will improve  Outcome: Progressing  Note: Titrated from high flow to room air. Increased work of breathing observed, RR 32, high for duration of shift. Resident Mima Carrasco aware. Patient continues to have moist congested cough, unable to cough up secretions       Patient is not progressing towards the following goals:      Problem: Mobility  Goal: Risk for activity intolerance will decrease  Outcome: Not Progressing  Note: Patient rigid this shift and unstable for mobilization     Problem: Nutrition  Goal: Patient's nutritional and fluid intake will be adequate or improve  Outcome: Not Progressing  Note: NPO thi shift, patient aspirating with PO intake. Speech to bedside      Problem:  Hemodynamics  Goal: Patient's hemodynamics, fluid balance and neurologic status will be stable or improve  Outcome: Not Progressing  Note: Patient very awrm to touch, not febrile on temp check. Q4 VS in place. Platelet transfusion this shift. AMS present. Patient neutropenic. IV fluids in place     Problem: Risk for Aspiration  Goal: Patient's risk for aspiration will be absent or decrease  Outcome: Not Progressing  Flowsheets (Taken 11/9/2024 1821)  Aspiration Prevention:   Assessed for signs and symptoms of aspiration   Confirmed NPO order until medically cleared   Collaborated with SLP   Implemented aspiration precautions  Note: Secretions heard when patient tries to speak. Moist cough. Unable to tolerate PO intake.      Problem: Infection - Standard  Goal: Patient will remain free from infection  Outcome: Not Progressing  Flowsheets (Taken 11/9/2024 1821)  Standard Infection Interventions:   Assessed for signs and symptoms of infection   Implemented standard precautions   Instructed patient/family on signs and symptoms of infection   Provided education on proper hand hygiene and infection prevention measures   Assessed for removal IV, central lines, intra-arterial or urinary catheters  Note: Droplet and protective precautions in place. Continued increased work of breathing. IV abx in place. Patient unable to tolerate PO abx this shift.

## 2024-11-10 NOTE — ASSESSMENT & PLAN NOTE
Target normoglycemia  Sugar trending up in a.m. and Lantus dose to be adjusted, changed to high sliding scale  Midday sugars dramatically elevated to 600 on 11/11  Check BMP, beta hydroxybutyric acid, lactic acid and magnesium stat  Beta hydroxybutyric acid not significantly elevated  Insulin infusion overnight brought glucose under control, transition to Lantus/medium SSI 11/12  TF starting 11/12 again    Increase Lantus to 15 units daily  Increase to high SSI  Tapering steroids as we advance tube feeds, hope to avoid insulin drip again

## 2024-11-10 NOTE — PROGRESS NOTES
"Patient tachypeic for duration of shift, weaned down to nasal cannula by RT. Patient continues to pull O2 off, saturating at 91%. Highest temp today 99.1f, patient neutropenic. Aspiration observed with PO meds crushed in apple sauce, patient switched to NPO and speech contacted.     Trops increased from , MD Mima Carrasco updated. Tele monitor initiated. RN questioned higher level of care. Per resident, \"IMCU to evaluate, no need to trend trops or to check lactic.\"   "

## 2024-11-10 NOTE — PROGRESS NOTES
Monitor summary:        Rhythm: Sinus rhythm - sinus tach  Rate:   Ectopy: (r)PVC, big  Measurements: .19/.06/.27        12hr chart check

## 2024-11-10 NOTE — CONSULTS
[facility] INFECTIOUS DISEASES INPATIENT CONSULT NOTE     Date of Service: 11/10/2024    Consult Requested By: Michel Conteh M.D.    Reason for Consultation: Pneumonia    Chief Complaint: Pneumonia    History of Present Illness:     Miri Joseph is a 84 y.o. male admitted 11/6/2024.  Extensive review of documentation from multiple specialties performed in generating this HPI.  Patient with poorly controlled type 2 diabetes, recent diagnosis of aplastic anemia, was admitted to [facility] in mid October, noted to have severe aplastic anemia and bone marrow biopsy, also with neutropenic fever with sputum cultures positive for MRSA and CT scan with nodular opacities with features consistent with halo sign concerning for invasive mold.  He was seen by infectious disease there, Dr. Sage, patient received a couple of days of Amphotericin and then switched to posaconazole.  He reportedly improved and has been on posaconazole now for about 2 to 3 weeks.  At [facility], his serum Aspergillus galactomannan was negative and a Karius was only positive for MRSA.  He was transferred from [facility] to [facility] for further oncology management.  He continued to have fevers here, white count 0.8, was started on prophylactic levofloxacin, acyclovir, and continued on posaconazole.  Sputum cultures positive for MRSA so linezolid was added on 11/9 and later that day, he developed worsening respiratory distress, now transferred to the ICU and is on high flow nasal cannula oxygen, considering transition to comfort care.  Repeat CT scan with bilateral pulmonary airspace opacities with worsening compared to prior imaging, bilateral pleural effusions.    Of note, patient developed a mild skin rash while at [facility] when he was on vancomycin, cefepime, posaconazole, remained mild and improved after vancomycin and cefepime were discontinued    Review of Systems:  Unable to obtain: Medical condition    Past Medical  History:   Diagnosis Date    Diabetes (HCC)     oral meds       Past Surgical History:   Procedure Laterality Date    BONE ASPIRATION BIOPSY Left 9/10/2024    Procedure: ASPIRATION, BONE MARROW, WITH BIOPSY - REGANTI;  Surgeon: Alexis Rosas M.D.;  Location: SURGERY SAME DAY [facility];  Service: Orthopedics    PANENDOSCOPY N/A 9/5/2024    Procedure: EGD, WITH COLONOSCOPY;  Surgeon: Alexis Anglin M.D.;  Location: SURGERY [facility];  Service: Gastroenterology    TURP-VAPOR  8/14/2017    Procedure: Green Light Photorelective Vaporization of the Prostate;  Surgeon: Sagar Boykin M.D.;  Location: SURGERY [facility];  Service:        No family history on file.    Social History     Socioeconomic History    Marital status:      Spouse name: Not on file    Number of children: Not on file    Years of education: Not on file    Highest education level: Not on file   Occupational History    Not on file   Tobacco Use    Smoking status: Never    Smokeless tobacco: Never   Vaping Use    Vaping status: Never Used   Substance and Sexual Activity    Alcohol use: No    Drug use: No    Sexual activity: Not on file   Other Topics Concern    Not on file   Social History Narrative    Not on file     Social Drivers of Health     Financial Resource Strain: Not on file   Food Insecurity: No Food Insecurity (9/6/2024)    Hunger Vital Sign     Worried About Running Out of Food in the Last Year: Never true     Ran Out of Food in the Last Year: Never true   Transportation Needs: No Transportation Needs (9/6/2024)    PRAPARE - Transportation     Lack of Transportation (Medical): No     Lack of Transportation (Non-Medical): No   Physical Activity: Not on file   Stress: Not on file   Social Connections: Not on file   Intimate Partner Violence: Not At Risk (9/6/2024)    Humiliation, Afraid, Rape, and Kick questionnaire     Fear of Current or Ex-Partner: No     Emotionally Abused: No     Physically Abused: No     Sexually Abused: No    Housing Stability: Low Risk  (9/6/2024)    Housing Stability Vital Sign     Unable to Pay for Housing in the Last Year: No     Number of Times Moved in the Last Year: 0     Homeless in the Last Year: No       No Known Allergies    Medications:    Current Facility-Administered Medications:     voriconazole (Vfend) 440 mg in  mL IVPB, 6 mg/kg, Intravenous, Q12HRS, Stopped at 11/10/24 0725 **FOLLOWED BY** [START ON 11/11/2024] voriconazole (Vfend) 290 mg in  mL IVPB, 4 mg/kg, Intravenous, Q12HRS, Gilbert Pineda M.D.    atovaquone (Mepron) 750 MG/5ML suspension 750 mg, 750 mg, Oral, BID, Gilbert Pineda M.D.    ipratropium-albuterol (DUONEB) nebulizer solution, 3 mL, Nebulization, Q2HRS PRN (RT), Ivanna Longo M.D., 3 mL at 11/09/24 0435    Respiratory Therapy Consult, , Nebulization, Continuous RT, Mima Carrasco D.O.    famotidine (Pepcid) injection 20 mg, 20 mg, Intravenous, BID, Scottie Sidhu M.D., 20 mg at 11/10/24 0609    insulin GLARGINE (Lantus,Semglee) injection, 4 Units, Subcutaneous, QAM INSULIN, 4 Units at 11/10/24 0602 **AND** insulin lispro (HumaLOG,AdmeLOG) subcutaneous injection, 1-6 Units, Subcutaneous, Q6HRS, 2 Units at 11/10/24 0603 **AND** POC blood glucose manual result, , , Q6H **AND** NOTIFY MD and PharmD, , , Once **AND** Administer 20 grams of glucose (approximately 8 ounces of fruit juice) every 15 minutes PRN FSBG less than 70 mg/dL, , , PRN **AND** dextrose 50% (D50W) injection 25 g, 25 g, Intravenous, Q15 MIN PRN, RICKY PabloOLefty    Linezolid (Zyvox) premix 600 mg, 600 mg, Intravenous, Q12HRS, Gilbert Pineda M.D., Stopped at 11/10/24 0651    [COMPLETED] piperacillin-tazobactam (Zosyn) 4.5 g in  mL IVPB, 4.5 g, Intravenous, Once, Stopped at 11/10/24 0148 **AND** piperacillin-tazobactam (Zosyn) 4.5 g in  mL IVPB, 4.5 g, Intravenous, Q8HRS, Gilbert Pineda M.D., Stopped at 11/10/24 0737    hydrocortisone sodium succinate PF  "(Solu-CORTEF) 100 MG injection 50 mg, 50 mg, Intravenous, Q6HRS, Gilbert Pineda M.D., 50 mg at 11/10/24 0608    MD Alert...ICU Electrolyte Replacement per Pharmacy, , Other, PHARMACY TO DOSE, Gilbert Pineda M.D.    MBX (diphenhydrAMINE-lidocaine-Maalox) oral susp Cup 5 mL, 5 mL, Swish & Spit, Q6HR, Donaldo Seals III, M.D., 5 mL at 24 0321    sodium bicarb 0.5 mEq/mL oral rinse 10 mL, 10 mL, Swish & Spit, Q6HRS, Donaldo Seals III, M.D., 10 mL at 24 182    cycloSPORINE (SandIMMUNE) capsule 100 mg, 100 mg, Oral, BID, Donaldo Seals III, M.D., 100 mg at 24 182    tamsulosin (Flomax) capsule 0.4 mg, 0.4 mg, Oral, AFTER BREAKFAST, Scottie Sidhu M.D., 0.4 mg at 24 0823    acyclovir (Zovirax) tablet 400 mg, 400 mg, Oral, BID, Ivanna Longo M.D., 400 mg at 24 181    atorvastatin (Lipitor) tablet 10 mg, 10 mg, Oral, Q EVENING, Ivanna Longo M.D., 10 mg at 24 181    carvedilol (Coreg) tablet 3.125 mg, 3.125 mg, Oral, BID WITH MEALS, Ivanna Longo M.D., 3.125 mg at 24 181    loratadine (Claritin) tablet 10 mg, 10 mg, Oral, DAILY, Ivanna Longo M.D., 10 mg at 24 0440    Physical Exam:   Vital Signs: /58   Pulse 91   Temp 36.4 °C (97.5 °F) (Temporal)   Resp (!) 23   Ht 1.753 m (5' 9\")   Wt 73 kg (160 lb 15 oz)   SpO2 100%   BMI 23.77 kg/m²   Temp  Av.9 °C (98.5 °F)  Min: 35.7 °C (96.2 °F)  Max: 38 °C (100.4 °F)  Vital signs reviewed  Constitutional: Patient is ill-appearing, minimal interaction due to illness  Head: Atraumatic, normocephalic  Eyes: Conjunctivae normal  Mouth/Throat: Dry MM  Cardiovascular: Normal rate  Pulmonary/Chest: Mild accessory muscle use, on high flow nasal cannula oxygen  Abdominal: Non distended  Musculoskeletal: No joint tenderness, swelling, erythema, or restriction of motion noted.  Skin: Small few scattered erythematous macular rashes over upper extremities noted  Psychiatric: Unable to " "obtain    LABS:  Recent Labs     11/09/24  1504 11/09/24  2158 11/10/24  0155   WBC 0.7* 0.8* 0.5*      Recent Labs     11/08/24  0137 11/09/24  0128 11/09/24  1504 11/09/24  2158 11/10/24  0155   HEMOGLOBIN 6.5*   < > 8.3* 8.8* 7.9*   HEMATOCRIT 18.9*   < > 23.6* 25.0* 22.3*   MCV 86.3   < > 86.4 86.5 86.1   MCH 29.7   < > 30.4 30.4 30.5   MACROCYTOSIS 1+  --   --   --  1+   ANISOCYTOSIS 1+  --   --   --  1+   PLATELETCT 22*   < > 7* 64* 48*    < > = values in this interval not displayed.       Recent Labs     11/08/24  0137 11/09/24  0128 11/10/24  0155   SODIUM 134* 134* 137   POTASSIUM 3.2* 2.9* 2.8*   CHLORIDE 103 104 101   CO2 22 23 24   CREATININE 0.59 0.68 0.66        Recent Labs     11/08/24  0137 11/09/24  0128 11/10/24  0155   ALBUMIN 1.8* 2.1* 2.0*        MICRO:  No results found for: \"BLOODCULTU\", \"BLDCULT\", \"BCHOLD\"     Latest pertinent labs were reviewed    IMAGING STUDIES:  As above    Hospital Course/Assessment:   Miri Joseph is a 84 y.o. male patient with severe aplastic anemia, recently at [facility] where he was started on posaconazole and completed a course of linezolid for possible invasive fungal pneumonia based on CT scan findings of nodular opacities with surrounding groundglass changes, transferred to [facility] for further oncology management, developed respiratory distress on 11/9, now on high flow nasal cannula oxygen in the ICU, repeat CT scan with worsening multifocal bilateral airspace opacities with surrounding groundglass findings concerning for worsening invasive mold pneumonia.    Pertinent Diagnoses:  Acute hypoxic respiratory failure  Multifocal pneumonia, presumed invasive mold  Severe aplastic anemia  Immunosuppressed status    Plan:   -Will switch voriconazole to IV amphotericin B  -If able, recommend bronchoscopy with fungal, AFB, regular cultures, currently to unstable to perform per ICU team assessment  -Okay to continue linezolid and IV Zosyn for now.  Given severe " thrombocytopenia, okay to use IV vancomycin instead of linezolid for now, will need to keep a close eye on rash progression and renal function  -Follow blood cultures x 2, no growth till date  -Will check serum beta D glucan, Aspergillus galactomannan, Coccidioides serology and Cryptococcal antigen    Disposition: Unable to determine at this time  Need for PICC line: Unable to determine at this time    Plan was discussed with the primary team, Dr. Conteh and ID PharmD.  ID will follow. This patient is critically ill in the ICU with impairment of 1 or more organ systems.  Spent 40 minutes in the unit reviewing chart, documentation, imaging, labs, discussing with primary teams.  Time includes highly complex decision making to help support vital organ systems.  Care provided was not concurrent and not duplicative of the care provided by the critical care team.    Sven Orlando M.D.    Please note that this dictation was created using voice recognition software. I have worked with technical experts from [facility] to optimize the interface.  I have made every reasonable attempt to correct obvious errors, but there may be errors of grammar and possibly content that I did not discover before finalizing the note.

## 2024-11-10 NOTE — ASSESSMENT & PLAN NOTE
Severe    Was treated with course of abx at [facility] for MRSA PNA (linezolid and cefepime)  Has had pulmonary infitrates and hypoxemia to some degree since September  Since transfer to [facility] has been on ppx dosed antibiotics only (acyclovir, posaconazole, levaquin)     Marked worsening of hypoxemia and lung injury 11/8-11/9 11/9-Sputum gram stain results with MRSA, unclear if causative pathogen given prior respiratory cultures with MRSA, but with new hypoxemia and infiltrates will consider this pathogenic and treat accordingly     Severe CAP, ARDS in the setting of severe immunocompromise and neutropenia  CT chest with dense BLL consolidations, scattered nodular densities in multiple lobes certainly concerning for fungal etiology though non-diagnostic     Linezolid and Ptazo plus treatment dose PJP coverage (atovaquone d/t risk of myelosuppression) for now in the event this represent PJP  Corticosteroids HC 50 q6 x 5 days, have discussed risks/benefits with family given imunocompromise  Treatment dose Posiconazole for now, needs to be IV for now as patient unable to take anything oral safely     BDG and gallactomanan and PJP DFA and fungal sputum cultures sent  ID consult tomorrow     PEP  ISS  HFNO  Mobility  DNR/DNI so will manage non-invasively to the best of our ability

## 2024-11-10 NOTE — PROGRESS NOTES
Received page form nursing staff that patient had increased respiratory distress and work of breathing, now requiring 15L NC. Evaluated pt at bedside, pt with extensive rhonci and poor aeration throughout, 2+ b/l LE pitting edema. RT at bedside placed patient on High Flow 50L 80%. Stat CBC, abg ordered. Gave patient IV Lasix 40mg. Likely secondary to worsening heart failure, MI vs PE also possible. ABG results, pH 7.41 PCO2 42.1, PO2 91.5 HCO3 26. RT increased High Flow to 60 L 100%. Patient tolerated High Flow with improvement of oxygen saturation. Consulted on-call [facility] physician who was concerned that pt is not a good candidate for BiPAP and recommended comfort measures. [facility] physician had extensive conversation with family who decided to pursue further treatment. Pt was transferred to [facility] for escalation of care.

## 2024-11-11 PROBLEM — N17.9 AKI (ACUTE KIDNEY INJURY) (HCC): Status: ACTIVE | Noted: 2024-01-01

## 2024-11-11 PROBLEM — Z71.89 GOALS OF CARE, COUNSELING/DISCUSSION: Status: ACTIVE | Noted: 2024-01-01

## 2024-11-11 PROBLEM — R65.21 SEPTIC SHOCK (HCC): Status: ACTIVE | Noted: 2024-01-01

## 2024-11-11 PROBLEM — A41.9 SEPTIC SHOCK (HCC): Status: ACTIVE | Noted: 2024-01-01

## 2024-11-11 NOTE — PROGRESS NOTES
1212 Informed MD Conteh about critically high BG that could not be read by the glucometer. BMP was ordered and sent at 1220.     1313 Called lab regarding status of labs. Have not been completed at this time.     1350 Called lab regarding status of labs. Labs given.

## 2024-11-11 NOTE — ASSESSMENT & PLAN NOTE
This is Septic shock Not present on admission  SIRS criteria identified on my evaluation include: Tachypnea, with respirations greater than 20 per minute and Leukopenia, with WBC less than 4,000  Clinical indicators of end organ dysfunction include Hypotension with systolic blood pressure less than 90 or MAP less than 65, Thrombocytopenia, with platelets less than 100, and GCS < 15  Indicators of septic shock include: Sepsis present and persistent hypotension despite fluid resuscitation   Sources is: Pulmonary, sputum culture growing MRSA and chest x-ray consistent with pneumonia, patient also with persistent pancytopenia and no neutrophils  Sepsis protocol initiated  Crystalloid Fluid Administration: Fluid resuscitation ordered per standard protocol -patient has already received fluids and with severe hypoalbuminemia and IV fluid shortage will administer IV albumin  IV antibiotics as appropriate for source of sepsis  Reassessment: I have reassessed the patient's hemodynamic status    Actively titrating norepinephrine  Continue midodrine  ID managing antibiotics-vancomycin/piperacillin/tazobactam  Prophylactic agents ongoing for severe neutropenia  Amphotericin discontinued by ID  ID resuming posaconazole  S/p IV albumin 25 g every 6 x 4  Status post multiple liters of fluids and fluid balance positive significant respiratory failure and developing ESTHER-caution with fluids  Lactic acid level improved  Clinically improved -tapering hydrocortisone off by 11/14  Additional fluids as clinically prudent, POCUS as needed

## 2024-11-11 NOTE — PROGRESS NOTES
MONITOR SUMMARY  Rate:  Rhythm: Sinus rhythm/sinus tach- progressively slowing to sinus   Ectopy: Occasional PVCs and PACs  Measurements:0.175/0.069/0.438

## 2024-11-11 NOTE — THERAPY
Speech Language Therapy Contact Note    Patient Name: Miri Joseph  Age:  84 y.o., Sex:  male  Medical Record #: [id number]  Today's Date: 11/11/2024    Discussed pt's status w/ MD- hold FEES for now- monitor for clinical improvement.

## 2024-11-11 NOTE — CARE PLAN
The patient is Watcher - Medium risk of patient condition declining or worsening    Shift Goals  Clinical Goals: spo2>92%, MAP>65 or SBP>90, Q2 turns  Patient Goals: sleep  Family Goals: frequent oral care    Progress made toward(s) clinical / shift goals:  all goals achieved    Patient is not progressing towards the following goals: NA      Problem: Pain - Standard  Goal: Alleviation of pain or a reduction in pain to the patient’s comfort goal  Outcome: Progressing  Note: No complaints or signs of pain overnight     Problem: Respiratory:  Goal: Respiratory status will improve  Outcome: Progressing  Note: HFNC on minimal settings, should be able to trial nasal cannula on day shift     Problem: Skin Integrity  Goal: Skin integrity is maintained or improved  Outcome: Progressing  Note: Q2 turns with wedges, heels floated with pillow and with mepilexes in place, pillows under arms

## 2024-11-11 NOTE — PROGRESS NOTES
Critical Care Progress Note    Date of admission  11/6/2024    Chief Complaint  84 y.o. male who presented 11/6/2024 with medical history of type II DM, recent diagnosis of aplastic anemia who was transferred to [facility] [place]ology care.  Evidently [facility] was treated for neutropenic fever with pneumonia and was noted to have pancytopenia.  He had a bone marrow biopsy performed consistent with severe aplastic anemia.  He was specifically transferred over here to obtain medical oncology services into initiate inpatient treatment with Promacta and cyclosporine which was initiated upon admission.     Throughout his hospitalization including [facility] he has had respiratory failure and hypoxemia and pulmonary infiltrates, initially had cultures at the [facility] with MRSA he was treated with broad-spectrum antibiotics for a course of pneumonia therapy there and also treated with antifungals given nodular pulmonary opacities and immunocompromise but reportedly had a negative galactomannan and Karius there. He was on posi, then amphoB, then back to posi as dictated by ID at [facility]. Completed course of linezolid and cefepime as well.     According to his discharge summary from 10/19/2024 from [facility] he had a mopped assist, pneumonia aplastic anemia and was treated for neutropenic fever with presumed pneumonia.     Ultimately he was transferred to [facility] for the initiation of chemotherapy and medical oncology evaluation.     During ED on 11/9 I was called to his bedside for respiratory stress, work of breathing, fatigue, and worsening oxygenation requiring max high flow.  Patient to CT was performed today showing dense bilateral consolidations with scattered nodularity involving most lobes of the lung.  Patient was transferred to ICU for further care  (Dr Pineda HPI 11/9)    Hospital Course    11/10 -oxygenation mildly improved since remains on HFNC, voice better with oral  care but failed swallow eval and feeding tube placed, Affirmed CODE STATUS with physician son and his sister, discussed possible comfort care if clinically worsens, ID concern of fungal infection adding Amphotericin to PIP/Tazo, secondary thrombocytopenia linezolid changed to vancomycin, blood cultures negative-sputum with MRSA    Interval Problem Update  Reviewed last 24 hour events:    Discussed with daughter at length at the bedside.    A&O x2, voice makes it difficult to be certain  Nodding appropriately and following commands, phonation still difficult improved  SR 80s  SBP 80-100s  UO adequate  Mckee placed last night - 700cc on bladder scan  Norepinephrine 0.1, Actively titrated  WOB ok  HFNC 25/30  CXR no film  Tmax 98  WBC 0.6  Cultures MRSA  Acyclovir Mepron  Vancomycin, piperacillin/tazobactam, liposomal Amphotericin  Hydrocortisone/cyclosporine  Glargine/SSI   Thiamine    Add midodrine  Albumin 25 g every 6 x 4  Continue fluids via feeding tube  Reviewed PICC with ID and PICC team via text  Antibiotics per ID  Increase lantus  Ongoing GOC discussions    Case reviewed with ID-Dr. Orlando at length    Called by RN, NG pulled by patient intermittently, soft restraints as needed, hold heavy sedation, will get family involved in assisting patient to not pull out tubes    Called by RN re: fingerstick blood sugar >600.  Will check BMP, magnesium and beta hydroxybutyric acid level.  Suggest glucose level out-of-control in part due to infectious process and norepinephrine use.  Likely will need to escalate insulin 180 infusion protocol which has been ordered pending confirmation with chemistries from the lab.         YESTERDAY  Case d/w ID at the bedside at length    A&O x 2  Follows, voice off  SR/ST  -170s   cc / 12 hours  HFNC 40/60%  O2 sats 99%  AB.48/36/53   CXR: Bilateral opacities similar to the most recent film, markedly worsened versus 10/19 film  WOB improved borderline  tachypneic but not labored now  Oral care quite productive per RN  CT soft tissue neck today without airway narrowing some right mastoid sinus fluid  Tmax 99.9  WBC 0.5  9/11 sputum MRSA  Blood cultures negative so far  UA negative  Normal RUQ GB U/S  NPO c/o aspiration    Continue current broad antibiotic regimen  Follow-up w/ID consultation they are following  Follow-up w/Hematology they are following  Aggressive pulmonary toilet  Wean HFNC  Mobilize  Encourage nutrition  Swallow eval  Oral care Q2H by RN/RT  Add IV thiamine  Change linezolid to vancomycin for thrombocytopenia    Case reviewed with daughter and son.  Daughter here in the ICU with son on the phone.  Outlined condition again today and reviewed our treatment regimen.  Reaffirmed DNR/DNI.  Discussed options for medications and nutrition and both are okay with a soft flexible small feeding tube for medications as well as nutrition if he is willing and the daughter will speak to him for us.  Outlined MRSA positive cultures and concerns recurrent staph pneumonia.  Thrombocytopenia may force us to change antibiotics away from linezolid to something else.  Per son patient may have had a vancomycin reaction.  I spoke with our ID specialist who will reach out to the [facility] ID specialist at clear that issue up.    Review of Systems  Review of Systems   Unable to perform ROS: Acuity of condition        Vital Signs for last 24 hours   Temp:  [35.8 °C (96.4 °F)-36.7 °C (98 °F)] 35.8 °C (96.4 °F)  Pulse:  [75-99] 83  Resp:  [18-49] 29  BP: ()/(43-66) 111/54  SpO2:  [91 %-100 %] 100 %    Hemodynamic parameters for last 24 hours       Respiratory Information for the last 24 hours       Physical Exam   Physical Exam  Vitals reviewed.   Constitutional:       General: He is not in acute distress.     Appearance: He is normal weight. He is ill-appearing (Appears improved). He is not toxic-appearing or diaphoretic.      Comments: HFNC, WOB improved    HENT:      Head: Normocephalic and atraumatic.      Mouth/Throat:      Mouth: Mucous membranes are moist.   Eyes:      Pupils: Pupils are equal, round, and reactive to light.   Cardiovascular:      Rate and Rhythm: Regular rhythm. Tachycardia present.      Pulses: Normal pulses.      Heart sounds: No murmur heard.  Pulmonary:      Breath sounds: Rhonchi (improved) and rales (Improved) present.      Comments: HFNC  Abdominal:      General: Bowel sounds are normal. There is no distension.      Palpations: Abdomen is soft.      Tenderness: There is no right CVA tenderness, left CVA tenderness or guarding.   Musculoskeletal:      Cervical back: Neck supple. No rigidity.      Right lower leg: No edema.      Left lower leg: No edema.   Lymphadenopathy:      Cervical: No cervical adenopathy.   Skin:     General: Skin is warm and dry.      Capillary Refill: Capillary refill takes 2 to 3 seconds.      Coloration: Skin is not cyanotic.      Findings: Bruising present.      Nails: There is no clubbing.   Neurological:      Mental Status: He is lethargic.      Cranial Nerves: Cranial nerves 2-12 are intact.      Comments: Alert, tracking and nodding appropriately and following commands, voice still poor but trying to speak more but has difficult phonation   Psychiatric:         Behavior: Behavior is cooperative.         Medications  Current Facility-Administered Medications   Medication Dose Route Frequency Provider Last Rate Last Admin    midodrine (Proamatine) tablet 10 mg  10 mg Enteral Tube Q8HRS Michel Conteh M.D.   10 mg at 11/11/24 0915    albumin human 25% solution 25 g  25 g Intravenous Q6HRS Michel Conteh M.D. 150 mL/hr at 11/11/24 1201 25 g at 11/11/24 1201    [START ON 11/12/2024] famotidine (Pepcid) injection 20 mg  20 mg Intravenous DAILY Michel Conteh M.D.        insulin regular (HumuLIN R/NovoLIN R) 1 unit/mL syringe (IV ONLY) 0-14 Units  0-14 Units Intravenous Once Michel Conteh M.D.         insulin regular (HumuLIN R/NovoLIN R) 100 Units in  mL infusion premix  0-29 Units/hr Intravenous Continuous Michel Conteh M.D.        dextrose 50% (D50W) injection 12.5-25 g  12.5-25 g Intravenous PRN Michel Conteh M.D.        NS (Bolus) 0.9 % infusion 500 mL  500 mL Intravenous Once PRN Michel Conteh M.D.        ceftaroline (Teflaro) 400 mg in  mL IVPB  400 mg Intravenous Q12HRS Sven Orlando M.D.        Pharmacy Consult Request for Amphotericin B monitoring  1 Each Other PRN Sven Orlando M.D.        NS infusion 500 mL  500 mL Intravenous Q24HR Sven Orlando M.D. 500 mL/hr at 11/11/24 1028 500 mL at 11/11/24 1028    And    acetaminophen (Tylenol) tablet 650 mg  650 mg Oral Q24HR Sven Orlando M.D.   650 mg at 11/11/24 1106    And    diphenhydrAMINE (Benadryl) tablet/capsule 25 mg  25 mg Oral Q24HR Sven Orlando M.D.   25 mg at 11/11/24 1106    And    dextrose 5% infusion 30 mL  30 mL Intravenous Q24HR Sven Orlando M.D. 999 mL/hr at 11/11/24 1130 30 mL at 11/11/24 1130    And    amphotericin B liposome (Ambisome) 350 mg in dextrose 5% 250 mL IVPB  350 mg Intravenous Q24HR Sven Orlando M.D.   Stopped at 11/11/24 1335    And    dextrose 5% infusion 30 mL  30 mL Intravenous Q24HR Sven Orlando M.D. 999 mL/hr at 11/11/24 1258 30 mL at 11/11/24 1258    And    NS infusion 250 mL  250 mL Intravenous Q24HR Sven Orlando M.D. 250 mL/hr at 11/11/24 1301 250 mL at 11/11/24 1301    K+ Scale: Goal of 4.5  1 Each Intravenous Q6HRS Michel Conteh M.D.   1 Each at 11/11/24 0600    thiamine (B-1) injection 200 mg  200 mg Intravenous BID Michel Conteh M.D.   200 mg at 11/11/24 0512    Pharmacy Consult: Enteral tube insertion - review meds/change route/product selection   Other PHARMACY TO DOSE Michel Conteh M.D.        acyclovir (Zovirax) tablet 400 mg  400 mg Enteral Tube BID Michel Conthe M.D.   400 mg at 11/11/24 0512    atorvastatin (Lipitor) tablet  10 mg  10 mg Enteral Tube Q EVENING Michel Conteh M.D.   10 mg at 11/10/24 1714    atovaquone (Mepron) 750 MG/5ML suspension 750 mg  750 mg Enteral Tube BID Michel Conteh M.D.   750 mg at 11/11/24 0600    loratadine (Claritin) tablet 10 mg  10 mg Enteral Tube DAILY Michel Conteh M.D.   10 mg at 11/11/24 0512    [Held by provider] cycloSPORINE (modified) (Neoral) 100 MG/ML microemulsion solution 100 mg  100 mg Enteral Tube BID Michel Conteh M.D.   100 mg at 11/11/24 0512    potassium bicarbonate (Klyte) effervescent tablet 25 mEq  25 mEq Enteral Tube TID Michel Conteh M.D.   25 mEq at 11/11/24 0512    norepinephrine (Levophed) 8 mg in 250 mL NS infusion (premix)  0-1 mcg/kg/min (Ideal) Intravenous Continuous Fani Walker M.D. 8 mL/hr at 11/11/24 1200 0.06 mcg/kg/min at 11/11/24 1200    ipratropium-albuterol (DUONEB) nebulizer solution  3 mL Nebulization Q2HRS PRN (RT) Ivanna Longo M.D.   3 mL at 11/09/24 0435    Respiratory Therapy Consult   Nebulization Continuous RT Mima Carrasco D.O.        hydrocortisone sodium succinate PF (Solu-CORTEF) 100 MG injection 50 mg  50 mg Intravenous Q6HRS Gilbert Pineda M.D.   50 mg at 11/11/24 1202    MD Alert...ICU Electrolyte Replacement per Pharmacy   Other PHARMACY TO DOSE Gilbert Pineda M.D.        MBX (diphenhydrAMINE-lidocaine-Maalox) oral susp Cup 5 mL  5 mL Swish & Spit Q6HR Donaldo Seals III, M.D.   5 mL at 11/10/24 1312    sodium bicarb 0.5 mEq/mL oral rinse 10 mL  10 mL Swish & Spit Q6HRS Donaldo Seals III, M.D.   10 mL at 11/10/24 1318    [Held by provider] tamsulosin (Flomax) capsule 0.4 mg  0.4 mg Oral AFTER BREAKFAST Scottie Sidhu M.D.   0.4 mg at 11/08/24 0823       Fluids    Intake/Output Summary (Last 24 hours) at 11/11/2024 1313  Last data filed at 11/11/2024 1200  Gross per 24 hour   Intake 3747.32 ml   Output 1135 ml   Net 2612.32 ml       Laboratory  Recent Labs     11/09/24  0504 11/09/24  1092  11/10/24  0257   DZCDG77N 7.46 7.41  --    SQTBBY565S 30.8 42.1*  --    MIYXE965S 129.6* 66.8  --    SZDE8PKK 98.2 91.5*  --    ARTHCO3 22 26*  --    W6LDZGABN 60 % 50  --    ARTBE -2 2  --    ISTATAPH  --   --  7.466   ISTATAPCO2  --   --  37.0   ISTATAPO2  --   --  55*   ISTATATCO2  --   --  28   [id number]  --   --  90*   ISTATARTHCO3  --   --  26.7*   ISTATARTBE  --   --  3   ISTATTEMP  --   --  97.4 F   ISTATSPEC  --   --  Arterial   ISTATAPHTC  --   --  7.476   PWLBFINE5XV  --   --  53*         Recent Labs     11/09/24  0128 11/10/24  0155 11/10/24  1445 11/10/24  1842 11/10/24  2352 11/11/24  0510   SODIUM 134* 137  --   --   --  132*   POTASSIUM 2.9* 2.8*   < > 3.3* 3.5* 3.5*   CHLORIDE 104 101  --   --   --  100   CO2 23 24  --   --   --  22   BUN 22 19  --   --   --  42*   CREATININE 0.68 0.66  --   --   --  1.23   MAGNESIUM 1.6 1.3*  --   --   --  2.8*   PHOSPHORUS 1.9* 3.8  --   --   --  2.6   CALCIUM 7.8* 8.0*  --   --   --  8.2*    < > = values in this interval not displayed.     Recent Labs     11/09/24  0128 11/10/24  0155 11/11/24  0510   ALTSGPT 60* 34 31   ASTSGOT 74* 29 32   ALKPHOSPHAT 119* 95 87   TBILIRUBIN 2.0* 2.1* 1.7*   DBILIRUBIN  --  1.4*  --    GLUCOSE 195* 291* 261*     Recent Labs     11/09/24  0128 11/09/24  1504 11/09/24  2158 11/10/24  0155 11/11/24  0510   WBC 0.7*   < > 0.8* 0.5* 0.6*   NEUTSPOLYS 0.00*  --   --  1.00* 0.80*   LYMPHOCYTES 99.10*  --   --  98.00* 98.20*   MONOCYTES 0.90  --   --  1.00 0.00   EOSINOPHILS 0.00  --   --  0.00 0.00   BASOPHILS 0.00  --   --  0.00 0.00   ASTSGOT 74*  --   --  29 32   ALTSGPT 60*  --   --  34 31   ALKPHOSPHAT 119*  --   --  95 87   TBILIRUBIN 2.0*  --   --  2.1* 1.7*    < > = values in this interval not displayed.     Recent Labs     11/09/24  2158 11/10/24  0155 11/11/24  0510   RBC 2.89* 2.59* 2.79*   HEMOGLOBIN 8.8* 7.9* 8.4*   HEMATOCRIT 25.0* 22.3* 23.9*   PLATELETCT 64* 48* 20*   PROTHROMBTM  --  21.6*  --    INR  --  1.87*  --         Imaging  X-Ray:  I have personally reviewed the images and compared with prior images.  CT:    Reviewed  Ultrasound:  Reviewed    Assessment/Plan  * Septic shock (HCC)  Assessment & Plan  This is Septic shock Not present on admission  SIRS criteria identified on my evaluation include: Tachypnea, with respirations greater than 20 per minute and Leukopenia, with WBC less than 4,000  Clinical indicators of end organ dysfunction include Hypotension with systolic blood pressure less than 90 or MAP less than 65, Thrombocytopenia, with platelets less than 100, and GCS < 15  Indicators of septic shock include: Sepsis present and persistent hypotension despite fluid resuscitation   Sources is: Pulmonary, sputum culture growing MRSA and chest x-ray consistent with pneumonia, patient also with persistent pancytopenia and no neutrophils  Sepsis protocol initiated  Crystalloid Fluid Administration: Fluid resuscitation ordered per standard protocol -patient has already received fluids and with severe hypoalbuminemia and IV fluid shortage will administer IV albumin  IV antibiotics as appropriate for source of sepsis  Reassessment: I have reassessed the patient's hemodynamic status    Actively titrating norepinephrine  ID managing antibiotics-vancomycin/piperacillin/tazobactam  Prophylactic agents ongoing for severe neutropenia  ID concern of fungal infection as well - empiric liposomal Amphotericin B ongoing  IV albumin 25 g every 6 x 4  Status post multiple liters of fluids and fluid balance positive significant respiratory failure and developing ESTHER-caution with fluids  Lactic acid level pending    ESTHRE (acute kidney injury) (HCC)  Assessment & Plan  Clear related to sepsis  Medications may be contributing  Appears euvolemic if not hypervolemic  Avoid nephrotoxins  Renally dose medications  Serial BMP  Monitor UO  Not a candidate for RRT    MRSA pneumonia (HCC)- (present on admission)  Assessment & Plan  Was treated with  course of abx at [facility] for MRSA PNA (vancomycin/linezolid and cefepime)  Has had pulmonary infitrates and hypoxemia to some degree since September  Since transfer to [facility] has been on ppx dosed antibiotics only (acyclovir, vori, levaquin)  10/19 chest x-ray from [facility] minimal opacities    Marked worsening of hypoxemia and lung injury 11/8-11/9 and new extensive bilateral pneumonic appearing infiltrates  11/9-Sputum gram stain results with MRSA, unclear if causative pathogen given prior respiratory cultures with MRSA, but with new hypoxemia and infiltrates will consider this pathogenic and treat accordingly     Severe CAP, ARDS in the setting of severe immunocompromise and neutropenia  CT chest with dense BLL consolidations    Linezolid and piperacillin/tazobactam-ID to follow-up and modify  Corticosteroids HC 50 q6 x 5 days, have discussed risks/benefits with family given imunocompromised state  Continue thiamine intravenously to the regimen as well    BDG and gallactomanan sent - review when available  History reviewed with ID, some concern of possible fungal infection, empiric Amphotericin per ID  CT scan reviewed and imaging most suggestive of consolidated pneumonia/MRSA, I do not see a halo sign    PEP/IS encouraged  HFNC O2, weaning  Mobility as able, patient very weak  DNR/DNI so will manage non-invasively to the best of our ability  Not a candidate for BiPAP    Type 2 diabetes mellitus, without long-term current use of insulin (HCC)- (present on admission)  Assessment & Plan  Target normoglycemia  Sugar trending up in a.m. and Lantus dose to be adjusted, changed to high sliding scale  Midday sugars dramatically elevated to 600  Check BMP, beta hydroxybutyric acid, lactic acid and magnesium stat  Likely will need insulin 180 infusion protocols, doubt DKA back hyperglycemia related to sepsis and the use of norepinephrine infusion    Pancytopenia (HCC)- (present on admission)  Assessment & Plan  BM biopsy consistent  with severe aplastic anemia, etiology unclear to me at this time DDX includes hypoplastic MDS versus PNH, and aplastic process, or infection/nutritional disorder/toxic myelosuppression.     Diagnostic evaluation in september and at [facility], viral studies negative, hepatitis panel negative,   Oncology following    On cyclosporine 100 bid, but cannot take orals currently  Promacta (eltrombopag) for thrombocytopenia 75 mg daily  Continue prophylactic antimicrobials  B12/MMA, folate levels resent, previously low    Tranfuse PRBC for <7  Transfuse platelets < 10, may warrant higher threshold given possibility of DAH though CT doesn't look strikingly like DAH  CT more likely MRSA pneumonia with positive cultures    Will review with hematology when they are available re: likelihood of recovery, apparently resistant to colony-stimulating factor so far      Respiratory failure (HCC)  Assessment & Plan  Reviewed hypoxic respiratory failure secondary to pneumonia  MRSA positive cultures recently at [facility] and they are recurrently positive now  DNR/DNI reaffirmed  Moved to unit for aggressive pulmonary toilet and oral care  HFNC O2 initiated initially on 100% / 60 L flow-since improved to 40/60  Not a candidate for BiPAP or diagnostic bronchoscopy, he would need to be intubated for that given his current status  RT protocols  Oral care every-2 hours  Aspiration precautions, swallow eval pending  Hydrocortisone added to antibiotic regimen for severe pneumonia with respiratory failure ID managing antibiotics  If patient clinically deteriorates transition to comfort care/hospice, discussed with daughter and son who are in agreement    Elevated LFTs  Assessment & Plan  Worsening today, no focal abdominal pain  RUQUS ordered-> normal  Trend LFTs while on antifungals  Avoid hepatotoxins  Monitor synthetic function as clinically prudent      Goals of care, counseling/discussion  Assessment & Plan  Family has participated in discussions for  extended period time and DNR/DNI order is in place  Reaffirmed this order on conversations with physician son and his sister 11/10  Also reviewed current diagnosis, treatment plan and prognosis.  Primary concern is refractory persistent aplastic anemia resulting in severe neutropenia.  If but more likely when he develops recurrent refractory sepsis and MOSF family okay with transitioning to CC.    Dysphagia  Assessment & Plan  Patient with worsening dysphagia over the last few days  Unable to swallow, has been aspirating, weak phonation  Suspect airway in part desiccated due to tachypnea/pneumonia and poor p.o. intake    Swallow evaluation/FEES pending for today  High risk for airway scope given thrombocytopenia  Not a candidate for NIV or PPV given inability to protect airway  NPO for now  Oral care every 1-2-hour, this seemed to help overnight, phonation a bit better - significant secretions cleared from airway overnight by RN  CT soft tissue neck without airway issue or obvious abscess/obstructing process    Severe malnutrition (HCC)- (present on admission)  Assessment & Plan  Eval pending  Family okay with soft flexible feeding tube and enteral nutrition  Swallow eval failed, small bore soft flexible tube  If necessary cautiously place tube with thrombocytopenia, lidocaine jelly ordered    GERD (gastroesophageal reflux disease)- (present on admission)  Assessment & Plan  With hydrocortisone and thrombocytopenia -continue IV PPI daily for prophylaxis  Antireflux measures    Thrombocytopenia (HCC)- (present on admission)  Assessment & Plan  Attributable to pancytopenia -aplastic anemia    Transfuse for platelets < 10 per med onc or if bleeding           VTE:  Contraindicated  Ulcer: Not Indicated  Lines: None    I have performed a physical exam and reviewed and updated ROS and Plan today (11/11/2024). In review of yesterday's note (11/10/2024), there are no changes except as documented above.     Discussed patient  condition and risk of morbidity and/or mortality with Family, RN, RT, Pharmacy, [employer] [facility] resident, Code status disscussed, Charge nurse / hot rounds, Patient, and infectious disease    The patient remains critically ill.  Critical care time = 60 minutes in directly providing and coordinating critical care and extensive data review.  No time overlap and excludes procedures.

## 2024-11-11 NOTE — PROCEDURES
Vascular Access Team     Date of Insertion: 11/11/24  Arm Circumference: 28   Internal length: 40  External Length: 0  Vein Occupancy %: 45   Reason for PICC: chemo  Labs: WBC 0.6, PLT 20, BUN 42, Cr 1.23, GFR 58, INR na     Consents confirmed, vessel patency confirmed with ultrasound. Risks and benefits of procedure explained to patient and other and education regarding central line associated bloodstream infections provided. Questions answered.      PICC placed in LUE per licensed provider order with ultrasound guidance.  5 Fr, double lumen PICC placed in brachial vein after 1 attempt(s). 2 mL of 1% lidocaine injected intradermally at the insertion site. A 21 gauge microintroducer needle was visualized entering the vein and modified Seldinger technique was used to obtain access to the vein. 40 cm catheter inserted and brisk blood return was observed from each lumen upon aspiration. Line secured at the 0 cm marker. TCS stylet removed and observed to be fully intact. Each lumen flushed using pulsatile method without resistance with 10 mL 0.9% normal saline. PICC line secured with Biopatch and Tegaderm.     PICC tip placement location is confirmed by nurse to be in the Superior Vena Cava (SVC) utilizing 3CG technology. PICC line is appropriate for use at this time. Patient tolerated procedure well, without complications.  Patient condition relayed to primary RN or ordering physician via this post procedure note in the EMR.      Ultrasound images uploaded to PACS and viewable in the EMR - yes  Ultrasound imaged printed and placed in paper chart - no     BARD Power PICC ref # [id number], Lot # [id number], Expiration Date 10/31/25

## 2024-11-11 NOTE — CARE PLAN
The patient is Watcher - Medium risk of patient condition declining or worsening    Shift Goals  Clinical Goals: Titrate O2, oral care, Q2 turns  Patient Goals: Sleep  Family Goals: Oral care and make sure patient eats    Progress made toward(s) clinical / shift goals:  FiO2 down from start of shift, NG tube placed, multiple ABX added/modified today, added antifungal, potassium replacement protocol added, 2 PIVs added, mobilized to standing, PICC consult placed, per wound team sacrum improved.    Patient is not progressing towards the following goals: Very weak cough, very lethargic, high aspiration risk but has a dry mouth, difficult to understand- without family uses written communication. Required Levo initiation    Problem: Pain - Standard  Goal: Alleviation of pain or a reduction in pain to the patient’s comfort goal  Outcome: Progressing     Problem: Hemodynamics  Goal: Patient's hemodynamics, fluid balance and neurologic status will be stable or improve  Outcome: Progressing     Problem: Mobility  Goal: Risk for activity intolerance will decrease  Outcome: Progressing       Problem: Risk for Aspiration  Goal: Patient's risk for aspiration will be absent or decrease  Outcome: Not Progressing     Problem: Infection - Standard  Goal: Patient will remain free from infection  Outcome: Not Progressing

## 2024-11-11 NOTE — ASSESSMENT & PLAN NOTE
Clear related to sepsis  Medications may be contributing  Appears euvolemic if not hypervolemic  Avoid nephrotoxins  Renally dose medications  Serial BMP  Monitor UO  Not a candidate for RRT    Creatinine improving, continue to trend  Amphotericin and vancomycin both discontinued  Status post fluid bolus and albumin  Hemodynamics improved  Hep-Lock IV fluids, tolerating tube feeds and water boluses via enteral tube

## 2024-11-11 NOTE — PROGRESS NOTES
" Hematology/Oncology Progress Note    Primary Hematologist/Oncologist: Tracy    Oncology History:  9/03/2024: Presented to ER with abdominal pain, bloody stools, dizziness.  Found to be pancytopenic on presentation.     9/10/24: Bone marrow biopsy demonstrating hypocellular bone marrow at 15% cellular with prominent stromal elements, markedly decreased maturing granulocytic precursors, decreased erythroid precursors with slight dyserythropoiesis, and markedly decreased megakaryocytes. MDS FISH was negative and heme comprehensive NGS was negative.  Final diagnosis most consistent with severe aplastic anemia and severe B12 deficiency.    9/10/24-10/21/24: Initiated on daily-weekly-monthly intramuscular B12 1000 mcg.  Unfortunately, no improvements in his cell counts despite improving B12 levels.    10/22/2024: Admission to [facility] with neutropenic fever and cough.    11/6/2024: Transfer from [facility] to [facility] [place] for inpatient cyclosporine and Promacta.    Interval History:  No events overnight.    Review of Systems:  Review of Systems   Unable to perform ROS: Critical illness        Vitals:     BP 96/55   Pulse 91   Temp 35.9 °C (96.6 °F) (Bladder)   Resp (!) 30   Ht 1.753 m (5' 9\")   Wt 73 kg (160 lb 15 oz)   SpO2 92%   BMI 23.77 kg/m²     Physical Exam:  Physical Exam  Vitals and nursing note reviewed.   Constitutional:       General: He is not in acute distress.     Appearance: He is not toxic-appearing.   HENT:      Head: Normocephalic and atraumatic.   Eyes:      General: No scleral icterus.     Pupils: Pupils are equal, round, and reactive to light.   Cardiovascular:      Rate and Rhythm: Normal rate and regular rhythm.      Pulses: Normal pulses.      Heart sounds: No murmur heard.     No friction rub. No gallop.   Pulmonary:      Effort: Respiratory distress present.      Breath sounds: No stridor. Rhonchi and rales present. No wheezing.   Abdominal:      General: Abdomen is flat. Bowel sounds are normal.      " Palpations: Abdomen is soft.   Musculoskeletal:         General: No swelling.      Cervical back: No rigidity.   Skin:     General: Skin is warm and dry.      Capillary Refill: Capillary refill takes 2 to 3 seconds.      Coloration: Skin is not jaundiced.   Neurological:      General: No focal deficit present.      Mental Status: He is alert and oriented to person, place, and time. Mental status is at baseline.   Psychiatric:         Mood and Affect: Mood normal.          Assessment and Plan:    Severe aplastic anemia  Immunocompromise state  Pancytopenia   he was initially diagnosed in 2024.  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  On 2024 he was transferred for consideration of initiation of cyclosporine and eltrombopag.  We are currently working on obtaining outpatient delivery of eltrombopag due to supply in hospital.  Cyclosporine is unfortunately on hold due to clinical deterioration.  Once he has recovered from infectious standpoint, we will reinitiate cyclosporine at 5 mg/kg/day.  Unfortunately, given his ongoing severe infectious status, antithymocyte globulin would lead to fatal infectious complications due to his severe, deep, and prolonged degree of immune compromise.    Given his performance status, cyclosporine will be reinitiated at 2.5 mg/kg and uptitrated as tolerated.  In addition, Promacta will be initiated 75 mg once obtained.    Neutropenic fever  Fungal pneumonia  MRSA pneumonia  ID following.  He is currently on amphotericin B, IV vancomycin, and IV Zosyn.    ARDS  He is currently on heated high flow nasal cannula.  He remains tachypneic with a respiratory rate in the 30s.    B12 deficiency  Continue monthly B12 injections; next due 2024    TRANSFUSION THRESHOLDS:  RBCs and PLTs must be leukoreduced, CMV negative, and irradiated  Hgb <7 if no symptoms or <8 with symptoms or bleedinu pRBC  PLTs <10 and no symptoms or <20 if febrile, septic, or  bleeidnu apheresis PLTs     Plan:  Continue excellent care from critical care, hospital medicine, and infectious disease services  Hematology will continue to follow  Will resume cyclosporine once infection under better control    Thank you for allowing me to participate in his care. Please do not hesitate to reach out with any questions.    Please note that this dictation was created using voice recognition software. I have made every reasonable attempt to correct obvious errors, but I expect that there are errors of grammar and possibly content that I did not discover before finalizing the note.      Howie Long MD  Hematologist/Oncologist  [facility]   [facility] - [facility]

## 2024-11-11 NOTE — PROGRESS NOTES
Notified Celestine Gold of bladder scan result over 700, patient has been straight cathed twice since admission but not consecutively. TOWRB to place a bishop at this time.

## 2024-11-11 NOTE — PROGRESS NOTES
Infectious Disease Progress Note    Author: Sven Orlando M.D. Date & Time of service: 2024  10:44 AM    Chief Complaint:  Follow-up for pneumonia    Interval History:   Tmax 98, white count 0.6, ANC 0, platelets 20, cultures as below, creatinine 1.23, normal transaminases, some improvement in oxygen requirements, down to 5 L oxime mask    Labs Reviewed and Medications Reviewed.    Review of Systems:  Review of Systems   Unable to perform ROS: Medical condition       Hemodynamics:  Temp (24hrs), Av.1 °C (97 °F), Min:35.8 °C (96.4 °F), Max:36.7 °C (98 °F)  Temperature: 35.9 °C (96.6 °F), Monitored Temp: 35.9 °C (96.6 °F)  Pulse  Av.6  Min: 75  Max: 122   Blood Pressure : 96/55       Physical Exam:  Physical Exam  Vitals and nursing note reviewed.   Constitutional:       General: He is not in acute distress.     Appearance: He is ill-appearing.   Eyes:      Conjunctiva/sclera: Conjunctivae normal.   Pulmonary:      Effort: No respiratory distress.      Breath sounds: No stridor.      Comments: Some accessory muscle use  Abdominal:      General: There is no distension.      Palpations: Abdomen is soft.   Musculoskeletal:         General: No swelling or tenderness.   Skin:     Findings: No erythema or rash.         Meds:    Current Facility-Administered Medications:     midodrine    albumin human 25%    potassium chloride    [START ON 2024] famotidine    [START ON 2024] insulin GLARGINE **AND** [DISCONTINUED] insulin lispro **AND** [CANCELED] POC blood glucose manual result **AND** [CANCELED] NOTIFY MD and PharmD **AND** [CANCELED] Administer 20 grams of glucose (approximately 8 ounces of fruit juice) every 15 minutes PRN FSBG less than 70 mg/dL **AND** [DISCONTINUED] dextrose bolus    Pharmacy    NS **AND** acetaminophen **AND** diphenhydrAMINE **AND** dextrose 5% **AND** amphotericin b liposomal (AMBISOME) IV **AND** dextrose 5% **AND** NS    K+ Scale: Goal of 4.5    insulin lispro  **AND** POC blood glucose manual result **AND** NOTIFY MD and PharmD **AND** Administer 20 grams of glucose (approximately 8 ounces of fruit juice) every 15 minutes PRN FSBG less than 70 mg/dL **AND** dextrose bolus    thiamine    MD Alert...Vancomycin per Pharmacy    Pharmacy    acyclovir    atorvastatin    atovaquone    loratadine    vancomycin    [Held by provider] cycloSPORINE (modified)    potassium bicarbonate    NORepinephrine    ipratropium-albuterol    Respiratory Therapy Consult    [COMPLETED] piperacillin-tazobactam **AND** piperacillin-tazobactam    hydrocortisone sodium succinate PF    MD Alert...Adult ICU Electrolyte Replacement per Pharmacy    MBX (diphenhydrAMINE-lidocaine-Maalox)    sodium bicarb 0.5 mEq/mL    [Held by provider] tamsulosin    Labs:  Recent Labs     11/09/24  0128 11/09/24  1504 11/09/24  2158 11/10/24  0155 11/11/24  0510   WBC 0.7*   < > 0.8* 0.5* 0.6*   RBC 2.73*   < > 2.89* 2.59* 2.79*   HEMOGLOBIN 8.5*   < > 8.8* 7.9* 8.4*   HEMATOCRIT 23.7*   < > 25.0* 22.3* 23.9*   MCV 86.8   < > 86.5 86.1 85.7   MCH 31.1   < > 30.4 30.5 30.1   RDW 45.1   < > 44.6 44.8 44.7   PLATELETCT 11*   < > 64* 48* 20*   MPV 10.5   < > 9.8 10.4 11.6   NEUTSPOLYS 0.00*  --   --  1.00* 0.80*   LYMPHOCYTES 99.10*  --   --  98.00* 98.20*   MONOCYTES 0.90  --   --  1.00 0.00   EOSINOPHILS 0.00  --   --  0.00 0.00   BASOPHILS 0.00  --   --  0.00 0.00   RBCMORPHOLO Present  --   --  Present Present    < > = values in this interval not displayed.     Recent Labs     11/09/24  0128 11/10/24  0155 11/10/24  1445 11/10/24  1842 11/10/24  2352 11/11/24  0510   SODIUM 134* 137  --   --   --  132*   POTASSIUM 2.9* 2.8*   < > 3.3* 3.5* 3.5*   CHLORIDE 104 101  --   --   --  100   CO2 23 24  --   --   --  22   GLUCOSE 195* 291*  --   --   --  261*   BUN 22 19  --   --   --  42*    < > = values in this interval not displayed.     Recent Labs     11/09/24  0128 11/10/24  0155 11/11/24  0510   ALBUMIN 2.1* 2.0* 1.7*    TBILIRUBIN 2.0* 2.1* 1.7*   ALKPHOSPHAT 119* 95 87   TOTPROTEIN 6.5 6.3 6.1   ALTSGPT 60* 34 31   ASTSGOT 74* 29 32   CREATININE 0.68 0.66 1.23       Imaging:  DX-ABDOMEN FOR TUBE PLACEMENT    Result Date: 11/10/2024  11/10/2024 12:14 PM HISTORY/REASON FOR EXAM:  Line evaluation. TECHNIQUE/EXAM DESCRIPTION AND NUMBER OF VIEWS:  1 view(s) of the abdomen. COMPARISON:  None. FINDINGS: Enteric tube has been placed. The tip projects over the stomach. The bowel gas pattern is within normal limits. Lungs show nonspecific bilateral airspace process     1.  Enteric tube has been placed and the tip projects over the stomach. 2.  Nonspecific pulmonary airspace process    US-RUQ    Result Date: 11/10/2024  11/10/2024 3:15 AM HISTORY/REASON FOR EXAM:  Abnormal Labs Abdominal pain TECHNIQUE/EXAM DESCRIPTION AND NUMBER OF VIEWS:  Real-time sonography of the liver and biliary tree. COMPARISON: None FINDINGS: The liver is normal in contour. There is no evidence of solid mass lesion. The liver measures . Gallbladder: The gallbladder is full of bile. No gallstones evident. The gallbladder wall thickness measures . The common duct measures 4.8 mm. No intrahepatic bile duct dilatation is evident. Pancreas: Normal visible extent. Aorta: Normal and the visible extension. Intrahepatic IVC is patent. The portal vein is patent with hepatopetal flow. The MPV measures . Intrahepatic IVC is patent. The right kidney measures 11.4 cm. No hydronephrosis or suspicious mass. No right upper quadrant ascites. Small right pleural effusion.     1. Normal gallbladder and right upper quadrant ultrasound. 2. Small right pleural effusion.    CT-SOFT TISSUE NECK WITH    Result Date: 11/10/2024  11/10/2024 2:28 AM HISTORY/REASON FOR EXAM:  airway obstruction. Pneumonia. Difficulty breathing. TECHNIQUE/EXAM DESCRIPTION AND NUMBER OF VIEWS:  CT soft tissue neck with contrast. The study was performed on a helical multidetector CT scanner. Contiguous thin  "section helical images were obtained of the neck from the skull base through the thoracic inlet. 80 mL of Omnipaque 350 nonionic contrast was injected intravenously. Low dose optimization technique was utilized for this CT exam including automated exposure control and adjustment of the mA and/or kV according to patient size. COMPARISON: CT chest from yesterday. FINDINGS: BRAIN: Visualized portions of the brain are normal in appearance. TEMPORAL bones: Trace right mastoid sinus fluid. The middle ear are clear. PARANASAL SINUSES: The paranasal sinuses do not show any fluid. Minimal mucosal thickening. CERVICAL spine: Minimal diffuse cervical spine degenerative changes. Moderate C6-7 degenerative disc changes. NODES: Normal size cervical lymph nodes in a symmetric manner measuring up to 28 x 8 mm. All are smaller than 1 cm short dimension. SALIVARY and THYROID gland: The parotid, submandibular, and thyroid gland are normal in appearance. AIRWAY: The airway appears patent. Mild uvula enlargement but no pharyngeal mass or swelling. No tonsillar swelling. Epiglottis and larynx appear normal. MEDIASTINUM: The superior mediastinum shows mildly enlarged lymph nodes as seen on CT. LUNGS: The visualized portions of the upper lungs show patchy dense pneumonia infiltrates with small pleural effusions.     1. No airway narrowing. Mild uvula swelling suggested. The epiglottis and larynx appear normal. 2. Upper normal size cervical lymph nodes. No cervical mass otherwise. 3. Trace right mastoid sinus fluid. No paranasal sinus fluid. The middle ears are clear. 4. The visible upper chest again shows dense infiltrates with small pleural effusions and mild upper mediastinal adenopathy.    CT-CHEST (THORAX) W/O    Result Date: 11/9/2024 11/9/2024 1:22 PM HISTORY/REASON FOR EXAM:  worsening pneumonia, previous imaging supicious for fungal; ID noted \"halo sign\" on outside CT chest 10/20, concern it may have worsened. TECHNIQUE/EXAM " DESCRIPTION: CT scan of the chest without contrast. Noncontrast helical scanning of the chest was obtained from the lung apices through the adrenal glands. Low dose optimization technique was utilized for this CT exam including automated exposure control and adjustment of the mA and/or kV according to patient size. COMPARISON: Outside CT chest 10/20/2024 FINDINGS: Lungs: There are airspace process within the right upper lobe, left upper lobe, right middle lobe, and lingular segment of left upper lobe and possibly both lower lobes although in the lower lobes is could be atelectasis.. There is a right upper lobe focal airspace process, nonspecific. Mediastinum/Jade: There is prevascular space adenopathy with the largest node measuring 20 x 13 mm. Pleura: There are small bilateral pleural effusion. Cardiac: Heart normal in size without pericardial effusion. There is coronary artery atherosclerotic plaque. Vascular: There is aortic and coronary artery atherosclerotic plaque.. Soft tissues: Unremarkable. Bones: No acute or destructive process. There is a retroperitoneal calcification consistent with sequela of prior inflammation.     1.  Multifocal bilateral pulmonary airspace process most consistent with pneumonia, with interval worsening 2.  Bilateral pleural effusion, most small in size, right greater than left 3.  Single enlarged prevascular space lymph node which is most likely reactive Fleischner Society pulmonary nodule recommendations: Not Applicable     DX-CHEST-PORTABLE (1 VIEW)    Result Date: 11/9/2024 11/9/2024 4:40 AM HISTORY/REASON FOR EXAM: Shortness of Breath TECHNIQUE/EXAM DESCRIPTION:  Single AP view of the chest. COMPARISON: November 7, 2024 FINDINGS: The cardiac silhouette appears within normal limits. The mediastinal contour appears within normal limits.  The central pulmonary vasculature appears normal. Bilateral lung volumes are diminished.  Patchy bilateral pulmonary opacities are seen. No  significant pleural effusions are identified. The bony structures appear age-appropriate.     1.  Patchy bilateral pulmonary infiltrates, similar to prior study    DX-CHEST-PORTABLE (1 VIEW)    Result Date: 11/7/2024 11/7/2024 12:06 PM HISTORY/REASON FOR EXAM:  Cough TECHNIQUE/EXAM DESCRIPTION AND NUMBER OF VIEWS: Single portable view of the chest. COMPARISON: Yesterday FINDINGS: HEART: Stable size. LUNGS: BILATERAL airspace disease redemonstrated. PLEURA: RIGHT costophrenic sulcus is not well seen.     1.  Unchanged BILATERAL pneumonia 2.  Possible RIGHT pleural effusion    DX-CHEST-2 VIEWS    Result Date: 11/7/2024 11/7/2024 12:08 AM HISTORY/REASON FOR EXAM: Cough TECHNIQUE/EXAM DESCRIPTION:  PA and lateral views of the chest. COMPARISON: October 19, 2024 FINDINGS: The cardiac silhouette appears within normal limits. Atherosclerotic calcification of the aorta is noted.  The central  pulmonary vasculature appears prominent and indistinct. Bilateral lung volumes are diminished.  Diffuse scattered hazy pulmonary parenchymal opacities are seen. Blunting of the left costophrenic angle is seen suggesting trace left effusion. The bony structures appear age-appropriate.     1.  Pulmonary edema and/or infiltrates. 2.  Trace left pleural effusion 3.  Atherosclerosis      Micro:  Results       Procedure Component Value Units Date/Time    Fungal Culture [[id number]] Collected: 11/10/24 0300    Order Status: Completed Specimen: Respirate from Sputum Updated: 11/11/24 0411     Significant Indicator NEG     Source RESP     Site Tracheal Aspirate     Culture Result Culture in progress.     Fungal Smear Results No fungal elements seen.    AFB Culture [[id number]] Collected: 11/10/24 0300    Order Status: Completed Specimen: Respirate from Sputum Updated: 11/11/24 0411     Significant Indicator NEG     Source RESP     Site Tracheal Aspirate     Culture Result Culture in progress.     AFB Smear Results No acid fast bacilli seen.     CULTURE RESPIRATORY W/ GRM STN [[id number]]  (Abnormal) Collected: 11/10/24 0300    Order Status: Sent Specimen: Respirate from Sputum Updated: 11/11/24 0411     Significant Indicator NEG     Source RESP     Site Tracheal Aspirate     Culture Result -     Gram Stain Result Many epithelial cells.  Many Gram positive cocci.  Many Gram positive rods.      GRAM STAIN [[id number]]  (Abnormal) Collected: 11/10/24 0300    Order Status: Completed Specimen: Respirate Updated: 11/10/24 1912     Significant Indicator .     Source RESP     Site Tracheal Aspirate     Gram Stain Result Many epithelial cells.  Many Gram positive cocci.  Many Gram positive rods.      Acid Fast Stain [[id number]] Collected: 11/10/24 0300    Order Status: Completed Specimen: Respirate Updated: 11/10/24 1912     Significant Indicator NEG     Source RESP     Site Tracheal Aspirate     AFB Smear Results No acid fast bacilli seen.    Fungal Smear [[id number]] Collected: 11/10/24 0300    Order Status: Completed Specimen: Respirate Updated: 11/10/24 1912     Significant Indicator NEG     Source RESP     Site Tracheal Aspirate     Fungal Smear Results No fungal elements seen.    Cryptococcal Antigen, Serum [[id number]] Collected: 11/10/24 1058    Order Status: Completed Specimen: Blood Updated: 11/10/24 1249     Cryptococcal Antigen, Serum Negative    BLOOD CULTURE [[id number]] Collected: 11/10/24 0825    Order Status: Completed Specimen: Blood from Peripheral Updated: 11/10/24 1208     Significant Indicator NEG     Source BLD     Site PERIPHERAL     Culture Result No Growth  Note: Blood cultures are incubated for 5 days and  are monitored continuously.Positive blood cultures  are called to the RN and reported as soon as  they are identified.      BLOOD CULTURE [[id number]] Collected: 11/10/24 0825    Order Status: Completed Specimen: Blood from Peripheral Updated: 11/10/24 1208     Significant Indicator NEG     Source BLD     Site PERIPHERAL     Culture Result  No Growth  Note: Blood cultures are incubated for 5 days and  are monitored continuously.Positive blood cultures  are called to the RN and reported as soon as  they are identified.      MRSA By PCR (Amp) [[id number]] Collected: 11/10/24 0111    Order Status: Completed Specimen: Respirate from Nares Updated: 11/10/24 0824     MRSA by PCR POSITIVE    CULTURE RESPIRATORY W/ GRM STN [[id number]]  (Abnormal)  (Susceptibility) Collected: 11/07/24 0820    Order Status: Completed Specimen: Respirate from Sputum Updated: 11/09/24 0823     Significant Indicator POS     Source RESP     Site SPUTUM     Culture Result Light growth usual upper respiratory rosalino     Gram Stain Result Few WBCs.  Rare epithelial cells.  Rare Gram positive rods.  Specimen Quality Score: 1+       Culture Result Methicillin Resistant Staphylococcus aureus  Light growth  Methicillin resistant by screening method      Susceptibility       Methicillin resistant staphylococcus aureus (1)       Antibiotic Interpretation Microscan   Method Status    Ampicillin/sulbactam Resistant <=8/4 mcg/mL BALTA Final    Clindamycin Sensitive 0.5 mcg/mL BALTA Final    Cefazolin Resistant <=8 mcg/mL BALTA Final    Cefepime Resistant 8 mcg/mL BALTA Final    Erythromycin Resistant >4 mcg/mL BALTA Final    Oxacillin Resistant >2 mcg/mL BALTA Final    Trimeth/Sulfa Sensitive <=0.5/9.5 mcg/mL BALTA Final    Tetracycline Sensitive <=4 mcg/mL BALTA Final    Vancomycin Sensitive 1 mcg/mL BALTA Final                       GRAM STAIN [[id number]] Collected: 11/07/24 0820    Order Status: Completed Specimen: Respirate Updated: 11/07/24 1108     Significant Indicator .     Source RESP     Site SPUTUM     Gram Stain Result Few WBCs.  Rare epithelial cells.  Rare Gram positive rods.  Specimen Quality Score: 1+      URINALYSIS [[id number]]  (Abnormal) Collected: 11/07/24 0550    Order Status: Completed Specimen: Urine, Cath Updated: 11/07/24 0747     Color Yellow     Character Clear     Specific Gravity 1.017      Ph 6.5     Glucose 500 mg/dL      Ketones Negative mg/dL      Protein 30 mg/dL      Bilirubin Negative     Urobilinogen, Urine 4.0 EU/dL      Nitrite Negative     Leukocyte Esterase Negative     Occult Blood Negative     Micro Urine Req Microscopic    BLOOD CULTURE [[id number]] Collected: 11/07/24 0241    Order Status: Completed Specimen: Blood from Peripheral Updated: 11/07/24 0408     Significant Indicator NEG     Source BLD     Site PERIPHERAL     Culture Result No Growth  Note: Blood cultures are incubated for 5 days and  are monitored continuously.Positive blood cultures  are called to the RN and reported as soon as  they are identified.      BLOOD CULTURE [[id number]] Collected: 11/07/24 0241    Order Status: Completed Specimen: Blood from Peripheral Updated: 11/07/24 0408     Significant Indicator NEG     Source BLD     Site PERIPHERAL     Culture Result No Growth  Note: Blood cultures are incubated for 5 days and  are monitored continuously.Positive blood cultures  are called to the RN and reported as soon as  they are identified.              Hospital Course/Assessment:   Miri Joseph is a 84 y.o. male patient with severe aplastic anemia, recently at [facility] where he was started on posaconazole and completed a course of linezolid for possible invasive fungal pneumonia based on CT scan findings of nodular opacities with surrounding groundglass changes, transferred to [facility] for further oncology management, developed respiratory distress on 11/9, now on high flow nasal cannula oxygen in the ICU, repeat CT scan with worsening multifocal bilateral airspace opacities with surrounding groundglass findings concerning for worsening invasive mold pneumonia. Sputum culture positive for MRSA.     Pertinent Diagnoses:  Acute hypoxic respiratory failure  Multifocal pneumonia, suspected invasive mold vs MRSA vs both  Severe aplastic anemia  Immunosuppressed status     Plan:   -Continue liposomal IV  amphotericin B, monitor renal function and electrolytes very closely  -If able, recommend bronchoscopy with fungal, AFB, regular cultures, currently too unstable to perform per ICU team assessment.  Tracheal aspirate has been sent, will add on Aspergillus PCR, discussed with Microbiology lab  -Noted bump in creatinine with use of vancomycin and amphotericin B.  Given also severe aplastic anemia, will avoid linezolid and instead switch vancomycin to IV ceftaroline, renally dosed  -Follow blood cultures x 2, no growth till date  -Will check serum beta D glucan, Aspergillus galactomannan, Coccidioides serology and Cryptococcal antigen, all pending     Disposition: Unable to determine at this time.  Noted currently DNR/DNI and if any worsening clinical status, possible transition to comfort care  Need for PICC line: In place     Plan was discussed with the primary team, Dr. Conteh and ID PharmD.  ID will follow.  High mortality risk. This patient is critically ill in the ICU with impairment of 1 or more organ systems.  Spent 35 minutes in the unit reviewing chart, documentation, imaging, labs, discussing with primary teams.  Time includes highly complex decision making to help support vital organ systems.  Care provided was not concurrent and not duplicative of the care provided by the critical care team.

## 2024-11-11 NOTE — PROGRESS NOTES
RCC    Reviewed case with ID.  Likely to need prolonged course of antibiotics.  Okay to place PICC.  Blood cultures yesterday more surveillance prior cultures negative.  Reviewed with Dr. Cash and PICC Team.

## 2024-11-11 NOTE — ASSESSMENT & PLAN NOTE
Family has participated in discussions for extended period time and DNR/DNI order is in place  Reaffirmed this order on conversations with physician son and his sister 11/10  Also reviewed current diagnosis, treatment plan and prognosis.  Primary concern is refractory persistent aplastic anemia resulting in severe neutropenia.  If but more likely when he develops recurrent refractory sepsis and MOSF family okay with transitioning to CC.    Case reviewed 11/12 with Dr. Josehp patient's son by phone as well as with patient's daughter at the bedside in the ICU.  Clinically improved except for worsening ESTHER.  Reaffirmed DNR/DNI and nonaggressive approach.  Reaffirmed no plans for hemodialysis.  Comfort measures if deteriorates.  Goal is to get him well enough to go home for convalescence and rehab.

## 2024-11-12 NOTE — DIETARY
"Nutrition Services: Initial Assessment     Day 6 of admit. Miri Joseph is a 84 y.o. male with admitting DX of Admission for chemotherapy [Z51.11]     Consult received for enteral nutrition.     Nutrition Assessment:      Height: 175.3 cm (5' 9\")  Weight: 72.2 kg (159 lb 2.8 oz)  Weight taken via bed scale  Body mass index is 23.51 kg/m². BMI classification: Normal.  Wt Readings from Last 6 Encounters:   24 72.2 kg (159 lb 2.8 oz)   24 70.3 kg (154 lb 15.7 oz)   24 74.3 kg (163 lb 12.8 oz)   17 77.5 kg (170 lb 13.7 oz)   17 79 kg (174 lb 2.6 oz)      Calculation/Equation: REE per MSJ (1414 kcal) x 1.3 = 1838 kcal/day  Total Calories / day: 1805 -   (Calories / k - 28)  Total Grams Protein / day: 65 - 87  (Grams Protein / k.9 - 1.2)     Objective:  Current dx list:    Septic shock (HCC) (POA: Unknown)    Pancytopenia (HCC) (POA: Yes)    Thrombocytopenia (HCC) (POA: Yes)    Type 2 diabetes mellitus, without long-term current use of insulin (HCC) (POA: Yes)    Dyslipidemia (POA: Yes)    MRSA pneumonia (HCC) (POA: Yes)    Aplastic anemia (HCC) (POA: Yes)    Neutropenic fever (HCC) (POA: Yes)    Hypophosphatemia (POA: Yes)    Hypomagnesemia (POA: Yes)    Petechial rash (POA: Yes)    Hypertension (POA: Yes)    GERD (gastroesophageal reflux disease) (POA: Yes)    Seasonal allergies (POA: Yes)    Severe malnutrition (HCC) (POA: Yes)    Pressure injury of deep tissue of sacral region (POA: Yes)    Dysphagia (POA: No)    Elevated troponin (POA: Clinically Undetermined)    Elevated LFTs (POA: Unknown)    Multifocal pneumonia (POA: Yes)    Respiratory failure (HCC) (POA: Unknown)    ESTHER (acute kidney injury) (HCC) (POA: Unknown)    Goals of care, counseling/discussion (POA: Unknown)  Pertinent medical hx:   Past Medical History:   Diagnosis Date    Diabetes (HCC)     oral meds     Nutrition support: Indicated due to pt is NPO day 3, consult to start Tfs per MD.  Enteral access: " "NGT  GOC per family excludes hemodialysis  Pertinent labs: K+ 3.5, 24-hr POC glu 108 - >600 (better glycemic control today, glucose <180 since 0127 FSBG), BUN 62, creatinine 1.87, GFR 35, Ca 8.0, corrected Ca 9.4, alb 2.3, phos 2.4, mag 2.9, A1c (09/2024): 7.3%  Pertinent meds: acyclovir, mepron, teflaro, cyclosporine, pepcid, solucortef, lantus, SSI (dose not req), thiamine, Klyte, levo (on MAR, stopped HS 11/11), midodrine, ICU electrolyte replacement per Pharmacy, NS @ 30 mL/hr, K+ scale  Skin/wounds: coccyx/sacrum DTI POA - appears improved per 11/10 update from Wound Team. Edema 1+ dependent to BLE  Food Allergies: NKFA  Last BM: 11/08/24 per flowsheets  Elevated protein needs r/t PNA, septic shock, severe malnutrition, DTPI; cautious protein provision w/ ESTHER and GOC does not include dialysis. CHO-controlled formula indicated w/ DM hx, hyperglycemia. Most appropriate formula based on RD evaluation: Nepro with CARBSTEADY     Current diet order:   NPO w/ TF    Nutrition Diagnosis:      Per RD note 11/9/24: \"Pt meets ASPEN criteria for severe malnutrition in the context of acute illness 2/2 aplastic anemia as evidenced by severe muscle wasting and fat loss.\"     Nutrition Interventions:      Initiate Nepro with CARBSTEADY at 15 ml/hr continuous and advance by 15 mL q12hrs to goal rate of 45 ml/hr.  Goal tube feed volume provides 1944 kcals, 87 g protein, 160 g CHO, and 785 ml free water daily.   Pt at risk of refeeding syndrome: continue to provide 100 mg/d Thiamine to aid in glucose metabolism. Continue to monitor electrolytes: order K+, mag, phos labs x 7 days - labs already ordered per EMR.  Additional fluids per MD.  Patient aware of active plan of care as appropriate.     Nutrition Monitoring and Evaluation:     Monitor nutrition POC  Additional fluids per MD/DO  Monitor renal labs.   Monitor K+, mag, phos labs.      RD following and will provide updated recommendations as indicated.    "

## 2024-11-12 NOTE — PROGRESS NOTES
Critical Care Progress Note    Date of admission  11/6/2024    Chief Complaint  84 y.o. male who presented 11/6/2024 with medical history of type II DM, recent diagnosis of aplastic anemia who was transferred [place] [facility] [place].  Evidently patient admitted to [facility] mid-October was treated for neutropenic fever with pneumonia and was noted to have pancytopenia.  He had a bone marrow biopsy performed consistent with severe aplastic anemia.  He was specifically transferred over here to obtain medical oncology services into initiate inpatient treatment with Promacta and cyclosporine which was initiated upon admission.     Throughout his hospitalization including [facility] he has had respiratory failure and hypoxemia and pulmonary infiltrates, initially had cultures at the [facility] with MRSA he was treated with broad-spectrum antibiotics for a course of pneumonia therapy there and also treated with antifungals given nodular pulmonary opacities and immunocompromise but reportedly had a negative galactomannan and Karius there. He was on posi, then amphoB, then back to posi as dictated by ID at [facility]. Completed course of linezolid and cefepime as well.     According to his discharge summary from 10/19/2024 from [facility] he had a mopped assist, pneumonia aplastic anemia and was treated for neutropenic fever with presumed pneumonia.     Ultimately he was transferred to [facility] for the initiation of chemotherapy and medical oncology evaluation.     During ED on 11/9 I was called to his bedside for respiratory stress, work of breathing, fatigue, and worsening oxygenation requiring max high flow.  Patient to CT was performed today showing dense bilateral consolidations with scattered nodularity involving most lobes of the lung.  Patient was transferred to ICU for further care  (Dr Pineda HPI 11/9)    Hospital Course    11/10 -oxygenation mildly improved since remains on HFNC, voice better with oral  care but failed swallow eval and feeding tube placed, Affirmed CODE STATUS with physician son and his sister, discussed possible comfort care if clinically worsens, ID concern of fungal infection adding Amphotericin to PIP/Tazo, secondary thrombocytopenia linezolid changed to vancomycin, blood cultures negative-sputum with MRSA    11/11 -mentation and oxygenation improved overnight, norepinephrine infusion and 10 mg Q8 added during the day, lactic acid borderline up with low urine output-albumin and lactated ringer bolus administered, creatinine trending up and vancomycin transition to ceftaroline, patient pulled feeding tube times several and soft restraints applied-enlisting help of family with this issue blood sugar shot up to over 600 and insulin infusion initiated, PICC catheter placed      Interval Problem Update  Reviewed last 24 hour events:    Updated daughter at length at the bedside    A&O x1-2?  More alert and follows better when family is present  SR 70s  SBP 90s  Albumin ongoing fourth 25 g dose this a.m.  Norepinephrine weaned off after fluid bolus 1 L LR last night   cc / 12 hours  Normal saline TKO 30 cc/HR  Oral care Q 1-2 hours  O2 weaned from HFNC to low-flow OxyMask with aggressive pulmonary toilet DuoNeb every 6  CXR change and bilateral opacities  Insulin infusion titrating overnight stopped in early a.m.  Glucose: 6 AM-119, 113, 108, 121, 149, 161  No Lantus given this morning and 9 a.m. fingerstick glucose pending  Beta hydroxybutyric acid 0.98 yesterday  Tmax 96.6  WBC 0.6  Lactic acid 1.6  Hydrocortisone 50 IV every 6  MRSA sputum cultures x2  Follow-up blood cultures negative so far  Ceftaroline, piperacillin/tazobactam, liposomal Amphotericin  Acyclovir, Atavaquone prophylaxis  Thiamine IV  Hemoglobin 6.2, PLT 6  , K 3.5, HCO3 22, AG 8, Phos 2.4, Mg 2.9  BUN 62, creatinine 1.87  Albumin 2.3, transaminases/alk phos and bilirubin normal  Famotidine PPx  No TF yet    Aggressive  pulm  Transfuse platelets  Transfuse packed cells  Replete K  Insulin regimen as needed  Titrate norepinephrine  Continue current antibiotic regimen and follow-up in cultures  Continue fluids, may need additional IVs if cannot keep NG tube in place for nutrition/fluids  Continue goals of care conversation  Contact physician son and Candida and update him    9   Resume Lantus 10 + med SSI    Reviewed antibiotics and steroid use with this immunocompromise septic patient at length with pharmacy and ID  Will taper hydrocortisone since were now off IV norepinephrine  With worsening ESTHER and more concern for MRSA then fungal infection especially with multiple studies from [facility] as well as here that do not suggest Aspergillus or other fungal process will stop Amphotericin at the advice of ID    Low but acceptable so far during the day  Tube feeds starting  Blood sugars acceptable since Lantus initiated, last glucose 169    Discussed care with patient's physician son Dr. Joesph in [place] by phone at length.  He had an opportunity to review his father's MyChart at length and we discussed his care by systems and are primary and was his worsening ESTHER.  Not surprisingly he needed platelets and packed cells again he has had aplastic anemia for a couple of months with the cyclosporine on hold and no further plans from hematology.  We reviewed the option of RRT and he and his family would not like their father to progress to hemodialysis so we will endeavor to avoid worsening renal failure as possible.     YESTERDAY  Discussed with daughter at length at the bedside.    A&O x2, voice makes it difficult to be certain  Nodding appropriately and following commands, phonation still difficult improved  SR 80s  SBP 80-100s  UO adequate  Mckee placed last night - 700cc on bladder scan  Norepinephrine 0.1, Actively titrated  WOB ok  HFNC 25/30  CXR no film  Tmax 98  WBC 0.6  Cultures MRSA  Acyclovir Mepron  Vancomycin,  piperacillin/tazobactam, liposomal Amphotericin  Hydrocortisone/cyclosporine  Glargine/SSI   Thiamine    Add midodrine  Albumin 25 g every 6 x 4  Continue fluids via feeding tube  Reviewed PICC with ID and PICC team via text  Antibiotics per ID  Increase lantus  Ongoing GOC discussions    Case reviewed with ID-Dr. Orlando at length    Called by RN NG pulled by patient intermittently, soft restraints as needed, hold heavy sedation, will get family involved in assisting patient to not pull out tubes    Called by RN re: fingerstick blood sugar >600.  Will check BMP, magnesium and beta hydroxybutyric acid level.  Suggest glucose level out-of-control in part due to infectious process and norepinephrine use.  Likely will need to escalate insulin 180 infusion protocol which has been ordered pending confirmation with chemistries from the lab.    Review of Systems  Review of Systems   Unable to perform ROS: Acuity of condition        Vital Signs for last 24 hours   Temp:  [35.3 °C (95.5 °F)-35.8 °C (96.4 °F)] 35.5 °C (95.9 °F)  Pulse:  [64-90] 69  Resp:  [12-32] 28  BP: ()/(45-66) 133/63  SpO2:  [88 %-100 %] 94 %    Hemodynamic parameters for last 24 hours       Respiratory Information for the last 24 hours       Physical Exam   Physical Exam  Vitals reviewed.   Constitutional:       General: He is not in acute distress.     Appearance: He is normal weight. He is ill-appearing (No change). He is not toxic-appearing or diaphoretic.      Comments: NC, WOB improved   HENT:      Head: Normocephalic and atraumatic.      Mouth/Throat:      Mouth: Mucous membranes are moist.   Eyes:      General: No scleral icterus.     Pupils: Pupils are equal, round, and reactive to light.   Cardiovascular:      Rate and Rhythm: Regular rhythm. Tachycardia present.      Pulses: Normal pulses.      Heart sounds: No murmur heard.  Pulmonary:      Breath sounds: Rhonchi (Mainly upper airway?) and rales (Resolving/basilar) present.       Comments: HFNC  Abdominal:      General: Bowel sounds are normal. There is no distension.      Palpations: Abdomen is soft.      Tenderness: There is no right CVA tenderness, left CVA tenderness or guarding.   Musculoskeletal:      Cervical back: Neck supple. No rigidity.      Right lower leg: No edema.      Left lower leg: No edema.   Lymphadenopathy:      Cervical: No cervical adenopathy.   Skin:     General: Skin is warm and dry.      Capillary Refill: Capillary refill takes 2 to 3 seconds.      Coloration: Skin is not cyanotic.      Findings: Bruising present.      Nails: There is no clubbing.   Neurological:      Mental Status: He is lethargic.      Cranial Nerves: Cranial nerves 2-12 are intact.      Comments: Alert, tracking, nodding and following simple commands, speaks a little bit to the daughter but not much verbal to me, patient is still a problem   Psychiatric:         Mood and Affect: Mood is not anxious.         Behavior: Behavior is not agitated. Behavior is cooperative.         Medications  Current Facility-Administered Medications   Medication Dose Route Frequency Provider Last Rate Last Admin    insulin lispro (HumaLOG,AdmeLOG) subcutaneous injection  2-9 Units Subcutaneous Q6HRS Alla Graf M.D.        And    dextrose 50% (D50W) injection 25 g  25 g Intravenous Q15 MIN PRN Alla Graf M.D.        sodium chloride (Hyper-Sal) 7 % nebulizer solution 4 mL  4 mL Nebulization 4X/DAY (RT) Michel Conteh M.D.   4 mL at 11/12/24 1303    ipratropium-albuterol (DUONEB) nebulizer solution  3 mL Nebulization 4X/DAY (RT) Michel Conteh M.D.   3 mL at 11/12/24 1304    insulin GLARGINE (Lantus,Semglee) injection  10 Units Subcutaneous QAM INSULIN Michel Conteh M.D.   10 Units at 11/12/24 1036    hydrocortisone sodium succinate PF (Solu-CORTEF) 100 MG injection 25 mg  25 mg Intravenous Q6HRS Michel Conteh M.D.   25 mg at 11/12/24 1334    midodrine (Proamatine) tablet 10 mg  10 mg Enteral  Tube Q8HRS Michel Conteh M.D.   10 mg at 11/12/24 0534    famotidine (Pepcid) injection 20 mg  20 mg Intravenous DAILY Michel Conteh M.D.   20 mg at 11/12/24 0534    NS (Bolus) 0.9 % infusion 500 mL  500 mL Intravenous Once PRN Michel Conteh M.D.        ceftaroline (Teflaro) 400 mg in  mL IVPB  400 mg Intravenous Q12HRS Sven Orlando M.D. 100 mL/hr at 11/12/24 1405 400 mg at 11/12/24 1405    NS infusion   Intravenous Continuous Michel Conteh M.D. 30 mL/hr at 11/11/24 2312 New Bag at 11/11/24 2312    Pharmacy Consult Request for Amphotericin B monitoring  1 Each Other PRN Sven Orlando M.D.        K+ Scale: Goal of 4.5  1 Each Intravenous Q6HRS Michel Conteh M.D.   1 Each at 11/12/24 0600    thiamine (B-1) injection 200 mg  200 mg Intravenous BID Michel Conteh M.D.   200 mg at 11/12/24 0534    Pharmacy Consult: Enteral tube insertion - review meds/change route/product selection   Other PHARMACY TO DOSE Michel Conteh M.D.        acyclovir (Zovirax) tablet 400 mg  400 mg Enteral Tube BID Michel Conteh M.D.   400 mg at 11/12/24 0534    atorvastatin (Lipitor) tablet 10 mg  10 mg Enteral Tube Q EVENING Michel Conteh M.D.   10 mg at 11/11/24 1759    atovaquone (Mepron) 750 MG/5ML suspension 750 mg  750 mg Enteral Tube BID Michel Conteh M.D.   750 mg at 11/12/24 0535    loratadine (Claritin) tablet 10 mg  10 mg Enteral Tube DAILY Michel Conteh M.D.   10 mg at 11/12/24 0535    [Held by provider] cycloSPORINE (modified) (Neoral) 100 MG/ML microemulsion solution 100 mg  100 mg Enteral Tube BID Michel Conteh M.D.   100 mg at 11/11/24 0512    potassium bicarbonate (Klyte) effervescent tablet 25 mEq  25 mEq Enteral Tube TID Michel Conteh M.D.   25 mEq at 11/12/24 1349    norepinephrine (Levophed) 8 mg in 250 mL NS infusion (premix)  0-1 mcg/kg/min (Ideal) Intravenous Continuous Fani Walker M.D.   Stopped at 11/11/24 8412    ipratropium-albuterol  (DUONEB) nebulizer solution  3 mL Nebulization Q2HRS PRN (RT) Ivanna Longo M.D.   3 mL at 11/09/24 0435    Respiratory Therapy Consult   Nebulization Continuous RT Mima Carrasco D.O.        MD Alert...ICU Electrolyte Replacement per Pharmacy   Other PHARMACY TO DOSE Gilbert Pineda M.D.        MBX (diphenhydrAMINE-lidocaine-Maalox) oral susp Cup 5 mL  5 mL Swish & Spit Q6HR Donaldo Seals III, M.D.   5 mL at 11/12/24 0925    sodium bicarb 0.5 mEq/mL oral rinse 10 mL  10 mL Swish & Spit Q6HRS Donaldo Seals III, M.D.   10 mL at 11/12/24 1338    [Held by provider] tamsulosin (Flomax) capsule 0.4 mg  0.4 mg Oral AFTER BREAKFAST Scottie Sidhu M.D.   0.4 mg at 11/08/24 0823       Fluids    Intake/Output Summary (Last 24 hours) at 11/12/2024 1413  Last data filed at 11/12/2024 1200  Gross per 24 hour   Intake 3228.16 ml   Output 249 ml   Net 2979.16 ml       Laboratory  Recent Labs     11/09/24  2158 11/10/24  0257   WCNKS42P 7.41  --    ZWFRDT035H 42.1*  --    QUODF836G 66.8  --    OLWK1TTH 91.5*  --    ARTHCO3 26*  --    T2MQVZCPG 50  --    ARTBE 2  --    ISTATAPH  --  7.466   ISTATAPCO2  --  37.0   ISTATAPO2  --  55*   ISTATATCO2  --  28   NKWDNLN3WWD  --  90*   ISTATARTHCO3  --  26.7*   ISTATARTBE  --  3   ISTATTEMP  --  97.4 F   ISTATSPEC  --  Arterial   ISTATAPHTC  --  7.476   SXXVNNHD3TG  --  53*         Recent Labs     11/10/24  0155 11/10/24  1445 11/11/24  0510 11/11/24  1220 11/11/24  1810 11/11/24  2235 11/12/24  0404   SODIUM 137  --  132* 118*  --   --  138   POTASSIUM 2.8*   < > 3.5* 3.4* 3.8 4.3 3.5*   CHLORIDE 101  --  100 89*  --   --  108   CO2 24  --  22 17*  --   --  22   BUN 19  --  42* 44*  --   --  62*   CREATININE 0.66  --  1.23 1.42*  --   --  1.87*   MAGNESIUM 1.3*  --  2.8* 2.6*  --   --  2.9*   PHOSPHORUS 3.8  --  2.6  --   --   --  2.4*   CALCIUM 8.0*  --  8.2* 6.9*  --   --  8.0*    < > = values in this interval not displayed.     Recent Labs     11/10/24  0155  11/11/24  0510 11/11/24  1220 11/12/24  0404   ALTSGPT 34 31  --  24   ASTSGOT 29 32  --  20   ALKPHOSPHAT 95 87  --  67   TBILIRUBIN 2.1* 1.7*  --  1.5   DBILIRUBIN 1.4*  --   --   --    GLUCOSE 291* 261* 629* 124*     Recent Labs     11/10/24  0155 11/11/24  0510 11/12/24  0404   WBC 0.5* 0.6* 0.6*   NEUTSPOLYS 1.00* 0.80* 0.00*   LYMPHOCYTES 98.00* 98.20* 100.00*   MONOCYTES 1.00 0.00 0.00   EOSINOPHILS 0.00 0.00 0.00   BASOPHILS 0.00 0.00 0.00   ASTSGOT 29 32 20   ALTSGPT 34 31 24   ALKPHOSPHAT 95 87 67   TBILIRUBIN 2.1* 1.7* 1.5     Recent Labs     11/10/24  0155 11/11/24  0510 11/12/24  0404   RBC 2.59* 2.79* 2.03*   HEMOGLOBIN 7.9* 8.4* 6.2*   HEMATOCRIT 22.3* 23.9* 17.5*   PLATELETCT 48* 20* 6*   PROTHROMBTM 21.6*  --   --    INR 1.87*  --   --        Imaging  X-Ray:  I have personally reviewed the images and compared with prior images.  CT:    Reviewed  Ultrasound:  Reviewed    Assessment/Plan  * Septic shock (HCC)  Assessment & Plan  This is Septic shock Not present on admission  SIRS criteria identified on my evaluation include: Tachypnea, with respirations greater than 20 per minute and Leukopenia, with WBC less than 4,000  Clinical indicators of end organ dysfunction include Hypotension with systolic blood pressure less than 90 or MAP less than 65, Thrombocytopenia, with platelets less than 100, and GCS < 15  Indicators of septic shock include: Sepsis present and persistent hypotension despite fluid resuscitation   Sources is: Pulmonary, sputum culture growing MRSA and chest x-ray consistent with pneumonia, patient also with persistent pancytopenia and no neutrophils  Sepsis protocol initiated  Crystalloid Fluid Administration: Fluid resuscitation ordered per standard protocol -patient has already received fluids and with severe hypoalbuminemia and IV fluid shortage will administer IV albumin  IV antibiotics as appropriate for source of sepsis  Reassessment: I have reassessed the patient's hemodynamic  status    Actively titrating norepinephrine  Continue midodrine  ID managing antibiotics-vancomycin/piperacillin/tazobactam  Prophylactic agents ongoing for severe neutropenia  Amphotericin discontinued by ID  IV albumin 25 g every 6 x 4  Status post multiple liters of fluids and fluid balance positive significant respiratory failure and developing ESTHER-caution with fluids  Lactic acid level improved  Clinically improved tapering hydrocortisone  Additional fluids as clinically prudent, POCUS as needed    ESTHER (acute kidney injury) (HCC)  Assessment & Plan  Clear related to sepsis  Medications may be contributing  Appears euvolemic if not hypervolemic  Avoid nephrotoxins  Renally dose medications  Serial BMP  Monitor UO  Not a candidate for RRT    Function worse, stopping Amphotericin vancomycin switched to ceftaroline  Increasing fluids via GI track both nutrition as well as free water  Urine output low acceptable at this point-updating family    MRSA pneumonia ([facility])- (present on admission)  Assessment & Plan  Was treated with course of abx at [facility] for MRSA PNA (vancomycin/linezolid and cefepime)  Has had pulmonary infitrates and hypoxemia to some degree since September  [place] [facility] [place] been on ppx dosed antibiotics only (acyclovir, vori, levaquin)  10/19 chest x-ray from [facility] minimal opacities    Marked worsening of hypoxemia and lung injury 11/8-11/9 and new extensive bilateral pneumonic appearing infiltrates  11/9-Sputum gram stain results with MRSA, unclear if causative pathogen given prior respiratory cultures with MRSA, but with new hypoxemia and infiltrates will consider this pathogenic and treat accordingly     Severe CAP, ARDS in the setting of severe immunocompromise and neutropenia  CT chest with dense BLL consolidations    Linezolid and piperacillin/tazobactam-ID to follow-up and modify  Corticosteroids HC 50 q6 x 5 days, have discussed risks/benefits with family given imunocompromised  state  Continue thiamine intravenously to the regimen as well    BDG and gallactomanan sent - review when available  History reviewed with ID, some concern of possible fungal infection, empiric Amphotericin per ID  CT scan reviewed and imaging most suggestive of consolidated pneumonia/MRSA, I do not see a halo sign    PEP/IS encouraged  HFNC O2, weaning  Mobility as able, patient very weak  DNR/DNI so will manage non-invasively to the best of our ability  Not a candidate for BiPAP    Type 2 diabetes mellitus, without long-term current use of insulin (HCC)- (present on admission)  Assessment & Plan  Target normoglycemia  Sugar trending up in a.m. and Lantus dose to be adjusted, changed to high sliding scale  Midday sugars dramatically elevated to 600 on 11/11  Check BMP, beta hydroxybutyric acid, lactic acid and magnesium stat  Beta hydroxybutyric acid not significantly elevated  Insulin infusion overnight brought glucose under control, transition to Lantus/medium SSI 11/12  TF starting 11/12 again,  Lantus and SSI dosing daily and adjust    Pancytopenia (HCC)- (present on admission)  Assessment & Plan  BM biopsy consistent with severe aplastic anemia, etiology unclear to me at this time DDX includes hypoplastic MDS versus PNH, and aplastic process, or infection/nutritional disorder/toxic myelosuppression.     Diagnostic evaluation in september and at [facility], viral studies negative, hepatitis panel negative,   Oncology following    On cyclosporine 100 bid, but cannot take orals currently  Promacta (eltrombopag) for thrombocytopenia 75 mg daily  Continue prophylactic antimicrobials  B12/MMA, folate levels resent, previously low    Tranfuse PRBC for <7  Transfuse platelets < 10, may warrant higher threshold given possibility of DAH though CT doesn't look strikingly like DAH  CT more likely MRSA pneumonia with positive cultures    Will review with hematology when they are available re: likelihood of recovery, apparently  resistant to colony-stimulating factor so far      Respiratory failure (HCC)  Assessment & Plan  Reviewed hypoxic respiratory failure secondary to pneumonia  MRSA positive cultures recently at [facility] and they are recurrently positive now  DNR/DNI reaffirmed  Moved to unit for aggressive pulmonary toilet and oral care  HFNC O2 initiated initially on 100% / 60 L flow-since improved to 40/60  Not a candidate for BiPAP or diagnostic bronchoscopy, he would need to be intubated for that given his current status  RT protocols  Oral care every-2 hours  Aspiration precautions, swallow eval pending  Hydrocortisone added to antibiotic regimen for severe pneumonia with respiratory failure ID managing antibiotics  If patient clinically deteriorates transition to comfort care/hospice, discussed with daughter and son who are in agreement    Niccoli improved tapering steroids    Elevated LFTs  Assessment & Plan  Worsening today, no focal abdominal pain  RUQUS ordered-> normal  Trend LFTs while on antifungals  Avoid hepatotoxins  Monitor synthetic function as clinically prudent      Goals of care, counseling/discussion  Assessment & Plan  Family has participated in discussions for extended period time and DNR/DNI order is in place  Reaffirmed this order on conversations with physician son and his sister 11/10  Also reviewed current diagnosis, treatment plan and prognosis.  Primary concern is refractory persistent aplastic anemia resulting in severe neutropenia.  If but more likely when he develops recurrent refractory sepsis and MOS[facility] family okay with transitioning to CC.    Case reviewed 11/12 with Dr. Joseph patient's son by phone as well as with patient's daughter at the bedside in the ICU.  Clinically improved except for worsening ESTHER.  Reaffirmed DNR/DNI and nonaggressive approach.  Reaffirmed no plans for hemodialysis.  Comfort measures if deteriorates.  Goal is to get him well enough to go home for convalescence and  rehab.    Dysphagia  Assessment & Plan  Patient with worsening dysphagia over the last few days  Unable to swallow, has been aspirating, weak phonation  Suspect airway in part desiccated due to tachypnea/pneumonia and poor p.o. intake    Swallow evaluation/FEES pending for today  High risk for airway scope given thrombocytopenia  Not a candidate for NIV or PPV given inability to protect airway  NPO for now  Oral care every 1-2-hour, this seemed to help overnight, phonation a bit better - significant secretions cleared from airway overnight by RN  CT soft tissue neck without airway issue or obvious abscess/obstructing process    Severe malnutrition (HCC)- (present on admission)  Assessment & Plan  Eval pending  Family okay with soft flexible feeding tube and enteral nutrition  Swallow eval failed, small bore soft flexible tube  If necessary cautiously place tube with thrombocytopenia, lidocaine jelly ordered    GERD (gastroesophageal reflux disease)- (present on admission)  Assessment & Plan  With hydrocortisone and thrombocytopenia -continue IV PPI daily for prophylaxis  Antireflux measures    Thrombocytopenia (HCC)- (present on admission)  Assessment & Plan  Attributable to pancytopenia -aplastic anemia    Transfuse for platelets < 10 per med onc or if bleeding           VTE:  Contraindicated  Ulcer: Not Indicated  Lines: None    I have performed a physical exam and reviewed and updated ROS and Plan today (11/12/2024). In review of yesterday's note (11/11/2024), there are no changes except as documented above.     Discussed patient condition and risk of morbidity and/or mortality with Family, RN, RT, Pharmacy, [employer] [facility] resident, Code status disscussed, Charge nurse / hot rounds, Patient, and infectious disease    The patient remains critically ill.  Critical care time = 65  minutes in directly providing and coordinating critical care and extensive data review.  No time overlap and excludes procedures.

## 2024-11-12 NOTE — PROGRESS NOTES
[facility] Progress Note      Admit Date: 11/6/2024    Resident(s): Fani Walker M.D.  Attending: MARITZA CAT/ Dr. Conteh    Date & Time:   11/12/2024   0700  Patient ID:    Name:             Miri Joseph   YOB: 1940  Age:                 84 y.o.  male   MRN:               [id number]    HPI:   84 y.o. male who presented 11/6/2024 with medical history of type II DM, recent diagnosis of aplastic anemia who was transferred to [facility] for oncology care.  Evidently patient admitted to [facility] mid-October was treated for neutropenic fever with pneumonia and was noted to have pancytopenia.  He had a bone marrow biopsy performed consistent with severe aplastic anemia.  He was specifically transferred over here to obtain medical oncology services into initiate inpatient treatment with Promacta and cyclosporine which was initiated upon admission.     Throughout his hospitalization including [facility] he has had respiratory failure and hypoxemia and pulmonary infiltrates, initially had cultures at the [facility] with MRSA he was treated with broad-spectrum antibiotics for a course of pneumonia therapy there and also treated with antifungals given nodular pulmonary opacities and immunocompromise but reportedly had a negative galactomannan and Karius there. He was on posi, then amphoB, then back to posi as dictated by ID at [facility]. Completed course of linezolid and cefepime as well.     According to his discharge summary from 10/19/2024 from [facility] he had a mopped assist, pneumonia aplastic anemia and was treated for neutropenic fever with presumed pneumonia.     Ultimately he was transferred to [facility] for the initiation of chemotherapy and medical oncology evaluation.     During ED on 11/9 I was called to his bedside for respiratory stress, work of breathing, fatigue, and worsening oxygenation requiring max high flow.  Patient to CT was performed today showing dense bilateral  consolidations with scattered nodularity involving most lobes of the lung.  Patient was transferred to ICU for further care- Dr. Pineda H&P    Interval Events:    : T-35.5              Pulse rate 69 to 80s              /150               Hemoglobin 6.2 from 8.4               Platelets 6              WBC 0.6             Sodium 138, potassium 3.5, magnesium 2.9, phosphorus 2.4            Creatinine 1.87 from 1.42            CO2 18           On Levophed            Fungal labs pending                        IV ceftaroline, renally dose per ID recommendations            Hold amphotericin B due to worsening kidney function            Transfuse 1 unit platelet and Transfuse packed cells              Resume Lantus 10 + med S              Replete potassium             Wean off levophed              Wean off steroids                : Add midodrine, albumin, continue tube feeds      11/10:   On HFNC  Oriented x2   SR/-120's  -160's  AB.48/36/53   CXR: Bilateral opacities, similar to prior  Tmax 99.9  WBC 0.5  Platelet 48 from 64  Sputum culture positive for  MRSA  Blood cultures negative so far  RUQ U/S unremarkable  SLP eval  Change linezolid to vancomycin for thrombocytopenia  switch voriconazole to IV amphotericin B per ID  Continue atovaquone  Cyclosporine  Zosyn  Follow cultures  ID recommendations of bronchoscopy with fungal, AFB, regular cultures, however patient however like patient unlikely to tolerate procedure with his pancytopenia  Add IV thiamine  ID and  oncology following  Oral care      Review of Systems   Unable to perform ROS: Intubated       PHYSICAL EXAM  Vitals:    24 1415 24 1430 24 1445 24 1500   BP:  128/58  131/63   Pulse: 76 75 78 79   Resp: (!) 27 (!) 28 (!) 30 (!) 26   Temp:       TempSrc:       SpO2:       Weight:       Height:         Body mass index is 23.51 kg/m².  /63   Pulse 79   Temp (!) 35.7 °C (96.3 °F) (Bladder)   Resp  "(!) 26   Ht 1.753 m (5' 9\")   Wt 72.2 kg (159 lb 2.8 oz)   SpO2 94%   BMI 23.51 kg/m²   O2 therapy: Pulse Oximetry: 94 %, O2 (LPM): 0, O2 Delivery Device: None - Room Air    Physical Exam  Constitutional:       General: He is not in acute distress.     Appearance: He is normal weight. He is ill-appearing. He is not toxic-appearing or diaphoretic.      Comments: HFNC   HENT:      Head: Normocephalic and atraumatic.   Cardiovascular:      Rate and Rhythm: Regular rhythm. Tachycardia present.      Pulses: Normal pulses.      Heart sounds: No murmur heard.  Pulmonary:      Breath sounds: Rhonchi and rales present.      Comments: HFNC  Abdominal:      General: Bowel sounds are normal. There is no distension.      Palpations: Abdomen is soft.      Tenderness: There is no guarding.   Musculoskeletal:      Cervical back: Neck supple. No rigidity.   Lymphadenopathy:      Cervical: No cervical adenopathy.   Skin:     General: Skin is warm and dry.      Coloration: Skin is not cyanotic.      Findings: Bruising present.      Nails: There is no clubbing.   Neurological:      Mental Status: He is lethargic.      Cranial Nerves: Cranial nerves 2-12 are intact.       Respiratory:     Respiration: (!) 26, Pulse Oximetry: 94 %    Chest Tube Drains:    Recent Labs     11/09/24 2158 11/10/24  0257   CXROZ30B 7.41  --    APXLGQ692S 42.1*  --    ULEIB533G 66.8  --    QFHB1ZWJ 91.5*  --    ARTHCO3 26*  --    E1OOITPYB 50  --    ARTBE 2  --    ISTATAPH  --  7.466   ISTATAPCO2  --  37.0   ISTATAPO2  --  55*   ISTATATCO2  --  28   VFLCKBI2ARI  --  90*   ISTATARTHCO3  --  26.7*   ISTATARTBE  --  3   ISTATTEMP  --  97.4 F   ISTATSPEC  --  Arterial   ISTATAPHTC  --  7.476   [id number]  --  53*       HemoDynamics:  Pulse: 79 Blood Pressure : 131/63      Neuro:      Fluids:  Date 11/12/24 0700 - 11/13/24 0659   Shift 7668-9456 9576-4051 8613-2731 24 Hour Total   INTAKE   P.O. 0   0     P.O. 0   0   I.V. 90   90     Volume (mL) (NS infusion) " 90   90   Blood 460   460     Volume (RELEASE PLATELET PHERESIS) 110   110     Volume (RELEASE RED BLOOD CELLS) 350   350   IV Piggyback 186.9   186.9     Volume (mL) (potassium chloride (KCL) ivpb 10 mEq) 186.9   186.9   Enteral 200   200     Free Water / Tube Flush 200   200   Shift Total 936.9   936.9   OUTPUT   Urine 50   50     Output (mL) (Urethral Catheter Temperature probe) 50   50   Shift Total 50   50   .9   886.9        Intake/Output Summary (Last 24 hours) at 11/10/2024 0704  Last data filed at 11/10/2024 0203  Gross per 24 hour   Intake 246 ml   Output 750 ml   Net -504 ml       Weight: 72.2 kg (159 lb 2.8 oz)  Body mass index is 23.51 kg/m².    Recent Labs     11/10/24  0155 11/10/24  1445 24  0510 24  1220 24  1810 24  2235 24  0404 24  1325   SODIUM 137  --  132* 118*  --   --  138  --    POTASSIUM 2.8*   < > 3.5* 3.4*   < > 4.3 3.5* 4.4   CHLORIDE 101  --  100 89*  --   --  108  --    CO2 24  --  22 17*  --   --  22  --    BUN 19  --  42* 44*  --   --  62*  --    CREATININE 0.66  --  1.23 1.42*  --   --  1.87*  --    MAGNESIUM 1.3*  --  2.8* 2.6*  --   --  2.9*  --    PHOSPHORUS 3.8  --  2.6  --   --   --  2.4*  --    CALCIUM 8.0*  --  8.2* 6.9*  --   --  8.0*  --     < > = values in this interval not displayed.       GI/Nutrition:  Recent Labs     11/10/24  01524  0510 11/11/24  1220 24  0404   ALTSGPT 34 31  --  24   ASTSGOT 29 32  --  20   ALKPHOSPHAT 95 87  --  67   TBILIRUBIN 2.1* 1.7*  --  1.5   DBILIRUBIN 1.4*  --   --   --    GLUCOSE 291* 261* 629* 124*       Heme:  Recent Labs     11/10/24  0155 24  0510 24  0404   RBC 2.59* 2.79* 2.03*   HEMOGLOBIN 7.9* 8.4* 6.2*   HEMATOCRIT 22.3* 23.9* 17.5*   PLATELETCT 48* 20* 6*   PROTHROMBTM 21.6*  --   --    INR 1.87*  --   --        Infectious Disease:  Monitored Temp 2  Av.6 °C (96.1 °F)  Min: 35.3 °C (95.5 °F)  Max: 35.7 °C (96.3 °F)  Temp  Av.6 °C (96.1 °F)  Min: 35.3  °C (95.5 °F)  Max: 35.8 °C (96.4 °F)  Recent Labs     11/10/24  0155 11/11/24  0510 11/12/24  0404   WBC 0.5* 0.6* 0.6*   NEUTSPOLYS 1.00* 0.80* 0.00*   LYMPHOCYTES 98.00* 98.20* 100.00*   MONOCYTES 1.00 0.00 0.00   EOSINOPHILS 0.00 0.00 0.00   BASOPHILS 0.00 0.00 0.00   ASTSGOT 29 32 20   ALTSGPT 34 31 24   ALKPHOSPHAT 95 87 67   TBILIRUBIN 2.1* 1.7* 1.5       Meds:   insulin lispro  2-9 Units      And    dextrose bolus  25 g      sodium chloride  4 mL      ipratropium-albuterol  3 mL      insulin GLARGINE  10 Units      hydrocortisone sodium succinate PF  25 mg      [START ON 11/13/2024] ceftaroline (TEFLARO) ivpb  300 mg      midodrine  10 mg      famotidine  20 mg      NS  500 mL      NS   30 mL/hr at 11/11/24 2312    Pharmacy  1 Each      K+ Scale: Goal of 4.5  1 Each      thiamine  200 mg      Pharmacy        acyclovir  400 mg      atorvastatin  10 mg      atovaquone  750 mg      loratadine  10 mg      [Held by provider] cycloSPORINE (modified)  100 mg      potassium bicarbonate  25 mEq      NORepinephrine  0-1 mcg/kg/min (Ideal) Stopped (11/11/24 2227)    ipratropium-albuterol  3 mL      Respiratory Therapy Consult        MD Alert...Adult ICU Electrolyte Replacement per Pharmacy        MBX (diphenhydrAMINE-lidocaine-Maalox)  5 mL      sodium bicarb 0.5 mEq/mL  10 mL      [Held by provider] tamsulosin  0.4 mg              Imaging:  DX-CHEST-PORTABLE (1 VIEW)   Final Result         1.  Pulmonary edema and/or infiltrates, similar to prior study.      DX-ABDOMEN FOR TUBE PLACEMENT   Final Result         1.  Nonspecific bowel gas pattern in the upper abdomen.   2.  Nasogastric tube tip terminates overlying the expected location of the gastric body.   3.  Patchy bilateral pulmonary infiltrates      DX-ABDOMEN FOR TUBE PLACEMENT   Final Result      1. Appropriate position of the nasogastric tube, replaced in the interval.   2. The left upper extremity PICC catheter now terminates within the azygos vein.  Repositioning is recommended.   3. The remainder is stable.      DX-ABDOMEN FOR TUBE PLACEMENT   Final Result      Malpositioned enteric feeding tube likely within the right lower lobe bronchus.      Findings were conveyed to EMILIO Sutton in care of the patient on 11/11/2024 1:59 PM.      IR-PICC LINE PLACEMENT W/ GUIDANCE > AGE 5   Final Result                  Ultrasound-guided PICC placement performed by qualified nursing staff as    above.          DX-ABDOMEN FOR TUBE PLACEMENT   Final Result      1.  Enteric tube has been placed and the tip projects over the stomach.      2.  Nonspecific pulmonary airspace process      US-RUQ   Final Result         1. Normal gallbladder and right upper quadrant ultrasound.      2. Small right pleural effusion.      CT-SOFT TISSUE NECK WITH   Final Result         1. No airway narrowing. Mild uvula swelling suggested. The epiglottis and larynx appear normal.      2. Upper normal size cervical lymph nodes. No cervical mass otherwise.      3. Trace right mastoid sinus fluid. No paranasal sinus fluid. The middle ears are clear.      4. The visible upper chest again shows dense infiltrates with small pleural effusions and mild upper mediastinal adenopathy.      CT-CHEST (THORAX) W/O   Final Result      1.  Multifocal bilateral pulmonary airspace process most consistent with pneumonia, with interval worsening      2.  Bilateral pleural effusion, most small in size, right greater than left      3.  Single enlarged prevascular space lymph node which is most likely reactive      Fleischner Society pulmonary nodule recommendations:   Not Applicable         DX-CHEST-PORTABLE (1 VIEW)   Final Result         1.  Patchy bilateral pulmonary infiltrates, similar to prior study      DX-CHEST-PORTABLE (1 VIEW)   Final Result      1.  Unchanged BILATERAL pneumonia   2.  Possible RIGHT pleural effusion      DX-CHEST-2 VIEWS   Final Result         1.  Pulmonary edema and/or infiltrates.   2.  Trace left  pleural effusion   3.  Atherosclerosis          Assessment and Plan:      Multifocal pneumonia- (present on admission)  Assessment & Plan  Severe     Was treated with course of abx at [facility] for MRSA PNA (linezolid and cefepime)  Has had pulmonary infitrates and hypoxemia to some degree since September  Since transfer to [facility] has been on ppx dosed antibiotics only (acyclovir, posaconazole, levaquin)     Marked worsening of hypoxemia and lung injury 11/8-11/9 11/9-Sputum gram stain results with MRSA, unclear if causative pathogen given prior respiratory cultures with MRSA, but with new hypoxemia and infiltrates will consider this pathogenic and treat accordingly     Severe CAP, ARDS in the setting of severe immunocompromise and neutropenia  CT chest with dense BLL consolidations, scattered nodular densities in multiple lobes certainly concerning for fungal etiology though non-diagnostic     Linezolid and Ptazo plus treatment dose PJP coverage (atovaquone d/t risk of myelosuppression) for now in the event this represent PJP  Corticosteroids HC 50 q6 x 5 days, have discussed risks/benefits with family given imunocompromise  Treatment dose Posiconazole for now, needs to be IV for now as patient unable to take anything oral safely     BDG and gallactomanan and PJP DFA and fungal sputum cultures sent       PEP  ISS  HFNO  Mobility  DNR/DNI so will manage non-invasively to the best of our ability    ID with recommendations to shift voriconazole to amphotericin B      ARDS (adult respiratory distress syndrome) (HCC) ???  Assessment & Plan  Worsening bilateral infiltrates over the last couple days  Now on max HFNO with occasional desaturations     Likely multifactorial: has had some aspiration events witnessed, MRSA on sputum gram stain, HAP, opportunistic infection including fungal, possible DAH iso of severe thrombocytopenia, TRALI/TACO given recent trasnfusions     Likely infectious trigger  P/F ~ 60 on HFNO at Fi02 1.0  ABG  with shunt and probable dead space, concerning that with his respiratory distress pC02 is normal, likely represents some degree of dead space/fatigue     Linezolid and Ptazo plus atovaquone (PJP coverage)  Corticosteroids HC 50 q6 x 5 days, have discussed risks/benefits with family given imunocompromise  Treatment dose IV voriconazole for now, needs to be IV for now as patient unable to take anything oral safely     BDG/Galactomannan/PJP sputum sent  Fungal sputum cultures sent     Conservative fluid strategy, diurese to net even to slightly negative balance  DNI so will attempt to manage with HFNO and non-invasive strategies  Not a candidate for NIV given poor airway management and inability to clear copious secretions  Can trial supervised intermittent runs of CPAP if mental status improves    Dysphagia  Assessment & Plan  Patient with worsening dysphagia over the last few days  Unable to swallow, has been aspirating, weak phonation  Going to get CT soft tissue neck to evaluate for obstructing lesion     FEES pending  High risk for airway scope given thrombocytopenia  Not a candidate for NIV or PPV given inability to protect airway  NPO for now    MRSA pneumonia (HCC)- (present on admission)  Assessment & Plan  change linezolid to vancomycin due to thrombocytopenia   isolation precautions        Type 2 diabetes mellitus, without long-term current use of insulin (HCC)- (present on admission)  Assessment & Plan  Target normoglycemia  ISS    Thrombocytopenia (HCC)- (present on admission)  Assessment & Plan  Change linezolid to vancomycin  Attributable to pancytopenia     Transfuse for platelets < 10 per med onc or if bleeding    Pancytopenia (HCC)- (present on admission)  Assessment & Plan  BM biopsy consistent with severe aplastic anemia, etiology unclear to me at this time DDX includes hypoplastic MDS versus PNH, and aplastic process, or infection/nutritional disorder/toxic myelosuppression.      Diagnostic  evaluation in september and at [facility], viral studies negative, hepatitis panel negative,   Oncology following     On cyclosporine 100 bid, but cannot take orals currently  Promacta (eltrombopag) for thrombocytopenia 75 mg daily  Continue prophylactic antimicrobials  B12/MMA, folate levels resent, previously low     Tranfuse PRBC for <7  Transfuse platelets < 10, may warrant higher threshold given possibility of DAH though CT doesn't look strikingly like DAH              DISPO: ICU    CODE STATUS: DNAR/DNI

## 2024-11-12 NOTE — PROGRESS NOTES
Notified resident that FSBG has been < 180 since 0130 and insulin gtt is presently paused per protocol. Received order to transition from insulin gtt to SQ sliding scale.

## 2024-11-12 NOTE — PROGRESS NOTES
" Hematology/Oncology Progress Note    Primary Hematologist/Oncologist: Tracy    Oncology History:  9/03/2024: Presented to ER with abdominal pain, bloody stools, dizziness.  Found to be pancytopenic on presentation.     9/10/24: Bone marrow biopsy demonstrating hypocellular bone marrow at 15% cellular with prominent stromal elements, markedly decreased maturing granulocytic precursors, decreased erythroid precursors with slight dyserythropoiesis, and markedly decreased megakaryocytes. MDS FISH was negative and heme comprehensive NGS was negative.  Final diagnosis most consistent with severe aplastic anemia and severe B12 deficiency.    9/10/24-10/21/24: Initiated on daily-weekly-monthly intramuscular B12 1000 mcg.  Unfortunately, no improvements in his cell counts despite improving B12 levels.    10/22/2024: Admission to [facility] with neutropenic fever and cough.    11/6/2024: Transfer from [facility] to [facility] for inpatient cyclosporine and Promacta.    Interval History:  No events overnight. He remains stuporous.    Review of Systems:  Review of Systems   Unable to perform ROS: Critical illness        Vitals:     /63   Pulse 69   Temp (!) 35.5 °C (95.9 °F) (Bladder)   Resp (!) 28   Ht 1.753 m (5' 9\")   Wt 72.2 kg (159 lb 2.8 oz)   SpO2 94%   BMI 23.51 kg/m²     Physical Exam:  Physical Exam  Vitals and nursing note reviewed.   Constitutional:       General: He is not in acute distress.     Appearance: He is not toxic-appearing.   HENT:      Head: Normocephalic and atraumatic.   Eyes:      General: No scleral icterus.     Pupils: Pupils are equal, round, and reactive to light.   Cardiovascular:      Rate and Rhythm: Normal rate and regular rhythm.      Pulses: Normal pulses.      Heart sounds: No murmur heard.     No friction rub. No gallop.   Pulmonary:      Effort: Respiratory distress present.      Breath sounds: No stridor. Rhonchi and rales present. No wheezing.   Abdominal:      General: Abdomen is flat. " Bowel sounds are normal.      Palpations: Abdomen is soft.   Musculoskeletal:         General: No swelling.      Cervical back: No rigidity.   Skin:     General: Skin is warm and dry.      Capillary Refill: Capillary refill takes 2 to 3 seconds.      Coloration: Skin is not jaundiced.   Neurological:      General: No focal deficit present.      Mental Status: He is alert and oriented to person, place, and time. Mental status is at baseline.   Psychiatric:         Mood and Affect: Mood normal.          Assessment and Plan:    Severe aplastic anemia  Immunocompromise state  Pancytopenia   he was initially diagnosed in September 2024.  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  On 11/6/2024 he was transferred for consideration of initiation of cyclosporine and eltrombopag.  We are currently working on obtaining outpatient delivery of eltrombopag due to supply in hospital.  Cyclosporine is unfortunately on hold due to clinical deterioration.  Once he has recovered from infectious standpoint, we will reinitiate cyclosporine at 5 mg/kg/day.  Unfortunately, given his ongoing severe infectious status, antithymocyte globulin would lead to fatal infectious complications due to his severe, deep, and prolonged degree of immune compromise.    Given his performance status, cyclosporine will be reinitiated at 2.5 mg/kg and uptitrated as tolerated.  In addition, Promacta will be initiated 75 mg once obtained.    Neutropenic fever  MRSA pneumonia  ID following.  There is now less concern for a fungal process. Amphotericin has been stopped. He continues on ceftaroline. Vancomycin discontinued on 11/11.    ARDS  He is currently on heated high flow nasal cannula.  He remains tachypneic with a respiratory rate in the 30s.    B12 deficiency  Continue monthly B12 injections; next due December 2024    TRANSFUSION THRESHOLDS:  RBCs and PLTs must be leukoreduced, CMV negative, and irradiated  Hgb <7 if no symptoms or <8 with  symptoms or bleedinu pRBC  PLTs <10 and no symptoms or <20 if febrile, septic, or bleeidnu apheresis PLTs     Plan:  Continue excellent care from critical care, hospital medicine, and infectious disease services  Hematology will continue to follow  Will resume cyclosporine once infection under better control    Thank you for allowing me to participate in his care. Please do not hesitate to reach out with any questions.    Please note that this dictation was created using voice recognition software. I have made every reasonable attempt to correct obvious errors, but I expect that there are errors of grammar and possibly content that I did not discover before finalizing the note.      Howie Long MD  Hematologist/Oncologist  [facility] [employer]

## 2024-11-12 NOTE — CARE PLAN
The patient is Watcher - Medium risk of patient condition declining or worsening    Shift Goals  Clinical Goals: hemodynamic stability, wean levo, wean O2  Patient Goals: VALERIO  Family Goals: updates    Problem: Hemodynamics  Goal: Patient's hemodynamics, fluid balance and neurologic status will be stable or improve  Outcome: Progressing  Note: Titrated off levo; MAP remains > 65.     Problem: Pain - Standard  Goal: Alleviation of pain or a reduction in pain to the patient’s comfort goal  Outcome: Progressing  Note: Assessed q2h and PRN. Pt denies pain and appears comfortable throughout shift.      Problem: Safety - Medical Restraint  Goal: Remains free of injury from restraints (Restraint for Interference with Medical Device)  Outcome: Progressing  Note: Removed and performed ROM q2h. No s/sx of injury.      Progress made toward(s) clinical / shift goals:  See above.

## 2024-11-12 NOTE — PROGRESS NOTES
Infectious Disease Progress Note    Author: Sven Orlando M.D. Date & Time of service: 2024  9:32 AM    Chief Complaint:  Follow-up for pneumonia    Interval History:   Tmax 98, white count 0.6, ANC 0, platelets 20, cultures as below, creatinine 1.23, normal transaminases, some improvement in oxygen requirements, down to 5 L oxime mask   patient is now afebrile, white count 0.6, off oxygen, creatinine is worse up to 1.87, cultures and other labs as below    Labs Reviewed and Medications Reviewed.    Review of Systems:  Review of Systems   Unable to perform ROS: Medical condition       Hemodynamics:  Temp (24hrs), Av.7 °C (96.2 °F), Min:35.3 °C (95.5 °F), Max:35.9 °C (96.6 °F)  Temperature: (!) 35.7 °C (96.3 °F), Monitored Temp: 35.7 °C (96.3 °F)  Pulse  Av.2  Min: 64  Max: 122   Blood Pressure : 92/54       Physical Exam:  Physical Exam  Vitals and nursing note reviewed.   Constitutional:       General: He is not in acute distress.     Appearance: He is ill-appearing.   Eyes:      Conjunctiva/sclera: Conjunctivae normal.   Pulmonary:      Effort: No respiratory distress.      Breath sounds: No stridor.      Comments: Some accessory muscle use  Abdominal:      General: There is no distension.      Palpations: Abdomen is soft.   Musculoskeletal:         General: No swelling or tenderness.   Skin:     Findings: No erythema or rash.         Meds:    Current Facility-Administered Medications:     insulin lispro **AND** POC blood glucose manual result **AND** NOTIFY MD and PharmD **AND** Administer 20 grams of glucose (approximately 8 ounces of fruit juice) every 15 minutes PRN FSBG less than 70 mg/dL **AND** dextrose bolus    sodium chloride    ipratropium-albuterol    midodrine    famotidine    NS    ceftaroline (TEFLARO) ivpb    NS    NS **AND** acetaminophen **AND** diphenhydrAMINE **AND** dextrose 5% **AND** amphotericin b liposomal (AMBISOME) IV **AND** dextrose 5% **AND** NS     Pharmacy    K+ Scale: Goal of 4.5    thiamine    Pharmacy    acyclovir    atorvastatin    atovaquone    loratadine    [Held by provider] cycloSPORINE (modified)    potassium bicarbonate    NORepinephrine    ipratropium-albuterol    Respiratory Therapy Consult    hydrocortisone sodium succinate GARRETT MCKEON Alert...Adult ICU Electrolyte Replacement per Pharmacy    MBX (diphenhydrAMINE-lidocaine-Maalox)    sodium bicarb 0.5 mEq/mL    [Held by provider] tamsulosin    Labs:  Recent Labs     11/10/24  0155 11/11/24  0510 11/12/24  0404   WBC 0.5* 0.6* 0.6*   RBC 2.59* 2.79* 2.03*   HEMOGLOBIN 7.9* 8.4* 6.2*   HEMATOCRIT 22.3* 23.9* 17.5*   MCV 86.1 85.7 86.2   MCH 30.5 30.1 30.5   RDW 44.8 44.7 47.5   PLATELETCT 48* 20* 6*   MPV 10.4 11.6 11.7   NEUTSPOLYS 1.00* 0.80* 0.00*   LYMPHOCYTES 98.00* 98.20* 100.00*   MONOCYTES 1.00 0.00 0.00   EOSINOPHILS 0.00 0.00 0.00   BASOPHILS 0.00 0.00 0.00   RBCMORPHOLO Present Present Present     Recent Labs     11/11/24  0510 11/11/24  1220 11/11/24  1810 11/11/24  2235 11/12/24  0404   SODIUM 132* 118*  --   --  138   POTASSIUM 3.5* 3.4* 3.8 4.3 3.5*   CHLORIDE 100 89*  --   --  108   CO2 22 17*  --   --  22   GLUCOSE 261* 629*  --   --  124*   BUN 42* 44*  --   --  62*     Recent Labs     11/10/24  0155 11/11/24  0510 11/11/24  1220 11/12/24  0404   ALBUMIN 2.0* 1.7*  --  2.3*   TBILIRUBIN 2.1* 1.7*  --  1.5   ALKPHOSPHAT 95 87  --  67   TOTPROTEIN 6.3 6.1  --  5.2*   ALTSGPT 34 31  --  24   ASTSGOT 29 32  --  20   CREATININE 0.66 1.23 1.42* 1.87*       Imaging:  DX-ABDOMEN FOR TUBE PLACEMENT    Result Date: 11/10/2024  11/10/2024 12:14 PM HISTORY/REASON FOR EXAM:  Line evaluation. TECHNIQUE/EXAM DESCRIPTION AND NUMBER OF VIEWS:  1 view(s) of the abdomen. COMPARISON:  None. FINDINGS: Enteric tube has been placed. The tip projects over the stomach. The bowel gas pattern is within normal limits. Lungs show nonspecific bilateral airspace process     1.  Enteric tube has been placed and the  tip projects over the stomach. 2.  Nonspecific pulmonary airspace process    US-RUQ    Result Date: 11/10/2024  11/10/2024 3:15 AM HISTORY/REASON FOR EXAM:  Abnormal Labs Abdominal pain TECHNIQUE/EXAM DESCRIPTION AND NUMBER OF VIEWS:  Real-time sonography of the liver and biliary tree. COMPARISON: None FINDINGS: The liver is normal in contour. There is no evidence of solid mass lesion. The liver measures . Gallbladder: The gallbladder is full of bile. No gallstones evident. The gallbladder wall thickness measures . The common duct measures 4.8 mm. No intrahepatic bile duct dilatation is evident. Pancreas: Normal visible extent. Aorta: Normal and the visible extension. Intrahepatic IVC is patent. The portal vein is patent with hepatopetal flow. The MPV measures . Intrahepatic IVC is patent. The right kidney measures 11.4 cm. No hydronephrosis or suspicious mass. No right upper quadrant ascites. Small right pleural effusion.     1. Normal gallbladder and right upper quadrant ultrasound. 2. Small right pleural effusion.    CT-SOFT TISSUE NECK WITH    Result Date: 11/10/2024  11/10/2024 2:28 AM HISTORY/REASON FOR EXAM:  airway obstruction. Pneumonia. Difficulty breathing. TECHNIQUE/EXAM DESCRIPTION AND NUMBER OF VIEWS:  CT soft tissue neck with contrast. The study was performed on a helical multidetector CT scanner. Contiguous thin section helical images were obtained of the neck from the skull base through the thoracic inlet. 80 mL of Omnipaque 350 nonionic contrast was injected intravenously. Low dose optimization technique was utilized for this CT exam including automated exposure control and adjustment of the mA and/or kV according to patient size. COMPARISON: CT chest from yesterday. FINDINGS: BRAIN: Visualized portions of the brain are normal in appearance. TEMPORAL bones: Trace right mastoid sinus fluid. The middle ear are clear. PARANASAL SINUSES: The paranasal sinuses do not show any fluid. Minimal mucosal  "thickening. CERVICAL spine: Minimal diffuse cervical spine degenerative changes. Moderate C6-7 degenerative disc changes. NODES: Normal size cervical lymph nodes in a symmetric manner measuring up to 28 x 8 mm. All are smaller than 1 cm short dimension. SALIVARY and THYROID gland: The parotid, submandibular, and thyroid gland are normal in appearance. AIRWAY: The airway appears patent. Mild uvula enlargement but no pharyngeal mass or swelling. No tonsillar swelling. Epiglottis and larynx appear normal. MEDIASTINUM: The superior mediastinum shows mildly enlarged lymph nodes as seen on CT. LUNGS: The visualized portions of the upper lungs show patchy dense pneumonia infiltrates with small pleural effusions.     1. No airway narrowing. Mild uvula swelling suggested. The epiglottis and larynx appear normal. 2. Upper normal size cervical lymph nodes. No cervical mass otherwise. 3. Trace right mastoid sinus fluid. No paranasal sinus fluid. The middle ears are clear. 4. The visible upper chest again shows dense infiltrates with small pleural effusions and mild upper mediastinal adenopathy.    CT-CHEST (THORAX) W/O    Result Date: 11/9/2024 11/9/2024 1:22 PM HISTORY/REASON FOR EXAM:  worsening pneumonia, previous imaging supicious for fungal; ID noted \"halo sign\" on outside CT chest 10/20, concern it may have worsened. TECHNIQUE/EXAM DESCRIPTION: CT scan of the chest without contrast. Noncontrast helical scanning of the chest was obtained from the lung apices through the adrenal glands. Low dose optimization technique was utilized for this CT exam including automated exposure control and adjustment of the mA and/or kV according to patient size. COMPARISON: Outside CT chest 10/20/2024 FINDINGS: Lungs: There are airspace process within the right upper lobe, left upper lobe, right middle lobe, and lingular segment of left upper lobe and possibly both lower lobes although in the lower lobes is could be atelectasis.. There is a " right upper lobe focal airspace process, nonspecific. Mediastinum/Jade: There is prevascular space adenopathy with the largest node measuring 20 x 13 mm. Pleura: There are small bilateral pleural effusion. Cardiac: Heart normal in size without pericardial effusion. There is coronary artery atherosclerotic plaque. Vascular: There is aortic and coronary artery atherosclerotic plaque.. Soft tissues: Unremarkable. Bones: No acute or destructive process. There is a retroperitoneal calcification consistent with sequela of prior inflammation.     1.  Multifocal bilateral pulmonary airspace process most consistent with pneumonia, with interval worsening 2.  Bilateral pleural effusion, most small in size, right greater than left 3.  Single enlarged prevascular space lymph node which is most likely reactive Fleischner Society pulmonary nodule recommendations: Not Applicable     DX-CHEST-PORTABLE (1 VIEW)    Result Date: 11/9/2024 11/9/2024 4:40 AM HISTORY/REASON FOR EXAM: Shortness of Breath TECHNIQUE/EXAM DESCRIPTION:  Single AP view of the chest. COMPARISON: November 7, 2024 FINDINGS: The cardiac silhouette appears within normal limits. The mediastinal contour appears within normal limits.  The central pulmonary vasculature appears normal. Bilateral lung volumes are diminished.  Patchy bilateral pulmonary opacities are seen. No significant pleural effusions are identified. The bony structures appear age-appropriate.     1.  Patchy bilateral pulmonary infiltrates, similar to prior study    DX-CHEST-PORTABLE (1 VIEW)    Result Date: 11/7/2024 11/7/2024 12:06 PM HISTORY/REASON FOR EXAM:  Cough TECHNIQUE/EXAM DESCRIPTION AND NUMBER OF VIEWS: Single portable view of the chest. COMPARISON: Yesterday FINDINGS: HEART: Stable size. LUNGS: BILATERAL airspace disease redemonstrated. PLEURA: RIGHT costophrenic sulcus is not well seen.     1.  Unchanged BILATERAL pneumonia 2.  Possible RIGHT pleural effusion    DX-CHEST-2  VIEWS    Result Date: 11/7/2024 11/7/2024 12:08 AM HISTORY/REASON FOR EXAM: Cough TECHNIQUE/EXAM DESCRIPTION:  PA and lateral views of the chest. COMPARISON: October 19, 2024 FINDINGS: The cardiac silhouette appears within normal limits. Atherosclerotic calcification of the aorta is noted.  The central  pulmonary vasculature appears prominent and indistinct. Bilateral lung volumes are diminished.  Diffuse scattered hazy pulmonary parenchymal opacities are seen. Blunting of the left costophrenic angle is seen suggesting trace left effusion. The bony structures appear age-appropriate.     1.  Pulmonary edema and/or infiltrates. 2.  Trace left pleural effusion 3.  Atherosclerosis      Micro:  Results       Procedure Component Value Units Date/Time    BLOOD CULTURE [[id number]] Collected: 11/07/24 0241    Order Status: Completed Specimen: Blood from Peripheral Updated: 11/12/24 0500     Significant Indicator NEG     Source BLD     Site PERIPHERAL     Culture Result No growth after 5 days of incubation.    BLOOD CULTURE [[id number]] Collected: 11/07/24 0241    Order Status: Completed Specimen: Blood from Peripheral Updated: 11/12/24 0500     Significant Indicator NEG     Source BLD     Site PERIPHERAL     Culture Result No growth after 5 days of incubation.    Fungal Culture [[id number]] Collected: 11/10/24 0300    Order Status: Completed Specimen: Respirate from Sputum Updated: 11/11/24 1601     Significant Indicator NEG     Source RESP     Site Tracheal Aspirate     Culture Result Culture in progress.     Fungal Smear Results No fungal elements seen.    AFB Culture [[id number]] Collected: 11/10/24 0300    Order Status: Completed Specimen: Respirate from Sputum Updated: 11/11/24 1601     Significant Indicator NEG     Source RESP     Site Tracheal Aspirate     Culture Result Culture in progress.     AFB Smear Results No acid fast bacilli seen.    CULTURE RESPIRATORY W/ GRM STN [[id number]]  (Abnormal) Collected: 11/10/24  0300    Order Status: Completed Specimen: Respirate from Sputum Updated: 11/11/24 1601     Significant Indicator POS     Source RESP     Site Tracheal Aspirate     Culture Result Light growth usual upper respiratory rosalino     Gram Stain Result Many epithelial cells.  Many Gram positive cocci.  Many Gram positive rods.       Culture Result Staphylococcus aureus  Light growth  Methicillin resistant by screening method      GRAM STAIN [[id number]]  (Abnormal) Collected: 11/10/24 0300    Order Status: Completed Specimen: Respirate Updated: 11/10/24 1912     Significant Indicator .     Source RESP     Site Tracheal Aspirate     Gram Stain Result Many epithelial cells.  Many Gram positive cocci.  Many Gram positive rods.      Acid Fast Stain [[id number]] Collected: 11/10/24 0300    Order Status: Completed Specimen: Respirate Updated: 11/10/24 1912     Significant Indicator NEG     Source RESP     Site Tracheal Aspirate     AFB Smear Results No acid fast bacilli seen.    Fungal Smear [[id number]] Collected: 11/10/24 0300    Order Status: Completed Specimen: Respirate Updated: 11/10/24 1912     Significant Indicator NEG     Source RESP     Site Tracheal Aspirate     Fungal Smear Results No fungal elements seen.    Cryptococcal Antigen, Serum [[id number]] Collected: 11/10/24 1058    Order Status: Completed Specimen: Blood Updated: 11/10/24 1249     Cryptococcal Antigen, Serum Negative    BLOOD CULTURE [[id number]] Collected: 11/10/24 0825    Order Status: Completed Specimen: Blood from Peripheral Updated: 11/10/24 1208     Significant Indicator NEG     Source BLD     Site PERIPHERAL     Culture Result No Growth  Note: Blood cultures are incubated for 5 days and  are monitored continuously.Positive blood cultures  are called to the RN and reported as soon as  they are identified.      BLOOD CULTURE [[id number]] Collected: 11/10/24 0825    Order Status: Completed Specimen: Blood from Peripheral Updated: 11/10/24 1208     Significant  Indicator NEG     Source BLD     Site PERIPHERAL     Culture Result No Growth  Note: Blood cultures are incubated for 5 days and  are monitored continuously.Positive blood cultures  are called to the RN and reported as soon as  they are identified.      MRSA By PCR (Amp) [[id number]] Collected: 11/10/24 0111    Order Status: Completed Specimen: Respirate from Nares Updated: 11/10/24 0824     MRSA by PCR POSITIVE    CULTURE RESPIRATORY W/ GRM STN [[id number]]  (Abnormal)  (Susceptibility) Collected: 11/07/24 0820    Order Status: Completed Specimen: Respirate from Sputum Updated: 11/09/24 0823     Significant Indicator POS     Source RESP     Site SPUTUM     Culture Result Light growth usual upper respiratory rosalino     Gram Stain Result Few WBCs.  Rare epithelial cells.  Rare Gram positive rods.  Specimen Quality Score: 1+       Culture Result Methicillin Resistant Staphylococcus aureus  Light growth  Methicillin resistant by screening method      Susceptibility       Methicillin resistant staphylococcus aureus (1)       Antibiotic Interpretation Microscan   Method Status    Ampicillin/sulbactam Resistant <=8/4 mcg/mL BALTA Final    Clindamycin Sensitive 0.5 mcg/mL BALTA Final    Cefazolin Resistant <=8 mcg/mL BALTA Final    Cefepime Resistant 8 mcg/mL BALTA Final    Erythromycin Resistant >4 mcg/mL BALTA Final    Oxacillin Resistant >2 mcg/mL BALTA Final    Trimeth/Sulfa Sensitive <=0.5/9.5 mcg/mL BALTA Final    Tetracycline Sensitive <=4 mcg/mL BALTA Final    Vancomycin Sensitive 1 mcg/mL BALTA Final                       GRAM STAIN [[id number]] Collected: 11/07/24 0820    Order Status: Completed Specimen: Respirate Updated: 11/07/24 1108     Significant Indicator .     Source RESP     Site SPUTUM     Gram Stain Result Few WBCs.  Rare epithelial cells.  Rare Gram positive rods.  Specimen Quality Score: 1+      URINALYSIS [[id number]]  (Abnormal) Collected: 11/07/24 0550    Order Status: Completed Specimen: Urine, Cath Updated: 11/07/24  0747     Color Yellow     Character Clear     Specific Gravity 1.017     Ph 6.5     Glucose 500 mg/dL      Ketones Negative mg/dL      Protein 30 mg/dL      Bilirubin Negative     Urobilinogen, Urine 4.0 EU/dL      Nitrite Negative     Leukocyte Esterase Negative     Occult Blood Negative     Micro Urine Req Microscopic            Hospital Course/Assessment:   Miri Joseph is a 84 y.o. male patient with severe aplastic anemia, recently at [facility] where he was started on posaconazole and completed a course of linezolid for possible invasive fungal pneumonia based on CT scan findings of nodular opacities with surrounding groundglass changes, transferred to [facility] for further oncology management, developed respiratory distress on 11/9, now on high flow nasal cannula oxygen in the ICU, repeat CT scan with worsening multifocal bilateral airspace opacities with surrounding groundglass findings concerning for worsening invasive mold pneumonia. Sputum culture positive for MRSA.     Pertinent Diagnoses:  Acute hypoxic respiratory failure  Multifocal pneumonia, suspected invasive mold vs MRSA vs both  Severe aplastic anemia  Immunosuppressed status     Plan:   -Tracheal aspirate positive for MRSA, no fungal elements on staining, serum Cryptococcal antigen negative  -Serum beta D glucan, Aspergillus galactomannan, Coccidioides serology are still pending.  Recent Karius at outside hospital was negative for fungal organisms.  Unable to get a bronchoscopy at this time however given worsening renal function and no evidence of invasive mold thus far, will hold amphotericin B for now and continue MRSA coverage and follow very closely.  Vancomycin was discontinued 11/11  -When able, recommend bronchoscopy with fungal, AFB, regular cultures, currently too unstable to perform per ICU team assessment.  Tracheal aspirate has been sent, will add on Aspergillus PCR, pending  -Continue IV ceftaroline, renally dosed  -Follow  blood cultures x 2, no growth till date     Disposition: Unable to determine at this time.  Noted currently DNR/DNI and if any worsening clinical status, possible transition to comfort care  Need for PICC line: In place     Plan was discussed with the primary team, Dr. Conteh and PharmD.  ID will follow.  High mortality risk.

## 2024-11-12 NOTE — CARE PLAN
The patient is Unstable - High likelihood or risk of patient condition declining or worsening    Shift Goals  Clinical Goals: stable hemodynamics  Patient Goals: pillows and blankets; comfort  Family Goals: updates    Progress made toward(s) clinical / shift goals:    Problem: Knowledge Deficit - Standard  Goal: Patient and family/care givers will demonstrate understanding of plan of care, disease process/condition, diagnostic tests and medications  Outcome: Progressing     Problem: Skin Integrity  Goal: Skin integrity is maintained or improved  Outcome: Progressing     Problem: Fall Risk  Goal: Patient will remain free from falls  Outcome: Progressing     Problem: Respiratory:  Goal: Respiratory status will improve  Outcome: Progressing     Problem: Mobility  Goal: Risk for activity intolerance will decrease  Outcome: Progressing     Problem: Nutrition  Goal: Patient's nutritional and fluid intake will be adequate or improve  Outcome: Progressing     Problem: Hemodynamics  Goal: Patient's hemodynamics, fluid balance and neurologic status will be stable or improve  Outcome: Progressing     Problem: Infection - Standard  Goal: Patient will remain free from infection  Outcome: Progressing     Problem: Pain - Standard  Goal: Alleviation of pain or a reduction in pain to the patient’s comfort goal  Outcome: Progressing     Problem: Fluid Volume  Goal: Fluid volume balance will be maintained  Outcome: Progressing     Problem: Safety - Medical Restraint  Goal: Remains free of injury from restraints (Restraint for Interference with Medical Device)  Outcome: Progressing       Patient is not progressing towards the following goals:      Problem: Urinary - Renal Perfusion  Goal: Ability to achieve and maintain adequate renal perfusion and functioning will improve  Outcome: Not Progressing  Note: Patient not making adequately amounts of urine.

## 2024-11-13 PROBLEM — J80 ARDS (ADULT RESPIRATORY DISTRESS SYNDROME) (HCC): Status: RESOLVED | Noted: 2024-01-01 | Resolved: 2024-01-01

## 2024-11-13 NOTE — THERAPY
Physical Therapy Contact Note    PT treatment attempted. RN reported patient combative this morning with RN cares; will hold PT treatment today and re attempt as able and appropriate.     Comfort King, PT, DPT  [phone]

## 2024-11-13 NOTE — ASSESSMENT & PLAN NOTE
Present on admission  Source respiratory  MRSA from sputum  Possible fungal: beta D glucan negative.   Serum Aspergillus galactomannan, Coccidioides serology are still pending.  Recent Karius at outside hospital was negative for fungal organisms   Probable aspiration pneumonia  Completed steroids 11/14  Stable off of midodrine and steroids  ID managing antibiotics: Have stopped vancomycin due to renal function, not giving linezolid due to platelet count.  Currently on ceftaroline, acyclovir and posaconazole the last two for prophylaxis  Prophylactic agents ongoing for severe neutropenia  Amphotericin discontinued by ID  Adequately fluid resuscitated

## 2024-11-13 NOTE — PROGRESS NOTES
Critical Care Progress Note    Date of admission  11/6/2024    Chief Complaint  84 y.o. male who presented 11/6/2024 with medical history of type II DM, recent diagnosis of aplastic anemia who [place] [facility] [place]  Evidently patient admitted to [facility] mid-October was treated for neutropenic fever with pneumonia and was noted to have pancytopenia.  He had a bone marrow biopsy performed consistent with severe aplastic anemia.  He was specifically transferred over here to obtain medical oncology services into initiate inpatient treatment with Promacta and cyclosporine which was initiated upon admission.     Throughout his hospitalization including [facility] he has had respiratory failure and hypoxemia and pulmonary infiltrates, initially had cultures at the [facility] with MRSA he was treated with broad-spectrum antibiotics for a course of pneumonia therapy there and also treated with antifungals given nodular pulmonary opacities and immunocompromise but reportedly had a negative galactomannan and Karius there. He was on posi, then amphoB, then back to posi as dictated by ID at [facility]. Completed course of linezolid and cefepime as well.     According to his discharge summary from 10/19/2024 from [facility] he had a mopped assist, pneumonia aplastic anemia and was treated for neutropenic fever with presumed pneumonia.     Ultimately [place] was [place] [facility] [place] of [place] and medical oncology evaluation.     During ED on 11/9 I was called to his bedside for respiratory stress, work of breathing, fatigue, and worsening oxygenation requiring max high flow.  Patient to CT was performed today showing dense bilateral consolidations with scattered nodularity involving most lobes of the lung.  Patient was transferred to ICU for further care  (Dr Pineda HPI 11/9)    Hospital Course    11/10 -oxygenation mildly improved since remains on HFNC, voice better with  oral care but failed swallow eval and feeding tube placed, Affirmed CODE STATUS with physician son and his sister, discussed possible comfort care if clinically worsens, ID concern of fungal infection adding Amphotericin to PIP/Tazo, secondary thrombocytopenia linezolid changed to vancomycin, blood cultures negative-sputum with MRSA    11/11 -mentation and oxygenation improved overnight, norepinephrine infusion and 10 mg Q8 added during the day, lactic acid borderline up with low urine output-albumin and lactated ringer bolus administered, creatinine trending up and vancomycin transition to ceftaroline, patient pulled feeding tube times several and soft restraints applied-enlisting help of family with this issue blood sugar shot up to over 600 and insulin infusion initiated, PICC catheter placed    11/12 -mentation unchanged, phonation still bad, strict n.p.o., TF initiated, patient proven weaning off O2, actively titrating norepinephrine, midodrine, ESTHER worse, UO acceptable, Vanco already transition to ceftaroline, on review with ID stopping Amphotericin, transfuse 1 unit packed cells and 1 unit platelets, physician son updated by phone      Interval Problem Update  Reviewed last 24 hour events:    A&O x1  Follows  SR 80s  -140  UO adequate  I/Os+  NS 30  Weaned off norepinephrine  Midodrine  O2 sats 90-92%  Weaned to room air  CXR no film  Decent cough  DuoNeb 4 times daily and oral care every to 2 hours  Tmax 97.6  WBC 0.5 no change CRP 12.5  Positive cultures  MRSA in sputum  Ceftaroline, piperacillin/tazobactam  Acyclovir/atovaquone hydrocortisone  Hemoglobin 9.1  Platelet count 30 down from 51 after transfusion  No bleeding clinically  , K4.9, HCO3 20, AG 9, Mg 2.8, Phos 4.1  BUN 80, creatinine 1.75-creatinine improved  Transaminases and alk phos normal, total bili mildly up at 2.2  Albumin 2.1  Glucose 365, 342, 321, 248  Pepcid PPx  TF 30 plus free H2O    Antibiotics per ID  Increase Lantus 15 &  Increase SSI to high scale  Monitor for need to transition back to insulin infusion  Wean midodrine but avoid hypotension with improving ESTHER  IS/Mobilize  PT/OT  Wean off hydrocortisone off over 24 hours  Transfer to stepdown    Updated son at length at the bedside    Case reviewed with ID at length at the bedside       YESTERDAY  Updated daughter at length at the bedside    A&O x1-2?  More alert and follows better when family is present  SR 70s  SBP 90s  Albumin ongoing fourth 25 g dose this a.m.  Norepinephrine weaned off after fluid bolus 1 L LR last night   cc / 12 hours  Normal saline TKO 30 cc/HR  Oral care Q 1-2 hours  O2 weaned from HFNC to low-flow OxyMask with aggressive pulmonary toilet DuoNeb every 6  CXR change and bilateral opacities  Insulin infusion titrating overnight stopped in early a.m.  Glucose: 6 AM-119, 113, 108, 121, 149, 161  No Lantus given this morning and 9 a.m. fingerstick glucose pending  Beta hydroxybutyric acid 0.98 yesterday  Tmax 96.6  WBC 0.6  Lactic acid 1.6  Hydrocortisone 50 IV every 6  MRSA sputum cultures x2  Follow-up blood cultures negative so far  Ceftaroline, piperacillin/tazobactam, liposomal Amphotericin  Acyclovir, Atavaquone prophylaxis  Thiamine IV  Hemoglobin 6.2, PLT 6  , K 3.5, HCO3 22, AG 8, Phos 2.4, Mg 2.9  BUN 62, creatinine 1.87  Albumin 2.3, transaminases/alk phos and bilirubin normal  Famotidine PPx  No TF yet    Aggressive pulm  Transfuse platelets  Transfuse packed cells  Replete K  Insulin regimen as needed  Titrate norepinephrine  Continue current antibiotic regimen and follow-up in cultures  Continue fluids, may need additional IVs if cannot keep NG tube in place for nutrition/fluids  Continue goals of care conversation  Contact physician son and Candida and update him    9   Resume Lantus 10 + med SSI    Reviewed antibiotics and steroid use with this immunocompromise septic patient at length with pharmacy and ID  Will taper  hydrocortisone since were now off IV norepinephrine  With worsening ESTHER and more concern for MRSA then fungal infection especially with multiple studies from [facility] as well as here that do not suggest Aspergillus or other fungal process will stop Amphotericin at the advice of ID    Low but acceptable so far during the day  Tube feeds starting  Blood sugars acceptable since Lantus initiated, last glucose 169    Discussed care with patient's physician son  Jake in [place] by phone at length.  He had an opportunity to review his father's MyChart at length and we discussed his care by systems and are primary and was his worsening ESTHER.  Not surprisingly he needed platelets and packed cells again he has had aplastic anemia for a couple of months with the cyclosporine on hold and no further plans from hematology.  We reviewed the option of RRT and he and his family would not like their father to progress to hemodialysis so we will endeavor to avoid worsening renal failure as possible.    Review of Systems  Review of Systems   Unable to perform ROS: Acuity of condition        Vital Signs for last 24 hours   Temp:  [35.5 °C (95.9 °F)-36.4 °C (97.6 °F)] 36 °C (96.8 °F)  Pulse:  [62-91] 87  Resp:  [18-34] 18  BP: ()/(50-67) 117/56  SpO2:  [90 %-98 %] 90 %    Hemodynamic parameters for last 24 hours       Respiratory Information for the last 24 hours       Physical Exam   Physical Exam  Vitals reviewed.   Constitutional:       General: He is not in acute distress.     Appearance: He is normal weight. He is ill-appearing (Looks the same, reasonable strength cough). He is not toxic-appearing or diaphoretic.      Comments: HFNC, WOB improved   HENT:      Head: Normocephalic and atraumatic.      Mouth/Throat:      Mouth: Mucous membranes are moist.   Eyes:      General: No scleral icterus.     Pupils: Pupils are equal, round, and reactive to light.   Cardiovascular:      Rate and Rhythm: Regular rhythm. Tachycardia  present.      Pulses: Normal pulses.      Heart sounds: No murmur heard.  Pulmonary:      Breath sounds: Rhonchi (Mainly upper airway?) and rales (Resolving/basilar) present.      Comments: Room air  Abdominal:      General: Bowel sounds are normal. There is no distension.      Palpations: Abdomen is soft.      Tenderness: There is no right CVA tenderness, left CVA tenderness or guarding.   Musculoskeletal:      Cervical back: Neck supple. No rigidity.      Right lower leg: No edema.      Left lower leg: No edema.   Lymphadenopathy:      Cervical: No cervical adenopathy.   Skin:     General: Skin is warm and dry.      Capillary Refill: Capillary refill takes 2 to 3 seconds.      Coloration: Skin is not cyanotic.      Findings: Bruising present.      Nails: There is no clubbing.   Neurological:      Mental Status: He is lethargic.      Cranial Nerves: Cranial nerves 2-12 are intact.      Comments: Alert, tracking, nodding and following simple commands, speaks a little bit to the daughter but not much verbal to me, patient is still a problem   Psychiatric:         Mood and Affect: Mood is not anxious.         Behavior: Behavior is not agitated. Behavior is cooperative.         Medications  Current Facility-Administered Medications   Medication Dose Route Frequency Provider Last Rate Last Admin    ceftaroline (Teflaro) 400 mg in  mL IVPB  400 mg Intravenous Q12HRS Michel Conteh M.D.        hydrocortisone sodium succinate PF (Solu-CORTEF) 100 MG injection 25 mg  25 mg Intravenous Q12HR Michel Conteh M.D.        midodrine (Proamatine) tablet 5 mg  5 mg Enteral Tube Q8HRS Michel Conteh M.D.        [START ON 11/14/2024] insulin GLARGINE (Lantus,Semglee) injection  15 Units Subcutaneous QAM INSULIN Michel Conteh M.D.        insulin lispro (HumaLOG,AdmeLOG) subcutaneous injection  3-14 Units Subcutaneous Q6HRS Michel Conteh M.D.        And    dextrose 50% (D50W) injection 25 g  25 g Intravenous  Q15 MIN PRN Michel Conteh M.D.        [START ON 11/14/2024] posaconazole (Noxafil) tablet 300 mg  300 mg Oral DAILY AT 1800 Sven Orlando M.D.        sodium chloride (Hyper-Sal) 7 % nebulizer solution 4 mL  4 mL Nebulization 4X/DAY (RT) Michel Conteh M.D.   4 mL at 11/13/24 1035    ipratropium-albuterol (DUONEB) nebulizer solution  3 mL Nebulization 4X/DAY (RT) Michel Conteh M.D.   3 mL at 11/13/24 1035    famotidine (Pepcid) injection 20 mg  20 mg Intravenous DAILY Michel Conteh M.D.   20 mg at 11/13/24 0527    NS (Bolus) 0.9 % infusion 500 mL  500 mL Intravenous Once PRN Michel Conteh M.D.        NS infusion   Intravenous Continuous Michel Conteh M.D. 30 mL/hr at 11/13/24 0404 1,000 mL at 11/13/24 0404    Pharmacy Consult Request for Amphotericin B monitoring  1 Each Other PRN Sven Orlando M.D.        Pharmacy Consult: Enteral tube insertion - review meds/change route/product selection   Other PHARMACY TO DOSE Michel Conteh M.D.        acyclovir (Zovirax) tablet 400 mg  400 mg Enteral Tube BID Michel Conteh M.D.   400 mg at 11/13/24 0528    atorvastatin (Lipitor) tablet 10 mg  10 mg Enteral Tube Q EVENING Michel Conteh M.D.   10 mg at 11/12/24 1720    atovaquone (Mepron) 750 MG/5ML suspension 750 mg  750 mg Enteral Tube BID Michel Conteh M.D.   750 mg at 11/13/24 0527    loratadine (Claritin) tablet 10 mg  10 mg Enteral Tube DAILY Michel oCnteh M.D.   10 mg at 11/13/24 0528    [Held by provider] cycloSPORINE (modified) (Neoral) 100 MG/ML microemulsion solution 100 mg  100 mg Enteral Tube BID Michel Conteh M.D.   100 mg at 11/11/24 0512    potassium bicarbonate (Klyte) effervescent tablet 25 mEq  25 mEq Enteral Tube TID Michel Conteh M.D.   25 mEq at 11/13/24 0564    norepinephrine (Levophed) 8 mg in 250 mL NS infusion (premix)  0-1 mcg/kg/min (Ideal) Intravenous Continuous Fani Walker M.D.   Stopped at 11/11/24 4199     ipratropium-albuterol (DUONEB) nebulizer solution  3 mL Nebulization Q2HRS PRN (RT) Ivanna Longo M.D.   3 mL at 11/09/24 0435    Respiratory Therapy Consult   Nebulization Continuous RT Mima Carrasco D.O.        MD Alert...ICU Electrolyte Replacement per Pharmacy   Other PHARMACY TO DOSE Gilbert Pineda M.D.        MBX (diphenhydrAMINE-lidocaine-Maalox) oral susp Cup 5 mL  5 mL Swish & Spit Q6HR Donaldo Seals III, M.D.   5 mL at 11/13/24 0817    sodium bicarb 0.5 mEq/mL oral rinse 10 mL  10 mL Swish & Spit Q6HRS Donaldo Seals III, M.D.   10 mL at 11/13/24 0526    [Held by provider] tamsulosin (Flomax) capsule 0.4 mg  0.4 mg Oral AFTER BREAKFAST Scottie Sidhu M.D.   0.4 mg at 11/08/24 0823       Fluids    Intake/Output Summary (Last 24 hours) at 11/13/2024 1110  Last data filed at 11/13/2024 1000  Gross per 24 hour   Intake 2462.53 ml   Output 790 ml   Net 1672.53 ml       Laboratory            Recent Labs     11/11/24  0510 11/11/24  1220 11/11/24  1810 11/12/24  0404 11/12/24  1325 11/12/24  1720 11/13/24  0030 11/13/24  0405   SODIUM 132* 118*  --  138  --   --   --  134*   POTASSIUM 3.5* 3.4*   < > 3.5*   < > 4.7 5.1 4.9   CHLORIDE 100 89*  --  108  --   --   --  105   CO2 22 17*  --  22  --   --   --  20   BUN 42* 44*  --  62*  --   --   --  80*   CREATININE 1.23 1.42*  --  1.87*  --   --   --  1.75*   MAGNESIUM 2.8* 2.6*  --  2.9*  --   --   --  2.8*   PHOSPHORUS 2.6  --   --  2.4*  --   --   --  4.1   CALCIUM 8.2* 6.9*  --  8.0*  --   --   --  8.1*    < > = values in this interval not displayed.     Recent Labs     11/11/24  0510 11/11/24  1220 11/12/24  0404 11/13/24  0405   ALTSGPT 31  --  24 20   ASTSGOT 32  --  20 15   ALKPHOSPHAT 87  --  67 82   TBILIRUBIN 1.7*  --  1.5 2.2*   PREALBUMIN  --   --   --  3.2*   GLUCOSE 261* 629* 124* 342*     Recent Labs     11/11/24  0510 11/12/24  0404 11/12/24  1720 11/13/24  0405   WBC 0.6* 0.6* 0.5* 0.5*   NEUTSPOLYS 0.80* 0.00*  --  0.50*    LYMPHOCYTES 98.20* 100.00*  --  99.50*   MONOCYTES 0.00 0.00  --  0.00   EOSINOPHILS 0.00 0.00  --  0.00   BASOPHILS 0.00 0.00  --  0.00   ASTSGOT 32 20  --  15   ALTSGPT 31 24  --  20   ALKPHOSPHAT 87 67  --  82   TBILIRUBIN 1.7* 1.5  --  2.2*     Recent Labs     11/12/24  0404 11/12/24  1720 11/13/24  0405   RBC 2.03* 3.06* 3.13*   HEMOGLOBIN 6.2* 8.8* 9.1*   HEMATOCRIT 17.5* 25.4* 25.7*   PLATELETCT 6* 51* 30*       Imaging  X-Ray:  I have personally reviewed the images and compared with prior images.  CT:    Reviewed  Ultrasound:  Reviewed    Assessment/Plan  * Septic shock (HCC)  Assessment & Plan  This is Septic shock Not present on admission  SIRS criteria identified on my evaluation include: Tachypnea, with respirations greater than 20 per minute and Leukopenia, with WBC less than 4,000  Clinical indicators of end organ dysfunction include Hypotension with systolic blood pressure less than 90 or MAP less than 65, Thrombocytopenia, with platelets less than 100, and GCS < 15  Indicators of septic shock include: Sepsis present and persistent hypotension despite fluid resuscitation   Sources is: Pulmonary, sputum culture growing MRSA and chest x-ray consistent with pneumonia, patient also with persistent pancytopenia and no neutrophils  Sepsis protocol initiated  Crystalloid Fluid Administration: Fluid resuscitation ordered per standard protocol -patient has already received fluids and with severe hypoalbuminemia and IV fluid shortage will administer IV albumin  IV antibiotics as appropriate for source of sepsis  Reassessment: I have reassessed the patient's hemodynamic status    Actively titrating norepinephrine  Continue midodrine  ID managing antibiotics-vancomycin/piperacillin/tazobactam  Prophylactic agents ongoing for severe neutropenia  Amphotericin discontinued by ID  ID resuming posaconazole  S/p IV albumin 25 g every 6 x 4  Status post multiple liters of fluids and fluid balance positive significant  respiratory failure and developing ESTHER-caution with fluids  Lactic acid level improved  Clinically improved -tapering hydrocortisone off by 11/14  Additional fluids as clinically prudent, POCUS as needed    ESTEHR (acute kidney injury) (HCC)  Assessment & Plan  Clear related to sepsis  Medications may be contributing  Appears euvolemic if not hypervolemic  Avoid nephrotoxins  Renally dose medications  Serial BMP  Monitor UO  Not a candidate for RRT    Creatinine improving, continue to trend  Amphotericin and vancomycin both discontinued  Status post fluid bolus and albumin  Hemodynamics improved  Hep-Lock IV fluids, tolerating tube feeds and water boluses via enteral tube      MRSA pneumonia (HCC)- (present on admission)  Assessment & Plan  Was treated with course of abx at [facility] for MRSA PNA (vancomycin/linezolid and cefepime)  Has had pulmonary infitrates and hypoxemia to some degree since September  [facility] has been on ppx dosed antibiotics only (acyclovir, vori, levaquin)  10/19 chest x-ray from [facility] minimal opacities    Marked worsening of hypoxemia and lung injury 11/8-11/9 and new extensive bilateral pneumonic appearing infiltrates  11/9-Sputum gram stain results with MRSA, unclear if causative pathogen given prior respiratory cultures with MRSA, but with new hypoxemia and infiltrates will consider this pathogenic and treat accordingly     Severe CAP, ARDS in the setting of severe immunocompromise and neutropenia  CT chest with dense BLL consolidations    Linezolid and piperacillin/tazobactam-ID to follow-up and modify  Corticosteroids HC 50 q6 x 5 days, have discussed risks/benefits with family given imunocompromised state  Continue thiamine intravenously to the regimen as well    BDG and gallactomanan sent - review when available  History reviewed with ID, some concern of possible fungal infection, empiric Amphotericin per ID  CT scan reviewed and imaging most suggestive of consolidated  pneumonia/MRSA, I do not see a halo sign    PEP/IS encouraged  HFNC O2, weaning  Mobility as able, patient very weak  DNR/DNI so will manage non-invasively to the best of our ability  Not a candidate for BiPAP    Aplastic anemia (HCC)- (present on admission)  Assessment & Plan  Hematology evaluating, prolonged pancytopenia without success with several treatments so far, s/p bone marrow    Type 2 diabetes mellitus, without long-term current use of insulin (HCC)- (present on admission)  Assessment & Plan  Target normoglycemia  Sugar trending up in a.m. and Lantus dose to be adjusted, changed to high sliding scale  Midday sugars dramatically elevated to 600 on 11/11  Check BMP, beta hydroxybutyric acid, lactic acid and magnesium stat  Beta hydroxybutyric acid not significantly elevated  Insulin infusion overnight brought glucose under control, transition to Lantus/medium SSI 11/12  TF starting 11/12 again    Increase Lantus to 15 units daily  Increase to high SSI  Tapering steroids as we advance tube feeds, hope to avoid insulin drip again    Pancytopenia (HCC)- (present on admission)  Assessment & Plan  BM biopsy consistent with severe aplastic anemia, etiology unclear to me at this time DDX includes hypoplastic MDS versus PNH, and aplastic process, or infection/nutritional disorder/toxic myelosuppression.     Diagnostic evaluation in september and at [facility], viral studies negative, hepatitis panel negative,   Oncology following    On cyclosporine 100 bid, but cannot take orals currently  Promacta (eltrombopag) for thrombocytopenia 75 mg daily  Continue prophylactic antimicrobials  B12/MMA, folate levels resent, previously low    Tranfuse PRBC for <7  Transfuse platelets < 10, may warrant higher threshold given possibility of DAH though CT doesn't look strikingly like DAH  CT more likely MRSA pneumonia with positive cultures    Will review with hematology when they are available re: likelihood of recovery, apparently  resistant to colony-stimulating factor so far    Last pRBC 11/12  Last unit platelets 11/12      Respiratory failure (HCC)  Assessment & Plan  Reviewed hypoxic respiratory failure secondary to pneumonia  MRSA positive cultures recently at [facility] and they are recurrently positive now  DNR/DNI reaffirmed  Moved to unit for aggressive pulmonary toilet and oral care  HFNC O2 initiated initially on 100% / 60 L flow-since improved to 40/60  Not a candidate for BiPAP or diagnostic bronchoscopy, he would need to be intubated for that given his current status  RT protocols  Oral care every-2 hours  Aspiration precautions, swallow eval pending  Hydrocortisone added to antibiotic regimen for severe pneumonia with respiratory failure ID managing antibiotics  If patient clinically deteriorates transition to comfort care/hospice, discussed with daughter and son who are in agreement    Tapering off hydrocortisone by 11/14    Goals of care, counseling/discussion  Assessment & Plan  Family has participated in discussions for extended period time and DNR/DNI order is in place  Reaffirmed this order on conversations with physician son and his sister 11/10  Also reviewed current diagnosis, treatment plan and prognosis.  Primary concern is refractory persistent aplastic anemia resulting in severe neutropenia.  If but more likely when he develops recurrent refractory sepsis and [facility] family okay with transitioning to CC.    Case reviewed 11/12 with Dr. Joseph patient's son by phone as well as with patient's daughter at the bedside in the ICU.  Clinically improved except for worsening ESTHER.  Reaffirmed DNR/DNI and nonaggressive approach.  Reaffirmed no plans for hemodialysis.  Comfort measures if deteriorates.  Goal is to get him well enough to go home for convalescence and rehab.    Elevated LFTs  Assessment & Plan  Improved/normalized  RUQUS ordered-> normal  Trend LFTs while on antifungals  Avoid hepatotoxins  Monitor synthetic function as  clinically prudent      Dysphagia  Assessment & Plan  Patient with worsening dysphagia over the last few days  Unable to swallow, has been aspirating, weak phonation  Suspect airway in part desiccated due to tachypnea/pneumonia and poor p.o. intake    Swallow evaluation/FEES pending for today  High risk for airway scope given thrombocytopenia  Not a candidate for NIV or PPV given inability to protect airway  NPO for now  Oral care every 1-2-hour, this seemed to help overnight, phonation a bit better - significant secretions cleared from airway overnight by RN  CT soft tissue neck without airway issue or obvious abscess/obstructing process    Severe malnutrition (HCC)- (present on admission)  Assessment & Plan  Eval pending  Family okay with soft flexible feeding tube and enteral nutrition  Swallow eval failed, small bore soft flexible tube  If necessary cautiously place tube with thrombocytopenia, lidocaine jelly ordered  Tolerating enteral feeding via nasal tube    GERD (gastroesophageal reflux disease)- (present on admission)  Assessment & Plan  With hydrocortisone and thrombocytopenia -continue IV PPI daily for prophylaxis  Antireflux measures    Dyslipidemia- (present on admission)  Assessment & Plan  Atorvastatin    Thrombocytopenia (HCC)- (present on admission)  Assessment & Plan  Attributable to pancytopenia -aplastic anemia    Transfuse for platelets < 10 per med onc or if bleeding  Last unit of platelets 11/12           VTE:  Contraindicated  Ulcer: Not Indicated  Lines: None    I have performed a physical exam and reviewed and updated ROS and Plan today (11/13/2024). In review of yesterday's note (11/12/2024), there are no changes except as documented above.     Discussed patient condition and risk of morbidity and/or mortality with Hospitalist, Family, RN, RT, Pharmacy, [employer] [facility] resident, Code status disscussed, Charge nurse / hot rounds, Patient, and infectious disease

## 2024-11-13 NOTE — CARE PLAN
The patient is Watcher - Medium risk of patient condition declining or worsening    Shift Goals  Clinical Goals: Improve mentation  Patient Goals: VALERIO  Family Goals: Updates    Progress made toward(s) clinical / shift goals:    Problem: Knowledge Deficit - Standard  Goal: Patient and family/care givers will demonstrate understanding of plan of care, disease process/condition, diagnostic tests and medications  Outcome: Progressing     Problem: Skin Integrity  Goal: Skin integrity is maintained or improved  Outcome: Progressing     Problem: Respiratory:  Goal: Respiratory status will improve  Outcome: Progressing     Problem: Mobility  Goal: Risk for activity intolerance will decrease  Outcome: Progressing     Problem: Nutrition  Goal: Patient's nutritional and fluid intake will be adequate or improve  Outcome: Progressing     Problem: Pain - Standard  Goal: Alleviation of pain or a reduction in pain to the patient’s comfort goal  Outcome: Progressing     Problem: Safety - Medical Restraint  Goal: Remains free of injury from restraints (Restraint for Interference with Medical Device)  Outcome: Progressing  Goal: Free from restraint(s) (Restraint for Interference with Medical Device)  Outcome: Progressing

## 2024-11-13 NOTE — CONSULTS
Hospital Medicine Consultation    Date of Service  11/13/2024    Referring Physician  KENYON Conteh    Consulting Physician  Real Yoon D.O.    Reason for Consultation      History of Presenting Illness  84 y.o. male who presented 11/6/2024 with history of type 2 diabetes, hypertension, GERD, dyslipidemia, and recently diagnosed with aplastic anemia.  Patient was transferred to our care from [facility] to initiate inpatient chemotherapy.  He had been admitted there on October 19 for treatment of neutropenic fever he was treated with a course of linezolid for MRSA pneumonia.  On arrival here he was on prophylactic posaconazole, fluoroquinolone, and acyclovir.  Repeat sputum cultures here were positive for MRSA, the patient was started on linezolid on November 9 and on that same day he was moved to the ICU due to worsening respiratory status.  In the ICU the patient was on high flow nasal cannula and did require IV vasopressors he was initiated on tube feeds as well.    On my examination review of systems is limited by the patient's mental status.  He is somnolent, rouses to verbal stim, and is in no apparent distress.  Patient's care was discussed with the patient's daughter who is at the bedside.    Review of Systems  Review of Systems   Unable to perform ROS: Mental status change       Past Medical History   has a past medical history of Diabetes (HCC).    Surgical History   has a past surgical history that includes turp-vapor (8/14/2017); panendoscopy (N/A, 9/5/2024); and bone aspiration biopsy (Left, 9/10/2024).    Family History  family history is not on file.    Social History   reports that he has never smoked. He has never used smokeless tobacco. He reports that he does not drink alcohol and does not use drugs.    Medications  Prior to Admission Medications   Prescriptions Last Dose Informant Patient Reported? Taking?   Acetaminophen (TYLENOL PO) 11/6/2024 Evening Patient, Family  Member Yes Yes   Sig: Take 1 Tablet by mouth 2 times a day as needed (For pain). Pt and pts son are not sure the strength (OTC)   Non Formulary Request  Family Member, Patient Yes No   Sig: Take 1 Tab by mouth every day. Cadifast from [facility] for eyes   acyclovir (ZOVIRAX) 400 MG tablet 11/6/2024 Evening  No Yes   Sig: Take 1 Tablet by mouth 2 times a day.   atorvastatin (LIPITOR) 10 MG Tab 11/6/2024 Evening Family Member, Patient Yes Yes   Sig: Take 10 mg by mouth every evening.   famotidine (PEPCID) 20 MG Tab 11/6/2024 Evening  Yes Yes   Sig: Take 20 mg by mouth 2 times a day.   folic acid (FOLVITE) 1 MG Tab 11/6/2024 Morning  No Yes   Sig: Take 1 Tablet by mouth every day.   glipiZIDE ER (GLUCOTROL XL) 5 MG TABLET SR 24 HR  Family Member, Patient Yes No   Sig: Take 5 mg by mouth every day.   levoFLOXacin (LEVAQUIN) 500 MG tablet 11/6/2024 Morning  Yes Yes   Sig: Take 500 mg by mouth every day.   senna-docusate (PERICOLACE OR SENOKOT S) 8.6-50 MG Tab 11/6/2024 Evening  Yes Yes   Sig: Take 1 Tablet by mouth 2 times a day.   sitagliptin (JANUVIA) 100 MG Tab  Family Member, Patient Yes No   Sig: Take 100 mg by mouth every day.      Facility-Administered Medications: None       Allergies  No Known Allergies    Physical Exam  Temp:  [35.7 °C (96.3 °F)-36.4 °C (97.6 °F)] 35.7 °C (96.3 °F)  Pulse:  [65-91] 85  Resp:  [18-34] 27  BP: ()/(50-67) 112/53  SpO2:  [90 %-98 %] 96 %    Physical Exam  Constitutional:       General: He is not in acute distress.     Appearance: He is well-developed. He is not diaphoretic.   HENT:      Head: Normocephalic and atraumatic.   Eyes:      Conjunctiva/sclera: Conjunctivae normal.   Neck:      Vascular: No JVD.   Cardiovascular:      Rate and Rhythm: Normal rate.      Heart sounds: No murmur heard.     No gallop.   Pulmonary:      Effort: Pulmonary effort is normal. No respiratory distress.      Breath sounds: No stridor. Rhonchi present. No wheezing or rales.   Abdominal:       Palpations: Abdomen is soft.      Tenderness: There is no abdominal tenderness. There is no guarding or rebound.   Musculoskeletal:      Right lower leg: Edema present.      Left lower leg: Edema present.      Comments: Trace edema  Good DP pulses bilaterally  Good radial pulses bilaterally  Hands and feet are warm and pink with normal capillary refill   Skin:     General: Skin is warm and dry.      Findings: No rash.   Neurological:      Comments: Somnolent  Arouses to verbal stim  Is alert and attends to conversation  Speech is slightly dysarthric however daughter reports this is normal for him when he does not have his dentures in.  Oriented to self and presence of his daughter.  Moving all 4 purposefully         Fluids  Date 11/13/24 0700 - 11/14/24 0659   Shift 1128-0025 4934-8107 1541-8945 24 Hour Total   INTAKE   P.O. 0   0   I.V. 179.5   179.5   NG/   150   IV Piggyback 0   0   Enteral 400   400   Free Water 0   0   Shift Total 729.5   729.5   OUTPUT   Urine 305   305   Shift Total 305   305   Weight (kg) 73.9 73.9 73.9 73.9       Laboratory  Recent Labs     11/12/24  0404 11/12/24  1720 11/13/24  0405   WBC 0.6* 0.5* 0.5*   RBC 2.03* 3.06* 3.13*   HEMOGLOBIN 6.2* 8.8* 9.1*   HEMATOCRIT 17.5* 25.4* 25.7*   MCV 86.2 83.0 82.1   MCH 30.5 28.8 29.1   MCHC 35.4 34.6 35.4   RDW 47.5 47.5 49.7   PLATELETCT 6* 51* 30*   MPV 11.7 10.6 10.6     Recent Labs     11/11/24  1220 11/11/24  1810 11/12/24  0404 11/12/24  1325 11/12/24  1720 11/13/24  0030 11/13/24  0405   SODIUM 118*  --  138  --   --   --  134*   POTASSIUM 3.4*   < > 3.5*   < > 4.7 5.1 4.9   CHLORIDE 89*  --  108  --   --   --  105   CO2 17*  --  22  --   --   --  20   GLUCOSE 629*  --  124*  --   --   --  342*   BUN 44*  --  62*  --   --   --  80*   CREATININE 1.42*  --  1.87*  --   --   --  1.75*   CALCIUM 6.9*  --  8.0*  --   --   --  8.1*    < > = values in this interval not displayed.                     Imaging  DX-CHEST-PORTABLE (1 VIEW)    Final Result         1.  Pulmonary edema and/or infiltrates, similar to prior study.      DX-ABDOMEN FOR TUBE PLACEMENT   Final Result         1.  Nonspecific bowel gas pattern in the upper abdomen.   2.  Nasogastric tube tip terminates overlying the expected location of the gastric body.   3.  Patchy bilateral pulmonary infiltrates      DX-ABDOMEN FOR TUBE PLACEMENT   Final Result      1. Appropriate position of the nasogastric tube, replaced in the interval.   2. The left upper extremity PICC catheter now terminates within the azygos vein. Repositioning is recommended.   3. The remainder is stable.      DX-ABDOMEN FOR TUBE PLACEMENT   Final Result      Malpositioned enteric feeding tube likely within the right lower lobe bronchus.      Findings were conveyed to EMILIO Sutton in care of the patient on 11/11/2024 1:59 PM.      IR-PICC LINE PLACEMENT W/ GUIDANCE > AGE 5   Final Result                  Ultrasound-guided PICC placement performed by qualified nursing staff as    above.          DX-ABDOMEN FOR TUBE PLACEMENT   Final Result      1.  Enteric tube has been placed and the tip projects over the stomach.      2.  Nonspecific pulmonary airspace process      US-RUQ   Final Result         1. Normal gallbladder and right upper quadrant ultrasound.      2. Small right pleural effusion.      CT-SOFT TISSUE NECK WITH   Final Result         1. No airway narrowing. Mild uvula swelling suggested. The epiglottis and larynx appear normal.      2. Upper normal size cervical lymph nodes. No cervical mass otherwise.      3. Trace right mastoid sinus fluid. No paranasal sinus fluid. The middle ears are clear.      4. The visible upper chest again shows dense infiltrates with small pleural effusions and mild upper mediastinal adenopathy.      CT-CHEST (THORAX) W/O   Final Result      1.  Multifocal bilateral pulmonary airspace process most consistent with pneumonia, with interval worsening      2.  Bilateral pleural effusion, most  small in size, right greater than left      3.  Single enlarged prevascular space lymph node which is most likely reactive      Fleischner Society pulmonary nodule recommendations:   Not Applicable         DX-CHEST-PORTABLE (1 VIEW)   Final Result         1.  Patchy bilateral pulmonary infiltrates, similar to prior study      DX-CHEST-PORTABLE (1 VIEW)   Final Result      1.  Unchanged BILATERAL pneumonia   2.  Possible RIGHT pleural effusion      DX-CHEST-2 VIEWS   Final Result         1.  Pulmonary edema and/or infiltrates.   2.  Trace left pleural effusion   3.  Atherosclerosis          Assessment/Plan  * Multifocal pneumonia- (present on admission)  Assessment & Plan       Was treated with course of abx at [facility] for MRSA PNA (linezolid and cefepime)  Since [place] to [facility] [place] has been on ppx dosed antibiotics only (acyclovir, posaconazole, levaquin)     Marked worsening of hypoxemia and lung injury 11/8-11/9 11/9-Sputum gram stain results with MRSA   Severe CAP, ARDS in the setting of severe immunocompromise and neutropenia  CT chest with dense BLL consolidations, scattered nodular densities in multiple lobes certainly concerning for fungal etiology     treatment dose PJP coverage (atovaquone d/t risk of myelosuppression) for now in the event this represent PJP   B glucan and gallactomanan and PJP DFA and fungal sputum cultures sent    Currently off of HFNC and on 2 LNC       Continue ceftaroline, follow cultures        ID following      ESTHER (acute kidney injury) ([facility])  Assessment & Plan  Likely sepsis primarily with contribution from medications  Adequately resuscitated  UOP 600ml given body weight of 70Kg this is borderline adequate output  Renally dose medications as appropriate  Serial BMP  Monitor UOP  Not a candidate for RRT         Septic shock ([facility])  Assessment & Plan  Present on admission  Source respiratory  MRSA from sputum  Probable aspiration pneumonia  Now off of IV vasopressors but still on IV  hydrocortisone, and midodrine  Titrate off hydrocortisone preferentially and then midodrine to follow as pressures allow  ID managing antibiotics: Have stopped vancomycin due to renal function, not giving linezolid due to platelet count.  Currently on ceftaroline, acyclovir and posaconazole  Prophylactic agents ongoing for severe neutropenia  Amphotericin discontinued by ID  Adequately fluid resuscitated  Clinically improved tapering hydrocortisone      Respiratory failure (HCC)  Assessment & Plan    Reviewed hypoxic respiratory failure secondary to pneumonia  MRSA positive cultures recently at [facility] and they are recurrently positive now  DNR/DNI confirmed with daughter  Moved to unit for aggressive pulmonary toilet and oral care  O2 demand has decreased and he is now off of high flow  RT protocols  Oral care every-2 hours  Aspiration precautions, swallow eval pending  Hydrocortisone added to antibiotic regimen for severe pneumonia with respiratory failure ID managing antibiotics  If patient clinically deteriorates transition to comfort care/hospice, discussed with daughter and son who are in agreement         Elevated LFTs  Assessment & Plan  improving  RUQUS ordered-> normal  Avoid hepatotoxins      Dysphagia  Assessment & Plan  Still has altered level of conciousness  If he starts to clear will re consult SLP  [employer] if very high risk for aspiration  Currently getting nutrition via NGT  Oral care every 1-2-hour  CT soft tissue neck without airway issue or obvious abscess/obstructing process      Severe malnutrition (HCC)- (present on admission)  Assessment & Plan  Nutrition following  Family okay with soft flexible feeding tube and enteral nutrition  Currently at goal on tube feeds  If mental status improves then we will consult speech and start p.o. when appropriate    GERD (gastroesophageal reflux disease)- (present on admission)  Assessment & Plan  With hydrocortisone and thrombocytopenia -continue IV PPI  daily for prophylaxis  Antireflux measures       Aplastic anemia (HCC)- (present on admission)  Assessment & Plan  BM biopsy consistent with severe aplastic anemia  Also consider infection/nutritional disorder/toxic myelosuppression.      Diagnostic evaluation in september and at [facility], viral studies negative, hepatitis panel negative,   Oncology following   On cyclosporine 100 bid, but cannot take orals currently  Promacta (eltrombopag) for thrombocytopenia 75 mg daily  Continue prophylactic antimicrobials  B12/MMA, folate levels resent, previously low     Tranfuse PRBC for <7  Transfuse platelets < 10, may warrant higher threshold given possibility of DAH though CT doesn't look strikingly like DAH  CT more likely MRSA pneumonia with positive cultures     Discuss with hematology regarding prognosis, not responsive to G-CSF in the past    MRSA pneumonia (HCC)- (present on admission)  Assessment & Plan  Was on linezolid and vancomycin  Continue ceftaroline for now   isolation precautions  ID following      Type 2 diabetes mellitus, without long-term current use of insulin (HCC)- (present on admission)  Assessment & Plan  Patient has been quite hyperglycemic  We have titrated up his glargine today  He is currently on hydrocortisone taper, and blood glucose will likely start to drop tomorrow with decreasing dose of steroid therefore will not be more aggressive with titration of his glargine today  Continue a high sliding scale  A1c 7.32 months ago    Thrombocytopenia (HCC)- (present on admission)  Assessment & Plan  Secondary to pancytopenia from aplastic anemia   Transfuse for platelets < 10 per oncology recommendations  No signs of active bleeding on exam today    Pancytopenia (HCC)  Assessment & Plan  BM biopsy consistent with severe aplastic anemia  Also consider infection/nutritional disorder/toxic myelosuppression.      Diagnostic evaluation in september and at [facility], viral studies negative, hepatitis panel negative,    Oncology following   On cyclosporine 100 bid, but cannot take orals currently  Promacta (eltrombopag) for thrombocytopenia 75 mg daily  Continue prophylactic antimicrobials  B12/MMA, folate levels resent, previously low     Tranfuse PRBC for <7  Transfuse platelets < 10, may warrant higher threshold given possibility of DAH though CT doesn't look strikingly like DAH  CT more likely MRSA pneumonia with positive cultures     Discuss with hematology regarding prognosis, not responsive to G-CSF in the past

## 2024-11-13 NOTE — ASSESSMENT & PLAN NOTE
Hematology evaluating, prolonged pancytopenia without success with several treatments so far, s/p bone marrow

## 2024-11-13 NOTE — CARE PLAN
The patient is Watcher - Medium risk of patient condition declining or worsening    Shift Goals  Clinical Goals: Maintain hemodynamic stability  Patient Goals: VALERIO  Family Goals: VALERIO    Progress made toward(s) clinical / shift goals:    Problem: Pain - Standard  Goal: Alleviation of pain or a reduction in pain to the patient’s comfort goal  Outcome: Progressing  Note: CPOT 0 throughout shift     Problem: Safety - Medical Restraint  Goal: Remains free of injury from restraints (Restraint for Interference with Medical Device)  Outcome: Progressing  Note: Restraint checks and charting in place       Patient is not progressing towards the following goals:

## 2024-11-13 NOTE — ASSESSMENT & PLAN NOTE
Nutrition following  Family okay with soft flexible feeding tube and enteral nutrition  Currently at goal on tube feeds  Last seen by SLP on 11/11; pt now more alert.  Will ask them to re-evaluate    11/20/2024  Continue tube feeds    11/21/2024  Resume tube feeds at 10 mL/h

## 2024-11-13 NOTE — PROGRESS NOTES
Lab called with critical result of WBC 0.5 and absolute neutrophils 0 at 0628. Critical lab result read back to lab.   Dr. Walker notified of critical lab result at 0630.

## 2024-11-13 NOTE — PROGRESS NOTES
Infectious Disease Progress Note    Author: Sven Orlando M.D. Date & Time of service: 2024  10:45 AM    Chief Complaint:  Follow-up for pneumonia    Interval History:   Tmax 98, white count 0.6, ANC 0, platelets 20, cultures as below, creatinine 1.23, normal transaminases, some improvement in oxygen requirements, down to 5 L oxime mask   patient is now afebrile, white count 0.6, off oxygen, creatinine is worse up to 1.87, cultures and other labs as below   patient remains afebrile, white count 0.5, ANC 0, hemoglobin 9.1, platelets 30, cultures as below, remains on room air.  Resting comfortably this morning    Labs Reviewed and Medications Reviewed.    Review of Systems:  Review of Systems   Unable to perform ROS: Other       Hemodynamics:  Temp (24hrs), Av.1 °C (96.9 °F), Min:35.5 °C (95.9 °F), Max:36.4 °C (97.6 °F)  Temperature: 36 °C (96.8 °F), Monitored Temp: 35.9 °C (96.6 °F)  Pulse  Av.9  Min: 62  Max: 122   Blood Pressure : 117/56       Physical Exam:  Physical Exam  Vitals and nursing note reviewed.   Constitutional:       General: He is not in acute distress.     Appearance: He is ill-appearing.      Comments: Thin   HENT:      Nose:      Comments: NG tube in place  Eyes:      Conjunctiva/sclera: Conjunctivae normal.   Pulmonary:      Effort: No respiratory distress.      Breath sounds: No stridor.   Abdominal:      General: There is no distension.      Palpations: Abdomen is soft.   Musculoskeletal:         General: No swelling or tenderness.   Skin:     Findings: No erythema or rash.         Meds:    Current Facility-Administered Medications:     ceftaroline (TEFLARO) ivpb    hydrocortisone sodium succinate PF    midodrine    [START ON 2024] insulin GLARGINE    insulin lispro **AND** POC blood glucose manual result **AND** NOTIFY MD and PharmD **AND** Administer 20 grams of glucose (approximately 8 ounces of fruit juice) every 15 minutes PRN FSBG less than 70 mg/dL  **AND** dextrose bolus    sodium chloride    ipratropium-albuterol    famotidine    NS    NS    Pharmacy    Pharmacy    acyclovir    atorvastatin    atovaquone    loratadine    [Held by provider] cycloSPORINE (modified)    potassium bicarbonate    NORepinephrine    ipratropium-albuterol    Respiratory Therapy Consult    MD Alert...Adult ICU Electrolyte Replacement per Pharmacy    MBX (diphenhydrAMINE-lidocaine-Maalox)    sodium bicarb 0.5 mEq/mL    [Held by provider] tamsulosin    Labs:  Recent Labs     11/11/24  0510 11/12/24  0404 11/12/24  1720 11/13/24  0405   WBC 0.6* 0.6* 0.5* 0.5*   RBC 2.79* 2.03* 3.06* 3.13*   HEMOGLOBIN 8.4* 6.2* 8.8* 9.1*   HEMATOCRIT 23.9* 17.5* 25.4* 25.7*   MCV 85.7 86.2 83.0 82.1   MCH 30.1 30.5 28.8 29.1   RDW 44.7 47.5 47.5 49.7   PLATELETCT 20* 6* 51* 30*   MPV 11.6 11.7 10.6 10.6   NEUTSPOLYS 0.80* 0.00*  --  0.50*   LYMPHOCYTES 98.20* 100.00*  --  99.50*   MONOCYTES 0.00 0.00  --  0.00   EOSINOPHILS 0.00 0.00  --  0.00   BASOPHILS 0.00 0.00  --  0.00   RBCMORPHOLO Present Present  --  Present     Recent Labs     11/11/24  1220 11/11/24  1810 11/12/24  0404 11/12/24  1325 11/12/24  1720 11/13/24  0030 11/13/24  0405   SODIUM 118*  --  138  --   --   --  134*   POTASSIUM 3.4*   < > 3.5*   < > 4.7 5.1 4.9   CHLORIDE 89*  --  108  --   --   --  105   CO2 17*  --  22  --   --   --  20   GLUCOSE 629*  --  124*  --   --   --  342*   BUN 44*  --  62*  --   --   --  80*    < > = values in this interval not displayed.     Recent Labs     11/11/24  0510 11/11/24  1220 11/12/24  0404 11/13/24  0405   ALBUMIN 1.7*  --  2.3* 2.1*   TBILIRUBIN 1.7*  --  1.5 2.2*   ALKPHOSPHAT 87  --  67 82   TOTPROTEIN 6.1  --  5.2* 5.7*   ALTSGPT 31  --  24 20   ASTSGOT 32  --  20 15   CREATININE 1.23 1.42* 1.87* 1.75*       Imaging:  DX-ABDOMEN FOR TUBE PLACEMENT    Result Date: 11/10/2024  11/10/2024 12:14 PM HISTORY/REASON FOR EXAM:  Line evaluation. TECHNIQUE/EXAM DESCRIPTION AND NUMBER OF VIEWS:  1  view(s) of the abdomen. COMPARISON:  None. FINDINGS: Enteric tube has been placed. The tip projects over the stomach. The bowel gas pattern is within normal limits. Lungs show nonspecific bilateral airspace process     1.  Enteric tube has been placed and the tip projects over the stomach. 2.  Nonspecific pulmonary airspace process    US-RUQ    Result Date: 11/10/2024  11/10/2024 3:15 AM HISTORY/REASON FOR EXAM:  Abnormal Labs Abdominal pain TECHNIQUE/EXAM DESCRIPTION AND NUMBER OF VIEWS:  Real-time sonography of the liver and biliary tree. COMPARISON: None FINDINGS: The liver is normal in contour. There is no evidence of solid mass lesion. The liver measures . Gallbladder: The gallbladder is full of bile. No gallstones evident. The gallbladder wall thickness measures . The common duct measures 4.8 mm. No intrahepatic bile duct dilatation is evident. Pancreas: Normal visible extent. Aorta: Normal and the visible extension. Intrahepatic IVC is patent. The portal vein is patent with hepatopetal flow. The MPV measures . Intrahepatic IVC is patent. The right kidney measures 11.4 cm. No hydronephrosis or suspicious mass. No right upper quadrant ascites. Small right pleural effusion.     1. Normal gallbladder and right upper quadrant ultrasound. 2. Small right pleural effusion.    CT-SOFT TISSUE NECK WITH    Result Date: 11/10/2024  11/10/2024 2:28 AM HISTORY/REASON FOR EXAM:  airway obstruction. Pneumonia. Difficulty breathing. TECHNIQUE/EXAM DESCRIPTION AND NUMBER OF VIEWS:  CT soft tissue neck with contrast. The study was performed on a helical multidetector CT scanner. Contiguous thin section helical images were obtained of the neck from the skull base through the thoracic inlet. 80 mL of Omnipaque 350 nonionic contrast was injected intravenously. Low dose optimization technique was utilized for this CT exam including automated exposure control and adjustment of the mA and/or kV according to patient size. COMPARISON:  "CT chest from yesterday. FINDINGS: BRAIN: Visualized portions of the brain are normal in appearance. TEMPORAL bones: Trace right mastoid sinus fluid. The middle ear are clear. PARANASAL SINUSES: The paranasal sinuses do not show any fluid. Minimal mucosal thickening. CERVICAL spine: Minimal diffuse cervical spine degenerative changes. Moderate C6-7 degenerative disc changes. NODES: Normal size cervical lymph nodes in a symmetric manner measuring up to 28 x 8 mm. All are smaller than 1 cm short dimension. SALIVARY and THYROID gland: The parotid, submandibular, and thyroid gland are normal in appearance. AIRWAY: The airway appears patent. Mild uvula enlargement but no pharyngeal mass or swelling. No tonsillar swelling. Epiglottis and larynx appear normal. MEDIASTINUM: The superior mediastinum shows mildly enlarged lymph nodes as seen on CT. LUNGS: The visualized portions of the upper lungs show patchy dense pneumonia infiltrates with small pleural effusions.     1. No airway narrowing. Mild uvula swelling suggested. The epiglottis and larynx appear normal. 2. Upper normal size cervical lymph nodes. No cervical mass otherwise. 3. Trace right mastoid sinus fluid. No paranasal sinus fluid. The middle ears are clear. 4. The visible upper chest again shows dense infiltrates with small pleural effusions and mild upper mediastinal adenopathy.    CT-CHEST (THORAX) W/O    Result Date: 11/9/2024 11/9/2024 1:22 PM HISTORY/REASON FOR EXAM:  worsening pneumonia, previous imaging supicious for fungal; ID noted \"halo sign\" on outside CT chest 10/20, concern it may have worsened. TECHNIQUE/EXAM DESCRIPTION: CT scan of the chest without contrast. Noncontrast helical scanning of the chest was obtained from the lung apices through the adrenal glands. Low dose optimization technique was utilized for this CT exam including automated exposure control and adjustment of the mA and/or kV according to patient size. COMPARISON: Outside CT " chest 10/20/2024 FINDINGS: Lungs: There are airspace process within the right upper lobe, left upper lobe, right middle lobe, and lingular segment of left upper lobe and possibly both lower lobes although in the lower lobes is could be atelectasis.. There is a right upper lobe focal airspace process, nonspecific. Mediastinum/Jade: There is prevascular space adenopathy with the largest node measuring 20 x 13 mm. Pleura: There are small bilateral pleural effusion. Cardiac: Heart normal in size without pericardial effusion. There is coronary artery atherosclerotic plaque. Vascular: There is aortic and coronary artery atherosclerotic plaque.. Soft tissues: Unremarkable. Bones: No acute or destructive process. There is a retroperitoneal calcification consistent with sequela of prior inflammation.     1.  Multifocal bilateral pulmonary airspace process most consistent with pneumonia, with interval worsening 2.  Bilateral pleural effusion, most small in size, right greater than left 3.  Single enlarged prevascular space lymph node which is most likely reactive Fleischner Society pulmonary nodule recommendations: Not Applicable     DX-CHEST-PORTABLE (1 VIEW)    Result Date: 11/9/2024 11/9/2024 4:40 AM HISTORY/REASON FOR EXAM: Shortness of Breath TECHNIQUE/EXAM DESCRIPTION:  Single AP view of the chest. COMPARISON: November 7, 2024 FINDINGS: The cardiac silhouette appears within normal limits. The mediastinal contour appears within normal limits.  The central pulmonary vasculature appears normal. Bilateral lung volumes are diminished.  Patchy bilateral pulmonary opacities are seen. No significant pleural effusions are identified. The bony structures appear age-appropriate.     1.  Patchy bilateral pulmonary infiltrates, similar to prior study    DX-CHEST-PORTABLE (1 VIEW)    Result Date: 11/7/2024 11/7/2024 12:06 PM HISTORY/REASON FOR EXAM:  Cough TECHNIQUE/EXAM DESCRIPTION AND NUMBER OF VIEWS: Single portable view of  the chest. COMPARISON: Yesterday FINDINGS: HEART: Stable size. LUNGS: BILATERAL airspace disease redemonstrated. PLEURA: RIGHT costophrenic sulcus is not well seen.     1.  Unchanged BILATERAL pneumonia 2.  Possible RIGHT pleural effusion    DX-CHEST-2 VIEWS    Result Date: 11/7/2024 11/7/2024 12:08 AM HISTORY/REASON FOR EXAM: Cough TECHNIQUE/EXAM DESCRIPTION:  PA and lateral views of the chest. COMPARISON: October 19, 2024 FINDINGS: The cardiac silhouette appears within normal limits. Atherosclerotic calcification of the aorta is noted.  The central  pulmonary vasculature appears prominent and indistinct. Bilateral lung volumes are diminished.  Diffuse scattered hazy pulmonary parenchymal opacities are seen. Blunting of the left costophrenic angle is seen suggesting trace left effusion. The bony structures appear age-appropriate.     1.  Pulmonary edema and/or infiltrates. 2.  Trace left pleural effusion 3.  Atherosclerosis      Micro:  Results       Procedure Component Value Units Date/Time    AFB Culture [[id number]] Collected: 11/10/24 0300    Order Status: Completed Specimen: Respirate from Sputum Updated: 11/12/24 1045     Significant Indicator NEG     Source RESP     Site Tracheal Aspirate     Culture Result Culture in progress.     AFB Smear Results No acid fast bacilli seen.    CULTURE RESPIRATORY W/ GRM STN [[id number]]  (Abnormal) Collected: 11/10/24 0300    Order Status: Completed Specimen: Respirate from Sputum Updated: 11/12/24 1045     Significant Indicator POS     Source RESP     Site Tracheal Aspirate     Culture Result Light growth usual upper respiratory rosalino     Gram Stain Result Many epithelial cells.  Many Gram positive cocci.  Many Gram positive rods.       Culture Result Staphylococcus aureus  Light growth  Methicillin resistant by screening method      Fungal Culture [[id number]] Collected: 11/10/24 0300    Order Status: Completed Specimen: Respirate from Sputum Updated: 11/12/24 1045      Significant Indicator NEG     Source RESP     Site Tracheal Aspirate     Culture Result Culture in progress.     Fungal Smear Results No fungal elements seen.    BLOOD CULTURE [[id number]] Collected: 11/07/24 0241    Order Status: Completed Specimen: Blood from Peripheral Updated: 11/12/24 0500     Significant Indicator NEG     Source BLD     Site PERIPHERAL     Culture Result No growth after 5 days of incubation.    BLOOD CULTURE [[id number]] Collected: 11/07/24 0241    Order Status: Completed Specimen: Blood from Peripheral Updated: 11/12/24 0500     Significant Indicator NEG     Source BLD     Site PERIPHERAL     Culture Result No growth after 5 days of incubation.    GRAM STAIN [[id number]]  (Abnormal) Collected: 11/10/24 0300    Order Status: Completed Specimen: Respirate Updated: 11/10/24 1912     Significant Indicator .     Source RESP     Site Tracheal Aspirate     Gram Stain Result Many epithelial cells.  Many Gram positive cocci.  Many Gram positive rods.      Acid Fast Stain [[id number]] Collected: 11/10/24 0300    Order Status: Completed Specimen: Respirate Updated: 11/10/24 1912     Significant Indicator NEG     Source RESP     Site Tracheal Aspirate     AFB Smear Results No acid fast bacilli seen.    Fungal Smear [[id number]] Collected: 11/10/24 0300    Order Status: Completed Specimen: Respirate Updated: 11/10/24 1912     Significant Indicator NEG     Source RESP     Site Tracheal Aspirate     Fungal Smear Results No fungal elements seen.    Cryptococcal Antigen, Serum [[id number]] Collected: 11/10/24 1058    Order Status: Completed Specimen: Blood Updated: 11/10/24 1249     Cryptococcal Antigen, Serum Negative    BLOOD CULTURE [[id number]] Collected: 11/10/24 0825    Order Status: Completed Specimen: Blood from Peripheral Updated: 11/10/24 1208     Significant Indicator NEG     Source BLD     Site PERIPHERAL     Culture Result No Growth  Note: Blood cultures are incubated for 5 days and  are monitored  continuously.Positive blood cultures  are called to the RN and reported as soon as  they are identified.      BLOOD CULTURE [[id number]] Collected: 11/10/24 0825    Order Status: Completed Specimen: Blood from Peripheral Updated: 11/10/24 1208     Significant Indicator NEG     Source BLD     Site PERIPHERAL     Culture Result No Growth  Note: Blood cultures are incubated for 5 days and  are monitored continuously.Positive blood cultures  are called to the RN and reported as soon as  they are identified.      MRSA By PCR (Amp) [[id number]] Collected: 11/10/24 0111    Order Status: Completed Specimen: Respirate from Nares Updated: 11/10/24 0824     MRSA by PCR POSITIVE    CULTURE RESPIRATORY W/ GRM STN [[id number]]  (Abnormal)  (Susceptibility) Collected: 11/07/24 0820    Order Status: Completed Specimen: Respirate from Sputum Updated: 11/09/24 0823     Significant Indicator POS     Source RESP     Site SPUTUM     Culture Result Light growth usual upper respiratory rosalino     Gram Stain Result Few WBCs.  Rare epithelial cells.  Rare Gram positive rods.  Specimen Quality Score: 1+       Culture Result Methicillin Resistant Staphylococcus aureus  Light growth  Methicillin resistant by screening method      Susceptibility       Methicillin resistant staphylococcus aureus (1)       Antibiotic Interpretation Microscan   Method Status    Ampicillin/sulbactam Resistant <=8/4 mcg/mL BALTA Final    Clindamycin Sensitive 0.5 mcg/mL BALTA Final    Cefazolin Resistant <=8 mcg/mL BALTA Final    Cefepime Resistant 8 mcg/mL BALTA Final    Erythromycin Resistant >4 mcg/mL BALTA Final    Oxacillin Resistant >2 mcg/mL BALTA Final    Trimeth/Sulfa Sensitive <=0.5/9.5 mcg/mL BALTA Final    Tetracycline Sensitive <=4 mcg/mL BALTA Final    Vancomycin Sensitive 1 mcg/mL BALTA Final                       GRAM STAIN [[id number]] Collected: 11/07/24 0820    Order Status: Completed Specimen: Respirate Updated: 11/07/24 1108     Significant Indicator .     Source RESP      Site SPUTUM     Gram Stain Result Few WBCs.  Rare epithelial cells.  Rare Gram positive rods.  Specimen Quality Score: 1+      URINALYSIS [[id number]]  (Abnormal) Collected: 11/07/24 0550    Order Status: Completed Specimen: Urine, Cath Updated: 11/07/24 0747     Color Yellow     Character Clear     Specific Gravity 1.017     Ph 6.5     Glucose 500 mg/dL      Ketones Negative mg/dL      Protein 30 mg/dL      Bilirubin Negative     Urobilinogen, Urine 4.0 EU/dL      Nitrite Negative     Leukocyte Esterase Negative     Occult Blood Negative     Micro Urine Req Microscopic            Hospital Course/Assessment:   Miri Joseph is a 84 y.o. male patient with severe aplastic anemia, recently at [facility] where he was started on posaconazole and completed a course of linezolid for possible invasive fungal pneumonia based on CT scan findings of nodular opacities with surrounding groundglass changes, transferred to [facility] for further oncology management, developed respiratory distress on 11/9, now on high flow nasal cannula oxygen in the ICU, repeat CT scan with worsening multifocal bilateral airspace opacities with surrounding groundglass findings.  There was concern for breakthrough worsening invasive mold pneumonia in the context of the previous outside hospital CT scan findings. Sputum culture positive for MRSA.  Further hospital course as below     Pertinent Diagnoses:  Acute hypoxic respiratory failure  Multifocal pneumonia, MRSA vs invasive mold, the latter appears less likely  Severe aplastic anemia  Immunosuppressed status  ESTHER, improved     Plan:   -Tracheal aspirate positive for MRSA, susceptibilities pending no fungal elements on staining, serum Cryptococcal antigen negative  -Serum beta D glucan negative, serum Aspergillus galactomannan, Coccidioides serology are still pending.  Recent Karius at outside hospital was negative for fungal organisms.  Unable to get a bronchoscopy at this time given  instability, however given worsening renal function and no evidence of invasive mold thus far, held amphotericin B 11/13 and will continue MRSA coverage and follow very closely.  Vancomycin was discontinued 11/11, continue renally dosed IV ceftaroline  -Follow pending tracheal aspirate Aspergillus PCR  -Follow blood cultures x 2, no growth till date  -Patient remains on acyclovir and atovaquone prophylaxis.  Will also restart posaconazole prophylaxis.  Please note posaconazole may increase levels of atorvastatin and cyclosporine    Disposition: Unable to determine at this time.  Noted currently DNR/DNI and if any worsening clinical status, possible transition to comfort care  Need for PICC line: In place     Plan was discussed with the primary team, Dr. Conteh and PharmD.  ID will follow.  High mortality risk.

## 2024-11-13 NOTE — CARE PLAN
Problem: Safety - Medical Restraint  Goal: Free from restraint(s) (Restraint for Interference with Medical Device)  Outcome: Not Progressing  Note: Still attempting to pull at tubes     Problem: Skin Integrity  Goal: Skin integrity is maintained or improved  Outcome: Progressing     Problem: Respiratory:  Goal: Respiratory status will improve  Outcome: Progressing     Problem: Nutrition  Goal: Patient's nutritional and fluid intake will be adequate or improve  Outcome: Progressing  Note: Start Feeds today      Problem: Hemodynamics  Goal: Patient's hemodynamics, fluid balance and neurologic status will be stable or improve  Outcome: Progressing     Problem: Pain - Standard  Goal: Alleviation of pain or a reduction in pain to the patient’s comfort goal  Outcome: Progressing     Problem: Safety - Medical Restraint  Goal: Remains free of injury from restraints (Restraint for Interference with Medical Device)  Outcome: Progressing   The patient is Watcher - Medium risk of patient condition declining or worsening    Shift Goals  Clinical Goals: hemodynamic stability, wean levo, wean O2  Patient Goals: VALERIO  Family Goals: updates    Progress made toward(s) clinical / shift goals:     Patient is not progressing towards the following goals:      Problem: Safety - Medical Restraint  Goal: Free from restraint(s) (Restraint for Interference with Medical Device)  Outcome: Not Progressing  Note: Still attempting to pull at tubes

## 2024-11-13 NOTE — ASSESSMENT & PLAN NOTE
Likely sepsis primarily with contribution from medications  Adequately resuscitated  UOP 600ml given body weight of 70Kg this is borderline adequate output  Renally dose medications as appropriate  Serial BMP  Monitor UOP  Not a candidate for RRT    11/20/2024  Resolved

## 2024-11-13 NOTE — CARE PLAN
Problem: Skin Integrity  Goal: Skin integrity is maintained or improved  Outcome: Progressing     Problem: Respiratory:  Goal: Respiratory status will improve  Outcome: Progressing     Problem: Nutrition  Goal: Patient's nutritional and fluid intake will be adequate or improve  Outcome: Progressing  Note: Start Feeds today      Problem: Hemodynamics  Goal: Patient's hemodynamics, fluid balance and neurologic status will be stable or improve  Outcome: Progressing     Problem: Pain - Standard  Goal: Alleviation of pain or a reduction in pain to the patient’s comfort goal  Outcome: Progressing   The patient is Watcher - Medium risk of patient condition declining or worsening    Shift Goals  Clinical Goals: hemodynamic stability, wean levo, wean O2  Patient Goals: VALERIO  Family Goals: updates    Progress made toward(s) clinical / shift goals:     Patient is not progressing towards the following goals:

## 2024-11-13 NOTE — THERAPY
Occupational Therapy Contact Note    Patient Name: Miri Joseph  Age:  84 y.o., Sex:  male  Medical Record #: [id number]  Today's Date: 11/13/2024    Discussed missed therapy with Clary       11/13/24 1308   Interdisciplinary Plan of Care Collaboration   Collaboration Comments OT tx attempted, Clary advised to hold due to agitation

## 2024-11-13 NOTE — ASSESSMENT & PLAN NOTE
Pneumonia   MRSA positive cultures from sputum  Possible fungal etiology: Beta D glucan neg.  Other serologies pending as noted above  DNR/DNI confirmed with daughter  Moved to unit for aggressive pulmonary toilet and oral care  O2 demand has decreased and he is now on RA/2 LNC  RT protocols  Oral care every-2 hours  Aspiration precautions, swallow eval pending  Hydrocortisone added to antibiotic regimen for severe pneumonia with respiratory failure ID managing antibiotics  If patient clinically deteriorates transition to comfort care/hospice, discussed with daughter and son who are in agreement     11/20/2024  Continues to be tachypneic with respiratory rate of 28.  Repeat ABG and chest x-ray ordered.  Continue supplemental oxygen    11/22/2024  Increasing oxygen requirements to 2.5 LPM  Start forced diuresis with furosemide 20 mg IV twice daily.    Continuous cardiac monitoring during forced diuresis.

## 2024-11-13 NOTE — PROGRESS NOTES
Per hematology CBC that was sent at 0405 will not be resulted for another couple of hours as they have to run more slides due to increased number of smudge cells.

## 2024-11-13 NOTE — ASSESSMENT & PLAN NOTE
BM biopsy consistent with severe aplastic anemia     Diagnostic evaluation in september and at [facility], viral studies negative, hepatitis panel negative,   Cyclosporine resumed at 110mg BID 11/14  Promacta (eltrombopag) for thrombocytopenia 75 mg daily  Continue prophylactic antimicrobials  B12/MMA, folate levels resent, previously low     All blood products irradiated/leukophoresed  Tranfuse PRBC for <7  Transfuse platelets < 10, may warrant higher threshold given possibility of DAH      Discuss with hematology regarding prognosis, not responsive to G-CSF in the past    11/19/2024  Discussed with Dr. Griffin of oncology.  Recommending further goals of care discussion given poor prognosis    11/21/2024  Continues to remain poor prognosis.  Discussed with Dr. Griffin of oncology.    11/22/2024  Hemoglobin dropping to 6.3.  Being transfused 1 unit of PRBC.    11/23/2024  Transitioning to comfort care

## 2024-11-14 NOTE — PROGRESS NOTES
Pt's son Julien at bedside, updated on overnight events and current plan of care. All questions answered. Pt's daughter plans to visit at bedside this afternoon.

## 2024-11-14 NOTE — CARE PLAN
Problem: Bronchopulmonary Hygiene  Goal: Increase mobilization of retained secretions  Description: Target End Date:  2 to 3 days    1.  Perform bronchopulmonary therapy as indicated by assessment  2.  Perform airway suctioning  3.  Perform actions to maintain patient airway  Outcome: Not Met   Sodium chloride wit duoneb QID

## 2024-11-14 NOTE — THERAPY
Speech Language Pathology   Daily Treatment     Patient Name: Miri Joseph  AGE:  84 y.o., SEX:  male  Medical Record #: [id number]  Date of Service: 11/14/2024      Precautions: Swallow Precautions, Fall Precautions, Nasogastric Tube       Subjective  RN cleared the pt for speech tx, no acute changes reported. Pt was received awake, but with limited interaction or attempt to communicate. Pt's daughter, Jessica, was present at b/s and assisted with verbal translation (Chantelle). She reported a little improvement from yesterday, as he is now more awake.       Assessment  Pt seen upright in semi-clancy's position, b/l wrist restraints and NGT intact. Somewhat limited inspection of oral cavity notable for xerostomia including dried blood in the posterior oral cavity despite Q2 oral care from nursing staff. Pt largely seen breathing through his mouth, which contributes to xerostomia.     SLP administered trials of ice chips x3 and 1/4 tsp thin liquids x3. Pt largely seen with open mouth posture at rest, however occasionally opened his mouth wider to accept boluses to verbal cues from his daughter. There was single instance of labial closure around the spoon, however this was not replicated across all other trials. Absent oral manipulation or lingual motion. SLP eventually provided light oral suction to remove excess fluid. No hyolaryngeal movement to digital palpation. SLP provided wet oral sponges to alleviate xerostomia. Pt with absent oral response to sponges. Limited trials attempted d/t concern for patient safety and tx session was terminated.      Clinical Impressions  Pt presents with improved DOM this date. Clinical signs of oropharyngeal dysphagia c/b impaired oral acceptance, absent oral manipulation and absence of hyolaryngeal movement to digital palpation. Findings appear likely acute in the setting of PNA atop aplastic anemia. Pt also with risk factors including cachexia and GERD. Aspiration risk from oral  "intake appears HIGH with indication to continue NPO at this time. SLP to continue to follow for dysphagia management.      Pt noted with improved participation when daughter, Jessica, is present at b/s typically between 6616-2230.       Recommendations  Diet Consistency: NPO with enteral nutrition/hydration via NGT  Instrumentation: Instrumental swallow study pending clinical progress. Will d/c current FEES order and re-order a diagnostic study when clinically appropriate.  Medication: Non Oral  Oral Care: Q2h      Results and recs d/w pt and RN. Thank you.       SLP Treatment Plan  Treatment Plan: Dysphagia Treatment, Patient/Family/Caregiver Training  SLP Frequency: 5x Per Week  Estimated Duration: Until Therapy Goals Met      Anticipated Discharge Needs  Discharge Recommendations: Recommend post-acute placement for additional speech therapy services prior to discharge home  Therapy Recommendations Upon DC: Dysphagia Training, Patient / Family / Caregiver Education      Patient / Family Goals  Patient / Family Goal #1: \"He was consuming it all\" per dtr  Short Term Goals  Short Term Goal # 1: Pt will complete instrumental assessment to determine his swallow physiology and POC.  Goal Outcome # 1: Progressing slower than expected  Short Term Goal # 2: Pt will participate in prfdg to determine readiness for diagnostic evaluation vs. PO diet.  Goal Outcome # 2 : Progressing slower than expected      Marianna Hernandez, SLP  "

## 2024-11-14 NOTE — DIETARY
Nutrition Services Brief Update:    Problem: Nutritional:  Goal: Nutrition support tolerated and meeting greater than 85% of estimated needs  Outcome: Met    TF Nepro with CARBSTEADY is running at goal rate 45 mL/hr per flowsheets.   BMP for today pending; K+ WNL. Phos lowest value in past 4 days has been 2.4; otherwise WNL. Mag lowest value in past 4 days 1.3, has been 2.6-2.9 (H) since.  Last BM 11/13, type 6. Abdomen soft, rounded per flowsheets.      RD continues to follow.

## 2024-11-14 NOTE — CARE PLAN
The patient is Watcher - Medium risk of patient condition declining or worsening    Shift Goals  Clinical Goals: Q2 turns, hemodynamic stability, respiratory stability  Patient Goals: VALERIO  Family Goals: VALERIO    Progress made toward(s) clinical / shift goals:    Problem: Skin Integrity  Goal: Skin integrity is maintained or improved  Outcome: Progressing     Problem: Fall Risk  Goal: Patient will remain free from falls  Outcome: Progressing     Problem: Respiratory:  Goal: Respiratory status will improve  Outcome: Progressing     Problem: Nutrition  Goal: Patient's nutritional and fluid intake will be adequate or improve  Outcome: Progressing     Problem: Hemodynamics  Goal: Patient's hemodynamics, fluid balance and neurologic status will be stable or improve  Outcome: Progressing     Problem: Risk for Aspiration  Goal: Patient's risk for aspiration will be absent or decrease  Outcome: Progressing     Problem: Infection - Standard  Goal: Patient will remain free from infection  Outcome: Progressing     Problem: Pain - Standard  Goal: Alleviation of pain or a reduction in pain to the patient’s comfort goal  Outcome: Progressing     Problem: Fluid Volume  Goal: Fluid volume balance will be maintained  Outcome: Progressing     Problem: Respiratory  Goal: Patient will achieve/maintain optimum respiratory ventilation and gas exchange  Outcome: Progressing     Problem: Safety - Medical Restraint  Goal: Remains free of injury from restraints (Restraint for Interference with Medical Device)  Outcome: Progressing       Patient is not progressing towards the following goals:      Problem: Knowledge Deficit - Standard  Goal: Patient and family/care givers will demonstrate understanding of plan of care, disease process/condition, diagnostic tests and medications  Outcome: Not Progressing  No evidence of learning. Does not respond to  or commands.

## 2024-11-14 NOTE — PROGRESS NOTES
Critical Lab values of  WBC: 0.5  Platelet: 9  ANC: 0.0  Physician Edson notified  -Irradiated platelets ordered

## 2024-11-14 NOTE — PROGRESS NOTES
Infectious Disease Progress Note    Author: Sven Orlando M.D. Date & Time of service: 2024  7:32 AM    Chief Complaint:  Follow-up for pneumonia    Interval History:   Tmax 98, white count 0.6, ANC 0, platelets 20, cultures as below, creatinine 1.23, normal transaminases, some improvement in oxygen requirements, down to 5 L oxime mask   patient is now afebrile, white count 0.6, off oxygen, creatinine is worse up to 1.87, cultures and other labs as below   patient remains afebrile, white count 0.5, ANC 0, hemoglobin 9.1, platelets 30, cultures as below, remains on room air.  Resting comfortably this morning   Tmax 99.1, 0.5, transferred out of the ICU to the [facility], on only 1 L nasal cannula oxygen, white count 0.5, cultures as below, ANC 0, platelets 9.  Patient is slightly tachypneic this morning, encephalopathic, not following simple commands    Labs Reviewed and Medications Reviewed.    Review of Systems:  Review of Systems   Unable to perform ROS: Medical condition     Hemodynamics:  Temp (24hrs), Av.6 °C (97.8 °F), Min:35.7 °C (96.3 °F), Max:37.3 °C (99.1 °F)  Temperature: 37.3 °C (99.1 °F), Monitored Temp: 37.2 °C (99 °F)  Pulse  Av.2  Min: 62  Max: 122   Blood Pressure : (!) 166/72       Physical Exam:  Physical Exam  Vitals and nursing note reviewed.   Constitutional:       General: He is not in acute distress.     Appearance: He is ill-appearing.      Comments: Thin   HENT:      Nose:      Comments: NG tube in place  Eyes:      Conjunctiva/sclera: Conjunctivae normal.   Pulmonary:      Effort: No respiratory distress.      Breath sounds: No stridor.      Comments: Some accessory muscle use  Abdominal:      General: There is no distension.      Palpations: Abdomen is soft.   Musculoskeletal:         General: No swelling or tenderness.   Skin:     Findings: No erythema or rash.         Meds:    Current Facility-Administered Medications:     NS    acetaminophen     ceftaroline (TEFLARO) ivpb    hydrocortisone sodium succinate PF    midodrine    insulin GLARGINE    insulin lispro **AND** POC blood glucose manual result **AND** NOTIFY MD and PharmD **AND** Administer 20 grams of glucose (approximately 8 ounces of fruit juice) every 15 minutes PRN FSBG less than 70 mg/dL **AND** dextrose bolus    posaconazole    sodium chloride    ipratropium-albuterol    famotidine    NS    NS    Pharmacy    Pharmacy    acyclovir    atorvastatin    atovaquone    loratadine    [Held by provider] cycloSPORINE (modified)    potassium bicarbonate    ipratropium-albuterol    Respiratory Therapy Consult    MD Alert...Adult ICU Electrolyte Replacement per Pharmacy    MBX (diphenhydrAMINE-lidocaine-Maalox)    sodium bicarb 0.5 mEq/mL    [Held by provider] tamsulosin    Labs:  Recent Labs     11/12/24  0404 11/12/24  1720 11/13/24  0405 11/14/24  0245   WBC 0.6* 0.5* 0.5* 0.5*   RBC 2.03* 3.06* 3.13* 2.83*   HEMOGLOBIN 6.2* 8.8* 9.1* 8.5*   HEMATOCRIT 17.5* 25.4* 25.7* 23.4*   MCV 86.2 83.0 82.1 82.7   MCH 30.5 28.8 29.1 30.0   RDW 47.5 47.5 49.7 50.6*   PLATELETCT 6* 51* 30* 9*   MPV 11.7 10.6 10.6 10.5   NEUTSPOLYS 0.00*  --  0.50* 0.00*   LYMPHOCYTES 100.00*  --  99.50* 100.00*   MONOCYTES 0.00  --  0.00 0.00   EOSINOPHILS 0.00  --  0.00 0.00   BASOPHILS 0.00  --  0.00 0.00   RBCMORPHOLO Present  --  Present Present     Recent Labs     11/11/24  1220 11/11/24  1810 11/12/24  0404 11/12/24  1325 11/13/24  0405 11/13/24  1217 11/13/24  1747 11/14/24  0001 11/14/24  0610   SODIUM 118*  --  138  --  134*  --   --   --   --    POTASSIUM 3.4*   < > 3.5*   < > 4.9   < > 4.7 4.8 4.3   CHLORIDE 89*  --  108  --  105  --   --   --   --    CO2 17*  --  22  --  20  --   --   --   --    GLUCOSE 629*  --  124*  --  342*  --   --   --   --    BUN 44*  --  62*  --  80*  --   --   --   --     < > = values in this interval not displayed.     Recent Labs     11/11/24  1220 11/12/24  0404 11/13/24  0405   ALBUMIN  --   2.3* 2.1*   TBILIRUBIN  --  1.5 2.2*   ALKPHOSPHAT  --  67 82   TOTPROTEIN  --  5.2* 5.7*   ALTSGPT  --  24 20   ASTSGOT  --  20 15   CREATININE 1.42* 1.87* 1.75*       Imaging:  DX-ABDOMEN FOR TUBE PLACEMENT    Result Date: 11/10/2024  11/10/2024 12:14 PM HISTORY/REASON FOR EXAM:  Line evaluation. TECHNIQUE/EXAM DESCRIPTION AND NUMBER OF VIEWS:  1 view(s) of the abdomen. COMPARISON:  None. FINDINGS: Enteric tube has been placed. The tip projects over the stomach. The bowel gas pattern is within normal limits. Lungs show nonspecific bilateral airspace process     1.  Enteric tube has been placed and the tip projects over the stomach. 2.  Nonspecific pulmonary airspace process    US-RUQ    Result Date: 11/10/2024  11/10/2024 3:15 AM HISTORY/REASON FOR EXAM:  Abnormal Labs Abdominal pain TECHNIQUE/EXAM DESCRIPTION AND NUMBER OF VIEWS:  Real-time sonography of the liver and biliary tree. COMPARISON: None FINDINGS: The liver is normal in contour. There is no evidence of solid mass lesion. The liver measures . Gallbladder: The gallbladder is full of bile. No gallstones evident. The gallbladder wall thickness measures . The common duct measures 4.8 mm. No intrahepatic bile duct dilatation is evident. Pancreas: Normal visible extent. Aorta: Normal and the visible extension. Intrahepatic IVC is patent. The portal vein is patent with hepatopetal flow. The MPV measures . Intrahepatic IVC is patent. The right kidney measures 11.4 cm. No hydronephrosis or suspicious mass. No right upper quadrant ascites. Small right pleural effusion.     1. Normal gallbladder and right upper quadrant ultrasound. 2. Small right pleural effusion.    CT-SOFT TISSUE NECK WITH    Result Date: 11/10/2024  11/10/2024 2:28 AM HISTORY/REASON FOR EXAM:  airway obstruction. Pneumonia. Difficulty breathing. TECHNIQUE/EXAM DESCRIPTION AND NUMBER OF VIEWS:  CT soft tissue neck with contrast. The study was performed on a helical multidetector CT scanner.  "Contiguous thin section helical images were obtained of the neck from the skull base through the thoracic inlet. 80 mL of Omnipaque 350 nonionic contrast was injected intravenously. Low dose optimization technique was utilized for this CT exam including automated exposure control and adjustment of the mA and/or kV according to patient size. COMPARISON: CT chest from yesterday. FINDINGS: BRAIN: Visualized portions of the brain are normal in appearance. TEMPORAL bones: Trace right mastoid sinus fluid. The middle ear are clear. PARANASAL SINUSES: The paranasal sinuses do not show any fluid. Minimal mucosal thickening. CERVICAL spine: Minimal diffuse cervical spine degenerative changes. Moderate C6-7 degenerative disc changes. NODES: Normal size cervical lymph nodes in a symmetric manner measuring up to 28 x 8 mm. All are smaller than 1 cm short dimension. SALIVARY and THYROID gland: The parotid, submandibular, and thyroid gland are normal in appearance. AIRWAY: The airway appears patent. Mild uvula enlargement but no pharyngeal mass or swelling. No tonsillar swelling. Epiglottis and larynx appear normal. MEDIASTINUM: The superior mediastinum shows mildly enlarged lymph nodes as seen on CT. LUNGS: The visualized portions of the upper lungs show patchy dense pneumonia infiltrates with small pleural effusions.     1. No airway narrowing. Mild uvula swelling suggested. The epiglottis and larynx appear normal. 2. Upper normal size cervical lymph nodes. No cervical mass otherwise. 3. Trace right mastoid sinus fluid. No paranasal sinus fluid. The middle ears are clear. 4. The visible upper chest again shows dense infiltrates with small pleural effusions and mild upper mediastinal adenopathy.    CT-CHEST (THORAX) W/O    Result Date: 11/9/2024 11/9/2024 1:22 PM HISTORY/REASON FOR EXAM:  worsening pneumonia, previous imaging supicious for fungal; ID noted \"halo sign\" on outside CT chest 10/20, concern it may have worsened. " TECHNIQUE/EXAM DESCRIPTION: CT scan of the chest without contrast. Noncontrast helical scanning of the chest was obtained from the lung apices through the adrenal glands. Low dose optimization technique was utilized for this CT exam including automated exposure control and adjustment of the mA and/or kV according to patient size. COMPARISON: Outside CT chest 10/20/2024 FINDINGS: Lungs: There are airspace process within the right upper lobe, left upper lobe, right middle lobe, and lingular segment of left upper lobe and possibly both lower lobes although in the lower lobes is could be atelectasis.. There is a right upper lobe focal airspace process, nonspecific. Mediastinum/Jade: There is prevascular space adenopathy with the largest node measuring 20 x 13 mm. Pleura: There are small bilateral pleural effusion. Cardiac: Heart normal in size without pericardial effusion. There is coronary artery atherosclerotic plaque. Vascular: There is aortic and coronary artery atherosclerotic plaque.. Soft tissues: Unremarkable. Bones: No acute or destructive process. There is a retroperitoneal calcification consistent with sequela of prior inflammation.     1.  Multifocal bilateral pulmonary airspace process most consistent with pneumonia, with interval worsening 2.  Bilateral pleural effusion, most small in size, right greater than left 3.  Single enlarged prevascular space lymph node which is most likely reactive Fleischner Society pulmonary nodule recommendations: Not Applicable     DX-CHEST-PORTABLE (1 VIEW)    Result Date: 11/9/2024 11/9/2024 4:40 AM HISTORY/REASON FOR EXAM: Shortness of Breath TECHNIQUE/EXAM DESCRIPTION:  Single AP view of the chest. COMPARISON: November 7, 2024 FINDINGS: The cardiac silhouette appears within normal limits. The mediastinal contour appears within normal limits.  The central pulmonary vasculature appears normal. Bilateral lung volumes are diminished.  Patchy bilateral pulmonary opacities  are seen. No significant pleural effusions are identified. The bony structures appear age-appropriate.     1.  Patchy bilateral pulmonary infiltrates, similar to prior study    DX-CHEST-PORTABLE (1 VIEW)    Result Date: 11/7/2024 11/7/2024 12:06 PM HISTORY/REASON FOR EXAM:  Cough TECHNIQUE/EXAM DESCRIPTION AND NUMBER OF VIEWS: Single portable view of the chest. COMPARISON: Yesterday FINDINGS: HEART: Stable size. LUNGS: BILATERAL airspace disease redemonstrated. PLEURA: RIGHT costophrenic sulcus is not well seen.     1.  Unchanged BILATERAL pneumonia 2.  Possible RIGHT pleural effusion    DX-CHEST-2 VIEWS    Result Date: 11/7/2024 11/7/2024 12:08 AM HISTORY/REASON FOR EXAM: Cough TECHNIQUE/EXAM DESCRIPTION:  PA and lateral views of the chest. COMPARISON: October 19, 2024 FINDINGS: The cardiac silhouette appears within normal limits. Atherosclerotic calcification of the aorta is noted.  The central  pulmonary vasculature appears prominent and indistinct. Bilateral lung volumes are diminished.  Diffuse scattered hazy pulmonary parenchymal opacities are seen. Blunting of the left costophrenic angle is seen suggesting trace left effusion. The bony structures appear age-appropriate.     1.  Pulmonary edema and/or infiltrates. 2.  Trace left pleural effusion 3.  Atherosclerosis      Micro:  Results       Procedure Component Value Units Date/Time    Fungal Culture [[id number]] Collected: 11/10/24 0300    Order Status: Completed Specimen: Respirate from Sputum Updated: 11/13/24 1323     Significant Indicator NEG     Source RESP     Site Tracheal Aspirate     Culture Result Culture in progress.     Fungal Smear Results No fungal elements seen.    AFB Culture [[id number]] Collected: 11/10/24 0300    Order Status: Completed Specimen: Respirate from Sputum Updated: 11/13/24 1323     Significant Indicator NEG     Source RESP     Site Tracheal Aspirate     Culture Result A significant status has been triggered by the BACTEC  MGIT  instrument due to an increase in O2 consumption. No ACID FAST  BACILLI SEEN on Acid Fast Stain.  AFB cultures are held for 6 weeks and monitored continuously.  Positive AFB cultures are called to RN/Provider and reported  as soon as identified.       AFB Smear Results No acid fast bacilli seen.    CULTURE RESPIRATORY W/ GRM STN [[id number]]  (Abnormal) Collected: 11/10/24 0300    Order Status: Completed Specimen: Respirate from Sputum Updated: 11/13/24 1323     Significant Indicator POS     Source RESP     Site Tracheal Aspirate     Culture Result Light growth usual upper respiratory rosalino     Gram Stain Result Many epithelial cells.  Many Gram positive cocci.  Many Gram positive rods.       Culture Result Staphylococcus aureus  Light growth  Methicillin resistant by screening method      BLOOD CULTURE [[id number]] Collected: 11/07/24 0241    Order Status: Completed Specimen: Blood from Peripheral Updated: 11/12/24 0500     Significant Indicator NEG     Source BLD     Site PERIPHERAL     Culture Result No growth after 5 days of incubation.    BLOOD CULTURE [[id number]] Collected: 11/07/24 0241    Order Status: Completed Specimen: Blood from Peripheral Updated: 11/12/24 0500     Significant Indicator NEG     Source BLD     Site PERIPHERAL     Culture Result No growth after 5 days of incubation.    GRAM STAIN [[id number]]  (Abnormal) Collected: 11/10/24 0300    Order Status: Completed Specimen: Respirate Updated: 11/10/24 1912     Significant Indicator .     Source RESP     Site Tracheal Aspirate     Gram Stain Result Many epithelial cells.  Many Gram positive cocci.  Many Gram positive rods.      Acid Fast Stain [[id number]] Collected: 11/10/24 0300    Order Status: Completed Specimen: Respirate Updated: 11/10/24 1912     Significant Indicator NEG     Source RESP     Site Tracheal Aspirate     AFB Smear Results No acid fast bacilli seen.    Fungal Smear [[id number]] Collected: 11/10/24 0300    Order Status: Completed  Specimen: Respirate Updated: 11/10/24 1912     Significant Indicator NEG     Source RESP     Site Tracheal Aspirate     Fungal Smear Results No fungal elements seen.    Cryptococcal Antigen, Serum [[id number]] Collected: 11/10/24 1058    Order Status: Completed Specimen: Blood Updated: 11/10/24 1249     Cryptococcal Antigen, Serum Negative    BLOOD CULTURE [[id number]] Collected: 11/10/24 0825    Order Status: Completed Specimen: Blood from Peripheral Updated: 11/10/24 1208     Significant Indicator NEG     Source BLD     Site PERIPHERAL     Culture Result No Growth  Note: Blood cultures are incubated for 5 days and  are monitored continuously.Positive blood cultures  are called to the RN and reported as soon as  they are identified.      BLOOD CULTURE [[id number]] Collected: 11/10/24 0825    Order Status: Completed Specimen: Blood from Peripheral Updated: 11/10/24 1208     Significant Indicator NEG     Source BLD     Site PERIPHERAL     Culture Result No Growth  Note: Blood cultures are incubated for 5 days and  are monitored continuously.Positive blood cultures  are called to the RN and reported as soon as  they are identified.      MRSA By PCR (Amp) [[id number]] Collected: 11/10/24 0111    Order Status: Completed Specimen: Respirate from Nares Updated: 11/10/24 0824     MRSA by PCR POSITIVE    CULTURE RESPIRATORY W/ GRM STN [[id number]]  (Abnormal)  (Susceptibility) Collected: 11/07/24 0820    Order Status: Completed Specimen: Respirate from Sputum Updated: 11/09/24 0823     Significant Indicator POS     Source RESP     Site SPUTUM     Culture Result Light growth usual upper respiratory rosalino     Gram Stain Result Few WBCs.  Rare epithelial cells.  Rare Gram positive rods.  Specimen Quality Score: 1+       Culture Result Methicillin Resistant Staphylococcus aureus  Light growth  Methicillin resistant by screening method      Susceptibility       Methicillin resistant staphylococcus aureus (1)       Antibiotic  Interpretation Microscan   Method Status    Ampicillin/sulbactam Resistant <=8/4 mcg/mL BALTA Final    Clindamycin Sensitive 0.5 mcg/mL BALTA Final    Cefazolin Resistant <=8 mcg/mL BALTA Final    Cefepime Resistant 8 mcg/mL BALTA Final    Erythromycin Resistant >4 mcg/mL BALTA Final    Oxacillin Resistant >2 mcg/mL BALTA Final    Trimeth/Sulfa Sensitive <=0.5/9.5 mcg/mL BALTA Final    Tetracycline Sensitive <=4 mcg/mL BALTA Final    Vancomycin Sensitive 1 mcg/mL BALTA Final                       GRAM STAIN [[id number]] Collected: 11/07/24 0820    Order Status: Completed Specimen: Respirate Updated: 11/07/24 1108     Significant Indicator .     Source RESP     Site SPUTUM     Gram Stain Result Few WBCs.  Rare epithelial cells.  Rare Gram positive rods.  Specimen Quality Score: 1+      URINALYSIS [[id number]]  (Abnormal) Collected: 11/07/24 0550    Order Status: Completed Specimen: Urine, Cath Updated: 11/07/24 0747     Color Yellow     Character Clear     Specific Gravity 1.017     Ph 6.5     Glucose 500 mg/dL      Ketones Negative mg/dL      Protein 30 mg/dL      Bilirubin Negative     Urobilinogen, Urine 4.0 EU/dL      Nitrite Negative     Leukocyte Esterase Negative     Occult Blood Negative     Micro Urine Req Microscopic            Hospital Course/Assessment:   Miri Joseph is a 84 y.o. male patient with severe aplastic anemia, recently at [facility] where he was started on posaconazole and completed a course of linezolid for possible invasive fungal pneumonia based on CT scan findings of nodular opacities with surrounding groundglass changes, transferred to [facility] for further oncology management, developed respiratory distress on 11/9, now on high flow nasal cannula oxygen in the ICU, repeat CT scan with worsening multifocal bilateral airspace opacities with surrounding groundglass findings.  There was concern for breakthrough worsening invasive mold pneumonia in the context of the previous outside hospital CT scan  findings. Sputum culture positive for MRSA.  Further hospital course as below     Pertinent Diagnoses:  Acute hypoxic respiratory failure  Multifocal pneumonia, MRSA vs invasive mold, the latter appearing less likely  Severe aplastic anemia  Immunosuppressed status  ESTHER, improved     Plan:   -Tracheal aspirate positive for MRSA, susceptibilities pending no fungal elements on staining, serum Cryptococcal antigen negative  -Serum beta D glucan negative, serum Aspergillus galactomannan, Coccidioides serology are still pending.  Recent Karius at outside hospital was negative for fungal organisms and only positive for MRSA.  Unable to get a bronchoscopy at this time given instability, however given worsening renal function and no evidence of invasive mold thus far, stopped amphotericin B 11/13 and will continue MRSA coverage and follow very closely.  Vancomycin was discontinued 11/11, continue renally dosed IV ceftaroline.  Will consider de-escalating to doxycycline if clinical improvement continues  -Follow pending tracheal aspirate Aspergillus PCR  -Follow blood cultures x 2, no growth till date  -Patient remains on acyclovir and atovaquone prophylaxis.  Will also restart posaconazole prophylaxis.  Please note posaconazole may increase levels of atorvastatin and cyclosporine  -Current working diagnosis is extensive multifocal MRSA pneumonia though will have to keep a low threshold to broaden antifungal back from posaconazole to Amphotericin if any worsening    Disposition: Unable to determine at this time.  Noted currently DNR/DNI and if any worsening clinical status, possible transition to comfort care  Need for PICC line: In place     Plan was discussed with the primary team, Dr. Conteh.  ID will follow.  This illness poses a threat to life

## 2024-11-14 NOTE — PROGRESS NOTES
Hospital Medicine Daily Progress Note    Date of Service  11/14/2024    Chief Complaint  Miri Joseph is a 84 y.o. male admitted 11/6/2024 with altered    Hospital Course  84 y.o. male who presented 11/6/2024 with history of type 2 diabetes, hypertension, GERD, dyslipidemia, and recently diagnosed with aplastic anemia.  Patient was transferred to our care from [facility] to initiate inpatient chemotherapy.  He had been admitted there on October 19 for treatment of neutropenic fever he was treated with a course of linezolid for MRSA pneumonia.  On arrival here he was on prophylactic posaconazole, fluoroquinolone, and acyclovir.  Repeat sputum cultures here were positive for MRSA, the patient was started on linezolid on November 9 and on that same day he was moved to the ICU due to worsening respiratory status.  In the ICU the patient was on high flow nasal cannula and did require IV vasopressors he was initiated on tube feeds as well.    Interval Problem Update  ROS limited by mental status  Daughter feels pt is more alert and attentive today    DW pt's daughter at the bedside  DW pt's son who is a physician in [facility] by phone    AFebrile  Sinus 80-90s overnight, 110s this am  -160  On 2 LNC  UOP 1655ml/24hrs    I have discussed this patient's plan of care and discharge plan at IDT rounds today with Case Management, Nursing, Nursing leadership, and other members of the IDT team.    Consultants/Specialty  oncology    Code Status  DNAR/DNI    Disposition  The patient is not medically cleared for discharge to home or a post-acute facility.    I have placed the appropriate orders for post-discharge needs.    Review of Systems  Review of Systems   Unable to perform ROS: Mental status change        Physical Exam  Temp:  [35.7 °C (96.3 °F)-37.3 °C (99.1 °F)] 37.3 °C (99.1 °F)  Pulse:  [] 117  Resp:  [15-34] 34  BP: (110-155)/(53-69) 155/67  SpO2:  [90 %-100 %] 93 %    Physical  Exam  Constitutional:       General: He is not in acute distress.     Appearance: He is well-developed. He is not diaphoretic.   HENT:      Head: Normocephalic and atraumatic.   Eyes:      Conjunctiva/sclera: Conjunctivae normal.   Neck:      Vascular: No JVD.   Cardiovascular:      Rate and Rhythm: Tachycardia present.      Heart sounds: Murmur heard.      No gallop.   Pulmonary:      Effort: Pulmonary effort is normal. No respiratory distress.      Breath sounds: No stridor. No wheezing or rales.   Abdominal:      Palpations: Abdomen is soft.      Tenderness: There is no abdominal tenderness. There is no guarding or rebound.   Musculoskeletal:      Right lower leg: No edema.      Left lower leg: No edema.   Skin:     General: Skin is warm and dry.      Findings: No rash.   Neurological:      Comments: Alert and interactive  Speech slightly dysarthric  Face symmetric  Follows inconsistently but is purposeful x 4       Fluids    Intake/Output Summary (Last 24 hours) at 11/14/2024 0647  Last data filed at 11/14/2024 0400  Gross per 24 hour   Intake 2313.46 ml   Output 1125 ml   Net 1188.46 ml        Laboratory  Recent Labs     11/12/24  1720 11/13/24  0405 11/14/24  0245   WBC 0.5* 0.5* 0.5*   RBC 3.06* 3.13* 2.83*   HEMOGLOBIN 8.8* 9.1* 8.5*   HEMATOCRIT 25.4* 25.7* 23.4*   MCV 83.0 82.1 82.7   MCH 28.8 29.1 30.0   MCHC 34.6 35.4 36.3   RDW 47.5 49.7 50.6*   PLATELETCT 51* 30* 9*   MPV 10.6 10.6 10.5     Recent Labs     11/11/24  1220 11/11/24  1810 11/12/24  0404 11/12/24  1325 11/13/24  0405 11/13/24  1217 11/13/24  1747 11/14/24  0001 11/14/24  0610   SODIUM 118*  --  138  --  134*  --   --   --   --    POTASSIUM 3.4*   < > 3.5*   < > 4.9   < > 4.7 4.8 4.3   CHLORIDE 89*  --  108  --  105  --   --   --   --    CO2 17*  --  22  --  20  --   --   --   --    GLUCOSE 629*  --  124*  --  342*  --   --   --   --    BUN 44*  --  62*  --  80*  --   --   --   --    CREATININE 1.42*  --  1.87*  --  1.75*  --   --   --   --     CALCIUM 6.9*  --  8.0*  --  8.1*  --   --   --   --     < > = values in this interval not displayed.                   Imaging  DX-CHEST-PORTABLE (1 VIEW)   Final Result         1.  Pulmonary edema and/or infiltrates, similar to prior study.      DX-ABDOMEN FOR TUBE PLACEMENT   Final Result         1.  Nonspecific bowel gas pattern in the upper abdomen.   2.  Nasogastric tube tip terminates overlying the expected location of the gastric body.   3.  Patchy bilateral pulmonary infiltrates      DX-ABDOMEN FOR TUBE PLACEMENT   Final Result      1. Appropriate position of the nasogastric tube, replaced in the interval.   2. The left upper extremity PICC catheter now terminates within the azygos vein. Repositioning is recommended.   3. The remainder is stable.      DX-ABDOMEN FOR TUBE PLACEMENT   Final Result      Malpositioned enteric feeding tube likely within the right lower lobe bronchus.      Findings were conveyed to EMILIO Sutton in care of the patient on 11/11/2024 1:59 PM.      IR-PICC LINE PLACEMENT W/ GUIDANCE > AGE 5   Final Result                  Ultrasound-guided PICC placement performed by qualified nursing staff as    above.          DX-ABDOMEN FOR TUBE PLACEMENT   Final Result      1.  Enteric tube has been placed and the tip projects over the stomach.      2.  Nonspecific pulmonary airspace process      US-RUQ   Final Result         1. Normal gallbladder and right upper quadrant ultrasound.      2. Small right pleural effusion.      CT-SOFT TISSUE NECK WITH   Final Result         1. No airway narrowing. Mild uvula swelling suggested. The epiglottis and larynx appear normal.      2. Upper normal size cervical lymph nodes. No cervical mass otherwise.      3. Trace right mastoid sinus fluid. No paranasal sinus fluid. The middle ears are clear.      4. The visible upper chest again shows dense infiltrates with small pleural effusions and mild upper mediastinal adenopathy.      CT-CHEST (THORAX) W/O   Final  Result      1.  Multifocal bilateral pulmonary airspace process most consistent with pneumonia, with interval worsening      2.  Bilateral pleural effusion, most small in size, right greater than left      3.  Single enlarged prevascular space lymph node which is most likely reactive      Fleischner Society pulmonary nodule recommendations:   Not Applicable         DX-CHEST-PORTABLE (1 VIEW)   Final Result         1.  Patchy bilateral pulmonary infiltrates, similar to prior study      DX-CHEST-PORTABLE (1 VIEW)   Final Result      1.  Unchanged BILATERAL pneumonia   2.  Possible RIGHT pleural effusion      DX-CHEST-2 VIEWS   Final Result         1.  Pulmonary edema and/or infiltrates.   2.  Trace left pleural effusion   3.  Atherosclerosis           Assessment/Plan  * Multifocal pneumonia- (present on admission)  Assessment & Plan    Present on admission  Source respiratory  MRSA from sputum  Possible fungal: beta D glucan negative.   Serum Aspergillus galactomannan, Coccidioides serology are still pending.  Recent Karius at outside hospital was negative for fungal organisms   Probable aspiration pneumonia  Tapering Hydrocortisone  DC midodrine  ID managing antibiotics: Have stopped vancomycin due to renal function, not giving linezolid due to platelet count.  Currently on ceftaroline, acyclovir and posaconazole the last two for prophylaxis  Prophylactic agents ongoing for severe neutropenia  Amphotericin discontinued by ID  Adequately fluid resuscitated    ESTHER (acute kidney injury) (HCC)  Assessment & Plan  Likely sepsis primarily with contribution from medications  Adequately resuscitated  UOP 600ml given body weight of 70Kg this is borderline adequate output  Renally dose medications as appropriate  Serial BMP  Monitor UOP  Not a candidate for RRT         Septic shock (HCC)  Assessment & Plan  Present on admission  Source respiratory  MRSA from sputum  Possible fungal: beta D glucan negative.   Serum Aspergillus  galactomannan, Coccidioides serology are still pending.  Recent Karius at outside hospital was negative for fungal organisms   Probable aspiration pneumonia  Tapering Hydrocortisone  DC midodrine  ID managing antibiotics: Have stopped vancomycin due to renal function, not giving linezolid due to platelet count.  Currently on ceftaroline, acyclovir and posaconazole the last two for prophylaxis  Prophylactic agents ongoing for severe neutropenia  Amphotericin discontinued by ID  Adequately fluid resuscitated        Respiratory failure (HCC)  Assessment & Plan  Pneumonia   MRSA positive cultures from sputum  Possible fungal etiology: Beta D glucan neg.  Other serologies pending as noted above  DNR/DNI confirmed with daughter  Moved to unit for aggressive pulmonary toilet and oral care  O2 demand has decreased and he is now on RA/2 LNC  RT protocols  Oral care every-2 hours  Aspiration precautions, swallow eval pending  Hydrocortisone added to antibiotic regimen for severe pneumonia with respiratory failure ID managing antibiotics  If patient clinically deteriorates transition to comfort care/hospice, discussed with daughter and son who are in agreement         Elevated LFTs  Assessment & Plan  improving  RUQUS ordered-> normal  Avoid hepatotoxins      Dysphagia  Assessment & Plan  Still has altered level of conciousness  If he starts to clear will re consult SLP  [employer] if very high risk for aspiration  Currently getting nutrition via NGT  Oral care every 1-2-hour  CT soft tissue neck without airway issue or obvious abscess/obstructing process  Now more alert and off HFNC: re consult SLP      Severe malnutrition (HCC)- (present on admission)  Assessment & Plan  Nutrition following  Family okay with soft flexible feeding tube and enteral nutrition  Currently at goal on tube feeds  If mental status improves then we will consult speech and start p.o. when appropriate pt is however very high risk for aspiration given 2  episodes of pneumonia in last month both likely aspiration events    GERD (gastroesophageal reflux disease)- (present on admission)  Assessment & Plan  With hydrocortisone and thrombocytopenia -continue IV PPI daily for prophylaxis  Antireflux measures       Aplastic anemia (HCC)- (present on admission)  Assessment & Plan  BM biopsy consistent with severe aplastic anemia  Also consider infection/nutritional disorder/toxic myelosuppression.      Diagnostic evaluation in september and at [facility], viral studies negative, hepatitis panel negative,   Oncology following  Cyclosporine held in setting of infection.  Plan to resume once infectious status has improved  Promacta (eltrombopag) for thrombocytopenia 75 mg daily  Continue prophylactic antimicrobials  B12/MMA, folate levels resent, previously low     All blood products irradiated/leukophoresed  Tranfuse PRBC for <7  Transfuse platelets < 10, may warrant higher threshold given possibility of DAH though CT doesn't look strikingly like DAH  CT more likely MRSA pneumonia with positive cultures     Discuss with hematology regarding prognosis, not responsive to G-CSF in the past    MRSA pneumonia (HCC)- (present on admission)  Assessment & Plan  Was on linezolid and vancomycin  Continue ceftaroline for now   isolation precautions  ID following      Type 2 diabetes mellitus, without long-term current use of insulin (HCC)- (present on admission)  Assessment & Plan  Patient has been quite hyperglycemic  We have titrated up his glargine   He is currently on hydrocortisone taper and are therefor being cautious with titration of glargine as steroid dose decreases.  Will give an extra 10 units as a one time dose  Continue a high sliding scale  A1c 7.32 months ago    Thrombocytopenia (HCC)- (present on admission)  Assessment & Plan  Secondary to pancytopenia from aplastic anemia  Count is 9 this am: giving one unit of platelets  No signs of active bleeding on exam  today    Pancytopenia (HCC)  Assessment & Plan  BM biopsy consistent with severe aplastic anemia  Also consider infection/nutritional disorder/toxic myelosuppression.      Diagnostic evaluation in september and at [facility], viral studies negative, hepatitis panel negative,   Oncology following   On cyclosporine 100 bid, but cannot take orals currently  Promacta (eltrombopag) for thrombocytopenia 75 mg daily  Continue prophylactic antimicrobials  B12/MMA, folate levels resent, previously low     Tranfuse PRBC for <7  Transfuse platelets < 10, may warrant higher threshold given possibility of DAH though CT doesn't look strikingly like DAH  CT more likely MRSA pneumonia with positive cultures     Discuss with hematology regarding prognosis, not responsive to G-CSF in the past         VTE prophylaxis: VTE Selection    I have performed a physical exam and reviewed and updated ROS and Plan today (11/14/2024). In review of yesterday's note (11/13/2024), there are no changes except as documented above.

## 2024-11-14 NOTE — THERAPY
Occupational Therapy Contact Note    Patient Name: Miri Joseph  Age:  84 y.o., Sex:  male  Medical Record #: [id number]  Today's Date: 11/14/2024    Discussed missed therapy with RN        11/14/24 1117   Treatment Variance   Reason For Missed Therapy Medical - Other (Please Comment)   Interdisciplinary Plan of Care Collaboration   Collaboration Comments OT tx remains on hold per RN not appropriate not following commands to partcipate   Session Information   Date / Session Number  11/7 #1 (1/3, 11/13)  (Attempt: 11/13, 11/14)

## 2024-11-14 NOTE — PROGRESS NOTES
4 Eyes Skin Assessment Completed by EMILIO Barrios and EMILIO Lopez.    Head WDL Dry  Ears WDL  Nose Redness and Blanching  Mouth WDL Dry and cracked  Neck WDL  Breast/Chest Redness and Blanching  Shoulder Blades WDL  Spine WDL  (R) Arm/Elbow/Hand Bruising, Discoloration, and Edema  (L) Arm/Elbow/Hand Bruising, Swelling, and Edema  Abdomen WDL  Groin WDL  Scrotum/Coccyx/Buttocks Non-Blanching and Discoloration  (R) Leg Redness, Bruising, and Edema  (L) Leg Redness, Bruising, and Edema  (R) Heel/Foot/Toe Redness and Blanching  (L) Heel/Foot/Toe Redness and Blanching                    Devices In Places ECG, Blood Pressure Cuff, Pulse Ox, Mckee, SCD's, OG/NG, Central Line, and Nasal Cannula      Interventions In Place NC W/Ear Foams, Heel Mepilex, TAP System, Pillows, Elbow Mepilex, Q2 Turns, Low Air Loss Mattress, and Barrier Cream    Possible Skin Injury Yes    Pictures Uploaded Into Epic Yes  Wound Consult Placed Yes (previous)  RN Wound Prevention Protocol Ordered No  Previously done

## 2024-11-15 NOTE — PROGRESS NOTES
" Hematology/Oncology Progress Note    Primary Hematologist/Oncologist: Tracy    Oncology History:  9/03/2024: Presented to ER with abdominal pain, bloody stools, dizziness.  Found to be pancytopenic on presentation.     9/10/24: Bone marrow biopsy demonstrating hypocellular bone marrow at 15% cellular with prominent stromal elements, markedly decreased maturing granulocytic precursors, decreased erythroid precursors with slight dyserythropoiesis, and markedly decreased megakaryocytes. MDS FISH was negative and heme comprehensive NGS was negative.  Final diagnosis most consistent with severe aplastic anemia and severe B12 deficiency.    9/10/24-10/21/24: Initiated on daily-weekly-monthly intramuscular B12 1000 mcg.  Unfortunately, no improvements in his cell counts despite improving B12 levels.    10/22/2024: Admission to [facility] with neutropenic fever and cough.    11/6/2024: Transfer from [facility] to [facility] for inpatient cyclosporine and Promacta.    Interval History:  Clinically improved; back to the floor from ICU.     Review of Systems:  Review of Systems   Unable to perform ROS: Critical illness        Vitals:     BP (!) 144/63   Pulse (!) 115   Temp 37.1 °C (98.8 °F) (Bladder)   Resp (!) 35   Ht 1.753 m (5' 9\")   Wt 74.2 kg (163 lb 9.3 oz)   SpO2 93%   BMI 24.16 kg/m²     Physical Exam:  Physical Exam  Vitals and nursing note reviewed.   Constitutional:       General: He is not in acute distress.     Appearance: He is not toxic-appearing.   HENT:      Head: Normocephalic and atraumatic.   Eyes:      General: No scleral icterus.     Pupils: Pupils are equal, round, and reactive to light.   Cardiovascular:      Rate and Rhythm: Normal rate and regular rhythm.      Pulses: Normal pulses.      Heart sounds: No murmur heard.     No friction rub. No gallop.   Pulmonary:      Effort: Respiratory distress present.      Breath sounds: No stridor. Rhonchi and rales present. No wheezing.   Abdominal:      General: Abdomen " is flat. Bowel sounds are normal.      Palpations: Abdomen is soft.   Musculoskeletal:         General: No swelling.      Cervical back: No rigidity.   Skin:     General: Skin is warm and dry.      Capillary Refill: Capillary refill takes 2 to 3 seconds.      Coloration: Skin is not jaundiced.   Neurological:      General: No focal deficit present.      Mental Status: He is alert and oriented to person, place, and time. Mental status is at baseline.   Psychiatric:         Mood and Affect: Mood normal.          Assessment and Plan:    Severe aplastic anemia  Immunocompromise state  Pancytopenia   he was initially diagnosed in 2024.  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  On 2024 he was transferred for consideration of initiation of cyclosporine and eltrombopag.  We are currently working on obtaining outpatient delivery of eltrombopag due to supply in hospital.  Cyclosporine is unfortunately on hold due to clinical deterioration.  Once he has recovered from infectious standpoint, we will reinitiate cyclosporine at 5 mg/kg/day.  Unfortunately, given his ongoing severe infectious status, antithymocyte globulin would lead to fatal infectious complications due to his severe, deep, and prolonged degree of immune compromise.    We will reinitiate cyclosporine approx 2.5mg/kg (100mg BID) now that he is improved. We will uptitrate to 5mg/kg once he is more clinically stable.    Neutropenic fever  MRSA pneumonia  ID following.  There is now less concern for a fungal process. Amphotericin has been stopped. He continues on ceftaroline. Vancomycin discontinued on .    ARDS  Improving. Now off of HHFNC and back to nasal cannual.    B12 deficiency  Continue monthly B12 injections; next due 2024    TRANSFUSION THRESHOLDS:  RBCs and PLTs must be leukoreduced, CMV negative, and irradiated  Hgb <7 if no symptoms or <8 with symptoms or bleedinu pRBC  PLTs <10 and no symptoms or <20 if  febrile, septic, or bleeidnu apheresis PLTs     Plan:  Continue excellent care from critical care, hospital medicine, and infectious disease services  Hematology will continue to follow  Resume cyclosporine today at 100mg BID    Thank you for allowing me to participate in his care. Please do not hesitate to reach out with any questions.    Please note that this dictation was created using voice recognition software. I have made every reasonable attempt to correct obvious errors, but I expect that there are errors of grammar and possibly content that I did not discover before finalizing the note.      Howie Long MD  Hematologist/Oncologist  [facility] - [facility] [employer]

## 2024-11-15 NOTE — PROGRESS NOTES
Received report, assumed pt care. Pt asleep without any signs of distress. Pt daughter at the bedside stating condom cath fell off, condom cath replaced. Resting comfortably in bed with call light, bedside table in reach. Pt daughter w/o c/o at this time. Side rails up 2. Pt daughter instructed to use call light when pt needing OOB/assistance verbalized understanding. Bed alarm on, bed in low position. Will continue to monitor.

## 2024-11-15 NOTE — THERAPY
"Occupational Therapy  Daily Treatment     Patient Name: Miri Joseph  Age:  84 y.o., Sex:  male  Medical Record #: [id number]  Today's Date: 11/15/2024     Precautions  Precautions: (P) Fall Risk, Swallow Precautions, Nasogastric Tube    Assessment    Pt seen for OT Re-evaluation as he has had a significant change in functional status since prior evaluation. Collaborated with PT due to these new changes. Pt required TotalA for supine<>sit, w/ intermittent posterior lean seated EOB. Required max A for seated grooming and UB dressing. Pt able to shrug L shoulder but not R. Noted increased stiffness in B shoulders. Pt used R UE to hold dtrs hand during session. Pt demo'd impaired balance, functional mobility, activity tolerance, B UE AROM and strength as well as cognition. Will continue to follow.    Dtr at bedside, reports she is typically here from 1-2:45 daily and can assist w/ translating.     Plan    Treatment Plan Status:    Type of Treatment: (P) Self Care / Activities of Daily Living, Neuro Re-Education / Balance, Therapeutic Exercises, Therapeutic Activity, Family / Caregiver Training, Adaptive Equipment  Treatment Frequency: (P) 3 Times per Week  Treatment Duration: (P) Until Therapy Goals Met    DC Equipment Recommendations: (P) Unable to determine at this time  Discharge Recommendations: (P) Recommend post-acute placement for additional occupational therapy services prior to discharge home    Subjective    \"He is doing better today than yesterday\" -pts dtr     Objective       Services   Is patient using  services for this encounter? No   Language Interpreted Chantelle    Name Daughter Jessica   Precautions   Precautions Fall Risk;Swallow Precautions;Nasogastric Tube   Vitals   O2 (LPM) 2   O2 Delivery Device Silicone Nasal Cannula   Pain 0 - 10 Group   Therapist Pain Assessment Nurse Notified;Post Activity Pain Same as Prior to Activity  (no s/s of pain)   Active ROM Upper Body "   Comments bilateral shoulder very stiff R shoulder worse than L shoulder   Strength Upper Body   Comments more AROM of R UE than Left UE during session   Balance Assessment   Sitting Balance (Static) Trace +   Sitting Balance (Dynamic) Trace   Weight Shift Sitting Absent   Bed Mobility    Supine to Sit Total Assist   Sit to Supine Total Assist   Scooting Total Assist   ADL Assessment   Grooming Maximal Assist;Seated   Upper Body Dressing Maximal Assist  (dtr assisted w/ turban)   Lower Body Dressing Total Assist   How much help from another person does the patient currently need...   Putting on and taking off regular lower body clothing? 1   Bathing (including washing, rinsing, and drying)? 1   Toileting, which includes using a toilet, bedpan, or urinal? 1   Putting on and taking off regular upper body clothing? 2   Taking care of personal grooming such as brushing teeth? 2   Eating meals? 1   6 Clicks Daily Activity Score 8   Functional Mobility   Sit to Stand Unable to Participate   Mobility sat EOB   Activity Tolerance   Sitting Edge of Bed 6-7 min   Patient / Family Goals   Patient / Family Goal #1 to return home   Short Term Goals   Short Term Goal # 1 Pt will demo seated grooming w/ min A   Short Term Goal # 2 Pt will demo ADL txf w/ min A   Short Term Goal # 3 Pt will demo UB dressing w/ min A   Short Term Goal # 4 Pt will demo LB dressing w/ mod A   Education Group   Role of Occupational Therapist Patient Response Patient;Acceptance;Explanation;Reinforcement Needed;Family   Pathology of Bedrest Patient Response Patient;Acceptance;Demonstration;Explanation;Verbal Demonstration;Action Demonstration;Family   Occupational Therapy Initial Treatment Plan    Treatment Interventions Self Care / Activities of Daily Living;Neuro Re-Education / Balance;Therapeutic Exercises;Therapeutic Activity;Family / Caregiver Training;Adaptive Equipment   Treatment Frequency 3 Times per Week   Duration Until Therapy Goals Met    Problem List   Problem List Decreased Homemaking Skills;Decreased Active Daily Living Skills;Decreased Functional Mobility;Decreased Activity Tolerance;Impaired Postural Control / Balance   Anticipated Discharge Equipment and Recommendations   DC Equipment Recommendations Unable to determine at this time   Discharge Recommendations Recommend post-acute placement for additional occupational therapy services prior to discharge home   Interdisciplinary Plan of Care Collaboration   IDT Collaboration with  Family / Caregiver;Nursing;Physical Therapist   Patient Position at End of Therapy In Bed;Bed Alarm On;Wrist Restraints Applied;Tray Table within Reach;Call Light within Reach;Phone within Reach;Family / Friend in Room   Collaboration Comments report given   Session Information   Date / Session Number  11/15, Re-Betteal, (1/3, 11/21)

## 2024-11-15 NOTE — PROGRESS NOTES
Infectious Disease Progress Note    Author: Nan De Oliveira M.D. Date & Time of service: 11/15/2024  10:55 AM    Chief Complaint:  Follow-up for pneumonia    Interval History:   Tmax 98, white count 0.6, ANC 0, platelets 20, cultures as below, creatinine 1.23, normal transaminases, some improvement in oxygen requirements, down to 5 L oxime mask   patient is now afebrile, white count 0.6, off oxygen, creatinine is worse up to 1.87, cultures and other labs as below   patient remains afebrile, white count 0.5, ANC 0, hemoglobin 9.1, platelets 30, cultures as below, remains on room air.  Resting comfortably this morning   Tmax 99.1, 0.5, transferred out of the ICU to the [facility], on only 1 L nasal cannula oxygen, white count 0.5, cultures as below, ANC 0, platelets 9.  Patient is slightly tachypneic this morning, encephalopathic, not following simple commands  11/15 AF (99.9) WBC 0.5 sleeping at time of rounds. No acute events overnight On 2 L NC    Labs Reviewed and Medications Reviewed.    Review of Systems:  Review of Systems   Unable to perform ROS: Medical condition   Constitutional:  Negative for fever.     Hemodynamics:  No data recorded.  Monitored Temp: 37.7 °C (99.9 °F)  Pulse  Av.1  Min: 62  Max: 127   Blood Pressure : (!) 159/72       Physical Exam:  Physical Exam  Vitals and nursing note reviewed.   Constitutional:       General: He is not in acute distress.     Appearance: He is ill-appearing.      Comments: Thin   HENT:      Nose:      Comments: NG tube in place  Pulmonary:      Effort: No respiratory distress.      Breath sounds: No stridor.   Abdominal:      General: There is no distension.      Palpations: Abdomen is soft.   Skin:     Coloration: Skin is not jaundiced.      Findings: Bruising present.         Meds:    Current Facility-Administered Medications:     [START ON 2024] insulin GLARGINE    insulin GLARGINE    ceftaroline (TEFLARO) ivpb    acetaminophen     melatonin    insulin lispro **AND** POC blood glucose manual result **AND** NOTIFY MD and PharmD **AND** Administer 20 grams of glucose (approximately 8 ounces of fruit juice) every 15 minutes PRN FSBG less than 70 mg/dL **AND** dextrose bolus    posaconazole    famotidine    NS    Pharmacy    acyclovir    atorvastatin    atovaquone    cycloSPORINE (modified)    potassium bicarbonate    ipratropium-albuterol    Respiratory Therapy Consult    MD Alert...Adult ICU Electrolyte Replacement per Pharmacy    MBX (diphenhydrAMINE-lidocaine-Maalox)    sodium bicarb 0.5 mEq/mL    [Held by provider] tamsulosin    Labs:  Recent Labs     11/13/24 0405 11/14/24  0245 11/15/24  0130   WBC 0.5* 0.5* 0.5*   RBC 3.13* 2.83* 2.73*   HEMOGLOBIN 9.1* 8.5* 7.9*   HEMATOCRIT 25.7* 23.4* 22.8*   MCV 82.1 82.7 83.5   MCH 29.1 30.0 28.9   RDW 49.7 50.6* 49.6   PLATELETCT 30* 9* 21*   MPV 10.6 10.5 9.1   NEUTSPOLYS 0.50* 0.00* 1.80*   LYMPHOCYTES 99.50* 100.00* 98.20*   MONOCYTES 0.00 0.00 0.00   EOSINOPHILS 0.00 0.00 0.00   BASOPHILS 0.00 0.00 0.00   RBCMORPHOLO Present Present Present     Recent Labs     11/13/24 0405 11/13/24  1217 11/14/24  0610 11/14/24  0945 11/15/24  0130   SODIUM 134*  --   --  139 140   POTASSIUM 4.9   < > 4.3 4.3 3.6   CHLORIDE 105  --   --  109 109   CO2 20  --   --  22 24   GLUCOSE 342*  --   --  373* 343*   BUN 80*  --   --  60* 42*    < > = values in this interval not displayed.     Recent Labs     11/13/24 0405 11/14/24  0945 11/15/24  0130   ALBUMIN 2.1*  --   --    TBILIRUBIN 2.2*  --   --    ALKPHOSPHAT 82  --   --    TOTPROTEIN 5.7*  --   --    ALTSGPT 20  --   --    ASTSGOT 15  --   --    CREATININE 1.75* 1.11 0.92       Imaging:  DX-ABDOMEN FOR TUBE PLACEMENT    Result Date: 11/10/2024  11/10/2024 12:14 PM HISTORY/REASON FOR EXAM:  Line evaluation. TECHNIQUE/EXAM DESCRIPTION AND NUMBER OF VIEWS:  1 view(s) of the abdomen. COMPARISON:  None. FINDINGS: Enteric tube has been placed. The tip projects over  the stomach. The bowel gas pattern is within normal limits. Lungs show nonspecific bilateral airspace process     1.  Enteric tube has been placed and the tip projects over the stomach. 2.  Nonspecific pulmonary airspace process    US-RUQ    Result Date: 11/10/2024  11/10/2024 3:15 AM HISTORY/REASON FOR EXAM:  Abnormal Labs Abdominal pain TECHNIQUE/EXAM DESCRIPTION AND NUMBER OF VIEWS:  Real-time sonography of the liver and biliary tree. COMPARISON: None FINDINGS: The liver is normal in contour. There is no evidence of solid mass lesion. The liver measures . Gallbladder: The gallbladder is full of bile. No gallstones evident. The gallbladder wall thickness measures . The common duct measures 4.8 mm. No intrahepatic bile duct dilatation is evident. Pancreas: Normal visible extent. Aorta: Normal and the visible extension. Intrahepatic IVC is patent. The portal vein is patent with hepatopetal flow. The MPV measures . Intrahepatic IVC is patent. The right kidney measures 11.4 cm. No hydronephrosis or suspicious mass. No right upper quadrant ascites. Small right pleural effusion.     1. Normal gallbladder and right upper quadrant ultrasound. 2. Small right pleural effusion.    CT-SOFT TISSUE NECK WITH    Result Date: 11/10/2024  11/10/2024 2:28 AM HISTORY/REASON FOR EXAM:  airway obstruction. Pneumonia. Difficulty breathing. TECHNIQUE/EXAM DESCRIPTION AND NUMBER OF VIEWS:  CT soft tissue neck with contrast. The study was performed on a helical multidetector CT scanner. Contiguous thin section helical images were obtained of the neck from the skull base through the thoracic inlet. 80 mL of Omnipaque 350 nonionic contrast was injected intravenously. Low dose optimization technique was utilized for this CT exam including automated exposure control and adjustment of the mA and/or kV according to patient size. COMPARISON: CT chest from yesterday. FINDINGS: BRAIN: Visualized portions of the brain are normal in appearance.  "TEMPORAL bones: Trace right mastoid sinus fluid. The middle ear are clear. PARANASAL SINUSES: The paranasal sinuses do not show any fluid. Minimal mucosal thickening. CERVICAL spine: Minimal diffuse cervical spine degenerative changes. Moderate C6-7 degenerative disc changes. NODES: Normal size cervical lymph nodes in a symmetric manner measuring up to 28 x 8 mm. All are smaller than 1 cm short dimension. SALIVARY and THYROID gland: The parotid, submandibular, and thyroid gland are normal in appearance. AIRWAY: The airway appears patent. Mild uvula enlargement but no pharyngeal mass or swelling. No tonsillar swelling. Epiglottis and larynx appear normal. MEDIASTINUM: The superior mediastinum shows mildly enlarged lymph nodes as seen on CT. LUNGS: The visualized portions of the upper lungs show patchy dense pneumonia infiltrates with small pleural effusions.     1. No airway narrowing. Mild uvula swelling suggested. The epiglottis and larynx appear normal. 2. Upper normal size cervical lymph nodes. No cervical mass otherwise. 3. Trace right mastoid sinus fluid. No paranasal sinus fluid. The middle ears are clear. 4. The visible upper chest again shows dense infiltrates with small pleural effusions and mild upper mediastinal adenopathy.    CT-CHEST (THORAX) W/O    Result Date: 11/9/2024 11/9/2024 1:22 PM HISTORY/REASON FOR EXAM:  worsening pneumonia, previous imaging supicious for fungal; ID noted \"halo sign\" on outside CT chest 10/20, concern it may have worsened. TECHNIQUE/EXAM DESCRIPTION: CT scan of the chest without contrast. Noncontrast helical scanning of the chest was obtained from the lung apices through the adrenal glands. Low dose optimization technique was utilized for this CT exam including automated exposure control and adjustment of the mA and/or kV according to patient size. COMPARISON: Outside CT chest 10/20/2024 FINDINGS: Lungs: There are airspace process within the right upper lobe, left upper " lobe, right middle lobe, and lingular segment of left upper lobe and possibly both lower lobes although in the lower lobes is could be atelectasis.. There is a right upper lobe focal airspace process, nonspecific. Mediastinum/Jade: There is prevascular space adenopathy with the largest node measuring 20 x 13 mm. Pleura: There are small bilateral pleural effusion. Cardiac: Heart normal in size without pericardial effusion. There is coronary artery atherosclerotic plaque. Vascular: There is aortic and coronary artery atherosclerotic plaque.. Soft tissues: Unremarkable. Bones: No acute or destructive process. There is a retroperitoneal calcification consistent with sequela of prior inflammation.     1.  Multifocal bilateral pulmonary airspace process most consistent with pneumonia, with interval worsening 2.  Bilateral pleural effusion, most small in size, right greater than left 3.  Single enlarged prevascular space lymph node which is most likely reactive Fleischner Society pulmonary nodule recommendations: Not Applicable     DX-CHEST-PORTABLE (1 VIEW)    Result Date: 11/9/2024 11/9/2024 4:40 AM HISTORY/REASON FOR EXAM: Shortness of Breath TECHNIQUE/EXAM DESCRIPTION:  Single AP view of the chest. COMPARISON: November 7, 2024 FINDINGS: The cardiac silhouette appears within normal limits. The mediastinal contour appears within normal limits.  The central pulmonary vasculature appears normal. Bilateral lung volumes are diminished.  Patchy bilateral pulmonary opacities are seen. No significant pleural effusions are identified. The bony structures appear age-appropriate.     1.  Patchy bilateral pulmonary infiltrates, similar to prior study    DX-CHEST-PORTABLE (1 VIEW)    Result Date: 11/7/2024 11/7/2024 12:06 PM HISTORY/REASON FOR EXAM:  Cough TECHNIQUE/EXAM DESCRIPTION AND NUMBER OF VIEWS: Single portable view of the chest. COMPARISON: Yesterday FINDINGS: HEART: Stable size. LUNGS: BILATERAL airspace disease  redemonstrated. PLEURA: RIGHT costophrenic sulcus is not well seen.     1.  Unchanged BILATERAL pneumonia 2.  Possible RIGHT pleural effusion    DX-CHEST-2 VIEWS    Result Date: 11/7/2024 11/7/2024 12:08 AM HISTORY/REASON FOR EXAM: Cough TECHNIQUE/EXAM DESCRIPTION:  PA and lateral views of the chest. COMPARISON: October 19, 2024 FINDINGS: The cardiac silhouette appears within normal limits. Atherosclerotic calcification of the aorta is noted.  The central  pulmonary vasculature appears prominent and indistinct. Bilateral lung volumes are diminished.  Diffuse scattered hazy pulmonary parenchymal opacities are seen. Blunting of the left costophrenic angle is seen suggesting trace left effusion. The bony structures appear age-appropriate.     1.  Pulmonary edema and/or infiltrates. 2.  Trace left pleural effusion 3.  Atherosclerosis      Micro:  Results       Procedure Component Value Units Date/Time    Fungal Culture [[id number]] Collected: 11/10/24 0300    Order Status: Completed Specimen: Respirate from Sputum Updated: 11/13/24 1323     Significant Indicator NEG     Source RESP     Site Tracheal Aspirate     Culture Result Culture in progress.     Fungal Smear Results No fungal elements seen.    AFB Culture [[id number]] Collected: 11/10/24 0300    Order Status: Completed Specimen: Respirate from Sputum Updated: 11/13/24 1323     Significant Indicator NEG     Source RESP     Site Tracheal Aspirate     Culture Result A significant status has been triggered by the BACTEC MGIT  instrument due to an increase in O2 consumption. No ACID FAST  BACILLI SEEN on Acid Fast Stain.  AFB cultures are held for 6 weeks and monitored continuously.  Positive AFB cultures are called to RN/Provider and reported  as soon as identified.       AFB Smear Results No acid fast bacilli seen.    CULTURE RESPIRATORY W/ GRM STN [[id number]]  (Abnormal) Collected: 11/10/24 0300    Order Status: Completed Specimen: Respirate from Sputum Updated:  11/13/24 1323     Significant Indicator POS     Source RESP     Site Tracheal Aspirate     Culture Result Light growth usual upper respiratory rosalino     Gram Stain Result Many epithelial cells.  Many Gram positive cocci.  Many Gram positive rods.       Culture Result Staphylococcus aureus  Light growth  Methicillin resistant by screening method      BLOOD CULTURE [[id number]] Collected: 11/07/24 0241    Order Status: Completed Specimen: Blood from Peripheral Updated: 11/12/24 0500     Significant Indicator NEG     Source BLD     Site PERIPHERAL     Culture Result No growth after 5 days of incubation.    BLOOD CULTURE [[id number]] Collected: 11/07/24 0241    Order Status: Completed Specimen: Blood from Peripheral Updated: 11/12/24 0500     Significant Indicator NEG     Source BLD     Site PERIPHERAL     Culture Result No growth after 5 days of incubation.    GRAM STAIN [[id number]]  (Abnormal) Collected: 11/10/24 0300    Order Status: Completed Specimen: Respirate Updated: 11/10/24 1912     Significant Indicator .     Source RESP     Site Tracheal Aspirate     Gram Stain Result Many epithelial cells.  Many Gram positive cocci.  Many Gram positive rods.      Acid Fast Stain [[id number]] Collected: 11/10/24 0300    Order Status: Completed Specimen: Respirate Updated: 11/10/24 1912     Significant Indicator NEG     Source RESP     Site Tracheal Aspirate     AFB Smear Results No acid fast bacilli seen.    Fungal Smear [[id number]] Collected: 11/10/24 0300    Order Status: Completed Specimen: Respirate Updated: 11/10/24 1912     Significant Indicator NEG     Source RESP     Site Tracheal Aspirate     Fungal Smear Results No fungal elements seen.    Cryptococcal Antigen, Serum [[id number]] Collected: 11/10/24 1058    Order Status: Completed Specimen: Blood Updated: 11/10/24 1249     Cryptococcal Antigen, Serum Negative    BLOOD CULTURE [[id number]] Collected: 11/10/24 0825    Order Status: Completed Specimen: Blood from  Peripheral Updated: 11/10/24 1208     Significant Indicator NEG     Source BLD     Site PERIPHERAL     Culture Result No Growth  Note: Blood cultures are incubated for 5 days and  are monitored continuously.Positive blood cultures  are called to the RN and reported as soon as  they are identified.      BLOOD CULTURE [[id number]] Collected: 11/10/24 0825    Order Status: Completed Specimen: Blood from Peripheral Updated: 11/10/24 1208     Significant Indicator NEG     Source BLD     Site PERIPHERAL     Culture Result No Growth  Note: Blood cultures are incubated for 5 days and  are monitored continuously.Positive blood cultures  are called to the RN and reported as soon as  they are identified.      MRSA By PCR (Amp) [[id number]] Collected: 11/10/24 0111    Order Status: Completed Specimen: Respirate from Nares Updated: 11/10/24 0824     MRSA by PCR POSITIVE    CULTURE RESPIRATORY W/ GRM STN [[id number]]  (Abnormal)  (Susceptibility) Collected: 11/07/24 0820    Order Status: Completed Specimen: Respirate from Sputum Updated: 11/09/24 0823     Significant Indicator POS     Source RESP     Site SPUTUM     Culture Result Light growth usual upper respiratory rosalino     Gram Stain Result Few WBCs.  Rare epithelial cells.  Rare Gram positive rods.  Specimen Quality Score: 1+       Culture Result Methicillin Resistant Staphylococcus aureus  Light growth  Methicillin resistant by screening method      Susceptibility       Methicillin resistant staphylococcus aureus (1)       Antibiotic Interpretation Microscan   Method Status    Ampicillin/sulbactam Resistant <=8/4 mcg/mL BALTA Final    Clindamycin Sensitive 0.5 mcg/mL BALTA Final    Cefazolin Resistant <=8 mcg/mL BALTA Final    Cefepime Resistant 8 mcg/mL BALTA Final    Erythromycin Resistant >4 mcg/mL BALTA Final    Oxacillin Resistant >2 mcg/mL BALTA Final    Trimeth/Sulfa Sensitive <=0.5/9.5 mcg/mL BALTA Final    Tetracycline Sensitive <=4 mcg/mL BALTA Final    Vancomycin Sensitive 1 mcg/mL  BALTA Final                               Hospital Course/Assessment:   Miri Joseph is a 84 y.o. male patient with severe aplastic anemia, recently at [facility] where he was started on posaconazole and completed a course of linezolid for possible invasive fungal pneumonia based on CT scan findings of nodular opacities with surrounding groundglass changes, transferred to [facility] for further oncology management, developed respiratory distress on 11/9, now on high flow nasal cannula oxygen in the ICU, repeat CT scan with worsening multifocal bilateral airspace opacities with surrounding groundglass findings.  There was concern for breakthrough worsening invasive mold pneumonia in the context of the previous outside hospital CT scan findings. Sputum culture positive for MRSA.  Further hospital course as below     Pertinent Diagnoses:  Acute hypoxic respiratory failure-on 2 liters NC today  Multifocal pneumonia, MRSA   Severe aplastic anemia  Immunosuppressed status  ESTHER, improved     Plan:   -Tracheal aspirate positive for MRSA, susceptibilities pending no fungal elements on staining  -Serum Cryptococcal antigen negative  -Serum beta D glucan negative  -Serum Aspergillus galactomannan neg  - Coccidioides serology still pending.  Recent Karius at outside hospital was negative for fungal organisms and only positive for MRSA.  Unable to get a bronchoscopy at this time given instability, however given worsening renal function and no evidence of invasive mold thus far, stopped amphotericin B 11/13 and will continue MRSA coverage and follow very closely.    -Vancomycin was discontinued 11/11, continue renally dosed IV ceftaroline.    Will consider de-escalating to doxycycline if clinical improvement continues and tolerating PO well  -Follow blood cultures x 2, no growth till date  -Patient remains on acyclovir and atovaquone prophylaxis.  Will also restart posaconazole prophylaxis.  Please note posaconazole may  increase levels of atorvastatin and cyclosporine  -Current working diagnosis is extensive multifocal MRSA pneumonia though will have to keep a low threshold to broaden antifungal back from posaconazole to liposomal Amphotericin if any worsening    Disposition: Unable to determine at this time.  Noted currently DNR/DNI and if any worsening clinical status, possible transition to comfort care  Need for PICC line: In place     Plan was discussed with pharmacy  ID will follow.  This illness poses a threat to life

## 2024-11-15 NOTE — PROGRESS NOTES
Hospital Medicine Daily Progress Note    Date of Service  11/15/2024    Chief Complaint  Miri Joseph is a 84 y.o. male admitted 11/6/2024 with altered    Hospital Course  84 y.o. male who presented 11/6/2024 with history of type 2 diabetes, hypertension, GERD, dyslipidemia, and recently diagnosed with aplastic anemia.  Patient was transferred to our care from [facility] to initiate inpatient chemotherapy.  He had been admitted there on October 19 for treatment of neutropenic fever he was treated with a course of linezolid for MRSA pneumonia.  On arrival here he was on prophylactic posaconazole, fluoroquinolone, and acyclovir.  Repeat sputum cultures here were positive for MRSA, the patient was started on linezolid on November 9 and on that same day he was moved to the ICU due to worsening respiratory status.  In the ICU the patient was on high flow nasal cannula and did require IV vasopressors he was initiated on tube feeds as well.    Interval Problem Update  ROS limited by mental status  Son at bedside feels pt is better than he was 2 days ago, about the same as he was yesterday in terms of level of alertness    DW pt's son at the bedside      AFebrile  Sinus 110s this am  -160  On 2 LNC  UOP 2560ml/24hrs    I have discussed this patient's plan of care and discharge plan at IDT rounds today with Case Management, Nursing, Nursing leadership, and other members of the IDT team.    Consultants/Specialty  oncology    Code Status  DNAR/DNI    Disposition  The patient is not medically cleared for discharge to home or a post-acute facility.      I have placed the appropriate orders for post-discharge needs.    Review of Systems  Review of Systems   Unable to perform ROS: Mental status change        Physical Exam  Temp:  [37.1 °C (98.8 °F)] 37.1 °C (98.8 °F)  Pulse:  [109-127] 113  Resp:  [21-41] 31  BP: (144-180)/(63-84) 180/79  SpO2:  [88 %-96 %] 92 %    Physical Exam  Constitutional:        General: He is not in acute distress.     Appearance: He is well-developed. He is not diaphoretic.   HENT:      Head: Normocephalic and atraumatic.   Eyes:      Conjunctiva/sclera: Conjunctivae normal.   Neck:      Vascular: No JVD.   Cardiovascular:      Rate and Rhythm: Tachycardia present.      Heart sounds: Murmur heard.      No gallop.   Pulmonary:      Effort: Pulmonary effort is normal. No respiratory distress.      Breath sounds: No stridor. No wheezing or rales.   Abdominal:      Palpations: Abdomen is soft.      Tenderness: There is no abdominal tenderness. There is no guarding or rebound.   Musculoskeletal:      Right lower leg: No edema.      Left lower leg: No edema.   Skin:     General: Skin is warm and dry.      Findings: No rash.   Neurological:      Comments: Alert and interactive  Speech slightly dysarthric  Face symmetric  Follows inconsistently but is purposeful x 4         Fluids    Intake/Output Summary (Last 24 hours) at 11/15/2024 0657  Last data filed at 11/15/2024 0600  Gross per 24 hour   Intake 2290.41 ml   Output 4900 ml   Net -2609.59 ml        Laboratory  Recent Labs     11/13/24  0405 11/14/24  0245 11/15/24  0130   WBC 0.5* 0.5* 0.5*   RBC 3.13* 2.83* 2.73*   HEMOGLOBIN 9.1* 8.5* 7.9*   HEMATOCRIT 25.7* 23.4* 22.8*   MCV 82.1 82.7 83.5   MCH 29.1 30.0 28.9   MCHC 35.4 36.3 34.6   RDW 49.7 50.6* 49.6   PLATELETCT 30* 9* 21*   MPV 10.6 10.5 9.1     Recent Labs     11/13/24  0405 11/13/24  1217 11/14/24  0610 11/14/24  0945 11/15/24  0130   SODIUM 134*  --   --  139 140   POTASSIUM 4.9   < > 4.3 4.3 3.6   CHLORIDE 105  --   --  109 109   CO2 20  --   --  22 24   GLUCOSE 342*  --   --  373* 343*   BUN 80*  --   --  60* 42*   CREATININE 1.75*  --   --  1.11 0.92   CALCIUM 8.1*  --   --  8.5 8.2*    < > = values in this interval not displayed.                   Imaging  DX-CHEST-PORTABLE (1 VIEW)   Final Result         1.  Pulmonary edema and/or infiltrates, similar to prior study.       DX-ABDOMEN FOR TUBE PLACEMENT   Final Result         1.  Nonspecific bowel gas pattern in the upper abdomen.   2.  Nasogastric tube tip terminates overlying the expected location of the gastric body.   3.  Patchy bilateral pulmonary infiltrates      DX-ABDOMEN FOR TUBE PLACEMENT   Final Result      1. Appropriate position of the nasogastric tube, replaced in the interval.   2. The left upper extremity PICC catheter now terminates within the azygos vein. Repositioning is recommended.   3. The remainder is stable.      DX-ABDOMEN FOR TUBE PLACEMENT   Final Result      Malpositioned enteric feeding tube likely within the right lower lobe bronchus.      Findings were conveyed to EMILIO Sutton in care of the patient on 11/11/2024 1:59 PM.      IR-PICC LINE PLACEMENT W/ GUIDANCE > AGE 5   Final Result                  Ultrasound-guided PICC placement performed by qualified nursing staff as    above.          DX-ABDOMEN FOR TUBE PLACEMENT   Final Result      1.  Enteric tube has been placed and the tip projects over the stomach.      2.  Nonspecific pulmonary airspace process      US-RUQ   Final Result         1. Normal gallbladder and right upper quadrant ultrasound.      2. Small right pleural effusion.      CT-SOFT TISSUE NECK WITH   Final Result         1. No airway narrowing. Mild uvula swelling suggested. The epiglottis and larynx appear normal.      2. Upper normal size cervical lymph nodes. No cervical mass otherwise.      3. Trace right mastoid sinus fluid. No paranasal sinus fluid. The middle ears are clear.      4. The visible upper chest again shows dense infiltrates with small pleural effusions and mild upper mediastinal adenopathy.      CT-CHEST (THORAX) W/O   Final Result      1.  Multifocal bilateral pulmonary airspace process most consistent with pneumonia, with interval worsening      2.  Bilateral pleural effusion, most small in size, right greater than left      3.  Single enlarged prevascular space lymph  node which is most likely reactive      Fleischner Society pulmonary nodule recommendations:   Not Applicable         DX-CHEST-PORTABLE (1 VIEW)   Final Result         1.  Patchy bilateral pulmonary infiltrates, similar to prior study      DX-CHEST-PORTABLE (1 VIEW)   Final Result      1.  Unchanged BILATERAL pneumonia   2.  Possible RIGHT pleural effusion      DX-CHEST-2 VIEWS   Final Result         1.  Pulmonary edema and/or infiltrates.   2.  Trace left pleural effusion   3.  Atherosclerosis           Assessment/Plan  * Multifocal pneumonia- (present on admission)  Assessment & Plan    Present on admission  Source respiratory  MRSA from sputum  Possible fungal: beta D glucan negative.   Serum Aspergillus galactomannan, Coccidioides serology are still pending.  Recent Karius at outside hospital was negative for fungal organisms   Probable aspiration pneumonia  Tapering Hydrocortisone  DC midodrine  ID managing antibiotics: Have stopped vancomycin due to renal function, not giving linezolid due to platelet count.  Currently on ceftaroline, acyclovir and posaconazole the last two for prophylaxis  Prophylactic agents ongoing for severe neutropenia  Amphotericin discontinued by ID  Adequately fluid resuscitated    ESTHER (acute kidney injury) (HCC)  Assessment & Plan  Likely sepsis primarily with contribution from medications  Adequately resuscitated  UOP 600ml given body weight of 70Kg this is borderline adequate output  Renally dose medications as appropriate  Serial BMP  Monitor UOP  Not a candidate for RRT         Septic shock (HCC)  Assessment & Plan  Present on admission  Source respiratory  MRSA from sputum  Possible fungal: beta D glucan negative.   Serum Aspergillus galactomannan, Coccidioides serology are still pending.  Recent Karius at outside hospital was negative for fungal organisms   Probable aspiration pneumonia  Completed steroids 11/14  Stable off of midodrine and steroids  ID managing antibiotics:  Have stopped vancomycin due to renal function, not giving linezolid due to platelet count.  Currently on ceftaroline, acyclovir and posaconazole the last two for prophylaxis  Prophylactic agents ongoing for severe neutropenia  Amphotericin discontinued by ID  Adequately fluid resuscitated        Respiratory failure (HCC)  Assessment & Plan  Pneumonia   MRSA positive cultures from sputum  Possible fungal etiology: Beta D glucan neg.  Other serologies pending as noted above  DNR/DNI confirmed with daughter  Moved to unit for aggressive pulmonary toilet and oral care  O2 demand has decreased and he is now on RA/2 LNC  RT protocols  Oral care every-2 hours  Aspiration precautions, swallow eval pending  Hydrocortisone added to antibiotic regimen for severe pneumonia with respiratory failure ID managing antibiotics  If patient clinically deteriorates transition to comfort care/hospice, discussed with daughter and son who are in agreement         Elevated LFTs  Assessment & Plan  improving  RUQUS ordered-> normal  Avoid hepatotoxins      Dysphagia  Assessment & Plan  Still has altered level of conciousness  If he starts to clear will re consult SLP  [employer] if very high risk for aspiration  Currently getting nutrition via NGT  Oral care every 1-2-hour  CT soft tissue neck without airway issue or obvious abscess/obstructing process  Now more alert and off HFNC: re consult SLP      Severe malnutrition (HCC)- (present on admission)  Assessment & Plan  Nutrition following  Family okay with soft flexible feeding tube and enteral nutrition  Currently at goal on tube feeds  If mental status improves then we will consult speech and start p.o. when appropriate pt is however very high risk for aspiration given 2 episodes of pneumonia in last month both likely aspiration events    GERD (gastroesophageal reflux disease)- (present on admission)  Assessment & Plan  With hydrocortisone and thrombocytopenia -continue IV PPI daily for  prophylaxis  Antireflux measures       Aplastic anemia (HCC)- (present on admission)  Assessment & Plan  BM biopsy consistent with severe aplastic anemia     Diagnostic evaluation in september and at [facility], viral studies negative, hepatitis panel negative,   Cyclosporine resumed at 110mg BID 11/14  Promacta (eltrombopag) for thrombocytopenia 75 mg daily  Continue prophylactic antimicrobials  B12/MMA, folate levels resent, previously low     All blood products irradiated/leukophoresed  Tranfuse PRBC for <7  Transfuse platelets < 10, may warrant higher threshold given possibility of DAH      Discuss with hematology regarding prognosis, not responsive to G-CSF in the past    MRSA pneumonia (HCC)- (present on admission)  Assessment & Plan  Was on linezolid and vancomycin  Continue ceftaroline for now   isolation precautions  ID following      Type 2 diabetes mellitus, without long-term current use of insulin (HCC)- (present on admission)  Assessment & Plan  Patient has been quite hyperglycemic  We have titrated up his glargine   Off hydrocortisone today  41 units of coverage/24hrs: increase glargine dose by 20 units tomorrow am and give a one time dose of 20 now  Continue a high sliding scale  A1c 7.32 months ago    Thrombocytopenia (HCC)- (present on admission)  Assessment & Plan  Secondary to pancytopenia from aplastic anemia  Given one unit yesterday  No signs of active bleeding on exam today    Pancytopenia (HCC)  Assessment & Plan  BM biopsy consistent with severe aplastic anemia  Also consider infection/nutritional disorder/toxic myelosuppression.      Diagnostic evaluation in september and at [facility], viral studies negative, hepatitis panel negative,   Oncology following   On cyclosporine 100 bid, but cannot take orals currently  Promacta (eltrombopag) for thrombocytopenia 75 mg daily  Continue prophylactic antimicrobials  B12/MMA, folate levels resent, previously low     Tranfuse PRBC for <7  Transfuse platelets < 10,  may warrant higher threshold given possibility of DAH though CT doesn't look strikingly like DAH  CT more likely MRSA pneumonia with positive cultures     Discuss with hematology regarding prognosis, not responsive to G-CSF in the past         VTE prophylaxis: VTE Selection    I have performed a physical exam and reviewed and updated ROS and Plan today (11/15/2024). In review of yesterday's note (11/14/2024), there are no changes except as documented above.

## 2024-11-15 NOTE — THERAPY
Physical Therapy   Daily Treatment     Patient Name: Miri Joseph  Age:  84 y.o., Sex:  male  Medical Record #: [id number]  Today's Date: 11/15/2024     Precautions  Precautions: Fall Risk;Swallow Precautions;Nasogastric Tube    Assessment    Patient with significant decline in function since initial evaluation. He mobilized as detailed below; required total A for all mobility. Will continue to follow 3x/wk however question therapeutic benefit given limited ability of patient to meaningfully participate.    Plan    Treatment Plan Status: Continue Current Treatment Plan (however question therapeutic benefit)  Type of Treatment: Bed Mobility, Family / Caregiver Training, Equipment, Gait Training, Neuro Re-Education / Balance, Self Care / Home Evaluation, Therapeutic Activities, Therapeutic Exercise  Treatment Frequency: 3 Times per Week  Treatment Duration: Until Therapy Goals Met    DC Equipment Recommendations: Unable to determine at this time  Discharge Recommendations: Recommend post-acute placement for additional physical therapy services prior to discharge home      Subjective    RN cleared patient for therapy; patient received in bed and agreeable per daughter     Objective         11/15/24 1419   Time In/Time Out   Therapy Start Time 1353   Therapy End Time 1419   Total Therapy Time 26   Charge Group   Charges  Yes   PT Re-evaluation PT Re-evaluation   Total Time Spent   PT Total Time Yes   PT Re-evaluation Time Spent (Mins) 26   PT Total Time Spent (Calculated) 26    Services   Is patient using  services for this encounter? No   Language Interpreted Chantelle    Name Daughter Jessica   Content of Interpretation (select all) Patient Education   Precautions   Precautions Fall Risk;Swallow Precautions;Nasogastric Tube   Vitals   O2 (LPM) 2   O2 Delivery Device Silicone Nasal Cannula   Pain 0 - 10 Group   Therapist Pain Assessment   (unclear if patient uncomfortable / agitated /  confused?)   Cognition    Cognition / Consciousness X   Level of Consciousness Responds to voice   Attention Impaired   Initiation Impaired   Comments difficult to assess given limited arousal and attention and daughter providing interpretation   Passive ROM Lower Body   Comments gross deficits BLE; unable to achieve full knee extension BLE   Active ROM Lower Body    Comments unclear if patient was able to perform volitional movements BLE; daughter reported he attempted RLE hip flexion but unclear if movement due to muscle activation   Strength Lower Body   Comments difficult to assess given limited command following / volitional movement   Balance   Sitting Balance (Static) Trace +   Sitting Balance (Dynamic) Trace   Weight Shift Sitting Absent   Skilled Intervention Verbal Cuing;Compensatory Strategies;Facilitation;Sequencing;Postural Facilitation;Inhibition;Tactile Cuing   Bed Mobility    Supine to Sit Total Assist   Sit to Supine Total Assist   Scooting Total Assist   Skilled Intervention Verbal Cuing;Compensatory Strategies;Facilitation;Sequencing;Postural Facilitation;Tactile Cuing;Inhibition   Gait Analysis   Gait Level Of Assist Unable to Participate   Weight Bearing Status no restrictions   Functional Mobility   Sit to Stand Unable to Participate   Bed, Chair, Wheelchair Transfer Unable to Participate   ICU Target Mobility Level   ICU Mobility - Targeted Level Level 2   6 Clicks Assessment - How much HELP from from another person do you currently need... (If the patient hasn't done an activity recently, how much help from another person do you think he/she would need if he/she tried?)   Turning from your back to your side while in a flat bed without using bedrails? 1   Moving from lying on your back to sitting on the side of a flat bed without using bedrails? 1   Moving to and from a bed to a chair (including a wheelchair)? 1   Standing up from a chair using your arms (e.g., wheelchair, or bedside chair)? 1    Walking in hospital room? 1   Climbing 3-5 steps with a railing? 1   6 clicks Mobility Score 6   Short Term Goals    Short Term Goal # 1 PT will perform bed mobility with mod A level by tx 6   Short Term Goal # 2 Patient will maintain static sitting balance at EOB for > 5 min with SBA within 6tx in order to progress independence with mobility   Short Term Goal # 3 Pt will perform transfer with max A within 6tx in order to progress mobility   Education Group   Education Provided Role of Physical Therapist   Role of Physical Therapist Patient Response Patient;Family;Acceptance;Explanation;Verbal Demonstration   Physical Therapy Treatment Plan   Physical Therapy Treatment Plan Continue Current Treatment Plan  (however question therapeutic benefit)   Anticipated Discharge Equipment and Recommendations   DC Equipment Recommendations Unable to determine at this time   Discharge Recommendations Recommend post-acute placement for additional physical therapy services prior to discharge home   Interdisciplinary Plan of Care Collaboration   IDT Collaboration with  Nursing;Occupational Therapist;Family / Caregiver   Patient Position at End of Therapy In Bed;Wrist Restraints Applied;Call Light within Reach;Tray Table within Reach;Family / Friend in Room   Collaboration Comments RN aware of visit, response   Session Information   Date / Session Number  11/15-2 (1/3, 11/21)

## 2024-11-15 NOTE — CARE PLAN
The patient is Stable - Low risk of patient condition declining or worsening    Shift Goals  Clinical Goals: Hemodynamic stability, neurologic improvement, respiratory stability  Patient Goals: VALERIO  Family Goals: Patient's mentation to improve    Progress made toward(s) clinical / shift goals:    Problem: Skin Integrity  Goal: Skin integrity is maintained or improved  Description: Target End Date:  Prior to discharge or change in level of care    Document interventions on Skin Risk/Jarrod flowsheet groups and corresponding LDA    1.  Assess and monitor skin integrity, appearance and/or temperature  2.  Assess risk factors for impaired skin integrity and/or pressures ulcers  3.  Implement precautions to protect skin integrity in collaboration with interdisciplinary team  4.  Implement pressure ulcer prevention protocol if at risk for skin breakdown  5.  Confirm wound care consult if at risk for skin breakdown  6.  Ensure patient use of pressure relieving devices  (Low air loss bed, waffle overlay, heel protectors, ROHO cushion, etc)  Outcome: Progressing     Problem: Fall Risk  Goal: Patient will remain free from falls  Description: Target End Date:  Prior to discharge or change in level of care    Document interventions on the [place] Fall Risk Assessment    1.  Assess for fall risk factors  2.  Implement fall precautions  Outcome: Progressing     Problem: Respiratory:  Goal: Respiratory status will improve  Outcome: Progressing     Problem: Mobility  Goal: Risk for activity intolerance will decrease  Outcome: Progressing     Problem: Nutrition  Goal: Patient's nutritional and fluid intake will be adequate or improve  Description: Target End Date:  Prior to discharge or change in level of care    Document on I/O flowsheet    1.  Monitor nutritional intake  2.  Monitor weight per provider order  3.  Assess patient's ability to take oral nutrition  4.  Collaborate with Speech Therapy, Dietitian and  interdisciplinary team for appropriate feeding and fluid intake  5.  Assist with feeding  Outcome: Progressing     Problem: Hemodynamics  Goal: Patient's hemodynamics, fluid balance and neurologic status will be stable or improve  Description: Target End Date:  Prior to discharge or change in level of care    Document on Assessment and I/O flowsheet templates    1.  Monitor vital signs, pulse oximetry and cardiac monitor per provider order and/or policy  2.  Maintain blood pressure per provider order  3.  Hemodynamic monitoring per provider order  4.  Manage IV fluids and IV infusions  5.  Monitor intake and output  6.  Daily weights per unit policy or provider order  7.  Assess peripheral pulses and capillary refill  8.  Assess color and body temperature  9.  Position patient for maximum circulation/cardiac output  10. Monitor for signs/symptoms of excessive bleeding  11. Assess mental status, restlessness and changes in level of consciousness  12. Monitor temperature and report fever or hypothermia to provider immediately. Consideration of targeted temperature management.  Outcome: Progressing     Problem: Risk for Aspiration  Goal: Patient's risk for aspiration will be absent or decrease  Description: Target End Date:  Prior to discharge or change in level of care    1.   Complete dysphagia screening on admission  2.   NPO until dysphagia screening complete or medically cleared  3.   Collaborate with Speech Therapy, Clinical Dietitian and interdisciplinary team  4.   Implement aspiration precautions  5.   Assist patient up to chair for meals  6.   Elevate head of bed 90 degrees if patient is unable to get out of bed  7.   Encourage small bites  8.   Ensure foods/liquids are of appropriate consistency  9.   Assess for any signs/symptoms of aspiration  10. Assess breath sounds and vital signs after oral intake  Outcome: Progressing     Problem: Infection - Standard  Goal: Patient will remain free from  infection  Description: Target End Date:  Prior to discharge or change in level of care    1.  Utilize Standard Precautions at all times to reduce the risk of transmission of microorganisms from both recognized and  unrecognized sources of infection  2.  Infection prevention handouts provided (general/device/diagnosis specific) and documented in Patient Education  3.  Educate patient and family/caregiver on isolation precautions if applicable  Outcome: Progressing     Problem: Pain - Standard  Goal: Alleviation of pain or a reduction in pain to the patient’s comfort goal  Description: Target End Date:  Prior to discharge or change in level of care    Document on Vitals flowsheet    1.  Document pain using the appropriate pain scale per order or unit policy  2.  Educate and implement non-pharmacologic comfort measures (i.e. relaxation, distraction, massage, cold/heat therapy, etc.)  3.  Pain management medications as ordered  4.  Reassess pain after pain med administration per policy  5.  If opiods administered assess patient's response to pain medication is appropriate per POSS sedation scale  6.  Follow pain management plan developed in collaboration with patient and interdisciplinary team (including palliative care or pain specialists if applicable)  Outcome: Progressing     Problem: Fluid Volume  Goal: Fluid volume balance will be maintained  Description: Target End Date:  Prior to discharge or change in level of care    Document on I/O flowsheet    1.  Monitor intake and output as ordered  2.  Promote oral intake as appropriate  3.  Report inadequate intake or output to physician  4.  Administer IV therapy as ordered  5.  Weights per provider order  6.  Assess for signs and symptoms of bleeding  7.  Monitor for signs of fluid overload (respiratory changes, edema, weight gain, increased abdominal girth)  8.  Monitor of signs for inadequate fluid volume (poor skin turgor, dry mucous membranes)  9.  Instruct  patient on adherence to fluid restrictions  Outcome: Progressing       Patient is not progressing towards the following goals:      Problem: Knowledge Deficit - Standard  Goal: Patient and family/care givers will demonstrate understanding of plan of care, disease process/condition, diagnostic tests and medications  Description: Target End Date:  1-3 days or as soon as patient condition allows    Document in Patient Education    1.  Patient and family/caregiver oriented to unit, equipment, visitation policy and means for communicating concern  2.  Complete/review Learning Assessment  3.  Assess knowledge level of disease process/condition, treatment plan, diagnostic tests and medications  4.  Explain disease process/condition, treatment plan, diagnostic tests and medications  Outcome: Not Progressing

## 2024-11-15 NOTE — CARE PLAN
Problem: Knowledge Deficit - Standard  Goal: Patient and family/care givers will demonstrate understanding of plan of care, disease process/condition, diagnostic tests and medications  Outcome: Progressing  Note: Discussed POC and medications with patient.       Problem: Pain - Standard  Goal: Alleviation of pain or a reduction in pain to the patient’s comfort goal  Outcome: Progressing  Note: POC regarding pain management discussed with pt.  Pt will be medicated for pain per MAR     The patient is Stable - Low risk of patient condition declining or worsening    Shift Goals  Clinical Goals: Hemodynamic stability, neurologic improvement, respiratory stability  Patient Goals: VALERIO  Family Goals: Patient's mentation to improve

## 2024-11-16 NOTE — CARE PLAN
The patient is Watcher    Shift Goals  Clinical Goals: Hemodynamic stability, neurologic improvement, respiratory stability  Patient Goals: VALERIO  Family Goals: Stay updated    Progress made toward(s) clinical / shift goals:  education done on restraint removal criteria, no learning evident. Continues to pull at life saving equipment such of supplemental oxygen and NG tube. Will continnue to monitor closely.   Patient is not progressing towards the following goals:    Problem: Safety - Medical Restraint  Goal: Remains free of injury from restraints (Restraint for Interference with Medical Device)  Outcome: Progressing     Problem: Safety - Medical Restraint  Goal: Free from restraint(s) (Restraint for Interference with Medical Device)  Outcome: Progressing

## 2024-11-16 NOTE — PROGRESS NOTES
Infectious Disease Progress Note    Author: Nan De Oliveira M.D. Date & Time of service: 2024  11:05 AM    Chief Complaint:  Follow-up for pneumonia    Interval History:   Tmax 98, white count 0.6, ANC 0, platelets 20, cultures as below, creatinine 1.23, normal transaminases, some improvement in oxygen requirements, down to 5 L oxime mask   patient is now afebrile, white count 0.6, off oxygen, creatinine is worse up to 1.87, cultures and other labs as below   patient remains afebrile, white count 0.5, ANC 0, hemoglobin 9.1, platelets 30, cultures as below, remains on room air.  Resting comfortably this morning   Tmax 99.1, 0.5, transferred out of the ICU to the [facility], on only 1 L nasal cannula oxygen, white count 0.5, cultures as below, ANC 0, platelets 9.  Patient is slightly tachypneic this morning, encephalopathic, not following simple commands  11/15 AF (99.9) WBC 0.5 sleeping at time of rounds. No acute events overnight On 2 L NC   AF WBC 0.7 remains obtunded-in soft restraints Remains on 2 L sat %    Labs Reviewed and Medications Reviewed.    Review of Systems:  Review of Systems   Unable to perform ROS: Medical condition   Constitutional:  Negative for fever.   Respiratory:  Negative for shortness of breath.      Hemodynamics:  Temp (24hrs), Av.9 °C (98.4 °F), Min:36.5 °C (97.7 °F), Max:37.7 °C (99.8 °F)  Temperature: 36.7 °C (98 °F)  Pulse  Av.8  Min: 62  Max: 127   Blood Pressure : 139/65       Physical Exam:  Physical Exam  Vitals and nursing note reviewed.   Constitutional:       General: He is not in acute distress.     Appearance: He is ill-appearing.      Comments: Thin, frail   Pulmonary:      Effort: No respiratory distress.      Breath sounds: No stridor.   Abdominal:      General: There is no distension.      Palpations: Abdomen is soft.   Skin:     Coloration: Skin is not jaundiced.      Findings: Bruising present.   Neurological:      Mental  Status: He is disoriented.         Meds:    Current Facility-Administered Medications:     potassium bicarbonate    insulin GLARGINE    ceftaroline (TEFLARO) ivpb    acetaminophen    melatonin    insulin lispro **AND** POC blood glucose manual result **AND** NOTIFY MD and PharmD **AND** Administer 20 grams of glucose (approximately 8 ounces of fruit juice) every 15 minutes PRN FSBG less than 70 mg/dL **AND** dextrose bolus    posaconazole    famotidine    NS    Pharmacy    acyclovir    atorvastatin    atovaquone    cycloSPORINE (modified)    potassium bicarbonate    ipratropium-albuterol    Respiratory Therapy Consult    MD Alert...Adult ICU Electrolyte Replacement per Pharmacy    MBX (diphenhydrAMINE-lidocaine-Maalox)    sodium bicarb 0.5 mEq/mL    [Held by provider] tamsulosin    Labs:  Recent Labs     11/14/24  0245 11/15/24  0130 11/16/24  0125   WBC 0.5* 0.5* 0.7*   RBC 2.83* 2.73* 2.56*   HEMOGLOBIN 8.5* 7.9* 7.4*   HEMATOCRIT 23.4* 22.8* 21.5*   MCV 82.7 83.5 84.0   MCH 30.0 28.9 28.9   RDW 50.6* 49.6 50.9*   PLATELETCT 9* 21* 6*   MPV 10.5 9.1 11.9   NEUTSPOLYS 0.00* 1.80* 1.10*   LYMPHOCYTES 100.00* 98.20* 97.70*   MONOCYTES 0.00 0.00 0.60   EOSINOPHILS 0.00 0.00 0.60   BASOPHILS 0.00 0.00 0.00   RBCMORPHOLO Present Present Present     Recent Labs     11/14/24  0945 11/15/24  0130 11/16/24  0125   SODIUM 139 140 143   POTASSIUM 4.3 3.6 3.5*   CHLORIDE 109 109 111   CO2 22 24 26   GLUCOSE 373* 343* 170*   BUN 60* 42* 35*     Recent Labs     11/14/24  0945 11/15/24  0130 11/16/24  0125   CREATININE 1.11 0.92 0.69       Imaging:  DX-ABDOMEN FOR TUBE PLACEMENT    Result Date: 11/10/2024  11/10/2024 12:14 PM HISTORY/REASON FOR EXAM:  Line evaluation. TECHNIQUE/EXAM DESCRIPTION AND NUMBER OF VIEWS:  1 view(s) of the abdomen. COMPARISON:  None. FINDINGS: Enteric tube has been placed. The tip projects over the stomach. The bowel gas pattern is within normal limits. Lungs show nonspecific bilateral airspace process      1.  Enteric tube has been placed and the tip projects over the stomach. 2.  Nonspecific pulmonary airspace process    US-RUQ    Result Date: 11/10/2024  11/10/2024 3:15 AM HISTORY/REASON FOR EXAM:  Abnormal Labs Abdominal pain TECHNIQUE/EXAM DESCRIPTION AND NUMBER OF VIEWS:  Real-time sonography of the liver and biliary tree. COMPARISON: None FINDINGS: The liver is normal in contour. There is no evidence of solid mass lesion. The liver measures . Gallbladder: The gallbladder is full of bile. No gallstones evident. The gallbladder wall thickness measures . The common duct measures 4.8 mm. No intrahepatic bile duct dilatation is evident. Pancreas: Normal visible extent. Aorta: Normal and the visible extension. Intrahepatic IVC is patent. The portal vein is patent with hepatopetal flow. The MPV measures . Intrahepatic IVC is patent. The right kidney measures 11.4 cm. No hydronephrosis or suspicious mass. No right upper quadrant ascites. Small right pleural effusion.     1. Normal gallbladder and right upper quadrant ultrasound. 2. Small right pleural effusion.    CT-SOFT TISSUE NECK WITH    Result Date: 11/10/2024  11/10/2024 2:28 AM HISTORY/REASON FOR EXAM:  airway obstruction. Pneumonia. Difficulty breathing. TECHNIQUE/EXAM DESCRIPTION AND NUMBER OF VIEWS:  CT soft tissue neck with contrast. The study was performed on a helical multidetector CT scanner. Contiguous thin section helical images were obtained of the neck from the skull base through the thoracic inlet. 80 mL of Omnipaque 350 nonionic contrast was injected intravenously. Low dose optimization technique was utilized for this CT exam including automated exposure control and adjustment of the mA and/or kV according to patient size. COMPARISON: CT chest from yesterday. FINDINGS: BRAIN: Visualized portions of the brain are normal in appearance. TEMPORAL bones: Trace right mastoid sinus fluid. The middle ear are clear. PARANASAL SINUSES: The paranasal  "sinuses do not show any fluid. Minimal mucosal thickening. CERVICAL spine: Minimal diffuse cervical spine degenerative changes. Moderate C6-7 degenerative disc changes. NODES: Normal size cervical lymph nodes in a symmetric manner measuring up to 28 x 8 mm. All are smaller than 1 cm short dimension. SALIVARY and THYROID gland: The parotid, submandibular, and thyroid gland are normal in appearance. AIRWAY: The airway appears patent. Mild uvula enlargement but no pharyngeal mass or swelling. No tonsillar swelling. Epiglottis and larynx appear normal. MEDIASTINUM: The superior mediastinum shows mildly enlarged lymph nodes as seen on CT. LUNGS: The visualized portions of the upper lungs show patchy dense pneumonia infiltrates with small pleural effusions.     1. No airway narrowing. Mild uvula swelling suggested. The epiglottis and larynx appear normal. 2. Upper normal size cervical lymph nodes. No cervical mass otherwise. 3. Trace right mastoid sinus fluid. No paranasal sinus fluid. The middle ears are clear. 4. The visible upper chest again shows dense infiltrates with small pleural effusions and mild upper mediastinal adenopathy.    CT-CHEST (THORAX) W/O    Result Date: 11/9/2024 11/9/2024 1:22 PM HISTORY/REASON FOR EXAM:  worsening pneumonia, previous imaging supicious for fungal; ID noted \"halo sign\" on outside CT chest 10/20, concern it may have worsened. TECHNIQUE/EXAM DESCRIPTION: CT scan of the chest without contrast. Noncontrast helical scanning of the chest was obtained from the lung apices through the adrenal glands. Low dose optimization technique was utilized for this CT exam including automated exposure control and adjustment of the mA and/or kV according to patient size. COMPARISON: Outside CT chest 10/20/2024 FINDINGS: Lungs: There are airspace process within the right upper lobe, left upper lobe, right middle lobe, and lingular segment of left upper lobe and possibly both lower lobes although in the " lower lobes is could be atelectasis.. There is a right upper lobe focal airspace process, nonspecific. Mediastinum/Jade: There is prevascular space adenopathy with the largest node measuring 20 x 13 mm. Pleura: There are small bilateral pleural effusion. Cardiac: Heart normal in size without pericardial effusion. There is coronary artery atherosclerotic plaque. Vascular: There is aortic and coronary artery atherosclerotic plaque.. Soft tissues: Unremarkable. Bones: No acute or destructive process. There is a retroperitoneal calcification consistent with sequela of prior inflammation.     1.  Multifocal bilateral pulmonary airspace process most consistent with pneumonia, with interval worsening 2.  Bilateral pleural effusion, most small in size, right greater than left 3.  Single enlarged prevascular space lymph node which is most likely reactive Fleischner Society pulmonary nodule recommendations: Not Applicable     DX-CHEST-PORTABLE (1 VIEW)    Result Date: 11/9/2024 11/9/2024 4:40 AM HISTORY/REASON FOR EXAM: Shortness of Breath TECHNIQUE/EXAM DESCRIPTION:  Single AP view of the chest. COMPARISON: November 7, 2024 FINDINGS: The cardiac silhouette appears within normal limits. The mediastinal contour appears within normal limits.  The central pulmonary vasculature appears normal. Bilateral lung volumes are diminished.  Patchy bilateral pulmonary opacities are seen. No significant pleural effusions are identified. The bony structures appear age-appropriate.     1.  Patchy bilateral pulmonary infiltrates, similar to prior study    DX-CHEST-PORTABLE (1 VIEW)    Result Date: 11/7/2024 11/7/2024 12:06 PM HISTORY/REASON FOR EXAM:  Cough TECHNIQUE/EXAM DESCRIPTION AND NUMBER OF VIEWS: Single portable view of the chest. COMPARISON: Yesterday FINDINGS: HEART: Stable size. LUNGS: BILATERAL airspace disease redemonstrated. PLEURA: RIGHT costophrenic sulcus is not well seen.     1.  Unchanged BILATERAL pneumonia 2.   Possible RIGHT pleural effusion    DX-CHEST-2 VIEWS    Result Date: 11/7/2024 11/7/2024 12:08 AM HISTORY/REASON FOR EXAM: Cough TECHNIQUE/EXAM DESCRIPTION:  PA and lateral views of the chest. COMPARISON: October 19, 2024 FINDINGS: The cardiac silhouette appears within normal limits. Atherosclerotic calcification of the aorta is noted.  The central  pulmonary vasculature appears prominent and indistinct. Bilateral lung volumes are diminished.  Diffuse scattered hazy pulmonary parenchymal opacities are seen. Blunting of the left costophrenic angle is seen suggesting trace left effusion. The bony structures appear age-appropriate.     1.  Pulmonary edema and/or infiltrates. 2.  Trace left pleural effusion 3.  Atherosclerosis      Micro:  Results       Procedure Component Value Units Date/Time    BLOOD CULTURE [[id number]] Collected: 11/10/24 0825    Order Status: Completed Specimen: Blood from Peripheral Updated: 11/15/24 1100     Significant Indicator NEG     Source BLD     Site PERIPHERAL     Culture Result No growth after 5 days of incubation.    BLOOD CULTURE [[id number]] Collected: 11/10/24 0825    Order Status: Completed Specimen: Blood from Peripheral Updated: 11/15/24 1100     Significant Indicator NEG     Source BLD     Site PERIPHERAL     Culture Result No growth after 5 days of incubation.    Fungal Culture [[id number]] Collected: 11/10/24 0300    Order Status: Completed Specimen: Respirate from Sputum Updated: 11/13/24 1323     Significant Indicator NEG     Source RESP     Site Tracheal Aspirate     Culture Result Culture in progress.     Fungal Smear Results No fungal elements seen.    AFB Culture [[id number]] Collected: 11/10/24 0300    Order Status: Completed Specimen: Respirate from Sputum Updated: 11/13/24 1323     Significant Indicator NEG     Source RESP     Site Tracheal Aspirate     Culture Result A significant status has been triggered by the BACTEC MGIT  instrument due to an increase in O2  consumption. No ACID FAST  BACILLI SEEN on Acid Fast Stain.  AFB cultures are held for 6 weeks and monitored continuously.  Positive AFB cultures are called to RN/Provider and reported  as soon as identified.       AFB Smear Results No acid fast bacilli seen.    CULTURE RESPIRATORY W/ GRM STN [[id number]]  (Abnormal) Collected: 11/10/24 0300    Order Status: Completed Specimen: Respirate from Sputum Updated: 11/13/24 1323     Significant Indicator POS     Source RESP     Site Tracheal Aspirate     Culture Result Light growth usual upper respiratory rosalino     Gram Stain Result Many epithelial cells.  Many Gram positive cocci.  Many Gram positive rods.       Culture Result Staphylococcus aureus  Light growth  Methicillin resistant by screening method      BLOOD CULTURE [[id number]] Collected: 11/07/24 0241    Order Status: Completed Specimen: Blood from Peripheral Updated: 11/12/24 0500     Significant Indicator NEG     Source BLD     Site PERIPHERAL     Culture Result No growth after 5 days of incubation.    BLOOD CULTURE [[id number]] Collected: 11/07/24 0241    Order Status: Completed Specimen: Blood from Peripheral Updated: 11/12/24 0500     Significant Indicator NEG     Source BLD     Site PERIPHERAL     Culture Result No growth after 5 days of incubation.    GRAM STAIN [[id number]]  (Abnormal) Collected: 11/10/24 0300    Order Status: Completed Specimen: Respirate Updated: 11/10/24 1912     Significant Indicator .     Source RESP     Site Tracheal Aspirate     Gram Stain Result Many epithelial cells.  Many Gram positive cocci.  Many Gram positive rods.      Acid Fast Stain [[id number]] Collected: 11/10/24 0300    Order Status: Completed Specimen: Respirate Updated: 11/10/24 1912     Significant Indicator NEG     Source RESP     Site Tracheal Aspirate     AFB Smear Results No acid fast bacilli seen.    Fungal Smear [[id number]] Collected: 11/10/24 0300    Order Status: Completed Specimen: Respirate Updated: 11/10/24  1912     Significant Indicator NEG     Source RESP     Site Tracheal Aspirate     Fungal Smear Results No fungal elements seen.    Cryptococcal Antigen, Serum [[id number]] Collected: 11/10/24 1058    Order Status: Completed Specimen: Blood Updated: 11/10/24 1249     Cryptococcal Antigen, Serum Negative    MRSA By PCR (Amp) [[id number]] Collected: 11/10/24 0111    Order Status: Completed Specimen: Respirate from Nares Updated: 11/10/24 0824     MRSA by PCR POSITIVE            Hospital Course/Assessment:   Miri Joseph is a 84 y.o. male patient with severe aplastic anemia, recently at [facility] where he was started on posaconazole and completed a course of linezolid for possible invasive fungal pneumonia based on CT scan findings of nodular opacities with surrounding groundglass changes, transferred to [facility] for further oncology management, developed respiratory distress on 11/9, now on high flow nasal cannula oxygen in the ICU, repeat CT scan with worsening multifocal bilateral airspace opacities with surrounding groundglass findings.  There was concern for breakthrough worsening invasive mold pneumonia in the context of the previous outside hospital CT scan findings. Sputum culture positive for MRSA.  Further hospital course as below     Pertinent Diagnoses:  Acute hypoxic respiratory failure-on 2 liters NC today  Multifocal pneumonia, MRSA   Severe aplastic anemia  Immunosuppressed status  ESTHER, improved     Plan:   -Tracheal aspirate positive for MRSA, susceptibilities pending no fungal elements on staining  -Serum Cryptococcal antigen negative  -Serum beta D glucan negative  -Serum Aspergillus galactomannan neg  - Coccidioides serology still pending.    -Recent Karius at outside hospital was negative for fungal organisms and only + MRSA.  Bronchoscopy deemed too high risk  -Vancomycin was discontinued 11/11, continue renally dosed IV ceftaroline.    Recommend de-escalating to doxycycline if clinical  improvement and tolerating PO well  Usual treatment course 10-14 days  -Follow blood cultures x 2, no growth till date  -Patient remains on acyclovir and atovaquone prophylaxis.  Will also restart posaconazole prophylaxis.  Please note posaconazole may increase levels of atorvastatin and cyclosporine      Disposition: Unable to determine at this time.    Noted currently DNR/DNI and if any worsening clinical status, possible transition to comfort care  Need for PICC line: In place     Plan was discussed with pharmacy    This illness poses a threat to life  Will sign off-please reconsult if needed

## 2024-11-16 NOTE — CARE PLAN
The patient is Watcher - Medium risk of patient condition declining or worsening    Shift Goals  Clinical Goals: Hemodynamic stability, neurologic improvement, respiratory stability  Patient Goals: VALERIO  Family Goals: Stay updated    Progress made toward(s) clinical / shift goals:  education done with familt on POC, all questions and concerns were addressed      Problem: Knowledge Deficit - Standard  Goal: Patient and family/care givers will demonstrate understanding of plan of care, disease process/condition, diagnostic tests and medications  Outcome: Progressing     Problem: Respiratory:  Goal: Respiratory status will improve  Outcome: Progressing     Patient is not progressing towards the following goals:      Problem: Nutrition  Goal: Patient's nutritional and fluid intake will be adequate or improve  Outcome: Not Progressing

## 2024-11-16 NOTE — PROGRESS NOTES
Hospital Medicine Daily Progress Note    Date of Service  11/16/2024    Chief Complaint  Miri Joseph is a 84 y.o. male admitted 11/6/2024 with altered    Hospital Course  84 y.o. male who presented 11/6/2024 with history of type 2 diabetes, hypertension, GERD, dyslipidemia, and recently diagnosed with aplastic anemia.  Patient was transferred to our care from [facility] to initiate inpatient chemotherapy.  He had been admitted there on October 19 for treatment of neutropenic fever he was treated with a course of linezolid for MRSA pneumonia.  On arrival here he was on prophylactic posaconazole, fluoroquinolone, and acyclovir.  Repeat sputum cultures here were positive for MRSA, the patient was started on linezolid on November 9 and on that same day he was moved to the ICU due to worsening respiratory status.  In the ICU the patient was on high flow nasal cannula and did require IV vasopressors he was initiated on tube feeds as well.    Interval Problem Update  ROS limited by mental status  Son at bedside feels pt is better than he was 2 days ago, about the same as he was yesterday in terms of level of alertness    DW pt's son who is a physician in [place] by phone      AFebrile  Sinus 90s this am  -140  On 2 LNC  UOP 1275ml/24hrs    I have discussed this patient's plan of care and discharge plan at IDT rounds today with Case Management, Nursing, Nursing leadership, and other members of the IDT team.    Consultants/Specialty  oncology    Code Status  DNAR/DNI    Disposition  The patient is not medically cleared for discharge to home or a post-acute facility.    I have placed the appropriate orders for post-discharge needs.    Review of Systems  Review of Systems   Unable to perform ROS: Mental status change        Physical Exam  Temp:  [36.5 °C (97.7 °F)-37.7 °C (99.8 °F)] 36.5 °C (97.7 °F)  Pulse:  [] 91  Resp:  [20-36] 20  BP: (112-170)/(56-96) 161/69  SpO2:  [91 %-100 %] 100  %    Physical Exam  Constitutional:       General: He is not in acute distress.     Appearance: He is well-developed. He is not diaphoretic.   HENT:      Head: Normocephalic and atraumatic.   Eyes:      Conjunctiva/sclera: Conjunctivae normal.   Neck:      Vascular: No JVD.   Cardiovascular:      Rate and Rhythm: Tachycardia present.      Heart sounds: Murmur heard.      No gallop.   Pulmonary:      Effort: Pulmonary effort is normal. No respiratory distress.      Breath sounds: No stridor. No wheezing or rales.   Abdominal:      Palpations: Abdomen is soft.      Tenderness: There is no abdominal tenderness. There is no guarding or rebound.   Musculoskeletal:      Right lower leg: No edema.      Left lower leg: No edema.   Skin:     General: Skin is warm and dry.      Capillary Refill: Capillary refill takes less than 2 seconds.      Findings: No rash.   Neurological:      Comments: Alert and interactive  Speech slightly dysarthric  Face symmetric  Follows inconsistently but is purposeful x 4         Fluids    Intake/Output Summary (Last 24 hours) at 11/16/2024 0638  Last data filed at 11/16/2024 0600  Gross per 24 hour   Intake 1865 ml   Output 1275 ml   Net 590 ml        Laboratory  Recent Labs     11/14/24  0245 11/15/24  0130 11/16/24  0125   WBC 0.5* 0.5* 0.7*   RBC 2.83* 2.73* 2.56*   HEMOGLOBIN 8.5* 7.9* 7.4*   HEMATOCRIT 23.4* 22.8* 21.5*   MCV 82.7 83.5 84.0   MCH 30.0 28.9 28.9   MCHC 36.3 34.6 34.4   RDW 50.6* 49.6 50.9*   PLATELETCT 9* 21* 6*   MPV 10.5 9.1 11.9     Recent Labs     11/14/24  0945 11/15/24  0130 11/16/24  0125   SODIUM 139 140 143   POTASSIUM 4.3 3.6 3.5*   CHLORIDE 109 109 111   CO2 22 24 26   GLUCOSE 373* 343* 170*   BUN 60* 42* 35*   CREATININE 1.11 0.92 0.69   CALCIUM 8.5 8.2* 8.0*                   Imaging  DX-CHEST-PORTABLE (1 VIEW)   Final Result         1.  Pulmonary edema and/or infiltrates, similar to prior study.      DX-ABDOMEN FOR TUBE PLACEMENT   Final Result         1.   Nonspecific bowel gas pattern in the upper abdomen.   2.  Nasogastric tube tip terminates overlying the expected location of the gastric body.   3.  Patchy bilateral pulmonary infiltrates      DX-ABDOMEN FOR TUBE PLACEMENT   Final Result      1. Appropriate position of the nasogastric tube, replaced in the interval.   2. The left upper extremity PICC catheter now terminates within the azygos vein. Repositioning is recommended.   3. The remainder is stable.      DX-ABDOMEN FOR TUBE PLACEMENT   Final Result      Malpositioned enteric feeding tube likely within the right lower lobe bronchus.      Findings were conveyed to EMILIO Sutton in care of the patient on 11/11/2024 1:59 PM.      IR-PICC LINE PLACEMENT W/ GUIDANCE > AGE 5   Final Result                  Ultrasound-guided PICC placement performed by qualified nursing staff as    above.          DX-ABDOMEN FOR TUBE PLACEMENT   Final Result      1.  Enteric tube has been placed and the tip projects over the stomach.      2.  Nonspecific pulmonary airspace process      US-RUQ   Final Result         1. Normal gallbladder and right upper quadrant ultrasound.      2. Small right pleural effusion.      CT-SOFT TISSUE NECK WITH   Final Result         1. No airway narrowing. Mild uvula swelling suggested. The epiglottis and larynx appear normal.      2. Upper normal size cervical lymph nodes. No cervical mass otherwise.      3. Trace right mastoid sinus fluid. No paranasal sinus fluid. The middle ears are clear.      4. The visible upper chest again shows dense infiltrates with small pleural effusions and mild upper mediastinal adenopathy.      CT-CHEST (THORAX) W/O   Final Result      1.  Multifocal bilateral pulmonary airspace process most consistent with pneumonia, with interval worsening      2.  Bilateral pleural effusion, most small in size, right greater than left      3.  Single enlarged prevascular space lymph node which is most likely reactive      Fleischner Society  pulmonary nodule recommendations:   Not Applicable         DX-CHEST-PORTABLE (1 VIEW)   Final Result         1.  Patchy bilateral pulmonary infiltrates, similar to prior study      DX-CHEST-PORTABLE (1 VIEW)   Final Result      1.  Unchanged BILATERAL pneumonia   2.  Possible RIGHT pleural effusion      DX-CHEST-2 VIEWS   Final Result         1.  Pulmonary edema and/or infiltrates.   2.  Trace left pleural effusion   3.  Atherosclerosis           Assessment/Plan  * Multifocal pneumonia- (present on admission)  Assessment & Plan    Present on admission  Source respiratory  MRSA from sputum  Possible fungal: beta D glucan negative.   Serum Aspergillus galactomannan, Coccidioides serology are still pending.  Recent Karius at outside hospital was negative for fungal organisms   Probable aspiration pneumonia  ID managing antibiotics: Have stopped vancomycin due to renal function, not giving linezolid due to platelet count.  Currently on ceftaroline, acyclovir and posaconazole the last two for prophylaxis  Prophylactic agents ongoing for severe neutropenia  Amphotericin discontinued by ID  Adequately fluid resuscitated    Looking to transition to Doxycycline soon if cont's to improve clinically per ID rec's    ESTHER (acute kidney injury) (HCC)  Assessment & Plan  Likely sepsis primarily with contribution from medications  Adequately resuscitated  UOP 600ml given body weight of 70Kg this is borderline adequate output  Renally dose medications as appropriate  Serial BMP  Monitor UOP  Not a candidate for RRT         Septic shock (HCC)  Assessment & Plan  Present on admission  Source respiratory  MRSA from sputum  Possible fungal: beta D glucan negative.   Serum Aspergillus galactomannan, Coccidioides serology are still pending.  Recent Karius at outside hospital was negative for fungal organisms   Probable aspiration pneumonia  Completed steroids 11/14  Stable off of midodrine and steroids  ID managing antibiotics: Have  stopped vancomycin due to renal function, not giving linezolid due to platelet count.  Currently on ceftaroline, acyclovir and posaconazole the last two for prophylaxis  Prophylactic agents ongoing for severe neutropenia  Amphotericin discontinued by ID  Adequately fluid resuscitated        Respiratory failure (HCC)  Assessment & Plan  Pneumonia   MRSA positive cultures from sputum  Possible fungal etiology: Beta D glucan neg.  Other serologies pending as noted above  DNR/DNI confirmed with daughter  Moved to unit for aggressive pulmonary toilet and oral care  O2 demand has decreased and he is now on RA/2 LNC  RT protocols  Oral care every-2 hours  Aspiration precautions, swallow eval pending  Hydrocortisone added to antibiotic regimen for severe pneumonia with respiratory failure ID managing antibiotics  If patient clinically deteriorates transition to comfort care/hospice, discussed with daughter and son who are in agreement         Elevated LFTs  Assessment & Plan  improving  RUQUS ordered-> normal  Avoid hepatotoxins      Dysphagia  Assessment & Plan  Still has altered level of conciousness  If he starts to clear will re consult SLP  [employer] if very high risk for aspiration  Currently getting nutrition via NGT  Oral care every 1-2-hour  CT soft tissue neck without airway issue or obvious abscess/obstructing process  Now more alert and off HFNC: re consult SLP      Severe malnutrition (HCC)- (present on admission)  Assessment & Plan  Nutrition following  Family okay with soft flexible feeding tube and enteral nutrition  Currently at goal on tube feeds  Last seen by SLP on 11/11; pt now more alert.  Will ask them to re-evaluate      GERD (gastroesophageal reflux disease)- (present on admission)  Assessment & Plan  With hydrocortisone and thrombocytopenia -continue IV PPI daily for prophylaxis  Antireflux measures       Aplastic anemia (HCC)- (present on admission)  Assessment & Plan  BM biopsy consistent with  severe aplastic anemia     Diagnostic evaluation in september and at [facility], [place], viral studies negative, hepatitis panel negative,   Cyclosporine resumed at 110mg BID 11/14  Promacta (eltrombopag) for thrombocytopenia 75 mg daily  Continue prophylactic antimicrobials  B12/MMA, folate levels resent, previously low     All blood products irradiated/leukophoresed  Tranfuse PRBC for <7  Transfuse platelets < 10, may warrant higher threshold given possibility of DAH      Discuss with hematology regarding prognosis, not responsive to G-CSF in the past    MRSA pneumonia (HCC)- (present on admission)  Assessment & Plan  Was on linezolid and vancomycin  Continue ceftaroline for now   isolation precautions  ID following      Type 2 diabetes mellitus, without long-term current use of insulin (HCC)- (present on admission)  Assessment & Plan  Patient has been quite hyperglycemic  We have titrated up his glargine: currently on 45 units  Now running mid to high 100s; monitor over next 24hrs and titrate glargine as appropriate  Continue a high sliding scale  A1c 7.32 months ago    Thrombocytopenia (HCC)- (present on admission)  Assessment & Plan  Secondary to pancytopenia from aplastic anemia  Back in single digits again today: giving one unit of platelets  No signs of active bleeding on exam today    Pancytopenia (HCC)  Assessment & Plan  BM biopsy consistent with severe aplastic anemia  Also consider infection/nutritional disorder/toxic myelosuppression.      Diagnostic evaluation in september and at [facility], [place], viral studies negative, hepatitis panel negative,   Oncology following   On cyclosporine 100 bid, but cannot take orals currently  Promacta (eltrombopag) for thrombocytopenia 75 mg daily  Continue prophylactic antimicrobials  B12/MMA, folate levels resent, previously low     Tranfuse PRBC for <7  Transfuse platelets < 10, may warrant higher threshold given possibility of DAH though CT doesn't look strikingly like DAH  CT more likely  MRSA pneumonia with positive cultures     Discuss with hematology regarding prognosis, not responsive to G-CSF in the past         VTE prophylaxis: VTE Selection    I have performed a physical exam and reviewed and updated ROS and Plan today (11/16/2024). In review of yesterday's note (11/15/2024), there are no changes except as documented above.

## 2024-11-16 NOTE — PROGRESS NOTES
"Hematology/Oncology Progress Note    Primary Hematologist/Oncologist: Tracy    Oncology History:  9/03/2024: Presented to ER with abdominal pain, bloody stools, dizziness.  Found to be pancytopenic on presentation.     9/10/24: Bone marrow biopsy demonstrating hypocellular bone marrow at 15% cellular with prominent stromal elements, markedly decreased maturing granulocytic precursors, decreased erythroid precursors with slight dyserythropoiesis, and markedly decreased megakaryocytes. MDS FISH was negative and heme comprehensive NGS was negative.  Final diagnosis most consistent with severe aplastic anemia and severe B12 deficiency.    9/10/24-10/21/24: Initiated on daily-weekly-monthly intramuscular B12 1000 mcg.  Unfortunately, no improvements in his cell counts despite improving B12 levels.    10/22/2024: Admission to [facility] with neutropenic fever and cough.    11/6/2024: Transfer from [facility] to [facility] for inpatient cyclosporine and Promacta.    Interval History:  No events overnight.    Review of Systems:  Review of Systems   Unable to perform ROS: Critical illness        Vitals:     BP (!) 158/78   Pulse (!) 102   Temp 36.7 °C (98 °F) (Temporal)   Resp (!) 33   Ht 1.753 m (5' 9\")   Wt 74.2 kg (163 lb 9.3 oz)   SpO2 93%   BMI 24.16 kg/m²     Physical Exam:  Physical Exam  Vitals and nursing note reviewed.   Constitutional:       General: He is not in acute distress.     Appearance: He is not toxic-appearing.   HENT:      Head: Normocephalic and atraumatic.   Eyes:      General: No scleral icterus.     Pupils: Pupils are equal, round, and reactive to light.   Cardiovascular:      Rate and Rhythm: Normal rate and regular rhythm.      Pulses: Normal pulses.      Heart sounds: No murmur heard.     No friction rub. No gallop.   Pulmonary:      Effort: Respiratory distress present.      Breath sounds: No stridor. Rhonchi and rales present. No wheezing.   Abdominal:      General: Abdomen is flat. Bowel sounds are " normal.      Palpations: Abdomen is soft.   Musculoskeletal:         General: No swelling.      Cervical back: No rigidity.   Skin:     General: Skin is warm and dry.      Capillary Refill: Capillary refill takes 2 to 3 seconds.      Coloration: Skin is not jaundiced.   Neurological:      General: No focal deficit present.      Mental Status: He is alert and oriented to person, place, and time. Mental status is at baseline.   Psychiatric:         Mood and Affect: Mood normal.        Assessment and Plan:    Severe aplastic anemia  Immunocompromise state  Pancytopenia   he was initially diagnosed in 2024.  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  On 2024 he was transferred for consideration of initiation of cyclosporine and eltrombopag.  We are currently working on obtaining outpatient delivery of eltrombopag due to supply in hospital.  Cyclosporine is unfortunately on hold due to clinical deterioration.  Once he has recovered from infectious standpoint, we will reinitiate cyclosporine at 5 mg/kg/day.  Unfortunately, given his ongoing severe infectious status, antithymocyte globulin would lead to fatal infectious complications due to his severe, deep, and prolonged degree of immune compromise.    We will continue cyclosporine approx 2.5mg/kg (100mg BID) now that he is improved. We will uptitrate to 5mg/kg once he is more clinically stable.    Neutropenic fever  MRSA pneumonia  ID following.  There is now less concern for a fungal process. Amphotericin has been stopped. He continues on ceftaroline. Vancomycin discontinued on .    ARDS  Improving. Now off of HHFNC and back to nasal cannual.    B12 deficiency  Continue monthly B12 injections; next due 2024    TRANSFUSION THRESHOLDS:  RBCs and PLTs must be leukoreduced, CMV negative, and irradiated  Hgb <7 if no symptoms or <8 with symptoms or bleedinu pRBC  PLTs <10 and no symptoms or <20 if febrile, septic, or bleeidng:  1u apheresis PLTs     Plan:  Continue excellent care from critical care, hospital medicine, and infectious disease services  Hematology will continue to follow  Cyclosporine 100mg BID    Thank you for allowing me to participate in his care. Please do not hesitate to reach out with any questions.    Please note that this dictation was created using voice recognition software. I have made every reasonable attempt to correct obvious errors, but I expect that there are errors of grammar and possibly content that I did not discover before finalizing the note.      Howie Long MD  Hematologist/Oncologist  [facility]   of [facility] - [facility] [employer]

## 2024-11-16 NOTE — PROGRESS NOTES
". Hematology/Oncology Progress Note    Primary Hematologist/Oncologist: Tracy    Oncology History:  9/03/2024: Presented to ER with abdominal pain, bloody stools, dizziness.  Found to be pancytopenic on presentation.     9/10/24: Bone marrow biopsy demonstrating hypocellular bone marrow at 15% cellular with prominent stromal elements, markedly decreased maturing granulocytic precursors, decreased erythroid precursors with slight dyserythropoiesis, and markedly decreased megakaryocytes. MDS FISH was negative and heme comprehensive NGS was negative.  Final diagnosis most consistent with severe aplastic anemia and severe B12 deficiency.    9/10/24-10/21/24: Initiated on daily-weekly-monthly intramuscular B12 1000 mcg.  Unfortunately, no improvements in his cell counts despite improving B12 levels.    10/22/2024: Admission to [facility] with neutropenic fever and cough.    11/6/2024: Transfer from [facility] to [facility] for inpatient cyclosporine and Promacta.    Interval History:  Stable compared to yesterday. He continues to have a fluctuating mental status.    Review of Systems:  Review of Systems   Unable to perform ROS: Critical illness        Vitals:     BP (!) 146/65   Pulse (!) 104   Temp 37.1 °C (98.8 °F) (Bladder)   Resp (!) 32   Ht 1.753 m (5' 9\")   Wt 74.2 kg (163 lb 9.3 oz)   SpO2 95%   BMI 24.16 kg/m²     Physical Exam:  Physical Exam  Vitals and nursing note reviewed.   Constitutional:       General: He is not in acute distress.     Appearance: He is not toxic-appearing.   HENT:      Head: Normocephalic and atraumatic.   Eyes:      General: No scleral icterus.     Pupils: Pupils are equal, round, and reactive to light.   Cardiovascular:      Rate and Rhythm: Normal rate and regular rhythm.      Pulses: Normal pulses.      Heart sounds: No murmur heard.     No friction rub. No gallop.   Pulmonary:      Effort: Respiratory distress present.      Breath sounds: No stridor. Rhonchi and rales present. No wheezing. "   Abdominal:      General: Abdomen is flat. Bowel sounds are normal.      Palpations: Abdomen is soft.   Musculoskeletal:         General: No swelling.      Cervical back: No rigidity.   Skin:     General: Skin is warm and dry.      Capillary Refill: Capillary refill takes 2 to 3 seconds.      Coloration: Skin is not jaundiced.   Neurological:      General: No focal deficit present.      Mental Status: He is alert and oriented to person, place, and time. Mental status is at baseline.   Psychiatric:         Mood and Affect: Mood normal.          Assessment and Plan:    Severe aplastic anemia  Immunocompromise state  Pancytopenia   he was initially diagnosed in 2024.  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  On 2024 he was transferred for consideration of initiation of cyclosporine and eltrombopag.  We are currently working on obtaining outpatient delivery of eltrombopag due to supply in hospital.  Cyclosporine is unfortunately on hold due to clinical deterioration.  Once he has recovered from infectious standpoint, we will reinitiate cyclosporine at 5 mg/kg/day.  Unfortunately, given his ongoing severe infectious status, antithymocyte globulin would lead to fatal infectious complications due to his severe, deep, and prolonged degree of immune compromise.    We will continue cyclosporine approx 2.5mg/kg (100mg BID) now that he is improved. We will uptitrate to 5mg/kg once he is more clinically stable.    Neutropenic fever  MRSA pneumonia  ID following.  There is now less concern for a fungal process. Amphotericin has been stopped. He continues on ceftaroline. Vancomycin discontinued on .    ARDS  Improving. Now off of HHFNC and back to nasal cannual.    B12 deficiency  Continue monthly B12 injections; next due 2024    TRANSFUSION THRESHOLDS:  RBCs and PLTs must be leukoreduced, CMV negative, and irradiated  Hgb <7 if no symptoms or <8 with symptoms or bleedinu  pRBC  PLTs <10 and no symptoms or <20 if febrile, septic, or bleeidnu apheresis PLTs     Plan:  Continue excellent care from critical care, hospital medicine, and infectious disease services  Hematology will continue to follow  Cyclosporine 100mg BID    Thank you for allowing me to participate in his care. Please do not hesitate to reach out with any questions.    Please note that this dictation was created using voice recognition software. I have made every reasonable attempt to correct obvious errors, but I expect that there are errors of grammar and possibly content that I did not discover before finalizing the note.      Howie Long MD  Hematologist/Oncologist  [facility]   of Medicine - [facility] [employer]

## 2024-11-16 NOTE — CARE PLAN
The patient is Watcher - Medium risk of patient condition declining or worsening    Shift Goals  Clinical Goals: Hemodynamic stability, neurologic improvement, respiratory stability  Patient Goals: VALERIO  Family Goals: Stay updated    Progress made toward(s) clinical / shift goals:    Problem: Knowledge Deficit - Standard  Goal: Patient and family/care givers will demonstrate understanding of plan of care, disease process/condition, diagnostic tests and medications  Outcome: Progressing     Problem: Skin Integrity  Goal: Skin integrity is maintained or improved  Outcome: Progressing     Problem: Fall Risk  Goal: Patient will remain free from falls  Outcome: Progressing     Problem: Respiratory:  Goal: Respiratory status will improve  Outcome: Progressing     Problem: Mobility  Goal: Risk for activity intolerance will decrease  Outcome: Progressing     Problem: Nutrition  Goal: Patient's nutritional and fluid intake will be adequate or improve  Outcome: Progressing     Problem: Hemodynamics  Goal: Patient's hemodynamics, fluid balance and neurologic status will be stable or improve  Outcome: Progressing     Problem: Risk for Aspiration  Goal: Patient's risk for aspiration will be absent or decrease  Outcome: Progressing     Problem: Infection - Standard  Goal: Patient will remain free from infection  Outcome: Progressing     Problem: Pain - Standard  Goal: Alleviation of pain or a reduction in pain to the patient’s comfort goal  Outcome: Progressing     Problem: Fluid Volume  Goal: Fluid volume balance will be maintained  Outcome: Progressing     Problem: Respiratory  Goal: Patient will achieve/maintain optimum respiratory ventilation and gas exchange  Outcome: Progressing     Problem: Safety - Medical Restraint  Goal: Remains free of injury from restraints (Restraint for Interference with Medical Device)  Outcome: Progressing       Patient is not progressing towards the following goals:

## 2024-11-17 NOTE — PROGRESS NOTES
"Hematology/Oncology Progress Note    Primary Hematologist/Oncologist: Tracy    Oncology History:  9/03/2024: Presented to ER with abdominal pain, bloody stools, dizziness.  Found to be pancytopenic on presentation.     9/10/24: Bone marrow biopsy demonstrating hypocellular bone marrow at 15% cellular with prominent stromal elements, markedly decreased maturing granulocytic precursors, decreased erythroid precursors with slight dyserythropoiesis, and markedly decreased megakaryocytes. MDS FISH was negative and heme comprehensive NGS was negative.  Final diagnosis most consistent with severe aplastic anemia and severe B12 deficiency.    9/10/24-10/21/24: Initiated on daily-weekly-monthly intramuscular B12 1000 mcg.  Unfortunately, no improvements in his cell counts despite improving B12 levels.    10/22/2024: Admission to [facility] with neutropenic fever and cough.    11/6/2024: Transfer from [facility] to [facility] for inpatient cyclosporine and Promacta. Initiation of cyclosporine 2.5mg/kg    11/7/24: Respiratory failure with transfer to ICU.    11/8/24: Promacta denied by insurance. Appeal in process.     11/15/24: Improvement in respiratory status; downgrade to IMCU    11/17/24: Returned to floor. Increase in cyclosporine to 5mg/kg.    Interval History:  Moved to the floor as his respiratory status is improving.    Review of Systems:  Review of Systems   Unable to perform ROS: Critical illness        Vitals:     BP (!) 144/79   Pulse 94   Temp 36.3 °C (97.4 °F) (Temporal)   Resp 20   Ht 1.753 m (5' 9\")   Wt 74.2 kg (163 lb 9.3 oz)   SpO2 99%   BMI 24.16 kg/m²     Physical Exam:  Physical Exam  Vitals and nursing note reviewed.   Constitutional:       General: He is not in acute distress.     Appearance: He is not toxic-appearing.   HENT:      Head: Normocephalic and atraumatic.   Eyes:      General: No scleral icterus.     Pupils: Pupils are equal, round, and reactive to light.   Cardiovascular:      Rate and Rhythm: " Normal rate and regular rhythm.      Pulses: Normal pulses.      Heart sounds: No murmur heard.     No friction rub. No gallop.   Pulmonary:      Effort: Respiratory distress present.      Breath sounds: No stridor. Rhonchi and rales present. No wheezing.   Abdominal:      General: Abdomen is flat. Bowel sounds are normal.      Palpations: Abdomen is soft.   Musculoskeletal:         General: No swelling.      Cervical back: No rigidity.   Skin:     General: Skin is warm and dry.      Capillary Refill: Capillary refill takes 2 to 3 seconds.      Coloration: Skin is not jaundiced.   Neurological:      General: No focal deficit present.      Mental Status: He is alert and oriented to person, place, and time. Mental status is at baseline.   Psychiatric:         Mood and Affect: Mood normal.          Assessment and Plan:    Severe aplastic anemia  Immunocompromise state  Pancytopenia   he was initially diagnosed in September 2024.  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  On 11/6/2024 he was transferred for consideration of initiation of cyclosporine and eltrombopag.  We are currently working on obtaining outpatient delivery of eltrombopag due to supply in hospital.  Cyclosporine is unfortunately on hold due to clinical deterioration.  Once he has recovered from infectious standpoint, we will reinitiate cyclosporine at 5 mg/kg/day.  Unfortunately, given his ongoing severe infectious status, antithymocyte globulin would lead to fatal infectious complications due to his severe, deep, and prolonged degree of immune compromise.    I discussed this case with infectious diseases.  They agreed that he is safe enough from an infection standpoint for us to increase his cyclosporine.  I will increase his dose to 5 mg/kg/day.    His insurance denied Promacta on 11/8.  We have an appeal in process.    Neutropenic fever  MRSA pneumonia  ID following.  There is now less concern for a fungal process. Amphotericin has  been stopped. He continues on ceftaroline. Vancomycin discontinued on .    Respiratory Failure  Improving. Now off of HHFNC and back to nasal cannual.    B12 deficiency  Continue monthly B12 injections; next due 2024    TRANSFUSION THRESHOLDS:  RBCs and PLTs must be leukoreduced, CMV negative, and irradiated  Hgb <7 if no symptoms or <8 with symptoms or bleedinu pRBC  PLTs <10 and no symptoms or <20 if febrile, septic, or bleeidnu apheresis PLTs     Plan:  Continue excellent care from critical care, hospital medicine, and infectious disease services  Hematology will continue to follow  Cyclosporine 5mg/kg/day    Thank you for allowing me to participate in his care. Please do not hesitate to reach out with any questions.    Please note that this dictation was created using voice recognition software. I have made every reasonable attempt to correct obvious errors, but I expect that there are errors of grammar and possibly content that I did not discover before finalizing the note.      Howie Long MD  Hematologist/Oncologist  [facility] - [facility] [employer]

## 2024-11-17 NOTE — CARE PLAN
Problem: Knowledge Deficit - Standard  Goal: Patient and family/care givers will demonstrate understanding of plan of care, disease process/condition, diagnostic tests and medications  Outcome: Not Progressing     Problem: Mobility  Goal: Risk for activity intolerance will decrease  Outcome: Not Progressing   The patient is Unstable - High likelihood or risk of patient condition declining or worsening    Shift Goals  Clinical Goals: Blood transfusion, monitor VS  Patient Goals: VALERIO  Family Goals: not present    Progress made toward(s) clinical / shift goals:  Pt's turned   Q2h, skin integrity maintained.     Patient is not progressing towards the following goals:      Problem: Knowledge Deficit - Standard  Goal: Patient and family/care givers will demonstrate understanding of plan of care, disease process/condition, diagnostic tests and medications  Outcome: Not Progressing     Problem: Mobility  Goal: Risk for activity intolerance will decrease  Outcome: Not Progressing

## 2024-11-17 NOTE — PROGRESS NOTES
4 Eyes Skin Assessment Completed by EMILIO Vann and EMILIO Rodriguez.    Head WDL  Ears Redness and Blanching  Nose WDL  Mouth Redness and Discoloration  Neck WDL  Breast/Chest Redness and Blanching  Shoulder Blades WDL  Spine WDL  (R) Arm/Elbow/Hand Redness, Blanching, Bruising, Swelling, and Edema  (L) Arm/Elbow/Hand Redness, Blanching, Bruising, Swelling, Discoloration, and Edema  Abdomen WDL  Groin WDL  Scrotum/Coccyx/Buttocks Redness, Non-Blanching, and Discoloration  (R) Leg Redness and Bruising  (L) Leg WDL  (R) Heel/Foot/Toe Discoloration, Swelling, and Edema  (L) Heel/Foot/Toe Discoloration, Boggy, Bruising, and Edema          Devices In Places SCD's, OG/NG, Central Line, Condom Cath, and Nasal Cannula      Interventions In Place Gray Ear Foams, Heel Mepilex, Heel Float Boots, TAP System, Elbow Mepilex, Q2 Turns, Low Air Loss Mattress, Barrier Cream, and Dri-Santos Pads    Possible Skin Injury Yes    Pictures Uploaded Into Epic Yes  Wound Consult Placed Yes  RN Wound Prevention Protocol Ordered Yes

## 2024-11-17 NOTE — CARE PLAN
The patient is Unstable - High likelihood or risk of patient condition declining or worsening    Shift Goals  Clinical Goals: hemodynamic stability  Patient Goals: VALERIO  Family Goals: Updates    Progress made toward(s) clinical / shift goals:      Problem: Safety - Medical Restraint  Goal: Remains free of injury from restraints (Restraint for Interference with Medical Device)  Outcome: Progressing     Problem: Safety - Medical Restraint  Goal: Free from restraint(s) (Restraint for Interference with Medical Device)  Outcome: Progressing

## 2024-11-17 NOTE — PROGRESS NOTES
Infectious Disease Progress Note    Author: Nan De Oliveira M.D. Date & Time of service: 2024  9:27 AM    Chief Complaint:  Follow-up for pneumonia    Interval History:   Tmax 98, white count 0.6, ANC 0, platelets 20, cultures as below, creatinine 1.23, normal transaminases, some improvement in oxygen requirements, down to 5 L oxime mask   patient is now afebrile, white count 0.6, off oxygen, creatinine is worse up to 1.87, cultures and other labs as below   patient remains afebrile, white count 0.5, ANC 0, hemoglobin 9.1, platelets 30, cultures as below, remains on room air.  Resting comfortably this morning   Tmax 99.1, 0.5, transferred out of the ICU to the [facility], on only 1 L nasal cannula oxygen, white count 0.5, cultures as below, ANC 0, platelets 9.  Patient is slightly tachypneic this morning, encephalopathic, not following simple commands  11/15 AF (99.9) WBC 0.5 sleeping at time of rounds. No acute events overnight On 2 L NC   AF WBC 0.7 remains obtunded-in soft restraints Remains on 2 L sat %   AF WBC 0.7 O2 sat stable on 2L 97-98% Called by heme-onc about cyclosporine    Labs Reviewed and Medications Reviewed.    Review of Systems:  Review of Systems   Unable to perform ROS: Medical condition   Constitutional:  Negative for fever.   Respiratory:  Negative for shortness of breath.      Hemodynamics:  Temp (24hrs), Av.7 °C (98.1 °F), Min:36.2 °C (97.2 °F), Max:37.2 °C (99 °F)  Temperature: 36.5 °C (97.7 °F)  Pulse  Av  Min: 62  Max: 127   Blood Pressure : (!) 153/76       Physical Exam:  Physical Exam  Vitals and nursing note reviewed.   Constitutional:       Comments: frail   Cardiovascular:      Rate and Rhythm: Normal rate.   Pulmonary:      Effort: No respiratory distress.         Meds:    Current Facility-Administered Medications:     cycloSPORINE (modified)    insulin GLARGINE    ceftaroline (TEFLARO) ivpb    acetaminophen    melatonin    insulin  lispro **AND** POC blood glucose manual result **AND** NOTIFY MD and PharmD **AND** Administer 20 grams of glucose (approximately 8 ounces of fruit juice) every 15 minutes PRN FSBG less than 70 mg/dL **AND** dextrose bolus    posaconazole    famotidine    NS    Pharmacy    acyclovir    atorvastatin    atovaquone    potassium bicarbonate    ipratropium-albuterol    Respiratory Therapy Consult    MBX (diphenhydrAMINE-lidocaine-Maalox)    sodium bicarb 0.5 mEq/mL    [Held by provider] tamsulosin    Labs:  Recent Labs     11/15/24  0130 11/16/24  0125 11/17/24  0145   WBC 0.5* 0.7* 0.7*   RBC 2.73* 2.56* 2.40*   HEMOGLOBIN 7.9* 7.4* 6.8*   HEMATOCRIT 22.8* 21.5* 20.3*   MCV 83.5 84.0 84.6   MCH 28.9 28.9 28.3   RDW 49.6 50.9* 51.5*   PLATELETCT 21* 6* 18*   MPV 9.1 11.9 11.2   NEUTSPOLYS 1.80* 1.10* 1.10*   LYMPHOCYTES 98.20* 97.70* 97.90*   MONOCYTES 0.00 0.60 1.00   EOSINOPHILS 0.00 0.60 0.00   BASOPHILS 0.00 0.00 0.00   RBCMORPHOLO Present Present Present     Recent Labs     11/15/24  0130 11/16/24  0125 11/17/24  0145   SODIUM 140 143 138   POTASSIUM 3.6 3.5* 3.8   CHLORIDE 109 111 107   CO2 24 26 27   GLUCOSE 343* 170* 149*   BUN 42* 35* 33*     Recent Labs     11/15/24  0130 11/16/24  0125 11/17/24  0145   CREATININE 0.92 0.69 0.72       Imaging:  DX-ABDOMEN FOR TUBE PLACEMENT    Result Date: 11/10/2024  11/10/2024 12:14 PM HISTORY/REASON FOR EXAM:  Line evaluation. TECHNIQUE/EXAM DESCRIPTION AND NUMBER OF VIEWS:  1 view(s) of the abdomen. COMPARISON:  None. FINDINGS: Enteric tube has been placed. The tip projects over the stomach. The bowel gas pattern is within normal limits. Lungs show nonspecific bilateral airspace process     1.  Enteric tube has been placed and the tip projects over the stomach. 2.  Nonspecific pulmonary airspace process    US-RUQ    Result Date: 11/10/2024  11/10/2024 3:15 AM HISTORY/REASON FOR EXAM:  Abnormal Labs Abdominal pain TECHNIQUE/EXAM DESCRIPTION AND NUMBER OF VIEWS:  Real-time  sonography of the liver and biliary tree. COMPARISON: None FINDINGS: The liver is normal in contour. There is no evidence of solid mass lesion. The liver measures . Gallbladder: The gallbladder is full of bile. No gallstones evident. The gallbladder wall thickness measures . The common duct measures 4.8 mm. No intrahepatic bile duct dilatation is evident. Pancreas: Normal visible extent. Aorta: Normal and the visible extension. Intrahepatic IVC is patent. The portal vein is patent with hepatopetal flow. The MPV measures . Intrahepatic IVC is patent. The right kidney measures 11.4 cm. No hydronephrosis or suspicious mass. No right upper quadrant ascites. Small right pleural effusion.     1. Normal gallbladder and right upper quadrant ultrasound. 2. Small right pleural effusion.    CT-SOFT TISSUE NECK WITH    Result Date: 11/10/2024  11/10/2024 2:28 AM HISTORY/REASON FOR EXAM:  airway obstruction. Pneumonia. Difficulty breathing. TECHNIQUE/EXAM DESCRIPTION AND NUMBER OF VIEWS:  CT soft tissue neck with contrast. The study was performed on a helical multidetector CT scanner. Contiguous thin section helical images were obtained of the neck from the skull base through the thoracic inlet. 80 mL of Omnipaque 350 nonionic contrast was injected intravenously. Low dose optimization technique was utilized for this CT exam including automated exposure control and adjustment of the mA and/or kV according to patient size. COMPARISON: CT chest from yesterday. FINDINGS: BRAIN: Visualized portions of the brain are normal in appearance. TEMPORAL bones: Trace right mastoid sinus fluid. The middle ear are clear. PARANASAL SINUSES: The paranasal sinuses do not show any fluid. Minimal mucosal thickening. CERVICAL spine: Minimal diffuse cervical spine degenerative changes. Moderate C6-7 degenerative disc changes. NODES: Normal size cervical lymph nodes in a symmetric manner measuring up to 28 x 8 mm. All are smaller than 1 cm short  "dimension. SALIVARY and THYROID gland: The parotid, submandibular, and thyroid gland are normal in appearance. AIRWAY: The airway appears patent. Mild uvula enlargement but no pharyngeal mass or swelling. No tonsillar swelling. Epiglottis and larynx appear normal. MEDIASTINUM: The superior mediastinum shows mildly enlarged lymph nodes as seen on CT. LUNGS: The visualized portions of the upper lungs show patchy dense pneumonia infiltrates with small pleural effusions.     1. No airway narrowing. Mild uvula swelling suggested. The epiglottis and larynx appear normal. 2. Upper normal size cervical lymph nodes. No cervical mass otherwise. 3. Trace right mastoid sinus fluid. No paranasal sinus fluid. The middle ears are clear. 4. The visible upper chest again shows dense infiltrates with small pleural effusions and mild upper mediastinal adenopathy.    CT-CHEST (THORAX) W/O    Result Date: 11/9/2024 11/9/2024 1:22 PM HISTORY/REASON FOR EXAM:  worsening pneumonia, previous imaging supicious for fungal; ID noted \"halo sign\" on outside CT chest 10/20, concern it may have worsened. TECHNIQUE/EXAM DESCRIPTION: CT scan of the chest without contrast. Noncontrast helical scanning of the chest was obtained from the lung apices through the adrenal glands. Low dose optimization technique was utilized for this CT exam including automated exposure control and adjustment of the mA and/or kV according to patient size. COMPARISON: Outside CT chest 10/20/2024 FINDINGS: Lungs: There are airspace process within the right upper lobe, left upper lobe, right middle lobe, and lingular segment of left upper lobe and possibly both lower lobes although in the lower lobes is could be atelectasis.. There is a right upper lobe focal airspace process, nonspecific. Mediastinum/Jade: There is prevascular space adenopathy with the largest node measuring 20 x 13 mm. Pleura: There are small bilateral pleural effusion. Cardiac: Heart normal in size " without pericardial effusion. There is coronary artery atherosclerotic plaque. Vascular: There is aortic and coronary artery atherosclerotic plaque.. Soft tissues: Unremarkable. Bones: No acute or destructive process. There is a retroperitoneal calcification consistent with sequela of prior inflammation.     1.  Multifocal bilateral pulmonary airspace process most consistent with pneumonia, with interval worsening 2.  Bilateral pleural effusion, most small in size, right greater than left 3.  Single enlarged prevascular space lymph node which is most likely reactive Fleischner Society pulmonary nodule recommendations: Not Applicable     DX-CHEST-PORTABLE (1 VIEW)    Result Date: 11/9/2024 11/9/2024 4:40 AM HISTORY/REASON FOR EXAM: Shortness of Breath TECHNIQUE/EXAM DESCRIPTION:  Single AP view of the chest. COMPARISON: November 7, 2024 FINDINGS: The cardiac silhouette appears within normal limits. The mediastinal contour appears within normal limits.  The central pulmonary vasculature appears normal. Bilateral lung volumes are diminished.  Patchy bilateral pulmonary opacities are seen. No significant pleural effusions are identified. The bony structures appear age-appropriate.     1.  Patchy bilateral pulmonary infiltrates, similar to prior study    DX-CHEST-PORTABLE (1 VIEW)    Result Date: 11/7/2024 11/7/2024 12:06 PM HISTORY/REASON FOR EXAM:  Cough TECHNIQUE/EXAM DESCRIPTION AND NUMBER OF VIEWS: Single portable view of the chest. COMPARISON: Yesterday FINDINGS: HEART: Stable size. LUNGS: BILATERAL airspace disease redemonstrated. PLEURA: RIGHT costophrenic sulcus is not well seen.     1.  Unchanged BILATERAL pneumonia 2.  Possible RIGHT pleural effusion    DX-CHEST-2 VIEWS    Result Date: 11/7/2024 11/7/2024 12:08 AM HISTORY/REASON FOR EXAM: Cough TECHNIQUE/EXAM DESCRIPTION:  PA and lateral views of the chest. COMPARISON: October 19, 2024 FINDINGS: The cardiac silhouette appears within normal limits.  Atherosclerotic calcification of the aorta is noted.  The central  pulmonary vasculature appears prominent and indistinct. Bilateral lung volumes are diminished.  Diffuse scattered hazy pulmonary parenchymal opacities are seen. Blunting of the left costophrenic angle is seen suggesting trace left effusion. The bony structures appear age-appropriate.     1.  Pulmonary edema and/or infiltrates. 2.  Trace left pleural effusion 3.  Atherosclerosis      Micro:  Results       Procedure Component Value Units Date/Time    BLOOD CULTURE [[id number]] Collected: 11/10/24 0825    Order Status: Completed Specimen: Blood from Peripheral Updated: 11/15/24 1100     Significant Indicator NEG     Source BLD     Site PERIPHERAL     Culture Result No growth after 5 days of incubation.    BLOOD CULTURE [[id number]] Collected: 11/10/24 0825    Order Status: Completed Specimen: Blood from Peripheral Updated: 11/15/24 1100     Significant Indicator NEG     Source BLD     Site PERIPHERAL     Culture Result No growth after 5 days of incubation.    Fungal Culture [[id number]] Collected: 11/10/24 0300    Order Status: Completed Specimen: Respirate from Sputum Updated: 11/13/24 1323     Significant Indicator NEG     Source RESP     Site Tracheal Aspirate     Culture Result Culture in progress.     Fungal Smear Results No fungal elements seen.    AFB Culture [[id number]] Collected: 11/10/24 0300    Order Status: Completed Specimen: Respirate from Sputum Updated: 11/13/24 1323     Significant Indicator NEG     Source RESP     Site Tracheal Aspirate     Culture Result A significant status has been triggered by the BACTEC MGIT  instrument due to an increase in O2 consumption. No ACID FAST  BACILLI SEEN on Acid Fast Stain.  AFB cultures are held for 6 weeks and monitored continuously.  Positive AFB cultures are called to RN/Provider and reported  as soon as identified.       AFB Smear Results No acid fast bacilli seen.    CULTURE RESPIRATORY W/ GRM STN  [[id number]]  (Abnormal) Collected: 11/10/24 0300    Order Status: Completed Specimen: Respirate from Sputum Updated: 11/13/24 1323     Significant Indicator POS     Source RESP     Site Tracheal Aspirate     Culture Result Light growth usual upper respiratory rosalino     Gram Stain Result Many epithelial cells.  Many Gram positive cocci.  Many Gram positive rods.       Culture Result Staphylococcus aureus  Light growth  Methicillin resistant by screening method      BLOOD CULTURE [[id number]] Collected: 11/07/24 0241    Order Status: Completed Specimen: Blood from Peripheral Updated: 11/12/24 0500     Significant Indicator NEG     Source BLD     Site PERIPHERAL     Culture Result No growth after 5 days of incubation.    BLOOD CULTURE [[id number]] Collected: 11/07/24 0241    Order Status: Completed Specimen: Blood from Peripheral Updated: 11/12/24 0500     Significant Indicator NEG     Source BLD     Site PERIPHERAL     Culture Result No growth after 5 days of incubation.    GRAM STAIN [[id number]]  (Abnormal) Collected: 11/10/24 0300    Order Status: Completed Specimen: Respirate Updated: 11/10/24 1912     Significant Indicator .     Source RESP     Site Tracheal Aspirate     Gram Stain Result Many epithelial cells.  Many Gram positive cocci.  Many Gram positive rods.      Acid Fast Stain [[id number]] Collected: 11/10/24 0300    Order Status: Completed Specimen: Respirate Updated: 11/10/24 1912     Significant Indicator NEG     Source RESP     Site Tracheal Aspirate     AFB Smear Results No acid fast bacilli seen.    Fungal Smear [[id number]] Collected: 11/10/24 0300    Order Status: Completed Specimen: Respirate Updated: 11/10/24 1912     Significant Indicator NEG     Source RESP     Site Tracheal Aspirate     Fungal Smear Results No fungal elements seen.    Cryptococcal Antigen, Serum [[id number]] Collected: 11/10/24 1058    Order Status: Completed Specimen: Blood Updated: 11/10/24 1249     Cryptococcal Antigen, Serum  Negative            Hospital Course/Assessment:   Miri Joseph is a 84 y.o. male patient with severe aplastic anemia, recently at [facility] where he was started on posaconazole and completed a course of linezolid for possible invasive fungal pneumonia based on CT scan findings of nodular opacities with surrounding groundglass changes, transferred to [facility] for further oncology management, developed respiratory distress on 11/9, now on high flow nasal cannula oxygen in the ICU, repeat CT scan with worsening multifocal bilateral airspace opacities with surrounding groundglass findings.  There was concern for breakthrough worsening invasive mold pneumonia in the context of the previous outside hospital CT scan findings. Sputum culture positive for MRSA.  Further hospital course as below     Pertinent Diagnoses:  Acute hypoxic respiratory failure-on 2 liters NC today  Multifocal pneumonia, MRSA   Severe aplastic anemia  Immunosuppressed status  ESTHER, improved     Plan:   -Tracheal aspirate positive for MRSA, susceptibilities pending no fungal elements on staining  -Serum Cryptococcal antigen negative  -Serum beta D glucan negative  -Serum Aspergillus galactomannan neg  - Coccidioides serology still pending.    -Recent Karius at outside hospital was negative for fungal organisms and only + MRSA.  Bronchoscopy deemed too high risk  -Vancomycin was discontinued 11/11, complete course of renally dosed IV ceftaroline  Can de-escalate to doxycycline if clinical improvement and tolerating PO well  Usual treatment course 10-14 days. Current stop date 11/20/24  -Follow blood cultures x 2, no growth till date  -Patient remains on acyclovir, posaconazole, and atovaquone prophylaxis.   Please note posaconazole may increase levels of atorvastatin and cyclosporine  OK to cautiously increase cyclosporine to treat aplastic anemia      Disposition: Unable to determine at this time.    Noted currently DNR/DNI and if any  worsening clinical status, possible transition to comfort care  Need for PICC line: In place     Plan was discussed with Dr Long  This illness poses a threat to life  Will sign off-please reconsult if needed

## 2024-11-17 NOTE — PROGRESS NOTES
Hospital Medicine Daily Progress Note    Date of Service  11/17/2024    Chief Complaint  Miri Joseph is a 84 y.o. male admitted 11/6/2024 with aplastic anemia for chemotherapy    Hospital Course    84 y.o. male who presented 11/6/2024 with history of type 2 diabetes, hypertension, GERD, dyslipidemia, and recently diagnosed with aplastic anemia.  Patient was transferred to our care from [facility] to initiate inpatient chemotherapy.  He had been admitted there on October 19 for treatment of neutropenic fever he was treated with a course of linezolid for MRSA pneumonia.  On arrival here he was on prophylactic posaconazole, fluoroquinolone, and acyclovir.  Repeat sputum cultures here were positive for MRSA, the patient was started on linezolid on November 9 and on that same day he was moved to the ICU due to worsening respiratory status.  In the ICU the patient was on high flow nasal cannula and did require IV vasopressors he was initiated on tube feeds as well.         Interval Problem Update    11/17/2024  Seen and examined at bedside  Patient is disoriented.  Alert to name only  On restraint  Labs reviewed noted to be neutropenia ANC 10, hemoglobin 6.8, platelet count 18K  Sodium 138 potassium 3.8, renal function stable BUNs/creatinine 23/0.72, recent blood culture reviewed which is negative, tracheal culture with MRSA  Chest x-ray from 11/11/2024 noted with pulmonary edema/infiltrate  Chart reviewed, consult reviewed, meds reviewed      Continue on ceftaroline, atovaquone, acyclovir, posaconazole  Currently on cyclosporine  Repeat BMP in a.m. to monitor electrolytes and renal function  Repeat CBC in a.m. to monitor white count and hemoglobin  Requiring IV antibiotics, need close monitoring for toxicity  Case discussed ID Dr. De Oliveira  Case discussed with oncology Dr. Aviles  Patient has high risk for deterioration.  Palliative care has been consulted  High risk of deterioration into septic  shock and worsening respiratory failure.  Need close monitoring  Family is aware of her poor prognosis.  They would like to continue treatment plan.  I have consulted palliative care.  Patient has a high medical complexity, complex decision making and is at high risk of complication, morbidity and mortality        I have discussed this patient's plan of care and discharge plan at IDT rounds today with Case Management, Nursing, Nursing leadership, and other members of the IDT team.    Consultants/Specialty  critical care, infectious disease, and oncology    Code Status  DNAR/DNI    Disposition  The patient is not medically cleared for discharge to home or a post-acute facility.  Anticipate discharge to: home with close outpatient follow-up    I have placed the appropriate orders for post-discharge needs.    Review of Systems  Review of Systems   Unable to perform ROS: Mental acuity        Physical Exam  Temp:  [36.2 °C (97.2 °F)-37.2 °C (99 °F)] 36.5 °C (97.7 °F)  Pulse:  [] 86  Resp:  [18-33] 20  BP: (124-165)/(60-79) 153/76  SpO2:  [93 %-99 %] 98 %    Physical Exam  Constitutional:       Appearance: He is ill-appearing.   HENT:      Head:      Comments: On G-tube  Cardiovascular:      Rate and Rhythm: Normal rate.   Pulmonary:      Effort: Pulmonary effort is normal. No respiratory distress.   Abdominal:      General: Abdomen is flat. There is no distension.   Genitourinary:     Comments: On [place]  Musculoskeletal:      Comments: Moving all extremities spontaneously   Neurological:      Mental Status: He is alert. He is disoriented.      Comments: Overall generalized weakness, moving all extremities.          Fluids    Intake/Output Summary (Last 24 hours) at 11/17/2024 1131  Last data filed at 11/17/2024 0524  Gross per 24 hour   Intake 730 ml   Output 725 ml   Net 5 ml        Laboratory  Recent Labs     11/15/24  0130 11/16/24  0125 11/17/24  0145   WBC 0.5* 0.7* 0.7*   RBC 2.73* 2.56* 2.40*   HEMOGLOBIN  7.9* 7.4* 6.8*   HEMATOCRIT 22.8* 21.5* 20.3*   MCV 83.5 84.0 84.6   MCH 28.9 28.9 28.3   MCHC 34.6 34.4 33.5   RDW 49.6 50.9* 51.5*   PLATELETCT 21* 6* 18*   MPV 9.1 11.9 11.2     Recent Labs     11/15/24  0130 11/16/24  0125 11/17/24  0145   SODIUM 140 143 138   POTASSIUM 3.6 3.5* 3.8   CHLORIDE 109 111 107   CO2 24 26 27   GLUCOSE 343* 170* 149*   BUN 42* 35* 33*   CREATININE 0.92 0.69 0.72   CALCIUM 8.2* 8.0* 8.0*                   Imaging  DX-CHEST-PORTABLE (1 VIEW)   Final Result         1.  Pulmonary edema and/or infiltrates, similar to prior study.      DX-ABDOMEN FOR TUBE PLACEMENT   Final Result         1.  Nonspecific bowel gas pattern in the upper abdomen.   2.  Nasogastric tube tip terminates overlying the expected location of the gastric body.   3.  Patchy bilateral pulmonary infiltrates      DX-ABDOMEN FOR TUBE PLACEMENT   Final Result      1. Appropriate position of the nasogastric tube, replaced in the interval.   2. The left upper extremity PICC catheter now terminates within the azygos vein. Repositioning is recommended.   3. The remainder is stable.      DX-ABDOMEN FOR TUBE PLACEMENT   Final Result      Malpositioned enteric feeding tube likely within the right lower lobe bronchus.      Findings were conveyed to EMILIO Sutton in care of the patient on 11/11/2024 1:59 PM.      IR-PICC LINE PLACEMENT W/ GUIDANCE > AGE 5   Final Result                  Ultrasound-guided PICC placement performed by qualified nursing staff as    above.          DX-ABDOMEN FOR TUBE PLACEMENT   Final Result      1.  Enteric tube has been placed and the tip projects over the stomach.      2.  Nonspecific pulmonary airspace process      US-RUQ   Final Result         1. Normal gallbladder and right upper quadrant ultrasound.      2. Small right pleural effusion.      CT-SOFT TISSUE NECK WITH   Final Result         1. No airway narrowing. Mild uvula swelling suggested. The epiglottis and larynx appear normal.      2. Upper normal  size cervical lymph nodes. No cervical mass otherwise.      3. Trace right mastoid sinus fluid. No paranasal sinus fluid. The middle ears are clear.      4. The visible upper chest again shows dense infiltrates with small pleural effusions and mild upper mediastinal adenopathy.      CT-CHEST (THORAX) W/O   Final Result      1.  Multifocal bilateral pulmonary airspace process most consistent with pneumonia, with interval worsening      2.  Bilateral pleural effusion, most small in size, right greater than left      3.  Single enlarged prevascular space lymph node which is most likely reactive      Fleischner Society pulmonary nodule recommendations:   Not Applicable         DX-CHEST-PORTABLE (1 VIEW)   Final Result         1.  Patchy bilateral pulmonary infiltrates, similar to prior study      DX-CHEST-PORTABLE (1 VIEW)   Final Result      1.  Unchanged BILATERAL pneumonia   2.  Possible RIGHT pleural effusion      DX-CHEST-2 VIEWS   Final Result         1.  Pulmonary edema and/or infiltrates.   2.  Trace left pleural effusion   3.  Atherosclerosis           Assessment/Plan  * Multifocal pneumonia- (present on admission)  Assessment & Plan    Present on admission  Source respiratory  MRSA from sputum  Possible fungal: beta D glucan negative.   Serum Aspergillus galactomannan, Coccidioides serology are still pending.  Recent Karius at outside hospital was negative for fungal organisms   Probable aspiration pneumonia  ID managing antibiotics: Have stopped vancomycin due to renal function, not giving linezolid due to platelet count.  Currently on ceftaroline, acyclovir and posaconazole the last two for prophylaxis  Prophylactic agents ongoing for severe neutropenia  Amphotericin discontinued by ID  Adequately fluid resuscitated      11/17/2024  Continue on ceftaroline, atovaquone, acyclovir, posaconazole  Currently on cyclosporine  Repeat BMP in a.m. to monitor electrolytes and renal function  Repeat CBC in a.m. to  monitor white count and hemoglobin  Requiring IV antibiotics, need close monitoring for toxicity  Case discussed ID Dr. De Oliveira  Case discussed with oncology Dr. Aviles  Patient has high risk for deterioration.  Palliative care has been consulted  High risk of deterioration into septic shock and worsening respiratory failure.  Need close monitoring    ESTHER (acute kidney injury) (HCC)  Assessment & Plan  Likely sepsis primarily with contribution from medications  Adequately resuscitated  UOP 600ml given body weight of 70Kg this is borderline adequate output  Renally dose medications as appropriate  Serial BMP  Monitor UOP  Not a candidate for RRT         Septic shock (HCC)  Assessment & Plan  Present on admission  Source respiratory  MRSA from sputum  Possible fungal: beta D glucan negative.   Serum Aspergillus galactomannan, Coccidioides serology are still pending.  Recent Karius at outside hospital was negative for fungal organisms   Probable aspiration pneumonia  Completed steroids 11/14  Stable off of midodrine and steroids  ID managing antibiotics: Have stopped vancomycin due to renal function, not giving linezolid due to platelet count.  Currently on ceftaroline, acyclovir and posaconazole the last two for prophylaxis  Prophylactic agents ongoing for severe neutropenia  Amphotericin discontinued by ID  Adequately fluid resuscitated        Respiratory failure (HCC)  Assessment & Plan  Pneumonia   MRSA positive cultures from sputum  Possible fungal etiology: Beta D glucan neg.  Other serologies pending as noted above  DNR/DNI confirmed with daughter  Moved to unit for aggressive pulmonary toilet and oral care  O2 demand has decreased and he is now on RA/2 LNC  RT protocols  Oral care every-2 hours  Aspiration precautions, swallow eval pending  Hydrocortisone added to antibiotic regimen for severe pneumonia with respiratory failure ID managing antibiotics  If patient clinically deteriorates transition to comfort  care/hospice, discussed with daughter and son who are in agreement         Elevated LFTs  Assessment & Plan  improving  RUQUS ordered-> normal  Avoid hepatotoxins      Dysphagia  Assessment & Plan  Still has altered level of conciousness  If he starts to clear will re consult SLP  [employer] if very high risk for aspiration  Currently getting nutrition via NGT  Oral care every 1-2-hour  CT soft tissue neck without airway issue or obvious abscess/obstructing process  Now more alert and off HFNC: re consult SLP      Severe malnutrition (HCC)- (present on admission)  Assessment & Plan  Nutrition following  Family okay with soft flexible feeding tube and enteral nutrition  Currently at goal on tube feeds  Last seen by SLP on 11/11; pt now more alert.  Will ask them to re-evaluate      GERD (gastroesophageal reflux disease)- (present on admission)  Assessment & Plan  With hydrocortisone and thrombocytopenia -continue IV PPI daily for prophylaxis  Antireflux measures       Aplastic anemia (HCC)- (present on admission)  Assessment & Plan  BM biopsy consistent with severe aplastic anemia     Diagnostic evaluation in september and at [facility], viral studies negative, hepatitis panel negative,   Cyclosporine resumed at 110mg BID 11/14  Promacta (eltrombopag) for thrombocytopenia 75 mg daily  Continue prophylactic antimicrobials  B12/MMA, folate levels resent, previously low     All blood products irradiated/leukophoresed  Tranfuse PRBC for <7  Transfuse platelets < 10, may warrant higher threshold given possibility of DAH      Discuss with hematology regarding prognosis, not responsive to G-CSF in the past    MRSA pneumonia (HCC)- (present on admission)  Assessment & Plan  Was on linezolid and vancomycin  Continue ceftaroline for now   isolation precautions  ID following      Type 2 diabetes mellitus, without long-term current use of insulin (HCC)- (present on admission)  Assessment & Plan  Patient has been quite hyperglycemic  We  have titrated up his glargine: currently on 45 units  Now running mid to high 100s; monitor over next 24hrs and titrate glargine as appropriate  Continue a high sliding scale  A1c 7.32 months ago    Thrombocytopenia (HCC)- (present on admission)  Assessment & Plan  Secondary to pancytopenia from aplastic anemia  Back in single digits again today: giving one unit of platelets  No signs of active bleeding on exam today    Pancytopenia (HCC)  Assessment & Plan  BM biopsy consistent with severe aplastic anemia  Also consider infection/nutritional disorder/toxic myelosuppression.      Diagnostic evaluation in september and at [facility], viral studies negative, hepatitis panel negative,   Oncology following   On cyclosporine 100 bid, but cannot take orals currently  Promacta (eltrombopag) for thrombocytopenia 75 mg daily  Continue prophylactic antimicrobials  B12/MMA, folate levels resent, previously low     Tranfuse PRBC for <7  Transfuse platelets < 10, may warrant higher threshold given possibility of DAH though CT doesn't look strikingly like DAH  CT more likely MRSA pneumonia with positive cultures     Discuss with hematology regarding prognosis, not responsive to G-CSF in the past         VTE prophylaxis: SCD    I have performed a physical exam and reviewed and updated ROS and Plan today (11/17/2024). In review of yesterday's note (11/16/2024), there are no changes except as documented above.

## 2024-11-18 NOTE — PROGRESS NOTES
Hospital Medicine Daily Progress Note    Date of Service  11/18/2024    Chief Complaint  Miri Joseph is a 84 y.o. male admitted 11/6/2024 with aplastic anemia for chemotherapy    Hospital Course    84 y.o. male who presented 11/6/2024 with history of type 2 diabetes, hypertension, GERD, dyslipidemia, and recently diagnosed with aplastic anemia.  Patient was transferred to our care from [facility] to initiate inpatient chemotherapy.  He had been admitted there on October 19 for treatment of neutropenic fever he was treated with a course of linezolid for MRSA pneumonia.  On arrival here he was on prophylactic posaconazole, fluoroquinolone, and acyclovir.  Repeat sputum cultures here were positive for MRSA, the patient was started on linezolid on November 9 and on that same day he was moved to the ICU due to worsening respiratory status.  In the ICU the patient was on high flow nasal cannula and did require IV vasopressors he was initiated on tube feeds as well.  Oncology and ID following closely.         Interval Problem Update      11/18/2024  Seen and examined at bedside  Daughter at bedside, I was able to communicate with him in ai   Patient is only alert to name, following command appropriately  Moving all extremities  Now on room air saturating over 90%  Labs notable for leukopenia, severe neutropenia, hemoglobin 8.8, platelet count 4K,  sodium 138 potassium 3.7, renal function stable  Chest x-ray reviewed noted with worsening pneumonitis    Continue on cefazolin, atovaquone, acyclovir, posaconazole  Continue on on cyclosporine  Waiting for Promacta  Repeat BMP in a.m. to monitor electrolytes and renal function  Repeat CBC in a.m. to monitor white count and hemoglobin  Requiring IV antibiotics, need close monitoring for toxicity  Case discussed with oncology Dr. Lua .   Patient has high risk for deterioration.  Palliative care has been consulted  High risk of deterioration into  septic shock and worsening respiratory failure.  Need close monitoring.      I had extensive discussion with patient's daughter at bedside regarding the current medical status, treatment plan and prognosis. She  was briefed on the patient's condition including recent changes or developments .  Updated with recent test results vital signs and symptoms.  Current treatment plan was explained in details including medications.  All question answered. Family is aware of her poor prognosis.  They would like to continue treatment plan.  I have consulted palliative care.    Patient has a high medical complexity, complex decision making and is at high risk of complication, morbidity and mortality  I have discussed this patient's plan of care and discharge plan at IDT rounds today with Case Management, Nursing, Nursing leadership, and other members of the IDT team.    Consultants/Specialty  critical care, infectious disease, and oncology    Code Status  DNAR/DNI    Disposition  The patient is not medically cleared for discharge to home or a post-acute facility.  Anticipate discharge to: skilled nursing facility    I have placed the appropriate orders for post-discharge needs.    Review of Systems  Review of Systems   Unable to perform ROS: Mental acuity        Physical Exam  Temp:  [36.2 °C (97.2 °F)-37 °C (98.6 °F)] 37 °C (98.6 °F)  Pulse:  [75-86] 83  Resp:  [18-22] 22  BP: (121-150)/(58-75) 140/69  SpO2:  [96 %-100 %] 96 %    Physical Exam  Constitutional:       Appearance: He is ill-appearing.   HENT:      Head:      Comments: On G-tube  Cardiovascular:      Rate and Rhythm: Normal rate.   Pulmonary:      Effort: Pulmonary effort is normal. No respiratory distress.   Abdominal:      General: Abdomen is flat. There is no distension.   Genitourinary:     Comments: On Mc[place]  Musculoskeletal:      Comments: Moving all extremities spontaneously   Neurological:      Mental Status: He is alert. He is disoriented.      Comments:  Overall generalized weakness, moving all extremities.          Fluids    Intake/Output Summary (Last 24 hours) at 11/18/2024 1500  Last data filed at 11/18/2024 1330  Gross per 24 hour   Intake 1032 ml   Output 800 ml   Net 232 ml        Laboratory  Recent Labs     11/16/24 0125 11/17/24  0145 11/18/24  0040   WBC 0.7* 0.7* 0.6*   RBC 2.56* 2.40* 2.98*   HEMOGLOBIN 7.4* 6.8* 8.8*   HEMATOCRIT 21.5* 20.3* 25.3*   MCV 84.0 84.6 84.9   MCH 28.9 28.3 29.5   MCHC 34.4 33.5 34.8   RDW 50.9* 51.5* 47.8   PLATELETCT 6* 18* 4*   MPV 11.9 11.2 10.2     Recent Labs     11/16/24 0125 11/17/24 0145 11/18/24  0040   SODIUM 143 138 138   POTASSIUM 3.5* 3.8 3.7   CHLORIDE 111 107 105   CO2 26 27 26   GLUCOSE 170* 149* 128*   BUN 35* 33* 34*   CREATININE 0.69 0.72 0.60   CALCIUM 8.0* 8.0* 7.6*                   Imaging  DX-CHEST-LIMITED (1 VIEW)   Final Result      1.  Similar to slightly worsened multifocal airspace opacities throughout both lungs suggestive of pneumonitis.   2.  Increased small right effusion.      DX-CHEST-PORTABLE (1 VIEW)   Final Result         1.  Pulmonary edema and/or infiltrates, similar to prior study.      DX-ABDOMEN FOR TUBE PLACEMENT   Final Result         1.  Nonspecific bowel gas pattern in the upper abdomen.   2.  Nasogastric tube tip terminates overlying the expected location of the gastric body.   3.  Patchy bilateral pulmonary infiltrates      DX-ABDOMEN FOR TUBE PLACEMENT   Final Result      1. Appropriate position of the nasogastric tube, replaced in the interval.   2. The left upper extremity PICC catheter now terminates within the azygos vein. Repositioning is recommended.   3. The remainder is stable.      DX-ABDOMEN FOR TUBE PLACEMENT   Final Result      Malpositioned enteric feeding tube likely within the right lower lobe bronchus.      Findings were conveyed to EMILIO Sutton in care of the patient on 11/11/2024 1:59 PM.      IR-PICC LINE PLACEMENT W/ GUIDANCE > AGE 5   Final Result                   Ultrasound-guided PICC placement performed by qualified nursing staff as    above.          DX-ABDOMEN FOR TUBE PLACEMENT   Final Result      1.  Enteric tube has been placed and the tip projects over the stomach.      2.  Nonspecific pulmonary airspace process      US-RUQ   Final Result         1. Normal gallbladder and right upper quadrant ultrasound.      2. Small right pleural effusion.      CT-SOFT TISSUE NECK WITH   Final Result         1. No airway narrowing. Mild uvula swelling suggested. The epiglottis and larynx appear normal.      2. Upper normal size cervical lymph nodes. No cervical mass otherwise.      3. Trace right mastoid sinus fluid. No paranasal sinus fluid. The middle ears are clear.      4. The visible upper chest again shows dense infiltrates with small pleural effusions and mild upper mediastinal adenopathy.      CT-CHEST (THORAX) W/O   Final Result      1.  Multifocal bilateral pulmonary airspace process most consistent with pneumonia, with interval worsening      2.  Bilateral pleural effusion, most small in size, right greater than left      3.  Single enlarged prevascular space lymph node which is most likely reactive      Fleischner Society pulmonary nodule recommendations:   Not Applicable         DX-CHEST-PORTABLE (1 VIEW)   Final Result         1.  Patchy bilateral pulmonary infiltrates, similar to prior study      DX-CHEST-PORTABLE (1 VIEW)   Final Result      1.  Unchanged BILATERAL pneumonia   2.  Possible RIGHT pleural effusion      DX-CHEST-2 VIEWS   Final Result         1.  Pulmonary edema and/or infiltrates.   2.  Trace left pleural effusion   3.  Atherosclerosis           Assessment/Plan  * Multifocal pneumonia- (present on admission)  Assessment & Plan    Present on admission  Source respiratory  MRSA from sputum  Possible fungal: beta D glucan negative.   Serum Aspergillus galactomannan, Coccidioides serology are still pending.  Recent Karius at outside hospital was  negative for fungal organisms   Probable aspiration pneumonia  ID managing antibiotics: Have stopped vancomycin due to renal function, not giving linezolid due to platelet count.  Currently on ceftaroline, acyclovir and posaconazole the last two for prophylaxis  Prophylactic agents ongoing for severe neutropenia  Amphotericin discontinued by ID  Adequately fluid resuscitated      11/18/2024    Now on room air saturating over 90%  Continue on ceftaroline, atovaquone, acyclovir, posaconazole  Continue on on cyclosporine  Waiting for Promacta  Monitor oxygen recommend closely  Repeat BMP in a.m. to monitor electrolytes and renal function  Repeat CBC in a.m. to monitor white count and hemoglobin  Requiring IV antibiotics, need close monitoring for toxicity  Case discussed with oncology Dr. Lua .   Patient has high risk for deterioration.  Palliative care has been consulted  High risk of deterioration into septic shock and worsening respiratory failure.  Need close monitoring.    ESTHER (acute kidney injury) (HCC)  Assessment & Plan  Likely sepsis primarily with contribution from medications  Adequately resuscitated  UOP 600ml given body weight of 70Kg this is borderline adequate output  Renally dose medications as appropriate  Serial BMP  Monitor UOP  Not a candidate for RRT         Septic shock (HCC)  Assessment & Plan  Present on admission  Source respiratory  MRSA from sputum  Possible fungal: beta D glucan negative.   Serum Aspergillus galactomannan, Coccidioides serology are still pending.  Recent Karius at outside hospital was negative for fungal organisms   Probable aspiration pneumonia  Completed steroids 11/14  Stable off of midodrine and steroids  ID managing antibiotics: Have stopped vancomycin due to renal function, not giving linezolid due to platelet count.  Currently on ceftaroline, acyclovir and posaconazole the last two for prophylaxis  Prophylactic agents ongoing for severe neutropenia  Amphotericin  discontinued by ID  Adequately fluid resuscitated        Respiratory failure (HCC)  Assessment & Plan  Pneumonia   MRSA positive cultures from sputum  Possible fungal etiology: Beta D glucan neg.  Other serologies pending as noted above  DNR/DNI confirmed with daughter  Moved to unit for aggressive pulmonary toilet and oral care  O2 demand has decreased and he is now on RA/2 LNC  RT protocols  Oral care every-2 hours  Aspiration precautions, swallow eval pending  Hydrocortisone added to antibiotic regimen for severe pneumonia with respiratory failure ID managing antibiotics  If patient clinically deteriorates transition to comfort care/hospice, discussed with daughter and son who are in agreement         Elevated LFTs  Assessment & Plan  improving  RUQUS ordered-> normal  Avoid hepatotoxins      Dysphagia  Assessment & Plan  Still has altered level of conciousness  If he starts to clear will re consult SLP  [employer] if very high risk for aspiration  Currently getting nutrition via NGT  Oral care every 1-2-hour  CT soft tissue neck without airway issue or obvious abscess/obstructing process  Now more alert and off HFNC: re consult SLP      Severe malnutrition (HCC)- (present on admission)  Assessment & Plan  Nutrition following  Family okay with soft flexible feeding tube and enteral nutrition  Currently at goal on tube feeds  Last seen by SLP on 11/11; pt now more alert.  Will ask them to re-evaluate      GERD (gastroesophageal reflux disease)- (present on admission)  Assessment & Plan  With hydrocortisone and thrombocytopenia -continue IV PPI daily for prophylaxis  Antireflux measures       Aplastic anemia (HCC)- (present on admission)  Assessment & Plan  BM biopsy consistent with severe aplastic anemia     Diagnostic evaluation in september and at [facility], viral studies negative, hepatitis panel negative,   Cyclosporine resumed at 110mg BID 11/14  Promacta (eltrombopag) for thrombocytopenia 75 mg daily  Continue  prophylactic antimicrobials  B12/MMA, folate levels resent, previously low     All blood products irradiated/leukophoresed  Tranfuse PRBC for <7  Transfuse platelets < 10, may warrant higher threshold given possibility of DAH      Discuss with hematology regarding prognosis, not responsive to G-CSF in the past    MRSA pneumonia (HCC)- (present on admission)  Assessment & Plan  Was on linezolid and vancomycin  Continue ceftaroline for now   isolation precautions  ID following      Type 2 diabetes mellitus, without long-term current use of insulin (HCC)- (present on admission)  Assessment & Plan  Patient has been quite hyperglycemic  We have titrated up his glargine: currently on 45 units  Now running mid to high 100s; monitor over next 24hrs and titrate glargine as appropriate  Continue a high sliding scale  A1c 7.32 months ago    Thrombocytopenia (HCC)- (present on admission)  Assessment & Plan  Secondary to pancytopenia from aplastic anemia  Back in single digits again today: giving one unit of platelets  No signs of active bleeding on exam today    Pancytopenia (HCC)  Assessment & Plan  BM biopsy consistent with severe aplastic anemia  Also consider infection/nutritional disorder/toxic myelosuppression.      Diagnostic evaluation in september and at [facility], viral studies negative, hepatitis panel negative,   Oncology following   On cyclosporine 100 bid, but cannot take orals currently  Promacta (eltrombopag) for thrombocytopenia 75 mg daily  Continue prophylactic antimicrobials  B12/MMA, folate levels resent, previously low     Tranfuse PRBC for <7  Transfuse platelets < 10, may warrant higher threshold given possibility of DAH though CT doesn't look strikingly like DAH  CT more likely MRSA pneumonia with positive cultures     Discuss with hematology regarding prognosis, not responsive to G-CSF in the past         VTE prophylaxis: SCD    I have performed a physical exam and reviewed and updated ROS and Plan today  (11/18/2024). In review of yesterday's note (11/17/2024), there are no changes except as documented above.

## 2024-11-18 NOTE — PROGRESS NOTES
Oncology/Hematology Progress Note               Author: Lucia Griffin M.D. Date & Time created: 11/18/2024  11:35 AM     Reason for Admission/Consult: Aplastic Anemia  Primary Heme/Onc: Dr. Molina    Interval History:  Pt resting quietly in bed. Opens eyes to his name, but then falls back asleep. Respiratory status seems stable.     Review of Systems:  Review of Systems   Unable to perform ROS: Medical condition       Physical Exam:  Physical Exam  Constitutional:       General: He is not in acute distress.     Appearance: He is ill-appearing.   HENT:      Head: Normocephalic and atraumatic.      Right Ear: External ear normal.      Left Ear: External ear normal.      Nose: Nose normal.      Mouth/Throat:      Pharynx: Oropharynx is clear. No oropharyngeal exudate.   Eyes:      General: No scleral icterus.     Conjunctiva/sclera: Conjunctivae normal.   Cardiovascular:      Rate and Rhythm: Regular rhythm. Tachycardia present.   Pulmonary:      Effort: Pulmonary effort is normal. No respiratory distress.      Breath sounds: Normal breath sounds.   Abdominal:      General: Abdomen is flat. Bowel sounds are normal. There is no distension.      Palpations: Abdomen is soft.   Musculoskeletal:      Cervical back: Neck supple. No rigidity.      Right lower leg: Edema present.      Left lower leg: Edema present.   Skin:     General: Skin is warm.      Coloration: Skin is pale. Skin is not jaundiced.      Findings: Bruising present.   Neurological:      General: No focal deficit present.   Psychiatric:      Comments: Patient withdrawn, somnolent         Labs:          Recent Labs     11/16/24 0125 11/17/24 0145 11/18/24  0040   SODIUM 143 138 138   POTASSIUM 3.5* 3.8 3.7   CHLORIDE 111 107 105   CO2 26 27 26   BUN 35* 33* 34*   CREATININE 0.69 0.72 0.60   MAGNESIUM  --   --  1.5   PHOSPHORUS  --   --  2.8   CALCIUM 8.0* 8.0* 7.6*     Recent Labs     11/16/24 0125 11/17/24 0145 11/18/24  0040 11/18/24  0905    PREALBUMIN  --   --   --  4.5*   GLUCOSE 170* 149* 128*  --      Recent Labs     24  0125 245 24  0040   RBC 2.56* 2.40* 2.98*   HEMOGLOBIN 7.4* 6.8* 8.8*   HEMATOCRIT 21.5* 20.3* 25.3*   PLATELETCT 6* 18* 4*     Recent Labs     245 24  0040   WBC 0.7* 0.7* 0.6*   NEUTSPOLYS 1.10* 1.10* 0.90*   LYMPHOCYTES 97.70* 97.90* 98.20*   MONOCYTES 0.60 1.00 0.90   EOSINOPHILS 0.60 0.00 0.00   BASOPHILS 0.00 0.00 0.00     Recent Labs     24  0040   SODIUM 143 138 138   POTASSIUM 3.5* 3.8 3.7   CHLORIDE 111 107 105   CO2 26 27 26   GLUCOSE 170* 149* 128*   BUN 35* 33* 34*   CREATININE 0.69 0.72 0.60   CALCIUM 8.0* 8.0* 7.6*     Hemodynamics:  Temp (24hrs), Av.4 °C (97.5 °F), Min:36.2 °C (97.2 °F), Max:36.8 °C (98.2 °F)  Temperature: 36.6 °C (97.8 °F)  Pulse  Av.8  Min: 62  Max: 127   Blood Pressure : 121/64     Respiratory:    Respiration: (!) 22, Pulse Oximetry: 100 %     Work Of Breathing / Effort: Mild;Tachypnea  RUL Breath Sounds: Rhonchi, RML Breath Sounds: Rhonchi, RLL Breath Sounds: Rhonchi, YAZAN Breath Sounds: Rhonchi, LLL Breath Sounds: Rhonchi  Fluids:    Intake/Output Summary (Last 24 hours) at 2024 1135  Last data filed at 2024 0900  Gross per 24 hour   Intake 912 ml   Output 800 ml   Net 112 ml        GI/Nutrition:  Orders Placed This Encounter   Procedures    Diet NPO - Okay to give medications through NG/OG     Okay to give medications through NG/OG     Standing Status:   Standing     Number of Occurrences:   1     Order Specific Question:   Clamp Time     Answer:   Other (Please specify)     Comments:   Ice chips okay per slp     Order Specific Question:   Clamp Time     Answer:   0     Comments:   Ice chips okay per SLP    Diet: Diet Tube Feed; Formula: Nepro; Nepro: Nepro RTH; Goal Rate (mL/Hour): 45; Duration: 24 HR     Initiate Nepro with CARBSTEADY at 15 ml/hr continuous and advance by 15 mL  q12hrs to goal rate of 45 ml/hr.     Standing Status:   Standing     Number of Occurrences:   1     Order Specific Question:   Diet     Answer:   Diet Tube Feed [35]     Order Specific Question:   Formula:     Answer:   Nepro     Order Specific Question:   Nepro:     Answer:   Nepro RTH     Order Specific Question:   Goal Rate (mL/Hour)     Answer:   45     Order Specific Question:   Route     Answer:   Enteral Tube     Order Specific Question:   Duration     Answer:   24 HR     Medical Decision Making, by Problem:  Active Hospital Problems    Diagnosis     *Multifocal pneumonia [J18.9]     Septic shock (HCC) [A41.9, R65.21]     ESTHER (acute kidney injury) (HCC) [N17.9]     Goals of care, counseling/discussion [Z71.89]     Respiratory failure (HCC) [J96.90]     Dysphagia [R13.10]     Elevated troponin [R79.89]     Elevated LFTs [R79.89]     Pressure injury of deep tissue of sacral region [L89.156]     Aplastic anemia (HCC) [D61.9]     Neutropenic fever (HCC) [D70.9, R50.81]     Hypophosphatemia [E83.39]     Hypomagnesemia [E83.42]     Petechial rash [R23.3]     Hypertension [I10]     GERD (gastroesophageal reflux disease) [K21.9]     Seasonal allergies [J30.2]     Severe malnutrition (HCC) [E43]     MRSA pneumonia (HCC) [J15.212]     Dyslipidemia [E78.5]     Type 2 diabetes mellitus, without long-term current use of insulin (HCC) [E11.9]     Thrombocytopenia (HCC) [D69.6]        Assessment and Plan:    Severe aplastic anemia - he was initially diagnosed in September 2024.  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  On 11/6/2024 he was transferred for consideration of initiation of cyclosporine and eltrombopag.  We are currently working on obtaining outpatient delivery of eltrombopag due to supply in hospital - this has been denied by insurance and an appeal is in process.     - Currently on cyclosporine, will discuss with pharmacy checking levels given DD interactions  - Promacta pending  - Overall  prognosis is very guarded     Neutropenic fever  MRSA pneumonia  ID following.  There is now less concern for a fungal process. Amphotericin has been stopped. He continues on ceftaroline. Vancomycin discontinued on .     B12 deficiency  Continue monthly B12 injections; next due 2024     TRANSFUSION THRESHOLDS:  RBCs and PLTs must be leukoreduced, CMV negative, and irradiated  Hgb <7 if no symptoms or <8 with symptoms or bleedinu pRBC  PLTs <10 and no symptoms or <20 if febrile, septic, or bleeidnu apheresis PLTs     Quality-Core Measures   Reviewed items::  Labs reviewed and Medications reviewed  DVT prophylaxis pharmacological::  Contraindicated - High bleeding risk

## 2024-11-18 NOTE — DISCHARGE PLANNING
Case Management Discharge Planning    Admission Date: 11/6/2024  GMLOS: 6  ALOS: 12    6-Clicks ADL Score: 8  6-Clicks Mobility Score: 6  PT and/or OT Eval ordered: Yes  Post-acute Referrals Ordered: Yes  Post-acute Choice Obtained: Yes  Has referral(s) been sent to post-acute provider:  Yes      Anticipated Discharge Dispo: Discharge Disposition: Discharged to home/self care (01)    DME Needed: No    Action(s) Taken: Patient was discussed in IDT rounds. Patient's insurance denied Promacta, an appeal is in process. Patient remains in restraints. Patient is receiving IV abx.     Escalations Completed: None    Medically Clear: No    Next Steps: pending medical clearance    Barriers to Discharge: Medical clearance    Is the patient up for discharge tomorrow: No

## 2024-11-18 NOTE — THERAPY
"Speech Language Pathology   Daily Treatment     Patient Name: Miri Joseph  AGE:  84 y.o., SEX:  male  Medical Record #: [id number]  Date of Service: 11/18/2024      Precautions:  Precautions: Fall Risk, Swallow Precautions, Nasogastric Tube      Subjective  Pt somewhat agreeable to SLP tx tasks, son at bedside very encouraging and helped pt participate during session. Son at bedside also assisted with translation to/from [facility], although he reported that at times he was unsure what his father was saying. Son did report that patient was requesting \"Warm water.\"      Assessment  Pt seen for dysphagia management. On room air with O2 available, seated upright in bed. Pt more verbal this date with perceptual improvements in vocal quality/intensity, although he remains hoarse. Trials of tsp ice, tsp TN0, and cup sips TN0 provided. Pt initially given ice chips and tsp cold water, which he reported was painful and requested warm water. He did not report odynophagia with warm water.    Pt with consistent anterior loss of at least partial bolus with tsp and cup sips. Multiple swallows noted following PO. Inconsistent cough response, both immediate and delayed. Discussed POC for dysphagia with son.       Clinical Impressions  Pt presents with improved DOM and participation this date. Findings appear likely acute in the setting of PNA atop aplastic anemia. Pt also with risk factors including cachexia and GERD. Aspiration risk from oral intake appears HIGH with indication to continue NPO at this time. SLP to continue to follow for dysphagia management and determine readiness for diagnotic evaluation.     Pt noted with improved participation when children are present. Per son, one family member is generally present from 900-1200, another is present from 7200-2663, and then someone is generally present in the evening.       Recommendations  NPO / NGT for enteral nutrition/hydration   - Frequent, diligent oral " "care  Instrumentation: Instrumental swallow study pending clinical progress  Medication: Non Oral  Oral Care: Q2h      SLP Treatment Plan  Treatment Plan: Dysphagia Treatment, Patient/Family/Caregiver Training  SLP Frequency: 5x Per Week  Estimated Duration: Until Therapy Goals Met      Anticipated Discharge Needs  Discharge Recommendations: Recommend post-acute placement for additional speech therapy services prior to discharge home  Therapy Recommendations Upon DC: Dysphagia Training, Patient / Family / Caregiver Education, Community Re-Integration      Patient / Family Goals  Patient / Family Goal #1: \"He was consuming it all\" per dtr  Goal #1 Outcome: Progressing slower than expected  Short Term Goals  Short Term Goal # 1: Pt will complete instrumental assessment to determine his swallow physiology and POC.  Goal Outcome # 1: Progressing slower than expected  Short Term Goal # 2: Pt will participate in prfdg to determine readiness for diagnostic evaluation vs. PO diet.  Goal Outcome # 2 : Progressing slower than expected      ASMITA Schulz  "

## 2024-11-18 NOTE — CARE PLAN
The patient is Watcher - Medium risk of patient condition declining or worsening    Shift Goals  Clinical Goals: Monitor VSS and HG  Patient Goals: VALERIO  Family Goals: comfort    Progress made toward(s) clinical / shift goals:  Continued Q2 restraint checks and Q2 turns. Placed a bishop for retention.   Problem: Knowledge Deficit - Standard  Goal: Patient and family/care givers will demonstrate understanding of plan of care, disease process/condition, diagnostic tests and medications  Outcome: Progressing     Problem: Skin Integrity  Goal: Skin integrity is maintained or improved  Outcome: Progressing       Patient is not progressing towards the following goals:

## 2024-11-18 NOTE — PROGRESS NOTES
Report received at 1845. Observed patient at bedside, restraints were placed back on the patient for pulling at his NG tube. Patient comfortable and resting with son at bedside.

## 2024-11-18 NOTE — PROGRESS NOTES
Pt taken out of bilateral wrist restraints at 1815. Pt cooperative with not pulling at lines, resting comfortably at this time.

## 2024-11-19 PROBLEM — E87.6 HYPOKALEMIA: Status: ACTIVE | Noted: 2024-01-01

## 2024-11-19 PROBLEM — E87.8 REFEEDING SYNDROME: Status: ACTIVE | Noted: 2024-01-01

## 2024-11-19 PROBLEM — Z71.89 ENCOUNTER FOR HOSPICE CARE DISCUSSION: Status: ACTIVE | Noted: 2024-01-01

## 2024-11-19 NOTE — CARE PLAN
The patient is Unstable - High likelihood or risk of patient condition declining or worsening    Shift Goals  Clinical Goals: Monitor HG and ss of infection  Patient Goals: VALERIO  Family Goals: Continue to work with speech    Progress made toward(s) clinical / shift goals:  Continued Q2 turns and safety checks with restraints. Continued the patients tube feed and IV abx. The patient did not require and blood products.

## 2024-11-19 NOTE — DIETARY
"Nutrition Services: Follow-up for TF Weekly Update  Day 13 of admit.  Miri Joseph is a 84 y.o. male with admitting DX of Admission for chemotherapy [Z51.11].    Tube feeding initiated on 11/12. Current TF via (route)  NGT is Nepro with CARBSTEADY @ 45 ml/hr providing 1944 kcals, 87 g protein, 160 g CHO, and 785 ml free water daily.      Nutrition Assessment:   Height: 175.3 cm (5' 9\")  Weight: 74.2 kg (163 lb 9.3 oz)  Weight to Use in Calculations: 72.2 kg (159 lb 2.8 oz)   Body mass index is 24.16 kg/m²., BMI classification: normal   Weight trend: mildly increasing (+1.2 kg in 6 days, all bed scale weights though)    Per last SLP assessment on 11/18, pt should remain NPO  Skin: pressure injury coccyx (DTI), no wound team update since 11/10  Pertinent Labs: Na low (133), K low (3.5), BG high (126), P low (2.3), Mg low (1.3)   Pertinent Meds: Klyte, magnesium oxide, insulin  Last BM: 11/14  Current feeding/diet: Nepro with CARBSTEADY at 45 ml/hr via NGT    Malnutrition risk: met criteria on 11/12      Nutrition Diagnosis: (PES)      Pt meets ASPEN criteria for severe malnutrition in the context of acute illness 2/2 aplastic anemia as evidenced by severe muscle wasting and fat loss.     Nutrition Dx Status: Ongoing    Nutrition Interventions:   Keep pt at goal rate   Recommended thiamine 100 mg QD   Monitor refeeding labs QD for 3 days   Replete electrolytes PRN        Nutrition Monitoring and Evaluation:   Monitor nutrition POC  Monitor vital signs pertinent to nutrition     RD following      "

## 2024-11-19 NOTE — PROGRESS NOTES
MD notified of blood tinged urine output in bishop. Will continue to monitor platelets and if there is an increase in ss of bleeding. Also notified MD of blood pressure, systolic in the 140's-160's. Order for PRN blood pressure medication received.

## 2024-11-19 NOTE — CONSULTS
MRN: [id number]  Date of palliative consult: 11/19/2024  Reason for consult: GOC discussion for tube feeds, medical management of aplastic anemia and MRSA multifocal pneumonia, pulmonary hygiene options, and discussion for possible future hospice evaluation.   Referring provider: Rai Ferguson MD; discussed with current attending Dr. Reaevs and oncologist Dr. Griffin  Location of consult: [facility]  Additional consulting services: Critical care 11/9, Infectious disease 11/10, Heme/onc 11/13    Utilized  services through [employer] (English [employer]  via iPad) for visit. [facility]fer on palliative care rotation.   HPI:   Miri Joseph is a 84 y.o. male with medical history significant for aplastic anemia was a direct admitted as floor to floor transfer on 11/6/2024 from [facility]) for higher level care requiring heme/onc consult.  Pt has a known previous history of severe aplastic anemia with acellular bone marrow since BM biopsy from 10/29/'2024 at [facility].  Heme/onc was consulted on 11/7 for aplastic anemia with associated pancytopenia including severe neutropenia recommending neutropenic ppx/isolation.   Pt had been admitted initially on medical floor under hospitalist team 11/6-11/9 and treated for neutropenic fever 2/2 aplastic anemia, thrombocytopenia.  Pt was transferred to ICU 11/9-11/13. Downgraded to IMCU on 11/13.  ID consulted on 11/10 because of multifocal pneumonia noted on CT and specifically nodular opacities with halo sign concerning for invasive mold at [facility] and positive MRSA culture noted on sputum cultures at [facility].  ID therefore recommended switching from voriconazole to IV amphotericin B, bronch with fungal, AFB, and sputum cultures.  Bronchoscopy was not able to be performed because determined to be to high-risk. Pt respiratory status noted to decompensate on the floor on 11/9, a rapid was called for acute respiratory  failure and patient was transferred to ICU and  for management of mutlifocal pneumonia complicated by ARDS on 11/10/2024 tracheal aspirate was cultured which confirmed to be positive for MRSA.  Of note, code status upon transfer had been full.  Heme/onc doc performed goals of care discussion on 11/10/2024 and pt's family consented to DNAR/DNI but otherwise requested medical management of  multifocal anemia and aplastic anemia.  PICC line placed on 2024.    Additional Pertinent Medical History: Heme/onc, infectious disease    ROS:    Review of Systems   Unable to perform ROS: Mental acuity     PE:   Recent vital signs  BMI: Body mass index is 24.16 kg/m².    Temp (24hrs), Av.7 °C (98.1 °F), Min:36.5 °C (97.7 °F), Max:37 °C (98.6 °F)  Temperature: 36.8 °C (98.2 °F)  Pulse  Av.9  Min: 62  Max: 127   Blood Pressure : (!) 160/93       Physical Exam  Vitals and nursing note reviewed.   Constitutional:       Comments: Pt grimaces some agitation. Not following commands.    Eyes:      General: No scleral icterus.     Comments: Did not open eyes for exam   Pulmonary:      Breath sounds: Rhonchi (diffuse, coarse) present. No wheezing or rales.   Abdominal:      General: There is distension.      Palpations: Abdomen is soft.      Tenderness: There is no abdominal tenderness. There is no guarding.   Musculoskeletal:      Comments: Anasarca   Skin:     General: Skin is warm and dry.      Coloration: Skin is not jaundiced.   Neurological:      Mental Status: He is lethargic.       ASSESSMENT/PLAN WITH SHARED DECISION MAKING:   PHYSICAL ASPECTS OF CARE  Palliative Performance Scale: 20%    #Acute hypoxic respiratory failure 2/2 MRSA multifocal pneumonia  # Severe Aplastic Anemia  # Severe Pancytopenia - WBC 0.5 (undetectably low ANC), Hgb 10.4, Plt 18 (plt count 4 yesterday).  #Acute encephalopathy  #Delirium  # Severe protein-calorie malnutrition  #Anasarca  *Performed goals of care discussion (please refer to  "\"Goals of Care/Serious Illness Conversation\" for further details.  Julien (DPOA) understood that patient prognosis is very guarded. Given that he was not offered granulocyte colony stimulating factor (currently considering Promacta), EPO, hypomethylating agent or immune modulator and not a good candidate for bone marrow transplant for multiple confounding factors, treatment of severe neutropenia and severe aplastic anemia with pancytopenia is limited to undertaking neutropenic prophylaxis and precautions.  GCS 8 today during my encounter.  Patient is on tube feeds. Significant anasarca noted which is poor prognostic factor given his tube feeds likely signifying lack of improvement of malnutrition.  Pt does need pulmonary hygiene but given mentation and low platelet count (18 today), deep suctioning is a high risk procedure.  Discussed this with family.  Updated RT order to evaluate for deep suctioning if appropriate.  - continue ceftaroline, atovoquone, acyclovir, pasaconazole per ID  - continue cyclosporine as prescribed by heme/onc  - pending approval for Promacta and appeal process per oncology  - continue pulmonary hygiene as tolerated  - ID following  - heme/onc following  - RT/SLP/PT/OT following; nursing communication order placed to have therapies see patient when family present as they are able to enhance communication with patient given he is Chantelle speaking and very hard of hearing with some delirium.  He responds better to family than staff.    SOCIAL ASPECTS OF CARE  Patient does not have an advance directive on file.  He has 3 adult children and his sons primarily make decisions and their family dynamic (\"Julien\" and Josse).  Josse is a family medicine doctor in [place].  Patient's son Aaron is present morning and evening and patient's daughter is present midday.    SPIRITUAL ASPECTS OF CARE   Per patient's son Julien pt identifies as Pentecostal.  Offered spiritual resources.  Son states that he has been " "providing spiritual materials to his dad via audio which pt has been responding to positively.    GOALS OF CARE/SERIOUS ILLNESS CONVERSATION  Introduced myself and Dr. Shafer to Miri Joseph who was unable to participate in discussion.  Call placed patient's local DPOA (\"Julien\" Johan) over telephone who was agreeable to goals of care discussion though he requested that we call his brother Josse because he is a physician and better able to understand patient medical conditions.   I obliged to call Josse, but offered Julien a simple explanation as to patient's hospital course so that he too could understand and participate.  I asked him of his current understanding of patient's condition.  I asked him what his short-term and long-term goals are.  Julien explained that his short-term goal would be to have patient to slowly advance his diet in order to regain strength and to treat patient MRSA pneumonia and severe neutropenia for the time being so that when he is able to regain at least some of his strength, patient will be able to be discharged home eventually.  Furthermore, Julien wanted to expectorate mucosal secretion in order to help patient to recover from his pneumonia.     I explained to Julien the very guarded prognosis given his recent severe aplastic anemia and severe pancytopenia which has conferred immunocompromised status which in turn requires neutropenic ppx and precautions predisposing him to opportunistic infection, and furthermore possibly making it more difficult for him to recover from his MRSA multifocal PNA.  I also explained to him that patient's current feeding status is via NGT, and advancing diet may be difficult and if in fact patient does not regain ability to advance diet, he may require long-term feeding tube such as PEG-tube which is associated with its own complications/risks and likely not advised given patient's overall prognosis/age.  Patient has been severely thrombocytoenic a " "condition which made possibility of bronchoscopy too high risk and furthermore the possibly of PEG-tube placement high risk as well.      Confirmed patient code status which had been addressed multiple times by Dr. Ferguson, Dr. Conteh and also Dr. Seals.  Pt is DNAR/DNI which I explained would qualify him for most medical management options with appropriate limitations due to performance status mainly ECOG 4 per my assessment which would make the treatment of his aplastic anemia too risky.  I explained to Julien that given pt's aplastic anemia which will likely not be able to be treated inpatient his estimated life expectancy of less than 6 months (potentially shorter), he would be qualify for hospice which had previously been declined multiple times.  I explained to Julien that per my assessment patient has a GCS score 8 that at some point if his condition does not improve, inpatient comfort care may be the only option.  Julien understood and requested to continue medical management to treat neutropenia and PNA with IV antimicrobials and to continue tube feeds.       Call placed to Josse over phone who is currently in [facility]. Discussed role of palliative care and reason for consult/call. He confirmed has flown back twice since diagnosis of aplastic anemia and states will likely come back at some point in time.  Josse states that patient's mentation has improved as he has noted on \"facetime.\"  Josse states that his mentation generally is better than it is with hospital staff.  Josse states that he understands vandananet's guarded prognosis as a physician, but he would like to continue medical management of aplastic anemia and pneumonia.  Explained anasarca as poor prognostic indicator given tube feeds to both Julien and Josse. Discussed code status with Josse as well who agreed with DNAR/DNI.  Josse would like to coordinate RT/SLP/PT/OT encounters to occur while family is available.  Josse " "would like to have efforts for pulmonary hygiene to be continued.  Discussed hospice evaluation with Josse who stated that he would like to hold off at this point.  Provided palliative care contact information and encouraged  to reach out with any questions/needs.     Code Status: DNAR/DNI    ACP Documents: None    60 minutes spent discussing advance care planning, this time excludes any other billed services.    Interval diagnostic studies and medical documentation entries pertinent to this case were reviewed independently by me. This patient has at least one acute or chronic illness or injury that poses a threat to life or bodily function. This patient suffers from a high risk of morbidity from additional invasive diagnostic testing or intensive treatment. Discussion of recommendations and coordination of care undertaken with primary provider/treatment team.      Bianka \"Zenaida\" ALESSANDRO High, [facility]  Inpatient Palliative Care (service hours Mon-Fri 8AM - 5PM)  [phone]             "

## 2024-11-19 NOTE — CARE PLAN
The patient is Stable - Low risk of patient condition declining or worsening    Shift Goals  Clinical Goals: monitor labs, pain mgmt, GOC  Patient Goals: VALERIO  Family Goals: VALERIO    Progress made toward(s) clinical / shift goals:        Problem: Knowledge Deficit - Standard  Goal: Patient and family/care givers will demonstrate understanding of plan of care, disease process/condition, diagnostic tests and medications  Outcome: Progressing  Note: Pt only arousable to pain this AM. Improved mentation with son interpreting at bedside, able to state his name and hospital, squeezed hands on command. SLP planning to re-eval this afternoon. Pending palliative reconsult.     Problem: Skin Integrity  Goal: Skin integrity is maintained or improved  Outcome: Progressing  Note: Cont'd q2hr turns, RENETTA bed, preventative mepliplex, heel boots in place.

## 2024-11-19 NOTE — DISCHARGE PLANNING
Case Management Discharge Planning    Admission Date: 11/6/2024  GMLOS: 6  ALOS: 13    6-Clicks ADL Score: 6  6-Clicks Mobility Score: 6  PT and/or OT Eval ordered: Yes  Post-acute Referrals Ordered: Yes  Post-acute Choice Obtained: Yes  Has referral(s) been sent to post-acute provider:  Yes      Anticipated Discharge Dispo: Discharge Disposition: Discharged to home/self care (01)    DME Needed: No    Action(s) Taken: Discussed in IDT rounds, Palliative consult placed. CCS and medical team have discussed hospice pt has not been agreeable. Oncology and Hospitalist to have goals of care conversation again with pt and family. Pt's insurance has declined promacta at this time, [facility] appealing denial. RN requested BM regiment, last BM on 11/14.    Escalations Completed: None    Medically Clear: No    Next Steps: Pending goals of care conversation.     Barriers to Discharge: Medical clearance    Is the patient up for discharge tomorrow: No

## 2024-11-19 NOTE — CARE PLAN
The patient is Unstable - High likelihood or risk of patient condition declining or worsening    Shift Goals  Clinical Goals: Patient will remain afebrile and free of s/sx of infections  Patient Goals: VALERIO  Family Goals: Continue antibiotics; Chest xray; Speech eval    Progress made toward(s) clinical / shift goals:    Problem: Skin Integrity  Goal: Skin integrity is maintained or improved  Outcome: Progressing     Problem: Fall Risk  Goal: Patient will remain free from falls  Outcome: Progressing     Problem: Respiratory:  Goal: Respiratory status will improve  Outcome: Progressing     Problem: Nutrition  Goal: Patient's nutritional and fluid intake will be adequate or improve  Outcome: Progressing     Problem: Hemodynamics  Goal: Patient's hemodynamics, fluid balance and neurologic status will be stable or improve  Outcome: Progressing     Problem: Infection - Standard  Goal: Patient will remain free from infection  Outcome: Progressing     Problem: Pain - Standard  Goal: Alleviation of pain or a reduction in pain to the patient’s comfort goal  Outcome: Progressing     Problem: Fluid Volume  Goal: Fluid volume balance will be maintained  Outcome: Progressing     Problem: Respiratory  Goal: Patient will achieve/maintain optimum respiratory ventilation and gas exchange  Outcome: Progressing     Problem: Physical Regulation  Goal: Diagnostic test results will improve  Outcome: Progressing  Goal: Signs and symptoms of infection will decrease  Outcome: Progressing       Patient is not progressing towards the following goals:      Problem: Knowledge Deficit - Standard  Goal: Patient and family/care givers will demonstrate understanding of plan of care, disease process/condition, diagnostic tests and medications  Outcome: Not Progressing     Problem: Mobility  Goal: Risk for activity intolerance will decrease  Outcome: Not Progressing     Problem: Risk for Aspiration  Goal: Patient's risk for aspiration will be absent or  decrease  Outcome: Not Progressing     Problem: Urinary - Renal Perfusion  Goal: Ability to achieve and maintain adequate renal perfusion and functioning will improve  Outcome: Not Progressing     Problem: Safety - Medical Restraint  Goal: Remains free of injury from restraints (Restraint for Interference with Medical Device)  Outcome: Not Progressing  Goal: Free from restraint(s) (Restraint for Interference with Medical Device)  Outcome: Not Progressing

## 2024-11-19 NOTE — PROGRESS NOTES
Oncology/Hematology Progress Note               Author: Lucia Griffin M.D. Date & Time created: 11/19/2024  11:19 AM     Reason for Admission/Consult: Aplastic Anemia  Primary Heme/Onc: Dr. Molina    Interval History:  No acute events overnight.  Patient again is resting quietly in bed.  No family was at the bedside during my visit today.  Patient moans but does not open his eyes to verbal commands.  Apparently this has been his baseline.  Apparently he communicates better with family around.    Review of Systems:  Review of Systems   Unable to perform ROS: Medical condition       Physical Exam:  Physical Exam  Constitutional:       General: He is not in acute distress.     Appearance: He is ill-appearing.   HENT:      Head: Normocephalic and atraumatic.      Right Ear: External ear normal.      Left Ear: External ear normal.      Nose: Nose normal.      Mouth/Throat:      Pharynx: Oropharynx is clear. No oropharyngeal exudate.   Eyes:      General: No scleral icterus.     Conjunctiva/sclera: Conjunctivae normal.   Cardiovascular:      Rate and Rhythm: Regular rhythm. Tachycardia present.   Pulmonary:      Effort: Pulmonary effort is normal. No respiratory distress.      Breath sounds: Rales present.   Abdominal:      General: Abdomen is flat. Bowel sounds are normal. There is no distension.      Palpations: Abdomen is soft.   Musculoskeletal:      Cervical back: Neck supple. No rigidity.      Right lower leg: Edema present.      Left lower leg: Edema present.   Skin:     General: Skin is warm.      Coloration: Skin is pale. Skin is not jaundiced.      Findings: Bruising present.   Neurological:      General: No focal deficit present.   Psychiatric:      Comments: Patient withdrawn, somnolent         Labs:          Recent Labs     11/17/24  0145 11/18/24  0040 11/19/24  0015   SODIUM 138 138 133*   POTASSIUM 3.8 3.7 3.5*   CHLORIDE 107 105 100   CO2 27 26 27   BUN 33* 34* 28*   CREATININE 0.72 0.60 0.47*    MAGNESIUM  --  1.5 1.3*   PHOSPHORUS  --  2.8 2.3*   CALCIUM 8.0* 7.6* 7.5*     Recent Labs     24  0924  0015   PREALBUMIN  --   --  4.5*  --    GLUCOSE 149* 128*  --  126*     Recent Labs     24  001   RBC 2.40* 2.98* 3.47*   HEMOGLOBIN 6.8* 8.8* 10.4*   HEMATOCRIT 20.3* 25.3* 29.2*   PLATELETCT 18* 4* 18*     Recent Labs     24  001   WBC 0.7* 0.6* 0.5*   NEUTSPOLYS 1.10* 0.90* 0.00*   LYMPHOCYTES 97.90* 98.20* 100.00*   MONOCYTES 1.00 0.90 0.00   EOSINOPHILS 0.00 0.00 0.00   BASOPHILS 0.00 0.00 0.00     Recent Labs     24  001   SODIUM 138 138 133*   POTASSIUM 3.8 3.7 3.5*   CHLORIDE 107 105 100   CO2 27 26 27   GLUCOSE 149* 128* 126*   BUN 33* 34* 28*   CREATININE 0.72 0.60 0.47*   CALCIUM 8.0* 7.6* 7.5*     Hemodynamics:  Temp (24hrs), Av.7 °C (98.1 °F), Min:36.5 °C (97.7 °F), Max:37 °C (98.6 °F)  Temperature: 36.8 °C (98.2 °F)  Pulse  Av.9  Min: 62  Max: 127   Blood Pressure : (!) 160/93     Respiratory:    Respiration: (!) 26, Pulse Oximetry: 93 %     Work Of Breathing / Effort: Mild;Shallow  RUL Breath Sounds: Crackles;Diminished, RML Breath Sounds: Crackles;Diminished, RLL Breath Sounds: Diminished, YAZAN Breath Sounds: Crackles;Diminished, LLL Breath Sounds: Diminished  Fluids:    Intake/Output Summary (Last 24 hours) at 2024 1135  Last data filed at 2024 0900  Gross per 24 hour   Intake 912 ml   Output 800 ml   Net 112 ml        GI/Nutrition:  Orders Placed This Encounter   Procedures    Diet NPO - Okay to give medications through NG/OG     Okay to give medications through NG/OG     Standing Status:   Standing     Number of Occurrences:   1     Order Specific Question:   Clamp Time     Answer:   Other (Please specify)     Comments:   Ice chips okay per slp     Order Specific Question:   Clamp Time     Answer:   0     Comments:    Ice chips okay per SLP    Diet: Diet Tube Feed; Formula: Nepro; Nepro: Nepro RTH; Goal Rate (mL/Hour): 45; Duration: 24 HR     Initiate Nepro with CARBSTEADY at 15 ml/hr continuous and advance by 15 mL q12hrs to goal rate of 45 ml/hr.     Standing Status:   Standing     Number of Occurrences:   1     Order Specific Question:   Diet     Answer:   Diet Tube Feed [35]     Order Specific Question:   Formula:     Answer:   Nepro     Order Specific Question:   Nepro:     Answer:   Nepro RTH     Order Specific Question:   Goal Rate (mL/Hour)     Answer:   45     Order Specific Question:   Route     Answer:   Enteral Tube     Order Specific Question:   Duration     Answer:   24 HR     Medical Decision Making, by Problem:  Active Hospital Problems    Diagnosis     *Multifocal pneumonia [J18.9]     Septic shock (HCC) [A41.9, R65.21]     ESTHER (acute kidney injury) (HCC) [N17.9]     Goals of care, counseling/discussion [Z71.89]     Respiratory failure (HCC) [J96.90]     Dysphagia [R13.10]     Elevated troponin [R79.89]     Elevated LFTs [R79.89]     Pressure injury of deep tissue of sacral region [L89.156]     Aplastic anemia (HCC) [D61.9]     Neutropenic fever (HCC) [D70.9, R50.81]     Hypophosphatemia [E83.39]     Hypomagnesemia [E83.42]     Petechial rash [R23.3]     Hypertension [I10]     GERD (gastroesophageal reflux disease) [K21.9]     Seasonal allergies [J30.2]     Severe malnutrition (HCC) [E43]     MRSA pneumonia (HCC) [J15.212]     Dyslipidemia [E78.5]     Type 2 diabetes mellitus, without long-term current use of insulin (HCC) [E11.9]     Thrombocytopenia (HCC) [D69.6]        Assessment and Plan:    Severe aplastic anemia - he was initially diagnosed in September 2024.  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  On 11/6/2024 he was transferred for consideration of initiation of cyclosporine and eltrombopag.  We are currently working on obtaining outpatient delivery of eltrombopag due to supply in  hospital - this has been denied by insurance and an appeal is in process.     - Currently on cyclosporine, level drawn yesterday evening and results pending from reference lab  - Promacta pending  - Overall prognosis is very guarded - I would like to arrange a time tomorrow to meet with family as his bone marrow condition is not curable and even with treatment would take time to see any meaningful improvements. I think best supportive care is most appropriate.     Neutropenic fever  MRSA pneumonia  ID following.  There is now less concern for a fungal process. Amphotericin has been stopped. He continues on ceftaroline. Vancomycin discontinued on .  He will remain at high risk for infections given prolonged neutropenic expected.  He is on posaconazole and mepron pxx     B12 deficiency  Continue monthly B12 injections; next due 2024     TRANSFUSION THRESHOLDS:  RBCs and PLTs must be leukoreduced, CMV negative, and irradiated  Hgb <7 if no symptoms or <8 with symptoms or bleedinu pRBC  PLTs <10 and no symptoms or <20 if febrile, septic, or bleeidnu apheresis PLTs     Quality-Core Measures   Reviewed items::  Labs reviewed and Medications reviewed  DVT prophylaxis pharmacological::  Contraindicated - High bleeding risk    Will Continue to follow.    Lucia Griffin MD  [facility]  [phone]

## 2024-11-20 PROBLEM — J96.01 ACUTE RESPIRATORY FAILURE WITH HYPOXIA (HCC): Status: ACTIVE | Noted: 2024-01-01

## 2024-11-20 PROBLEM — E16.2 HYPOGLYCEMIA: Status: ACTIVE | Noted: 2024-01-01

## 2024-11-20 PROBLEM — E16.0 HYPOGLYCEMIA DUE TO INSULIN: Status: ACTIVE | Noted: 2024-01-01

## 2024-11-20 PROBLEM — T38.3X5A HYPOGLYCEMIA DUE TO INSULIN: Status: ACTIVE | Noted: 2024-01-01

## 2024-11-20 NOTE — ASSESSMENT & PLAN NOTE
11/19/2024  Likely from refeeding syndrome  Neutra-Phos I ordered hypophosphatemia    11/20/2024  Continue to replace with Neutra-Phos

## 2024-11-20 NOTE — HOSPITAL COURSE
84 y.o. male who presented 11/6/2024 with history of type 2 diabetes, hypertension, GERD, dyslipidemia, and recently diagnosed with aplastic anemia.  Patient was transferred to our care from [facility] to initiate inpatient chemotherapy.  He had been admitted there on October 19 for treatment of neutropenic fever he was treated with a course of linezolid for MRSA pneumonia.  On arrival here he was on prophylactic posaconazole, fluoroquinolone, and acyclovir.  Repeat sputum cultures here were positive for MRSA, the patient was started on linezolid on November 9 and on that same day he was moved to the ICU due to worsening respiratory status.  In the ICU the patient was on high flow nasal cannula and did require IV vasopressors he was initiated on tube feeds as well.  Oncology and ID following closely.

## 2024-11-20 NOTE — DISCHARGE PLANNING
Case Management Discharge Planning    Admission Date: 11/6/2024  GMLOS: 6  ALOS: 14    6-Clicks ADL Score: 6  6-Clicks Mobility Score: 6  PT and/or OT Eval ordered: Yes  Post-acute Referrals Ordered: Yes  Post-acute Choice Obtained: Yes  Has referral(s) been sent to post-acute provider:  Yes      Anticipated Discharge Dispo: Discharge Disposition: Discharged to home/self care (01)    DME Needed: No    Action(s) Taken: Discussed in IDT rounds. Referral sent to [facility]. Promacta remains pending from [facility].     Escalations Completed: None    Medically Clear: No    Next Steps: Pending medical clearance.     Barriers to Discharge: Medical clearance    Is the patient up for discharge tomorrow: No

## 2024-11-20 NOTE — ASSESSMENT & PLAN NOTE
11/19/2024  Per discussion Dr. Griffin of oncology recommending further goals of care discussion given poor clinical state and poor prognosis.  I discussed at length with the patient's daughter at bedside regarding patient's likelihood of aspirating again although he is improving with IV antibiotics appears to be somewhat stable at this time.  She stated that the patient previously had pneumonia and improved with antibiotics but then deteriorated after he ate again.  I explained to her that this is likely indication that the patient is nearing the end of his life.  I explained the option of a G-tube, which could decrease the risk but not eliminate the risk of aspiration.  It may only prolong his suffering and inevitable death.  I explained that the patient had not been improving in the past 2 weeks, which is considered a prolonged hospitalization.  Daughter appeared to have difficulty grasping this message and say that she 1 to see how the patient will proceed in the next 2 weeks.      Discussed with case management and nursing leadership.  Recommending discharge to LTAC.  I placed referral for LTAC.    Advance care planning total time 16 minutes

## 2024-11-20 NOTE — ASSESSMENT & PLAN NOTE
11/19/2024  As per history.  Defer statin at this time given multiple other comorbidities and active medical issues.

## 2024-11-20 NOTE — CONSULTS
Palliative Care   Consult completed. Please refer to consult note.     KENJI Davis.  Palliative Care Nurse Practitioner  [phone]

## 2024-11-20 NOTE — PROGRESS NOTES
Oncology/Hematology Progress Note               Author: Lucia Griffin M.D. Date & Time created: 11/20/2024  10:33 AM     Reason for Admission/Consult: Aplastic Anemia  Primary Heme/Onc: Dr. Molina    Interval History:  No acute events overnight.  Pt son is at the bedside today. Pt is is not interactive in conversation at all with his son or with me today. He is not able to voice any concerns. I had a long conversation today with son at bedside, I feel his father is suffering (he is bed bound, in soft restraints, moaning in bed, and sleeps/somnolent the entire day). and that it is highly unlikely he will improve. His son feels differently, they feel his father would want to continue care even if there was a very small chance of improvement. He wants his father to try to eat food by mouth, but I explained to him that is dangerous as he is not alert enough to swallow.     I spoke with his son, Dr. Bill Joseph, who is a physician in [place] and updated him on his father's current clinical status and my concerns about his overall prognosis. He understood. He again agreed that for now they would like to continue current cares and will have more discussions with his brother and sister about his father's wishes and get back to me in the coming days.    Review of Systems:  Review of Systems   Unable to perform ROS: Medical condition       Physical Exam:  Physical Exam  Constitutional:       General: He is not in acute distress.     Appearance: He is ill-appearing.   HENT:      Head: Normocephalic and atraumatic.      Right Ear: External ear normal.      Left Ear: External ear normal.      Nose: Nose normal.      Mouth/Throat:      Pharynx: Oropharynx is clear. No oropharyngeal exudate.   Eyes:      General: No scleral icterus.     Conjunctiva/sclera: Conjunctivae normal.   Cardiovascular:      Rate and Rhythm: Regular rhythm. Tachycardia present.   Pulmonary:      Effort: Pulmonary effort is normal. No respiratory  distress.      Breath sounds: Rales present.   Abdominal:      General: Abdomen is flat. Bowel sounds are normal. There is no distension.      Palpations: Abdomen is soft.   Musculoskeletal:      Cervical back: Neck supple. No rigidity.      Right lower leg: Edema present.      Left lower leg: Edema present.   Skin:     General: Skin is warm.      Coloration: Skin is pale. Skin is not jaundiced.      Findings: Bruising present.   Neurological:      General: No focal deficit present.   Psychiatric:      Comments: Patient withdrawn, somnolent         Labs:          Recent Labs     24   SODIUM 138 133* 131*   POTASSIUM 3.7 3.5* 3.8   CHLORIDE 105 100 99   CO2 26 27 26   BUN 34* 28* 26*   CREATININE 0.60 0.47* 0.53   MAGNESIUM 1.5 1.3* 1.8   PHOSPHORUS 2.8 2.3* 2.3*   CALCIUM 7.6* 7.5* 7.3*     Recent Labs     240 24  0905 24   ALTSGPT  --   --   --  23   ASTSGOT  --   --   --  28   ALKPHOSPHAT  --   --   --  115*   TBILIRUBIN  --   --   --  2.5*   PREALBUMIN  --  4.5*  --   --    GLUCOSE 128*  --  126* 102*     Recent Labs     245 24   RBC 2.98* 3.47* 2.92*   HEMOGLOBIN 8.8* 10.4* 8.7*   HEMATOCRIT 25.3* 29.2* 24.2*   PLATELETCT 4* 18* 5*     Recent Labs     245 24   WBC 0.6* 0.5* 0.8*   NEUTSPOLYS 0.90* 0.00* 0.90*   LYMPHOCYTES 98.20* 100.00* 99.10*   MONOCYTES 0.90 0.00 0.00   EOSINOPHILS 0.00 0.00 0.00   BASOPHILS 0.00 0.00 0.00   ASTSGOT  --   --  28   ALTSGPT  --   --  23   ALKPHOSPHAT  --   --  115*   TBILIRUBIN  --   --  2.5*     Recent Labs     24  0040 24  0015 24  0015   SODIUM 138 133* 131*   POTASSIUM 3.7 3.5* 3.8   CHLORIDE 105 100 99   CO2 26 27 26   GLUCOSE 128* 126* 102*   BUN 34* 28* 26*   CREATININE 0.60 0.47* 0.53   CALCIUM 7.6* 7.5* 7.3*     Hemodynamics:  Temp (24hrs), Av.8 °C (98.2 °F), Min:36.3 °C (97.3 °F),  Max:37.3 °C (99.2 °F)  Temperature: 37 °C (98.6 °F)  Pulse  Av.1  Min: 62  Max: 127   Blood Pressure : (!) 158/84     Respiratory:    Respiration: (!) 26, Pulse Oximetry: 94 %     Work Of Breathing / Effort: Mild;Shallow  RUL Breath Sounds: Crackles;Diminished, RML Breath Sounds: Crackles;Diminished, RLL Breath Sounds: Diminished, YAZAN Breath Sounds: Crackles;Diminished, LLL Breath Sounds: Diminished  Fluids:    Intake/Output Summary (Last 24 hours) at 2024 1135  Last data filed at 2024 0900  Gross per 24 hour   Intake 912 ml   Output 800 ml   Net 112 ml        GI/Nutrition:  Orders Placed This Encounter   Procedures    Diet NPO - Okay to give medications through NG/OG     Okay to give medications through NG/OG     Standing Status:   Standing     Number of Occurrences:   1     Order Specific Question:   Clamp Time     Answer:   Other (Please specify)     Comments:   Ice chips okay per slp     Order Specific Question:   Clamp Time     Answer:   0     Comments:   Ice chips okay per SLP    Diet: Diet Tube Feed; Formula: Nepro; Nepro: Nepro RTH; Goal Rate (mL/Hour): 45; Duration: 24 HR     Initiate Nepro with CARBSTEADY at 15 ml/hr continuous and advance by 15 mL q12hrs to goal rate of 45 ml/hr.     Standing Status:   Standing     Number of Occurrences:   1     Order Specific Question:   Diet     Answer:   Diet Tube Feed [35]     Order Specific Question:   Formula:     Answer:   Nepro     Order Specific Question:   Nepro:     Answer:   Nepro RTH     Order Specific Question:   Goal Rate (mL/Hour)     Answer:   45     Order Specific Question:   Route     Answer:   Enteral Tube     Order Specific Question:   Duration     Answer:   24 HR     Medical Decision Making, by Problem:  Active Hospital Problems    Diagnosis     *Multifocal pneumonia [J18.9]     Septic shock (HCC) [A41.9, R65.21]     ESTHER (acute kidney injury) (HCC) [N17.9]     Goals of care, counseling/discussion [Z71.89]     Respiratory failure  (HCC) [J96.90]     Dysphagia [R13.10]     Elevated troponin [R79.89]     Elevated LFTs [R79.89]     Pressure injury of deep tissue of sacral region [L89.156]     Aplastic anemia (HCC) [D61.9]     Neutropenic fever (HCC) [D70.9, R50.81]     Hypophosphatemia [E83.39]     Hypomagnesemia [E83.42]     Petechial rash [R23.3]     Hypertension [I10]     GERD (gastroesophageal reflux disease) [K21.9]     Seasonal allergies [J30.2]     Severe malnutrition (HCC) [E43]     MRSA pneumonia (HCC) [J15.212]     Dyslipidemia [E78.5]     Type 2 diabetes mellitus, without long-term current use of insulin (HCC) [E11.9]     Thrombocytopenia (HCC) [D69.6]        Assessment and Plan:    Severe aplastic anemia - he was initially diagnosed in 2024.  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  On 2024 he was transferred for consideration of initiation of cyclosporine and eltrombopag.  We are currently working on obtaining outpatient delivery of eltrombopag due to supply in hospital - this has been denied by insurance and an appeal is in process.     - Currently on cyclosporine, level drawn and results pending from reference lab  - Promacta pending  - Overall prognosis is very guarded - see above discussions with patient's sons.     Neutropenic fever  MRSA pneumonia, completed antibiotic course.  He will remain at high risk for infections given prolonged neutropenic expected.  He is on posaconazole and mepron pxx     B12 deficiency  Continue monthly B12 injections; next due 2024     TRANSFUSION THRESHOLDS:  RBCs and PLTs must be leukoreduced, CMV negative, and irradiated  Hgb <7 if no symptoms or <8 with symptoms or bleedinu pRBC  PLTs <10 and no symptoms or <20 if febrile, septic, or bleeidnu apheresis PLTs     Quality-Core Measures   Reviewed items::  Labs reviewed and Medications reviewed  DVT prophylaxis pharmacological::  Contraindicated - High bleeding risk    Will Continue to  follow.    Lucia Griffin MD  [facility]  [phone]

## 2024-11-20 NOTE — PROGRESS NOTES
Hospital Medicine Daily Progress Note    Date of Service  11/19/2024    Chief Complaint  Miri Joseph is a 84 y.o. male admitted 11/6/2024 with inpatient chemotherapy for aplastic anemia.    Hospital Course  84 y.o. male who presented 11/6/2024 with history of type 2 diabetes, hypertension, GERD, dyslipidemia, and recently diagnosed with aplastic anemia.  Patient was transferred to our care from [facility] to initiate inpatient chemotherapy.  He had been admitted there on October 19 for treatment of neutropenic fever he was treated with a course of linezolid for MRSA pneumonia.  On arrival here he was on prophylactic posaconazole, fluoroquinolone, and acyclovir.  Repeat sputum cultures here were positive for MRSA, the patient was started on linezolid on November 9 and on that same day he was moved to the ICU due to worsening respiratory status.  In the ICU the patient was on high flow nasal cannula and did require IV vasopressors he was initiated on tube feeds as well.  Oncology and ID following closely.       Interval Problem Update  11/18/2024  Seen and examined at bedside  Daughter at bedside, I was able to communicate with him in ai   Patient is only alert to name, following command appropriately  Moving all extremities  Now on room air saturating over 90%  Labs notable for leukopenia, severe neutropenia, hemoglobin 8.8, platelet count 4K,  sodium 138 potassium 3.7, renal function stable  Chest x-ray reviewed noted with worsening pneumonitis     Continue on cefazolin, atovaquone, acyclovir, posaconazole  Continue on on cyclosporine  Waiting for Promacta  Repeat BMP in a.m. to monitor electrolytes and renal function  Repeat CBC in a.m. to monitor white count and hemoglobin  Requiring IV antibiotics, need close monitoring for toxicity  Case discussed with oncology Dr. Lua .   Patient has high risk for deterioration.  Palliative care has been consulted  High risk of deterioration  into septic shock and worsening respiratory failure.  Need close monitoring.        I had extensive discussion with patient's daughter at bedside regarding the current medical status, treatment plan and prognosis. She  was briefed on the patient's condition including recent changes or developments .  Updated with recent test results vital signs and symptoms.  Current treatment plan was explained in details including medications.  All question answered. Family is aware of her poor prognosis.  They would like to continue treatment plan.  I have consulted palliative care.    Above per previous hospitalist.    11/19/24  Patient was seen and examined on the oncology floor.  Patient is continue to require restraints.    Discussed with Dr. Griffin of oncology, who is recommending ongoing goals of care discussions given poor prognosis.  Discussed with daughter at bedside regarding poor prognosis and that the patient will likely aspirate again..  Would like to continue medical treatment and watch 2 more weeks to see if the patient will get better.  Patient is not responding to questions.  He is not following any commands.  Continues on ceftaroline for MRSA multifocal pneumonia as well as atovaquone.  Cyclosporine and posaconazole.  ANC remains undetectable.  Potassium of 3.5 and magnesium of 1.3 being replaced.    Tolerating tube feeds through NG tube.  Showing signs of refeeding syndrome started on thiamine.  Discussed with registered dietitian.      I have discussed this patient's plan of care and discharge plan at IDT rounds today with Case Management, Nursing, Nursing leadership, and other members of the IDT team.    Consultants/Specialty  critical care, oncology, and palliative care    Code Status  DNAR/DNI    Disposition  The patient is not medically cleared for discharge to home or a post-acute facility.  Anticipate discharge to: a long-term acute care hospital    I have placed the appropriate orders for  post-discharge needs.    Review of Systems  Review of Systems   Unable to perform ROS: Mental acuity        Physical Exam  Temp:  [36.5 °C (97.7 °F)-37.3 °C (99.2 °F)] 37.3 °C (99.2 °F)  Pulse:  [] 90  Resp:  [22-28] 28  BP: (140-165)/(62-93) 140/62  SpO2:  [92 %-95 %] 92 %    Physical Exam  Vitals and nursing note reviewed. Exam conducted with a chaperone present.   Constitutional:       General: He is not in acute distress.     Appearance: He is ill-appearing.   HENT:      Head: Normocephalic and atraumatic.      Mouth/Throat:      Mouth: Mucous membranes are moist.      Pharynx: Oropharynx is clear. No oropharyngeal exudate.   Eyes:      General: No scleral icterus.        Right eye: No discharge.         Left eye: No discharge.      Conjunctiva/sclera: Conjunctivae normal.   Cardiovascular:      Rate and Rhythm: Normal rate and regular rhythm.      Pulses: Normal pulses.      Heart sounds: Normal heart sounds. No murmur heard.  Pulmonary:      Effort: Pulmonary effort is normal. No respiratory distress.      Breath sounds: Normal breath sounds.   Abdominal:      General: Abdomen is flat. Bowel sounds are normal. There is no distension.      Palpations: Abdomen is soft.   Musculoskeletal:         General: No swelling.      Cervical back: Neck supple. No tenderness.      Right lower leg: No edema.      Left lower leg: No edema.   Skin:     General: Skin is warm and dry.      Coloration: Skin is pale.   Neurological:      Mental Status: He is alert. Mental status is at baseline.      Motor: Weakness present.      Comments: Not responding verbally.  Not following following any commands.  Moves extremities spontaneously with severe weakness.   Psychiatric:      Comments: Unable to assess         Fluids    Intake/Output Summary (Last 24 hours) at 11/19/2024 1852  Last data filed at 11/19/2024 1700  Gross per 24 hour   Intake 1100 ml   Output 1775 ml   Net -675 ml        Laboratory  Recent Labs     11/17/24  0143  11/18/24  0040 11/19/24  0015   WBC 0.7* 0.6* 0.5*   RBC 2.40* 2.98* 3.47*   HEMOGLOBIN 6.8* 8.8* 10.4*   HEMATOCRIT 20.3* 25.3* 29.2*   MCV 84.6 84.9 84.1   MCH 28.3 29.5 30.0   MCHC 33.5 34.8 35.6   RDW 51.5* 47.8 47.3   PLATELETCT 18* 4* 18*   MPV 11.2 10.2 10.2     Recent Labs     11/17/24  0145 11/18/24  0040 11/19/24  0015   SODIUM 138 138 133*   POTASSIUM 3.8 3.7 3.5*   CHLORIDE 107 105 100   CO2 27 26 27   GLUCOSE 149* 128* 126*   BUN 33* 34* 28*   CREATININE 0.72 0.60 0.47*   CALCIUM 8.0* 7.6* 7.5*                   Imaging  DX-ABDOMEN FOR TUBE PLACEMENT   Final Result      Orogastric tube tip at the proximal stomach.      DX-CHEST-LIMITED (1 VIEW)   Final Result      1.  Similar to slightly worsened multifocal airspace opacities throughout both lungs suggestive of pneumonitis.   2.  Increased small right effusion.      DX-CHEST-PORTABLE (1 VIEW)   Final Result         1.  Pulmonary edema and/or infiltrates, similar to prior study.      DX-ABDOMEN FOR TUBE PLACEMENT   Final Result         1.  Nonspecific bowel gas pattern in the upper abdomen.   2.  Nasogastric tube tip terminates overlying the expected location of the gastric body.   3.  Patchy bilateral pulmonary infiltrates      DX-ABDOMEN FOR TUBE PLACEMENT   Final Result      1. Appropriate position of the nasogastric tube, replaced in the interval.   2. The left upper extremity PICC catheter now terminates within the azygos vein. Repositioning is recommended.   3. The remainder is stable.      DX-ABDOMEN FOR TUBE PLACEMENT   Final Result      Malpositioned enteric feeding tube likely within the right lower lobe bronchus.      Findings were conveyed to EMILIO Sutton in care of the patient on 11/11/2024 1:59 PM.      IR-PICC LINE PLACEMENT W/ GUIDANCE > AGE 5   Final Result                  Ultrasound-guided PICC placement performed by qualified nursing staff as    above.          DX-ABDOMEN FOR TUBE PLACEMENT   Final Result      1.  Enteric tube has been  placed and the tip projects over the stomach.      2.  Nonspecific pulmonary airspace process      US-RUQ   Final Result         1. Normal gallbladder and right upper quadrant ultrasound.      2. Small right pleural effusion.      CT-SOFT TISSUE NECK WITH   Final Result         1. No airway narrowing. Mild uvula swelling suggested. The epiglottis and larynx appear normal.      2. Upper normal size cervical lymph nodes. No cervical mass otherwise.      3. Trace right mastoid sinus fluid. No paranasal sinus fluid. The middle ears are clear.      4. The visible upper chest again shows dense infiltrates with small pleural effusions and mild upper mediastinal adenopathy.      CT-CHEST (THORAX) W/O   Final Result      1.  Multifocal bilateral pulmonary airspace process most consistent with pneumonia, with interval worsening      2.  Bilateral pleural effusion, most small in size, right greater than left      3.  Single enlarged prevascular space lymph node which is most likely reactive      Fleischner Society pulmonary nodule recommendations:   Not Applicable         DX-CHEST-PORTABLE (1 VIEW)   Final Result         1.  Patchy bilateral pulmonary infiltrates, similar to prior study      DX-CHEST-PORTABLE (1 VIEW)   Final Result      1.  Unchanged BILATERAL pneumonia   2.  Possible RIGHT pleural effusion      DX-CHEST-2 VIEWS   Final Result         1.  Pulmonary edema and/or infiltrates.   2.  Trace left pleural effusion   3.  Atherosclerosis           Assessment/Plan  * Multifocal pneumonia- (present on admission)  Assessment & Plan    Present on admission  Source respiratory  MRSA from sputum  Possible fungal: beta D glucan negative.   Serum Aspergillus galactomannan, Coccidioides serology are still pending.  Recent Karius at outside hospital was negative for fungal organisms   Probable aspiration pneumonia  ID managing antibiotics: Have stopped vancomycin due to renal function, not giving linezolid due to platelet  count.  Currently on ceftaroline, acyclovir and posaconazole the last two for prophylaxis  Prophylactic agents ongoing for severe neutropenia  Amphotericin discontinued by ID  Adequately fluid resuscitated      11/18/2024    Now on room air saturating over 90%  Continue on ceftaroline, atovaquone, acyclovir, posaconazole  Continue on on cyclosporine  Waiting for Promacta  Monitor oxygen recommend closely  Repeat BMP in a.m. to monitor electrolytes and renal function  Repeat CBC in a.m. to monitor white count and hemoglobin  Requiring IV antibiotics, need close monitoring for toxicity  Case discussed with oncology Dr. Lua .   Patient has high risk for deterioration.  Palliative care has been consulted  High risk of deterioration into septic shock and worsening respiratory failure.  Need close monitoring.    11/19/2024  Continues to remain on room air  Continue ceftaroline, atovaquone, acyclovir, and posaconazole  Continue cyclosporine  Continue to monitor for severe bone marrow toxicity requiring further transfusion    Refeeding syndrome- (present on admission)  Assessment & Plan  11/19/2024  Discussed with registered dietitian.  Electrolyte disturbances are concerning for mild refeeding syndrome.  I have started patient on thiamine per recommendations.  Continue to monitor electrolytes  Initiated remote cardiac monitoring to monitor for arrhythmias  Continue restraints as needed    Hypokalemia- (present on admission)  Assessment & Plan  11/19/2024  Likely from poor oral intake likely from poor oral intake and refeeding syndrome  Continue K-Lyte for goal greater than 4    Encounter for hospice care discussion- (present on admission)  Assessment & Plan  11/19/2024  Per discussion Dr. Griffin of oncology recommending further goals of care discussion given poor clinical state and poor prognosis.  I discussed at length with the patient's daughter at bedside regarding patient's likelihood of aspirating again although  he is improving with IV antibiotics appears to be somewhat stable at this time.  She stated that the patient previously had pneumonia and improved with antibiotics but then deteriorated after he ate again.  I explained to her that this is likely indication that the patient is nearing the end of his life.  I explained the option of a G-tube, which could decrease the risk but not eliminate the risk of aspiration.  It may only prolong his suffering and inevitable death.  I explained that the patient had not been improving in the past 2 weeks, which is considered a prolonged hospitalization.  Daughter appeared to have difficulty grasping this message and say that she 1 to see how the patient will proceed in the next 2 weeks.      Discussed with case management and nursing leadership.  Recommending discharge to LTAC.  I placed referral for LTAC.    Advance care planning total time 16 minutes    ESTHER (acute kidney injury) ([facility])  Assessment & Plan  Likely sepsis primarily with contribution from medications  Adequately resuscitated  UOP 600ml given body weight of 70Kg this is borderline adequate output  Renally dose medications as appropriate  Serial BMP  Monitor UOP  Not a candidate for RRT         Septic shock ([facility])  Assessment & Plan  Present on admission  Source respiratory  MRSA from sputum  Possible fungal: beta D glucan negative.   Serum Aspergillus galactomannan, Coccidioides serology are still pending.  Recent Karius at outside hospital was negative for fungal organisms   Probable aspiration pneumonia  Completed steroids 11/14  Stable off of midodrine and steroids  ID managing antibiotics: Have stopped vancomycin due to renal function, not giving linezolid due to platelet count.  Currently on ceftaroline, acyclovir and posaconazole the last two for prophylaxis  Prophylactic agents ongoing for severe neutropenia  Amphotericin discontinued by ID  Adequately fluid resuscitated        Respiratory failure  (HCC)  Assessment & Plan  Pneumonia   MRSA positive cultures from sputum  Possible fungal etiology: Beta D glucan neg.  Other serologies pending as noted above  DNR/DNI confirmed with daughter  Moved to unit for aggressive pulmonary toilet and oral care  O2 demand has decreased and he is now on RA/2 LNC  RT protocols  Oral care every-2 hours  Aspiration precautions, swallow eval pending  Hydrocortisone added to antibiotic regimen for severe pneumonia with respiratory failure ID managing antibiotics  If patient clinically deteriorates transition to comfort care/hospice, discussed with daughter and son who are in agreement         Elevated LFTs  Assessment & Plan  improving  RUQUS ordered-> normal  Avoid hepatotoxins      Dysphagia  Assessment & Plan  Still has altered level of conciousness  If he starts to clear will re consult SLP  [employer] if very high risk for aspiration  Currently getting nutrition via NGT  Oral care every 1-2-hour  CT soft tissue neck without airway issue or obvious abscess/obstructing process  Now more alert and off HFNC: re consult SLP      Severe malnutrition (HCC)- (present on admission)  Assessment & Plan  Nutrition following  Family okay with soft flexible feeding tube and enteral nutrition  Currently at goal on tube feeds  Last seen by SLP on 11/11; pt now more alert.  Will ask them to re-evaluate      GERD (gastroesophageal reflux disease)- (present on admission)  Assessment & Plan  With hydrocortisone and thrombocytopenia -continue IV PPI daily for prophylaxis  Antireflux measures       Hypomagnesemia- (present on admission)  Assessment & Plan  11/19/2024  Magnesium down to 1.3.  Likely from refeeding syndrome  I will order replacement for Imer 4.    Hypophosphatemia- (present on admission)  Assessment & Plan  11/19/2024  Likely from refeeding syndrome  Neutra-Phos I ordered hypophosphatemia    Aplastic anemia (HCC)- (present on admission)  Assessment & Plan  BM biopsy consistent with  severe aplastic anemia     Diagnostic evaluation in september and at [facility], viral studies negative, hepatitis panel negative,   Cyclosporine resumed at 110mg BID 11/14  Promacta (eltrombopag) for thrombocytopenia 75 mg daily  Continue prophylactic antimicrobials  B12/MMA, folate levels resent, previously low     All blood products irradiated/leukophoresed  Tranfuse PRBC for <7  Transfuse platelets < 10, may warrant higher threshold given possibility of DAH      Discuss with hematology regarding prognosis, not responsive to G-CSF in the past    11/19/2024  Discussed with Dr. Griffin of oncology.  Recommending further goals of care discussion given poor prognosis    MRSA pneumonia (HCC)- (present on admission)  Assessment & Plan  Was on linezolid and vancomycin  Continue ceftaroline for now   isolation precautions  ID following    11/19/2024  Continue ceftaroline      Dyslipidemia- (present on admission)  Assessment & Plan  11/19/2024  As per history.  Defer statin at this time given multiple other comorbidities and active medical issues.    Type 2 diabetes mellitus, without long-term current use of insulin (HCC)- (present on admission)  Assessment & Plan  Patient has been quite hyperglycemic  We have titrated up his glargine: currently on 45 units  Now running mid to high 100s; monitor over next 24hrs and titrate glargine as appropriate  Continue a high sliding scale  A1c 7.32 months ago    Thrombocytopenia (HCC)- (present on admission)  Assessment & Plan  Secondary to pancytopenia from aplastic anemia  Back in single digits again today: giving one unit of platelets  No signs of active bleeding on exam today    11/19/2024  No further transfusion required overnight  Platelets improving to 18 following transfusion yesterday      Pancytopenia (HCC)  Assessment & Plan  BM biopsy consistent with severe aplastic anemia  Also consider infection/nutritional disorder/toxic myelosuppression.      Diagnostic evaluation in  september and at [facility], viral studies negative, hepatitis panel negative,   Oncology following   On cyclosporine 100 bid, but cannot take orals currently  Promacta (eltrombopag) for thrombocytopenia 75 mg daily  Continue prophylactic antimicrobials  B12/MMA, folate levels resent, previously low     Tranfuse PRBC for <7  Transfuse platelets < 10, may warrant higher threshold given possibility of DAH though CT doesn't look strikingly like DAH  CT more likely MRSA pneumonia with positive cultures     Discuss with hematology regarding prognosis, not responsive to G-CSF in the past        VTE prophylaxis:   SCDs/TEDs   pharmacologic prophylaxis contraindicated due to Thrombocytopenia      I have performed a physical exam and reviewed and updated ROS and Plan today (11/19/2024). In review of yesterday's note (11/18/2024), there are no changes except as documented above.      Patient is medically complex and high risk of deterioration and death.

## 2024-11-20 NOTE — ASSESSMENT & PLAN NOTE
11/19/2024  Likely from poor oral intake likely from poor oral intake and refeeding syndrome  Continue K-Lyte for goal greater than 4

## 2024-11-20 NOTE — ASSESSMENT & PLAN NOTE
11/19/2024  Magnesium down to 1.3.  Likely from refeeding syndrome  I will order replacement for goal greater than 4.    11/20/2024  magnesium of 1.8 being replaced.

## 2024-11-20 NOTE — CARE PLAN
The patient is Unstable - High likelihood or risk of patient condition declining or worsening    Shift Goals  Clinical Goals: monitor labs and saftey  Patient Goals: VALERIO  Family Goals: pt to tolerate po intake    Progress made toward(s) clinical / shift goals:  Progress made toward(s) clinical / shift goals:  Maintained the patients safety throughout the shift by accessing his soft restraints every 2 hours. Continued q2 turns with wedges to protect the patients skin integrity and applied barrier paste to prevent skin breakdown. Requested medication to reduce restlessness and agitation to allow the patient to rest. Continued IV abx and administered one unit of irradiated platelets.

## 2024-11-20 NOTE — ASSESSMENT & PLAN NOTE
11/19/2024  Discussed with registered dietitian.  Electrolyte disturbances are concerning for mild refeeding syndrome.  I have started patient on thiamine per recommendations.  Continue to monitor electrolytes  Initiated remote cardiac monitoring to monitor for arrhythmias  Continue restraints as needed

## 2024-11-20 NOTE — THERAPY
"Speech Language Pathology   Daily Treatment     Patient Name: Miri Joseph  AGE:  84 y.o., SEX:  male  Medical Record #: [id number]  Date of Service: 11/19/2024      Precautions:  Precautions: Fall Risk, Swallow Precautions, Nasogastric Tube       Subjective  Daughter eager and encouraging. Patient not consistently agreeable. \"Warm water.\"      Assessment  Pt seen for dysphagia management. On RA, seated upright in bed, NGT in situ. Tsp of warm TN0 water provided x3, tsp of ice chips provided x1, which patient expectorated (dtr reports he does not like ice). Immediate prolonged cough following 50% of trials. Anterior loss of partial bolus across trials. Cough was ultimately productive for large collection of thick, slightly bloody secretions, mixed with what may have been regurgitated tube feedings (dark-arron yellow and of different consistency than secretions). These were suctioned out via EMILIO Stapleton updated.     Discussed options for dysphagia management with patient's daughter.       Clinical Impressions  Pt presents with decreased DOM and participation this date as compared to prior session. Aspiration risk from oral intake appears HIGH with indication to continue NPO at this time. Patient also appears to be at risk for malnutrition should NGT be discontinued as he is refusing some trials and requiring max encouragement for small sips. SLP to continue to follow for dysphagia management and determine readiness for diagnotic evaluation.     Pt noted with improved participation when children are present. Per son, one family member is generally present from 900-1200, another is present from 8027-9303, and then someone is generally present in the evening.       Recommendations  NPO / NGT   - Frequent, diligent oral care  - Okay for limited ice chips when awake/when patient desired under direct spv from staff/family  Instrumentation: Instrumental swallow study pending clinical progress  Medication: Non Oral  Oral " "Care: Q2h      SLP Treatment Plan  Treatment Plan: Dysphagia Treatment, Patient/Family/Caregiver Training  SLP Frequency: 5x Per Week  Estimated Duration: Until Therapy Goals Met      Anticipated Discharge Needs  Discharge Recommendations: Recommend post-acute placement for additional speech therapy services prior to discharge home  Therapy Recommendations Upon DC: Dysphagia Training, Community Re-Integration, Patient / Family / Caregiver Education      Patient / Family Goals  Patient / Family Goal #1: \"He was consuming it all\" per dtr  Goal #1 Outcome: Progressing slower than expected  Short Term Goals  Short Term Goal # 1: Pt will complete instrumental assessment to determine his swallow physiology and POC.  Goal Outcome # 1: Progressing slower than expected  Short Term Goal # 2: Pt will participate in prfdg to determine readiness for diagnostic evaluation vs. PO diet.  Goal Outcome # 2 : Goal not met      ASMITA Schulz  "

## 2024-11-21 PROBLEM — K59.00 CONSTIPATION: Status: ACTIVE | Noted: 2024-01-01

## 2024-11-21 PROBLEM — J90 BILATERAL PLEURAL EFFUSION: Status: ACTIVE | Noted: 2024-01-01

## 2024-11-21 PROBLEM — J69.0 ASPIRATION PNEUMONIA (HCC): Status: ACTIVE | Noted: 2024-01-01

## 2024-11-21 NOTE — PROGRESS NOTES
Hypoglycemia Intervention    Hypoglycemia protocol intervention:  Blood glucose 63 at )635.  Intervention: 25 g IV dextrose per MAR   Repeat blood glucose 141 at 712.  Intervention: No further interventions needed.  Additional interventions needed: Passed to day shift nurse to re-check blood glucose.

## 2024-11-21 NOTE — PROGRESS NOTES
Patient had an episode of aspiration at 0245. Notified MD and provided suction for the patient. MD informed to closely monitor patient and pause tube feed.

## 2024-11-21 NOTE — PROGRESS NOTES
"Oncology/Hematology Progress Note               Author: Lucia Griffin M.D. Date & Time created: 11/21/2024  1:46 PM     Reason for Admission/Consult: Aplastic Anemia  Primary Heme/Onc: Dr. Molina    Interval History:  No acute events overnight.  Pt daughter is at the bedside today.  She admits he has been more sleepy today.  He has not been interacting in conversation.  She feels that if he can just get a little bit stronger and eat something that the family would be willing to take him home and care for him.  When I asked to the question \"do you feel like he would want to live this way?\" She responded he would at this time. I expressed my concerns to her about his potential recovery which in my opinion is nearing 0%. She understood and will discuss with her family. Pt is not able to participate in any conversation today, somnolent, resting in bed.    Review of Systems:  Review of Systems   Unable to perform ROS: Medical condition       Physical Exam:  Physical Exam  Constitutional:       General: He is not in acute distress.     Appearance: He is ill-appearing.   HENT:      Head: Normocephalic and atraumatic.      Right Ear: External ear normal.      Left Ear: External ear normal.      Nose: Nose normal.      Mouth/Throat:      Pharynx: Oropharynx is clear. No oropharyngeal exudate.   Eyes:      General: No scleral icterus.     Conjunctiva/sclera: Conjunctivae normal.   Cardiovascular:      Rate and Rhythm: Regular rhythm. Tachycardia present.   Pulmonary:      Effort: Pulmonary effort is normal. No respiratory distress.      Breath sounds: Rales present.   Abdominal:      General: Abdomen is flat. Bowel sounds are normal. There is no distension.      Palpations: Abdomen is soft.   Musculoskeletal:      Cervical back: Neck supple. No rigidity.      Right lower leg: Edema present.      Left lower leg: Edema present.   Skin:     General: Skin is warm.      Coloration: Skin is pale. Skin is not jaundiced.      " Findings: Bruising present.   Neurological:      General: No focal deficit present.   Psychiatric:      Comments: Patient withdrawn, somnolent         Labs:  Recent Labs     24  1906   MDTFC15E 7.60*   SQLREB099B 27.3*   YSAVA991N 56.2*   AYJH1PML 90.2*   ARTHCO3 26   [id number] room air   ARTBE 4*         Recent Labs     24  0015 24  0015 24  0020   SODIUM 133* 131* 128*   POTASSIUM 3.5* 3.8 3.6   CHLORIDE 100 99 96   CO2 27 26 26   BUN 28* 26* 28*   CREATININE 0.47* 0.53 0.68   MAGNESIUM 1.3* 1.8 1.7   PHOSPHORUS 2.3* 2.3* 3.2   CALCIUM 7.5* 7.3* 7.5*     Recent Labs     24  0015 24  0015 24  0020   ALTSGPT  --     ASTSGOT  --     ALKPHOSPHAT  --  115* 126*   TBILIRUBIN  --  2.5* 2.8*   GLUCOSE 126* 102* 95     Recent Labs     24  0015 24  0015 24  0020   RBC 3.47* 2.92* 2.37*   HEMOGLOBIN 10.4* 8.7* 7.0*   HEMATOCRIT 29.2* 24.2* 19.5*   PLATELETCT 18* 5* 20*     Recent Labs     24  0015 24  0015 24  0020   WBC 0.5* 0.8* 0.6*   NEUTSPOLYS 0.00* 0.90* 1.00*   LYMPHOCYTES 100.00* 99.10* 99.00*   MONOCYTES 0.00 0.00 0.00   EOSINOPHILS 0.00 0.00 0.00   BASOPHILS 0.00 0.00 0.00   ASTSGOT  --   29   ALTSGPT  --     ALKPHOSPHAT  --  115* 126*   TBILIRUBIN  --  2.5* 2.8*     Recent Labs     24  0015 24  0015 24  0020   SODIUM 133* 131* 128*   POTASSIUM 3.5* 3.8 3.6   CHLORIDE 100 99 96   CO2 27 26 26   GLUCOSE 126* 102* 95   BUN 28* 26* 28*   CREATININE 0.47* 0.53 0.68   CALCIUM 7.5* 7.3* 7.5*     Hemodynamics:  Temp (24hrs), Av.8 °C (98.3 °F), Min:36.2 °C (97.1 °F), Max:37.2 °C (99 °F)  Temperature: (P) 36.6 °C (97.8 °F)  Pulse  Av  Min: 62  Max: 127   Blood Pressure : (P) 131/58     Respiratory:    Respiration: (!) (P) 24, Pulse Oximetry: (P) 96 %     Work Of Breathing / Effort: Mild;Moderate;Tachypnea  RUL Breath Sounds: Diminished, RML Breath Sounds: Diminished, RLL Breath Sounds: Diminished, YAZAN  Breath Sounds: Diminished, LLL Breath Sounds: Diminished  Fluids:    Intake/Output Summary (Last 24 hours) at 11/18/2024 1135  Last data filed at 11/18/2024 0900  Gross per 24 hour   Intake 912 ml   Output 800 ml   Net 112 ml        GI/Nutrition:  Orders Placed This Encounter   Procedures    Diet NPO - Okay to give medications through NG/OG     Okay to give medications through NG/OG     Standing Status:   Standing     Number of Occurrences:   1     Order Specific Question:   Clamp Time     Answer:   Other (Please specify)     Comments:   Ice chips okay per slp     Order Specific Question:   Clamp Time     Answer:   0     Comments:   Ice chips okay per SLP    Diet: Diet Tube Feed; Formula: Nepro; Nepro: Nepro RTH; Goal Rate (mL/Hour): 45; Duration: 24 HR     Initiate Nepro with CARBSTEADY at 15 ml/hr continuous and advance by 15 mL q12hrs to goal rate of 45 ml/hr.     Standing Status:   Standing     Number of Occurrences:   1     Order Specific Question:   Diet     Answer:   Diet Tube Feed [35]     Order Specific Question:   Formula:     Answer:   Nepro     Order Specific Question:   Nepro:     Answer:   Nepro RTH     Order Specific Question:   Goal Rate (mL/Hour)     Answer:   45     Order Specific Question:   Route     Answer:   Enteral Tube     Order Specific Question:   Duration     Answer:   24 HR     Medical Decision Making, by Problem:  Active Hospital Problems    Diagnosis     *Multifocal pneumonia [J18.9]     Septic shock (HCC) [A41.9, R65.21]     ESTHER (acute kidney injury) (HCC) [N17.9]     Goals of care, counseling/discussion [Z71.89]     Respiratory failure (HCC) [J96.90]     Dysphagia [R13.10]     Elevated troponin [R79.89]     Elevated LFTs [R79.89]     Pressure injury of deep tissue of sacral region [L89.156]     Aplastic anemia (HCC) [D61.9]     Neutropenic fever (HCC) [D70.9, R50.81]     Hypophosphatemia [E83.39]     Hypomagnesemia [E83.42]     Petechial rash [R23.3]     Hypertension [I10]     GERD  (gastroesophageal reflux disease) [K21.9]     Seasonal allergies [J30.2]     Severe malnutrition (HCC) [E43]     MRSA pneumonia (HCC) [J15.212]     Dyslipidemia [E78.5]     Type 2 diabetes mellitus, without long-term current use of insulin (HCC) [E11.9]     Thrombocytopenia (HCC) [D69.6]        Assessment and Plan:    Severe aplastic anemia - he was initially diagnosed in 2024.  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  On 2024 he was transferred for consideration of initiation of cyclosporine and eltrombopag.  We are currently working on obtaining outpatient delivery of eltrombopag due to supply in hospital - this has been denied by insurance and an appeal is in process.     - Currently on cyclosporine  - Promacta pending  - Overall prognosis is very guarded - pt family not wishing to change course of care at this time.     Neutropenic fever  MRSA pneumonia, completed antibiotic course.  He will remain at high risk for infections given prolonged neutropenic expected.  He is on posaconazole and mepron pxx     B12 deficiency  Continue monthly B12 injections; next due 2024     TRANSFUSION THRESHOLDS:  RBCs and PLTs must be leukoreduced, CMV negative, and irradiated  Hgb <7 if no symptoms or <8 with symptoms or bleedinu pRBC  PLTs <10 and no symptoms or <20 if febrile, septic, or bleeidnu apheresis PLTs     Quality-Core Measures   Reviewed items::  Labs reviewed and Medications reviewed  DVT prophylaxis pharmacological::  Contraindicated - High bleeding risk    Will Continue to follow.    Lucia Griffin MD  [facility]  [phone]

## 2024-11-21 NOTE — DISCHARGE PLANNING
Case Management Discharge Planning    Admission Date: 11/6/2024  GMLOS: 6  ALOS: 15    6-Clicks ADL Score: 6  6-Clicks Mobility Score: 6  PT and/or OT Eval ordered: Yes  Post-acute Referrals Ordered: Yes  Post-acute Choice Obtained: Yes  Has referral(s) been sent to post-acute provider:  Yes      Anticipated Discharge Dispo: Discharge Disposition: Discharged to home/self care (01)    DME Needed: No    Action(s) Taken: Discussed in IDT rounds. Pending [facility] referral. Pt aspirated overnight and tube feeds were put on hold. Pt is still in restraints at this time.     Escalations Completed: None    Medically Clear: No    Next Steps: Pending medical clearance and referral to LTACH.    Barriers to Discharge: Medical clearance    Is the patient up for discharge tomorrow: No

## 2024-11-21 NOTE — PROGRESS NOTES
Received report from NOC shift nurse. Tube feeds on hold due to episode of emesis with possible aspiration. Patient non-responsive except grimacing intermittently. O2 sats dropping to 88% on room air. Placed on 2L nasal cannula with o2 sats in the mid to high 90's. RR 22 with periods of Cheyne Martinez breathing noted. Periods of apnea lasting approximately 2-3 seconds. Patient with moist productive cough. Oral suctioning PRN. BP stable. Tele monitor in place.Temporal temp of 99 in setting of tylenol administration and neutropenia. Repeat FSBG 114 after administration of D50 on [facility] shift. MD notified of all of the above. Stated he will assess. Frequent assessment in place.

## 2024-11-21 NOTE — CARE PLAN
The patient is Unstable - High likelihood or risk of patient condition declining or worsening    Shift Goals  Clinical Goals: Monitor BG and for S/S of bleeding, skin integrity  Patient Goals: VALERIO  Family Goals: Allow for oral intake and comfort    Progress made toward(s) clinical / shift goals:   All questions answered at this time from the family. Patient remained free of falls throughout shift, fall precautions in place. Bed in lowest position, belongings and call light in reach.     Patient is not progressing towards the following goals:      Problem: Knowledge Deficit - Standard  Goal: Patient and family/care givers will demonstrate understanding of plan of care, disease process/condition, diagnostic tests and medications  Outcome: Not Progressing   Patient unable to participate in his plan of care.

## 2024-11-21 NOTE — CARE PLAN
The patient is Stable - Low risk of patient condition declining or worsening      Problem: Knowledge Deficit - Standard  Goal: Patient and family/care givers will demonstrate understanding of plan of care, disease process/condition, diagnostic tests and medications  Outcome: Progressing     Problem: Skin Integrity  Goal: Skin integrity is maintained or improved  Outcome: Progressing       Shift Goals  Clinical Goals: q2 turn, keep patient comfortable  Patient Goals: VALERIO  Family Goals: na      Patient is not progressing towards the following goals:

## 2024-11-21 NOTE — PROGRESS NOTES
NOC APRN CROSS COVER NOTE      Notified by nursing that patient suffered aspiration event on his tube feeding this morning. He has been suctioned and remains stable.     Tube feeding placed on hold. Patient will need to be closely monitored for need to restart antibiotics course.     Alis Thapa ACNPC-AG, NOC Hospitalist ALESSANDRO

## 2024-11-21 NOTE — CARE PLAN
The patient is Unstable - High likelihood or risk of patient condition declining or worsening    Shift Goals  Clinical Goals: Pt will maintain O2 sats greater than 92%  Patient Goals: unable to assess  Family Goals: not present    Progress made toward(s) clinical / shift goals:  ***    Patient is not progressing towards the following goals:      Problem: Knowledge Deficit - Standard  Goal: Patient and family/care givers will demonstrate understanding of plan of care, disease process/condition, diagnostic tests and medications  Outcome: Not Progressing     Problem: Respiratory:  Goal: Respiratory status will improve  Outcome: Not Progressing     Problem: Mobility  Goal: Risk for activity intolerance will decrease  Outcome: Not Progressing     Problem: Nutrition  Goal: Patient's nutritional and fluid intake will be adequate or improve  Outcome: Not Progressing     Problem: Hemodynamics  Goal: Patient's hemodynamics, fluid balance and neurologic status will be stable or improve  Outcome: Not Progressing     Problem: Risk for Aspiration  Goal: Patient's risk for aspiration will be absent or decrease  Outcome: Not Progressing     Problem: Infection - Standard  Goal: Patient will remain free from infection  Outcome: Not Progressing

## 2024-11-21 NOTE — PROGRESS NOTES
Hospital Medicine Daily Progress Note    Date of Service  11/20/2024    Chief Complaint  Miri Joseph is a 84 y.o. male admitted 11/6/2024 with inpatient chemotherapy for aplastic anemia.    Hospital Course  84 y.o. male who presented 11/6/2024 with history of type 2 diabetes, hypertension, GERD, dyslipidemia, and recently diagnosed with aplastic anemia.  Patient was transferred to our care from [facility] to initiate inpatient chemotherapy.  He had been admitted there on October 19 for treatment of neutropenic fever he was treated with a course of linezolid for MRSA pneumonia.  On arrival here he was on prophylactic posaconazole, fluoroquinolone, and acyclovir.  Repeat sputum cultures here were positive for MRSA, the patient was started on linezolid on November 9 and on that same day he was moved to the ICU due to worsening respiratory status.  In the ICU the patient was on high flow nasal cannula and did require IV vasopressors he was initiated on tube feeds as well.  Oncology and ID following closely.       Interval Problem Update  11/18/2024  Seen and examined at bedside  Daughter at bedside, I was able to communicate with him in ai   Patient is only alert to name, following command appropriately  Moving all extremities  Now on room air saturating over 90%  Labs notable for leukopenia, severe neutropenia, hemoglobin 8.8, platelet count 4K,  sodium 138 potassium 3.7, renal function stable  Chest x-ray reviewed noted with worsening pneumonitis     Continue on cefazolin, atovaquone, acyclovir, posaconazole  Continue on on cyclosporine  Waiting for Promacta  Repeat BMP in a.m. to monitor electrolytes and renal function  Repeat CBC in a.m. to monitor white count and hemoglobin  Requiring IV antibiotics, need close monitoring for toxicity  Case discussed with oncology Dr. Lua .   Patient has high risk for deterioration.  Palliative care has been consulted  High risk of deterioration  into septic shock and worsening respiratory failure.  Need close monitoring.        I had extensive discussion with patient's daughter at bedside regarding the current medical status, treatment plan and prognosis. She  was briefed on the patient's condition including recent changes or developments .  Updated with recent test results vital signs and symptoms.  Current treatment plan was explained in details including medications.  All question answered. Family is aware of her poor prognosis.  They would like to continue treatment plan.  I have consulted palliative care.    Above per previous hospitalist.    11/19/24  Patient was seen and examined on the oncology floor.  Patient is continue to require restraints.    Discussed with Dr. Griffin of oncology, who is recommending ongoing goals of care discussions given poor prognosis.  Discussed with daughter at bedside regarding poor prognosis and that the patient will likely aspirate again.  Would like to continue medical treatment and watch 2 more weeks to see if the patient will get better.  Patient is not responding to questions.  He is not following any commands.  Continues on ceftaroline for MRSA multifocal pneumonia as well as atovaquone.  Cyclosporine and posaconazole.  ANC remains undetectable.  Potassium of 3.5 and magnesium of 1.3 being replaced.    Tolerating tube feeds through NG tube.  Showing signs of refeeding syndrome started on thiamine.  Discussed with registered dietitian.    11/20/2024  Patient was seen and examined on the oncology floor.  Patient is continue to require restraints.  Opening eyes to stimuli.  Not moving extremities to command.  Tolerating tube feeds.  ANC remaining low at 10.  Sodium dropping to 131.  Potassium of 3.8 and magnesium of 1.8 being replaced.  Phosphorus of 2.3 being replaced.   Continues to be tachypneic with respiratory rate of 28.  Repeat ABG and chest x-ray ordered.  Becoming hypoglycemic blood glucose of 68.  Decrease  glargine insulin to 35 units daily.  Platelets dropping to 5.  1 unit platelets being transfused.           I have discussed this patient's plan of care and discharge plan at IDT rounds today with Case Management, Nursing, Nursing leadership, and other members of the IDT team.    Consultants/Specialty  critical care, oncology, and palliative care    Code Status  DNAR/DNI    Disposition  The patient is not medically cleared for discharge to home or a post-acute facility.  Anticipate discharge to: a long-term acute care hospital    I have placed the appropriate orders for post-discharge needs.    Review of Systems  Review of Systems   Unable to perform ROS: Mental acuity        Physical Exam  Temp:  [36.3 °C (97.3 °F)-37.2 °C (99 °F)] 37.2 °C (99 °F)  Pulse:  [] 74  Resp:  [22-28] 28  BP: (126-165)/(67-94) 140/94  SpO2:  [90 %-95 %] 95 %    Physical Exam  Vitals and nursing note reviewed.   Constitutional:       General: He is not in acute distress.     Appearance: He is ill-appearing.   HENT:      Head: Normocephalic and atraumatic.      Mouth/Throat:      Mouth: Mucous membranes are moist.      Pharynx: Oropharynx is clear. No oropharyngeal exudate.   Eyes:      General: No scleral icterus.        Right eye: No discharge.         Left eye: No discharge.      Conjunctiva/sclera: Conjunctivae normal.   Cardiovascular:      Rate and Rhythm: Normal rate and regular rhythm.      Pulses: Normal pulses.      Heart sounds: Normal heart sounds. No murmur heard.  Pulmonary:      Effort: Respiratory distress present.      Breath sounds: Rhonchi and rales present.   Abdominal:      General: Abdomen is flat. Bowel sounds are normal. There is no distension.      Palpations: Abdomen is soft.   Musculoskeletal:         General: No swelling.      Cervical back: Neck supple. No tenderness.      Right lower leg: No edema.      Left lower leg: No edema.   Skin:     General: Skin is warm and dry.      Coloration: Skin is pale.    Neurological:      Mental Status: He is alert. Mental status is at baseline.      Motor: Weakness present.      Comments: Not responding verbally.  Not following following any commands.  Moves extremities spontaneously with severe weakness.  Opening eyes to voice.   Psychiatric:      Comments: Unable to assess         Fluids    Intake/Output Summary (Last 24 hours) at 11/20/2024 1851  Last data filed at 11/20/2024 1524  Gross per 24 hour   Intake 910 ml   Output 2000 ml   Net -1090 ml        Laboratory  Recent Labs     11/18/24  0040 11/19/24  0015 11/20/24  0015   WBC 0.6* 0.5* 0.8*   RBC 2.98* 3.47* 2.92*   HEMOGLOBIN 8.8* 10.4* 8.7*   HEMATOCRIT 25.3* 29.2* 24.2*   MCV 84.9 84.1 82.9   MCH 29.5 30.0 29.8   MCHC 34.8 35.6 36.0   RDW 47.8 47.3 46.5   PLATELETCT 4* 18* 5*   MPV 10.2 10.2  --      Recent Labs     11/18/24  0040 11/19/24  0015 11/20/24  0015   SODIUM 138 133* 131*   POTASSIUM 3.7 3.5* 3.8   CHLORIDE 105 100 99   CO2 26 27 26   GLUCOSE 128* 126* 102*   BUN 34* 28* 26*   CREATININE 0.60 0.47* 0.53   CALCIUM 7.6* 7.5* 7.3*                   Imaging  DX-ABDOMEN FOR TUBE PLACEMENT   Final Result      Orogastric tube tip at the proximal stomach.      DX-CHEST-LIMITED (1 VIEW)   Final Result      1.  Similar to slightly worsened multifocal airspace opacities throughout both lungs suggestive of pneumonitis.   2.  Increased small right effusion.      DX-CHEST-PORTABLE (1 VIEW)   Final Result         1.  Pulmonary edema and/or infiltrates, similar to prior study.      DX-ABDOMEN FOR TUBE PLACEMENT   Final Result         1.  Nonspecific bowel gas pattern in the upper abdomen.   2.  Nasogastric tube tip terminates overlying the expected location of the gastric body.   3.  Patchy bilateral pulmonary infiltrates      DX-ABDOMEN FOR TUBE PLACEMENT   Final Result      1. Appropriate position of the nasogastric tube, replaced in the interval.   2. The left upper extremity PICC catheter now terminates within the  azygos vein. Repositioning is recommended.   3. The remainder is stable.      DX-ABDOMEN FOR TUBE PLACEMENT   Final Result      Malpositioned enteric feeding tube likely within the right lower lobe bronchus.      Findings were conveyed to EMILIO Sutton in care of the patient on 11/11/2024 1:59 PM.      IR-PICC LINE PLACEMENT W/ GUIDANCE > AGE 5   Final Result                  Ultrasound-guided PICC placement performed by qualified nursing staff as    above.          DX-ABDOMEN FOR TUBE PLACEMENT   Final Result      1.  Enteric tube has been placed and the tip projects over the stomach.      2.  Nonspecific pulmonary airspace process      US-RUQ   Final Result         1. Normal gallbladder and right upper quadrant ultrasound.      2. Small right pleural effusion.      CT-SOFT TISSUE NECK WITH   Final Result         1. No airway narrowing. Mild uvula swelling suggested. The epiglottis and larynx appear normal.      2. Upper normal size cervical lymph nodes. No cervical mass otherwise.      3. Trace right mastoid sinus fluid. No paranasal sinus fluid. The middle ears are clear.      4. The visible upper chest again shows dense infiltrates with small pleural effusions and mild upper mediastinal adenopathy.      CT-CHEST (THORAX) W/O   Final Result      1.  Multifocal bilateral pulmonary airspace process most consistent with pneumonia, with interval worsening      2.  Bilateral pleural effusion, most small in size, right greater than left      3.  Single enlarged prevascular space lymph node which is most likely reactive      Fleischner Society pulmonary nodule recommendations:   Not Applicable         DX-CHEST-PORTABLE (1 VIEW)   Final Result         1.  Patchy bilateral pulmonary infiltrates, similar to prior study      DX-CHEST-PORTABLE (1 VIEW)   Final Result      1.  Unchanged BILATERAL pneumonia   2.  Possible RIGHT pleural effusion      DX-CHEST-2 VIEWS   Final Result         1.  Pulmonary edema and/or infiltrates.   2.   Trace left pleural effusion   3.  Atherosclerosis      DX-CHEST-PORTABLE (1 VIEW)    (Results Pending)        Assessment/Plan  * Multifocal pneumonia- (present on admission)  Assessment & Plan    Present on admission  Source respiratory  MRSA from sputum  Possible fungal: beta D glucan negative.   Serum Aspergillus galactomannan, Coccidioides serology are still pending.  Recent Karius at outside hospital was negative for fungal organisms   Probable aspiration pneumonia  ID managing antibiotics: Have stopped vancomycin due to renal function, not giving linezolid due to platelet count.  Currently on ceftaroline, acyclovir and posaconazole the last two for prophylaxis  Prophylactic agents ongoing for severe neutropenia  Amphotericin discontinued by ID    11/20/2024  Continue posaconazole, atovaquone, acyclovir, ceftaroline  Adequately fluid resuscitated      11/18/2024    Now on room air saturating over 90%  Continue on ceftaroline, atovaquone, acyclovir, posaconazole  Continue on on cyclosporine  Waiting for Promacta  Monitor oxygen recommend closely  Repeat BMP in a.m. to monitor electrolytes and renal function  Repeat CBC in a.m. to monitor white count and hemoglobin  Requiring IV antibiotics, need close monitoring for toxicity  Case discussed with oncology Dr. Lua .   Patient has high risk for deterioration.  Palliative care has been consulted  High risk of deterioration into septic shock and worsening respiratory failure.  Need close monitoring.    11/19/2024  Continues to remain on room air  Continue ceftaroline, atovaquone, acyclovir, and posaconazole  Continue cyclosporine  Continue to monitor for severe bone marrow toxicity requiring further transfusion    Refeeding syndrome- (present on admission)  Assessment & Plan  11/19/2024  Discussed with registered dietitian.  Electrolyte disturbances are concerning for mild refeeding syndrome.  I have started patient on thiamine per recommendations.  Continue to  monitor electrolytes  Initiated remote cardiac monitoring to monitor for arrhythmias  Continue restraints as needed    Hypoglycemia due to insulin  Assessment & Plan  11/20/2024      Hypokalemia- (present on admission)  Assessment & Plan  11/19/2024  Likely from poor oral intake likely from poor oral intake and refeeding syndrome  Continue K-Lyte for goal greater than 4    Encounter for hospice care discussion- (present on admission)  Assessment & Plan  11/19/2024  Per discussion Dr. Griffin of oncology recommending further goals of care discussion given poor clinical state and poor prognosis.  I discussed at length with the patient's daughter at bedside regarding patient's likelihood of aspirating again although he is improving with IV antibiotics appears to be somewhat stable at this time.  She stated that the patient previously had pneumonia and improved with antibiotics but then deteriorated after he ate again.  I explained to her that this is likely indication that the patient is nearing the end of his life.  I explained the option of a G-tube, which could decrease the risk but not eliminate the risk of aspiration.  It may only prolong his suffering and inevitable death.  I explained that the patient had not been improving in the past 2 weeks, which is considered a prolonged hospitalization.  Daughter appeared to have difficulty grasping this message and say that she 1 to see how the patient will proceed in the next 2 weeks.      Discussed with case management and nursing leadership.  Recommending discharge to LTAC.  I placed referral for LTAC.    Advance care planning total time 16 minutes    ESTHER (acute kidney injury) (HCC)  Assessment & Plan  Likely sepsis primarily with contribution from medications  Adequately resuscitated  UOP 600ml given body weight of 70Kg this is borderline adequate output  Renally dose medications as appropriate  Serial BMP  Monitor UOP  Not a candidate for  RRT    11/20/2024  Resolved         Septic shock (HCC)  Assessment & Plan  Present on admission  Source respiratory  MRSA from sputum  Possible fungal: beta D glucan negative.   Serum Aspergillus galactomannan, Coccidioides serology are still pending.  Recent Karius at outside hospital was negative for fungal organisms   Probable aspiration pneumonia  Completed steroids 11/14  Stable off of midodrine and steroids  ID managing antibiotics: Have stopped vancomycin due to renal function, not giving linezolid due to platelet count.  Currently on ceftaroline, acyclovir and posaconazole the last two for prophylaxis  Prophylactic agents ongoing for severe neutropenia  Amphotericin discontinued by ID  Adequately fluid resuscitated        Acute respiratory failure with hypoxia (HCC)  Assessment & Plan  Pneumonia   MRSA positive cultures from sputum  Possible fungal etiology: Beta D glucan neg.  Other serologies pending as noted above  DNR/DNI confirmed with daughter  Moved to unit for aggressive pulmonary toilet and oral care  O2 demand has decreased and he is now on RA/2 LNC  RT protocols  Oral care every-2 hours  Aspiration precautions, swallow eval pending  Hydrocortisone added to antibiotic regimen for severe pneumonia with respiratory failure ID managing antibiotics  If patient clinically deteriorates transition to comfort care/hospice, discussed with daughter and son who are in agreement     11/20/2024  Continues to be tachypneic with respiratory rate of 28.  Repeat ABG and chest x-ray ordered.  Continue supplemental oxygen    Elevated LFTs  Assessment & Plan  improving  RUQUS ordered-> normal  Avoid hepatotoxins      Dysphagia  Assessment & Plan  Still has altered level of conciousness  If he starts to clear will re consult SLP  [employer] if very high risk for aspiration  Currently getting nutrition via NGT  Oral care every 1-2-hour  CT soft tissue neck without airway issue or obvious abscess/obstructing process  Now  more alert and off HFNC: re consult SLP      Pressure injury of deep tissue of sacral region- (present on admission)  Assessment & Plan  11/20/2024  As per history  Likely due to severe protein calorie nutrition.    Severe malnutrition (HCC)- (present on admission)  Assessment & Plan  Nutrition following  Family okay with soft flexible feeding tube and enteral nutrition  Currently at goal on tube feeds  Last seen by SLP on 11/11; pt now more alert.  Will ask them to re-evaluate    11/20/2024  Continue tube feeds    GERD (gastroesophageal reflux disease)- (present on admission)  Assessment & Plan  With hydrocortisone and thrombocytopenia -continue IV PPI daily for prophylaxis  Antireflux measures       Hypomagnesemia- (present on admission)  Assessment & Plan  11/19/2024  Magnesium down to 1.3.  Likely from refeeding syndrome  I will order replacement for goal greater than 4.    11/20/2024  magnesium of 1.8 being replaced.     Hypophosphatemia- (present on admission)  Assessment & Plan  11/19/2024  Likely from refeeding syndrome  Neutra-Phos I ordered hypophosphatemia    11/20/2024  Continue to replace with Neutra-Phos    Neutropenic fever (HCC)- (present on admission)  Assessment & Plan  11/20/2024  No further fevers in 48 hours.    Aplastic anemia (HCC)- (present on admission)  Assessment & Plan  BM biopsy consistent with severe aplastic anemia     Diagnostic evaluation in september and at [facility], viral studies negative, hepatitis panel negative,   Cyclosporine resumed at 110mg BID 11/14  Promacta (eltrombopag) for thrombocytopenia 75 mg daily  Continue prophylactic antimicrobials  B12/MMA, folate levels resent, previously low     All blood products irradiated/leukophoresed  Tranfuse PRBC for <7  Transfuse platelets < 10, may warrant higher threshold given possibility of DAH      Discuss with hematology regarding prognosis, not responsive to G-CSF in the past    11/19/2024  Discussed with Dr. Griffin of  oncology.  Recommending further goals of care discussion given poor prognosis    MRSA pneumonia (HCC)- (present on admission)  Assessment & Plan  Was on linezolid and vancomycin  Continue ceftaroline for now   isolation precautions  ID following    11/19/2024  Continue ceftaroline      Dyslipidemia- (present on admission)  Assessment & Plan  11/19/2024  As per history.  Defer statin at this time given multiple other comorbidities and active medical issues.    Type 2 diabetes mellitus, without long-term current use of insulin (HCC)- (present on admission)  Assessment & Plan  Patient has been quite hyperglycemic  We have titrated up his glargine: currently on 45 units  Now running mid to high 100s; monitor over next 24hrs and titrate glargine as appropriate  Continue a high sliding scale  A1c 7.32 months ago    Thrombocytopenia (HCC)- (present on admission)  Assessment & Plan  Secondary to pancytopenia from aplastic anemia  Back in single digits again today: giving one unit of platelets  No signs of active bleeding on exam today    11/19/2024  No further transfusion required overnight  Platelets improving to 18 following transfusion yesterday    11/20/2024  Platelets dropping to 5  Patient has been transfused 1 unit platelets  Monitor closely for transfusion reaction and bleeding.      Pancytopenia (HCC)  Assessment & Plan  BM biopsy consistent with severe aplastic anemia  Also consider infection/nutritional disorder/toxic myelosuppression.      Diagnostic evaluation in september and at [facility], viral studies negative, hepatitis panel negative,   Oncology following   On cyclosporine 100 bid, but cannot take orals currently  Promacta (eltrombopag) for thrombocytopenia 75 mg daily  Continue prophylactic antimicrobials  B12/MMA, folate levels resent, previously low     Tranfuse PRBC for <7  Transfuse platelets < 10, may warrant higher threshold given possibility of DAH though CT doesn't look strikingly like DAH  CT more  likely MRSA pneumonia with positive cultures     Discuss with hematology regarding prognosis, not responsive to G-CSF in the past        VTE prophylaxis:   SCDs/TEDs   pharmacologic prophylaxis contraindicated due to Thrombocytopenia      I have performed a physical exam and reviewed and updated ROS and Plan today (11/20/2024). In review of yesterday's note (11/19/2024), there are no changes except as documented above.      Patient is medically complex and high risk of deterioration and death.

## 2024-11-22 NOTE — CARE PLAN
The patient is Unstable - High likelihood or risk of patient condition declining or worsening    Shift Goals  Clinical Goals: Pt will have BG monitored and spO2 > 88% throughout the end of my shift  Patient Goals: VALERIO  Family Goals: Comfort for patient    Progress made toward(s) clinical / shift goals:    Problem: Skin Integrity  Goal: Skin integrity is maintained or improved  Outcome: Progressing  Note: Skin integrity is maintained w/ q2H turns, purewick, and cleaning when soiled.      Problem: Nutrition  Goal: Patient's nutritional and fluid intake will be adequate or improve  Outcome: Progressing  Note: Pt is receiving Tube feeds at 10 ml/hr      Problem: Pain - Standard  Goal: Alleviation of pain or a reduction in pain to the patient’s comfort goal  Outcome: Progressing  Note: Pt is sleeping in bed with even unlabored breaths       Patient is not progressing towards the following goals:

## 2024-11-22 NOTE — ASSESSMENT & PLAN NOTE
11/21/2024  Moderate stool burden within the rectum.  Continue MiraLAX 3 times a day  Docusate twice a day  Suppository ordered

## 2024-11-22 NOTE — THERAPY
Occupational Therapy Contact Note    Patient Name: Miri Joseph  Age:  84 y.o., Sex:  male  Medical Record #: [id number]  Today's Date: 11/22/2024 11/22/24 1013   Treatment Variance   Reason For Missed Therapy Medical - Patient on Hold from Therapy   Interdisciplinary Plan of Care Collaboration   Collaboration Comments OT tx held d/t low H&H will monitor status and resume as medically appropriate   Session Information   Date / Session Number  11/15, Re-Eval, (1/3, 11/21  (held 11/22)

## 2024-11-22 NOTE — ASSESSMENT & PLAN NOTE
11/21/2024  Recurrent aspiration event this morning  Chest x-ray this morning per my read showed no significant changes.  Diffuse bilateral interstitial infiltrates.  Continue posaconazole, acyclovir, atovaquone, and ceftaroline

## 2024-11-22 NOTE — DISCHARGE PLANNING
Case Management Discharge Planning    Admission Date: 11/6/2024  GMLOS: 6  ALOS: 16    6-Clicks ADL Score: 6  6-Clicks Mobility Score: 6  PT and/or OT Eval ordered: Yes  Post-acute Referrals Ordered: Yes  Post-acute Choice Obtained: Yes  Has referral(s) been sent to post-acute provider:  Yes      Anticipated Discharge Dispo: Discharge Disposition: Discharged to home/self care (01)    DME Needed: No    Action(s) Taken: OTHER    Escalations Completed: None    Medically Clear: No    Next Steps:     Barriers to Discharge: Medical clearance    Is the patient up for discharge tomorrow: No

## 2024-11-22 NOTE — THERAPY
Physical Therapy Contact Note    Patient Name: Miri Joseph  Age:  84 y.o., Sex:  male  Medical Record #: [id number]  Today's Date: 11/22/2024 11/22/24 0759   Treatment Variance   Reason For Missed Therapy Medical - Patient on Hold from Therapy   Interdisciplinary Plan of Care Collaboration   Collaboration Comments PT to hold d/t H&H 6.3/ 17.9 today.   Session Information   Date / Session Number  11/15 -2 (1/3, 11/21) 11/22- hold for low H&H

## 2024-11-22 NOTE — PROGRESS NOTES
Hospital Medicine Daily Progress Note    Date of Service  11/21/2024    Chief Complaint  Miri Joseph is a 84 y.o. male admitted 11/6/2024 with inpatient chemotherapy for aplastic anemia.    Hospital Course  84 y.o. male who presented 11/6/2024 with history of type 2 diabetes, hypertension, GERD, dyslipidemia, and recently diagnosed with aplastic anemia.  Patient was transferred to our care from [facility] to initiate inpatient chemotherapy.  He had been admitted there on October 19 for treatment of neutropenic fever he was treated with a course of linezolid for MRSA pneumonia.  On arrival here he was on prophylactic posaconazole, fluoroquinolone, and acyclovir.  Repeat sputum cultures here were positive for MRSA, the patient was started on linezolid on November 9 and on that same day he was moved to the ICU due to worsening respiratory status.  In the ICU the patient was on high flow nasal cannula and did require IV vasopressors he was initiated on tube feeds as well.  Oncology and ID following closely.       Interval Problem Update  11/18/2024  Seen and examined at bedside  Daughter at bedside, I was able to communicate with him in ai   Patient is only alert to name, following command appropriately  Moving all extremities  Now on room air saturating over 90%  Labs notable for leukopenia, severe neutropenia, hemoglobin 8.8, platelet count 4K,  sodium 138 potassium 3.7, renal function stable  Chest x-ray reviewed noted with worsening pneumonitis     Continue on cefazolin, atovaquone, acyclovir, posaconazole  Continue on on cyclosporine  Waiting for Promacta  Repeat BMP in a.m. to monitor electrolytes and renal function  Repeat CBC in a.m. to monitor white count and hemoglobin  Requiring IV antibiotics, need close monitoring for toxicity  Case discussed with oncology Dr. Lua .   Patient has high risk for deterioration.  Palliative care has been consulted  High risk of deterioration  into septic shock and worsening respiratory failure.  Need close monitoring.        I had extensive discussion with patient's daughter at bedside regarding the current medical status, treatment plan and prognosis. She  was briefed on the patient's condition including recent changes or developments .  Updated with recent test results vital signs and symptoms.  Current treatment plan was explained in details including medications.  All question answered. Family is aware of her poor prognosis.  They would like to continue treatment plan.  I have consulted palliative care.    Above per previous hospitalist.    11/19/24  Patient was seen and examined on the oncology floor.  Patient is continue to require restraints.    Discussed with Dr. Griffin of oncology, who is recommending ongoing goals of care discussions given poor prognosis.  Discussed with daughter at bedside regarding poor prognosis and that the patient will likely aspirate again.  Would like to continue medical treatment and watch 2 more weeks to see if the patient will get better.  Patient is not responding to questions.  He is not following any commands.  Continues on ceftaroline for MRSA multifocal pneumonia as well as atovaquone.  Cyclosporine and posaconazole.  ANC remains undetectable.  Potassium of 3.5 and magnesium of 1.3 being replaced.    Tolerating tube feeds through NG tube.  Showing signs of refeeding syndrome started on thiamine.  Discussed with registered dietitian.    11/20/2024  Patient was seen and examined on the oncology floor.  Patient is continue to require restraints.  Opening eyes to stimuli.  Not moving extremities to command.  Tolerating tube feeds.  ANC remaining low at 10.  Sodium dropping to 131.  Potassium of 3.8 and magnesium of 1.8 being replaced.  Phosphorus of 2.3 being replaced.   Continues to be tachypneic with respiratory rate of 28.  Repeat ABG and chest x-ray ordered.  Becoming hypoglycemic blood glucose of 68.  Decrease  glargine insulin to 35 units daily.  Platelets dropping to 5.  1 unit platelets being transfused.       11/21/2024  Patient was seen and examined on the oncology floor.  Notified of aspiration event this morning following nausea and vomiting event.  Currently requiring 1.5 LPM oxygen by nasal cannula.  Discussed with patient's daughter at bedside.        Chest x-ray per my read shows diffuse interstitial infiltrates right greater than left.  NG tube in place.      Abdominal x-ray per my read shows significant constipation in the rectum.  No distended loops of bowel consistent with small obstruction.        I have discussed this patient's plan of care and discharge plan at IDT rounds today with Case Management, Nursing, Nursing leadership, and other members of the IDT team.    Consultants/Specialty  critical care, oncology, and palliative care    Code Status  DNAR/DNI    Disposition  The patient is not medically cleared for discharge to home or a post-acute facility.  Anticipate discharge to: skilled nursing facility    I have placed the appropriate orders for post-discharge needs.    Review of Systems  Review of Systems   Unable to perform ROS: Mental acuity        Physical Exam  Temp:  [36.2 °C (97.1 °F)-37.2 °C (99 °F)] 36.7 °C (98 °F)  Pulse:  [] 78  Resp:  [18-24] 24  BP: (123-183)/() 144/62  SpO2:  [89 %-99 %] 96 %    Physical Exam  Vitals and nursing note reviewed.   Constitutional:       General: He is not in acute distress.     Appearance: He is ill-appearing.   HENT:      Head: Normocephalic and atraumatic.      Mouth/Throat:      Mouth: Mucous membranes are moist.      Pharynx: Oropharynx is clear. No oropharyngeal exudate.   Eyes:      General: No scleral icterus.        Right eye: No discharge.         Left eye: No discharge.      Conjunctiva/sclera: Conjunctivae normal.   Cardiovascular:      Rate and Rhythm: Normal rate and regular rhythm.      Pulses: Normal pulses.      Heart sounds:  Normal heart sounds. No murmur heard.  Pulmonary:      Effort: Respiratory distress present.      Breath sounds: Rhonchi and rales present.   Abdominal:      General: Abdomen is flat. Bowel sounds are normal. There is no distension.      Palpations: Abdomen is soft.   Musculoskeletal:         General: No swelling.      Cervical back: Neck supple. No tenderness.      Right lower leg: No edema.      Left lower leg: No edema.   Skin:     General: Skin is warm and dry.      Coloration: Skin is pale.   Neurological:      Mental Status: He is alert. Mental status is at baseline.      Motor: Weakness present.      Comments: Not responding verbally.  Not following following any commands.    Grimacing to pain.      Psychiatric:      Comments: Unable to assess         Fluids    Intake/Output Summary (Last 24 hours) at 11/21/2024 1727  Last data filed at 11/21/2024 0800  Gross per 24 hour   Intake 400 ml   Output 1050 ml   Net -650 ml        Laboratory  Recent Labs     11/19/24  0015 11/20/24  0015 11/21/24  0020   WBC 0.5* 0.8* 0.6*   RBC 3.47* 2.92* 2.37*   HEMOGLOBIN 10.4* 8.7* 7.0*   HEMATOCRIT 29.2* 24.2* 19.5*   MCV 84.1 82.9 82.3   MCH 30.0 29.8 29.5   MCHC 35.6 36.0 35.9   RDW 47.3 46.5 47.6   PLATELETCT 18* 5* 20*   MPV 10.2  --  9.8     Recent Labs     11/19/24  0015 11/20/24  0015 11/21/24  0020   SODIUM 133* 131* 128*   POTASSIUM 3.5* 3.8 3.6   CHLORIDE 100 99 96   CO2 27 26 26   GLUCOSE 126* 102* 95   BUN 28* 26* 28*   CREATININE 0.47* 0.53 0.68   CALCIUM 7.5* 7.3* 7.5*                   Imaging  [id number] VIEW   Final Result      1.  Nonobstructive bowel gas pattern with a mild underlying colonic stool burden.   2.  Enteric feeding tube terminates projecting over the expected location of the stomach.      DX-CHEST-PORTABLE (1 VIEW)   Final Result      Stable appearance the chest.      DX-CHEST-PORTABLE (1 VIEW)   Final Result      1.  Extensive bilateral pneumonitis with minimal change since previous exam of  11/18/2024      DX-ABDOMEN FOR TUBE PLACEMENT   Final Result      Orogastric tube tip at the proximal stomach.      DX-CHEST-LIMITED (1 VIEW)   Final Result      1.  Similar to slightly worsened multifocal airspace opacities throughout both lungs suggestive of pneumonitis.   2.  Increased small right effusion.      DX-CHEST-PORTABLE (1 VIEW)   Final Result         1.  Pulmonary edema and/or infiltrates, similar to prior study.      DX-ABDOMEN FOR TUBE PLACEMENT   Final Result         1.  Nonspecific bowel gas pattern in the upper abdomen.   2.  Nasogastric tube tip terminates overlying the expected location of the gastric body.   3.  Patchy bilateral pulmonary infiltrates      DX-ABDOMEN FOR TUBE PLACEMENT   Final Result      1. Appropriate position of the nasogastric tube, replaced in the interval.   2. The left upper extremity PICC catheter now terminates within the azygos vein. Repositioning is recommended.   3. The remainder is stable.      DX-ABDOMEN FOR TUBE PLACEMENT   Final Result      Malpositioned enteric feeding tube likely within the right lower lobe bronchus.      Findings were conveyed to RN Herman in care of the patient on 11/11/2024 1:59 PM.      IR-PICC LINE PLACEMENT W/ GUIDANCE > AGE 5   Final Result                  Ultrasound-guided PICC placement performed by qualified nursing staff as    above.          DX-ABDOMEN FOR TUBE PLACEMENT   Final Result      1.  Enteric tube has been placed and the tip projects over the stomach.      2.  Nonspecific pulmonary airspace process      US-RUQ   Final Result         1. Normal gallbladder and right upper quadrant ultrasound.      2. Small right pleural effusion.      CT-SOFT TISSUE NECK WITH   Final Result         1. No airway narrowing. Mild uvula swelling suggested. The epiglottis and larynx appear normal.      2. Upper normal size cervical lymph nodes. No cervical mass otherwise.      3. Trace right mastoid sinus fluid. No paranasal sinus fluid. The  middle ears are clear.      4. The visible upper chest again shows dense infiltrates with small pleural effusions and mild upper mediastinal adenopathy.      CT-CHEST (THORAX) W/O   Final Result      1.  Multifocal bilateral pulmonary airspace process most consistent with pneumonia, with interval worsening      2.  Bilateral pleural effusion, most small in size, right greater than left      3.  Single enlarged prevascular space lymph node which is most likely reactive      Fleischner Society pulmonary nodule recommendations:   Not Applicable         DX-CHEST-PORTABLE (1 VIEW)   Final Result         1.  Patchy bilateral pulmonary infiltrates, similar to prior study      DX-CHEST-PORTABLE (1 VIEW)   Final Result      1.  Unchanged BILATERAL pneumonia   2.  Possible RIGHT pleural effusion      DX-CHEST-2 VIEWS   Final Result         1.  Pulmonary edema and/or infiltrates.   2.  Trace left pleural effusion   3.  Atherosclerosis           Assessment/Plan  * Multifocal pneumonia- (present on admission)  Assessment & Plan    Present on admission  Source respiratory  MRSA from sputum  Possible fungal: beta D glucan negative.   Serum Aspergillus galactomannan, Coccidioides serology are still pending.  Recent Karius at outside hospital was negative for fungal organisms   Probable aspiration pneumonia  ID managing antibiotics: Have stopped vancomycin due to renal function, not giving linezolid due to platelet count.  Currently on ceftaroline, acyclovir and posaconazole the last two for prophylaxis  Prophylactic agents ongoing for severe neutropenia  Amphotericin discontinued by ID    11/20/2024  Continue posaconazole, atovaquone, acyclovir, ceftaroline  Adequately fluid resuscitated      11/18/2024    Now on room air saturating over 90%  Continue on ceftaroline, atovaquone, acyclovir, posaconazole  Continue on on cyclosporine  Waiting for Promacta  Monitor oxygen recommend closely  Repeat BMP in a.m. to monitor electrolytes  and renal function  Repeat CBC in a.m. to monitor white count and hemoglobin  Requiring IV antibiotics, need close monitoring for toxicity  Case discussed with oncology Dr. Lua .   Patient has high risk for deterioration.  Palliative care has been consulted  High risk of deterioration into septic shock and worsening respiratory failure.  Need close monitoring.    11/19/2024  Continues to remain on room air  Continue ceftaroline, atovaquone, acyclovir, and posaconazole  Continue cyclosporine  Continue to monitor for severe bone marrow toxicity requiring further transfusion    11/21/2024  Continue current medications    Constipation- (present on admission)  Assessment & Plan  11/21/2024  Moderate stool burden within the rectum.  Continue MiraLAX 3 times a day  Docusate twice a day  Suppository ordered    Aspiration pneumonia (HCC)- (present on admission)  Assessment & Plan  11/21/2024  Recurrent aspiration event this morning  Chest x-ray this morning per my read showed no significant changes.  Diffuse bilateral interstitial infiltrates.  Continue posaconazole, acyclovir, atovaquone, and ceftaroline    Refeeding syndrome- (present on admission)  Assessment & Plan  11/19/2024  Discussed with registered dietitian.  Electrolyte disturbances are concerning for mild refeeding syndrome.  I have started patient on thiamine per recommendations.  Continue to monitor electrolytes  Initiated remote cardiac monitoring to monitor for arrhythmias  Continue restraints as needed    Bilateral pleural effusion- (present on admission)  Assessment & Plan  11/21/2024  As per previous CT scan.  Will need to consider diuresis once stable    Hypoglycemia due to insulin  Assessment & Plan  11/20/2024      Hypokalemia- (present on admission)  Assessment & Plan  11/19/2024  Likely from poor oral intake likely from poor oral intake and refeeding syndrome  Continue K-Lyte for goal greater than 4    Encounter for hospice care discussion-  (present on admission)  Assessment & Plan  11/19/2024  Per discussion Dr. Griffin of oncology recommending further goals of care discussion given poor clinical state and poor prognosis.  I discussed at length with the patient's daughter at bedside regarding patient's likelihood of aspirating again although he is improving with IV antibiotics appears to be somewhat stable at this time.  She stated that the patient previously had pneumonia and improved with antibiotics but then deteriorated after he ate again.  I explained to her that this is likely indication that the patient is nearing the end of his life.  I explained the option of a G-tube, which could decrease the risk but not eliminate the risk of aspiration.  It may only prolong his suffering and inevitable death.  I explained that the patient had not been improving in the past 2 weeks, which is considered a prolonged hospitalization.  Daughter appeared to have difficulty grasping this message and say that she 1 to see how the patient will proceed in the next 2 weeks.      Discussed with case management and nursing leadership.  Recommending discharge to LTAC.  I placed referral for LTAC.    Advance care planning total time 16 minutes    ESTHER (acute kidney injury) (HCC)  Assessment & Plan  Likely sepsis primarily with contribution from medications  Adequately resuscitated  UOP 600ml given body weight of 70Kg this is borderline adequate output  Renally dose medications as appropriate  Serial BMP  Monitor UOP  Not a candidate for RRT    11/20/2024  Resolved         Septic shock (HCC)  Assessment & Plan  Present on admission  Source respiratory  MRSA from sputum  Possible fungal: beta D glucan negative.   Serum Aspergillus galactomannan, Coccidioides serology are still pending.  Recent Karius at outside hospital was negative for fungal organisms   Probable aspiration pneumonia  Completed steroids 11/14  Stable off of midodrine and steroids  ID managing antibiotics:  Have stopped vancomycin due to renal function, not giving linezolid due to platelet count.  Currently on ceftaroline, acyclovir and posaconazole the last two for prophylaxis  Prophylactic agents ongoing for severe neutropenia  Amphotericin discontinued by ID  Adequately fluid resuscitated        Acute respiratory failure with hypoxia (HCC)  Assessment & Plan  Pneumonia   MRSA positive cultures from sputum  Possible fungal etiology: Beta D glucan neg.  Other serologies pending as noted above  DNR/DNI confirmed with daughter  Moved to unit for aggressive pulmonary toilet and oral care  O2 demand has decreased and he is now on RA/2 LNC  RT protocols  Oral care every-2 hours  Aspiration precautions, swallow eval pending  Hydrocortisone added to antibiotic regimen for severe pneumonia with respiratory failure ID managing antibiotics  If patient clinically deteriorates transition to comfort care/hospice, discussed with daughter and son who are in agreement     11/20/2024  Continues to be tachypneic with respiratory rate of 28.  Repeat ABG and chest x-ray ordered.  Continue supplemental oxygen    Elevated LFTs  Assessment & Plan  improving  RUQUS ordered-> normal  Avoid hepatotoxins      Dysphagia  Assessment & Plan  Still has altered level of conciousness  If he starts to clear will re consult SLP  [employer] if very high risk for aspiration  Currently getting nutrition via NGT  Oral care every 1-2-hour  CT soft tissue neck without airway issue or obvious abscess/obstructing process  Now more alert and off HFNC: re consult SLP      Pressure injury of deep tissue of sacral region- (present on admission)  Assessment & Plan  11/20/2024  As per history  Likely due to severe protein calorie nutrition.    Severe malnutrition (HCC)- (present on admission)  Assessment & Plan  Nutrition following  Family okay with soft flexible feeding tube and enteral nutrition  Currently at goal on tube feeds  Last seen by SLP on 11/11; pt now more  alert.  Will ask them to re-evaluate    11/20/2024  Continue tube feeds    11/21/2024  Resume tube feeds at 10 mL/h    GERD (gastroesophageal reflux disease)- (present on admission)  Assessment & Plan  With hydrocortisone and thrombocytopenia -continue IV PPI daily for prophylaxis  Antireflux measures       Hypomagnesemia- (present on admission)  Assessment & Plan  11/19/2024  Magnesium down to 1.3.  Likely from refeeding syndrome  I will order replacement for goal greater than 4.    11/20/2024  magnesium of 1.8 being replaced.     Hypophosphatemia- (present on admission)  Assessment & Plan  11/19/2024  Likely from refeeding syndrome  Neutra-Phos I ordered hypophosphatemia    11/20/2024  Continue to replace with Neutra-Phos    Neutropenic fever (HCC)- (present on admission)  Assessment & Plan  11/20/2024  No further fevers in 48 hours.    Aplastic anemia (HCC)- (present on admission)  Assessment & Plan  BM biopsy consistent with severe aplastic anemia     Diagnostic evaluation in september and at [facility], viral studies negative, hepatitis panel negative,   Cyclosporine resumed at 110mg BID 11/14  Promacta (eltrombopag) for thrombocytopenia 75 mg daily  Continue prophylactic antimicrobials  B12/MMA, folate levels resent, previously low     All blood products irradiated/leukophoresed  Tranfuse PRBC for <7  Transfuse platelets < 10, may warrant higher threshold given possibility of DAH      Discuss with hematology regarding prognosis, not responsive to G-CSF in the past    11/19/2024  Discussed with Dr. Griffin of oncology.  Recommending further goals of care discussion given poor prognosis    11/21/2024  Continues to remain poor prognosis.  Discussed with Dr. Griffin of oncology.    MRSA pneumonia (HCC)- (present on admission)  Assessment & Plan  Was on linezolid and vancomycin  Continue ceftaroline for now   isolation precautions  ID following    11/19/2024  Continue ceftaroline      Dyslipidemia- (present on  admission)  Assessment & Plan  11/19/2024  As per history.  Defer statin at this time given multiple other comorbidities and active medical issues.    Type 2 diabetes mellitus, without long-term current use of insulin (HCC)- (present on admission)  Assessment & Plan  Patient has been quite hyperglycemic  We have titrated up his glargine: currently on 45 units  Now running mid to high 100s; monitor over next 24hrs and titrate glargine as appropriate  Continue a high sliding scale  A1c 7.32 months ago    11/21/2024  Ongoing hypoglycemia blood glucose 77   Decrease glargine insulin to 35 units daily.  Currently being held today due to aspiration event and withholding of tube feeds.    Thrombocytopenia (HCC)- (present on admission)  Assessment & Plan  Secondary to pancytopenia from aplastic anemia  Back in single digits again today: giving one unit of platelets  No signs of active bleeding on exam today    11/19/2024  No further transfusion required overnight  Platelets improving to 18 following transfusion yesterday    11/20/2024  Platelets dropping to 5  Patient has been transfused 1 unit platelets  Monitor closely for transfusion reaction and bleeding.    11/21/2024  Platelets improving to 20 following 1 unit transfusion    Pancytopenia (HCC)  Assessment & Plan  BM biopsy consistent with severe aplastic anemia  Also consider infection/nutritional disorder/toxic myelosuppression.      Diagnostic evaluation in september and at [facility], viral studies negative, hepatitis panel negative,   Oncology following   On cyclosporine 100 bid, but cannot take orals currently  Promacta (eltrombopag) for thrombocytopenia 75 mg daily  Continue prophylactic antimicrobials  B12/MMA, folate levels resent, previously low     Tranfuse PRBC for <7  Transfuse platelets < 10, may warrant higher threshold given possibility of DAH though CT doesn't look strikingly like DAH  CT more likely MRSA pneumonia with positive cultures     Discuss with  hematology regarding prognosis, not responsive to G-CSF in the past        VTE prophylaxis:    pharmacologic prophylaxis contraindicated due to Severe thrombocytopenia requiring transfusions      I have performed a physical exam and reviewed and updated ROS and Plan today (11/21/2024). In review of yesterday's note (11/20/2024), there are no changes except as documented above.      Greater than 53 minutes spent prepping to see patient (e.g. review of tests) obtaining and/or reviewing separately obtained history. Performing a medically appropriate examination and evaluation.  Counseling and educating the patient/family/caregiver.  Ordering medications, tests, or procedures.  Referring and communicating with other health care professionals.  Documenting clinical information in EPIC.  Independently interpreting results and communicating results to patient/family/caregiver.  Care coordination.

## 2024-11-22 NOTE — PROGRESS NOTES
Oncology/Hematology Progress Note               Author: Lucia Griffin M.D. Date & Time created: 11/22/2024  10:50 AM     Reason for Admission/Consult: Aplastic Anemia  Primary Heme/Onc: Dr. Molina    Interval History:  No acute events overnight.  Pt daughter is at the bedside today.  Pt himself is a bit more alert today, but still has difficulties in conversation even in his native language. Otherwise no changes in his overall status.    Review of Systems:  Review of Systems   Unable to perform ROS: Medical condition     Physical Exam:  Physical Exam  Constitutional:       General: He is not in acute distress.     Appearance: He is ill-appearing.   HENT:      Head: Normocephalic and atraumatic.      Right Ear: External ear normal.      Left Ear: External ear normal.      Nose: Nose normal.      Mouth/Throat:      Pharynx: Oropharynx is clear. No oropharyngeal exudate.   Eyes:      General: No scleral icterus.     Conjunctiva/sclera: Conjunctivae normal.   Cardiovascular:      Rate and Rhythm: Regular rhythm. Tachycardia present.   Pulmonary:      Effort: Pulmonary effort is normal. No respiratory distress.      Breath sounds: Rales present.   Abdominal:      General: Abdomen is flat. Bowel sounds are normal. There is no distension.      Palpations: Abdomen is soft.   Musculoskeletal:      Cervical back: Neck supple. No rigidity.      Right lower leg: Edema present.      Left lower leg: Edema present.   Skin:     General: Skin is warm.      Coloration: Skin is pale. Skin is not jaundiced.      Findings: Bruising present.   Neurological:      General: No focal deficit present.   Psychiatric:      Comments: Patient withdrawn, somnolent         Labs:  Recent Labs     11/20/24  1906   [id number] 7.60*   [id number] 27.3*   [id number] 56.2*   [id number] 90.2*   ARTHCO3 26   T1NTYUIBH room air   ARTBE 4*         Recent Labs     11/20/24  0015 11/21/24  0020 11/22/24  0030   SODIUM 131* 128* 129*   POTASSIUM 3.8 3.6 3.3*    CHLORIDE 99 96 97   CO2 26 26 26   BUN 26* 28* 30*   CREATININE 0.53 0.68 0.74   MAGNESIUM 1.8 1.7 2.1   PHOSPHORUS 2.3* 3.2 4.1   CALCIUM 7.3* 7.5* 7.5*     Recent Labs     24  0015 24  0020 24  0030   ALTSGPT 23 22 17   ASTSGOT    ALKPHOSPHAT 115* 126* 106*   TBILIRUBIN 2.5* 2.8* 3.3*   GLUCOSE 102* 95 115*     Recent Labs     24  0015 24  0020 24  0030   RBC 2.92* 2.37* 2.15*   HEMOGLOBIN 8.7* 7.0* 6.3*   HEMATOCRIT 24.2* 19.5* 17.9*   PLATELETCT 5* 20* 7*     Recent Labs     24  0015 24  0020 24  0030   WBC 0.8* 0.6* 0.7*   NEUTSPOLYS 0.90* 1.00* 1.20*   LYMPHOCYTES 99.10* 99.00* 98.40*   MONOCYTES 0.00 0.00 0.00   EOSINOPHILS 0.00 0.00 0.00   BASOPHILS 0.00 0.00 0.00   ASTSGOT 28    ALTSGPT    ALKPHOSPHAT 115* 126* 106*   TBILIRUBIN 2.5* 2.8* 3.3*     Recent Labs     24  0015 24  0020 24  0030   SODIUM 131* 128* 129*   POTASSIUM 3.8 3.6 3.3*   CHLORIDE 99 96 97   CO2 26 26 26   GLUCOSE 102* 95 115*   BUN 26* 28* 30*   CREATININE 0.53 0.68 0.74   CALCIUM 7.3* 7.5* 7.5*     Hemodynamics:  Temp (24hrs), Av.9 °C (98.5 °F), Min:36.6 °C (97.8 °F), Max:37.6 °C (99.7 °F)  Temperature: 37 °C (98.6 °F)  Pulse  Av.1  Min: 62  Max: 127   Blood Pressure : (!) 145/85     Respiratory:    Respiration: 20, Pulse Oximetry: 91 %     Work Of Breathing / Effort: Increased Work of Breathing;Moderate  RUL Breath Sounds: Diminished, RML Breath Sounds: Diminished, RLL Breath Sounds: Diminished, YAZAN Breath Sounds: Diminished, LLL Breath Sounds: Diminished  Fluids:    Intake/Output Summary (Last 24 hours) at 2024 1135  Last data filed at 2024 0900  Gross per 24 hour   Intake 912 ml   Output 800 ml   Net 112 ml        GI/Nutrition:  Orders Placed This Encounter   Procedures    Diet NPO - Okay to give medications through NG/OG     Okay to give medications through NG/OG     Standing Status:   Standing     Number of  Occurrences:   1     Order Specific Question:   Clamp Time     Answer:   Other (Please specify)     Comments:   Ice chips okay per slp     Order Specific Question:   Clamp Time     Answer:   0     Comments:   Ice chips okay per SLP    Diet: Diet Tube Feed; Formula: Nepro; Nepro: Nepro RTH; Goal Rate (mL/Hour): 45; Duration: 24 HR     Initiate Nepro with CARBSTEADY at 15 ml/hr continuous and advance by 15 mL q12hrs to goal rate of 45 ml/hr.     Standing Status:   Standing     Number of Occurrences:   1     Order Specific Question:   Diet     Answer:   Diet Tube Feed [35]     Order Specific Question:   Formula:     Answer:   Nepro     Order Specific Question:   Nepro:     Answer:   Nepro RTH     Order Specific Question:   Goal Rate (mL/Hour)     Answer:   45     Order Specific Question:   Route     Answer:   Enteral Tube     Order Specific Question:   Duration     Answer:   24 HR     Medical Decision Making, by Problem:  Active Hospital Problems    Diagnosis     *Multifocal pneumonia [J18.9]     Septic shock (HCC) [A41.9, R65.21]     ESTHER (acute kidney injury) (HCC) [N17.9]     Goals of care, counseling/discussion [Z71.89]     Respiratory failure (HCC) [J96.90]     Dysphagia [R13.10]     Elevated troponin [R79.89]     Elevated LFTs [R79.89]     Pressure injury of deep tissue of sacral region [L89.156]     Aplastic anemia (HCC) [D61.9]     Neutropenic fever (HCC) [D70.9, R50.81]     Hypophosphatemia [E83.39]     Hypomagnesemia [E83.42]     Petechial rash [R23.3]     Hypertension [I10]     GERD (gastroesophageal reflux disease) [K21.9]     Seasonal allergies [J30.2]     Severe malnutrition (HCC) [E43]     MRSA pneumonia (HCC) [J15.212]     Dyslipidemia [E78.5]     Type 2 diabetes mellitus, without long-term current use of insulin (HCC) [E11.9]     Thrombocytopenia (HCC) [D69.6]        Assessment and Plan:    Severe aplastic anemia - he was initially diagnosed in September 2024.  He has remained transfusion dependent  during this time.  On 2024 he was transferred for consideration of initiation of cyclosporine and eltrombopag.  We are currently working on obtaining outpatient delivery of eltrombopag due to supply in hospital - this has been denied by insurance and an appeal is in process which is still pending at this time.    - Currently on cyclosporine, increasing dose as cyclosporine level not therapeutic yet, repeat level again in 2 days.  - Promacta pending  - Overall prognosis is very guarded - pt family not wishing to change course of care at this time.     Neutropenic fever  MRSA pneumonia, completed antibiotic course.  He will remain at high risk for infections given prolonged neutropenic expected.  He is on posaconazole and mepron pxx     B12 deficiency  Continue monthly B12 injections; next due 2024     TRANSFUSION THRESHOLDS:  RBCs and PLTs must be leukoreduced, CMV negative, and irradiated  Hgb <7 if no symptoms or <8 with symptoms or bleedinu pRBC  PLTs <10 and no symptoms or <20 if febrile, septic, or bleeidnu apheresis PLTs     Quality-Core Measures   Reviewed items::  Labs reviewed and Medications reviewed  DVT prophylaxis pharmacological::  Contraindicated - High bleeding risk    Will Continue to follow.    Lucia Griffin MD  [facility]  [phone]

## 2024-11-23 PROBLEM — Z66 DNR (DO NOT RESUSCITATE): Status: ACTIVE | Noted: 2024-01-01

## 2024-11-23 PROBLEM — Z51.5 COMFORT MEASURES ONLY STATUS: Status: ACTIVE | Noted: 2024-01-01

## 2024-11-23 NOTE — PROGRESS NOTES
HOSPITALIST NOC CROSS COVER    Notified of patient having fever 100.7 F. Increased oxygen demand slowly throughout the day from 1.5L to 4L with tachypnea, tachycardia. Patient is neutropenic with hx of severe aplastic anemia. He is currently on acyclovir, posaconazole and mepron prophylaxis. Recently completed treatment with ceftaroline for MRSA pneumonia.     VS: Tem 100.7 F, , RR 24, /86, SpO2 92% on 4 L NC    PE is significant for tachycardia, tachypnea with rhonchi RLL, diminished LLL, on 4L NC. Skin is warm to touch. Patient is illl appearing, not alert    A/P  #Neutropenic fever  #Acute hypoxic respiratory failure  #Hx recent MRSA pneumonia  #Sepsis, likely secondary to recurrent pneumonia  - Add sepsis workup, including chest x-ray, procalcitonin, lactic acid, BC  - Continue prophylaxis treatments as ordered  - Supplemental oxygen to keep sats >90%  - Add abx to cover MRSA PNA (patient recently on ceftaroline for 10 days, so will resume. Discussed with pharmacy)  - Re-consult ID in the morning  - oncology following

## 2024-11-23 NOTE — CARE PLAN
Problem: Knowledge Deficit - Standard  Goal: Patient and family/care givers will demonstrate understanding of plan of care, disease process/condition, diagnostic tests and medications  Outcome: Progressing     Problem: Skin Integrity  Goal: Skin integrity is maintained or improved  Outcome: Progressing     Problem: Fall Risk  Goal: Patient will remain free from falls  Outcome: Progressing     Problem: Respiratory:  Goal: Respiratory status will improve  Outcome: Progressing     Problem: Mobility  Goal: Risk for activity intolerance will decrease  Outcome: Progressing     Problem: Nutrition  Goal: Patient's nutritional and fluid intake will be adequate or improve  Outcome: Progressing     Problem: Hemodynamics  Goal: Patient's hemodynamics, fluid balance and neurologic status will be stable or improve  Outcome: Progressing     Problem: Risk for Aspiration  Goal: Patient's risk for aspiration will be absent or decrease  Outcome: Progressing     Problem: Infection - Standard  Goal: Patient will remain free from infection  Outcome: Progressing     Problem: Pain - Standard  Goal: Alleviation of pain or a reduction in pain to the patient’s comfort goal  Outcome: Progressing     Problem: Fluid Volume  Goal: Fluid volume balance will be maintained  Outcome: Progressing     Problem: Urinary - Renal Perfusion  Goal: Ability to achieve and maintain adequate renal perfusion and functioning will improve  Outcome: Progressing     Problem: Respiratory  Goal: Patient will achieve/maintain optimum respiratory ventilation and gas exchange  Outcome: Progressing     Problem: Physical Regulation  Goal: Diagnostic test results will improve  Outcome: Progressing  Goal: Signs and symptoms of infection will decrease  Outcome: Progressing   The patient is Watcher - Medium risk of patient condition declining or worsening    Shift Goals  Clinical Goals: stay afebrile  Patient Goals: VALERIO  Family Goals: Comfort for patient    Progress made  toward(s) clinical / shift goals:      Patient is not progressing towards the following goals:    Pt awake this morning; unable to understand, mumbles. Family by the beside, able to communicate with pt. PICC patent with positive blood return, bishop draining to gravity, NG tube with tube feed. Tube feed advanced to 45 ml( goal). Q 2 hr turns, low air mattress bed. Pt now on 2.5 L 02, MD notified. 2 p assist.

## 2024-11-23 NOTE — CARE PLAN
The patient is Watcher - Medium risk of patient condition declining or worsening    Shift Goals  Clinical Goals: monitor labs, VS  Patient Goals: VALERIO  Family Goals: comfort    Progress made toward(s) clinical / shift goals:      Patient is not progressing towards the following goals:    Problem: Knowledge Deficit - Standard  Goal: Patient and family/care givers will demonstrate understanding of plan of care, disease process/condition, diagnostic tests and medications  Outcome: Progressing     Problem: Skin Integrity  Goal: Skin integrity is maintained or improved  Outcome: Progressing     Problem: Fall Risk  Goal: Patient will remain free from falls  Outcome: Progressing     Problem: Respiratory:  Goal: Respiratory status will improve  Outcome: Not Progressing       Problem: Mobility  Goal: Risk for activity intolerance will decrease  Outcome: Progressing       Problem: Nutrition  Goal: Patient's nutritional and fluid intake will be adequate or improve  Outcome: Progressing     Problem: Hemodynamics  Goal: Patient's hemodynamics, fluid balance and neurologic status will be stable or improve  Outcome: Progressing     Problem: Risk for Aspiration  Goal: Patient's risk for aspiration will be absent or decrease  Outcome: Progressing     Problem: Infection - Standard  Goal: Patient will remain free from infection  Outcome: Progressing     Problem: Pain - Standard  Goal: Alleviation of pain or a reduction in pain to the patient’s comfort goal  Outcome: Progressing     Problem: Fluid Volume  Goal: Fluid volume balance will be maintained  Outcome: Progressing     Problem: Urinary - Renal Perfusion  Goal: Ability to achieve and maintain adequate renal perfusion and functioning will improve     Problem: Respiratory  Goal: Patient will achieve/maintain optimum respiratory ventilation and gas exchange  Outcome: Progressing      Problem: Safety - Medical Restraint  Goal: Remains free of injury from restraints (Restraint for  Interference with Medical Device)  Outcome: Progressing  Goal: Free from restraint(s) (Restraint for Interference with Medical Device)    Outcome: Progressing   Pt responds to voice and opens his eyes from time to time. Comfort care measures. Morphine given. Mckee, NG tube in place (tube feeding stopped). Q 2 hr turns, feet elevated, mepilex to heels and elbows.

## 2024-11-23 NOTE — THERAPY
"Speech Language Pathology   Daily Treatment     Patient Name: Miri Joseph  AGE:  84 y.o., SEX:  male  Medical Record #: [id number]  Date of Service: 11/22/2024      Precautions:  Precautions: Fall Risk, Swallow Precautions, Nasogastric Tube         Subjective  Patient asleep upon arrival, roused with max and verbal cues. No verbalizations throughout session.       Assessment  Pt seen this date for dysphagia management. PO of ice chips and water via tsp presented. Pt observed to orally manipulate ice chips; no overt cough response following trials. Reflexive, congested cough following tsp of water x3. Further trials abandoned d/t increased pt lethargy concern for pt safety.      Clinical Impressions  Pt presents with limited DOM and participation in PO trials this date; possibly impacted by no family at bedside. Aspiration risk from oral intake appears HIGH with indication to continue NPO at this time. SLP to continue to follow for dysphagia management and determine readiness for diagnostic evaluation.       Recommendations  Diet Consistency: NPO / NGT  - Frequent, diligent oral care  - Okay for limited ice chips  Instrumentation: Instrumental swallow study pending clinical progress  Medication: Non Oral  Oral Care: Q2h        SLP Treatment Plan  Treatment Plan: Dysphagia Treatment  SLP Frequency: 5x Per Week  Estimated Duration: Until Therapy Goals Met      Anticipated Discharge Needs  Discharge Recommendations: Recommend post-acute placement for additional speech therapy services prior to discharge home  Therapy Recommendations Upon DC: Dysphagia Training, Patient / Family / Caregiver Education      Patient / Family Goals  Patient / Family Goal #1: \"He was consuming it all\" per dtr  Goal #1 Outcome: Progressing slower than expected  Short Term Goals  Short Term Goal # 1: Pt will complete instrumental assessment to determine his swallow physiology and POC.  Goal Outcome # 1: Progressing slower than expected  Short " Term Goal # 2: Pt will participate in prfdg to determine readiness for diagnostic evaluation vs. PO diet.  Goal Outcome # 2 : Progressing slower than expected      Ryley Martinez, SLP

## 2024-11-23 NOTE — PROGRESS NOTES
Hospital Medicine Daily Progress Note    Date of Service  11/23/2024    Chief Complaint  Miri Joseph is a 84 y.o. male admitted 11/6/2024 with inpatient chemotherapy for aplastic anemia.    Hospital Course  84 y.o. male who presented 11/6/2024 with history of type 2 diabetes, hypertension, GERD, dyslipidemia, and recently diagnosed with aplastic anemia.  Patient was transferred to our care from [facility] to initiate inpatient chemotherapy.  He had been admitted there on October 19 for treatment of neutropenic fever he was treated with a course of linezolid for MRSA pneumonia.  On arrival here he was on prophylactic posaconazole, fluoroquinolone, and acyclovir.  Repeat sputum cultures here were positive for MRSA, the patient was started on linezolid on November 9 and on that same day he was moved to the ICU due to worsening respiratory status.  In the ICU the patient was on high flow nasal cannula and did require IV vasopressors he was initiated on tube feeds as well.  Oncology and ID following closely.       Interval Problem Update  11/18/2024  Seen and examined at bedside  Daughter at bedside, I was able to communicate with him in ai   Patient is only alert to name, following command appropriately  Moving all extremities  Now on room air saturating over 90%  Labs notable for leukopenia, severe neutropenia, hemoglobin 8.8, platelet count 4K,  sodium 138 potassium 3.7, renal function stable  Chest x-ray reviewed noted with worsening pneumonitis     Continue on cefazolin, atovaquone, acyclovir, posaconazole  Continue on on cyclosporine  Waiting for Promacta  Repeat BMP in a.m. to monitor electrolytes and renal function  Repeat CBC in a.m. to monitor white count and hemoglobin  Requiring IV antibiotics, need close monitoring for toxicity  Case discussed with oncology Dr. Lua .   Patient has high risk for deterioration.  Palliative care has been consulted  High risk of deterioration  into septic shock and worsening respiratory failure.  Need close monitoring.        I had extensive discussion with patient's daughter at bedside regarding the current medical status, treatment plan and prognosis. She  was briefed on the patient's condition including recent changes or developments .  Updated with recent test results vital signs and symptoms.  Current treatment plan was explained in details including medications.  All question answered. Family is aware of her poor prognosis.  They would like to continue treatment plan.  I have consulted palliative care.    Above per previous hospitalist.    11/19/24  Patient was seen and examined on the oncology floor.  Patient is continue to require restraints.    Discussed with Dr. Griffin of oncology, who is recommending ongoing goals of care discussions given poor prognosis.  Discussed with daughter at bedside regarding poor prognosis and that the patient will likely aspirate again.  Would like to continue medical treatment and watch 2 more weeks to see if the patient will get better.  Patient is not responding to questions.  He is not following any commands.  Continues on ceftaroline for MRSA multifocal pneumonia as well as atovaquone.  Cyclosporine and posaconazole.  ANC remains undetectable.  Potassium of 3.5 and magnesium of 1.3 being replaced.    Tolerating tube feeds through NG tube.  Showing signs of refeeding syndrome started on thiamine.  Discussed with registered dietitian.    11/20/2024  Patient was seen and examined on the oncology floor.  Patient is continue to require restraints.  Opening eyes to stimuli.  Not moving extremities to command.  Tolerating tube feeds.  ANC remaining low at 10.  Sodium dropping to 131.  Potassium of 3.8 and magnesium of 1.8 being replaced.  Phosphorus of 2.3 being replaced.   Continues to be tachypneic with respiratory rate of 28.  Repeat ABG and chest x-ray ordered.  Becoming hypoglycemic blood glucose of 68.  Decrease  glargine insulin to 35 units daily.  Platelets dropping to 5.  1 unit platelets being transfused.       11/21/2024  Patient was seen and examined on the oncology floor.  Notified of aspiration event this morning following nausea and vomiting event.  Currently requiring 1.5 LPM oxygen by nasal cannula.  Discussed with patient's daughter at bedside.        Chest x-ray per my read shows diffuse interstitial infiltrates right greater than left.  NG tube in place.      Abdominal x-ray per my read shows significant constipation in the rectum.  No distended loops of bowel consistent with small obstruction.    11/22/2024  Patient was seen and examined on the oncology floor.  Examined the patient at bedside with daughter, who declined .  Daughter stating the patient is doing better.  He is opening his eyes.  Following commands.  Hemoglobin dropping to 6.3 being transfused 1 unit PRBC.  Platelets have obtained being transfused 1 unit of platelets.  Sodium improving to 129.  NT-proBNP elevated at 1000 159.  Starting furosemide 20 mg IV twice daily.  Increasing oxygen requirements to 2.5 LPM.  Concerning for ongoing aspiration.  Blood pressure elevated at 162/65.  Tolerating tube feeds at 35 mL/h.  Increasing to goal.  Had bowel movement today.    11/23/2024  Patient deteriorating further throughout the day notified by nursing  Increasing oxygen requirements to 7 LPM oxygen mask.  Tachypneic respiratory rate in the 40s.  Per discussion with Dr. Griffin of oncology.  Appears to be futile despite maximum medical care.  Recommended transitioning to comfort care, to which the patient's Semi was agreeable.  Have ordered comfort care measures only.  Continue oral antibiotics and treatment for his aplastic anemia as further therapies appear to be futile as the patient is continue to aspirate and die from aspiration pneumonia and hypoxia with worsening respiratory failure.    Chest x-ray per my read shows diffuse infiltrates  bilaterally interstitially.    Deep suction contraindicated due to patient's thrombocytopenia.        I have discussed this patient's plan of care and discharge plan at IDT rounds today with Case Management, Nursing, Nursing leadership, and other members of the IDT team.    Consultants/Specialty  critical care, oncology, and palliative care    Code Status  Comfort Care/DNR    Disposition  The patient is not medically cleared for discharge to home or a post-acute facility.  Anticipate discharge to: hospice    I have placed the appropriate orders for post-discharge needs.    Review of Systems  Review of Systems   Unable to perform ROS: Mental acuity        Physical Exam  Temp:  [36.8 °C (98.2 °F)-39.5 °C (103.1 °F)] 36.8 °C (98.2 °F)  Pulse:  [] 94  Resp:  [24-40] 32  BP: (107-162)/(61-86) 144/71  SpO2:  [91 %-99 %] 93 %    Physical Exam  Vitals and nursing note reviewed. Exam conducted with a chaperone present.   Constitutional:       General: He is in acute distress.      Appearance: He is ill-appearing and toxic-appearing.   HENT:      Head: Normocephalic and atraumatic.      Mouth/Throat:      Mouth: Mucous membranes are moist.      Pharynx: Oropharynx is clear. No oropharyngeal exudate.   Eyes:      General: No scleral icterus.        Right eye: No discharge.         Left eye: No discharge.      Conjunctiva/sclera: Conjunctivae normal.   Cardiovascular:      Rate and Rhythm: Normal rate and regular rhythm.      Pulses: Normal pulses.      Heart sounds: Normal heart sounds. No murmur heard.  Pulmonary:      Effort: Respiratory distress present.      Breath sounds: Rhonchi and rales present.   Abdominal:      General: Abdomen is flat. Bowel sounds are normal. There is no distension.      Palpations: Abdomen is soft.   Musculoskeletal:         General: No swelling.      Cervical back: Neck supple. No tenderness.      Right lower leg: No edema.      Left lower leg: No edema.   Skin:     General: Skin is warm  and dry.      Coloration: Skin is pale.   Neurological:      Mental Status: Mental status is at baseline. He is lethargic.      Motor: Weakness present.      Comments: Opening eyes intermittently to voice and pain.  He is moving extremities intermittently.  He is in restraints   Psychiatric:      Comments: Unable to assess       Fluids    Intake/Output Summary (Last 24 hours) at 11/23/2024 1817  Last data filed at 11/23/2024 0438  Gross per 24 hour   Intake --   Output 2350 ml   Net -2350 ml        Laboratory  Recent Labs     11/21/24  0020 11/22/24  0030 11/23/24  0015   WBC 0.6* 0.7* 0.4*   RBC 2.37* 2.15* 2.36*   HEMOGLOBIN 7.0* 6.3* 7.0*   HEMATOCRIT 19.5* 17.9* 19.1*   MCV 82.3 83.3 80.9*   MCH 29.5 29.3 29.7   MCHC 35.9 35.2 36.6*   RDW 47.6 48.8 46.5   PLATELETCT 20* 7* 34*   MPV 9.8 10.3 9.0     Recent Labs     11/21/24  0020 11/22/24  0030 11/23/24  0015   SODIUM 128* 129* 133*   POTASSIUM 3.6 3.3* 3.3*   CHLORIDE 96 97 99   CO2 26 26 29   GLUCOSE 95 115* 120*   BUN 28* 30* 31*   CREATININE 0.68 0.74 0.79   CALCIUM 7.5* 7.5* 7.6*                   Imaging  DX-CHEST-PORTABLE (1 VIEW)   Final Result      Stable diffuse bilateral pulmonary infiltrates.      DX-CHEST-PORTABLE (1 VIEW)   Final Result         1. No significant interval change.      ZO-OXILQJW-0 VIEW   Final Result      1.  Nonobstructive bowel gas pattern with a mild underlying colonic stool burden.   2.  Enteric feeding tube terminates projecting over the expected location of the stomach.      DX-CHEST-PORTABLE (1 VIEW)   Final Result      Stable appearance the chest.      DX-CHEST-PORTABLE (1 VIEW)   Final Result      1.  Extensive bilateral pneumonitis with minimal change since previous exam of 11/18/2024      DX-ABDOMEN FOR TUBE PLACEMENT   Final Result      Orogastric tube tip at the proximal stomach.      DX-CHEST-LIMITED (1 VIEW)   Final Result      1.  Similar to slightly worsened multifocal airspace opacities throughout both lungs  suggestive of pneumonitis.   2.  Increased small right effusion.      DX-CHEST-PORTABLE (1 VIEW)   Final Result         1.  Pulmonary edema and/or infiltrates, similar to prior study.      DX-ABDOMEN FOR TUBE PLACEMENT   Final Result         1.  Nonspecific bowel gas pattern in the upper abdomen.   2.  Nasogastric tube tip terminates overlying the expected location of the gastric body.   3.  Patchy bilateral pulmonary infiltrates      DX-ABDOMEN FOR TUBE PLACEMENT   Final Result      1. Appropriate position of the nasogastric tube, replaced in the interval.   2. The left upper extremity PICC catheter now terminates within the azygos vein. Repositioning is recommended.   3. The remainder is stable.      DX-ABDOMEN FOR TUBE PLACEMENT   Final Result      Malpositioned enteric feeding tube likely within the right lower lobe bronchus.      Findings were conveyed to EMILIO Sutton in care of the patient on 11/11/2024 1:59 PM.      IR-PICC LINE PLACEMENT W/ GUIDANCE > AGE 5   Final Result                  Ultrasound-guided PICC placement performed by qualified nursing staff as    above.          DX-ABDOMEN FOR TUBE PLACEMENT   Final Result      1.  Enteric tube has been placed and the tip projects over the stomach.      2.  Nonspecific pulmonary airspace process      US-RUQ   Final Result         1. Normal gallbladder and right upper quadrant ultrasound.      2. Small right pleural effusion.      CT-SOFT TISSUE NECK WITH   Final Result         1. No airway narrowing. Mild uvula swelling suggested. The epiglottis and larynx appear normal.      2. Upper normal size cervical lymph nodes. No cervical mass otherwise.      3. Trace right mastoid sinus fluid. No paranasal sinus fluid. The middle ears are clear.      4. The visible upper chest again shows dense infiltrates with small pleural effusions and mild upper mediastinal adenopathy.      CT-CHEST (THORAX) W/O   Final Result      1.  Multifocal bilateral pulmonary airspace process  most consistent with pneumonia, with interval worsening      2.  Bilateral pleural effusion, most small in size, right greater than left      3.  Single enlarged prevascular space lymph node which is most likely reactive      Fleischner Society pulmonary nodule recommendations:   Not Applicable         DX-CHEST-PORTABLE (1 VIEW)   Final Result         1.  Patchy bilateral pulmonary infiltrates, similar to prior study      DX-CHEST-PORTABLE (1 VIEW)   Final Result      1.  Unchanged BILATERAL pneumonia   2.  Possible RIGHT pleural effusion      DX-CHEST-2 VIEWS   Final Result         1.  Pulmonary edema and/or infiltrates.   2.  Trace left pleural effusion   3.  Atherosclerosis           Assessment/Plan  * Multifocal pneumonia- (present on admission)  Assessment & Plan    Present on admission  Source respiratory  MRSA from sputum  Possible fungal: beta D glucan negative.   Serum Aspergillus galactomannan, Coccidioides serology are still pending.  Recent Karius at outside hospital was negative for fungal organisms   Probable aspiration pneumonia  ID managing antibiotics: Have stopped vancomycin due to renal function, not giving linezolid due to platelet count.  Currently on ceftaroline, acyclovir and posaconazole the last two for prophylaxis  Prophylactic agents ongoing for severe neutropenia  Amphotericin discontinued by ID    11/20/2024  Continue posaconazole, atovaquone, acyclovir, ceftaroline  Adequately fluid resuscitated      11/18/2024    Now on room air saturating over 90%  Continue on ceftaroline, atovaquone, acyclovir, posaconazole  Continue on on cyclosporine  Waiting for Promacta  Monitor oxygen recommend closely  Repeat BMP in a.m. to monitor electrolytes and renal function  Repeat CBC in a.m. to monitor white count and hemoglobin  Requiring IV antibiotics, need close monitoring for toxicity  Case discussed with oncology Dr. Lua .   Patient has high risk for deterioration.  Palliative care has been  consulted  High risk of deterioration into septic shock and worsening respiratory failure.  Need close monitoring.    11/19/2024  Continues to remain on room air  Continue ceftaroline, atovaquone, acyclovir, and posaconazole  Continue cyclosporine  Continue to monitor for severe bone marrow toxicity requiring further transfusion    11/21/2024  Continue current medications    11/23/2024  Transitioning to comfort care    Constipation- (present on admission)  Assessment & Plan  11/21/2024  Moderate stool burden within the rectum.  Continue MiraLAX 3 times a day  Docusate twice a day  Suppository ordered    Aspiration pneumonia (HCC)- (present on admission)  Assessment & Plan  11/21/2024  Recurrent aspiration event this morning  Chest x-ray this morning per my read showed no significant changes.  Diffuse bilateral interstitial infiltrates.  Continue posaconazole, acyclovir, atovaquone, and ceftaroline    Refeeding syndrome- (present on admission)  Assessment & Plan  11/19/2024  Discussed with registered dietitian.  Electrolyte disturbances are concerning for mild refeeding syndrome.  I have started patient on thiamine per recommendations.  Continue to monitor electrolytes  Initiated remote cardiac monitoring to monitor for arrhythmias  Continue restraints as needed    Aplastic anemia (HCC)- (present on admission)  Assessment & Plan  BM biopsy consistent with severe aplastic anemia     Diagnostic evaluation in september and at [facility], viral studies negative, hepatitis panel negative,   Cyclosporine resumed at 110mg BID 11/14  Promacta (eltrombopag) for thrombocytopenia 75 mg daily  Continue prophylactic antimicrobials  B12/MMA, folate levels resent, previously low     All blood products irradiated/leukophoresed  Tranfuse PRBC for <7  Transfuse platelets < 10, may warrant higher threshold given possibility of DAH      Discuss with hematology regarding prognosis, not responsive to G-CSF in the past    11/19/2024  Discussed  with Dr. Griffin of oncology.  Recommending further goals of care discussion given poor prognosis    11/21/2024  Continues to remain poor prognosis.  Discussed with Dr. Griffin of oncology.    11/22/2024  Hemoglobin dropping to 6.3.  Being transfused 1 unit of PRBC.    11/23/2024  Transitioning to comfort care    Comfort measures only status  Assessment & Plan  11/23/2024  Patient is actively dying.  Significant concerns from nursing at the sustained medical treatment is prolonging the suffering for the patient.  I do not believe that the patient will benefit from any further medical therapies.  Agree with transitioning the patient to comfort care measures only.  Discussed with Dr. Griffin of oncology.  Discussed with patient's family.    DNR (do not resuscitate)- (present on admission)  Assessment & Plan  11/23/2024  Patient is DNR/DNI status    Bilateral pleural effusion- (present on admission)  Assessment & Plan  11/21/2024  As per previous CT scan.  Will need to consider diuresis once stable    Hypoglycemia due to insulin  Assessment & Plan  11/22/2024  Improving with decreasing insulin      Hypokalemia- (present on admission)  Assessment & Plan  11/19/2024  Likely from poor oral intake likely from poor oral intake and refeeding syndrome  Continue K-Lyte for goal greater than 4    Encounter for hospice care discussion- (present on admission)  Assessment & Plan  11/19/2024  Per discussion Dr. Griffin of oncology recommending further goals of care discussion given poor clinical state and poor prognosis.  I discussed at length with the patient's daughter at bedside regarding patient's likelihood of aspirating again although he is improving with IV antibiotics appears to be somewhat stable at this time.  She stated that the patient previously had pneumonia and improved with antibiotics but then deteriorated after he ate again.  I explained to her that this is likely indication that the patient is nearing the end of  his life.  I explained the option of a G-tube, which could decrease the risk but not eliminate the risk of aspiration.  It may only prolong his suffering and inevitable death.  I explained that the patient had not been improving in the past 2 weeks, which is considered a prolonged hospitalization.  Daughter appeared to have difficulty grasping this message and say that she 1 to see how the patient will proceed in the next 2 weeks.      Discussed with case management and nursing leadership.  Recommending discharge to LTAC.  I placed referral for LTAC.    Advance care planning total time 16 minutes    ESTHER (acute kidney injury) ([facility])  Assessment & Plan  Likely sepsis primarily with contribution from medications  Adequately resuscitated  UOP 600ml given body weight of 70Kg this is borderline adequate output  Renally dose medications as appropriate  Serial BMP  Monitor UOP  Not a candidate for RRT    11/20/2024  Resolved         Septic shock ([facility])  Assessment & Plan  Present on admission  Source respiratory  MRSA from sputum  Possible fungal: beta D glucan negative.   Serum Aspergillus galactomannan, Coccidioides serology are still pending.  Recent Karius at outside hospital was negative for fungal organisms   Probable aspiration pneumonia  Completed steroids 11/14  Stable off of midodrine and steroids  ID managing antibiotics: Have stopped vancomycin due to renal function, not giving linezolid due to platelet count.  Currently on ceftaroline, acyclovir and posaconazole the last two for prophylaxis  Prophylactic agents ongoing for severe neutropenia  Amphotericin discontinued by ID  Adequately fluid resuscitated        Acute respiratory failure with hypoxia ([facility])  Assessment & Plan  Pneumonia   MRSA positive cultures from sputum  Possible fungal etiology: Beta D glucan neg.  Other serologies pending as noted above  DNR/DNI confirmed with daughter  Moved to unit for aggressive pulmonary toilet and oral care  O2 demand has  decreased and he is now on RA/2 LNC  RT protocols  Oral care every-2 hours  Aspiration precautions, swallow eval pending  Hydrocortisone added to antibiotic regimen for severe pneumonia with respiratory failure ID managing antibiotics  If patient clinically deteriorates transition to comfort care/hospice, discussed with daughter and son who are in agreement     11/20/2024  Continues to be tachypneic with respiratory rate of 28.  Repeat ABG and chest x-ray ordered.  Continue supplemental oxygen    11/22/2024  Increasing oxygen requirements to 2.5 LPM  Start forced diuresis with furosemide 20 mg IV twice daily.    Continuous cardiac monitoring during forced diuresis.    Elevated LFTs  Assessment & Plan  improving  RUQUS ordered-> normal  Avoid hepatotoxins      Dysphagia  Assessment & Plan  Still has altered level of conciousness  If he starts to clear will re consult SLP  [employer] if very high risk for aspiration  Currently getting nutrition via NGT  Oral care every 1-2-hour  CT soft tissue neck without airway issue or obvious abscess/obstructing process  Now more alert and off HFNC: re consult SLP      11/22/2024  Tolerating tube feeds at 35 mL/h.  Increasing to 45 at goal.    Pressure injury of deep tissue of sacral region- (present on admission)  Assessment & Plan  11/20/2024  As per history  Likely due to severe protein calorie nutrition.    Severe protein-calorie malnutrition (HCC)- (present on admission)  Assessment & Plan  Nutrition following  Family okay with soft flexible feeding tube and enteral nutrition  Currently at goal on tube feeds  Last seen by SLP on 11/11; pt now more alert.  Will ask them to re-evaluate    11/20/2024  Continue tube feeds    11/21/2024  Resume tube feeds at 10 mL/h    GERD (gastroesophageal reflux disease)- (present on admission)  Assessment & Plan  With hydrocortisone and thrombocytopenia -continue IV PPI daily for prophylaxis  Antireflux measures       Hypomagnesemia- (present on  admission)  Assessment & Plan  11/19/2024  Magnesium down to 1.3.  Likely from refeeding syndrome  I will order replacement for goal greater than 4.    11/20/2024  magnesium of 1.8 being replaced.     Hypophosphatemia- (present on admission)  Assessment & Plan  11/19/2024  Likely from refeeding syndrome  Neutra-Phos I ordered hypophosphatemia    11/20/2024  Continue to replace with Neutra-Phos    Neutropenic fever (HCC)- (present on admission)  Assessment & Plan  11/20/2024  No further fevers in 48 hours.    MRSA pneumonia (HCC)- (present on admission)  Assessment & Plan  Was on linezolid and vancomycin  Continue ceftaroline for now   isolation precautions  ID following    11/19/2024  Continue ceftaroline      Dyslipidemia- (present on admission)  Assessment & Plan  11/19/2024  As per history.  Defer statin at this time given multiple other comorbidities and active medical issues.    Type 2 diabetes mellitus, without long-term current use of insulin ([facility])- (present on admission)  Assessment & Plan  Patient has been quite hyperglycemic  We have titrated up his glargine: currently on 45 units  Now running mid to high 100s; monitor over next 24hrs and titrate glargine as appropriate  Continue a high sliding scale  A1c 7.32 months ago    11/21/2024  Ongoing hypoglycemia blood glucose 77   Decrease glargine insulin to 35 units daily.  Currently being held today due to aspiration event and withholding of tube feeds.    11/22/2024  Resume glargine insulin at 20 units daily.  As the patient is tolerating tube feeds.f    11/23/2024  Transitioning to comfort care    Thrombocytopenia (HCC)- (present on admission)  Assessment & Plan  Secondary to pancytopenia from aplastic anemia  Back in single digits again today: giving one unit of platelets  No signs of active bleeding on exam today    11/19/2024  No further transfusion required overnight  Platelets improving to 18 following transfusion yesterday    11/20/2024  Platelets  dropping to 5  Patient has been transfused 1 unit platelets  Monitor closely for transfusion reaction and bleeding.    11/21/2024  Platelets improving to 20 following 1 unit transfusion    11/22/2024  Platelets dropping to 7.  Transfusing 1 unit platelets.    11/23/2024  Transitioning to comfort care    Pancytopenia (HCC)  Assessment & Plan  BM biopsy consistent with severe aplastic anemia  Also consider infection/nutritional disorder/toxic myelosuppression.      Diagnostic evaluation in september and at [facility], viral studies negative, hepatitis panel negative,   Oncology following   On cyclosporine 100 bid, but cannot take orals currently  Promacta (eltrombopag) for thrombocytopenia 75 mg daily  Continue prophylactic antimicrobials  B12/MMA, folate levels resent, previously low     Tranfuse PRBC for <7  Transfuse platelets < 10, may warrant higher threshold given possibility of DAH though CT doesn't look strikingly like DAH  CT more likely MRSA pneumonia with positive cultures     Discuss with hematology regarding prognosis, not responsive to G-CSF in the past        VTE prophylaxis:    pharmacologic prophylaxis contraindicated due to Comfort care measures only      I have performed a physical exam and reviewed and updated ROS and Plan today (11/23/2024). In review of yesterday's note (11/22/2024), there are no changes except as documented above.         Greater than 54 minutes spent prepping to see patient (e.g. review of tests) obtaining and/or reviewing separately obtained history. Performing a medically appropriate examination and evaluation.  Counseling and educating the patient/family/caregiver.  Ordering medications, tests, or procedures.  Referring and communicating with other health care professionals.  Documenting clinical information in EPIC.  Independently interpreting results and communicating results to patient/family/caregiver.  Care coordination.

## 2024-11-23 NOTE — RESPIRATORY CARE
Called to bedside to suction. Deep oral suction provided, with resulting tube feed bloody secretions in [facility].

## 2024-11-23 NOTE — PROGRESS NOTES
Hospitalist Note    Was notified of patient's worsening respiratory status with respiratory rate in the 40s with concerns for ongoing aspiration.  Patient was febrile overnight up to 103.1.  Requested deep suctioning by respiratory therapy said patient's thrombocytopenia was a contraindication.  I did perform some suctioning and to the throat as much today could and suctioned copious amounts of tube feeds.  Tube feeds were stopped.    I explained to the daughter this morning that the patient was actively dying due to ongoing aspiration despite our best efforts to keep him alive provide nutrition.  She thanked me and stated that she would be further discussion with her brothers on how to proceed.     I explained to the patient's son Johan over the phone and also at bedside that I was concerned about patient's deterioration and that he was unlikely to recover with continued therapies including the antibiotics.  He question whether he still had MRSA pneumonia and I stated that he was continued to receive ceftaroline as well as atovaquone.  He later stated that his brain was in shock I did not know what to do.  He stated that he would be continue to talk to his sister as well as other brother and Candida.    I discussed the case with Dr. Griffin of oncology, who felt that ongoing medical support was futile.    For my assessment, ongoing supportive medical therapy and treatment is only prolonging patient's suffering and feel that it is appropriate to transition the patient to comfort care measures as he is actively dying.    I have initiated comfort care measures order set and discontinued all life-sustaining medical therapies.      Advance care planning total time 19 minutes

## 2024-11-23 NOTE — CARE PLAN
The patient is Unstable - High likelihood or risk of patient condition declining or worsening    Shift Goals  Clinical Goals: stay afebrile, monitor labs  Patient Goals: VALERIO  Family Goals: comfort    Progress made toward(s) clinical / shift goals:  Pt A/Ox1     Patient is not progressing towards the following goals:  Pt is on sepsis rule out. Pt has been febrile, blood cultures drawn, tylenol given.   Problem: Knowledge Deficit - Standard  Goal: Patient and family/care givers will demonstrate understanding of plan of care, disease process/condition, diagnostic tests and medications  Outcome: Not Met     Problem: Respiratory:  Goal: Respiratory status will improve  Outcome: Not Met  Note: Pt work of breathing increasing. Pt now on 6L on oxygen mask.

## 2024-11-23 NOTE — PROGRESS NOTES
Hospital Medicine Daily Progress Note    Date of Service  11/22/2024    Chief Complaint  Miri Joseph is a 84 y.o. male admitted 11/6/2024 with inpatient chemotherapy for aplastic anemia.    Hospital Course  84 y.o. male who presented 11/6/2024 with history of type 2 diabetes, hypertension, GERD, dyslipidemia, and recently diagnosed with aplastic anemia.  Patient was transferred to our care from [facility] to initiate inpatient chemotherapy.  He had been admitted there on October 19 for treatment of neutropenic fever he was treated with a course of linezolid for MRSA pneumonia.  On arrival here he was on prophylactic posaconazole, fluoroquinolone, and acyclovir.  Repeat sputum cultures here were positive for MRSA, the patient was started on linezolid on November 9 and on that same day he was moved to the ICU due to worsening respiratory status.  In the ICU the patient was on high flow nasal cannula and did require IV vasopressors he was initiated on tube feeds as well.  Oncology and ID following closely.       Interval Problem Update  11/18/2024  Seen and examined at bedside  Daughter at bedside, I was able to communicate with him in ai   Patient is only alert to name, following command appropriately  Moving all extremities  Now on room air saturating over 90%  Labs notable for leukopenia, severe neutropenia, hemoglobin 8.8, platelet count 4K,  sodium 138 potassium 3.7, renal function stable  Chest x-ray reviewed noted with worsening pneumonitis     Continue on cefazolin, atovaquone, acyclovir, posaconazole  Continue on on cyclosporine  Waiting for Promacta  Repeat BMP in a.m. to monitor electrolytes and renal function  Repeat CBC in a.m. to monitor white count and hemoglobin  Requiring IV antibiotics, need close monitoring for toxicity  Case discussed with oncology Dr. Lua .   Patient has high risk for deterioration.  Palliative care has been consulted  High risk of deterioration  into septic shock and worsening respiratory failure.  Need close monitoring.        I had extensive discussion with patient's daughter at bedside regarding the current medical status, treatment plan and prognosis. She  was briefed on the patient's condition including recent changes or developments .  Updated with recent test results vital signs and symptoms.  Current treatment plan was explained in details including medications.  All question answered. Family is aware of her poor prognosis.  They would like to continue treatment plan.  I have consulted palliative care.    Above per previous hospitalist.    11/19/24  Patient was seen and examined on the oncology floor.  Patient is continue to require restraints.    Discussed with Dr. Griffin of oncology, who is recommending ongoing goals of care discussions given poor prognosis.  Discussed with daughter at bedside regarding poor prognosis and that the patient will likely aspirate again.  Would like to continue medical treatment and watch 2 more weeks to see if the patient will get better.  Patient is not responding to questions.  He is not following any commands.  Continues on ceftaroline for MRSA multifocal pneumonia as well as atovaquone.  Cyclosporine and posaconazole.  ANC remains undetectable.  Potassium of 3.5 and magnesium of 1.3 being replaced.    Tolerating tube feeds through NG tube.  Showing signs of refeeding syndrome started on thiamine.  Discussed with registered dietitian.    11/20/2024  Patient was seen and examined on the oncology floor.  Patient is continue to require restraints.  Opening eyes to stimuli.  Not moving extremities to command.  Tolerating tube feeds.  ANC remaining low at 10.  Sodium dropping to 131.  Potassium of 3.8 and magnesium of 1.8 being replaced.  Phosphorus of 2.3 being replaced.   Continues to be tachypneic with respiratory rate of 28.  Repeat ABG and chest x-ray ordered.  Becoming hypoglycemic blood glucose of 68.  Decrease  glargine insulin to 35 units daily.  Platelets dropping to 5.  1 unit platelets being transfused.       11/21/2024  Patient was seen and examined on the oncology floor.  Notified of aspiration event this morning following nausea and vomiting event.  Currently requiring 1.5 LPM oxygen by nasal cannula.  Discussed with patient's daughter at bedside.        Chest x-ray per my read shows diffuse interstitial infiltrates right greater than left.  NG tube in place.      Abdominal x-ray per my read shows significant constipation in the rectum.  No distended loops of bowel consistent with small obstruction.    11/22/2024  Patient was seen and examined on the oncology floor.  Examined the patient at bedside with daughter, who declined .  Daughter stating the patient is doing better.  He is opening his eyes.  Following commands.  Hemoglobin dropping to 6.3 being transfused 1 unit PRBC.  Platelets have obtained being transfused 1 unit of platelets.  Sodium improving to 129.  NT-proBNP elevated at 1000 159.  Starting furosemide 20 mg IV twice daily.  Increasing oxygen requirements to 2.5 LPM.  Concerning for ongoing aspiration.  Blood pressure elevated at 162/65.  Tolerating tube feeds at 35 mL/h.  Increasing to goal.  Had bowel movement today.      I have discussed this patient's plan of care and discharge plan at IDT rounds today with Case Management, Nursing, Nursing leadership, and other members of the IDT team.    Consultants/Specialty  critical care, oncology, and palliative care    Code Status  DNAR/DNI    Disposition  The patient is not medically cleared for discharge to home or a post-acute facility.  Anticipate discharge to: home with organized home healthcare and close outpatient follow-up    I have placed the appropriate orders for post-discharge needs.    Review of Systems  Review of Systems   Unable to perform ROS: Mental acuity        Physical Exam  Temp:  [36.7 °C (98 °F)-37.6 °C (99.7 °F)] 37.1 °C  (98.8 °F)  Pulse:  [] 103  Resp:  [18-28] 28  BP: (145-165)/(63-85) 148/69  SpO2:  [90 %-95 %] 92 %    Physical Exam  Vitals and nursing note reviewed. Exam conducted with a chaperone present.   Constitutional:       General: He is not in acute distress.     Appearance: He is ill-appearing.   HENT:      Head: Normocephalic and atraumatic.      Mouth/Throat:      Mouth: Mucous membranes are moist.      Pharynx: Oropharynx is clear. No oropharyngeal exudate.   Eyes:      General: No scleral icterus.        Right eye: No discharge.         Left eye: No discharge.      Conjunctiva/sclera: Conjunctivae normal.   Cardiovascular:      Rate and Rhythm: Normal rate and regular rhythm.      Pulses: Normal pulses.      Heart sounds: Normal heart sounds. No murmur heard.  Pulmonary:      Effort: No respiratory distress.      Breath sounds: Rhonchi and rales present.   Abdominal:      General: Abdomen is flat. Bowel sounds are normal. There is no distension.      Palpations: Abdomen is soft.   Musculoskeletal:         General: No swelling.      Cervical back: Neck supple. No tenderness.      Right lower leg: No edema.      Left lower leg: No edema.   Skin:     General: Skin is warm and dry.      Coloration: Skin is pale.   Neurological:      Mental Status: He is alert. Mental status is at baseline.      Motor: Weakness present.      Comments: Opening eyes to voice.  Moving extremities intermittently to command.   Psychiatric:      Comments: Unable to assess         Fluids    Intake/Output Summary (Last 24 hours) at 11/22/2024 1851  Last data filed at 11/22/2024 1710  Gross per 24 hour   Intake 700 ml   Output 2525 ml   Net -1825 ml        Laboratory  Recent Labs     11/20/24  0015 11/21/24  0020 11/22/24  0030   WBC 0.8* 0.6* 0.7*   RBC 2.92* 2.37* 2.15*   HEMOGLOBIN 8.7* 7.0* 6.3*   HEMATOCRIT 24.2* 19.5* 17.9*   MCV 82.9 82.3 83.3   MCH 29.8 29.5 29.3   MCHC 36.0 35.9 35.2   RDW 46.5 47.6 48.8   PLATELETCT 5* 20* 7*    MPV  --  9.8 10.3     Recent Labs     11/20/24  0015 11/21/24  0020 11/22/24  0030   SODIUM 131* 128* 129*   POTASSIUM 3.8 3.6 3.3*   CHLORIDE 99 96 97   CO2 26 26 26   GLUCOSE 102* 95 115*   BUN 26* 28* 30*   CREATININE 0.53 0.68 0.74   CALCIUM 7.3* 7.5* 7.5*                   Imaging  [id number] VIEW   Final Result      1.  Nonobstructive bowel gas pattern with a mild underlying colonic stool burden.   2.  Enteric feeding tube terminates projecting over the expected location of the stomach.      DX-CHEST-PORTABLE (1 VIEW)   Final Result      Stable appearance the chest.      DX-CHEST-PORTABLE (1 VIEW)   Final Result      1.  Extensive bilateral pneumonitis with minimal change since previous exam of 11/18/2024      DX-ABDOMEN FOR TUBE PLACEMENT   Final Result      Orogastric tube tip at the proximal stomach.      DX-CHEST-LIMITED (1 VIEW)   Final Result      1.  Similar to slightly worsened multifocal airspace opacities throughout both lungs suggestive of pneumonitis.   2.  Increased small right effusion.      DX-CHEST-PORTABLE (1 VIEW)   Final Result         1.  Pulmonary edema and/or infiltrates, similar to prior study.      DX-ABDOMEN FOR TUBE PLACEMENT   Final Result         1.  Nonspecific bowel gas pattern in the upper abdomen.   2.  Nasogastric tube tip terminates overlying the expected location of the gastric body.   3.  Patchy bilateral pulmonary infiltrates      DX-ABDOMEN FOR TUBE PLACEMENT   Final Result      1. Appropriate position of the nasogastric tube, replaced in the interval.   2. The left upper extremity PICC catheter now terminates within the azygos vein. Repositioning is recommended.   3. The remainder is stable.      DX-ABDOMEN FOR TUBE PLACEMENT   Final Result      Malpositioned enteric feeding tube likely within the right lower lobe bronchus.      Findings were conveyed to EMILIO Sutton in care of the patient on 11/11/2024 1:59 PM.      IR-PICC LINE PLACEMENT W/ GUIDANCE > AGE 5   Final  Result                  Ultrasound-guided PICC placement performed by qualified nursing staff as    above.          DX-ABDOMEN FOR TUBE PLACEMENT   Final Result      1.  Enteric tube has been placed and the tip projects over the stomach.      2.  Nonspecific pulmonary airspace process      US-RUQ   Final Result         1. Normal gallbladder and right upper quadrant ultrasound.      2. Small right pleural effusion.      CT-SOFT TISSUE NECK WITH   Final Result         1. No airway narrowing. Mild uvula swelling suggested. The epiglottis and larynx appear normal.      2. Upper normal size cervical lymph nodes. No cervical mass otherwise.      3. Trace right mastoid sinus fluid. No paranasal sinus fluid. The middle ears are clear.      4. The visible upper chest again shows dense infiltrates with small pleural effusions and mild upper mediastinal adenopathy.      CT-CHEST (THORAX) W/O   Final Result      1.  Multifocal bilateral pulmonary airspace process most consistent with pneumonia, with interval worsening      2.  Bilateral pleural effusion, most small in size, right greater than left      3.  Single enlarged prevascular space lymph node which is most likely reactive      Fleischner Society pulmonary nodule recommendations:   Not Applicable         DX-CHEST-PORTABLE (1 VIEW)   Final Result         1.  Patchy bilateral pulmonary infiltrates, similar to prior study      DX-CHEST-PORTABLE (1 VIEW)   Final Result      1.  Unchanged BILATERAL pneumonia   2.  Possible RIGHT pleural effusion      DX-CHEST-2 VIEWS   Final Result         1.  Pulmonary edema and/or infiltrates.   2.  Trace left pleural effusion   3.  Atherosclerosis           Assessment/Plan  * Multifocal pneumonia- (present on admission)  Assessment & Plan    Present on admission  Source respiratory  MRSA from sputum  Possible fungal: beta D glucan negative.   Serum Aspergillus galactomannan, Coccidioides serology are still pending.  Recent Arabella at  outside hospital was negative for fungal organisms   Probable aspiration pneumonia  ID managing antibiotics: Have stopped vancomycin due to renal function, not giving linezolid due to platelet count.  Currently on ceftaroline, acyclovir and posaconazole the last two for prophylaxis  Prophylactic agents ongoing for severe neutropenia  Amphotericin discontinued by ID    11/20/2024  Continue posaconazole, atovaquone, acyclovir, ceftaroline  Adequately fluid resuscitated      11/18/2024    Now on room air saturating over 90%  Continue on ceftaroline, atovaquone, acyclovir, posaconazole  Continue on on cyclosporine  Waiting for Promacta  Monitor oxygen recommend closely  Repeat BMP in a.m. to monitor electrolytes and renal function  Repeat CBC in a.m. to monitor white count and hemoglobin  Requiring IV antibiotics, need close monitoring for toxicity  Case discussed with oncology Dr. Lua .   Patient has high risk for deterioration.  Palliative care has been consulted  High risk of deterioration into septic shock and worsening respiratory failure.  Need close monitoring.    11/19/2024  Continues to remain on room air  Continue ceftaroline, atovaquone, acyclovir, and posaconazole  Continue cyclosporine  Continue to monitor for severe bone marrow toxicity requiring further transfusion    11/21/2024  Continue current medications    Constipation- (present on admission)  Assessment & Plan  11/21/2024  Moderate stool burden within the rectum.  Continue MiraLAX 3 times a day  Docusate twice a day  Suppository ordered    Aspiration pneumonia (HCC)- (present on admission)  Assessment & Plan  11/21/2024  Recurrent aspiration event this morning  Chest x-ray this morning per my read showed no significant changes.  Diffuse bilateral interstitial infiltrates.  Continue posaconazole, acyclovir, atovaquone, and ceftaroline    Refeeding syndrome- (present on admission)  Assessment & Plan  11/19/2024  Discussed with registered  dietitian.  Electrolyte disturbances are concerning for mild refeeding syndrome.  I have started patient on thiamine per recommendations.  Continue to monitor electrolytes  Initiated remote cardiac monitoring to monitor for arrhythmias  Continue restraints as needed    Aplastic anemia (HCC)- (present on admission)  Assessment & Plan  BM biopsy consistent with severe aplastic anemia     Diagnostic evaluation in september and at [facility], viral studies negative, hepatitis panel negative,   Cyclosporine resumed at 110mg BID 11/14  Promacta (eltrombopag) for thrombocytopenia 75 mg daily  Continue prophylactic antimicrobials  B12/MMA, folate levels resent, previously low     All blood products irradiated/leukophoresed  Tranfuse PRBC for <7  Transfuse platelets < 10, may warrant higher threshold given possibility of DAH      Discuss with hematology regarding prognosis, not responsive to G-CSF in the past    11/19/2024  Discussed with Dr. Griffin of oncology.  Recommending further goals of care discussion given poor prognosis    11/21/2024  Continues to remain poor prognosis.  Discussed with Dr. Griffin of oncology.    11/22/2024  Hemoglobin dropping to 6.3.  Being transfused 1 unit of PRBC.    Bilateral pleural effusion- (present on admission)  Assessment & Plan  11/21/2024  As per previous CT scan.  Will need to consider diuresis once stable    Hypoglycemia due to insulin  Assessment & Plan  11/22/2024  Improving with decreasing insulin      Hypokalemia- (present on admission)  Assessment & Plan  11/19/2024  Likely from poor oral intake likely from poor oral intake and refeeding syndrome  Continue K-Lyte for goal greater than 4    Encounter for hospice care discussion- (present on admission)  Assessment & Plan  11/19/2024  Per discussion Dr. Griffin of oncology recommending further goals of care discussion given poor clinical state and poor prognosis.  I discussed at length with the patient's daughter at bedside regarding  patient's likelihood of aspirating again although he is improving with IV antibiotics appears to be somewhat stable at this time.  She stated that the patient previously had pneumonia and improved with antibiotics but then deteriorated after he ate again.  I explained to her that this is likely indication that the patient is nearing the end of his life.  I explained the option of a G-tube, which could decrease the risk but not eliminate the risk of aspiration.  It may only prolong his suffering and inevitable death.  I explained that the patient had not been improving in the past 2 weeks, which is considered a prolonged hospitalization.  Daughter appeared to have difficulty grasping this message and say that she 1 to see how the patient will proceed in the next 2 weeks.      Discussed with case management and nursing leadership.  Recommending discharge to LTAC.  I placed referral for LTAC.    Advance care planning total time 16 minutes    ESTHER (acute kidney injury) (HCC)  Assessment & Plan  Likely sepsis primarily with contribution from medications  Adequately resuscitated  UOP 600ml given body weight of 70Kg this is borderline adequate output  Renally dose medications as appropriate  Serial BMP  Monitor UOP  Not a candidate for RRT    11/20/2024  Resolved         Septic shock (HCC)  Assessment & Plan  Present on admission  Source respiratory  MRSA from sputum  Possible fungal: beta D glucan negative.   Serum Aspergillus galactomannan, Coccidioides serology are still pending.  Recent Karius at outside hospital was negative for fungal organisms   Probable aspiration pneumonia  Completed steroids 11/14  Stable off of midodrine and steroids  ID managing antibiotics: Have stopped vancomycin due to renal function, not giving linezolid due to platelet count.  Currently on ceftaroline, acyclovir and posaconazole the last two for prophylaxis  Prophylactic agents ongoing for severe neutropenia  Amphotericin discontinued by  ID  Adequately fluid resuscitated        Acute respiratory failure with hypoxia (HCC)  Assessment & Plan  Pneumonia   MRSA positive cultures from sputum  Possible fungal etiology: Beta D glucan neg.  Other serologies pending as noted above  DNR/DNI confirmed with daughter  Moved to unit for aggressive pulmonary toilet and oral care  O2 demand has decreased and he is now on RA/2 LNC  RT protocols  Oral care every-2 hours  Aspiration precautions, swallow eval pending  Hydrocortisone added to antibiotic regimen for severe pneumonia with respiratory failure ID managing antibiotics  If patient clinically deteriorates transition to comfort care/hospice, discussed with daughter and son who are in agreement     11/20/2024  Continues to be tachypneic with respiratory rate of 28.  Repeat ABG and chest x-ray ordered.  Continue supplemental oxygen    11/22/2024  Increasing oxygen requirements to 2.5 LPM  Start forced diuresis with furosemide 20 mg IV twice daily.    Continuous cardiac monitoring during forced diuresis.    Elevated LFTs  Assessment & Plan  improving  RUQUS ordered-> normal  Avoid hepatotoxins      Dysphagia  Assessment & Plan  Still has altered level of conciousness  If he starts to clear will re consult SLP  [employer] if very high risk for aspiration  Currently getting nutrition via NGT  Oral care every 1-2-hour  CT soft tissue neck without airway issue or obvious abscess/obstructing process  Now more alert and off HFNC: re consult SLP      11/22/2024  Tolerating tube feeds at 35 mL/h.  Increasing to 45 at goal.    Pressure injury of deep tissue of sacral region- (present on admission)  Assessment & Plan  11/20/2024  As per history  Likely due to severe protein calorie nutrition.    Severe malnutrition (HCC)- (present on admission)  Assessment & Plan  Nutrition following  Family okay with soft flexible feeding tube and enteral nutrition  Currently at goal on tube feeds  Last seen by SLP on 11/11; pt now more  alert.  Will ask them to re-evaluate    11/20/2024  Continue tube feeds    11/21/2024  Resume tube feeds at 10 mL/h    GERD (gastroesophageal reflux disease)- (present on admission)  Assessment & Plan  With hydrocortisone and thrombocytopenia -continue IV PPI daily for prophylaxis  Antireflux measures       Hypomagnesemia- (present on admission)  Assessment & Plan  11/19/2024  Magnesium down to 1.3.  Likely from refeeding syndrome  I will order replacement for goal greater than 4.    11/20/2024  magnesium of 1.8 being replaced.     Hypophosphatemia- (present on admission)  Assessment & Plan  11/19/2024  Likely from refeeding syndrome  Neutra-Phos I ordered hypophosphatemia    11/20/2024  Continue to replace with Neutra-Phos    Neutropenic fever (HCC)- (present on admission)  Assessment & Plan  11/20/2024  No further fevers in 48 hours.    MRSA pneumonia (HCC)- (present on admission)  Assessment & Plan  Was on linezolid and vancomycin  Continue ceftaroline for now   isolation precautions  ID following    11/19/2024  Continue ceftaroline      Dyslipidemia- (present on admission)  Assessment & Plan  11/19/2024  As per history.  Defer statin at this time given multiple other comorbidities and active medical issues.    Type 2 diabetes mellitus, without long-term current use of insulin (HCC)- (present on admission)  Assessment & Plan  Patient has been quite hyperglycemic  We have titrated up his glargine: currently on 45 units  Now running mid to high 100s; monitor over next 24hrs and titrate glargine as appropriate  Continue a high sliding scale  A1c 7.32 months ago    11/21/2024  Ongoing hypoglycemia blood glucose 77   Decrease glargine insulin to 35 units daily.  Currently being held today due to aspiration event and withholding of tube feeds.    11/22/2024  Resume glargine insulin at 20 units daily.  As the patient is tolerating tube feeds.    Thrombocytopenia (HCC)- (present on admission)  Assessment &  Plan  Secondary to pancytopenia from aplastic anemia  Back in single digits again today: giving one unit of platelets  No signs of active bleeding on exam today    11/19/2024  No further transfusion required overnight  Platelets improving to 18 following transfusion yesterday    11/20/2024  Platelets dropping to 5  Patient has been transfused 1 unit platelets  Monitor closely for transfusion reaction and bleeding.    11/21/2024  Platelets improving to 20 following 1 unit transfusion    11/22/2024  Platelets dropping to 7.  Transfusing 1 unit platelets.    Pancytopenia (HCC)  Assessment & Plan  BM biopsy consistent with severe aplastic anemia  Also consider infection/nutritional disorder/toxic myelosuppression.      Diagnostic evaluation in september and at [facility], viral studies negative, hepatitis panel negative,   Oncology following   On cyclosporine 100 bid, but cannot take orals currently  Promacta (eltrombopag) for thrombocytopenia 75 mg daily  Continue prophylactic antimicrobials  B12/MMA, folate levels resent, previously low     Tranfuse PRBC for <7  Transfuse platelets < 10, may warrant higher threshold given possibility of DAH though CT doesn't look strikingly like DAH  CT more likely MRSA pneumonia with positive cultures     Discuss with hematology regarding prognosis, not responsive to G-CSF in the past        VTE prophylaxis:   SCDs/TEDs   pharmacologic prophylaxis contraindicated due to Thrombocytopenia and anemia requiring transfusions      I have performed a physical exam and reviewed and updated ROS and Plan today (11/22/2024). In review of yesterday's note (11/21/2024), there are no changes except as documented above.      Patient is critically ill.   The patient continues to have: Anemia with hemoglobin of 6.3 and thrombocytopenia platelets of 7.  The vital organ system that is affected is the: Hematologic, cardiovascular, and neurologic  If untreated there is a high chance of deterioration into:  Tissue ischemia, coma, cardiovascular collapse, cardiac arrest, and eventually death.  The critical care that I am providing today is: Extensive data review with frequent monitoring patient's vital signs and clinical assessment at bedside.  I am continuing transfusion of 1 unit PRBC as well as 1 unit of platelets.  Time spent coordinating care with bedside nurse discussing plan of care with the daughter at bedside.  The critical that has been undertaken is medically complex.   There has been no overlap in critical care time.   Critical Care Time not including procedures: 34 minutes

## 2024-11-24 PROBLEM — R54 ADVANCED AGE: Status: ACTIVE | Noted: 2024-01-01

## 2024-11-24 PROBLEM — N18.2 ACUTE RENAL FAILURE WITH ACUTE RENAL CORTICAL NECROSIS SUPERIMPOSED ON STAGE 2 CHRONIC KIDNEY DISEASE (HCC): Status: ACTIVE | Noted: 2024-01-01

## 2024-11-24 PROBLEM — R62.7 FAILURE TO THRIVE IN ADULT: Status: ACTIVE | Noted: 2024-01-01

## 2024-11-24 PROBLEM — R13.12 OROPHARYNGEAL DYSPHAGIA: Status: ACTIVE | Noted: 2024-01-01

## 2024-11-24 PROBLEM — B95.62 MRSA BACTEREMIA: Status: ACTIVE | Noted: 2024-01-01

## 2024-11-24 PROBLEM — N17.1 ACUTE RENAL FAILURE WITH ACUTE CORTICAL NECROSIS (HCC): Status: ACTIVE | Noted: 2024-01-01

## 2024-11-24 PROBLEM — N18.2 STAGE 2 CHRONIC KIDNEY DISEASE: Status: ACTIVE | Noted: 2024-01-01

## 2024-11-24 NOTE — PROGRESS NOTES
Oncology/Hematology Progress Note               Author: Lucia Griffin M.D. Date & Time created: 11/23/2024  4:10 PM     Reason for Admission/Consult: Aplastic Anemia  Primary Heme/Onc: Dr. Molina    Interval History:  Pt febrile yesterday and worsening respiratory failure. He is breathing at 40-45 per minute now. He is unconscious at this time. Pt son, Julien, is at the bedside and explained to him that our recommendations are for comfort care so he can die comfortably. He is struggling to breath and is a DNR/DNI and thus I recommend comfort based approach to his care. Julien wanted me to talk with his brother. Spoke Spring by phone, explained to him that his clinical condition has worsened and that the recommendations are to proceed with comfort cares. Spring was in complete agreement with this plan and reports he spoke to his brother and sister and that all of the family are in agreement.     Review of Systems:  Review of Systems   Unable to perform ROS: Medical condition     Physical Exam:  Physical Exam  Constitutional:       General: He is in acute distress.      Appearance: He is ill-appearing.   HENT:      Head: Normocephalic and atraumatic.      Right Ear: External ear normal.      Left Ear: External ear normal.      Nose: Nose normal.      Mouth/Throat:      Pharynx: Oropharynx is clear. No oropharyngeal exudate.   Eyes:      General: No scleral icterus.     Conjunctiva/sclera: Conjunctivae normal.   Cardiovascular:      Rate and Rhythm: Regular rhythm. Tachycardia present.   Pulmonary:      Effort: Respiratory distress present.      Breath sounds: Rales present.   Abdominal:      General: Abdomen is flat. Bowel sounds are normal. There is no distension.      Palpations: Abdomen is soft.   Musculoskeletal:      Cervical back: Neck supple. No rigidity.      Right lower leg: Edema present.      Left lower leg: Edema present.   Skin:     General: Skin is warm.      Coloration: Skin is pale. Skin is not  jaundiced.      Findings: Bruising present.   Neurological:      General: No focal deficit present.   Psychiatric:      Comments: Unconscious, does not respond to voice         Labs:  Recent Labs     24  1906   NRNOO75S 7.60*   [id number] 27.3*   [id number] 56.2*   IAEH2IZZ 90.2*   ARTHCO3 26   [id number] room air   ARTBE 4*         Recent Labs     24  0020 24  0030 24  0015   SODIUM 128* 129* 133*   POTASSIUM 3.6 3.3* 3.3*   CHLORIDE 96 97 99   CO2 26 26 29   BUN 28* 30* 31*   CREATININE 0.68 0.74 0.79   MAGNESIUM 1.7 2.1 1.5   PHOSPHORUS 3.2 4.1 3.0   CALCIUM 7.5* 7.5* 7.6*     Recent Labs     24  0020 24  0030 24  0015   ALTSGPT    ASTSGOT    ALKPHOSPHAT 126* 106* 122*   TBILIRUBIN 2.8* 3.3* 4.2*   GLUCOSE 95 115* 120*     Recent Labs     24  0020 24  0030 24  0015   RBC 2.37* 2.15* 2.36*   HEMOGLOBIN 7.0* 6.3* 7.0*   HEMATOCRIT 19.5* 17.9* 19.1*   PLATELETCT 20* 7* 34*     Recent Labs     24  0020 24  0030 24  0015   WBC 0.6* 0.7* 0.4*   NEUTSPOLYS 1.00* 1.20* 1.80*   LYMPHOCYTES 99.00* 98.40* 98.20*   MONOCYTES 0.00 0.00 0.00   EOSINOPHILS 0.00 0.00 0.00   BASOPHILS 0.00 0.00 0.00   ASTSGOT    ALTSGPT  17   ALKPHOSPHAT 126* 106* 122*   TBILIRUBIN 2.8* 3.3* 4.2*     Recent Labs     24  0020 24  0030 24  0015   SODIUM 128* 129* 133*   POTASSIUM 3.6 3.3* 3.3*   CHLORIDE 96 97 99   CO2 26 26 29   GLUCOSE 95 115* 120*   BUN 28* 30* 31*   CREATININE 0.68 0.74 0.79   CALCIUM 7.5* 7.5* 7.6*     Hemodynamics:  Temp (24hrs), Av.3 °C (100.9 °F), Min:36.8 °C (98.2 °F), Max:39.5 °C (103.1 °F)  Temperature: 36.8 °C (98.2 °F)  Pulse  Av.7  Min: 62  Max: 127   Blood Pressure : (!) 144/71     Respiratory:    Respiration: (!) 32, Pulse Oximetry: 93 %     Work Of Breathing / Effort: Moderate;Tachypnea  RUL Breath Sounds: Crackles, RML Breath Sounds: Crackles, RLL Breath Sounds: Crackles, YAZAN Breath  Sounds: Crackles, LLL Breath Sounds: Crackles  Fluids:    Intake/Output Summary (Last 24 hours) at 11/18/2024 1135  Last data filed at 11/18/2024 0900  Gross per 24 hour   Intake 912 ml   Output 800 ml   Net 112 ml        GI/Nutrition:  No orders of the defined types were placed in this encounter.    Medical Decision Making, by Problem:  Active Hospital Problems    Diagnosis     *Multifocal pneumonia [J18.9]     Septic shock (HCC) [A41.9, R65.21]     ESTHER (acute kidney injury) (HCC) [N17.9]     Goals of care, counseling/discussion [Z71.89]     Respiratory failure (HCC) [J96.90]     Dysphagia [R13.10]     Elevated troponin [R79.89]     Elevated LFTs [R79.89]     Pressure injury of deep tissue of sacral region [L89.156]     Aplastic anemia (HCC) [D61.9]     Neutropenic fever (HCC) [D70.9, R50.81]     Hypophosphatemia [E83.39]     Hypomagnesemia [E83.42]     Petechial rash [R23.3]     Hypertension [I10]     GERD (gastroesophageal reflux disease) [K21.9]     Seasonal allergies [J30.2]     Severe malnutrition (HCC) [E43]     MRSA pneumonia ([facility]) [J15.212]     Dyslipidemia [E78.5]     Type 2 diabetes mellitus, without long-term current use of insulin (HCC) [E11.9]     Thrombocytopenia ([facility]) [D69.6]        Assessment and Plan:    Severe aplastic anemia - he was initially diagnosed in September 2024.  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  On 11/6/2024 he was transferred for consideration of initiation of cyclosporine and eltrombopag.  We are currently working on obtaining outpatient delivery of eltrombopag due to supply in hospital - this has been denied by insurance and an appeal has been in process.    - The patient's clinical condition has severely worsened overnight primarily with worsening respiratory failure likely from worsening aspiration/pneumonia and is actively dying at this time.See above for discussions with family, plan to change to comfort care so he can die peacefully.    Quality-Core  Measures   Reviewed items::  Labs reviewed and Medications reviewed  DVT prophylaxis pharmacological::  Contraindicated - High bleeding risk    Lucia Griffin MD  [facility]  [phone]

## 2024-11-24 NOTE — ASSESSMENT & PLAN NOTE
11/23/2024  Patient is actively dying.  Significant concerns from nursing at the sustained medical treatment is prolonging the suffering for the patient.  I do not believe that the patient will benefit from any further medical therapies.  Agree with transitioning the patient to comfort care measures only.  Discussed with Dr. Griffin of oncology.  Discussed with patient's family.

## 2024-11-24 NOTE — DISCHARGE SUMMARY
Death Summary    Cause of Death  Cardiopulmonary arrest due to acute respiratory failure with hypoxemia due to recurrent aspiration MRSA pneumonia due to oropharyngeal dysphagia due to advanced age and failure to thrive in setting of end-stage aplastic anemia    Comorbid Conditions at the Time of Death  Principal Problem:    Multifocal pneumonia (POA: Yes)  Active Problems:    MRSA bacteremia (POA: No)    Aplastic anemia (HCC) (POA: Yes)    Refeeding syndrome (POA: Yes)    Aspiration pneumonia (HCC) (POA: Yes)    Constipation (POA: Yes)    Septic encephalopathy (POA: No)    Thrombocytopenia (HCC) (POA: Yes)    Type 2 diabetes mellitus, without long-term current use of insulin (HCC) (POA: Yes)    Dyslipidemia (POA: Yes)    MRSA pneumonia (HCC) (POA: Yes)    Neutropenic fever (HCC) (POA: Yes)    Hypophosphatemia (POA: Yes)    Hypomagnesemia (POA: Yes)    Petechial rash (POA: Yes)    Hypertension (POA: Yes)    GERD (gastroesophageal reflux disease) (POA: Yes)    Seasonal allergies (POA: Yes)    Severe protein-calorie malnutrition (HCC) (POA: Yes)    Pressure injury of deep tissue of sacral region (POA: Yes)    Elevated troponin (POA: Clinically Undetermined)    Elevated LFTs (POA: Unknown)    Acute respiratory failure with hypoxia (HCC) (POA: Unknown)    Septic shock (HCC) (POA: Unknown)    Acute renal failure with acute renal cortical necrosis superimposed on stage 2 chronic kidney disease (HCC) (POA: Unknown)    Goals of care, counseling/discussion (POA: Unknown)    Encounter for hospice care discussion (POA: Yes)    Hypokalemia (POA: Yes)    Hypoglycemia due to insulin (POA: No)    Bilateral pleural effusion (POA: Yes)    DNR (do not resuscitate) (POA: Yes)    Comfort measures only status (POA: No)    Stage 2 chronic kidney disease (POA: Yes)    Oropharyngeal dysphagia (POA: No)    Advanced age (POA: Yes)    Failure to thrive in adult (POA: Yes)  Resolved Problems:    Admission for chemotherapy (POA: Yes)     Transaminitis (POA: No)    Acute respiratory failure with hypoxemia (HCC) (POA: Yes)      History of Presenting Illness and Hospital Course  Miri Joseph is a 84 y.o. male admitted 11/6/2024 for inpatient chemotherapy for aplastic anemia.  This is a pleasant Chantelle speaking gentleman with a history of diabetes mellitus type 2, primary hypertension, GERD, dyslipidemia, and recently diagnosed aplastic anemia.  He was transferred to [facility] from [facility] to initiate inpatient chemotherapy.  He was admitted at the outside hospital on October 19, 2024 for treatment of neutropenic fever was treated with a course of linezolid for MRSA pneumonia.  On arrival to [facility], patient was on posaconazole, fluoroquinolone, and acyclovir.  Repeat sputum cultures were positive for MRSA.  He was initiated again on linezolid and on the same day moved to the intensive care unit due to worsening respiratory status.  He required high flow oxygen and required IV vasopressors.  He was initiated on tube feeds.    Patient's respiratory and hemodynamic status stabilized and was subsequently transferred out of the intensive care unit.  Infectious disease was consulted to assist with antimicrobial therapy and was continued on ceftaroline as well as atovaquone and acyclovir.  Patient was noted to be fluid overloaded with low sodium of 129 and was started on IV diuresis with correction of his hyponatremia.  Despite best efforts to optimize the patient medically, he continued to aspirate and declined rapidly on 11/23/2024.  Patient was transition to comfort care measures per discussion with the patient's family members as all medical therapy appeared to be futile and the patient was DNR/DNI status.  Patient passed away peacefully overnight.      Death Date: 11/24/24   Death Time: 0235       Pronounced By (RN1): Peggy Garner  Pronounced By (RN2): Kaila Mendez

## 2024-11-25 LAB
BACTERIA BLD CULT: ABNORMAL
SIGNIFICANT IND 70042: ABNORMAL
SITE SITE: ABNORMAL
SOURCE SOURCE: ABNORMAL

## 2024-11-25 ASSESSMENT — ENCOUNTER SYMPTOMS: FEVER: 1

## 2024-11-26 PROBLEM — G93.41 SEPTIC ENCEPHALOPATHY: Status: ACTIVE | Noted: 2024-11-26

## 2024-11-27 NOTE — DOCUMENTATION QUERY
[facility]                                                                       Query Response Note      PATIENT:               STEVEN BECKER  ACCT #:                  [id number]  MRN:                     [id number]  :                      1940  ADMIT DATE:       2024 8:31 PM  DISCH DATE:        2024 2:35 AM  RESPONDING  PROVIDER #:        [id number]           QUERY TEXT:    Encephalopathy is documented in the Medical Record. Please specify type.    The patient's Clinical Indicators include:   ID Progress Note:  encephalopathic, not following simple commands   Hospitalist Note:  ROS limited by mental status.     Palliative Note: Acute encephalopathy, delirium   Risk Factors: acute respiratory failure, pna, sepsis, chemotherapy for aplastic anemia  Treatment: supplemental O2, antibiotics     Important Note:  if new diagnosis or change to documentation made via query please include in daily documentation and/or DC Summary    Thank you,  Michael Rivera RN, BSN, CCDS  Clinical   Connect via [employer]  Options provided:   -- Metabolic encephalopathy   -- Septic encephalopathy   -- Due to medications or drugs   -- Other type of encephalopathy, please specify   -- Other explanation, (please specify other explanation)   -- Unable to determine      Query created by: Michael Rivera on 2024 1:44 PM    RESPONSE TEXT:    Septic encephalopathy       QUERY TEXT:    Please clarify POA status of Acute respiratory failure:    The patient's Clinical Indicators include:  Vitals Flowsheet :   Admit vitals (2040): SpO2 87% on RA, RR 20 -> 92% on 5L   0816: SpO2 91% on RA, RR 22, pulse 105 -> 92% on 2.5L, RR 24  H&P: clear to auscultation throughout, noisy breathing, normal respiratory effort. Productive cough, treated for MRSA pna at [facility].  Unclear if active pna at this time    Progress Note:  rapid response early morning of 11/9 for acute respiratory failure that required HHF O2 for several hours before transitioning back to NC.  Acute respiratory failure with hypoxemia  Risk Factors: PNA/ recent pna  Treatment: supplemental O2, CXR    Important Note:  if new diagnosis or change to documentation made via query please include in daily documentation and/or DC Summary    Thank you,  Michael Rivera RN, BSN, CCDS  Clinical   Connect via [place]  Options provided:   -- Acute respiratory failure was POA   -- Acute respiratory failure was not POA   -- Other explanation (please specify), please specify   -- Unable to determine      Query created by: Michael Rivera on 11/25/2024 1:54 PM    RESPONSE TEXT:    Acute respiratory failure was POA          Electronically signed by:  YUKI VIVAR MD 11/26/2024 11:42 PM

## 2024-11-28 VITALS
WEIGHT: 163.58 LBS | RESPIRATION RATE: 32 BRPM | SYSTOLIC BLOOD PRESSURE: 77 MMHG | HEART RATE: 91 BPM | DIASTOLIC BLOOD PRESSURE: 52 MMHG | OXYGEN SATURATION: 96 % | TEMPERATURE: 97 F | HEIGHT: 69 IN | BODY MASS INDEX: 24.23 KG/M2

## 2024-11-28 LAB
BACTERIA BLD CULT: NORMAL
SIGNIFICANT IND 70042: NORMAL
SITE SITE: NORMAL
SOURCE SOURCE: NORMAL

## 2024-11-29 ENCOUNTER — TELEPHONE (OUTPATIENT)
Dept: HOSPITALIST | Facility: MEDICAL CENTER | Age: 84
End: 2024-11-29
Payer: MEDICARE

## 2024-11-30 LAB
MYCOBACTERIUM SPEC CULT: NORMAL
RHODAMINE-AURAMINE STN SPEC: NORMAL
SIGNIFICANT IND 70042: NORMAL
SITE SITE: NORMAL
SOURCE SOURCE: NORMAL

## 2024-12-11 LAB
FUNGUS SPEC CULT: ABNORMAL
FUNGUS SPEC CULT: ABNORMAL
FUNGUS SPEC FUNGUS STN: ABNORMAL
SIGNIFICANT IND 70042: ABNORMAL
SITE SITE: ABNORMAL
SOURCE SOURCE: ABNORMAL

## 2025-01-09 NOTE — ANESTHESIA POSTPROCEDURE EVALUATION
Patient: Miri Joseph    Procedure Summary       Date: 09/05/24 Room / Location:  ENDOSCOPIC ULTRASOUND ROOM / SURGERY [facility]    Anesthesia Start: 1508 Anesthesia Stop: 1546    Procedure: EGD, WITH COLONOSCOPY (Abdomen) Diagnosis: (Pending lab results)    Surgeons: Alexis Anglin M.D. Responsible Provider: Dinh Ashford D.O.    Anesthesia Type: MAC ASA Status: 2            Final Anesthesia Type: MAC  Last vitals  BP WNL       Temp WNL     Pulse WNL      Resp WNL       SpO2 WNL         Anesthesia Post Evaluation    Patient location during evaluation: PACU  Patient participation: complete - patient participated  Level of consciousness: awake and alert    Airway patency: patent  Anesthetic complications: no  Cardiovascular status: hemodynamically stable  Respiratory status: acceptable  Hydration status: euvolemic    PONV: none          No notable events documented.     Nurse Pain Score: 0 (NPRS)           Endocrinology Yes

## 2025-04-16 NOTE — PROGRESS NOTES
[facility] Progress Note      Admit Date: 11/6/2024    Resident(s): Fani Walker M.D.  Attending: MARITZA CAT/ Dr. Conteh    Date & Time:   11/13/2024   0700  Patient ID:    Name:             Miri Joseph   YOB: 1940  Age:                 84 y.o.  male   MRN:               [id number]    HPI:   84 y.o. male who presented 11/6/2024 with medical history of type II DM, recent diagnosis of aplastic anemia who was transferred to [facility] for oncology care.  Evidently patient admitted to [facility] mid-October was treated for neutropenic fever with pneumonia and was noted to have pancytopenia.  He had a bone marrow biopsy performed consistent with severe aplastic anemia.  He was specifically transferred over here to obtain medical oncology services into initiate inpatient treatment with Promacta and cyclosporine which was initiated upon admission.     Throughout his hospitalization including [facility] he has had respiratory failure and hypoxemia and pulmonary infiltrates, initially had cultures at the [facility] with MRSA he was treated with broad-spectrum antibiotics for a course of pneumonia therapy there and also treated with antifungals given nodular pulmonary opacities and immunocompromise but reportedly had a negative galactomannan and Karius there. He was on posi, then amphoB, then back to posi as dictated by ID at [facility]. Completed course of linezolid and cefepime as well.     According to his discharge summary from 10/19/2024 from [facility] he had a mopped assist, pneumonia aplastic anemia and was treated for neutropenic fever with presumed pneumonia.     Ultimately he was transferred to [facility] for the initiation of chemotherapy and medical oncology evaluation.     During ED on 11/9 I was called to his bedside for respiratory stress, work of breathing, fatigue, and worsening oxygenation requiring max high flow.  Patient to CT was performed today showing dense bilateral  Vestibular Physical Therapy Initial Examination Note    Patient Name: Desiree Cochran  MRN Number: [id number]  Initial Examination Date: 4/16/2025  Referring Clinician: Betsy Schneider MD  Reason for Visit: Vertigo    Insurance  Visit Number: 1   Approved Visits:   Certification/ POC Period:  Coverage: Payor: [employer]ICARE / Plan: [employer] MEDICARE ADVANTAGE / Product Type: *No Product type* /     Precautions/ History  Right side sciatica, some numbness.     Problem List  Problem List Items Addressed This Visit           ICD-10-CM    Vertigo R42     Subjective  Vertigo goes and comes. Since February she has been dizzy all the time. Dizziness 20 years or more, medication then. The first instance of dizziness, laid in bed and room was moving. Laying on her left she has movement. She has pain in her left ear, bothers her all the time. Bending forward, feels like she is going to fall over. She feels off balance. Did have a fall April of last year. No treatment for dizziness so far. Increasing water intake, did not make a change. Arthritis in her neck, stiffness and pain on the outside, hurts when she turns her head. Drives, short distances. Avoids stairs, lightheaded and off balance, fear of falling down stairs. Goes down stairs backwards since dizziness. Does not sew as much because her head has to look downwards. Cannot quickly move her head. Lives with her family, daughter and grand kids.     Prior level of function: Less dizziness.   Patient Goals: Reduce dizziness.    Inspection  Gait: Slow cautious, elevator made her more dizzy.  Walking with vertical head movements:   Walking with horizontal head movements:    Objective       Cervical AROM Left Right Center Comments   Rotation 12° 15°     Side Flexion 19° 10°     Flexion   40°    Extension   22°      Canalith Testing Left Right Comments   Posterior Canal  none none loaded celi-hallpike   Horizontal Canal none none side lying       [place] SOP  condition 1: Normal  condition  "2: Normal  condition 3: Normal  condition 4: Normal  condition 5: Normal  condition 6: Sway  condition 7: Left     Occulomotor Testing  Saccades Vertical: Normal  Saccades Horizontal: Normal  Gaze Center: Normal  Gaze Right: Normal  Gaze Left: Normal  Gaze Upward: Normal  Optokinetic Reflex: Normal  Convergence: Normal    Vestibulo-Ocular Reflex Testing  Head Thrust Test/ Head Impulse Test:   Head Shake Without Fixation:     Assessment      Problem List: {amstewart Vestibular Problem List:18175}    Plan  Can assess passive accessory movement. Screen BPPV again.     Planned interventions include: {Inverventions:81938::\"Patient education\",\"Home exercise program\"}    {1:50387} / Week for {6 Weeks:99908}    Treatment  Therapeutic Exercise   - verbally instructed to perform cervical AROM, 1-2 min    Home Program      Goals  - Full return to prior level of function to allow continued working capability.  - Reduce DHI outcome measure goal to less than or equal to 20 for meaningful and clinical improvements in dizziness condition.  - Full resolution of dizziness to improve quality of life.  - Complete resolution of postural dizziness for improved ability to sleep, transfer in bed, bend down and look upwards.  - Patient to have no falls and negative test on condition 7 within ALISIA SOP testing to improve postural control and balance.  - Increase cervical AROM greater than or equal to 60° into flexion, 35° into extension, 30° bilateral into side bending and 55° bilateral into rotation without pain for ease of gazing while driving and neck movement during chores.  - Patient to complete VOR x1 on stable surface, feet shoulder width apart at 100 bpm for 1 minute without onset of symptoms for improve VOR function and reduced contribution to overall dizziness.  - All oculomotor testing WNL and without onset of symptoms for reduction in optokinetic contribution to dizziness    Plan of care developed in agreement with patient. " consolidations with scattered nodularity involving most lobes of the lung.  Patient was transferred to ICU for further care- Dr. Pineda H&P    Interval Events:      :   Afebrile  SBP 120s to 140s  On room air, saturating 91 to 92%  Midodrine   Of Levophed  Oriented x 1  Hemoglobin 8.8, platelet count 51  WBC 0.5  Creatinine improved to 1.75 from 1.87  No signs of bleeding  Urine 635, but +2000  On tube feeds and flushes  Following cultures  On ceftaroline  Zosyn  Acyclovir  Atovaquone  Hydrocortisone      Physical therapy Occupational Therapy  Wean off hydrocortisone  Increase Lantus to 15 units and transition to high sliding scale insulin   Wean off midodrine  Patient medically stable for transfer to [facility]      : T-35.5              Pulse rate 69 to 80s              /150               Hemoglobin 6.2 from 8.4               Platelets 6              WBC 0.6             Sodium 138, potassium 3.5, magnesium 2.9, phosphorus 2.4            Creatinine 1.87 from 1.42, CO2 18           On Levophed            Fungal labs pending            IV ceftaroline, renally dose per ID recommendations            Hold amphotericin B due to worsening kidney function            Transfuse 1 unit platelet and Transfuse packed cells              Resume Lantus 10 + med S              Replete potassium             Wean off levophed              Wean off steroids                : Add midodrine, albumin, continue tube feeds      11/10:   On HFNC  Oriented x2   SR/-120's  -160's  AB.48/36/53   CXR: Bilateral opacities, similar to prior  Tmax 99.9  WBC 0.5  Platelet 48 from 64  Sputum culture positive for  MRSA  Blood cultures negative so far  RUQ U/S unremarkable  SLP eval  Change linezolid to vancomycin for thrombocytopenia  switch voriconazole to IV amphotericin B per ID  Continue atovaquone  Cyclosporine  Zosyn  Follow cultures  ID recommendations of bronchoscopy with fungal, AFB, regular cultures,  "    Personal Factors Affecting Care: {Personal Factors Affecting Care:36394}  PT Clinical Presentation: {RCTPhysical Therapy Clinical Presentation:64906::\"Stable and/or uncomplicated characteristics\"}  Rehab Potential: {Rehab Potential:46497}       Screening  Frequency  Date Last Completed   Spiritual and Cultural Beliefs   Screening  each visit or episode of care 4/10/2025   Falls Risk Screening  every ambulatory visit    Pain Screening  annually at primary care visit  1/26/2023   Domestic Violence screening  annually at primary care visit 4/10/2025   Elder Abuse Screening  annually at primary care visit 4/10/2025   Depression Screening  annually in the primary care setting 8/9/2024   Suicide Risk Screening  annually in the primary care setting 4/10/2025   Nutrition and Food Insecurity   Screening  at least annually at primary care visit     Key Learner  annually in the primary care setting 4/10/2025   Drug Screen  2/11/2025  9:10 AM   Alcohol Screen  2/11/2025  9:10 AM   Advance Directive  4/10/2025                        " "however patient however like patient unlikely to tolerate procedure with his pancytopenia  Add IV thiamine  ID and  oncology following  Oral care      Review of Systems   Unable to perform ROS: Intubated       PHYSICAL EXAM  Vitals:    11/13/24 1000 11/13/24 1035 11/13/24 1100 11/13/24 1200   BP: 117/56  133/63 112/53   Pulse: 78 87 89 85   Resp: 19 18 (!) 31 (!) 27   Temp:    (!) 35.7 °C (96.3 °F)   TempSrc:    Bladder   SpO2: 93% 90% 90% 96%   Weight:       Height:         Body mass index is 24.06 kg/m².  /53   Pulse 85   Temp (!) 35.7 °C (96.3 °F) (Bladder)   Resp (!) 27   Ht 1.753 m (5' 9\")   Wt 73.9 kg (162 lb 14.7 oz)   SpO2 96%   BMI 24.06 kg/m²   O2 therapy: Pulse Oximetry: 96 %, O2 (LPM): 2, O2 Delivery Device: Silicone Nasal Cannula    Physical Exam  Constitutional:       General: He is not in acute distress.     Appearance: He is normal weight. He is ill-appearing. He is not toxic-appearing or diaphoretic.      Comments: HFNC   HENT:      Head: Normocephalic and atraumatic.   Cardiovascular:      Rate and Rhythm: Regular rhythm. Tachycardia present.      Pulses: Normal pulses.      Heart sounds: No murmur heard.  Pulmonary:      Breath sounds: Rhonchi and rales present.      Comments: HFNC  Abdominal:      General: Bowel sounds are normal. There is no distension.      Palpations: Abdomen is soft.      Tenderness: There is no guarding.   Musculoskeletal:      Cervical back: Neck supple. No rigidity.   Lymphadenopathy:      Cervical: No cervical adenopathy.   Skin:     General: Skin is warm and dry.      Coloration: Skin is not cyanotic.      Findings: Bruising present.      Nails: There is no clubbing.   Neurological:      Mental Status: He is lethargic.      Cranial Nerves: Cranial nerves 2-12 are intact.       Respiratory:     Respiration: (!) 27, Pulse Oximetry: 96 %    Chest Tube Drains:            HemoDynamics:  Pulse: 85 Blood Pressure : 112/53      Neuro:      Fluids:  Date 11/13/24 " 0700 - 11/14/24 0659   Shift 0378-9950 3816-4732 3727-0367 24 Hour Total   INTAKE   P.O. 0   0     P.O. 0   0   I.V. 179.5   179.5     Norepinephrine Volume 0   0     Volume (mL) (NS infusion) 179.5   179.5   NG/   150     Intake (mL) (Enteral Tube 11/11/24 12 Fr.) 150   150   IV Piggyback 0   0     Volume (mL) (ceftaroline (Teflaro) 300 mg in  mL IVPB) 0   0   Enteral 400   400     Free Water / Tube Flush 400   400   Free Water 0   0     Free Water 0   0   Shift Total 729.5   729.5   OUTPUT   Urine 305   305     Output (mL) (Urethral Catheter Temperature probe) 305   305   Stool         Number of Times Stooled 1 x   1 x   Shift Total 305   305   .5   424.5        Intake/Output Summary (Last 24 hours) at 11/10/2024 0704  Last data filed at 11/10/2024 0203  Gross per 24 hour   Intake 246 ml   Output 750 ml   Net -504 ml       Weight: 73.9 kg (162 lb 14.7 oz)  Body mass index is 24.06 kg/m².    Recent Labs     11/11/24  0510 11/11/24  1220 11/11/24  1810 11/12/24  0404 11/12/24  1325 11/13/24  0030 11/13/24  0405 11/13/24  1217   SODIUM 132* 118*  --  138  --   --  134*  --    POTASSIUM 3.5* 3.4*   < > 3.5*   < > 5.1 4.9 4.8   CHLORIDE 100 89*  --  108  --   --  105  --    CO2 22 17*  --  22  --   --  20  --    BUN 42* 44*  --  62*  --   --  80*  --    CREATININE 1.23 1.42*  --  1.87*  --   --  1.75*  --    MAGNESIUM 2.8* 2.6*  --  2.9*  --   --  2.8*  --    PHOSPHORUS 2.6  --   --  2.4*  --   --  4.1  --    CALCIUM 8.2* 6.9*  --  8.0*  --   --  8.1*  --     < > = values in this interval not displayed.       GI/Nutrition:  Recent Labs     11/11/24  0510 11/11/24  1220 11/12/24  0404 11/13/24  0405   ALTSGPT 31  --  24 20   ASTSGOT 32  --  20 15   ALKPHOSPHAT 87  --  67 82   TBILIRUBIN 1.7*  --  1.5 2.2*   PREALBUMIN  --   --   --  3.2*   GLUCOSE 261* 629* 124* 342*       Heme:  Recent Labs     11/12/24  0404 11/12/24  1720 11/13/24 0405   RBC 2.03* 3.06* 3.13*   HEMOGLOBIN 6.2* 8.8* 9.1*    HEMATOCRIT 17.5* 25.4* 25.7*   PLATELETCT 6* 51* 30*       Infectious Disease:  Monitored Temp 2  Av.8 °C (96.5 °F)  Min: 35.6 °C (96.1 °F)  Max: 36.2 °C (97.2 °F)  Temp  Av.1 °C (97 °F)  Min: 35.7 °C (96.3 °F)  Max: 36.4 °C (97.6 °F)  Recent Labs     24  0510 24  0404 24  1720 24  0405   WBC 0.6* 0.6* 0.5* 0.5*   NEUTSPOLYS 0.80* 0.00*  --  0.50*   LYMPHOCYTES 98.20* 100.00*  --  99.50*   MONOCYTES 0.00 0.00  --  0.00   EOSINOPHILS 0.00 0.00  --  0.00   BASOPHILS 0.00 0.00  --  0.00   ASTSGOT 32 20  --  15   ALTSGPT 31 24  --  20   ALKPHOSPHAT 87 67  --  82   TBILIRUBIN 1.7* 1.5  --  2.2*       Meds:   ceftaroline (TEFLARO) ivpb  400 mg      hydrocortisone sodium succinate PF  25 mg      midodrine  5 mg      [START ON 2024] insulin GLARGINE  15 Units      insulin lispro  3-14 Units      And    dextrose bolus  25 g      [START ON 2024] posaconazole  300 mg      sodium chloride  4 mL      ipratropium-albuterol  3 mL      famotidine  20 mg      NS  500 mL      NS   1,000 mL (24)    Pharmacy  1 Each      Pharmacy        acyclovir  400 mg      atorvastatin  10 mg      atovaquone  750 mg      loratadine  10 mg      [Held by provider] cycloSPORINE (modified)  100 mg      potassium bicarbonate  25 mEq      NORepinephrine  0-1 mcg/kg/min (Ideal) Stopped (24)    ipratropium-albuterol  3 mL      Respiratory Therapy Consult        MD Alert...Adult ICU Electrolyte Replacement per Pharmacy        MBX (diphenhydrAMINE-lidocaine-Maalox)  5 mL      sodium bicarb 0.5 mEq/mL  10 mL      [Held by provider] tamsulosin  0.4 mg              Imaging:  DX-CHEST-PORTABLE (1 VIEW)   Final Result         1.  Pulmonary edema and/or infiltrates, similar to prior study.      DX-ABDOMEN FOR TUBE PLACEMENT   Final Result         1.  Nonspecific bowel gas pattern in the upper abdomen.   2.  Nasogastric tube tip terminates overlying the expected location of the gastric body.   3.   Patchy bilateral pulmonary infiltrates      DX-ABDOMEN FOR TUBE PLACEMENT   Final Result      1. Appropriate position of the nasogastric tube, replaced in the interval.   2. The left upper extremity PICC catheter now terminates within the azygos vein. Repositioning is recommended.   3. The remainder is stable.      DX-ABDOMEN FOR TUBE PLACEMENT   Final Result      Malpositioned enteric feeding tube likely within the right lower lobe bronchus.      Findings were conveyed to EMILIO Sutton in care of the patient on 11/11/2024 1:59 PM.      IR-PICC LINE PLACEMENT W/ GUIDANCE > AGE 5   Final Result                  Ultrasound-guided PICC placement performed by qualified nursing staff as    above.          DX-ABDOMEN FOR TUBE PLACEMENT   Final Result      1.  Enteric tube has been placed and the tip projects over the stomach.      2.  Nonspecific pulmonary airspace process      US-RUQ   Final Result         1. Normal gallbladder and right upper quadrant ultrasound.      2. Small right pleural effusion.      CT-SOFT TISSUE NECK WITH   Final Result         1. No airway narrowing. Mild uvula swelling suggested. The epiglottis and larynx appear normal.      2. Upper normal size cervical lymph nodes. No cervical mass otherwise.      3. Trace right mastoid sinus fluid. No paranasal sinus fluid. The middle ears are clear.      4. The visible upper chest again shows dense infiltrates with small pleural effusions and mild upper mediastinal adenopathy.      CT-CHEST (THORAX) W/O   Final Result      1.  Multifocal bilateral pulmonary airspace process most consistent with pneumonia, with interval worsening      2.  Bilateral pleural effusion, most small in size, right greater than left      3.  Single enlarged prevascular space lymph node which is most likely reactive      Fleischner Society pulmonary nodule recommendations:   Not Applicable         DX-CHEST-PORTABLE (1 VIEW)   Final Result         1.  Patchy bilateral pulmonary  infiltrates, similar to prior study      DX-CHEST-PORTABLE (1 VIEW)   Final Result      1.  Unchanged BILATERAL pneumonia   2.  Possible RIGHT pleural effusion      DX-CHEST-2 VIEWS   Final Result         1.  Pulmonary edema and/or infiltrates.   2.  Trace left pleural effusion   3.  Atherosclerosis          Assessment and Plan:      * Multifocal pneumonia- (present on admission)  Assessment & Plan       Was treated with course of abx at [facility] for MRSA PNA (linezolid and cefepime)  Since transfer to [facility] has been on ppx dosed antibiotics only (acyclovir, posaconazole, levaquin)     Marked worsening of hypoxemia and lung injury 11/8-11/9 11/9-Sputum gram stain results with MRSA   Severe CAP, ARDS in the setting of severe immunocompromise and neutropenia  CT chest with dense BLL consolidations, scattered nodular densities in multiple lobes certainly concerning for fungal etiology     treatment dose PJP coverage (atovaquone d/t risk of myelosuppression) for now in the event this represent PJP   B glucan and gallactomanan and PJP DFA and fungal sputum cultures sent    Currently on RA       Continue ceftaroline, follow cultures        ID following      ESTHER (acute kidney injury) ([facility])  Assessment & Plan  Clear related to sepsis  Medications may be contributing  Appears euvolemic if not hypervolemic  Avoid nephrotoxins  Renally dose medications  Serial BMP  Monitor UO  Not a candidate for RRT     Function worse, stopping Amphotericin vancomycin switched to ceftaroline  Increasing fluids via GI track both nutrition as well as free water  Urine output low acceptable at this point-updating family    Septic shock (HCC)  Assessment & Plan  This is Septic shock Not present on admission  SIRS criteria identified on my evaluation include: Tachypnea, with respirations greater than 20 per minute and Leukopenia, with WBC less than 4,000  Clinical indicators of end organ dysfunction include Hypotension with systolic blood pressure less  than 90 or MAP less than 65, Thrombocytopenia, with platelets less than 100, and GCS < 15  Indicators of septic shock include: Sepsis present and persistent hypotension despite fluid resuscitation   Sources is: Pulmonary, sputum culture growing MRSA and chest x-ray consistent with pneumonia, patient also with persistent pancytopenia and no neutrophils  Sepsis protocol initiated  Crystalloid Fluid Administration: Fluid resuscitation ordered per standard protocol -patient has already received fluids and with severe hypoalbuminemia and IV fluid shortage will administer IV albumin  IV antibiotics as appropriate for source of sepsis  Reassessment: I have reassessed the patient's hemodynamic status     Actively titrating norepinephrine  Continue midodrine  ID managing antibiotics-vancomycin/piperacillin/tazobactam  Prophylactic agents ongoing for severe neutropenia  Amphotericin discontinued by ID  IV albumin 25 g every 6 x 4  Status post multiple liters of fluids and fluid balance positive significant respiratory failure and developing ESTHER-caution with fluids  Lactic acid level improved  Clinically improved tapering hydrocortisone  Additional fluids as clinically prudent, POCUS as needed    Respiratory failure (HCC)  Assessment & Plan  resolved  Reviewed hypoxic respiratory failure secondary to pneumonia  MRSA positive cultures recently at [facility] and they are recurrently positive now  DNR/DNI reaffirmed  Moved to unit for aggressive pulmonary toilet and oral care  HFNC O2 initiated initially on 100% / 60 L flow-since improved to 40/60  Not a candidate for BiPAP or diagnostic bronchoscopy, he would need to be intubated for that given his current status  RT protocols  Oral care every-2 hours  Aspiration precautions, swallow eval pending  Hydrocortisone added to antibiotic regimen for severe pneumonia with respiratory failure ID managing antibiotics  If patient clinically deteriorates transition to comfort care/hospice,  discussed with daughter and son who are in agreement     Niccoli improved tapering steroids    Elevated LFTs  Assessment & Plan  improving  RUQUS ordered-> normal  Avoid hepatotoxins      Dysphagia  Assessment & Plan  Patient with worsening dysphagia over the last few days  Unable to swallow, has been aspirating, weak phonation  Suspect airway in part desiccated due to tachypnea/pneumonia and poor p.o. intake     Swallow evaluation/FEES  High risk for airway scope given thrombocytopenia  Oral care every 1-2-hour  CT soft tissue neck without airway issue or obvious abscess/obstructing process  On tube feeding    Severe malnutrition (HCC)- (present on admission)  Assessment & Plan  Eval pending  Family okay with soft flexible feeding tube and enteral nutrition  Swallow eval failed, small bore soft flexible tube  If necessary cautiously place tube with thrombocytopenia, lidocaine jelly ordered    GERD (gastroesophageal reflux disease)- (present on admission)  Assessment & Plan  With hydrocortisone and thrombocytopenia -continue IV PPI daily for prophylaxis  Antireflux measures       MRSA pneumonia (HCC)- (present on admission)  Assessment & Plan  Was on linezolid and vancomycin  Continue ceftaroline for now   isolation precautions  ID following      Type 2 diabetes mellitus, without long-term current use of insulin (HCC)- (present on admission)  Assessment & Plan  Target normoglycemia  Sugar trending up in a.m. and Lantus dose to be adjusted, changed to high sliding scale  Midday sugars dramatically elevated to 600 on 11/11  Check BMP, beta hydroxybutyric acid, lactic acid and magnesium stat  Beta hydroxybutyric acid not significantly elevated  Insulin infusion overnight brought glucose under control, transition to Lantus/medium SSI 11/12  TF starting 11/12 again,  Lantus and SSI dosing daily and adjust    Thrombocytopenia (HCC)- (present on admission)  Assessment & Plan  Secondary to pancytopenia from aplastic  anemia   Transfuse for platelets < 10 per oncology recommendations    Pancytopenia (HCC)- (present on admission)  Assessment & Plan  BM biopsy consistent with severe aplastic anemia  Also consider infection/nutritional disorder/toxic myelosuppression.      Diagnostic evaluation in september and at [facility], viral studies negative, hepatitis panel negative,   Oncology following   On cyclosporine 100 bid, but cannot take orals currently  Promacta (eltrombopag) for thrombocytopenia 75 mg daily  Continue prophylactic antimicrobials  B12/MMA, folate levels resent, previously low     Tranfuse PRBC for <7  Transfuse platelets < 10, may warrant higher threshold given possibility of DAH though CT doesn't look strikingly like DAH  CT more likely MRSA pneumonia with positive cultures     Discuss with hematology regarding prognosis, not responsive to G-CSF in the past      DISPO: ICU    CODE STATUS: DNAR/DNI

## 2025-06-24 NOTE — ASSESSMENT & PLAN NOTE
Rapid response morning of 11/9 for acutely worsened respiratory status. Troponin obtained during rapid was elevated. BNP elevated as well. EKG was normal, patient without chest pain. Ddimer positive. Ddx for PE or other thrombus, elevation related to acute illness, aplastic process. Patient is not a candidate for thrombolytic therapy due to severe aplastic anemia and thrombocytopenia. Given 20 mg IV lasix during rapid. Patient appearing euvolemic. CXR without pulmonary edema or pleural effusion.    Plan:  -Repeat troponin ordered for 4 hr later  -Telemetry  -Repeat EKG if develops chest pain or other cardiac symptoms.   [FreeTextEntry1] : W/C DOI 5/14/25  Case discussed nature of diagnosis and treatment options discussed MRI R shoulder to evaluate RCT/ injury pathology Ice as reviewed pain guide activities will continue OTC nsaids  CSI offered he prefers to work precautions for any lifting return after MRI for review

## (undated) DEVICE — CANISTER SUCTION 3000ML MECHANICAL FILTER AUTO SHUTOFF MEDI-VAC NONSTERILE LF DISP  (40EA/CA)

## (undated) DEVICE — ELECTRODE 850 FOAM ADHESIVE - HYDROGEL RADIOTRNSPRNT (50/PK)

## (undated) DEVICE — FIBER GREEN LIGHT SINGLE USE

## (undated) DEVICE — GLOVE BIOGEL SZ 7.5 SURGICAL PF LTX - (50PR/BX 4BX/CA)

## (undated) DEVICE — LACTATED RINGERS INJ 1000 ML - (14EA/CA 60CA/PF)

## (undated) DEVICE — COVER LIGHT HANDLE FLEXIBLE - SOFT (2EA/PK 80PK/CA)

## (undated) DEVICE — SENSOR SPO2 NEO LNCS ADHESIVE (20/BX) SEE USER NOTES

## (undated) DEVICE — SODIUM CHL. IRRIGATION 0.9% 3000ML (4EA/CA 65CA/PF)

## (undated) DEVICE — JELLY SURGILUBE STERILE FOIL 5 GM (150EA/BX)

## (undated) DEVICE — SPONGE GAUZESTER 4 X 4 4PLY - (128PK/CA)

## (undated) DEVICE — TUBING CLEARLINK DUO-VENT - C-FLO (48EA/CA)

## (undated) DEVICE — DRAPE SURGICAL U 77X120 - (10/CA)

## (undated) DEVICE — CANNULA DIVIDED ADULT CO2 - SAMPLE W/FEMALE CONNCT (25/CA)

## (undated) DEVICE — MASK ANESTHESIA ADULT  - (100/CA)

## (undated) DEVICE — PACK CYSTOSCOPY III - (8/CA)

## (undated) DEVICE — SENSOR OXIMETER ADULT SPO2 RD SET (20EA/BX)

## (undated) DEVICE — CONTAINER SPECIMEN BAG OR - STERILE 4 OZ W/LID (100EA/CA)

## (undated) DEVICE — GLOVE BIOGEL PI ORTHO SZ 6 1/2 SURGICAL PF LF (40PR/BX)

## (undated) DEVICE — GLOVE BIOGEL SZ 8 SURGICAL PF LTX - (50PR/BX 4BX/CA)

## (undated) DEVICE — SET LEADWIRE 5 LEAD BEDSIDE DISPOSABLE ECG (1SET OF 5/EA)

## (undated) DEVICE — ELECTRODE DUAL RETURN W/ CORD - (50/PK)

## (undated) DEVICE — SET IRRIGATION CYSTOSCOPY Y-TYPE L81 IN (20EA/CA)

## (undated) DEVICE — PROTECTOR ULNA NERVE - (36PR/CA)

## (undated) DEVICE — GOWN SURGICAL X-LARGE ULTRA - FILM-REINFORCED (20/CA)

## (undated) DEVICE — NEPTUNE 4 PORT MANIFOLD - (20/PK)

## (undated) DEVICE — CONNECTOR HOSE NEPTUNE FOR CYSTO ROOM

## (undated) DEVICE — SUCTION INSTRUMENT YANKAUER BULBOUS TIP W/O VENT (50EA/CA)

## (undated) DEVICE — KIT ANESTHESIA W/CIRCUIT & 3/LT BAG W/FILTER (20EA/CA)

## (undated) DEVICE — JELLY, KY 2 0Z STERILE

## (undated) DEVICE — MASK, LARYNGEAL AIRWAY #4

## (undated) DEVICE — GLOVE BIOGEL PI INDICATOR SZ 7.0 SURGICAL PF LF - (50/BX 4BX/CA)

## (undated) DEVICE — CATHETER FOLEY 22 FR 30CC  - (12EA/CA)

## (undated) DEVICE — SODIUM CHL IRRIGATION 0.9% 1000ML (12EA/CA)

## (undated) DEVICE — WATER IRRIGATION STERILE 1000ML (12EA/CA)

## (undated) DEVICE — GOWN WARMING STANDARD FLEX - (30/CA)

## (undated) DEVICE — TOWELS CLOTH SURGICAL - (4/PK 20PK/CA)

## (undated) DEVICE — PACK MINOR BASIN - (2EA/CA)

## (undated) DEVICE — SLEEVE STERILE  A599T - 30/BX 2BX/CS

## (undated) DEVICE — KIT ROOM DECONTAMINATION

## (undated) DEVICE — SET EXTENSION WITH 2 PORTS (48EA/CA) ***PART #2C8610 IS A SUBSTITUTE*****

## (undated) DEVICE — SYRINGE 30 ML LL (56/BX)

## (undated) DEVICE — BANDAID SHEER STRIP 3/4 IN (100EA/BX 12BX/CA)

## (undated) DEVICE — TOWEL STOP TIMEOUT SAFETY FLAG (40EA/CA)

## (undated) DEVICE — SYRINGE NON SAFETY 5 CC 20 GA X 1-1/2 IN (100/BX 4BX/CA)

## (undated) DEVICE — SYRINGE 3 CC 22 GA X 1-1/2 - NDL SAFETY (50/BX 8BX/CA) DELETE AND POINT TO PREMIER 65230

## (undated) DEVICE — SOD. CHL 10CC SYRINGE PREFILL - W/10 CC (30/BX)

## (undated) DEVICE — HEAD HOLDER JUNIOR/ADULT

## (undated) DEVICE — TEETHGUARD ENT -2BX MIN ORDER- (6EA/BX)